# Patient Record
Sex: FEMALE | Race: WHITE | NOT HISPANIC OR LATINO | Employment: OTHER | URBAN - METROPOLITAN AREA
[De-identification: names, ages, dates, MRNs, and addresses within clinical notes are randomized per-mention and may not be internally consistent; named-entity substitution may affect disease eponyms.]

---

## 2017-01-24 DIAGNOSIS — R10.9 RIGHT FLANK PAIN: ICD-10-CM

## 2017-01-24 RX ORDER — TAMSULOSIN HYDROCHLORIDE 0.4 MG/1
0.4 CAPSULE ORAL DAILY
Qty: 10 CAPSULE | Refills: 1 | Status: SHIPPED | OUTPATIENT
Start: 2017-01-24 | End: 2017-03-03 | Stop reason: SDUPTHER

## 2017-02-27 DIAGNOSIS — R10.9 RIGHT FLANK PAIN: ICD-10-CM

## 2017-02-27 RX ORDER — TAMSULOSIN HYDROCHLORIDE 0.4 MG/1
0.4 CAPSULE ORAL DAILY
Qty: 10 CAPSULE | Refills: 0 | OUTPATIENT
Start: 2017-02-27

## 2017-03-03 DIAGNOSIS — R10.9 RIGHT FLANK PAIN: ICD-10-CM

## 2017-03-03 RX ORDER — TAMSULOSIN HYDROCHLORIDE 0.4 MG/1
0.4 CAPSULE ORAL DAILY
Qty: 10 CAPSULE | Refills: 1 | Status: SHIPPED | OUTPATIENT
Start: 2017-03-03 | End: 2017-05-18 | Stop reason: SDUPTHER

## 2017-03-14 DIAGNOSIS — J20.9 BRONCHITIS, ACUTE, WITH BRONCHOSPASM: ICD-10-CM

## 2017-03-14 RX ORDER — IPRATROPIUM BROMIDE AND ALBUTEROL 20; 100 UG/1; UG/1
SPRAY, METERED RESPIRATORY (INHALATION)
Qty: 1 PACKAGE | Refills: 2 | Status: SHIPPED | OUTPATIENT
Start: 2017-03-14 | End: 2017-06-07 | Stop reason: SDUPTHER

## 2017-04-05 DIAGNOSIS — R10.9 RIGHT FLANK PAIN: ICD-10-CM

## 2017-04-05 RX ORDER — TAMSULOSIN HYDROCHLORIDE 0.4 MG/1
0.4 CAPSULE ORAL DAILY
Qty: 10 CAPSULE | Refills: 1 | OUTPATIENT
Start: 2017-04-05

## 2017-05-18 DIAGNOSIS — R10.9 RIGHT FLANK PAIN: ICD-10-CM

## 2017-05-18 RX ORDER — TAMSULOSIN HYDROCHLORIDE 0.4 MG/1
0.4 CAPSULE ORAL DAILY
Qty: 10 CAPSULE | Refills: 1 | Status: SHIPPED | OUTPATIENT
Start: 2017-05-18 | End: 2017-07-31 | Stop reason: SDUPTHER

## 2017-06-07 DIAGNOSIS — J20.9 BRONCHITIS, ACUTE, WITH BRONCHOSPASM: ICD-10-CM

## 2017-06-07 RX ORDER — IPRATROPIUM BROMIDE AND ALBUTEROL 20; 100 UG/1; UG/1
SPRAY, METERED RESPIRATORY (INHALATION)
Qty: 4 G | Refills: 2 | Status: SHIPPED | OUTPATIENT
Start: 2017-06-07 | End: 2018-03-19 | Stop reason: SDUPTHER

## 2017-07-31 DIAGNOSIS — R10.9 RIGHT FLANK PAIN: ICD-10-CM

## 2017-07-31 RX ORDER — TAMSULOSIN HYDROCHLORIDE 0.4 MG/1
CAPSULE ORAL
Qty: 10 CAPSULE | Refills: 1 | Status: SHIPPED | OUTPATIENT
Start: 2017-07-31 | End: 2018-02-21

## 2017-08-23 DIAGNOSIS — J20.9 BRONCHITIS, ACUTE, WITH BRONCHOSPASM: ICD-10-CM

## 2017-08-23 RX ORDER — IPRATROPIUM BROMIDE AND ALBUTEROL 20; 100 UG/1; UG/1
SPRAY, METERED RESPIRATORY (INHALATION)
Refills: 2 | OUTPATIENT
Start: 2017-08-23

## 2017-08-29 ENCOUNTER — OFFICE VISIT (OUTPATIENT)
Dept: OPTOMETRY | Facility: CLINIC | Age: 62
End: 2017-08-29
Payer: MEDICARE

## 2017-08-29 DIAGNOSIS — H52.7 REFRACTIVE ERROR: ICD-10-CM

## 2017-08-29 DIAGNOSIS — H25.13 NUCLEAR SCLEROSIS, BILATERAL: Primary | ICD-10-CM

## 2017-08-29 DIAGNOSIS — H26.9 CORTICAL CATARACT: ICD-10-CM

## 2017-08-29 DIAGNOSIS — H04.123 DRY EYE SYNDROME, BILATERAL: ICD-10-CM

## 2017-08-29 PROCEDURE — 99999 PR PBB SHADOW E&M-EST. PATIENT-LVL I: CPT | Mod: PBBFAC,,, | Performed by: OPTOMETRIST

## 2017-08-29 PROCEDURE — 92015 DETERMINE REFRACTIVE STATE: CPT | Mod: ,,, | Performed by: OPTOMETRIST

## 2017-08-29 PROCEDURE — 99211 OFF/OP EST MAY X REQ PHY/QHP: CPT | Mod: PBBFAC,PO | Performed by: OPTOMETRIST

## 2017-08-29 PROCEDURE — 92014 COMPRE OPH EXAM EST PT 1/>: CPT | Mod: S$PBB,,, | Performed by: OPTOMETRIST

## 2017-08-29 RX ORDER — ONDANSETRON HYDROCHLORIDE 8 MG/1
TABLET, FILM COATED ORAL
Refills: 0 | COMMUNITY
Start: 2017-08-27 | End: 2021-08-09 | Stop reason: SDUPTHER

## 2017-08-29 RX ORDER — PANTOPRAZOLE SODIUM 40 MG/1
TABLET, DELAYED RELEASE ORAL
Refills: 0 | Status: ON HOLD | COMMUNITY
Start: 2017-08-27 | End: 2019-11-21 | Stop reason: SDUPTHER

## 2017-08-29 NOTE — PROGRESS NOTES
HPI     Presenting Complaint: Pt heretoday for yearly ocular health exam    (+) Pt states near and distance vision has been decline over the last   year.   (-) Pt stopped taking Plaquenil in 2015    Pain:None  Ophthalmic medication / drops:None    (-) headaches  (-) diplopia   (-) flashes / (+) hx of floaters      Last edited by Alfie Erwin, OD on 8/29/2017  1:40 PM. (History)            Assessment /Plan     For exam results, see Encounter Report.    Nuclear sclerosis, bilateral    Cortical cataract    Refractive error    Dry eye syndrome, bilateral      Mild cataracts OU. Discussed possible ocular affects of cataracts. Acceptable BCVA OU. Discussed treatment options. Surgery not recommended at this time. Monitor yearly.     Dispensed updated spectacle Rx. Discussed various spectacle lens options. Discussed adaptation period to new specs.     Mild MELISA OU. Discussed ocular affects of dry eyes. Recommend OTC Systane or Refresh gel artificial tears twice daily OU, caution transient blurring of vision with gel use. Discussed chronicity of MELISA. RTC if symptoms not alleviated by continued use of artificial tears.       RTC in 1 year for comprehensive eye exam, or sooner prn.

## 2017-09-28 DIAGNOSIS — Z12.11 COLON CANCER SCREENING: ICD-10-CM

## 2017-10-06 DIAGNOSIS — J20.9 BRONCHITIS, ACUTE, WITH BRONCHOSPASM: ICD-10-CM

## 2017-10-06 RX ORDER — IPRATROPIUM BROMIDE AND ALBUTEROL 20; 100 UG/1; UG/1
SPRAY, METERED RESPIRATORY (INHALATION)
Refills: 2 | OUTPATIENT
Start: 2017-10-06

## 2017-12-29 ENCOUNTER — TELEPHONE (OUTPATIENT)
Dept: FAMILY MEDICINE | Facility: CLINIC | Age: 62
End: 2017-12-29

## 2017-12-29 NOTE — TELEPHONE ENCOUNTER
Spoke with patient, to let her know that the clinic is closed.  Asked patient if she would like an appointment tomorrow with Saturday clinic.  Patient stated that she will be going to the ED.

## 2017-12-29 NOTE — TELEPHONE ENCOUNTER
----- Message from RT Javon sent at 12/29/2017 11:55 AM CST -----  Contact: pt    pt , requesting an appt to be worked in today for possible kidney infection, and she is in pain,  thanks.

## 2018-01-03 DIAGNOSIS — J20.9 BRONCHITIS, ACUTE, WITH BRONCHOSPASM: ICD-10-CM

## 2018-01-05 RX ORDER — IPRATROPIUM BROMIDE AND ALBUTEROL 20; 100 UG/1; UG/1
SPRAY, METERED RESPIRATORY (INHALATION)
Qty: 4 G | Refills: 2 | OUTPATIENT
Start: 2018-01-05

## 2018-02-20 ENCOUNTER — NURSE TRIAGE (OUTPATIENT)
Dept: ADMINISTRATIVE | Facility: CLINIC | Age: 63
End: 2018-02-20

## 2018-02-20 ENCOUNTER — TELEPHONE (OUTPATIENT)
Dept: FAMILY MEDICINE | Facility: CLINIC | Age: 63
End: 2018-02-20

## 2018-02-20 NOTE — TELEPHONE ENCOUNTER
Spoke with patient.  Has been sick for several days with cough.  Today experiencing shortness of breath.  Appointment with Dr Davis for 1pm today.

## 2018-02-20 NOTE — TELEPHONE ENCOUNTER
Was transferred from the scheduling desk. Patient stated that she has had some difficulty breathing which started a few days ago. Reported weak, shallow breathing and SOB even at rest. Thought it was her allergies. Advised to call EMS per protocol however seems hesitant. Informed that I would send a message to the provider's office. Instructed to call back with any additional problems and/or concerns    Reason for Disposition   Slow, shallow and weak breathing    Protocols used: ST BREATHING DIFFICULTY-A-OH

## 2018-02-20 NOTE — TELEPHONE ENCOUNTER
----- Message from Mireya Garcia sent at 2/20/2018 10:33 AM CST -----  Contact: patient  Type: Needs Medical Advice    Who Called:  patient  Symptoms (please be specific):  Shortness of breath  How long has patient had these symptoms:  A week, but it's getting worse  Pharmacy name and phone #: N/A  Best Call Back Number:   Additional Information: Patient calling to schedule an appt today for shortness of breath. Call to pod, no answer. Called and warm transferred to On Call Nurse.

## 2018-02-21 ENCOUNTER — OFFICE VISIT (OUTPATIENT)
Dept: FAMILY MEDICINE | Facility: CLINIC | Age: 63
End: 2018-02-21
Payer: MEDICARE

## 2018-02-21 VITALS
WEIGHT: 224.19 LBS | BODY MASS INDEX: 36.03 KG/M2 | HEART RATE: 103 BPM | SYSTOLIC BLOOD PRESSURE: 114 MMHG | TEMPERATURE: 99 F | DIASTOLIC BLOOD PRESSURE: 73 MMHG | OXYGEN SATURATION: 98 % | RESPIRATION RATE: 20 BRPM | HEIGHT: 66 IN

## 2018-02-21 DIAGNOSIS — J18.9 COMMUNITY ACQUIRED PNEUMONIA, UNSPECIFIED LATERALITY: Primary | ICD-10-CM

## 2018-02-21 PROCEDURE — 99999 PR PBB SHADOW E&M-EST. PATIENT-LVL III: CPT | Mod: PBBFAC,,, | Performed by: FAMILY MEDICINE

## 2018-02-21 PROCEDURE — 96372 THER/PROPH/DIAG INJ SC/IM: CPT | Mod: PBBFAC,PO

## 2018-02-21 PROCEDURE — 99214 OFFICE O/P EST MOD 30 MIN: CPT | Mod: S$PBB,,, | Performed by: FAMILY MEDICINE

## 2018-02-21 PROCEDURE — 99213 OFFICE O/P EST LOW 20 MIN: CPT | Mod: PBBFAC,PO,25 | Performed by: FAMILY MEDICINE

## 2018-02-21 RX ORDER — BENZONATATE 100 MG/1
100 CAPSULE ORAL 3 TIMES DAILY PRN
Qty: 45 CAPSULE | Refills: 1 | Status: SHIPPED | OUTPATIENT
Start: 2018-02-21 | End: 2018-03-22 | Stop reason: SDUPTHER

## 2018-02-21 RX ORDER — METHYLPREDNISOLONE 4 MG/1
TABLET ORAL
Qty: 1 PACKAGE | Refills: 0 | Status: SHIPPED | OUTPATIENT
Start: 2018-02-21 | End: 2018-03-19 | Stop reason: ALTCHOICE

## 2018-02-21 RX ORDER — DOXYCYCLINE 100 MG/1
100 CAPSULE ORAL 2 TIMES DAILY
Qty: 20 CAPSULE | Refills: 0 | Status: SHIPPED | OUTPATIENT
Start: 2018-02-21 | End: 2018-03-19 | Stop reason: ALTCHOICE

## 2018-02-21 RX ORDER — CEFTRIAXONE 1 G/1
1 INJECTION, POWDER, FOR SOLUTION INTRAMUSCULAR; INTRAVENOUS
Status: COMPLETED | OUTPATIENT
Start: 2018-02-21 | End: 2018-02-21

## 2018-02-21 RX ADMIN — CEFTRIAXONE SODIUM 1 G: 1 INJECTION, POWDER, FOR SOLUTION INTRAMUSCULAR; INTRAVENOUS at 01:02

## 2018-02-21 NOTE — PROGRESS NOTES
Patient identified by name and  with verbal feedback. Rocephin 1 gm administered IM to right upper outer quadrant gluteus using aseptic technique. Patient tolerated well, no adverse reactions noted/reported.

## 2018-02-21 NOTE — PROGRESS NOTES
Subjective:       Patient ID: Edith Tran is a 63 y.o. female.    Chief Complaint: No chief complaint on file.    Pt reports past Hx of Pulm Fibrosis and Pulm HTN      Cough   This is a new problem. The current episode started in the past 7 days. The problem has been gradually worsening. The problem occurs every few minutes. The cough is productive of purulent sputum. Associated symptoms include chest pain, ear congestion, shortness of breath and wheezing. Pertinent negatives include no fever, hemoptysis or rash.     Review of Systems   Constitutional: Negative for fever.   Respiratory: Positive for cough, shortness of breath and wheezing. Negative for hemoptysis.    Cardiovascular: Positive for chest pain.   Gastrointestinal: Negative for abdominal pain and nausea.   Skin: Negative for rash.   All other systems reviewed and are negative.      Objective:      Physical Exam   Constitutional: She appears well-developed. No distress.   HENT:   Right Ear: Tympanic membrane normal. Tympanic membrane is not erythematous.   Left Ear: Tympanic membrane normal. Tympanic membrane is not erythematous.   Nose: Mucosal edema present. Right sinus exhibits no maxillary sinus tenderness. Left sinus exhibits no maxillary sinus tenderness.   Mouth/Throat: Posterior oropharyngeal erythema present.   Neck: Neck supple.   Cardiovascular: Normal rate and regular rhythm.    No murmur heard.  Pulmonary/Chest: Effort normal. She has rales in the right middle field, the left upper field and the left lower field.   Lymphadenopathy:     She has no cervical adenopathy.   Skin: Skin is warm and dry.       Assessment:       1. Community acquired pneumonia, unspecified laterality        Plan:         Diagnoses and all orders for this visit:    Community acquired pneumonia, unspecified laterality  -     cefTRIAXone injection 1 g; Inject 1 g into the muscle one time.  -     doxycycline (MONODOX) 100 MG capsule; Take 1 capsule (100 mg total) by  mouth 2 (two) times daily.  -     benzonatate (TESSALON) 100 MG capsule; Take 1 capsule (100 mg total) by mouth 3 (three) times daily as needed for Cough.  -     methylPREDNISolone (MEDROL DOSEPACK) 4 mg tablet; use as directed

## 2018-03-07 DIAGNOSIS — R10.9 RIGHT FLANK PAIN: ICD-10-CM

## 2018-03-07 DIAGNOSIS — J20.9 BRONCHITIS, ACUTE, WITH BRONCHOSPASM: ICD-10-CM

## 2018-03-07 RX ORDER — TAMSULOSIN HYDROCHLORIDE 0.4 MG/1
CAPSULE ORAL
Qty: 10 CAPSULE | Refills: 1 | OUTPATIENT
Start: 2018-03-07

## 2018-03-07 RX ORDER — IPRATROPIUM BROMIDE AND ALBUTEROL 20; 100 UG/1; UG/1
SPRAY, METERED RESPIRATORY (INHALATION)
Qty: 4 G | Refills: 2 | OUTPATIENT
Start: 2018-03-07

## 2018-03-19 ENCOUNTER — OFFICE VISIT (OUTPATIENT)
Dept: FAMILY MEDICINE | Facility: CLINIC | Age: 63
End: 2018-03-19
Payer: MEDICARE

## 2018-03-19 ENCOUNTER — HOSPITAL ENCOUNTER (OUTPATIENT)
Dept: RADIOLOGY | Facility: CLINIC | Age: 63
Discharge: HOME | End: 2018-03-19
Attending: INTERNAL MEDICINE
Payer: MEDICARE

## 2018-03-19 ENCOUNTER — DOCUMENTATION ONLY (OUTPATIENT)
Dept: FAMILY MEDICINE | Facility: CLINIC | Age: 63
End: 2018-03-19

## 2018-03-19 VITALS
RESPIRATION RATE: 16 BRPM | HEIGHT: 66 IN | DIASTOLIC BLOOD PRESSURE: 84 MMHG | HEART RATE: 92 BPM | BODY MASS INDEX: 36.21 KG/M2 | OXYGEN SATURATION: 97 % | WEIGHT: 225.31 LBS | SYSTOLIC BLOOD PRESSURE: 126 MMHG | TEMPERATURE: 98 F

## 2018-03-19 DIAGNOSIS — I27.20 PULMONARY HYPERTENSION: Primary | ICD-10-CM

## 2018-03-19 DIAGNOSIS — J20.9 SUBACUTE BRONCHITIS: ICD-10-CM

## 2018-03-19 DIAGNOSIS — I27.20 PULMONARY HYPERTENSION: ICD-10-CM

## 2018-03-19 DIAGNOSIS — J20.9 BRONCHITIS, ACUTE, WITH BRONCHOSPASM: ICD-10-CM

## 2018-03-19 DIAGNOSIS — J30.1 CHRONIC ALLERGIC RHINITIS DUE TO POLLEN, UNSPECIFIED SEASONALITY: ICD-10-CM

## 2018-03-19 DIAGNOSIS — J01.81 OTHER ACUTE RECURRENT SINUSITIS: ICD-10-CM

## 2018-03-19 DIAGNOSIS — I10 ESSENTIAL HYPERTENSION: ICD-10-CM

## 2018-03-19 PROCEDURE — 71046 X-RAY EXAM CHEST 2 VIEWS: CPT | Mod: 26,,, | Performed by: RADIOLOGY

## 2018-03-19 PROCEDURE — 99214 OFFICE O/P EST MOD 30 MIN: CPT | Mod: S$GLB,,, | Performed by: INTERNAL MEDICINE

## 2018-03-19 PROCEDURE — 71046 X-RAY EXAM CHEST 2 VIEWS: CPT | Mod: TC,FY,PO

## 2018-03-19 RX ORDER — AMLODIPINE BESYLATE 5 MG/1
5 TABLET ORAL DAILY
Qty: 90 TABLET | Refills: 1 | Status: SHIPPED | OUTPATIENT
Start: 2018-03-19 | End: 2018-10-12 | Stop reason: SDUPTHER

## 2018-03-19 RX ORDER — HYDROXYZINE HYDROCHLORIDE 25 MG/1
25 TABLET, FILM COATED ORAL 3 TIMES DAILY PRN
Qty: 20 TABLET | Refills: 5 | Status: SHIPPED | OUTPATIENT
Start: 2018-03-19 | End: 2019-03-20 | Stop reason: SDUPTHER

## 2018-03-19 RX ORDER — MYCOPHENOLATE MOFETIL 500 MG/1
500 TABLET ORAL 2 TIMES DAILY
Qty: 180 TABLET | Refills: 1 | Status: SHIPPED | OUTPATIENT
Start: 2018-03-19 | End: 2019-07-15 | Stop reason: SDUPTHER

## 2018-03-19 RX ORDER — FLUTICASONE PROPIONATE 50 MCG
1 SPRAY, SUSPENSION (ML) NASAL DAILY
Qty: 3 BOTTLE | Refills: 3 | Status: SHIPPED | OUTPATIENT
Start: 2018-03-19 | End: 2019-04-05 | Stop reason: SDUPTHER

## 2018-03-19 RX ORDER — PREDNISONE 20 MG/1
40 TABLET ORAL DAILY
Qty: 4 TABLET | Refills: 0 | Status: SHIPPED | OUTPATIENT
Start: 2018-03-19 | End: 2018-03-21

## 2018-03-19 RX ORDER — BENZONATATE 200 MG/1
200 CAPSULE ORAL 3 TIMES DAILY PRN
Qty: 30 CAPSULE | Refills: 1 | Status: SHIPPED | OUTPATIENT
Start: 2018-03-19 | End: 2018-03-29

## 2018-03-19 RX ORDER — TADALAFIL 20 MG/1
20 TABLET ORAL DAILY
Qty: 90 TABLET | Refills: 1 | Status: SHIPPED | OUTPATIENT
Start: 2018-03-19 | End: 2019-01-11 | Stop reason: SDUPTHER

## 2018-03-19 NOTE — PROGRESS NOTES
"Subjective:       Patient ID: Edith Tran is a 63 y.o. female.    Chief Complaint: Follow-up; Pneumonia; Cough; and Sore Throat    HPI       CHIEF COMPLAINT: Cough(+).  HPI:  Has pulmonary htn, out of meds.     ONSET/TIMIN wks  ago    DURATION:               Paroxysmal: no .    QUALITY/COURSE:. better    INTENSITY/SEVERITY: Severity is #  8 (10 point scale)      The following symptoms/statements are positive if BOLD, negative otherwise.      CONTEXT/WHEN:  Tobacco_use. Smokers_in_home. Seasonal_pattern. Allergies/Hayfever. Sinusitis. Irritant_Exposure(smoke/dust/fumes). Exposure_to_others_with_similar_symptoms.        Similar_problems_in_past.   PAST TREATMENT OR EVALUATION:   previous PPD. Recent_previous_chest_x-ray. Recent_antibiotics.  Associated Symptoms:     sputum production: scant. copious. Hemoptysis.  Medical History: Past_pulmonary_infections.  Cardiovascular_disease.chronic_lung_disease.  tuberculosis. Asthma. AIDS. Gastroesophageal_reflux_disease .      Review of Systems   Constitutional: Negative for chills, diaphoresis, fatigue, fever and unexpected weight change.   HENT: Positive for sore throat. Negative for rhinorrhea and sinus pressure.    Respiratory: Positive for cough and shortness of breath. Negative for chest tightness and wheezing.    Cardiovascular: Negative for chest pain and palpitations.   Gastrointestinal: Negative for nausea and vomiting.   Musculoskeletal: Negative for myalgias.   Neurological: Negative for dizziness and weakness.   Psychiatric/Behavioral: The patient is not nervous/anxious.        Objective:      Vitals:    18 1400   BP: 126/84   Pulse: 92   Resp: 16   Temp: 98 °F (36.7 °C)   TempSrc: Oral   SpO2: 97%   Weight: 102.2 kg (225 lb 5 oz)   Height: 5' 6" (1.676 m)   PainSc: 0-No pain     Physical Exam   Constitutional: She appears well-developed and well-nourished.   HENT:   Right Ear: External ear normal.   Left Ear: External ear normal.   Mouth/Throat: " Oropharynx is clear and moist. No oropharyngeal exudate.   Cardiovascular: Normal rate, regular rhythm and normal heart sounds.  Exam reveals no friction rub.    No murmur heard.  Pulmonary/Chest: Effort normal. No respiratory distress. She has no wheezes. She has rales (both bases). She exhibits no tenderness.   Abdominal: Soft. Bowel sounds are normal. There is no tenderness.   Musculoskeletal: She exhibits no edema.   Lymphadenopathy:     She has no cervical adenopathy.   Nursing note and vitals reviewed.        Assessment:       1. Pulmonary hypertension    2. Subacute bronchitis    3. Bronchitis, acute, with bronchospasm    4. Other acute recurrent sinusitis    5. Chronic allergic rhinitis due to pollen, unspecified seasonality    6. Essential hypertension     I suspect that her shortness of breath and cough are due to the uncontrolled pulmonary hypertension since she's been off her medicine.  She is however immunosuppressed.  We'll get the x-ray as soon as possible      Plan:     Pulmonary hypertension  -     X-Ray Chest PA And Lateral; Future; Expected date: 03/19/2018  -     tadalafil, PULMONARY HYPERTENSION, (ADCIRCA) 20 mg Tab; Take 1 tablet (20 mg total) by mouth once daily.  Dispense: 90 tablet; Refill: 1  -     mycophenolate (CELLCEPT) 500 mg Tab; Take 1 tablet (500 mg total) by mouth 2 (two) times daily.  Dispense: 180 tablet; Refill: 1  -     Ambulatory consult to Pulmonology    Subacute bronchitis  -     Brain natriuretic peptide; Future; Expected date: 03/19/2018  -     D dimer, quantitative; Future; Expected date: 03/19/2018  -     CBC auto differential; Future; Expected date: 03/19/2018  -     Comprehensive metabolic panel; Future; Expected date: 03/19/2018  -     X-Ray Chest PA And Lateral; Future; Expected date: 03/19/2018  -     hydrOXYzine HCl (ATARAX) 25 MG tablet; Take 1 tablet (25 mg total) by mouth 3 (three) times daily as needed for Anxiety.  Dispense: 20 tablet; Refill: 5  -      benzonatate (TESSALON) 200 MG capsule; Take 1 capsule (200 mg total) by mouth 3 (three) times daily as needed for Cough.  Dispense: 30 capsule; Refill: 1  -     predniSONE (DELTASONE) 20 MG tablet; Take 2 tablets (40 mg total) by mouth once daily.  Dispense: 4 tablet; Refill: 0    Bronchitis, acute, with bronchospasm  -     ipratropium-albuterol (COMBIVENT RESPIMAT)  mcg/actuation inhaler; Inhale 1 puff into the lungs 4 (four) times daily. Rescue  Dispense: 4 g; Refill: 2    Other acute recurrent sinusitis  -     fluticasone (FLONASE) 50 mcg/actuation nasal spray; 1 spray (50 mcg total) by Each Nare route once daily.  Dispense: 3 Bottle; Refill: 3    Chronic allergic rhinitis due to pollen, unspecified seasonality  -     fluticasone (FLONASE) 50 mcg/actuation nasal spray; 1 spray (50 mcg total) by Each Nare route once daily.  Dispense: 3 Bottle; Refill: 3    Essential hypertension  -     amLODIPine (NORVASC) 5 MG tablet; Take 1 tablet (5 mg total) by mouth once daily.  Dispense: 90 tablet; Refill: 1      Follow-up in about 6 weeks (around 4/30/2018) for if you are not better return in one week.

## 2018-03-20 DIAGNOSIS — R06.00 DYSPNEA, UNSPECIFIED TYPE: ICD-10-CM

## 2018-03-21 ENCOUNTER — HOSPITAL ENCOUNTER (OUTPATIENT)
Dept: RADIOLOGY | Facility: HOSPITAL | Age: 63
Discharge: HOME | End: 2018-03-21
Attending: INTERNAL MEDICINE
Payer: MEDICARE

## 2018-03-21 DIAGNOSIS — R06.00 DYSPNEA, UNSPECIFIED TYPE: ICD-10-CM

## 2018-03-21 DIAGNOSIS — R93.5 ABNORMAL CT OF THE ABDOMEN: Primary | ICD-10-CM

## 2018-03-21 DIAGNOSIS — N28.89 KIDNEY MASS: Primary | ICD-10-CM

## 2018-03-21 PROCEDURE — 71275 CT ANGIOGRAPHY CHEST: CPT | Mod: 26,,, | Performed by: RADIOLOGY

## 2018-03-21 PROCEDURE — 25500020 PHARM REV CODE 255

## 2018-03-21 PROCEDURE — 71275 CT ANGIOGRAPHY CHEST: CPT | Mod: TC

## 2018-03-21 RX ORDER — SODIUM CHLORIDE 9 MG/ML
INJECTION, SOLUTION INTRAVENOUS
Status: DISCONTINUED
Start: 2018-03-21 | End: 2018-03-22 | Stop reason: HOSPADM

## 2018-03-21 RX ADMIN — IOHEXOL 100 ML: 350 INJECTION, SOLUTION INTRAVENOUS at 04:03

## 2018-03-22 ENCOUNTER — HOSPITAL ENCOUNTER (OUTPATIENT)
Dept: RADIOLOGY | Facility: HOSPITAL | Age: 63
Discharge: HOME | End: 2018-03-22
Attending: INTERNAL MEDICINE
Payer: MEDICARE

## 2018-03-22 DIAGNOSIS — J18.9 COMMUNITY ACQUIRED PNEUMONIA, UNSPECIFIED LATERALITY: ICD-10-CM

## 2018-03-22 DIAGNOSIS — N28.89 RENAL MASS: Primary | ICD-10-CM

## 2018-03-22 DIAGNOSIS — R93.5 ABNORMAL CT OF THE ABDOMEN: ICD-10-CM

## 2018-03-22 PROCEDURE — 76770 US EXAM ABDO BACK WALL COMP: CPT | Mod: 26,,, | Performed by: RADIOLOGY

## 2018-03-22 PROCEDURE — 76770 US EXAM ABDO BACK WALL COMP: CPT | Mod: TC

## 2018-03-23 RX ORDER — BENZONATATE 100 MG/1
CAPSULE ORAL
Qty: 45 CAPSULE | Refills: 1 | Status: SHIPPED | OUTPATIENT
Start: 2018-03-23 | End: 2018-05-01 | Stop reason: ALTCHOICE

## 2018-03-26 ENCOUNTER — TELEPHONE (OUTPATIENT)
Dept: FAMILY MEDICINE | Facility: CLINIC | Age: 63
End: 2018-03-26

## 2018-03-26 ENCOUNTER — HOSPITAL ENCOUNTER (OUTPATIENT)
Dept: RADIOLOGY | Facility: HOSPITAL | Age: 63
Discharge: HOME | End: 2018-03-26
Attending: INTERNAL MEDICINE
Payer: MEDICARE

## 2018-03-26 DIAGNOSIS — N28.89 RENAL MASS: ICD-10-CM

## 2018-03-26 DIAGNOSIS — N28.89 RENAL MASS: Primary | ICD-10-CM

## 2018-03-26 PROCEDURE — A9585 GADOBUTROL INJECTION: HCPCS | Performed by: INTERNAL MEDICINE

## 2018-03-26 PROCEDURE — 25500020 PHARM REV CODE 255: Performed by: INTERNAL MEDICINE

## 2018-03-26 PROCEDURE — 74183 MRI ABD W/O CNTR FLWD CNTR: CPT | Mod: TC

## 2018-03-26 PROCEDURE — 74183 MRI ABD W/O CNTR FLWD CNTR: CPT | Mod: 26,,, | Performed by: RADIOLOGY

## 2018-03-26 RX ORDER — GADOBUTROL 604.72 MG/ML
10 INJECTION INTRAVENOUS
Status: COMPLETED | OUTPATIENT
Start: 2018-03-26 | End: 2018-03-26

## 2018-03-26 RX ORDER — SODIUM CHLORIDE 9 MG/ML
INJECTION, SOLUTION INTRAVENOUS
Status: DISPENSED
Start: 2018-03-26 | End: 2018-03-26

## 2018-03-26 RX ORDER — GADOBUTROL 604.72 MG/ML
INJECTION INTRAVENOUS
Status: DISPENSED
Start: 2018-03-26 | End: 2018-03-26

## 2018-03-26 RX ADMIN — GADOBUTROL 10 ML: 604.72 INJECTION INTRAVENOUS at 06:03

## 2018-03-26 NOTE — TELEPHONE ENCOUNTER
----- Message from Laina Evans sent at 3/26/2018 11:30 AM CDT -----   Please call me to discuss and issue with this patients MRI this morning epic orders , Thanking you in advance.    Laina     [phone]

## 2018-04-03 ENCOUNTER — TELEPHONE (OUTPATIENT)
Dept: FAMILY MEDICINE | Facility: CLINIC | Age: 63
End: 2018-04-03

## 2018-04-03 DIAGNOSIS — R82.81 PYURIA: Primary | ICD-10-CM

## 2018-04-03 NOTE — TELEPHONE ENCOUNTER
Spoke with patient.  She had routine lab work yesterday at [facility] and they recommended a urine culture for bacteria in urine.  She is requesting orders.  Also, she will sign release of information when she come in to give sample for us to get info.  Fwd to provider.

## 2018-04-03 NOTE — TELEPHONE ENCOUNTER
----- Message from Shruthi Simpson sent at 4/3/2018  2:44 PM CDT -----  Contact: self   Patient requesting orders for urine culture, any questions please call back at [phone] ([phone])

## 2018-04-04 ENCOUNTER — PATIENT MESSAGE (OUTPATIENT)
Dept: FAMILY MEDICINE | Facility: CLINIC | Age: 63
End: 2018-04-04

## 2018-04-04 ENCOUNTER — CLINICAL SUPPORT (OUTPATIENT)
Dept: FAMILY MEDICINE | Facility: CLINIC | Age: 63
End: 2018-04-04
Payer: MEDICARE

## 2018-04-04 DIAGNOSIS — R82.81 PYURIA: ICD-10-CM

## 2018-04-04 PROCEDURE — 87086 URINE CULTURE/COLONY COUNT: CPT

## 2018-04-06 ENCOUNTER — PATIENT MESSAGE (OUTPATIENT)
Dept: FAMILY MEDICINE | Facility: CLINIC | Age: 63
End: 2018-04-06

## 2018-04-06 LAB — BACTERIA UR CULT: NO GROWTH

## 2018-05-01 ENCOUNTER — OFFICE VISIT (OUTPATIENT)
Dept: FAMILY MEDICINE | Facility: CLINIC | Age: 63
End: 2018-05-01
Payer: MEDICARE

## 2018-05-01 VITALS
HEIGHT: 66 IN | OXYGEN SATURATION: 98 % | SYSTOLIC BLOOD PRESSURE: 112 MMHG | TEMPERATURE: 98 F | DIASTOLIC BLOOD PRESSURE: 86 MMHG | RESPIRATION RATE: 16 BRPM | BODY MASS INDEX: 35.96 KG/M2 | WEIGHT: 223.75 LBS | HEART RATE: 91 BPM

## 2018-05-01 DIAGNOSIS — E78.5 HYPERLIPIDEMIA, UNSPECIFIED HYPERLIPIDEMIA TYPE: ICD-10-CM

## 2018-05-01 DIAGNOSIS — Z12.11 COLON CANCER SCREENING: ICD-10-CM

## 2018-05-01 DIAGNOSIS — M34.9 SCLERODERMA: Primary | ICD-10-CM

## 2018-05-01 DIAGNOSIS — Z12.4 CERVICAL CANCER SCREENING: ICD-10-CM

## 2018-05-01 DIAGNOSIS — J42 CHRONIC BRONCHITIS, UNSPECIFIED CHRONIC BRONCHITIS TYPE: ICD-10-CM

## 2018-05-01 PROCEDURE — 99213 OFFICE O/P EST LOW 20 MIN: CPT | Mod: S$GLB,,, | Performed by: INTERNAL MEDICINE

## 2018-05-01 NOTE — PROGRESS NOTES
"Subjective:       Patient ID: Edith Tran is a 63 y.o. female.    Chief Complaint: Follow-up (pneumonia)    HPI         CHIEF COMPLAINT: Cough(+).  HPI:     ONSET/TIMIN.5 mo   ago    DURATION:               Paroxysmal: no .    QUALITY/COURSE:. better    INTENSITY/SEVERITY: Severity is #  0 (10 point scale)      The following symptoms/statements are positive if BOLD, negative otherwise.      CONTEXT/WHEN:  Tobacco_use. Smokers_in_home. Seasonal_pattern. Allergies/Hayfever. Sinusitis. Irritant_Exposure(smoke/dust/fumes). Exposure_to_others_with_similar_symptoms.        Similar_problems_in_past.   PAST TREATMENT OR EVALUATION:   previous PPD. Recent_previous_chest_x-ray. Recent_antibiotics.  Associated Symptoms:     sputum production: scant. copious. Hemoptysis.  Medical History: Past_pulmonary_infections.  Cardiovascular_disease.chronic_lung_disease.  tuberculosis. Asthma. AIDS. Gastroesophageal_reflux_disease .      Review of Systems   Constitutional: Negative for chills, diaphoresis, fever and unexpected weight change.   HENT: Negative for rhinorrhea, sinus pressure and sore throat.    Respiratory: Negative for cough, shortness of breath and wheezing.    Cardiovascular: Negative for chest pain.   Musculoskeletal: Negative for myalgias.   Allergic/Immunologic: Positive for environmental allergies.       Objective:      Vitals:    18 0834   BP: 112/86   Pulse: 91   Resp: 16   Temp: 97.9 °F (36.6 °C)   TempSrc: Oral   SpO2: 98%   Weight: 101.5 kg (223 lb 12.3 oz)   Height: 5' 6" (1.676 m)   PainSc: 0-No pain     Physical Exam   Constitutional: She appears well-developed and well-nourished.   HENT:   Right Ear: External ear normal.   Left Ear: External ear normal.   Mouth/Throat: Oropharynx is clear and moist. No oropharyngeal exudate.   Cardiovascular: Normal rate, regular rhythm and normal heart sounds.  Exam reveals no friction rub.    No murmur heard.  Pulmonary/Chest: Effort normal and breath sounds " normal. No respiratory distress. She has no wheezes. She has no rales. She exhibits no tenderness.   Abdominal: Soft. Bowel sounds are normal. There is no tenderness.   Musculoskeletal: She exhibits no edema.   Lymphadenopathy:     She has no cervical adenopathy.   Neurological: She is alert.   Psychiatric: She has a normal mood and affect. Her behavior is normal. Thought content normal.   Nursing note and vitals reviewed.      The ASCVD Risk score (Manterwilber MCKEON Jr., et al., 2013) failed to calculate for the following reasons:    Cannot find a previous HDL lab    Cannot find a previous total cholesterol lab    Assessment:       1. Scleroderma    2. Chronic bronchitis, unspecified chronic bronchitis type    3. Colon cancer screening    4. Cervical cancer screening    5. Hyperlipidemia, unspecified hyperlipidemia type          Plan:   In the setting of scleroderma her severe bronchitis which lasted 2 months may be related to reflux.  Eventually it may have caused lung problems.  Scleroderma  -     Complete PFT with bronchodilator; Future    Chronic bronchitis, unspecified chronic bronchitis type  -     Complete PFT with bronchodilator; Future    Colon cancer screening  -     Ambulatory consult to Gastroenterology    Cervical cancer screening    Hyperlipidemia, unspecified hyperlipidemia type  -     Comprehensive metabolic panel; Future; Expected date: 05/01/2018  -     Lipid panel; Future; Expected date: 05/01/2018      Follow-up in about 3 months (around 8/1/2018).

## 2018-05-08 ENCOUNTER — HOSPITAL ENCOUNTER (OUTPATIENT)
Dept: RESPIRATORY THERAPY | Facility: HOSPITAL | Age: 63
Discharge: HOME | End: 2018-05-08
Attending: INTERNAL MEDICINE
Payer: MEDICARE

## 2018-05-08 DIAGNOSIS — M34.9 SCLERODERMA: ICD-10-CM

## 2018-05-08 DIAGNOSIS — J42 CHRONIC BRONCHITIS, UNSPECIFIED CHRONIC BRONCHITIS TYPE: ICD-10-CM

## 2018-05-08 PROCEDURE — 94729 DIFFUSING CAPACITY: CPT

## 2018-05-08 PROCEDURE — 94060 EVALUATION OF WHEEZING: CPT | Mod: 26,,, | Performed by: INTERNAL MEDICINE

## 2018-05-08 PROCEDURE — 94729 DIFFUSING CAPACITY: CPT | Mod: 26,,, | Performed by: INTERNAL MEDICINE

## 2018-05-08 PROCEDURE — 94727 GAS DIL/WSHOT DETER LNG VOL: CPT

## 2018-05-08 PROCEDURE — 94727 GAS DIL/WSHOT DETER LNG VOL: CPT | Mod: 26,,, | Performed by: INTERNAL MEDICINE

## 2018-05-08 PROCEDURE — 94060 EVALUATION OF WHEEZING: CPT

## 2018-05-09 NOTE — PROCEDURES
Pulmonary Functions, including spirometry and bronchodilator response and lung volumes and diffusion, study was done May 8, 2018.  Spirometry shows mild obstruction, loss vital capacity and obstruction and no bronchodilator response.   FEV1 is 65% or 1.68 liters.  Lung volumes show  loss of TLC with restriction 66%.  Diffusion shows reduced but falls within normal range when corrected for lung volumes.    Pulmonary functions show  Mild obstruction and also restriction. Clinical correlation recommended.     Mikal Serrano M.D.

## 2018-05-15 ENCOUNTER — PATIENT MESSAGE (OUTPATIENT)
Dept: FAMILY MEDICINE | Facility: CLINIC | Age: 63
End: 2018-05-15

## 2018-06-04 ENCOUNTER — OFFICE VISIT (OUTPATIENT)
Dept: FAMILY MEDICINE | Facility: CLINIC | Age: 63
End: 2018-06-04
Payer: MEDICARE

## 2018-06-04 ENCOUNTER — HOSPITAL ENCOUNTER (OUTPATIENT)
Dept: RADIOLOGY | Facility: CLINIC | Age: 63
Discharge: HOME | End: 2018-06-04
Attending: NURSE PRACTITIONER
Payer: MEDICARE

## 2018-06-04 VITALS
WEIGHT: 224.63 LBS | SYSTOLIC BLOOD PRESSURE: 114 MMHG | DIASTOLIC BLOOD PRESSURE: 76 MMHG | HEIGHT: 66 IN | BODY MASS INDEX: 36.1 KG/M2 | HEART RATE: 105 BPM | TEMPERATURE: 98 F | OXYGEN SATURATION: 96 % | RESPIRATION RATE: 16 BRPM

## 2018-06-04 DIAGNOSIS — R05.9 COUGH: ICD-10-CM

## 2018-06-04 DIAGNOSIS — J01.10 ACUTE FRONTAL SINUSITIS, RECURRENCE NOT SPECIFIED: Primary | ICD-10-CM

## 2018-06-04 PROCEDURE — 71046 X-RAY EXAM CHEST 2 VIEWS: CPT | Mod: TC,FY,PO

## 2018-06-04 PROCEDURE — 99213 OFFICE O/P EST LOW 20 MIN: CPT | Mod: S$GLB,,, | Performed by: NURSE PRACTITIONER

## 2018-06-04 PROCEDURE — 71046 X-RAY EXAM CHEST 2 VIEWS: CPT | Mod: 26,,, | Performed by: RADIOLOGY

## 2018-06-04 RX ORDER — AMOXICILLIN AND CLAVULANATE POTASSIUM 875; 125 MG/1; MG/1
1 TABLET, FILM COATED ORAL 2 TIMES DAILY
Qty: 20 TABLET | Refills: 0 | Status: SHIPPED | OUTPATIENT
Start: 2018-06-04 | End: 2018-06-14

## 2018-06-04 RX ORDER — PREDNISONE 5 MG/1
TABLET ORAL
Qty: 21 TABLET | Refills: 0 | Status: SHIPPED | OUTPATIENT
Start: 2018-06-04 | End: 2018-07-11 | Stop reason: SDUPTHER

## 2018-06-04 RX ORDER — PROMETHAZINE HYDROCHLORIDE AND DEXTROMETHORPHAN HYDROBROMIDE 6.25; 15 MG/5ML; MG/5ML
5 SYRUP ORAL NIGHTLY
Qty: 120 ML | Refills: 0 | Status: SHIPPED | OUTPATIENT
Start: 2018-06-04 | End: 2018-07-11 | Stop reason: SDUPTHER

## 2018-06-04 RX ORDER — BENZONATATE 200 MG/1
200 CAPSULE ORAL 3 TIMES DAILY PRN
Qty: 30 CAPSULE | Refills: 0 | Status: SHIPPED | OUTPATIENT
Start: 2018-06-04 | End: 2019-02-05

## 2018-06-04 NOTE — PROGRESS NOTES
Subjective:       Patient ID: Edith Tran is a 63 y.o. female.    Chief Complaint: cough, congestion, shortness of breath    Cough   This is a new problem. The current episode started in the past 7 days. The problem has been gradually worsening. The problem occurs every few minutes. The cough is productive of sputum (scant productive cough). Associated symptoms include chest pain, ear congestion, nasal congestion, a sore throat, shortness of breath and wheezing. Pertinent negatives include no chills, ear pain, headaches or myalgias. Risk factors for lung disease include travel. She has tried OTC cough suppressant and prescription cough suppressant for the symptoms. The treatment provided mild relief. has scleroderma, is currently on cellcept     Review of Systems   Constitutional: Negative for chills.   HENT: Positive for sore throat. Negative for ear pain.    Respiratory: Positive for cough, shortness of breath and wheezing.    Cardiovascular: Positive for chest pain.   Musculoskeletal: Negative for myalgias.   Neurological: Negative for headaches.       Objective:      Physical Exam   Constitutional: She is oriented to person, place, and time. She appears well-developed and well-nourished. No distress.   HENT:   Head: Normocephalic and atraumatic.   Right Ear: External ear normal.   Left Ear: External ear normal.   Mouth/Throat: Oropharynx is clear and moist. No oropharyngeal exudate.   Eyes: Conjunctivae are normal. Right eye exhibits no discharge. Left eye exhibits no discharge. No scleral icterus.   Neck: Normal range of motion. Neck supple.   Cardiovascular: Normal rate, regular rhythm and normal heart sounds.  Exam reveals no gallop and no friction rub.    No murmur heard.  Pulmonary/Chest: Effort normal. No respiratory distress. She has no wheezes. She has rales.   Lymphadenopathy:     She has no cervical adenopathy.   Neurological: She is alert and oriented to person, place, and time.   Skin: Skin is warm  and dry. She is not diaphoretic.   Psychiatric: She has a normal mood and affect. Her behavior is normal.   Nursing note and vitals reviewed.      Assessment:       1. Acute frontal sinusitis, recurrence not specified    2. Cough        Plan:       Acute frontal sinusitis, recurrence not specified  -     amoxicillin-clavulanate 875-125mg (AUGMENTIN) 875-125 mg per tablet; Take 1 tablet by mouth 2 (two) times daily. 1 tab po with food bid  Dispense: 20 tablet; Refill: 0    Cough  -     X-Ray Chest PA And Lateral; Future; Expected date: 06/04/2018  -     benzonatate (TESSALON) 200 MG capsule; Take 1 capsule (200 mg total) by mouth 3 (three) times daily as needed for Cough.  Dispense: 30 capsule; Refill: 0  -     promethazine-dextromethorphan (PROMETHAZINE-DM) 6.25-15 mg/5 mL Syrp; Take 5 mLs by mouth every evening.  Dispense: 120 mL; Refill: 0  -     predniSONE (DELTASONE) 5 MG tablet; 6 tabs the first day, then 5 the next, then 4, 3, 2, 1  Dispense: 21 tablet; Refill: 0      1 week if not better    Start the prednisone first thing in the morning, take all the tablets (6 first day, 5 second day, 4 third day, 3 forth day, 2 fifth day, 1 last day) with breakfast or 1/2 with breakfast and 1/2 with lunch). Do not use after 2:00 pm or it will keep you awake all night.

## 2018-06-28 ENCOUNTER — PATIENT MESSAGE (OUTPATIENT)
Dept: GASTROENTEROLOGY | Facility: CLINIC | Age: 63
End: 2018-06-28

## 2018-06-29 ENCOUNTER — TELEPHONE (OUTPATIENT)
Dept: GASTROENTEROLOGY | Facility: CLINIC | Age: 63
End: 2018-06-29

## 2018-06-29 NOTE — TELEPHONE ENCOUNTER
----- Message from Anna Ly sent at 6/29/2018  1:16 PM CDT -----  Call pt at [phone] asking to set up a colonoscopy

## 2018-07-11 ENCOUNTER — OFFICE VISIT (OUTPATIENT)
Dept: PULMONOLOGY | Facility: CLINIC | Age: 63
End: 2018-07-11
Payer: MEDICARE

## 2018-07-11 VITALS
BODY MASS INDEX: 35.19 KG/M2 | HEART RATE: 88 BPM | OXYGEN SATURATION: 100 % | WEIGHT: 218.94 LBS | DIASTOLIC BLOOD PRESSURE: 79 MMHG | HEIGHT: 66 IN | SYSTOLIC BLOOD PRESSURE: 131 MMHG

## 2018-07-11 DIAGNOSIS — K22.2 ESOPHAGEAL STRICTURE: ICD-10-CM

## 2018-07-11 DIAGNOSIS — M34.9 SCLERODERMA: ICD-10-CM

## 2018-07-11 DIAGNOSIS — J47.9 BRONCHIECTASIS WITHOUT COMPLICATION: ICD-10-CM

## 2018-07-11 DIAGNOSIS — J84.10 PULMONARY FIBROSIS: ICD-10-CM

## 2018-07-11 DIAGNOSIS — R05.9 COUGH: ICD-10-CM

## 2018-07-11 DIAGNOSIS — I27.20 PULMONARY HYPERTENSION: Primary | ICD-10-CM

## 2018-07-11 DIAGNOSIS — R09.89 CHRONIC SINUS COMPLAINTS: ICD-10-CM

## 2018-07-11 DIAGNOSIS — J44.9 CHRONIC OBSTRUCTIVE PULMONARY DISEASE, UNSPECIFIED COPD TYPE: ICD-10-CM

## 2018-07-11 PROCEDURE — 99999 PR PBB SHADOW E&M-EST. PATIENT-LVL IV: CPT | Mod: PBBFAC,,, | Performed by: INTERNAL MEDICINE

## 2018-07-11 PROCEDURE — 99214 OFFICE O/P EST MOD 30 MIN: CPT | Mod: PBBFAC,PO | Performed by: INTERNAL MEDICINE

## 2018-07-11 PROCEDURE — 99205 OFFICE O/P NEW HI 60 MIN: CPT | Mod: S$PBB,,, | Performed by: INTERNAL MEDICINE

## 2018-07-11 RX ORDER — ALBUTEROL SULFATE 90 UG/1
AEROSOL, METERED RESPIRATORY (INHALATION)
Qty: 1 INHALER | Refills: 11 | Status: SHIPPED | OUTPATIENT
Start: 2018-07-11 | End: 2019-01-14 | Stop reason: SDUPTHER

## 2018-07-11 RX ORDER — MONTELUKAST SODIUM 10 MG/1
10 TABLET ORAL NIGHTLY
Qty: 30 TABLET | Refills: 11 | Status: SHIPPED | OUTPATIENT
Start: 2018-07-11 | End: 2019-04-29 | Stop reason: SDUPTHER

## 2018-07-11 RX ORDER — PROMETHAZINE HYDROCHLORIDE AND DEXTROMETHORPHAN HYDROBROMIDE 6.25; 15 MG/5ML; MG/5ML
5 SYRUP ORAL NIGHTLY
Qty: 473 ML | Refills: 2 | Status: SHIPPED | OUTPATIENT
Start: 2018-07-11 | End: 2019-02-05

## 2018-07-11 RX ORDER — PREDNISONE 5 MG/1
TABLET ORAL
Qty: 21 TABLET | Refills: 1 | Status: SHIPPED | OUTPATIENT
Start: 2018-07-11 | End: 2018-08-01 | Stop reason: ALTCHOICE

## 2018-07-11 RX ORDER — AMOXICILLIN AND CLAVULANATE POTASSIUM 875; 125 MG/1; MG/1
1 TABLET, FILM COATED ORAL EVERY 12 HOURS
Qty: 20 TABLET | Refills: 3 | Status: SHIPPED | OUTPATIENT
Start: 2018-07-11 | End: 2018-08-01 | Stop reason: ALTCHOICE

## 2018-07-11 NOTE — PROGRESS NOTES
"7/11/2018    Edith Tran  New Patient Consult    Chief Complaint   Patient presents with    Pulmonary Hypertension    Pulmonary Fibrosis    Sputum Production     white and clear       HPI: has had raynauld's for yrs, pt had severe mobility problems issues leading to dx slceroderma 2007 - "rock bottom".  Has had swallow sticking with  2-3 dilations with last over 3 yrs ago.  Pt has had pulm htn on opsumit and talafadil,  Pt also on ofev in a study, and cellcept.  Also low dose prednisone.    Pt dx osas and uses cpap nightly 8 hrs - had used 02 but off 02 for 3 yrs.     Pt had had 6 min walks but none recently- none last yr at least.    Pt gets bronchitis with cough and yellow mucous.  Has occ wheezes.  Had exacerbations in feb and June - typically 2-3 yrly.  Tessalon helps.    Uses combivent occ ppt headaches with some breathing benefit.       Uses flonase regular.  Was on asthma rx for yrs.              The chief compliant  problem is new to me",   PFSH:  Past Medical History:   Diagnosis Date    Allergy     GERD (gastroesophageal reflux disease)     Scleroderma involving lung since she was 49 y/o         History reviewed. No pertinent surgical history.  Social History   Substance Use Topics    Smoking status: Former Smoker     Quit date: 9/2/1995    Smokeless tobacco: Not on file    Alcohol use No     Family History   Problem Relation Age of Onset    Kidney disease Mother     Cancer Father     Heart disease Brother     Mental illness Son     Glaucoma Neg Hx     Macular degeneration Neg Hx     Retinal detachment Neg Hx      Review of patient's allergies indicates:   Allergen Reactions    Plaquenil [hydroxychloroquine]      Having eye reaction, needs to stop plaquenil and stay off of it.        Performance Status:The patient's activity level is housebound activities.      Review of Systems:  a review of eleven systems covering constitutional, Eye, HEENT, Psych, Respiratory, Cardiac, GI, , " "Musculoskeletal, Endocrine, Dermatologic was negative except for pertinent findings as listed ABOVE and below:  pertinent positive as above, rest is good       Exam:Comprehensive exam done. /79 (BP Location: Right arm, Patient Position: Sitting)   Pulse 88   Ht 5' 6" (1.676 m)   Wt 99.3 kg (218 lb 14.7 oz)   SpO2 100%   BMI 35.33 kg/m²   Exam included Vitals as listed, and patient's appearance and affect and alertness and mood, oral exam for yeast and hygiene and pharynx lesions and Mallapatti (M) score, neck with inspection for jvd and masses and thyroid abnormalities and lymph nodes (supraclavicular and infraclavicular nodes and axillary also examined and noted if abn), chest exam included symmetry and effort and fremitus and percussion and auscultation, cardiac exam included rhythm and gallops and murmur and rubs and jvd and edema, abdominal exam for mass and hepatosplenomegaly and tenderness and hernias and bowel sounds, Musculoskeletal exam with muscle tone and posture and mobility/gait and  strength, and skin for rashes and cyanosis and pallor and turgor, extremity for clubbing.  Findings were normal except for pertinent findings listed below:M2, bibasiliar rales, min clubbing, rest good.       Radiographs (ct chest and cxr) reviewed: view by direct vision  March 2018 ct not too bad with cysts    Labs  noted    PFT results reviewed   Pulmonary Functions, including spirometry and bronchodilator response and lung volumes and diffusion, study was done May 8, 2018.  Spirometry shows mild obstruction, loss vital capacity and obstruction and no bronchodilator response.   FEV1 is 65% or 1.68 liters.  Lung volumes show  loss of TLC with restriction 66%.  Diffusion shows reduced but falls within normal range when corrected for lung volumes.     Pulmonary functions show  Mild obstruction and also restriction. Clinical correlation recommended.     Mikal Serrano M.D.    Plan:  Clinical impression is " apparently straight forward and impression with management as below.     Edith was seen today for pulmonary hypertension, pulmonary fibrosis and sputum production.    Diagnoses and all orders for this visit:    Pulmonary hypertension  -     Six Minute Walk Test to qualify for Home Oxygen; Future  -     PULSE OXIMETRY OVERNIGHT    Pulmonary fibrosis  -     Six Minute Walk Test to qualify for Home Oxygen; Future  -     PULSE OXIMETRY OVERNIGHT  -     AFB Culture & Smear; Future    Chronic sinus complaints  -     montelukast (SINGULAIR) 10 mg tablet; Take 1 tablet (10 mg total) by mouth every evening.    Bronchiectasis without complication  -     Culture, Respiratory with Gram Stain; Standing  -     fluticasone-umeclidin-vilanter (TRELEGY ELLIPTA) 100-62.5-25 mcg DsDv; Inhale 1 puff into the lungs once daily.  -     amoxicillin-clavulanate 875-125mg (AUGMENTIN) 875-125 mg per tablet; Take 1 tablet by mouth every 12 (twelve) hours.  -     albuterol 90 mcg/actuation inhaler; 2 puffs every 4 hours as needed for cough, wheeze, or shortness of breath  -     AFB Culture & Smear; Future    Esophageal stricture    Scleroderma    Chronic obstructive pulmonary disease, unspecified COPD type  -     Six Minute Walk Test to qualify for Home Oxygen; Future  -     PULSE OXIMETRY OVERNIGHT  -     fluticasone-umeclidin-vilanter (TRELEGY ELLIPTA) 100-62.5-25 mcg DsDv; Inhale 1 puff into the lungs once daily.  -     amoxicillin-clavulanate 875-125mg (AUGMENTIN) 875-125 mg per tablet; Take 1 tablet by mouth every 12 (twelve) hours.  -     albuterol 90 mcg/actuation inhaler; 2 puffs every 4 hours as needed for cough, wheeze, or shortness of breath  -     montelukast (SINGULAIR) 10 mg tablet; Take 1 tablet (10 mg total) by mouth every evening.  -     AFB Culture & Smear; Future    Cough  -     Cancel: AFB Culture & Smear  -     predniSONE (DELTASONE) 5 MG tablet; 6 tabs the first day, then 5 the next, then 4, 3, 2, 1  -      promethazine-dextromethorphan (PROMETHAZINE-DM) 6.25-15 mg/5 mL Syrp; Take 5 mLs by mouth every evening.        Follow-up in about 6 weeks (around 8/22/2018).    Discussed with patient above for education the following:      Patient Instructions   Chronic sinus   Use flonase,    Use singulair - may skip in a month if not clearly helps     augmentin for bad infection    Fibrosis and bronchiectasis and copd (bronchiectasis form of copd)   Risk for germs hard to treat- check culture   Continue scleroderma medications     Trial trelegy- continue if helps   Use albuterol as needed for cough - stop combivent when out- may set up nebulizer     If bronchitis flares- prednisone taper as you did in June 2018- augmentin (if not clearing yellow/green would need culture)    Check 6 min walk and 02 sleeping-- would arrange oxygen if needed.

## 2018-07-11 NOTE — LETTER
July 11, 2018      Tj Haywood MD  [address]  [place] [address]           [facility]  [address]  [place] [address]  Phone: [phone]  Fax: [phone]          Patient: Edith Tran   MR Number: [id number]   YOB: 1955   Date of Visit: 7/11/2018       Dear Dr. Tj Haywood:    Thank you for referring Edith Tran to me for evaluation. Attached you will find relevant portions of my assessment and plan of care.    If you have questions, please do not hesitate to call me. I look forward to following Edith Tran along with you.    Sincerely,    Mikal Serrano MD    Enclosure  CC:  No Recipients    If you would like to receive this communication electronically, please contact [email or web address] or [phone] to request more information on [employer] Link access.    For providers and/or their staff who would like to refer a patient to [facility], please contact us through our one-stop-shop provider referral line, [facility], at [phone].    If you feel you have received this communication in error or would no longer like to receive these types of communications, please e-mail [email or web address]

## 2018-07-11 NOTE — PATIENT INSTRUCTIONS
Chronic sinus   Use flonase,    Use singulair - may skip in a month if not clearly helps     augmentin for bad infection    Fibrosis and bronchiectasis and copd (bronchiectasis form of copd)   Risk for germs hard to treat- check culture   Continue scleroderma medications     Trial trelegy- continue if helps   Use albuterol as needed for cough - stop combivent when out- may set up nebulizer     If bronchitis flares- prednisone taper as you did in June 2018- augmentin (if not clearing yellow/green would need culture)    Check 6 min walk and 02 sleeping-- would arrange oxygen if needed.

## 2018-07-17 ENCOUNTER — HOSPITAL ENCOUNTER (OUTPATIENT)
Dept: RESPIRATORY THERAPY | Facility: HOSPITAL | Age: 63
Discharge: HOME | End: 2018-07-17
Attending: INTERNAL MEDICINE
Payer: MEDICARE

## 2018-07-17 DIAGNOSIS — I27.20 PULMONARY HYPERTENSION: ICD-10-CM

## 2018-07-17 DIAGNOSIS — J44.9 CHRONIC OBSTRUCTIVE PULMONARY DISEASE, UNSPECIFIED COPD TYPE: ICD-10-CM

## 2018-07-17 DIAGNOSIS — J84.10 PULMONARY FIBROSIS: ICD-10-CM

## 2018-07-17 LAB — BR6MWT: NORMAL

## 2018-07-17 PROCEDURE — 94618 PULMONARY STRESS TESTING: CPT

## 2018-07-23 ENCOUNTER — TELEPHONE (OUTPATIENT)
Dept: PULMONOLOGY | Facility: CLINIC | Age: 63
End: 2018-07-23

## 2018-07-23 DIAGNOSIS — I27.20 PULMONARY HYPERTENSION: Primary | ICD-10-CM

## 2018-07-23 NOTE — TELEPHONE ENCOUNTER
Oxygen ordered through [employer].     ----- Message from Mikal Serrano MD sent at 7/17/2018  4:39 PM CDT -----  Should wear 02 for activity - important for pulmonary hypertension.  Arrange 2lpm portalbe concentrator

## 2018-07-25 ENCOUNTER — CLINICAL SUPPORT (OUTPATIENT)
Dept: FAMILY MEDICINE | Facility: CLINIC | Age: 63
End: 2018-07-25
Payer: MEDICARE

## 2018-07-25 ENCOUNTER — OFFICE VISIT (OUTPATIENT)
Dept: GASTROENTEROLOGY | Facility: CLINIC | Age: 63
End: 2018-07-25
Payer: MEDICARE

## 2018-07-25 VITALS
DIASTOLIC BLOOD PRESSURE: 77 MMHG | SYSTOLIC BLOOD PRESSURE: 125 MMHG | HEART RATE: 98 BPM | WEIGHT: 217.81 LBS | BODY MASS INDEX: 35.16 KG/M2

## 2018-07-25 DIAGNOSIS — K50.00 TERMINAL ILEITIS WITHOUT COMPLICATION: ICD-10-CM

## 2018-07-25 DIAGNOSIS — R12 HEARTBURN: ICD-10-CM

## 2018-07-25 DIAGNOSIS — Z87.19 HISTORY OF ESOPHAGEAL ULCER: Primary | ICD-10-CM

## 2018-07-25 DIAGNOSIS — E78.5 HYPERLIPIDEMIA, UNSPECIFIED HYPERLIPIDEMIA TYPE: ICD-10-CM

## 2018-07-25 LAB
ALBUMIN SERPL BCP-MCNC: 3.9 G/DL
ALP SERPL-CCNC: 65 U/L
ALT SERPL W/O P-5'-P-CCNC: 34 U/L
ANION GAP SERPL CALC-SCNC: 11 MMOL/L
AST SERPL-CCNC: 33 U/L
BILIRUB SERPL-MCNC: 0.8 MG/DL
BUN SERPL-MCNC: 7 MG/DL
CALCIUM SERPL-MCNC: 9.6 MG/DL
CHLORIDE SERPL-SCNC: 105 MMOL/L
CHOLEST SERPL-MCNC: 190 MG/DL
CHOLEST/HDLC SERPL: 4.1 {RATIO}
CO2 SERPL-SCNC: 24 MMOL/L
CREAT SERPL-MCNC: 0.7 MG/DL
EST. GFR  (AFRICAN AMERICAN): >60 ML/MIN/1.73 M^2
EST. GFR  (NON AFRICAN AMERICAN): >60 ML/MIN/1.73 M^2
GLUCOSE SERPL-MCNC: 89 MG/DL
HDLC SERPL-MCNC: 46 MG/DL
HDLC SERPL: 24.2 %
LDLC SERPL CALC-MCNC: 111.2 MG/DL
NONHDLC SERPL-MCNC: 144 MG/DL
POTASSIUM SERPL-SCNC: 4 MMOL/L
PROT SERPL-MCNC: 7.1 G/DL
SODIUM SERPL-SCNC: 140 MMOL/L
TRIGL SERPL-MCNC: 164 MG/DL

## 2018-07-25 PROCEDURE — 99999 PR PBB SHADOW E&M-EST. PATIENT-LVL II: CPT | Mod: PBBFAC,,, | Performed by: INTERNAL MEDICINE

## 2018-07-25 PROCEDURE — 99212 OFFICE O/P EST SF 10 MIN: CPT | Mod: PBBFAC,PO | Performed by: INTERNAL MEDICINE

## 2018-07-25 PROCEDURE — 80053 COMPREHEN METABOLIC PANEL: CPT

## 2018-07-25 PROCEDURE — 80061 LIPID PANEL: CPT

## 2018-07-25 PROCEDURE — 99205 OFFICE O/P NEW HI 60 MIN: CPT | Mod: S$PBB,,, | Performed by: INTERNAL MEDICINE

## 2018-07-25 NOTE — PROGRESS NOTES
"[facility]     CC: Esophageal ulcer, ? Crohn's disease    HPI 63 y.o. female with history of scleroderma associated with pulmonary fibrosis and pulmonary hypertension, presents to follow up history of benign, chronic, esophageal ulcer that was biopsied at [facility] in 2012. Old records which were faxed to my office reviewed, esophageal ulcer pathology showed "squamous mucosa with ulcer, acute and chronic inflammation, granulation tissue, no evidence of dysplasia or malignancy". She does admit to frequent heartburn and early satiety, which are controlled on Nexium 40 mg daily.     Although her colonoscopy report is not available, the pathology from her colonoscopy in 2014 is, which shoed hyperplastic polyps in descending colon and rectum, as well as "ulceration, marked acute and chronic inflammation which extends into the submucosa" was found in the terminal ileum (viral stains negative), as well as "chronic and mild acute inflammation and focal surface hyperplastic changes" in the cecum. Patient states they were concerned she had Crohn's disease however never provided treatment. She has not had a follow up colonoscopy since. She has 2-3 formed BMs/day without blood and denies abdominal pain.       Past Medical History:   Diagnosis Date    Allergy     GERD (gastroesophageal reflux disease)     Scleroderma involving lung since she was 47 y/o       No past surgical history on file.    Social History   Substance Use Topics    Smoking status: Former Smoker     Quit date: 9/2/1995    Smokeless tobacco: Not on file    Alcohol use No   -No illicits    Family History   Problem Relation Age of Onset    Kidney disease Mother     Cancer Father     Heart disease Brother     Mental illness Son     Glaucoma Neg Hx     Macular degeneration Neg Hx     Retinal detachment Neg Hx        Review of Systems  General ROS: negative for - chills, fever or weight loss  Psychological ROS: negative for - hallucination, " depression or suicidal ideation  Ophthalmic ROS: negative for - blurry vision, photophobia or eye pain  ENT ROS: negative for - epistaxis, sore throat or rhinorrhea  Respiratory ROS: + chronic cough, shortness of breath, or wheezing  Cardiovascular ROS: no chest pain or dyspnea on exertion  Gastrointestinal ROS: + heartburn, + early satiety, no diarrhea  Genito-Urinary ROS: no dysuria, trouble voiding, or hematuria  Musculoskeletal ROS: negative for - arthralgia, myalgia, weakness  Neurological ROS: no syncope or seizures; no ataxia  Dermatological ROS: negative for pruritis, rash and jaundice    Physical Examination  /77   Pulse 98   Wt 98.8 kg (217 lb 13 oz)   BMI 35.16 kg/m²   General appearance: alert, cooperative, no distress  HENT: Normocephalic, atraumatic, neck symmetrical, no nasal discharge   Eyes: conjunctivae/corneas clear, PERRL, EOM's intact, sclera anicteric  Lungs: clear to auscultation bilaterally, no dullness to percussion bilaterally, symmetric expansion, breathing unlabored  Heart: regular rate and rhythm without rub; no displacement of the PMI   Abdomen: obese, soft, nontender, nondistended, BS normoactive ,no organomegaly appreciated   Extremities: extremities symmetric; no clubbing, cyanosis, or edema  Integument: Skin color, texture, turgor normal; no rashes; hair distrubution normal, no jaundice  Neurologic: Alert and oriented X 3, no focal sensory or motor neurologic deficits  Psychiatric: no pressured speech; normal affect; no evidence of impaired cognition, no anxiety/depression     Labs:  Lab Results   Component Value Date    WBC 6.71 03/19/2018    HGB 13.7 03/19/2018    HCT 44.0 03/19/2018    MCV 93 03/19/2018     03/19/2018     -LFTs- normal  -HCV Ab- NR    Imaging:  MRI abdomen March 2018 was independently visualized and reviewed by me and showed fatty liver, complex renal cyst    Assessment:   63 y.o. female with scleroderma, pulmonary fibrosis and pulmonary  hypertension, presents for follow up of history of esophageal ulcer as well as terminal ileitis in 2014 suspicious for Crohn's disease that has not been treated   Plan:  -Schedule EGD and colonoscopy with TI intubation as well as TI and segmental colon biopsies  -Continue daily PPI  -Check CBC, CRP      Ngozi Prince MD  [facility]  [address], [address]  [place] [address]  Office: [phone]  Fax: [phone]

## 2018-07-30 ENCOUNTER — LAB VISIT (OUTPATIENT)
Dept: LAB | Facility: HOSPITAL | Age: 63
End: 2018-07-30
Attending: INTERNAL MEDICINE
Payer: MEDICARE

## 2018-07-30 DIAGNOSIS — K50.00 TERMINAL ILEITIS WITHOUT COMPLICATION: ICD-10-CM

## 2018-07-30 LAB
BASOPHILS # BLD AUTO: 0.05 K/UL
BASOPHILS NFR BLD: 0.7 %
CRP SERPL-MCNC: 3.6 MG/L
DIFFERENTIAL METHOD: ABNORMAL
EOSINOPHIL # BLD AUTO: 0.2 K/UL
EOSINOPHIL NFR BLD: 2.2 %
ERYTHROCYTE [DISTWIDTH] IN BLOOD BY AUTOMATED COUNT: 12.9 %
HCT VFR BLD AUTO: 44.6 %
HGB BLD-MCNC: 14.3 G/DL
IMM GRANULOCYTES # BLD AUTO: 0.02 K/UL
IMM GRANULOCYTES NFR BLD AUTO: 0.3 %
LYMPHOCYTES # BLD AUTO: 1 K/UL
LYMPHOCYTES NFR BLD: 15.2 %
MCH RBC QN AUTO: 29.1 PG
MCHC RBC AUTO-ENTMCNC: 32.1 G/DL
MCV RBC AUTO: 91 FL
MONOCYTES # BLD AUTO: 0.4 K/UL
MONOCYTES NFR BLD: 5.3 %
NEUTROPHILS # BLD AUTO: 5.2 K/UL
NEUTROPHILS NFR BLD: 76.3 %
NRBC BLD-RTO: 0 /100 WBC
PLATELET # BLD AUTO: 203 K/UL
PMV BLD AUTO: 12.3 FL
RBC # BLD AUTO: 4.91 M/UL
WBC # BLD AUTO: 6.76 K/UL

## 2018-07-30 PROCEDURE — 85025 COMPLETE CBC W/AUTO DIFF WBC: CPT

## 2018-07-30 PROCEDURE — 36415 COLL VENOUS BLD VENIPUNCTURE: CPT | Mod: PO

## 2018-07-30 PROCEDURE — 86140 C-REACTIVE PROTEIN: CPT

## 2018-07-31 ENCOUNTER — PATIENT MESSAGE (OUTPATIENT)
Dept: GASTROENTEROLOGY | Facility: CLINIC | Age: 63
End: 2018-07-31

## 2018-08-01 ENCOUNTER — DOCUMENTATION ONLY (OUTPATIENT)
Dept: FAMILY MEDICINE | Facility: CLINIC | Age: 63
End: 2018-08-01

## 2018-08-01 ENCOUNTER — OFFICE VISIT (OUTPATIENT)
Dept: FAMILY MEDICINE | Facility: CLINIC | Age: 63
End: 2018-08-01
Payer: MEDICARE

## 2018-08-01 VITALS
TEMPERATURE: 98 F | OXYGEN SATURATION: 98 % | WEIGHT: 217.81 LBS | BODY MASS INDEX: 35.01 KG/M2 | DIASTOLIC BLOOD PRESSURE: 78 MMHG | SYSTOLIC BLOOD PRESSURE: 134 MMHG | RESPIRATION RATE: 16 BRPM | HEIGHT: 66 IN | HEART RATE: 88 BPM

## 2018-08-01 DIAGNOSIS — R09.02 HYPOXIA: ICD-10-CM

## 2018-08-01 DIAGNOSIS — M34.1 SCLERODERMA OF ESOPHAGUS: ICD-10-CM

## 2018-08-01 DIAGNOSIS — I10 HYPERTENSION, ESSENTIAL: Primary | ICD-10-CM

## 2018-08-01 DIAGNOSIS — I27.20 PULMONARY HYPERTENSION: ICD-10-CM

## 2018-08-01 PROCEDURE — 99214 OFFICE O/P EST MOD 30 MIN: CPT | Mod: S$GLB,,, | Performed by: INTERNAL MEDICINE

## 2018-08-01 NOTE — PROGRESS NOTES
Health Maintenance Due   Topic Date Due    TETANUS VACCINE  02/09/1973    Pap Smear with HPV Cotest  02/09/1976    Colonoscopy  02/09/2005    Influenza Vaccine  08/01/2018

## 2018-08-01 NOTE — PROGRESS NOTES
Subjective:       Patient ID: Edith Tran is a 63 y.o. female.    Chief Complaint: Hypertension (labs)    HPI     Has scleroderma and resultant pulmonary hypertension.  Also has pulmonary fibrosis.      CHIEF COMPLAINT: Hypertension  HPI:     ONSET:      QUALITY/COURSE:   Unchanged.     INTENSITY/SEVERITY:  Average blood pressure is 125/70 .     MODIFIERS/TREATMENTS:  Taking medications: yes. .High sodium intake: no. alcohol: no      The following symptoms are positive only if BOLDED, otherwise are negative.      SYMPTOMS/RELATED: Possible medication side effects include:   Depression..  . Cough. . Constipation.    REVIEW OF SYMPTOMS: . Weight_loss . Weight_gain . Leg_cramps ..    TARGET ORGAN DAMAGE:: angina/ prior myocardial infarction, chronic kidney disease, heart failure, left ventricular hypertrophy, peripheral artery disease, prior coronary revascularization, retinopathy, stroke. transient ischemic attack.      When she is tired can't cope with making any decisions.  This happens for example when she is shopping.    Review of Systems   Constitutional: Negative for activity change and unexpected weight change.   HENT: Positive for trouble swallowing (trouble with solids). Negative for rhinorrhea.    Eyes: Positive for visual disturbance. Negative for discharge.   Respiratory: Positive for shortness of breath. Negative for chest tightness and wheezing.    Cardiovascular: Negative for chest pain and palpitations.   Gastrointestinal: Negative for blood in stool, constipation, diarrhea and vomiting.   Endocrine: Negative for polydipsia and polyuria.   Genitourinary: Negative for difficulty urinating, dysuria, hematuria and menstrual problem.   Musculoskeletal: Positive for arthralgias and joint swelling. Negative for neck pain.   Neurological: Positive for weakness. Negative for headaches.   Psychiatric/Behavioral: Positive for confusion. Negative for dysphoric mood.         Objective:      Vitals:    08/01/18  "0835   BP: 134/78   Pulse: 88   Resp: 16   Temp: 97.9 °F (36.6 °C)   TempSrc: Oral   SpO2: 98%   Weight: 98.8 kg (217 lb 13 oz)   Height: 5' 6" (1.676 m)   PainSc: 0-No pain     Physical Exam   Constitutional: She appears well-developed and well-nourished.   Cardiovascular: Normal rate, regular rhythm and normal heart sounds.    Pulmonary/Chest: Effort normal and breath sounds normal.   Abdominal: Soft. There is no tenderness.   Neurological: She is alert.   Psychiatric: She has a normal mood and affect. Her behavior is normal. Thought content normal.   Nursing note and vitals reviewed.      The 10-year ASCVD risk score ([place] Academy[place] MCKEON Jr., et al., 2013) is: 7%    Values used to calculate the score:      Age: 63 years      Sex: Female      Is Non- : No      Diabetic: No      Tobacco smoker: No      Systolic Blood Pressure: 134 mmHg      Is BP treated: Yes      HDL Cholesterol: 46 mg/dL      Total Cholesterol: 190 mg/dL  6 min walk resulted in a decrease of her pulse ox from 90% to 87%  Assessment:       1. Hypertension, essential    2. Scleroderma of esophagus    3. Pulmonary hypertension    4. Hypoxia          Plan:   Patient is seen GI next week    Hypertension, essential    Scleroderma of esophagus    Pulmonary hypertension  -     OXYGEN FOR HOME USE    Hypoxia  -     OXYGEN FOR HOME USE      No Follow-up on file.      "

## 2018-08-01 NOTE — PATIENT INSTRUCTIONS
Lose 5 pounds.  Suggest [place] diet++    You do not have oxygen you need to rest it you're feeling confused.  For example, if you walk to the car and are having feelings of confusion do not drive because your oxygen would be too low.

## 2018-08-02 ENCOUNTER — TELEPHONE (OUTPATIENT)
Dept: FAMILY MEDICINE | Facility: CLINIC | Age: 63
End: 2018-08-02

## 2018-08-02 NOTE — TELEPHONE ENCOUNTER
----- Message from Keyla Huddleston sent at 8/2/2018  1:22 PM CDT -----  Type: Needs Medical Advice    Who Called:Millie Brunner [facility]  Symptoms (please be specific):  Na  How long has patient had these symptoms:  tiesha  Pharmacy name and phone #:  [facility]  Best Call Back Number: [phone]   Additional Information: For oxygen, stating received 2 part testing, need a 3 part, will also be faxing over information regarding the need for 3 part testing

## 2018-08-06 ENCOUNTER — PATIENT MESSAGE (OUTPATIENT)
Dept: PULMONOLOGY | Facility: CLINIC | Age: 63
End: 2018-08-06

## 2018-08-07 ENCOUNTER — HOSPITAL ENCOUNTER (OUTPATIENT)
Facility: HOSPITAL | Age: 63
Discharge: HOME | End: 2018-08-07
Attending: INTERNAL MEDICINE | Admitting: INTERNAL MEDICINE
Payer: MEDICARE

## 2018-08-07 ENCOUNTER — SURGERY (OUTPATIENT)
Age: 63
End: 2018-08-07

## 2018-08-07 ENCOUNTER — ANESTHESIA EVENT (OUTPATIENT)
Dept: ENDOSCOPY | Facility: HOSPITAL | Age: 63
End: 2018-08-07
Payer: MEDICARE

## 2018-08-07 ENCOUNTER — ANESTHESIA (OUTPATIENT)
Dept: ENDOSCOPY | Facility: HOSPITAL | Age: 63
End: 2018-08-07
Payer: MEDICARE

## 2018-08-07 VITALS
DIASTOLIC BLOOD PRESSURE: 88 MMHG | HEIGHT: 66 IN | TEMPERATURE: 98 F | SYSTOLIC BLOOD PRESSURE: 139 MMHG | RESPIRATION RATE: 16 BRPM | OXYGEN SATURATION: 97 % | HEART RATE: 93 BPM

## 2018-08-07 DIAGNOSIS — Z12.11 SCREENING FOR COLON CANCER: ICD-10-CM

## 2018-08-07 DIAGNOSIS — K63.3 IDIOPATHIC SMALL INTESTINAL ULCERS: Primary | ICD-10-CM

## 2018-08-07 PROBLEM — K22.9 ESOPHAGEAL ABNORMALITY: Status: ACTIVE | Noted: 2018-08-07

## 2018-08-07 PROBLEM — K57.90 DIVERTICULOSIS: Status: ACTIVE | Noted: 2018-08-07

## 2018-08-07 PROCEDURE — 43239 EGD BIOPSY SINGLE/MULTIPLE: CPT | Performed by: INTERNAL MEDICINE

## 2018-08-07 PROCEDURE — 27201012 HC FORCEPS, HOT/COLD, DISP: Performed by: INTERNAL MEDICINE

## 2018-08-07 PROCEDURE — 37000009 HC ANESTHESIA EA ADD 15 MINS: Performed by: INTERNAL MEDICINE

## 2018-08-07 PROCEDURE — 37000008 HC ANESTHESIA 1ST 15 MINUTES: Performed by: INTERNAL MEDICINE

## 2018-08-07 PROCEDURE — 63600175 PHARM REV CODE 636 W HCPCS: Performed by: NURSE ANESTHETIST, CERTIFIED REGISTERED

## 2018-08-07 PROCEDURE — D9220A PRA ANESTHESIA: Mod: ANES,,, | Performed by: ANESTHESIOLOGY

## 2018-08-07 PROCEDURE — 45380 COLONOSCOPY AND BIOPSY: CPT | Performed by: INTERNAL MEDICINE

## 2018-08-07 PROCEDURE — 25000003 PHARM REV CODE 250: Performed by: INTERNAL MEDICINE

## 2018-08-07 PROCEDURE — 88305 TISSUE EXAM BY PATHOLOGIST: CPT | Performed by: PATHOLOGY

## 2018-08-07 PROCEDURE — 43239 EGD BIOPSY SINGLE/MULTIPLE: CPT | Mod: 51,,, | Performed by: INTERNAL MEDICINE

## 2018-08-07 PROCEDURE — D9220A PRA ANESTHESIA: Mod: CRNA,,, | Performed by: NURSE ANESTHETIST, CERTIFIED REGISTERED

## 2018-08-07 PROCEDURE — 45380 COLONOSCOPY AND BIOPSY: CPT | Mod: ,,, | Performed by: INTERNAL MEDICINE

## 2018-08-07 RX ORDER — SODIUM CHLORIDE 9 MG/ML
INJECTION, SOLUTION INTRAVENOUS CONTINUOUS
Status: DISCONTINUED | OUTPATIENT
Start: 2018-08-07 | End: 2018-08-07 | Stop reason: HOSPADM

## 2018-08-07 RX ORDER — LIDOCAINE HCL/PF 100 MG/5ML
SYRINGE (ML) INTRAVENOUS
Status: DISCONTINUED | OUTPATIENT
Start: 2018-08-07 | End: 2018-08-07

## 2018-08-07 RX ORDER — PROPOFOL 10 MG/ML
INJECTION, EMULSION INTRAVENOUS
Status: COMPLETED
Start: 2018-08-07 | End: 2018-08-07

## 2018-08-07 RX ORDER — LIDOCAINE HYDROCHLORIDE 20 MG/ML
INJECTION, SOLUTION EPIDURAL; INFILTRATION; INTRACAUDAL; PERINEURAL
Status: DISCONTINUED
Start: 2018-08-07 | End: 2018-08-07 | Stop reason: HOSPADM

## 2018-08-07 RX ORDER — PROPOFOL 10 MG/ML
VIAL (ML) INTRAVENOUS
Status: DISCONTINUED | OUTPATIENT
Start: 2018-08-07 | End: 2018-08-07

## 2018-08-07 RX ADMIN — PROPOFOL 30 MG: 10 INJECTION, EMULSION INTRAVENOUS at 09:08

## 2018-08-07 RX ADMIN — SODIUM CHLORIDE: 0.9 INJECTION, SOLUTION INTRAVENOUS at 08:08

## 2018-08-07 RX ADMIN — PROPOFOL 100 MG: 10 INJECTION, EMULSION INTRAVENOUS at 09:08

## 2018-08-07 RX ADMIN — LIDOCAINE HYDROCHLORIDE 75 MG: 20 INJECTION, SOLUTION INTRAVENOUS at 09:08

## 2018-08-07 RX ADMIN — PROPOFOL 40 MG: 10 INJECTION, EMULSION INTRAVENOUS at 09:08

## 2018-08-07 NOTE — TRANSFER OF CARE
"Anesthesia Transfer of Care Note    Patient: Edith Tran    Procedure(s) Performed: Procedure(s) (LRB):  EGD (ESOPHAGOGASTRODUODENOSCOPY) (N/A)  COLONOSCOPY (N/A)    Patient location: PACU    Anesthesia Type: general    Transport from OR: Transported from OR on room air with adequate spontaneous ventilation    Post pain: adequate analgesia    Post assessment: no apparent anesthetic complications    Post vital signs: stable    Level of consciousness: awake    Nausea/Vomiting: no nausea/vomiting    Complications: none    Transfer of care protocol was followed      Last vitals:   Visit Vitals  /76   Pulse 88   Temp 36.3 °C (97.3 °F) (Tympanic)   Resp 11   Ht 5' 6" (1.676 m)   SpO2 95%   Breastfeeding? No     "

## 2018-08-07 NOTE — DISCHARGE INSTRUCTIONS
Gastritis (Adult)    Gastritis is inflammation and irritation of the stomach lining. It can be present for a short time (acute) or be long lasting (chronic). Gastritis is often caused by infection with bacteria called H pylori. More than a third of people in the [place] have this bacteria in their bodies. In many cases, H pylori causes no problems or symptoms. In some people, though, the infection irritates the stomach lining and causes gastritis. Other causes of stomach irritation include drinking alcohol or taking pain-relieving medicines called NSAIDs (such as aspirin or ibuprofen).   Symptoms of gastritis can include:  · Abdominal pain or bloating  · Loss of appetite  · Nausea or vomiting  · Vomiting blood or having black stools  · Feeling more tired than usual  An inflamed and irritated stomach lining is more likely to develop a sore called an ulcer. To help prevent this, gastritis should be treated.  Home care  If needed, medicines may be prescribed. If you have H pylori infection, treating it will likely relieve your symptoms. Other changes can help reduce stomach irritation and help it heal.  · If you have been prescribed medicines for H pylori infection, take them as directed. Take all of the medicine until it is finished or your healthcare provider tells you to stop, even if you feel better.  · Your healthcare provider may recommend avoiding NSAIDs. If you take daily aspirin for your heart or other medical reasons, do not stop without talking to your healthcare provider first.  · Avoid drinking alcohol.  · Stop smoking. Smoking can irritate the stomach and delay healing. As much as possible, stay away from second hand smoke.  Follow-up care  Follow up with your healthcare provider, or as advised by our staff. Testing may be needed to check for inflammation or an ulcer.  When to seek medical advice  Call your healthcare provider for any of the following:  · Stomach pain that gets worse or moves to the lower  right abdomen (appendix area)  · Chest pain that appears or gets worse, or spreads to the back, neck, shoulder, or arm  · Frequent vomiting (cant keep down liquids)  · Blood in the stool or vomit (red or black in color)  · Feeling weak or dizzy  · Fever of 100.4ºF (38ºC) or higher, or as directed by your healthcare provider  Date Last Reviewed: 6/22/2015 © 2000-2017 [employer]. [address] [place] [address]. All rights reserved. This information is not intended as a substitute for professional medical care. Always follow your healthcare professional's instructions.        What Is a Hiatal Hernia?    Hiatal hernia is when the area where the stomach and esophagus meet bulges up through the diaphragm into the chest cavity. In some cases, part of the stomach may bulge above the diaphragm. Stomach acid may move up into the esophagus and cause symptoms. The symptoms are often blamed on gastroesophageal reflux disease (GERD). You may only know about the hernia when it shows up on an X-ray taken for other reasons.   What you may feel  The hiatus is a normal hole in the diaphragm. The esophagus passes through this hole and leads to the stomach. In some cases, part of the stomach may bulge above the diaphragm. This bulge is called a hernia. Stomach acid may move up into the esophagus and cause symptoms.  When you eat, the muscle at the hiatus relaxes to allow food to pass into the stomach. It tightens again to keep food and digestive acids in the stomach.  Many people with hiatal hernias have mild symptoms. You may notice the following GERD symptoms:  · Heartburn or other chest discomfort  · A feeling of chest fullness after a meal  · Frequent burping  · Acid taste in the mouth  · Trouble swallowing  Treating symptoms  If you have been diagnosed with hiatal hernia, these suggestions may help improve symptoms:  · Lose excess weight. Extra weight puts pressure on the stomach and esophagus.  · Dont  lie down after eating. Sit up for at least an hour after eating. Lying down after eating can increase symptoms.  · Avoid certain foods and drinks. These include fatty foods, chocolate, coffee, mint, and other foods that cause symptoms for you.  · Dont smoke or drink alcohol. These can worsen symptoms.  · Look at your medicines. Discuss your medicines with your healthcare provider. Many medicines can cause symptoms.  · Consider an antacid medicine. Ask your healthcare provider about over-the-counter and prescription medicines that may help.  · Ask about surgery, if needed. Surgery is a treatment choice for some people. Your healthcare provider can determine if surgery is an option for you.    Date Last Reviewed: 10/1/2016  © 0168-0819 [employer]. [address] [place] [address], [place] [address]. All rights reserved. This information is not intended as a substitute for professional medical care. Always follow your healthcare professional's instructions.        Upper GI Endoscopy     During endoscopy, a long, flexible tube is used to view the inside of your upper GI tract.      Upper GI endoscopy allows your healthcare provider to look directly into the beginning of your gastrointestinal (GI) tract. The esophagus, stomach, and duodenum (the first part of the small intestine) make up the upper GI tract.   Before the exam  Follow these and any other instructions you are given before your endoscopy. If you dont follow the healthcare providers instructions carefully, the test may need to be canceled or done over:  · Don't eat or drink anything after midnight the night before your exam. If your exam is in the afternoon, drink only clear liquids in the morning. Don't eat or drink anything for 8 hours before the exam. In some cases, you may be able to take medicines with sips of water until 2 hours before the procedure. Speak with your healthcare provider about this.   · Bring your X-rays and any other test results  you have.  · Because you will be sedated, arrange for an adult to drive you home after the exam.  · Tell your healthcare provider before the exam if you are taking any medicines or have any medical problems.  The procedure  Here is what to expect:  · You will lie on the endoscopy table. Usually patients lie on the left side.  · You will be monitored and given oxygen.  · Your throat may be numbed with a spray or gargle. You are given medicine through an intravenous (IV) line that will help you relax and remain comfortable. You may be awake or asleep during the procedure.  · The healthcare provider will put the endoscope in your mouth and down your esophagus. It is thinner than most pieces of food that you swallow. It will not affect your breathing. The medicine helps keep you from gagging.  · Air is put into your GI tract to expand it. It can make you burp.  · During the procedure, the healthcare provider can take biopsies (tissue samples), remove abnormalities, such as polyps, or treat abnormalities through a variety of devices placed through the endoscope. You will not feel this.   · The endoscope carries images of your upper GI tract to a video screen. If you are awake, you may be able to look at the images.  · After the procedure is done, you will rest for a time. An adult must drive you home.  When to call your healthcare provider  Contact your healthcare provider if you have:  · Black or tarry stools, or blood in your stool  · Fever  · Pain in your belly that does not go away  · Nausea and vomiting, or vomiting blood   Date Last Reviewed: 7/1/2016 © 2000-2017 [employer]. [address] [place] [address], [place] [address]. All rights reserved. This information is not intended as a substitute for professional medical care. Always follow your healthcare professional's instructions.        Diverticulosis    Diverticulosis means that small pouches have formed in the wall of your large intestine (colon). Most  often, this problem causes no symptoms and is common as people age. But the pouches in the colon are at risk of becoming infected. When this happens, the condition is called diverticulitis. Although most people with diverticulosis never develop diverticulitis, it is still not uncommon. Rectal bleeding can also occur and in less common situations, a type of colon inflammation called colitis.  While most people do not have symptoms, some people with diverticulosis may have:  · Abdominal cramps and pain  · Bloating  · Constipation  · Change in bowel habits  Causes  The exact cause of diverticulosis (and diverticulitis) has not been proved, but a few things are associated with the condition:  · Low-fiber diet  · Constipation  · Lack of exercise  Your healthcare provider will talk with you about how to manage your condition. Diet changes may be all that are needed to help control diverticulosis and prevent progression to diverticulitis. If you develop diverticulitis, you will likely need other treatments.  Home care  You may be told to take fiber supplements daily. Fiber adds bulk to the stool so that it passes through the colon more easily. Stool softeners may be recommended. You may also be given medications for pain relief. Be sure to take all medications as directed.  In the past, people were told to avoid corn, nuts, and seeds. This is no longer necessary.  Follow these guidelines when caring for yourself at home:  · Eat unprocessed foods that are high in fiber. Whole grains, fruits, and vegetables are good choices.  · Drink 6 to 8 glasses of water every day unless your healthcare provider has you limit how much fluid you should have.  · Watch for changes in your bowel movements. Tell your provider if you notice any changes.  · Begin an exercise program. Ask your provider how to get started. Generally, walking is the best.  · Get plenty of rest and sleep.  Follow-up care  Follow up with your healthcare provider,  or as advised. Regular visits may be needed to check on your health. Sometimes special procedures such as colonoscopy, are needed after an episode of diverticulitis or blooding. Be sure to keep all your appointments.  If a stool sample was taken, or cultures were done, you should be told if they are positive, or if your treatment needs to be changed. You can call as directed for the results.  If X-rays were done, a radiologist will look at them. You will be told if there is a change in your treatment.  If antibiotics were prescribed, be sure to finish them all.  When to seek medical advice  Call your healthcare provider right away if any of these occur:  · Fever of 100.4°F (38°C) or higher, or as directed by your healthcare provider  · Severe cramps in the lower left side of the abdomen or pain that is getting worse  · Tenderness in the lower left side of the abdomen or worsening pain throughout the abdomen  · Diarrhea or constipation that doesn't get better within 24 hours  · Nausea and vomiting  · Bleeding from the rectum  Call 911  Call emergency services if any of the following occur:  · Trouble breathing  · Confusion  · Very drowsy or trouble awakening  · Fainting or loss of consciousness  · Rapid heart rate  · Chest pain  Date Last Reviewed: 12/30/2015  © 3102-9491 [employer]. [address] [place] [address], [place] [address]. All rights reserved. This information is not intended as a substitute for professional medical care. Always follow your healthcare professional's instructions.        Understanding Colon and Rectal Polyps    The colon (also called the large intestine) is a muscular tube that forms the last part of the digestive tract. It absorbs water and stores food waste. The colon is about 4 to 6 feet long. The rectum is the last 6 inches of the colon. The colon and rectum have a smooth lining composed of millions of cells. Changes in these cells can lead to growths in the colon that can become  cancerous and should be removed. Multiple tests are available to screen for colon cancer, but the colonoscopy is the most recommended test. During colonoscopy, these polyps can be removed. How often you need this test depends on many things including your condition, your family history, symptoms, and what the findings were at the previous colonoscopy.   When the colon lining changes  Changes that happen in the cells that line the colon or rectum can lead to growths called polyps. Over a period of years, polyps can turn cancerous. Removing polyps early may prevent cancer from ever forming.  Polyps  Polyps are fleshy clumps of tissue that form on the lining of the colon or rectum. Small polyps are usually benign (not cancerous). However, over time, cells in a polyp can change and become cancerous. Certain types of polyps known as adenomatous polyps are premalignant. The risk for invasive cancer increases with the size of the polyp and certain cell and gene features. This means that they can become cancerous if they're not removed. Hyperplastic polyps are benign. They can grow quite large and not turn cancerous.   Cancer  Almost all colorectal cancers start when polyp cells begin growing abnormally. As a cancerous tumor grows, it may involve more and more of the colon or rectum. In time, cancer can also grow beyond the colon or rectum and spread to nearby organs or to glands called lymph nodes. The cells can also travel to other parts of the body. This is known as metastasis. The earlier a cancerous tumor is removed, the better the chance of preventing its spread.    Date Last Reviewed: 8/1/2016  © 9514-3358 [employer]. [address] [place] [address], [place] [address]. All rights reserved. This information is not intended as a substitute for professional medical care. Always follow your healthcare professional's instructions.        Colonoscopy     A camera attached to a flexible tube with a viewing lens is  used to take video pictures.     Colonoscopy is a test to view the inside of your lower digestive tract (colon and rectum). Sometimes it can show the last part of the small intestine (ileum). During the test, small pieces of tissue may be removed for testing. This is called a biopsy. Small growths, such as polyps, may also be removed.   Why is colonoscopy done?  The test is done to help look for colon cancer. And it can help find the source of abdominal pain, bleeding, and changes in bowel habits. It may be needed once a year, depending on factors such as your:  · Age  · Health history  · Family health history  · Symptoms  · Results from any prior colonoscopy  Risks and possible complications  These include:  · Bleeding               · A puncture or tear in the colon   · Risks of anesthesia  · A cancer lesion not being seen  Getting ready   To prepare for the test:  · Talk with your healthcare provider about the risks of the test (see below). Also ask your healthcare provider about alternatives to the test.  · Tell your healthcare provider about any medicines you take. Also tell him or her about any health conditions you may have.  · Make sure your rectum and colon are empty for the test. Follow the diet and bowel prep instructions exactly. If you dont, the test may need to be rescheduled.  · Plan for a friend or family member to drive you home after the test.     Colonoscopy provides an inside view of the entire colon.     You may discuss the results with your doctor right away or at a future visit.  During the test   The test is usually done in the hospital on an outpatient basis. This means you go home the same day. The procedure takes about 30 minutes. During that time:  · You are given relaxing (sedating) medicine through an IV line. You may be drowsy, or fully asleep.  · The healthcare provider will first give you a physical exam to check for anal and rectal problems.  · Then the anus is lubricated and the  scope inserted.  · If you are awake, you may have a feeling similar to needing to have a bowel movement. You may also feel pressure as air is pumped into the colon. Its OK to pass gas during the procedure.  · Biopsy, polyp removal, or other treatments may be done during the test.  After the test   You may have gas right after the test. It can help to try to pass it to help prevent later bloating. Your healthcare provider may discuss the results with you right away. Or you may need to schedule a follow-up visit to talk about the results. After the test, you can go back to your normal eating and other activities. You may be tired from the sedation and need to rest for a few hours.  Date Last Reviewed: 11/1/2016  © 2995-4665 [employer]. [address] [place] [address], [place] [address]. All rights reserved. This information is not intended as a substitute for professional medical care. Always follow your healthcare professional's instructions.

## 2018-08-07 NOTE — ANESTHESIA PREPROCEDURE EVALUATION
08/07/2018  Edith Tran is a 63 y.o., female.    Anesthesia Evaluation    I have reviewed the Patient Summary Reports.    I have reviewed the Nursing Notes.   I have reviewed the Medications.     Review of Systems  Anesthesia Hx:  No problems with previous Anesthesia Denies Hx of Anesthetic complications    Social:  Non-Smoker    Cardiovascular:   Hypertension Denies MI.  Denies CAD.    Denies CABG/stent.   Denies Angina.    Pulmonary:   COPD Denies Asthma.  Denies Recent URI.    Renal/:   Denies Chronic Renal Disease.     Hepatic/GI:   GERD, poorly controlled Denies Liver Disease.    Neurological:   Denies TIA. Denies CVA. Denies Seizures.    Endocrine:   Denies Diabetes. Denies Hypothyroidism.    Psych:   Denies Psychiatric History.          Physical Exam  General:  Obesity    Airway/Jaw/Neck:  Airway Findings: Mouth Opening: Normal Tongue: Normal  General Airway Assessment: Adult, Good  Mallampati: II  Improves to II with phonation.  TM Distance: 4-6 cm      Dental:  Dental Findings: In tact   Chest/Lungs:  Chest/Lungs Findings: Clear to auscultation, Normal Respiratory Rate     Heart/Vascular:  Heart Findings: Rate: Normal  Rhythm: Regular Rhythm  Sounds: Normal  Heart murmur: negative       Mental Status:  Mental Status Findings:  Cooperative, Alert and Oriented         Anesthesia Plan  Type of Anesthesia, risks & benefits discussed:  Anesthesia Type:  general  Patient's Preference:   Intra-op Monitoring Plan: standard ASA monitors  Intra-op Monitoring Plan Comments:   Post Op Pain Control Plan:   Post Op Pain Control Plan Comments:   Induction:    Beta Blocker:  Patient is not currently on a Beta-Blocker (No further documentation required).       Informed Consent: Patient understands risks and agrees with Anesthesia plan.  Questions answered. Anesthesia consent signed with patient.  ASA Score: 3      Day of Surgery Review of History & Physical: I have interviewed and examined the patient. I have reviewed the patient's H&P dated:  There are no significant changes.          Ready For Surgery From Anesthesia Perspective.

## 2018-08-07 NOTE — [employer] PATIENT INSTRUCTIONS
Discharge Summary/Instructions after an Endoscopic Procedure  Patient Name: Edith Tran  Patient MRN: [id number]  Patient YOB: 1955 Tuesday, August 07, 2018  Ngozi Prince MD  RESTRICTIONS:  During your procedure today, you received medications for sedation.  These   medications may affect your judgment, balance and coordination.  Therefore,   for 24 hours, you have the following restrictions:   - DO NOT drive a car, operate machinery, make legal/financial decisions,   sign important papers or drink alcohol.    ACTIVITY:  Today: no heavy lifting, straining or running due to procedural   sedation/anesthesia.  The following day: return to full activity including work.  DIET:  Eat and drink normally unless instructed otherwise.     TREATMENT FOR COMMON SIDE EFFECTS:  - Mild abdominal pain, nausea, belching, bloating or excessive gas:  rest,   eat lightly and use a heating pad.  - Sore Throat: treat with throat lozenges and/or gargle with warm salt   water.  - Because air was used during the procedure, expelling large amounts of air   from your rectum or belching is normal.  - If a bowel prep was taken, you may not have a bowel movement for 1-3 days.    This is normal.  SYMPTOMS TO WATCH FOR AND REPORT TO YOUR PHYSICIAN:  1. Abdominal pain or bloating, other than gas cramps.  2. Chest pain.  3. Back pain.  4. Signs of infection such as: chills or fever occurring within 24 hours   after the procedure.  5. Rectal bleeding, which would show as bright red, maroon, or black stools.   (A tablespoon of blood from the rectum is not serious, especially if   hemorrhoids are present.)  6. Vomiting.  7. Weakness or dizziness.  GO DIRECTLY TO THE NEAREST EMERGENCY ROOM IF YOU HAVE ANY OF THE FOLLOWING:      Difficulty breathing              Chills and/or fever over 101 F   Persistent vomiting and/or vomiting blood   Severe abdominal pain   Severe chest pain   Black, tarry stools   Bleeding- more than one  tablespoon   Any other symptom or condition that you feel may need urgent attention  Your doctor recommends these additional instructions:  If any biopsies were taken, your doctors clinic will contact you in 1 to 2   weeks with any results.  - Discharge patient to home (with escort).   - Patient has a contact number available for emergencies.  The signs and   symptoms of potential delayed complications were discussed with the   patient.  Return to normal activities tomorrow.  Written discharge   instructions were provided to the patient.   - Resume previous diet.   - Continue present medications.   - Await pathology results.   - Repeat colonoscopy date to be determined after pending pathology results   are reviewed for surveillance based on pathology results.  -CTE to further evaluate small bowel   - Return to my office in 3 months.  For questions, problems or results please call your physician - Ngozi Prince MD at Work:  [phone].  [facility], EMERGENCY ROOM PHONE NUMBER: [phone]  IF A COMPLICATION OR EMERGENCY SITUATION ARISES AND YOU ARE UNABLE TO REACH   YOUR PHYSICIAN - GO DIRECTLY TO THE EMERGENCY ROOM.  Ngozi Prince MD  8/7/2018 10:04:12 AM  This report has been verified and signed electronically.  [employer]

## 2018-08-07 NOTE — [employer]ATION PATIENT INSTRUCTIONS
Discharge Summary/Instructions after an Endoscopic Procedure  Patient Name: Edith Tran  Patient MRN: [id number]  Patient YOB: 1955 Tuesday, August 07, 2018  Ngozi Prince MD  RESTRICTIONS:  During your procedure today, you received medications for sedation.  These   medications may affect your judgment, balance and coordination.  Therefore,   for 24 hours, you have the following restrictions:   - DO NOT drive a car, operate machinery, make legal/financial decisions,   sign important papers or drink alcohol.    ACTIVITY:  Today: no heavy lifting, straining or running due to procedural   sedation/anesthesia.  The following day: return to full activity including work.  DIET:  Eat and drink normally unless instructed otherwise.     TREATMENT FOR COMMON SIDE EFFECTS:  - Mild abdominal pain, nausea, belching, bloating or excessive gas:  rest,   eat lightly and use a heating pad.  - Sore Throat: treat with throat lozenges and/or gargle with warm salt   water.  - Because air was used during the procedure, expelling large amounts of air   from your rectum or belching is normal.  - If a bowel prep was taken, you may not have a bowel movement for 1-3 days.    This is normal.  SYMPTOMS TO WATCH FOR AND REPORT TO YOUR PHYSICIAN:  1. Abdominal pain or bloating, other than gas cramps.  2. Chest pain.  3. Back pain.  4. Signs of infection such as: chills or fever occurring within 24 hours   after the procedure.  5. Rectal bleeding, which would show as bright red, maroon, or black stools.   (A tablespoon of blood from the rectum is not serious, especially if   hemorrhoids are present.)  6. Vomiting.  7. Weakness or dizziness.  GO DIRECTLY TO THE NEAREST EMERGENCY ROOM IF YOU HAVE ANY OF THE FOLLOWING:      Difficulty breathing              Chills and/or fever over 101 F   Persistent vomiting and/or vomiting blood   Severe abdominal pain   Severe chest pain   Black, tarry stools   Bleeding- more than one  tablespoon   Any other symptom or condition that you feel may need urgent attention  Your doctor recommends these additional instructions:  If any biopsies were taken, your doctors clinic will contact you in 1 to 2   weeks with any results.  - Discharge patient to home (with escort).   - Patient has a contact number available for emergencies.  The signs and   symptoms of potential delayed complications were discussed with the   patient.  Return to normal activities tomorrow.  Written discharge   instructions were provided to the patient.   - Resume previous diet.   - Continue present medications including PPI.    - Await pathology results.   - Repeat upper endoscopy after studies are complete for surveillance based   on pathology results.   - Return to GI office in 3 months.  For questions, problems or results please call your physician - Ngozi Prince MD at Work:  [phone].  [facility], EMERGENCY ROOM PHONE NUMBER: [phone]  IF A COMPLICATION OR EMERGENCY SITUATION ARISES AND YOU ARE UNABLE TO REACH   YOUR PHYSICIAN - GO DIRECTLY TO THE EMERGENCY ROOM.  Ngozi Prince MD  8/7/2018 10:14:10 AM  This report has been verified and signed electronically.  [employer]

## 2018-08-07 NOTE — ANESTHESIA POSTPROCEDURE EVALUATION
"Anesthesia Post Evaluation    Patient: Edith Tran    Procedure(s) Performed: Procedure(s) (LRB):  EGD (ESOPHAGOGASTRODUODENOSCOPY) (N/A)  COLONOSCOPY (N/A)    Final Anesthesia Type: general  Patient location during evaluation: PACU  Patient participation: Yes- Able to Participate  Level of consciousness: awake and alert and oriented  Post-procedure vital signs: reviewed and stable  Pain management: adequate  Airway patency: patent  PONV status at discharge: No PONV  Anesthetic complications: no      Cardiovascular status: blood pressure returned to baseline  Respiratory status: unassisted, spontaneous ventilation and room air  Hydration status: euvolemic  Follow-up not needed.        Visit Vitals  /88   Pulse 93   Temp 36.4 °C (97.6 °F)   Resp 16   Ht 5' 6" (1.676 m)   SpO2 97%   Breastfeeding? No       Pain/Kranthi Score: Pain Assessment Performed: Yes (8/7/2018 10:16 AM)  Presence of Pain: denies (8/7/2018 10:16 AM)  Pain Rating Prior to Med Admin: 1 (8/7/2018 10:16 AM)  Pain Rating Post Med Admin: 1 (8/7/2018 10:16 AM)  Kranthi Score: 10 (8/7/2018 10:15 AM)      "

## 2018-08-07 NOTE — H&P (VIEW-ONLY)
"[facility]     CC: Esophageal ulcer, ? Crohn's disease    HPI 63 y.o. female with history of scleroderma associated with pulmonary fibrosis and pulmonary hypertension, presents to follow up history of benign, chronic, esophageal ulcer that was biopsied at [facility] in 2012. Old records which were faxed to my office reviewed, esophageal ulcer pathology showed "squamous mucosa with ulcer, acute and chronic inflammation, granulation tissue, no evidence of dysplasia or malignancy". She does admit to frequent heartburn and early satiety, which are controlled on Nexium 40 mg daily.     Although her colonoscopy report is not available, the pathology from her colonoscopy in 2014 is, which shoed hyperplastic polyps in descending colon and rectum, as well as "ulceration, marked acute and chronic inflammation which extends into the submucosa" was found in the terminal ileum (viral stains negative), as well as "chronic and mild acute inflammation and focal surface hyperplastic changes" in the cecum. Patient states they were concerned she had Crohn's disease however never provided treatment. She has not had a follow up colonoscopy since. She has 2-3 formed BMs/day without blood and denies abdominal pain.       Past Medical History:   Diagnosis Date    Allergy     GERD (gastroesophageal reflux disease)     Scleroderma involving lung since she was 47 y/o       No past surgical history on file.    Social History   Substance Use Topics    Smoking status: Former Smoker     Quit date: 9/2/1995    Smokeless tobacco: Not on file    Alcohol use No   -No illicits    Family History   Problem Relation Age of Onset    Kidney disease Mother     Cancer Father     Heart disease Brother     Mental illness Son     Glaucoma Neg Hx     Macular degeneration Neg Hx     Retinal detachment Neg Hx        Review of Systems  General ROS: negative for - chills, fever or weight loss  Psychological ROS: negative for - hallucination, " depression or suicidal ideation  Ophthalmic ROS: negative for - blurry vision, photophobia or eye pain  ENT ROS: negative for - epistaxis, sore throat or rhinorrhea  Respiratory ROS: + chronic cough, shortness of breath, or wheezing  Cardiovascular ROS: no chest pain or dyspnea on exertion  Gastrointestinal ROS: + heartburn, + early satiety, no diarrhea  Genito-Urinary ROS: no dysuria, trouble voiding, or hematuria  Musculoskeletal ROS: negative for - arthralgia, myalgia, weakness  Neurological ROS: no syncope or seizures; no ataxia  Dermatological ROS: negative for pruritis, rash and jaundice    Physical Examination  /77   Pulse 98   Wt 98.8 kg (217 lb 13 oz)   BMI 35.16 kg/m²   General appearance: alert, cooperative, no distress  HENT: Normocephalic, atraumatic, neck symmetrical, no nasal discharge   Eyes: conjunctivae/corneas clear, PERRL, EOM's intact, sclera anicteric  Lungs: clear to auscultation bilaterally, no dullness to percussion bilaterally, symmetric expansion, breathing unlabored  Heart: regular rate and rhythm without rub; no displacement of the PMI   Abdomen: obese, soft, nontender, nondistended, BS normoactive ,no organomegaly appreciated   Extremities: extremities symmetric; no clubbing, cyanosis, or edema  Integument: Skin color, texture, turgor normal; no rashes; hair distrubution normal, no jaundice  Neurologic: Alert and oriented X 3, no focal sensory or motor neurologic deficits  Psychiatric: no pressured speech; normal affect; no evidence of impaired cognition, no anxiety/depression     Labs:  Lab Results   Component Value Date    WBC 6.71 03/19/2018    HGB 13.7 03/19/2018    HCT 44.0 03/19/2018    MCV 93 03/19/2018     03/19/2018     -LFTs- normal  -HCV Ab- NR    Imaging:  MRI abdomen March 2018 was independently visualized and reviewed by me and showed fatty liver, complex renal cyst    Assessment:   63 y.o. female with scleroderma, pulmonary fibrosis and pulmonary  hypertension, presents for follow up of history of esophageal ulcer as well as terminal ileitis in 2014 suspicious for Crohn's disease that has not been treated   Plan:  -Schedule EGD and colonoscopy with TI intubation as well as TI and segmental colon biopsies  -Continue daily PPI  -Check CBC, CRP      Ngozi Prince MD  [facility]  [address], [address]  [place] [address]  Office: [phone]  Fax: [phone]

## 2018-08-07 NOTE — PLAN OF CARE
Have you had a colonoscopy LESS THAN 3 years ago?   * If YES, answer these questions*: No      1. Did patient have a prior colonic polyp in a previous surveillance/diagnostic colonoscopy and is 18 years or older on date of encounter?  2. Documentation of < 3 year interval since the patients last colonoscopy due to medical reasons (eg., last colonoscopy incomplete, last colonoscopy had inadequate prep, piecemeal removal of adenomas, or last colonoscopy found > 10 adenomas) ?

## 2018-08-14 ENCOUNTER — HOSPITAL ENCOUNTER (OUTPATIENT)
Dept: RADIOLOGY | Facility: HOSPITAL | Age: 63
Discharge: HOME | End: 2018-08-14
Attending: INTERNAL MEDICINE
Payer: MEDICARE

## 2018-08-14 DIAGNOSIS — K63.3 IDIOPATHIC SMALL INTESTINAL ULCERS: ICD-10-CM

## 2018-08-14 PROCEDURE — 74177 CT ABD & PELVIS W/CONTRAST: CPT | Mod: TC

## 2018-08-14 PROCEDURE — 74177 CT ABD & PELVIS W/CONTRAST: CPT | Mod: 26,,, | Performed by: RADIOLOGY

## 2018-08-14 PROCEDURE — 25500020 PHARM REV CODE 255

## 2018-08-14 RX ORDER — SODIUM CHLORIDE 9 MG/ML
INJECTION, SOLUTION INTRAVENOUS
Status: DISPENSED
Start: 2018-08-14 | End: 2018-08-14

## 2018-08-14 RX ADMIN — IOHEXOL 100 ML: 350 INJECTION, SOLUTION INTRAVENOUS at 08:08

## 2018-08-20 ENCOUNTER — PATIENT MESSAGE (OUTPATIENT)
Dept: GASTROENTEROLOGY | Facility: CLINIC | Age: 63
End: 2018-08-20

## 2018-08-21 ENCOUNTER — OFFICE VISIT (OUTPATIENT)
Dept: PULMONOLOGY | Facility: CLINIC | Age: 63
End: 2018-08-21
Payer: MEDICARE

## 2018-08-21 ENCOUNTER — HOSPITAL ENCOUNTER (OUTPATIENT)
Dept: RESPIRATORY THERAPY | Facility: HOSPITAL | Age: 63
Discharge: HOME | End: 2018-08-21
Attending: INTERNAL MEDICINE
Payer: MEDICARE

## 2018-08-21 VITALS
DIASTOLIC BLOOD PRESSURE: 84 MMHG | OXYGEN SATURATION: 98 % | BODY MASS INDEX: 34.61 KG/M2 | HEIGHT: 66 IN | HEART RATE: 107 BPM | WEIGHT: 215.38 LBS | SYSTOLIC BLOOD PRESSURE: 115 MMHG

## 2018-08-21 DIAGNOSIS — I27.20 PULMONARY HYPERTENSION: ICD-10-CM

## 2018-08-21 DIAGNOSIS — J84.9 INTERSTITIAL LUNG DISEASE: ICD-10-CM

## 2018-08-21 DIAGNOSIS — M34.9 SCLERODERMA: ICD-10-CM

## 2018-08-21 DIAGNOSIS — J96.11 CHRONIC HYPOXEMIC RESPIRATORY FAILURE: ICD-10-CM

## 2018-08-21 DIAGNOSIS — R09.89 CHRONIC SINUS COMPLAINTS: ICD-10-CM

## 2018-08-21 DIAGNOSIS — I27.20 PULMONARY HYPERTENSION: Primary | ICD-10-CM

## 2018-08-21 LAB — BR6MWT: NORMAL

## 2018-08-21 PROCEDURE — 94618 PULMONARY STRESS TESTING: CPT

## 2018-08-21 PROCEDURE — 99999 PR PBB SHADOW E&M-EST. PATIENT-LVL IV: CPT | Mod: PBBFAC,,, | Performed by: INTERNAL MEDICINE

## 2018-08-21 PROCEDURE — 99214 OFFICE O/P EST MOD 30 MIN: CPT | Mod: S$PBB,,, | Performed by: INTERNAL MEDICINE

## 2018-08-21 PROCEDURE — 99214 OFFICE O/P EST MOD 30 MIN: CPT | Mod: PBBFAC,PO | Performed by: INTERNAL MEDICINE

## 2018-08-21 NOTE — PROGRESS NOTES
"2018    Edith Tran  New Patient Consult    Chief Complaint   Patient presents with    Follow-up       HPI:   Aug 21, uses trelegy, no c/o.  No 0x arranges- sat 90 falls to 87 at 2 mins- walked 307 meters or 71%.  Dx Crohn's.  No abx nor prednisone.  cpap good and sinus good.  Instructions:Would monitor 6 min walk every 3 months.    307 meters was last distance.  Six min walk.  Monitor ct chest yearly in March - sooner if worsens.  Needs 02 for activity.  Use medications for bronchitis.   Stay active.    has had raynauld's for yrs, pt had severe mobility problems issues leading to dx slceroderma  - "rock bottom".  Has had swallow sticking with  2-3 dilations with last over 3 yrs ago.  Pt has had pulm htn on opsumit and talafadil,  Pt also on ofev in a study, and cellcept.  Also low dose prednisone.  Pt dx osas and uses cpap nightly 8 hrs - had used 02 but off 02 for 3 yrs.   Pt had had 6 min walks but none recently- none last yr at least.  Pt gets bronchitis with cough and yellow mucous.  Has occ wheezes.  Had exacerbations in feb and  - typically 2-3 yrly.  Tessalon helps.  Uses combivent occ ppt headaches with some breathing benefit.     Uses flonase regular.  Was on asthma rx for yrs.              The chief compliant  problem is new to me",   PFSH:  Past Medical History:   Diagnosis Date    Allergy     COPD (chronic obstructive pulmonary disease)     GERD (gastroesophageal reflux disease)     Hypertension     Scleroderma involving lung since she was 47 y/o         History reviewed. No pertinent surgical history.  Social History     Tobacco Use    Smoking status: Former Smoker     Last attempt to quit: 1995     Years since quittin.9   Substance Use Topics    Alcohol use: No    Drug use: No     Family History   Problem Relation Age of Onset    Kidney disease Mother     Cancer Father     Heart disease Brother     Mental illness Son     Glaucoma Neg Hx     Macular " "degeneration Neg Hx     Retinal detachment Neg Hx      Review of patient's allergies indicates:   Allergen Reactions    Plaquenil [hydroxychloroquine]      Having eye reaction, needs to stop plaquenil and stay off of it.        Performance Status:The patient's activity level is housebound activities.      Review of Systems:  a review of eleven systems covering constitutional, Eye, HEENT, Psych, Respiratory, Cardiac, GI, , Musculoskeletal, Endocrine, Dermatologic was negative except for pertinent findings as listed ABOVE and below:  pertinent positive as above, rest is good       Exam:Comprehensive exam done. /84 (BP Location: Left arm, Patient Position: Sitting)   Pulse 107   Ht 5' 6" (1.676 m)   Wt 97.7 kg (215 lb 6.2 oz)   SpO2 98% Comment: on room air  BMI 34.76 kg/m²   Exam included Vitals as listed, and patient's appearance and affect and alertness and mood, oral exam for yeast and hygiene and pharynx lesions and Mallapatti (M) score, neck with inspection for jvd and masses and thyroid abnormalities and lymph nodes (supraclavicular and infraclavicular nodes and axillary also examined and noted if abn), chest exam included symmetry and effort and fremitus and percussion and auscultation, cardiac exam included rhythm and gallops and murmur and rubs and jvd and edema, abdominal exam for mass and hepatosplenomegaly and tenderness and hernias and bowel sounds, Musculoskeletal exam with muscle tone and posture and mobility/gait and  strength, and skin for rashes and cyanosis and pallor and turgor, extremity for clubbing.  Findings were normal except for pertinent findings listed below:M2, bibasiliar rales, min clubbing, rest good.       Radiographs (ct chest and cxr) reviewed: view by direct vision  March 2018 ct not too bad with cysts- viewed 8/21/18    Labs  noted    PFT results reviewed   Pulmonary Functions, including spirometry and bronchodilator response and lung volumes and diffusion, study " was done May 8, 2018.  Spirometry shows mild obstruction, loss vital capacity and obstruction and no bronchodilator response.   FEV1 is 65% or 1.68 liters.  Lung volumes show  loss of TLC with restriction 66%.  Diffusion shows reduced but falls within normal range when corrected for lung volumes.   Pulmonary functions show  Mild obstruction and also restriction. Clinical correlation recommended.   Mikal Serrano M.D.    Plan:  Clinical impression is apparently straight forward and impression with management as below.     Edith was seen today for follow-up.    Diagnoses and all orders for this visit:    Pulmonary hypertension  -     Stress test, pulmonary; Standing    Interstitial lung disease  -     CT Chest Without Contrast; Future    Scleroderma  -     CT Chest Without Contrast; Future    Chronic hypoxemic respiratory failure    Chronic sinus complaints        Follow-up in about 3 months (around 11/21/2018).    Discussed with patient above for education the following:      Patient Instructions   Would monitor 6 min walk every 3 months.    307 meters was last distance.  Six min walk.  Monitor ct chest yearly in March - sooner if worsens.  Needs 02 for activity.  Use medications for bronchitis.   Stay active.

## 2018-08-21 NOTE — PATIENT INSTRUCTIONS
Would monitor 6 min walk every 3 months.    307 meters was last distance.  Six min walk.  Monitor ct chest yearly in March - sooner if worsens.  Needs 02 for activity.  Use medications for bronchitis.   Stay active.

## 2018-09-12 ENCOUNTER — OFFICE VISIT (OUTPATIENT)
Dept: GASTROENTEROLOGY | Facility: CLINIC | Age: 63
End: 2018-09-12
Payer: MEDICARE

## 2018-09-12 VITALS
HEART RATE: 96 BPM | SYSTOLIC BLOOD PRESSURE: 130 MMHG | BODY MASS INDEX: 34.16 KG/M2 | WEIGHT: 211.63 LBS | DIASTOLIC BLOOD PRESSURE: 84 MMHG

## 2018-09-12 DIAGNOSIS — K50.80 CROHN'S DISEASE OF BOTH SMALL AND LARGE INTESTINE WITHOUT COMPLICATION: Primary | ICD-10-CM

## 2018-09-12 PROCEDURE — 99213 OFFICE O/P EST LOW 20 MIN: CPT | Mod: PBBFAC,PO | Performed by: INTERNAL MEDICINE

## 2018-09-12 PROCEDURE — 99214 OFFICE O/P EST MOD 30 MIN: CPT | Mod: S$PBB,,, | Performed by: INTERNAL MEDICINE

## 2018-09-12 PROCEDURE — 99999 PR PBB SHADOW E&M-EST. PATIENT-LVL III: CPT | Mod: PBBFAC,,, | Performed by: INTERNAL MEDICINE

## 2018-09-12 NOTE — PROGRESS NOTES
"[facility]    CC: Crohn's disease    HPI 63 y.o. female with history of scleroderma associated with pulmonary fibrosis and pulmonary HTN on Cellcept, Psumit, Nintedanib, and Tadalafil, presents for recently diagnosed, non-stricturing, non-penetrating ileocolonic Crohn's disease. In August she underwent both EGD (esophageal hyperkeratosis, gastric inflammation negative for H.pylori, hyperplastic gastric polyps) and colonoscopy (mild inflammation in distal TI  biopsies "focal active ileitis with villous atrophy" with endoscopically normal colonic mucosa, ascending, transverse colon biopsies "mild chronic colitis" ). She also had a CTE which shows chronic inflammatory changes in the TI. She currently is asymptomatic, denies diarrhea, blood in stool or abdominal pain. She has no prior treatment for Crohn's disease. As referenced in my last note, she underwent colonoscopy in 2014 at [facility]. Although her colonoscopy report is not available, the pathology from her colonoscopy in 2014 is, which showed hyperplastic polyps in descending colon and rectum, as well as "ulceration, marked acute and chronic inflammation which extends into the submucosa" was found in the terminal ileum (viral stains negative), as well as "chronic and mild acute inflammation and focal surface hyperplastic changes" in the cecum.    Past Medical History:   Diagnosis Date    Allergy     COPD (chronic obstructive pulmonary disease)     GERD (gastroesophageal reflux disease)     Hypertension     Scleroderma involving lung since she was 47 y/o         Review of Systems  General ROS: negative for - chills, fever or weight loss  Cardiovascular ROS: no chest pain or dyspnea on exertion  Gastrointestinal ROS: no diarrhea, no blood in stool, no abdominal pain    Physical Examination  /84   Pulse 96   Wt 96 kg (211 lb 10.3 oz)   BMI 34.16 kg/m²   General appearance: alert, cooperative, no distress  HENT: Normocephalic, atraumatic, " neck symmetrical, no nasal discharge, sclera anicteric   Lungs: clear to auscultation bilaterally, symmetric chest wall expansion bilaterally  Heart: regular rate and rhythm without rub; no displacement of the PMI   Abdomen: soft, nontender,nondistended, BS normoactive, no organomegaly  Extremities: extremities symmetric; no clubbing, cyanosis, or edema    Labs:  Lab Results   Component Value Date    WBC 6.76 07/30/2018    HGB 14.3 07/30/2018    HCT 44.6 07/30/2018    MCV 91 07/30/2018     07/30/2018     -LFTs- normal  -CRP- 3.6 (normal)    Imaging:  CTE was independently visualized and reviewed by me and showed chronic inflammatory changes of TI with prominent fat in the wall, renal mass (complex cyst on MRI), fatty liver, diverticulosis coli, uterine fibroids    Assessment:   63 y.o. female with scleroderma, pulmonary fibrosis and pulmonary HTN on multiple medications including Cellcept, presents for follow up of recently diagnosed, non-stricturing, non-penetrating ileocolonic Crohn's disease. She is asymptomatic from a GI standpoint.     Plan:  -Due to patient's age, lack of symptoms and use of immunosuppressants/ advanced agents for pulmonary HTN/fibrosis/scleroderma, unsure if biologic therapy, or even immunomodulator, is in the patient's best interest. I will discuss case with IBD expert and then with the patient after I have received recommendations. She agrees with this plan and feels well. Prior to initiating any treatment for Crohn's disease I will speak to patient's Rheumatologist (Dr. Barnes at [facility], cell number provided by patient)  -Will check labs, including vitamin levels  -DEXA scan  -Discussed importance of flu and pneumovax  -Based off colon polyp pathology (tiny serrated polyp) would need repeat colonoscopy in 5 years, however in setting of new recently diagnosed ileocolonic Crohn's disease will likely perform sooner for surveillance  -Daily PPI         Ngozi Prince,  MD [facility]  [address], [address]  [place] [address]  Office: [phone]  Fax: [phone]

## 2018-09-14 ENCOUNTER — HOSPITAL ENCOUNTER (OUTPATIENT)
Dept: RADIOLOGY | Facility: HOSPITAL | Age: 63
Discharge: HOME | End: 2018-09-14
Attending: INTERNAL MEDICINE
Payer: MEDICARE

## 2018-09-14 DIAGNOSIS — K50.80 CROHN'S DISEASE OF BOTH SMALL AND LARGE INTESTINE WITHOUT COMPLICATION: ICD-10-CM

## 2018-09-14 PROCEDURE — 77080 DXA BONE DENSITY AXIAL: CPT | Mod: TC

## 2018-09-14 PROCEDURE — 77080 DXA BONE DENSITY AXIAL: CPT | Mod: 26,,, | Performed by: RADIOLOGY

## 2018-09-20 NOTE — PROGRESS NOTES
Discussed case with IBD expert Dr. Guzman. As patient is asymptomatic with mild ileal ulcers and on Cellcept I will elect not to treat her Crohn's disease at this time. We will plan for repeat colonoscopy in 1 year and if remains stable and patient remains symptom free likely colonoscopy every 2 years after that. Patient is in agreement with plan. We also discussed her normal DEXA scan.    Ngozi Prince MD

## 2018-09-28 ENCOUNTER — LAB VISIT (OUTPATIENT)
Dept: LAB | Facility: HOSPITAL | Age: 63
End: 2018-09-28
Attending: INTERNAL MEDICINE
Payer: MEDICARE

## 2018-09-28 DIAGNOSIS — K50.80 CROHN'S DISEASE OF BOTH SMALL AND LARGE INTESTINE WITHOUT COMPLICATION: ICD-10-CM

## 2018-09-28 LAB
25(OH)D3+25(OH)D2 SERPL-MCNC: 21 NG/ML
VIT B12 SERPL-MCNC: 340 PG/ML

## 2018-09-28 PROCEDURE — 82607 VITAMIN B-12: CPT

## 2018-09-28 PROCEDURE — 82306 VITAMIN D 25 HYDROXY: CPT

## 2018-09-28 PROCEDURE — 36415 COLL VENOUS BLD VENIPUNCTURE: CPT

## 2018-10-04 ENCOUNTER — PATIENT MESSAGE (OUTPATIENT)
Dept: GASTROENTEROLOGY | Facility: CLINIC | Age: 63
End: 2018-10-04

## 2018-10-04 RX ORDER — ERGOCALCIFEROL 1.25 MG/1
50000 CAPSULE ORAL DAILY
Qty: 16 CAPSULE | Refills: 1 | Status: SHIPPED | OUTPATIENT
Start: 2018-10-04 | End: 2018-10-27 | Stop reason: SDUPTHER

## 2018-10-11 ENCOUNTER — HOSPITAL ENCOUNTER (OUTPATIENT)
Dept: RADIOLOGY | Facility: CLINIC | Age: 63
Discharge: HOME | End: 2018-10-11
Attending: OBSTETRICS & GYNECOLOGY
Payer: MEDICARE

## 2018-10-11 ENCOUNTER — OFFICE VISIT (OUTPATIENT)
Dept: OBSTETRICS AND GYNECOLOGY | Facility: CLINIC | Age: 63
End: 2018-10-11
Payer: MEDICARE

## 2018-10-11 VITALS — WEIGHT: 214.31 LBS | BODY MASS INDEX: 34.59 KG/M2

## 2018-10-11 DIAGNOSIS — Z12.39 SCREENING FOR MALIGNANT NEOPLASM OF BREAST: ICD-10-CM

## 2018-10-11 DIAGNOSIS — Z01.419 WELL WOMAN EXAM WITH ROUTINE GYNECOLOGICAL EXAM: Primary | ICD-10-CM

## 2018-10-11 DIAGNOSIS — Z01.419 ENCOUNTER FOR WELL WOMAN EXAM WITH ROUTINE GYNECOLOGICAL EXAM: ICD-10-CM

## 2018-10-11 PROCEDURE — 77063 BREAST TOMOSYNTHESIS BI: CPT | Mod: TC,PO

## 2018-10-11 PROCEDURE — 77067 SCR MAMMO BI INCL CAD: CPT | Mod: 26,,, | Performed by: RADIOLOGY

## 2018-10-11 PROCEDURE — 88175 CYTOPATH C/V AUTO FLUID REDO: CPT

## 2018-10-11 PROCEDURE — 99999 PR PBB SHADOW E&M-EST. PATIENT-LVL IV: CPT | Mod: PBBFAC,,, | Performed by: OBSTETRICS & GYNECOLOGY

## 2018-10-11 PROCEDURE — 77067 SCR MAMMO BI INCL CAD: CPT | Mod: TC,PO

## 2018-10-11 PROCEDURE — 99214 OFFICE O/P EST MOD 30 MIN: CPT | Mod: PBBFAC,PO | Performed by: OBSTETRICS & GYNECOLOGY

## 2018-10-11 PROCEDURE — 87624 HPV HI-RISK TYP POOLED RSLT: CPT

## 2018-10-11 PROCEDURE — 77063 BREAST TOMOSYNTHESIS BI: CPT | Mod: 26,,, | Performed by: RADIOLOGY

## 2018-10-11 PROCEDURE — G0438 PPPS, INITIAL VISIT: HCPCS | Mod: ,,, | Performed by: OBSTETRICS & GYNECOLOGY

## 2018-10-11 NOTE — PROGRESS NOTES
Subjective:       Patient ID: Edith Tran is a 63 y.o. female.    Chief Complaint:  Well Woman      History of Present Illness  HPI  Annual Exam-Postmenopausal  Patient presents for annual exam. The patient has no complaints today. The patient is not currently sexually active. GYN screening history: no prior history of gyn screening tests. The patient is not taking hormone replacement therapy. Patient denies post-menopausal vaginal bleeding.Patient reports menopause at age 50. The patient wears seatbelts: yes. The patient participates in regular exercise: no. Has the patient ever been transfused or tattooed?: no. The patient reports that there is not domestic violence in her life.      GYN & OB History  No LMP recorded. Patient is postmenopausal.   Date of Last Pap: No result found    OB History    Para Term  AB Living   3 1     2     SAB TAB Ectopic Multiple Live Births     2            # Outcome Date GA Lbr Hemant/2nd Weight Sex Delivery Anes PTL Lv   3 Para      Vag-Spont      2 TAB            1 TAB                   Review of Systems  Review of Systems   Constitutional: Negative for activity change, appetite change, chills, diaphoresis, fatigue, fever and unexpected weight change.   HENT: Negative for mouth sores and tinnitus.    Eyes: Negative for discharge and visual disturbance.   Respiratory: Negative for cough, shortness of breath and wheezing.    Cardiovascular: Negative for chest pain, palpitations and leg swelling.   Gastrointestinal: Negative for abdominal pain, bloating, blood in stool, constipation, diarrhea, nausea and vomiting.   Endocrine: Negative for diabetes, hair loss, hot flashes, hyperthyroidism and hypothyroidism.   Genitourinary: Negative for decreased libido, dyspareunia, dysuria, flank pain, frequency, genital sores, hematuria, menorrhagia, menstrual problem, pelvic pain, urgency, vaginal bleeding, vaginal discharge, vaginal pain, dysmenorrhea, urinary incontinence,  postcoital bleeding, postmenopausal bleeding and vaginal odor.   Musculoskeletal: Negative for back pain, joint swelling and myalgias.   Skin:  Negative for rash, no acne and hair changes.   Neurological: Negative for seizures, syncope, numbness and headaches.   Hematological: Negative for adenopathy. Does not bruise/bleed easily.   Psychiatric/Behavioral: Negative for depression and sleep disturbance. The patient is not nervous/anxious.    Breast: Negative for breast mass, breast pain, nipple discharge and skin changes          Objective:    Physical Exam:   Constitutional: She is oriented to person, place, and time. She appears well-developed and well-nourished. No distress.    HENT:   Head: Normocephalic.    Eyes: Pupils are equal, round, and reactive to light.    Neck: Normal range of motion.    Cardiovascular: Normal rate.     Pulmonary/Chest: Effort normal.   Breasts: no palpable masses or nipple discharge        Abdominal: Soft. She exhibits no distension. There is no tenderness.     Genitourinary: Vagina normal and uterus normal. No vaginal discharge found.   Genitourinary Comments: Pap smear of cervix           Musculoskeletal: Normal range of motion and moves all extremeties.       Neurological: She is alert and oriented to person, place, and time.    Skin: Skin is warm and dry.    Psychiatric: She has a normal mood and affect. Her behavior is normal. Judgment and thought content normal.          Assessment:        1. Well woman exam with routine gynecological exam    2. Screening for malignant neoplasm of breast    3. Encounter for well woman exam with routine gynecological exam                Plan:      Mammogram

## 2018-10-12 DIAGNOSIS — I10 ESSENTIAL HYPERTENSION: ICD-10-CM

## 2018-10-14 RX ORDER — AMLODIPINE BESYLATE 5 MG/1
5 TABLET ORAL DAILY
Qty: 90 TABLET | Refills: 0 | Status: SHIPPED | OUTPATIENT
Start: 2018-10-14 | End: 2019-07-05 | Stop reason: SDUPTHER

## 2018-10-17 ENCOUNTER — PATIENT OUTREACH (OUTPATIENT)
Dept: ADMINISTRATIVE | Facility: HOSPITAL | Age: 63
End: 2018-10-17

## 2018-10-18 LAB
HPV HR 12 DNA CVX QL NAA+PROBE: NEGATIVE
HPV16 AG SPEC QL: NEGATIVE
HPV18 DNA SPEC QL NAA+PROBE: NEGATIVE

## 2018-10-25 ENCOUNTER — LAB VISIT (OUTPATIENT)
Dept: LAB | Facility: HOSPITAL | Age: 63
End: 2018-10-25
Attending: INTERNAL MEDICINE
Payer: MEDICARE

## 2018-10-25 DIAGNOSIS — I10 HYPERTENSION, ESSENTIAL: ICD-10-CM

## 2018-10-25 DIAGNOSIS — I27.20 PULMONARY HYPERTENSION: ICD-10-CM

## 2018-10-25 LAB
ALBUMIN SERPL BCP-MCNC: 3.6 G/DL
ALP SERPL-CCNC: 64 U/L
ALT SERPL W/O P-5'-P-CCNC: 46 U/L
ANION GAP SERPL CALC-SCNC: 10 MMOL/L
AST SERPL-CCNC: 40 U/L
BASOPHILS # BLD AUTO: 0.05 K/UL
BASOPHILS NFR BLD: 0.8 %
BILIRUB SERPL-MCNC: 0.7 MG/DL
BUN SERPL-MCNC: 10 MG/DL
CALCIUM SERPL-MCNC: 9.1 MG/DL
CHLORIDE SERPL-SCNC: 106 MMOL/L
CO2 SERPL-SCNC: 23 MMOL/L
CREAT SERPL-MCNC: 0.8 MG/DL
DIFFERENTIAL METHOD: ABNORMAL
EOSINOPHIL # BLD AUTO: 0.2 K/UL
EOSINOPHIL NFR BLD: 3.8 %
ERYTHROCYTE [DISTWIDTH] IN BLOOD BY AUTOMATED COUNT: 13.5 %
EST. GFR  (AFRICAN AMERICAN): >60 ML/MIN/1.73 M^2
EST. GFR  (NON AFRICAN AMERICAN): >60 ML/MIN/1.73 M^2
GLUCOSE SERPL-MCNC: 95 MG/DL
HCT VFR BLD AUTO: 40.8 %
HGB BLD-MCNC: 13 G/DL
IMM GRANULOCYTES # BLD AUTO: 0.03 K/UL
IMM GRANULOCYTES NFR BLD AUTO: 0.5 %
LYMPHOCYTES # BLD AUTO: 1.2 K/UL
LYMPHOCYTES NFR BLD: 20.4 %
MCH RBC QN AUTO: 29.7 PG
MCHC RBC AUTO-ENTMCNC: 31.9 G/DL
MCV RBC AUTO: 93 FL
MONOCYTES # BLD AUTO: 0.5 K/UL
MONOCYTES NFR BLD: 8.9 %
NEUTROPHILS # BLD AUTO: 4 K/UL
NEUTROPHILS NFR BLD: 65.6 %
NRBC BLD-RTO: 0 /100 WBC
PLATELET # BLD AUTO: 181 K/UL
PMV BLD AUTO: 12 FL
POTASSIUM SERPL-SCNC: 3.6 MMOL/L
PROT SERPL-MCNC: 6.7 G/DL
RBC # BLD AUTO: 4.37 M/UL
SODIUM SERPL-SCNC: 139 MMOL/L
WBC # BLD AUTO: 6.07 K/UL

## 2018-10-25 PROCEDURE — 80053 COMPREHEN METABOLIC PANEL: CPT

## 2018-10-25 PROCEDURE — 36415 COLL VENOUS BLD VENIPUNCTURE: CPT | Mod: PO

## 2018-10-25 PROCEDURE — 85025 COMPLETE CBC W/AUTO DIFF WBC: CPT

## 2018-10-31 ENCOUNTER — OFFICE VISIT (OUTPATIENT)
Dept: OPTOMETRY | Facility: CLINIC | Age: 63
End: 2018-10-31
Payer: MEDICARE

## 2018-10-31 DIAGNOSIS — H25.13 NUCLEAR SCLEROSIS, BILATERAL: Primary | ICD-10-CM

## 2018-10-31 DIAGNOSIS — H52.7 REFRACTIVE ERROR: ICD-10-CM

## 2018-10-31 DIAGNOSIS — H26.9 CORTICAL CATARACT: ICD-10-CM

## 2018-10-31 DIAGNOSIS — H04.123 DRY EYE SYNDROME, BILATERAL: ICD-10-CM

## 2018-10-31 PROCEDURE — 99999 PR PBB SHADOW E&M-EST. PATIENT-LVL II: CPT | Mod: PBBFAC,,, | Performed by: OPTOMETRIST

## 2018-10-31 PROCEDURE — 92014 COMPRE OPH EXAM EST PT 1/>: CPT | Mod: S$PBB,,, | Performed by: OPTOMETRIST

## 2018-10-31 PROCEDURE — 92015 DETERMINE REFRACTIVE STATE: CPT | Mod: ,,, | Performed by: OPTOMETRIST

## 2018-10-31 PROCEDURE — 99212 OFFICE O/P EST SF 10 MIN: CPT | Mod: PBBFAC,PO | Performed by: OPTOMETRIST

## 2018-10-31 NOTE — PROGRESS NOTES
HPI     Presenting Complaint: Pt here today for yearly ocular health exam. Pt   states when watching TV with current glasses vision feels blurry.     Ophthalmic medication / drops:OTC art tear gel at bedtime as needed for   dry eyes    (-) headaches  (-) diplopia   (-) flashes / (-) floaters      Last edited by Eloy Manrique on 10/31/2018  8:39 AM. (History)            Assessment /Plan     For exam results, see Encounter Report.    Nuclear sclerosis, bilateral    Cortical cataract    Refractive error    Dry eye syndrome, bilateral      Mild cataracts of both eyes, not visually significant. Discussed possible ocular affects of cataracts. Acceptable BCVA OU. Discussed treatment options. Surgery not recommended at this time. Monitor yearly.     Dispensed updated spectacle Rx. Discussed various spectacle lens options. Discussed adaptation period to new specs.     Mild MELISA of both eyes. Discussed symptoms and treatment options. Recommend otc artificial tears at least in the morning and at bedtime, more while watching tv or around bright lights. Discussed chronicity, return if worsening or no alleviation.       RTC in 1 year for comprehensive eye exam, or sooner prn.

## 2018-11-01 ENCOUNTER — OFFICE VISIT (OUTPATIENT)
Dept: FAMILY MEDICINE | Facility: CLINIC | Age: 63
End: 2018-11-01
Payer: MEDICARE

## 2018-11-01 VITALS
WEIGHT: 213.19 LBS | BODY MASS INDEX: 34.26 KG/M2 | TEMPERATURE: 98 F | OXYGEN SATURATION: 94 % | SYSTOLIC BLOOD PRESSURE: 112 MMHG | HEART RATE: 94 BPM | DIASTOLIC BLOOD PRESSURE: 78 MMHG | HEIGHT: 66 IN

## 2018-11-01 DIAGNOSIS — R45.4 IRRITABILITY: ICD-10-CM

## 2018-11-01 DIAGNOSIS — Z23 NEEDS FLU SHOT: ICD-10-CM

## 2018-11-01 DIAGNOSIS — R06.00 DYSPNEA, UNSPECIFIED TYPE: Primary | ICD-10-CM

## 2018-11-01 DIAGNOSIS — M34.9 SCLERODERMA: ICD-10-CM

## 2018-11-01 DIAGNOSIS — I27.20 PULMONARY HYPERTENSION: ICD-10-CM

## 2018-11-01 PROCEDURE — G0008 ADMIN INFLUENZA VIRUS VAC: HCPCS | Mod: S$GLB,,, | Performed by: INTERNAL MEDICINE

## 2018-11-01 PROCEDURE — 99214 OFFICE O/P EST MOD 30 MIN: CPT | Mod: 25,S$GLB,, | Performed by: INTERNAL MEDICINE

## 2018-11-01 PROCEDURE — 90686 IIV4 VACC NO PRSV 0.5 ML IM: CPT | Mod: S$GLB,,, | Performed by: INTERNAL MEDICINE

## 2018-11-01 RX ORDER — BUSPIRONE HYDROCHLORIDE 15 MG/1
15 TABLET ORAL 2 TIMES DAILY
Qty: 60 TABLET | Refills: 11 | Status: SHIPPED | OUTPATIENT
Start: 2018-11-01 | End: 2019-02-05 | Stop reason: HOSPADM

## 2018-11-01 NOTE — PATIENT INSTRUCTIONS
Get echo and stress test results from [facility].    Continue aspiration precautions including elevating the head of the bed and not eating before bedtime

## 2018-11-01 NOTE — PROGRESS NOTES
Subjective:       Patient ID: Edith Tran is a 63 y.o. female.    Chief Complaint: Scleroderma    HPI         CHIEF COMPLAINT: Dyspnea    .   HPI: scleroderma on oxygen. Pulmonary hypertension    ONSET/TIMING:          10 y       ago.    DURATION: . intermittant    QUALITY/COURSE:   unchanged      INTENSITY/SEVERITY:  7 (0 to 10)               MODIFIERS/TREATMENTS:Precipitating factors: exercise, exertion and recumbency,  Weights:   Wt Readings from Last 1 Encounters:   11/01/18 0825 96.7 kg (213 lb 3 oz)     BNP    @LABRCNTIP(BNP,BNPTRIAGEBLO)@  D-dimer    Lab Results   Component Value Date    DDIMER 0.78 (H) 03/19/2018         SYMPTOMS/RELATED: . --Possible medication side effects include:    The following symptoms/statements are positive if in BOLD, negative if not.          CONTEXT/WHEN:  Similar_problems . Tobacco_use. Seasonal_pattern.  Copd. CHF.   thomboembolic disease.  Allergies/Hayfever.. Sinusitis. . Asthma.  Exposure_to_others_with_similar_symptoms.      TREATMENTS:  OTC_Cough/Decongestants..  Rx_Cough/Decongestants. Bronchodilators.. Inhaled_Corticosteroids. Oral_Corticosteroids... .Antibiotics. Diuretics. Ace inhibitor or ARB. Beta-blocker.     REVIEW OF SYMPTOMS:    . Dyspnea on exertion. Paroxysmal_Nocturnal_Dyspnea.. Orthopnea...  Purulent_Sputum. . Hemoptysis.  Rhinorrhea/Purulent.   Stressed. Weight_loss. Weight_gain. Leg_Pain.     Echo and echo stress test at [facility]     Family says she is depressed, but she is tired all the time.         Review of Systems   Constitutional: Positive for activity change. Negative for unexpected weight change.   HENT: Positive for rhinorrhea. Negative for hearing loss and trouble swallowing.    Eyes: Positive for visual disturbance. Negative for discharge.   Respiratory: Positive for cough and wheezing (at baseline). Negative for chest tightness.    Cardiovascular: Negative for chest pain and palpitations.   Gastrointestinal: Negative for blood in stool,  "constipation, diarrhea and vomiting.   Endocrine: Negative for polydipsia and polyuria.   Genitourinary: Negative for difficulty urinating, dysuria, hematuria and menstrual problem.   Musculoskeletal: Positive for arthralgias and joint swelling. Negative for neck pain.   Neurological: Positive for weakness. Negative for headaches.   Psychiatric/Behavioral: Positive for dysphoric mood. Negative for confusion.         Objective:      Vitals:    11/01/18 0825   BP: 112/78   Pulse: 94   Temp: 98 °F (36.7 °C)   TempSrc: Oral   SpO2: (!) 94%   Weight: 96.7 kg (213 lb 3 oz)   Height: 5' 6" (1.676 m)   PainSc: 0-No pain     Physical Exam      Assessment:       1. Dyspnea, unspecified type    2. Scleroderma    3. Pulmonary hypertension    4. Irritability    5. Needs flu shot          Plan:   Has a rheumatolgist and sees Dr. Serrano    Dyspnea, unspecified type  -     Comprehensive metabolic panel; Future; Expected date: 11/01/2018  -     CBC auto differential; Future; Expected date: 11/01/2018    Scleroderma  -     Comprehensive metabolic panel; Future; Expected date: 11/01/2018  -     CBC auto differential; Future; Expected date: 11/01/2018    Pulmonary hypertension  -     CBC auto differential; Future; Expected date: 11/01/2018    Irritability  -     busPIRone (BUSPAR) 15 MG tablet; Take 1 tablet (15 mg total) by mouth 2 (two) times daily.  Dispense: 60 tablet; Refill: 11    Needs flu shot  -     Influenza - Quadrivalent (3 years & older) (PF)      Follow-up in about 3 months (around 2/1/2019).      "

## 2018-11-09 RX ORDER — ERGOCALCIFEROL 1.25 MG/1
CAPSULE ORAL
Qty: 16 CAPSULE | Refills: 0 | Status: SHIPPED | OUTPATIENT
Start: 2018-11-09 | End: 2019-01-02 | Stop reason: SDUPTHER

## 2018-11-15 ENCOUNTER — PATIENT MESSAGE (OUTPATIENT)
Dept: PULMONOLOGY | Facility: CLINIC | Age: 63
End: 2018-11-15

## 2018-11-21 ENCOUNTER — HOSPITAL ENCOUNTER (OUTPATIENT)
Dept: RESPIRATORY THERAPY | Facility: HOSPITAL | Age: 63
Discharge: HOME | End: 2018-11-21
Attending: INTERNAL MEDICINE
Payer: MEDICARE

## 2018-11-21 DIAGNOSIS — I27.20 PULMONARY HYPERTENSION: ICD-10-CM

## 2018-11-21 LAB — BR6MWT: NORMAL

## 2018-11-21 PROCEDURE — 94618 PULMONARY STRESS TESTING: CPT

## 2018-11-27 ENCOUNTER — OFFICE VISIT (OUTPATIENT)
Dept: PULMONOLOGY | Facility: CLINIC | Age: 63
End: 2018-11-27
Payer: MEDICARE

## 2018-11-27 VITALS
HEIGHT: 66 IN | HEART RATE: 94 BPM | DIASTOLIC BLOOD PRESSURE: 73 MMHG | OXYGEN SATURATION: 97 % | SYSTOLIC BLOOD PRESSURE: 121 MMHG | BODY MASS INDEX: 34.29 KG/M2 | WEIGHT: 213.38 LBS

## 2018-11-27 DIAGNOSIS — J84.9 INTERSTITIAL LUNG DISEASE: ICD-10-CM

## 2018-11-27 DIAGNOSIS — J44.9 COPD MIXED TYPE: ICD-10-CM

## 2018-11-27 DIAGNOSIS — I27.20 PULMONARY HYPERTENSION: Primary | ICD-10-CM

## 2018-11-27 PROCEDURE — 99214 OFFICE O/P EST MOD 30 MIN: CPT | Mod: S$PBB,,, | Performed by: INTERNAL MEDICINE

## 2018-11-27 PROCEDURE — 99999 PR PBB SHADOW E&M-EST. PATIENT-LVL IV: CPT | Mod: PBBFAC,,, | Performed by: INTERNAL MEDICINE

## 2018-11-27 PROCEDURE — 99214 OFFICE O/P EST MOD 30 MIN: CPT | Mod: PBBFAC,PO | Performed by: INTERNAL MEDICINE

## 2018-11-27 RX ORDER — BUDESONIDE AND FORMOTEROL FUMARATE DIHYDRATE 160; 4.5 UG/1; UG/1
2 AEROSOL RESPIRATORY (INHALATION) EVERY 12 HOURS
Qty: 1 INHALER | Refills: 11 | Status: SHIPPED | OUTPATIENT
Start: 2018-11-27 | End: 2019-12-02 | Stop reason: SDUPTHER

## 2018-11-27 RX ORDER — AMOXICILLIN AND CLAVULANATE POTASSIUM 875; 125 MG/1; MG/1
TABLET, FILM COATED ORAL
Refills: 3 | COMMUNITY
Start: 2018-09-28 | End: 2019-02-05

## 2018-11-27 RX ORDER — PREDNISONE 5 MG/1
TABLET ORAL
Refills: 1 | COMMUNITY
Start: 2018-09-28 | End: 2019-10-08 | Stop reason: ALTCHOICE

## 2018-11-27 NOTE — PATIENT INSTRUCTIONS
Suggest monitor 6 min walks - distance and  02 levels need monitored.  Distance is more pulm hypertension- 02 sat is  More lung tissue.    You are on ofev and cellcept for lung tissue- if lung tissue worsens (ct would be way to see) - other treatment reasonable.    You are on opsumit and tadafanil for pulmonary hypertension- walk distance is main parameter to follow.    Your sleep apnea is not bad with AHI - 6.3, much worse on back and in dream sleep.  cpap 8 was adequate.  Big concern should be low 02 levels sleeping- not  Big issue.    Do chest chest in  March and follow      Use symbicort if covered 2 twice daily  Should suppress cough.

## 2018-11-27 NOTE — PROGRESS NOTES
"11/27/2018    Edith Tran      Chief Complaint   Patient presents with    Follow-up     3 month, 6 minute wwalk    Shortness of Breath    Sputum Production     clear       HPI:   Nov 27, trelegy not covered- not using, had flu shot 8 am with sorenes later day and huge swollen arm with  Pain thhat night.   No cough. On opsumit/tadafanil, and on ofev/cellcept.  Stbale. No diarrhea.  Sees pulm htn and  Rheum [facility].  Last 6 min walk 02  Angelo 91 slow pace.        Aug 21, uses trelegy, no c/o.  No 0x arranges- sat 90 falls to 87 at 2 mins- walked 307 meters or 71%.  Dx Crohn's.  No abx nor prednisone.  cpap good and sinus good.  Instructions:Would monitor 6 min walk every 3 months.    307 meters was last distance.  Six min walk.  Monitor ct chest yearly in March - sooner if worsens.  Needs 02 for activity.  Use medications for bronchitis.   Stay active.    July11,2018has had raynauld's for yrs, pt had severe mobility problems issues leading to dx slceroderma 2007 - "rock bottom".  Has had swallow sticking with  2-3 dilations with last over 3 yrs ago.  Pt has had pulm htn on opsumit and talafadil,  Pt also on ofev in a study, and cellcept.  Also low dose prednisone.  Pt dx osas and uses cpap nightly 8 hrs - had used 02 but off 02 for 3 yrs.   Pt had had 6 min walks but none recently- none last yr at least.  Pt gets bronchitis with cough and yellow mucous.  Has occ wheezes.  Had exacerbations in feb and June - typically 2-3 yrly.  Tessalon helps.  Uses combivent occ ppt headaches with some breathing benefit.     Uses flonase regular.  Was on asthma rx for yrs.              The chief compliant  problem is new to me",   PFSH:  Past Medical History:   Diagnosis Date    Allergy     COPD (chronic obstructive pulmonary disease)     GERD (gastroesophageal reflux disease)     Hypertension     Scleroderma involving lung since she was 47 y/o         Past Surgical History:   Procedure Laterality Date    COLONOSCOPY N/A " "2018    Procedure: COLONOSCOPY;  Surgeon: Ngozi Prince MD;  Location: [facility];  Service: Endoscopy;  Laterality: N/A;    COLONOSCOPY N/A 2018    Performed by Ngozi Prince MD at [facility] ENDO    EGD (ESOPHAGOGASTRODUODENOSCOPY) N/A 2018    Performed by Ngozi Prince MD at [facility] ENDO    ESOPHAGOGASTRODUODENOSCOPY N/A 2018    Procedure: EGD (ESOPHAGOGASTRODUODENOSCOPY);  Surgeon: Ngozi Prince MD;  Location: [facility];  Service: Endoscopy;  Laterality: N/A;     Social History     Tobacco Use    Smoking status: Former Smoker     Last attempt to quit: 1995     Years since quittin.2   Substance Use Topics    Alcohol use: No    Drug use: No     Family History   Problem Relation Age of Onset    Kidney disease Mother     Cancer Father     Heart disease Brother     Mental illness Son     Glaucoma Neg Hx     Macular degeneration Neg Hx     Retinal detachment Neg Hx      Review of patient's allergies indicates:   Allergen Reactions    Plaquenil [hydroxychloroquine]      Having eye reaction, needs to stop plaquenil and stay off of it.        Performance Status:The patient's activity level is housebound activities.      Review of Systems:  a review of eleven systems covering constitutional, Eye, HEENT, Psych, Respiratory, Cardiac, GI, , Musculoskeletal, Endocrine, Dermatologic was negative except for pertinent findings as listed ABOVE and below:  pertinent positive as above, rest is good       Exam:Comprehensive exam done. /73 (BP Location: Right arm, Patient Position: Sitting)   Pulse 94   Ht 5' 6" (1.676 m)   Wt 96.8 kg (213 lb 6.5 oz)   SpO2 97% Comment: on room air  BMI 34.44 kg/m²   Exam included Vitals as listed, and patient's appearance and affect and alertness and mood, oral exam for yeast and hygiene and pharynx lesions and Mallapatti (M) score, neck with inspection for jvd and masses and thyroid abnormalities and lymph nodes (supraclavicular and " infraclavicular nodes and axillary also examined and noted if abn), chest exam included symmetry and effort and fremitus and percussion and auscultation, cardiac exam included rhythm and gallops and murmur and rubs and jvd and edema, abdominal exam for mass and hepatosplenomegaly and tenderness and hernias and bowel sounds, Musculoskeletal exam with muscle tone and posture and mobility/gait and  strength, and skin for rashes and cyanosis and pallor and turgor, extremity for clubbing.  Findings were normal except for pertinent findings listed below:M2, bibasiliar rales, min clubbing, rest good.       Radiographs (ct chest and cxr) reviewed: view by direct vision  March 2018 ct not too bad with cysts- viewed 8/21/18    Labs  noted    PFT results reviewed   Pulmonary Functions, including spirometry and bronchodilator response and lung volumes and diffusion, study was done May 8, 2018.  Spirometry shows mild obstruction, loss vital capacity and obstruction and no bronchodilator response.   FEV1 is 65% or 1.68 liters.  Lung volumes show  loss of TLC with restriction 66%.  Diffusion shows reduced but falls within normal range when corrected for lung volumes.   Pulmonary functions show  Mild obstruction and also restriction. Clinical correlation recommended.   Mikal Serrano M.D.    Plan:  Clinical impression is apparently straight forward and impression with management as below.     Edith was seen today for follow-up, shortness of breath and sputum production.    Diagnoses and all orders for this visit:    Pulmonary hypertension    Interstitial lung disease  -     budesonide-formoterol 160-4.5 mcg (SYMBICORT) 160-4.5 mcg/actuation HFAA; Inhale 2 puffs into the lungs every 12 (twelve) hours. Controller  -     CT Chest Without Contrast; Future    COPD mixed type  -     budesonide-formoterol 160-4.5 mcg (SYMBICORT) 160-4.5 mcg/actuation HFAA; Inhale 2 puffs into the lungs every 12 (twelve) hours. Controller  -     CT  Chest Without Contrast; Future        Follow-up in about 4 months (around 4/2/2019).    Discussed with patient above for education the following:      Patient Instructions   Suggest monitor 6 min walks - distance and  02 levels need monitored.  Distance is more pulm hypertension- 02 sat is  More lung tissue.    You are on ofev and cellcept for lung tissue- if lung tissue worsens (ct would be way to see) - other treatment reasonable.    You are on opsumit and tadafanil for pulmonary hypertension- walk distance is main parameter to follow.    Your sleep apnea is not bad with AHI - 6.3, much worse on back and in dream sleep.  cpap 8 was adequate.  Big concern should be low 02 levels sleeping- not  Big issue.    Do chest chest in  March and follow      Use symbicort if covered 2 twice daily  Should suppress cough.

## 2019-01-02 RX ORDER — ERGOCALCIFEROL 1.25 MG/1
50000 CAPSULE ORAL
Qty: 12 CAPSULE | Refills: 3 | Status: SHIPPED | OUTPATIENT
Start: 2019-01-02 | End: 2019-06-14 | Stop reason: SDUPTHER

## 2019-01-11 DIAGNOSIS — I27.20 PULMONARY HYPERTENSION: ICD-10-CM

## 2019-01-11 RX ORDER — TADALAFIL 20 MG/1
20 TABLET ORAL DAILY
Qty: 90 TABLET | Refills: 1 | Status: SHIPPED | OUTPATIENT
Start: 2019-01-11 | End: 2019-02-04 | Stop reason: SDUPTHER

## 2019-01-14 DIAGNOSIS — J44.9 CHRONIC OBSTRUCTIVE PULMONARY DISEASE, UNSPECIFIED COPD TYPE: ICD-10-CM

## 2019-01-14 DIAGNOSIS — J47.9 BRONCHIECTASIS WITHOUT COMPLICATION: ICD-10-CM

## 2019-01-14 RX ORDER — ALBUTEROL SULFATE 90 UG/1
AEROSOL, METERED RESPIRATORY (INHALATION)
Qty: 1 INHALER | Refills: 11 | Status: SHIPPED | OUTPATIENT
Start: 2019-01-14 | End: 2020-01-27 | Stop reason: SDUPTHER

## 2019-01-25 ENCOUNTER — LAB VISIT (OUTPATIENT)
Dept: LAB | Facility: HOSPITAL | Age: 64
End: 2019-01-25
Attending: INTERNAL MEDICINE
Payer: MEDICARE

## 2019-01-25 DIAGNOSIS — R06.00 DYSPNEA, UNSPECIFIED TYPE: ICD-10-CM

## 2019-01-25 DIAGNOSIS — I27.20 PULMONARY HYPERTENSION: ICD-10-CM

## 2019-01-25 DIAGNOSIS — M34.9 SCLERODERMA: ICD-10-CM

## 2019-01-25 LAB
ALBUMIN SERPL BCP-MCNC: 3.8 G/DL
ALP SERPL-CCNC: 73 U/L
ALT SERPL W/O P-5'-P-CCNC: 34 U/L
ANION GAP SERPL CALC-SCNC: 10 MMOL/L
AST SERPL-CCNC: 28 U/L
BASOPHILS # BLD AUTO: 0.04 K/UL
BASOPHILS NFR BLD: 0.7 %
BILIRUB SERPL-MCNC: 0.5 MG/DL
BUN SERPL-MCNC: 7 MG/DL
CALCIUM SERPL-MCNC: 9.7 MG/DL
CHLORIDE SERPL-SCNC: 106 MMOL/L
CO2 SERPL-SCNC: 25 MMOL/L
CREAT SERPL-MCNC: 0.7 MG/DL
DIFFERENTIAL METHOD: ABNORMAL
EOSINOPHIL # BLD AUTO: 0.2 K/UL
EOSINOPHIL NFR BLD: 3.9 %
ERYTHROCYTE [DISTWIDTH] IN BLOOD BY AUTOMATED COUNT: 13.1 %
EST. GFR  (AFRICAN AMERICAN): >60 ML/MIN/1.73 M^2
EST. GFR  (NON AFRICAN AMERICAN): >60 ML/MIN/1.73 M^2
GLUCOSE SERPL-MCNC: 92 MG/DL
HCT VFR BLD AUTO: 42.3 %
HGB BLD-MCNC: 13.4 G/DL
IMM GRANULOCYTES # BLD AUTO: 0.02 K/UL
IMM GRANULOCYTES NFR BLD AUTO: 0.4 %
LYMPHOCYTES # BLD AUTO: 1.1 K/UL
LYMPHOCYTES NFR BLD: 20.3 %
MCH RBC QN AUTO: 29.7 PG
MCHC RBC AUTO-ENTMCNC: 31.7 G/DL
MCV RBC AUTO: 94 FL
MONOCYTES # BLD AUTO: 0.4 K/UL
MONOCYTES NFR BLD: 7.4 %
NEUTROPHILS # BLD AUTO: 3.8 K/UL
NEUTROPHILS NFR BLD: 67.3 %
NRBC BLD-RTO: 0 /100 WBC
PLATELET # BLD AUTO: 192 K/UL
PMV BLD AUTO: 11.8 FL
POTASSIUM SERPL-SCNC: 3.5 MMOL/L
PROT SERPL-MCNC: 7.1 G/DL
RBC # BLD AUTO: 4.51 M/UL
SODIUM SERPL-SCNC: 141 MMOL/L
WBC # BLD AUTO: 5.57 K/UL

## 2019-01-25 PROCEDURE — 36415 COLL VENOUS BLD VENIPUNCTURE: CPT | Mod: PO

## 2019-01-25 PROCEDURE — 80053 COMPREHEN METABOLIC PANEL: CPT

## 2019-01-25 PROCEDURE — 85025 COMPLETE CBC W/AUTO DIFF WBC: CPT

## 2019-01-30 ENCOUNTER — PATIENT MESSAGE (OUTPATIENT)
Dept: FAMILY MEDICINE | Facility: CLINIC | Age: 64
End: 2019-01-30

## 2019-02-04 DIAGNOSIS — I27.20 PULMONARY HYPERTENSION: ICD-10-CM

## 2019-02-04 RX ORDER — TADALAFIL 20 MG/1
20 TABLET ORAL DAILY
Qty: 90 TABLET | Refills: 1 | Status: SHIPPED | OUTPATIENT
Start: 2019-02-04 | End: 2019-09-18

## 2019-02-05 ENCOUNTER — OFFICE VISIT (OUTPATIENT)
Dept: FAMILY MEDICINE | Facility: CLINIC | Age: 64
End: 2019-02-05
Payer: MEDICARE

## 2019-02-05 ENCOUNTER — DOCUMENTATION ONLY (OUTPATIENT)
Dept: FAMILY MEDICINE | Facility: CLINIC | Age: 64
End: 2019-02-05

## 2019-02-05 VITALS
HEART RATE: 95 BPM | HEIGHT: 66 IN | DIASTOLIC BLOOD PRESSURE: 78 MMHG | WEIGHT: 209.88 LBS | SYSTOLIC BLOOD PRESSURE: 116 MMHG | BODY MASS INDEX: 33.73 KG/M2 | RESPIRATION RATE: 16 BRPM | OXYGEN SATURATION: 98 %

## 2019-02-05 DIAGNOSIS — R31.9 HEMATURIA, UNSPECIFIED TYPE: ICD-10-CM

## 2019-02-05 DIAGNOSIS — F32.A ANXIETY AND DEPRESSION: Primary | ICD-10-CM

## 2019-02-05 DIAGNOSIS — J84.9 INTERSTITIAL LUNG DISEASE: ICD-10-CM

## 2019-02-05 DIAGNOSIS — I10 HYPERTENSION, UNSPECIFIED TYPE: ICD-10-CM

## 2019-02-05 DIAGNOSIS — F41.9 ANXIETY AND DEPRESSION: Primary | ICD-10-CM

## 2019-02-05 DIAGNOSIS — M34.9 SCLERODERMA: ICD-10-CM

## 2019-02-05 LAB
BILIRUB SERPL-MCNC: NORMAL MG/DL
BLOOD URINE, POC: NORMAL
COLOR, POC UA: YELLOW
GLUCOSE UR QL STRIP: NORMAL
KETONES UR QL STRIP: NORMAL
LEUKOCYTE ESTERASE URINE, POC: NORMAL
NITRITE, POC UA: NORMAL
PH, POC UA: 7
PROTEIN, POC: NORMAL
SPECIFIC GRAVITY, POC UA: 1
UROBILINOGEN, POC UA: NORMAL

## 2019-02-05 PROCEDURE — 81002 URINALYSIS NONAUTO W/O SCOPE: CPT | Mod: S$GLB,,, | Performed by: INTERNAL MEDICINE

## 2019-02-05 PROCEDURE — 81002 POCT URINE DIPSTICK WITHOUT MICROSCOPE: ICD-10-PCS | Mod: S$GLB,,, | Performed by: INTERNAL MEDICINE

## 2019-02-05 PROCEDURE — 99214 OFFICE O/P EST MOD 30 MIN: CPT | Mod: 25,S$GLB,, | Performed by: INTERNAL MEDICINE

## 2019-02-05 PROCEDURE — 99214 PR OFFICE/OUTPT VISIT, EST, LEVL IV, 30-39 MIN: ICD-10-PCS | Mod: 25,S$GLB,, | Performed by: INTERNAL MEDICINE

## 2019-02-05 RX ORDER — VENLAFAXINE HYDROCHLORIDE 75 MG/1
75 CAPSULE, EXTENDED RELEASE ORAL DAILY
Qty: 30 CAPSULE | Refills: 11 | Status: SHIPPED | OUTPATIENT
Start: 2019-02-05 | End: 2019-12-18

## 2019-02-05 NOTE — PATIENT INSTRUCTIONS
Call us if you have any suicidal thoughts    Exercise will help.  Medication will take 3 or 4 weeks to kick in

## 2019-02-05 NOTE — PROGRESS NOTES
Subjective:       Patient ID: Edith Tran is a 63 y.o. female.    Chief Complaint: Hypertension (labs)    HPI         CHIEF COMPLAINT: Dyspnea    .   HPI: scleroderma , can walk 100 ft, sometimes get confused if no O2.     ONSET/TIMING:                 ago.      DURATION: . intermittant    QUALITY/COURSE:   unchanged      INTENSITY/SEVERITY:  5 (0 to 10)               MODIFIERS/TREATMENTS:Precipitating factors: exercise,  Weights:   Wt Readings from Last 1 Encounters:   02/05/19 0905 95.2 kg (209 lb 14.1 oz)     BNP    @LABRCNTIP(BNP,BNPTRIAGEBLO)@  D-dimer    Lab Results   Component Value Date    DDIMER 0.78 (H) 03/19/2018         SYMPTOMS/RELATED: . --Possible medication side effects include:    The following symptoms/statements are positive if in BOLD, negative if not.          CONTEXT/WHEN:  Similar_problems . Tobacco_use. Seasonal_pattern.  Copd. CHF.   thomboembolic disease.  Allergies/Hayfever.. Sinusitis. . Asthma.  Exposure_to_others_with_similar_symptoms.      TREATMENTS:  OTC_Cough/Decongestants..  Rx_Cough/Decongestants. Bronchodilators.. Inhaled_Corticosteroids. Oral_Corticosteroids... .Antibiotics. Diuretics. Ace inhibitor or ARB. Beta-blocker.     REVIEW OF SYMPTOMS:    . Dyspnea on exertion. Paroxysmal_Nocturnal_Dyspnea.. Orthopnea...  Purulent_Sputum. . Hemoptysis.  Rhinorrhea/Purulent.   Stressed. Weight_loss. Weight_gain. Leg_Pain.     \        CHIEF COMPLAINT: Depression: yes.  . Anxiety: yes.   HPI: couldn't fit buspar in due to all the meds.     ONSET/TIMING:  .  ago.  Similar problems in past: .yes.   . # of episodes  of panic per week      0. .    DURATION:  constant    QUALITY/COURSE: unchanged    INTENSITY/SEVERITY: .Severity is # 5 (10 point scale)    CONTEXT/WHEN:  Postpartum: no..  Personal loss: no ..  Stress: yes..    MODIFIERS/TREATMENTS: . Taking medications: yes.  Compliance with taking prescribed medications: yes.  alcohol abuse: no ...  Drug abuse: no .  Chronic disease:  no.      The following symptoms are positive if BOLD, negative otherwise.        SYMPTOMS/RELATED: Possible medication side effects include:  dry mouth, decreased libido, impotence, GI disturbance, headache, insomnia, weight gain and weight loss.    REVIEW OF SYSTEMS: Sadness . Weakness . Fatigue . Lack_of _enjoyment_in_life . Sleep_disturbances . Anorexia .  Increased_appetite .  Irritability . Crying_spells .  Guilt .  Lost_concentration . Suicidal_ideation .   During panic attacks:  Chest_pain  . Shortness_of_breath  . dizziness . tingling . palpitations .            CHIEF COMPLAINT: Hypertension  HPI:     ONSET:       QUALITY/COURSE:   Unchanged.     INTENSITY/SEVERITY:  Average blood pressure is 120/80 .     MODIFIERS/TREATMENTS:  Taking medications: yes. .High sodium intake: no. alcohol: no      The following symptoms are positive only if BOLDED, otherwise are negative.      SYMPTOMS/RELATED: Possible medication side effects include:   Depression..  . Cough. . Constipation.    REVIEW OF SYMPTOMS: . Weight_loss . Weight_gain . Leg_cramps .Potency_problems .    TARGET ORGAN DAMAGE:: angina/ prior myocardial infarction, chronic kidney disease, heart failure, left ventricular hypertrophy, peripheral artery disease, prior coronary revascularization, retinopathy, stroke. transient ischemic attack.      Review of Systems   Constitutional: Negative for chills, diaphoresis, fatigue and fever.   HENT: Negative for sore throat.    Eyes: Negative for visual disturbance.   Respiratory: Positive for shortness of breath. Negative for cough, chest tightness and wheezing.    Cardiovascular: Negative for chest pain and palpitations.   Gastrointestinal: Negative for nausea and vomiting.   Neurological: Negative for dizziness, syncope, weakness and headaches.   Psychiatric/Behavioral: Negative for dysphoric mood. The patient is not nervous/anxious.          Objective:      Vitals:    02/05/19 0905   BP: 116/78   Pulse: 95   Resp:  "16   SpO2: 98%   Weight: 95.2 kg (209 lb 14.1 oz)   Height: 5' 6" (1.676 m)   PainSc: 0-No pain     Physical Exam   Constitutional: She appears well-developed and well-nourished.   Cardiovascular: Normal rate, regular rhythm and normal heart sounds.   Pulmonary/Chest: Effort normal and breath sounds normal.   Abdominal: Soft. There is no tenderness.   Neurological: She is alert.   Psychiatric: She has a normal mood and affect. Her behavior is normal. Thought content normal.   Nursing note and vitals reviewed.   Urinalysis without blood      Assessment:       1. Anxiety and depression    2. Interstitial lung disease    3. Scleroderma    4. Hypertension, unspecified type    5. Hematuria, unspecified type          Plan:       Anxiety and depression  -     venlafaxine (EFFEXOR-XR) 75 MG 24 hr capsule; Take 1 capsule (75 mg total) by mouth once daily.  Dispense: 30 capsule; Refill: 11    Interstitial lung disease    Scleroderma  -     CBC auto differential; Future; Expected date: 02/05/2019  -     Comprehensive metabolic panel; Future; Expected date: 02/05/2019    Hypertension, unspecified type  -     CBC auto differential; Future; Expected date: 02/05/2019  -     Comprehensive metabolic panel; Future; Expected date: 02/05/2019    Hematuria, unspecified type  -     POCT urine dipstick without microscope  -     Comprehensive metabolic panel; Future; Expected date: 02/05/2019      Follow-up in about 6 weeks (around 3/19/2019).      "

## 2019-03-13 ENCOUNTER — LAB VISIT (OUTPATIENT)
Dept: LAB | Facility: HOSPITAL | Age: 64
End: 2019-03-13
Attending: INTERNAL MEDICINE
Payer: MEDICARE

## 2019-03-13 DIAGNOSIS — M34.9 SCLERODERMA: ICD-10-CM

## 2019-03-13 DIAGNOSIS — R31.9 HEMATURIA, UNSPECIFIED TYPE: ICD-10-CM

## 2019-03-13 DIAGNOSIS — I10 HYPERTENSION, UNSPECIFIED TYPE: ICD-10-CM

## 2019-03-13 LAB
ALBUMIN SERPL BCP-MCNC: 3.9 G/DL
ALP SERPL-CCNC: 65 U/L
ALT SERPL W/O P-5'-P-CCNC: 33 U/L
ANION GAP SERPL CALC-SCNC: 10 MMOL/L
AST SERPL-CCNC: 32 U/L
BASOPHILS # BLD AUTO: 0.05 K/UL
BASOPHILS NFR BLD: 0.9 %
BILIRUB SERPL-MCNC: 0.8 MG/DL
BUN SERPL-MCNC: 8 MG/DL
CALCIUM SERPL-MCNC: 9.7 MG/DL
CHLORIDE SERPL-SCNC: 106 MMOL/L
CO2 SERPL-SCNC: 24 MMOL/L
CREAT SERPL-MCNC: 0.7 MG/DL
DIFFERENTIAL METHOD: ABNORMAL
EOSINOPHIL # BLD AUTO: 0.3 K/UL
EOSINOPHIL NFR BLD: 4.4 %
ERYTHROCYTE [DISTWIDTH] IN BLOOD BY AUTOMATED COUNT: 13.7 %
EST. GFR  (AFRICAN AMERICAN): >60 ML/MIN/1.73 M^2
EST. GFR  (NON AFRICAN AMERICAN): >60 ML/MIN/1.73 M^2
GLUCOSE SERPL-MCNC: 79 MG/DL
HCT VFR BLD AUTO: 44 %
HGB BLD-MCNC: 13.6 G/DL
IMM GRANULOCYTES # BLD AUTO: 0.01 K/UL
IMM GRANULOCYTES NFR BLD AUTO: 0.2 %
LYMPHOCYTES # BLD AUTO: 0.9 K/UL
LYMPHOCYTES NFR BLD: 16.5 %
MCH RBC QN AUTO: 29.1 PG
MCHC RBC AUTO-ENTMCNC: 30.9 G/DL
MCV RBC AUTO: 94 FL
MONOCYTES # BLD AUTO: 0.5 K/UL
MONOCYTES NFR BLD: 8.6 %
NEUTROPHILS # BLD AUTO: 4 K/UL
NEUTROPHILS NFR BLD: 69.4 %
NRBC BLD-RTO: 0 /100 WBC
PLATELET # BLD AUTO: 197 K/UL
PMV BLD AUTO: 11.4 FL
POTASSIUM SERPL-SCNC: 4.2 MMOL/L
PROT SERPL-MCNC: 7 G/DL
RBC # BLD AUTO: 4.67 M/UL
SODIUM SERPL-SCNC: 140 MMOL/L
WBC # BLD AUTO: 5.71 K/UL

## 2019-03-13 PROCEDURE — 36415 COLL VENOUS BLD VENIPUNCTURE: CPT | Mod: PO

## 2019-03-13 PROCEDURE — 80053 COMPREHEN METABOLIC PANEL: CPT

## 2019-03-13 PROCEDURE — 85025 COMPLETE CBC W/AUTO DIFF WBC: CPT

## 2019-03-20 ENCOUNTER — OFFICE VISIT (OUTPATIENT)
Dept: FAMILY MEDICINE | Facility: CLINIC | Age: 64
End: 2019-03-20
Payer: MEDICARE

## 2019-03-20 ENCOUNTER — DOCUMENTATION ONLY (OUTPATIENT)
Dept: FAMILY MEDICINE | Facility: CLINIC | Age: 64
End: 2019-03-20

## 2019-03-20 VITALS
HEART RATE: 90 BPM | RESPIRATION RATE: 16 BRPM | SYSTOLIC BLOOD PRESSURE: 118 MMHG | DIASTOLIC BLOOD PRESSURE: 82 MMHG | WEIGHT: 203.25 LBS | HEIGHT: 66 IN | OXYGEN SATURATION: 98 % | BODY MASS INDEX: 32.66 KG/M2

## 2019-03-20 DIAGNOSIS — I10 ESSENTIAL HYPERTENSION: ICD-10-CM

## 2019-03-20 DIAGNOSIS — M34.9 SCLERODERMA: Primary | ICD-10-CM

## 2019-03-20 DIAGNOSIS — I27.20 PULMONARY HYPERTENSION: ICD-10-CM

## 2019-03-20 DIAGNOSIS — F41.9 ANXIETY AND DEPRESSION: ICD-10-CM

## 2019-03-20 DIAGNOSIS — F32.A ANXIETY AND DEPRESSION: ICD-10-CM

## 2019-03-20 DIAGNOSIS — J44.9 COPD MIXED TYPE: ICD-10-CM

## 2019-03-20 PROCEDURE — 99213 PR OFFICE/OUTPT VISIT, EST, LEVL III, 20-29 MIN: ICD-10-PCS | Mod: S$GLB,,, | Performed by: INTERNAL MEDICINE

## 2019-03-20 PROCEDURE — 99213 OFFICE O/P EST LOW 20 MIN: CPT | Mod: S$GLB,,, | Performed by: INTERNAL MEDICINE

## 2019-03-20 RX ORDER — HYDROXYZINE HYDROCHLORIDE 25 MG/1
25 TABLET, FILM COATED ORAL 3 TIMES DAILY PRN
Qty: 30 TABLET | Refills: 5 | Status: SHIPPED | OUTPATIENT
Start: 2019-03-20 | End: 2019-11-01

## 2019-03-20 NOTE — PATIENT INSTRUCTIONS
Itching instruction: moist compress for several minutes, then baby oil    Use hydroxyzine with caution

## 2019-03-20 NOTE — PROGRESS NOTES
Subjective:       Patient ID: Edith Tran is a 64 y.o. female.    Chief Complaint: Hypertension (labs)    HPI     Patient has scleroderma with pulmonary fibrosis and resultant pulmonary hypertension.      CHIEF COMPLAINT: Hypertension  HPI:     ONSET:      QUALITY/COURSE:   Unchanged.     INTENSITY/SEVERITY:  Average blood pressure is 120/70 .     MODIFIERS/TREATMENTS:  Taking medications: yes. .High sodium intake: no. alcohol: no      The following symptoms are positive only if BOLDED, otherwise are negative.      SYMPTOMS/RELATED: Possible medication side effects include:   Depression..  . Cough. . Constipation.    REVIEW OF SYMPTOMS: . Weight_loss . Weight_gain . Leg_cramps ..    TARGET ORGAN DAMAGE:: angina/ prior myocardial infarction, chronic kidney disease, heart failure, left ventricular hypertrophy, peripheral artery disease, prior coronary revascularization, retinopathy, stroke. transient ischemic attack.          CHIEF COMPLAINT: Depression: yes.  . Anxiety: yes.   HPI:     ONSET/TIMING:  .yrs  ago.  Similar problems in past: .yes.   . # of episodes  of panic per week      0. .    DURATION:  constant    QUALITY/COURSE: better    INTENSITY/SEVERITY: .Severity is # 2 (10 point scale)    CONTEXT/WHEN:  Postpartum: no..  Personal loss: no ..  Stress: yes..    MODIFIERS/TREATMENTS: . Taking medications: yes.  Compliance with taking prescribed medications: yes.  alcohol abuse: no ...  Drug abuse: no .  Chronic disease: no.      The following symptoms are positive if BOLD, negative otherwise.        SYMPTOMS/RELATED: Possible medication side effects include:  dry mouth, decreased libido, impotence, GI disturbance, headache, insomnia, weight gain and weight loss.    REVIEW OF SYSTEMS: Sadness . Weakness . Fatigue . Lack_of _enjoyment_in_life . Sleep_disturbances . Anorexia .  Increased_appetite .  Irritability . Crying_spells .  Guilt .  Lost_concentration . Suicidal_ideation .   During panic attacks:   "Chest_pain  . Shortness_of_breath  . dizziness . tingling . palpitations .        Review of Systems   Constitutional: Negative for diaphoresis.   Eyes: Negative for visual disturbance.   Respiratory: Negative for chest tightness and shortness of breath.    Cardiovascular: Negative for chest pain.   Gastrointestinal: Positive for diarrhea (takes imodium 2x/wk with success. ) and nausea. Negative for abdominal distention, abdominal pain and vomiting.   Neurological: Negative for dizziness, syncope, weakness and headaches.   Psychiatric/Behavioral: Negative for dysphoric mood. The patient is not nervous/anxious.          Objective:      Vitals:    03/20/19 0849   BP: 118/82   Pulse: 90   Resp: 16   SpO2: 98%   Weight: 92.2 kg (203 lb 4.2 oz)   Height: 5' 6" (1.676 m)   PainSc: 0-No pain     Physical Exam   Constitutional: She appears well-developed and well-nourished.   Cardiovascular: Normal rate, regular rhythm and normal heart sounds.   Pulmonary/Chest: Effort normal and breath sounds normal.   Crackles at bases   Abdominal: Soft. There is no tenderness.   Neurological: She is alert.   Psychiatric: She has a normal mood and affect. Her behavior is normal. Thought content normal.   Nursing note and vitals reviewed.        Assessment:       1. Scleroderma    2. Essential hypertension    3. Anxiety and depression    4. Pulmonary hypertension    5. COPD mixed type          Plan:       Scleroderma  -     hydrOXYzine HCl (ATARAX) 25 MG tablet; Take 1 tablet (25 mg total) by mouth 3 (three) times daily as needed for Itching.  Dispense: 30 tablet; Refill: 5    Essential hypertension  -     CBC auto differential; Future; Expected date: 03/20/2019  -     Comprehensive metabolic panel; Future; Expected date: 03/20/2019  -     Lipid panel; Future; Expected date: 03/20/2019    Anxiety and depression    Pulmonary hypertension    COPD mixed type      Follow-up in about 3 months (around 6/20/2019).      "

## 2019-03-27 ENCOUNTER — TELEPHONE (OUTPATIENT)
Dept: PULMONOLOGY | Facility: CLINIC | Age: 64
End: 2019-03-27

## 2019-03-27 ENCOUNTER — HOSPITAL ENCOUNTER (OUTPATIENT)
Dept: RADIOLOGY | Facility: HOSPITAL | Age: 64
Discharge: HOME | End: 2019-03-27
Attending: INTERNAL MEDICINE
Payer: MEDICARE

## 2019-03-27 DIAGNOSIS — J44.9 COPD MIXED TYPE: ICD-10-CM

## 2019-03-27 DIAGNOSIS — J84.9 INTERSTITIAL LUNG DISEASE: ICD-10-CM

## 2019-03-27 PROCEDURE — 71250 CT CHEST WITHOUT CONTRAST: ICD-10-PCS | Mod: 26,,, | Performed by: RADIOLOGY

## 2019-03-27 PROCEDURE — 71250 CT THORAX DX C-: CPT | Mod: TC

## 2019-03-27 PROCEDURE — 71250 CT THORAX DX C-: CPT | Mod: 26,,, | Performed by: RADIOLOGY

## 2019-03-27 NOTE — TELEPHONE ENCOUNTER
Pt notified. Confirmed appt with NP 4/3/19    ----- Message from Mikal Serrano MD sent at 3/27/2019 12:09 PM CDT -----  Notify CT scan the chest looks to be similar to last year.  No change in plans.  Discuss at follow-up.

## 2019-04-03 ENCOUNTER — OFFICE VISIT (OUTPATIENT)
Dept: PULMONOLOGY | Facility: CLINIC | Age: 64
End: 2019-04-03
Payer: MEDICARE

## 2019-04-03 VITALS
HEIGHT: 66 IN | DIASTOLIC BLOOD PRESSURE: 72 MMHG | SYSTOLIC BLOOD PRESSURE: 104 MMHG | BODY MASS INDEX: 32.45 KG/M2 | OXYGEN SATURATION: 88 % | HEART RATE: 103 BPM | WEIGHT: 201.94 LBS

## 2019-04-03 DIAGNOSIS — J84.9 INTERSTITIAL LUNG DISEASE: ICD-10-CM

## 2019-04-03 DIAGNOSIS — I27.20 PULMONARY HYPERTENSION: Primary | ICD-10-CM

## 2019-04-03 DIAGNOSIS — G47.33 OBSTRUCTIVE SLEEP APNEA: ICD-10-CM

## 2019-04-03 DIAGNOSIS — J01.00 ACUTE NON-RECURRENT MAXILLARY SINUSITIS: ICD-10-CM

## 2019-04-03 DIAGNOSIS — J44.9 COPD MIXED TYPE: ICD-10-CM

## 2019-04-03 PROCEDURE — 99214 PR OFFICE/OUTPT VISIT, EST, LEVL IV, 30-39 MIN: ICD-10-PCS | Mod: S$PBB,,, | Performed by: NURSE PRACTITIONER

## 2019-04-03 PROCEDURE — 99999 PR PBB SHADOW E&M-EST. PATIENT-LVL IV: ICD-10-PCS | Mod: PBBFAC,,, | Performed by: NURSE PRACTITIONER

## 2019-04-03 PROCEDURE — 99214 OFFICE O/P EST MOD 30 MIN: CPT | Mod: PBBFAC,PO | Performed by: NURSE PRACTITIONER

## 2019-04-03 PROCEDURE — 99999 PR PBB SHADOW E&M-EST. PATIENT-LVL IV: CPT | Mod: PBBFAC,,, | Performed by: NURSE PRACTITIONER

## 2019-04-03 PROCEDURE — 99214 OFFICE O/P EST MOD 30 MIN: CPT | Mod: S$PBB,,, | Performed by: NURSE PRACTITIONER

## 2019-04-03 RX ORDER — PROMETHAZINE HYDROCHLORIDE AND DEXTROMETHORPHAN HYDROBROMIDE 6.25; 15 MG/5ML; MG/5ML
5 SYRUP ORAL NIGHTLY
COMMUNITY
End: 2019-04-03 | Stop reason: SDUPTHER

## 2019-04-03 RX ORDER — BENZONATATE 200 MG/1
200 CAPSULE ORAL 3 TIMES DAILY PRN
Qty: 90 CAPSULE | Refills: 1 | Status: SHIPPED | OUTPATIENT
Start: 2019-04-03 | End: 2019-05-03

## 2019-04-03 RX ORDER — AMOXICILLIN AND CLAVULANATE POTASSIUM 875; 125 MG/1; MG/1
1 TABLET, FILM COATED ORAL
COMMUNITY
End: 2020-09-09

## 2019-04-03 RX ORDER — PROMETHAZINE HYDROCHLORIDE AND DEXTROMETHORPHAN HYDROBROMIDE 6.25; 15 MG/5ML; MG/5ML
5 SYRUP ORAL NIGHTLY
Qty: 150 ML | Refills: 3 | Status: SHIPPED | OUTPATIENT
Start: 2019-04-03 | End: 2020-06-18

## 2019-04-03 NOTE — PROGRESS NOTES
"4/3/2019    Edith Tran      Chief Complaint   Patient presents with    Shortness of Breath    Cough    Sputum Production     yellow-white       HPI:   April 3, 2019- Onset 1 week: Cough productive white in color, sore throat, post nasal drip, sinus drainage yellow, flushed cheeks, complaints improving tx with antibiotics no prednisone use. Cough worse when lying down.    Current therapy for scleroderma, pulmonary htn, and pulmonary fibrosis Opsumit/tadafanil and Ofev/cellcept. No diarrhea no complaints.  Currently using Symbicort 1 puff daily. Using supplemental oxygen at 2L [place] for Shortness of breath, states greatly beneficial, pt states able to walk further and keep up with company while on oxygen.      Nov 27, trelegy not covered- not using, had flu shot 8 am with sorenes later day and huge swollen arm with  Pain thhat night.   No cough. On opsumit/tadafanil, and on ofev/cellcept.  Stable. No diarrhea.  Sees pulm htn and  [facility].  Last 6 min walk 02  Angelo 91 slow pace.      Aug 21, uses trelegy, no c/o.  No 0x arranges- sat 90 falls to 87 at 2 mins- walked 307 meters or 71%.  Dx Crohn's.  No abx nor prednisone.  cpap good and sinus good.  Instructions:Would monitor 6 min walk every 3 months.    307 meters was last distance.  Six min walk.  Monitor ct chest yearly in March - sooner if worsens.  Needs 02 for activity.  Use medications for bronchitis.   Stay active.    July11,2018has had raynauld's for yrs, pt had severe mobility problems issues leading to dx slceroderma 2007 - "rock bottom".  Has had swallow sticking with  2-3 dilations with last over 3 yrs ago.  Pt has had pulm htn on opsumit and talafadil,  Pt also on ofev in a study, and cellcept.  Also low dose prednisone.  Pt dx osas and uses cpap nightly 8 hrs - had used 02 but off 02 for 3 yrs.   Pt had had 6 min walks but none recently- none last yr at least.  Pt gets bronchitis with cough and yellow mucous.  Has occ wheezes.  Had exacerbations in " "feb and  - typically 2-3 yrly.  Tessalon helps.  Uses combivent occ ppt headaches with some breathing benefit.     Uses flonase regular.  Was on asthma rx for yrs.              The chief compliant  problem is new to me  PFSH:  Past Medical History:   Diagnosis Date    Allergy     COPD (chronic obstructive pulmonary disease)     GERD (gastroesophageal reflux disease)     Hypertension     Scleroderma involving lung since she was 49 y/o         Past Surgical History:   Procedure Laterality Date    COLONOSCOPY N/A 2018    Performed by [facility]gozi Prince [facility] at [facility]    EGD (ESOPHAGOGASTRODUODENOSCOPY) N/A 2018    Performed by [facility]gozi Prince MD at [facility]     Social History     Tobacco Use    Smoking status: Former Smoker     Last attempt to quit: 1995     Years since quittin.6   Substance Use Topics    Alcohol use: No    Drug use: No     Family History   Problem Relation Age of Onset    Kidney disease Mother     Cancer Father     Heart disease Brother     Mental illness Son     Glaucoma Neg Hx     Macular degeneration Neg Hx     Retinal detachment Neg Hx      Review of patient's allergies indicates:   Allergen Reactions    Plaquenil [hydroxychloroquine]      Having eye reaction, needs to stop plaquenil and stay off of it.        Performance Status:The patient's activity level is housebound activities.      Review of Systems:  a review of eleven systems covering constitutional, Eye, HEENT, Psych, Respiratory, Cardiac, GI, , Musculoskeletal, Endocrine, Dermatologic was negative except for pertinent findings as listed ABOVE and below:  pertinent positive as above, cough, sinus pressure, sore throat, rest is good       Exam:Comprehensive exam done. /72 (BP Location: Right arm, Patient Position: Sitting)   Pulse 103   Ht 5' 6" (1.676 m)   Wt 91.6 kg (201 lb 15.1 oz)   SpO2 (!) 88% Comment: room air at rest  BMI 32.59 kg/m²   Exam included Vitals as listed, and " patient's appearance and affect and alertness and mood, oral exam for yeast and hygiene and pharynx lesions and Mallapatti (M) score, neck with inspection for jvd and masses and thyroid abnormalities and lymph nodes (supraclavicular and infraclavicular nodes and axillary also examined and noted if abn), chest exam included symmetry and effort and fremitus and percussion and auscultation, cardiac exam included rhythm and gallops and murmur and rubs and jvd and edema, abdominal exam for mass and hepatosplenomegaly and tenderness and hernias and bowel sounds, Musculoskeletal exam with muscle tone and posture and mobility/gait and  strength, and skin for rashes and cyanosis and pallor and turgor, extremity for clubbing.  Findings were normal except for pertinent findings listed below:M2, bibasiliar rales, min clubbing, Sinus cavity edema boggy, throat erythema,        Radiographs (ct chest and cxr) reviewed: view by direct vision  March 2018 ct not too bad with cysts- viewed 8/21/18  CT CHEST WITHOUT CONTRAST 03/27/2019   Severe interstitial fibrosis with peripheral honeycombing and multiple bilateral lower lobe bulla consistent with the history of scleroderma.  This is essentially unchanged from March 21, 2018.  Continued mild mediastinal adenopathy also unchanged.  No acute findings.  Small hiatal hernia.  Stable 2 cm complicated cyst of the left kidney.       Labs  noted  Lab Results   Component Value Date    WBC 5.71 03/13/2019    HGB 13.6 03/13/2019    HCT 44.0 03/13/2019    MCV 94 03/13/2019     03/13/2019        PFT results reviewed   Pulmonary Functions, including spirometry and bronchodilator response and lung volumes and diffusion, study was done May 8, 2018.  Spirometry shows mild obstruction, loss vital capacity and obstruction and no bronchodilator response.   FEV1 is 65% or 1.68 liters.  Lung volumes show  loss of TLC with restriction 66%.  Diffusion shows reduced but falls within normal range  when corrected for lung volumes.   Pulmonary functions show  Mild obstruction and also restriction. Clinical correlation recommended.   Mikal Serrano M.D.    Echo 10/12/2018 Normal left and right ventricular systolic functions with LVEF >55%.    Plan:  Clinical impression is apparently straight forward and impression with management as below.     Edith was seen today for shortness of breath, cough and sputum production.    Diagnoses and all orders for this visit:    Pulmonary hypertension    COPD mixed type  -     promethazine-dextromethorphan (PROMETHAZINE-DM) 6.25-15 mg/5 mL Syrp; Take 5 mLs by mouth every evening.  -     benzonatate (TESSALON) 200 MG capsule; Take 1 capsule (200 mg total) by mouth 3 (three) times daily as needed for Cough.    Interstitial lung disease    Obstructive sleep apnea    Acute non-recurrent maxillary sinusitis  -     promethazine-dextromethorphan (PROMETHAZINE-DM) 6.25-15 mg/5 mL Syrp; Take 5 mLs by mouth every evening.  -     benzonatate (TESSALON) 200 MG capsule; Take 1 capsule (200 mg total) by mouth 3 (three) times daily as needed for Cough.        Follow up in about 6 months (around 10/3/2019).    Discussed with patient above for education the following:      Patient Instructions   You are doing an excellent job taking care of yourself    CT was reviewed. The lung damage present is stable from a year ago.    Will continue to monitor Pulmonary hypertension and Pulmonary fibrosis thru six minute walk test and CTs   Continue current treatment plan for Pulmonary Fibrosis and Pulmonary Hypertension    Continue CPAP for Obstructive sleep apnea, current settings are sufficient.    Continue supplemental oxygen for shortness of breath    Use Symbicort 2 puffs twice a day when SOB can decrease to 1 puff daily when stable    Fela perles pills 1 pill three times a day for cough

## 2019-04-03 NOTE — PATIENT INSTRUCTIONS
You are doing an excellent job taking care of yourself    CT was reviewed. The lung damage present is stable from a year ago.    Will continue to monitor Pulmonary hypertension and Pulmonary fibrosis thru six minute walk test and CTs   Continue current treatment plan for Pulmonary Fibrosis and Pulmonary Hypertension    Continue CPAP for Obstructive sleep apnea, current settings are sufficient.    Continue supplemental oxygen for shortness of breath    Use Symbicort 2 puffs twice a day when SOB can decrease to 1 puff daily when stable    Fela perles pills 1 pill three times a day for cough

## 2019-04-05 DIAGNOSIS — J01.81 OTHER ACUTE RECURRENT SINUSITIS: ICD-10-CM

## 2019-04-05 DIAGNOSIS — J30.2 CHRONIC SEASONAL ALLERGIC RHINITIS: ICD-10-CM

## 2019-04-05 RX ORDER — FLUTICASONE PROPIONATE 50 MCG
1 SPRAY, SUSPENSION (ML) NASAL DAILY
Qty: 3 BOTTLE | Refills: 3 | Status: SHIPPED | OUTPATIENT
Start: 2019-04-05 | End: 2020-03-31 | Stop reason: SDUPTHER

## 2019-04-29 DIAGNOSIS — J44.9 CHRONIC OBSTRUCTIVE PULMONARY DISEASE, UNSPECIFIED COPD TYPE: ICD-10-CM

## 2019-04-29 DIAGNOSIS — R09.89 CHRONIC SINUS COMPLAINTS: ICD-10-CM

## 2019-04-29 RX ORDER — MONTELUKAST SODIUM 10 MG/1
10 TABLET ORAL NIGHTLY
Qty: 30 TABLET | Refills: 11 | Status: SHIPPED | OUTPATIENT
Start: 2019-04-29 | End: 2020-04-13 | Stop reason: SDUPTHER

## 2019-06-11 ENCOUNTER — LAB VISIT (OUTPATIENT)
Dept: LAB | Facility: HOSPITAL | Age: 64
End: 2019-06-11
Attending: INTERNAL MEDICINE
Payer: MEDICARE

## 2019-06-11 DIAGNOSIS — I10 ESSENTIAL HYPERTENSION: ICD-10-CM

## 2019-06-11 LAB
ALBUMIN SERPL BCP-MCNC: 3.9 G/DL (ref 3.5–5.2)
ALP SERPL-CCNC: 75 U/L (ref 55–135)
ALT SERPL W/O P-5'-P-CCNC: 17 U/L (ref 10–44)
ANION GAP SERPL CALC-SCNC: 11 MMOL/L (ref 8–16)
AST SERPL-CCNC: 18 U/L (ref 10–40)
BASOPHILS # BLD AUTO: 0.05 K/UL (ref 0–0.2)
BASOPHILS NFR BLD: 1 % (ref 0–1.9)
BILIRUB SERPL-MCNC: 0.3 MG/DL (ref 0.1–1)
BUN SERPL-MCNC: 7 MG/DL (ref 8–23)
CALCIUM SERPL-MCNC: 10 MG/DL (ref 8.7–10.5)
CHLORIDE SERPL-SCNC: 108 MMOL/L (ref 95–110)
CHOLEST SERPL-MCNC: 206 MG/DL (ref 120–199)
CHOLEST/HDLC SERPL: 3.3 {RATIO} (ref 2–5)
CO2 SERPL-SCNC: 27 MMOL/L (ref 23–29)
CREAT SERPL-MCNC: 0.7 MG/DL (ref 0.5–1.4)
DIFFERENTIAL METHOD: ABNORMAL
EOSINOPHIL # BLD AUTO: 0.2 K/UL (ref 0–0.5)
EOSINOPHIL NFR BLD: 4.3 % (ref 0–8)
ERYTHROCYTE [DISTWIDTH] IN BLOOD BY AUTOMATED COUNT: 13.6 % (ref 11.5–14.5)
EST. GFR  (AFRICAN AMERICAN): >60 ML/MIN/1.73 M^2
EST. GFR  (NON AFRICAN AMERICAN): >60 ML/MIN/1.73 M^2
GLUCOSE SERPL-MCNC: 101 MG/DL (ref 70–110)
HCT VFR BLD AUTO: 43.1 % (ref 37–48.5)
HDLC SERPL-MCNC: 62 MG/DL (ref 40–75)
HDLC SERPL: 30.1 % (ref 20–50)
HGB BLD-MCNC: 13.4 G/DL (ref 12–16)
IMM GRANULOCYTES # BLD AUTO: 0.02 K/UL (ref 0–0.04)
IMM GRANULOCYTES NFR BLD AUTO: 0.4 % (ref 0–0.5)
LDLC SERPL CALC-MCNC: 118.4 MG/DL (ref 63–159)
LYMPHOCYTES # BLD AUTO: 1 K/UL (ref 1–4.8)
LYMPHOCYTES NFR BLD: 20 % (ref 18–48)
MCH RBC QN AUTO: 29.3 PG (ref 27–31)
MCHC RBC AUTO-ENTMCNC: 31.1 G/DL (ref 32–36)
MCV RBC AUTO: 94 FL (ref 82–98)
MONOCYTES # BLD AUTO: 0.4 K/UL (ref 0.3–1)
MONOCYTES NFR BLD: 7.9 % (ref 4–15)
NEUTROPHILS # BLD AUTO: 3.4 K/UL (ref 1.8–7.7)
NEUTROPHILS NFR BLD: 66.4 % (ref 38–73)
NONHDLC SERPL-MCNC: 144 MG/DL
NRBC BLD-RTO: 0 /100 WBC
PLATELET # BLD AUTO: 183 K/UL (ref 150–350)
PMV BLD AUTO: 12 FL (ref 9.2–12.9)
POTASSIUM SERPL-SCNC: 4.4 MMOL/L (ref 3.5–5.1)
PROT SERPL-MCNC: 7.3 G/DL (ref 6–8.4)
RBC # BLD AUTO: 4.58 M/UL (ref 4–5.4)
SODIUM SERPL-SCNC: 146 MMOL/L (ref 136–145)
TRIGL SERPL-MCNC: 128 MG/DL (ref 30–150)
WBC # BLD AUTO: 5.16 K/UL (ref 3.9–12.7)

## 2019-06-11 PROCEDURE — 80061 LIPID PANEL: CPT

## 2019-06-11 PROCEDURE — 36415 COLL VENOUS BLD VENIPUNCTURE: CPT | Mod: PO

## 2019-06-11 PROCEDURE — 85025 COMPLETE CBC W/AUTO DIFF WBC: CPT

## 2019-06-11 PROCEDURE — 80053 COMPREHEN METABOLIC PANEL: CPT

## 2019-06-12 ENCOUNTER — PATIENT MESSAGE (OUTPATIENT)
Dept: FAMILY MEDICINE | Facility: CLINIC | Age: 64
End: 2019-06-12

## 2019-06-18 ENCOUNTER — OFFICE VISIT (OUTPATIENT)
Dept: FAMILY MEDICINE | Facility: CLINIC | Age: 64
End: 2019-06-18
Payer: MEDICARE

## 2019-06-18 ENCOUNTER — DOCUMENTATION ONLY (OUTPATIENT)
Dept: FAMILY MEDICINE | Facility: CLINIC | Age: 64
End: 2019-06-18

## 2019-06-18 VITALS
DIASTOLIC BLOOD PRESSURE: 72 MMHG | RESPIRATION RATE: 16 BRPM | SYSTOLIC BLOOD PRESSURE: 126 MMHG | BODY MASS INDEX: 30.86 KG/M2 | OXYGEN SATURATION: 99 % | TEMPERATURE: 98 F | WEIGHT: 192 LBS | HEIGHT: 66 IN | HEART RATE: 97 BPM

## 2019-06-18 DIAGNOSIS — M34.9 SCLERODERMA: Primary | ICD-10-CM

## 2019-06-18 DIAGNOSIS — G47.00 INSOMNIA, UNSPECIFIED TYPE: ICD-10-CM

## 2019-06-18 DIAGNOSIS — I15.0 RENOVASCULAR HYPERTENSION: ICD-10-CM

## 2019-06-18 PROBLEM — R06.09 DOE (DYSPNEA ON EXERTION): Status: ACTIVE | Noted: 2019-06-18

## 2019-06-18 PROCEDURE — 99214 OFFICE O/P EST MOD 30 MIN: CPT | Mod: S$GLB,,, | Performed by: INTERNAL MEDICINE

## 2019-06-18 PROCEDURE — 99214 PR OFFICE/OUTPT VISIT, EST, LEVL IV, 30-39 MIN: ICD-10-PCS | Mod: S$GLB,,, | Performed by: INTERNAL MEDICINE

## 2019-06-18 RX ORDER — LOSARTAN POTASSIUM 25 MG/1
25 TABLET ORAL DAILY
Qty: 90 TABLET | Refills: 3 | Status: SHIPPED | OUTPATIENT
Start: 2019-06-18 | End: 2020-06-14 | Stop reason: SDUPTHER

## 2019-06-18 RX ORDER — ERGOCALCIFEROL 1.25 MG/1
CAPSULE ORAL
Qty: 12 CAPSULE | Refills: 0 | Status: SHIPPED | OUTPATIENT
Start: 2019-06-18 | End: 2019-07-12 | Stop reason: SDUPTHER

## 2019-06-18 RX ORDER — TRAZODONE HYDROCHLORIDE 50 MG/1
50 TABLET ORAL NIGHTLY PRN
Qty: 30 TABLET | Refills: 3 | Status: SHIPPED | OUTPATIENT
Start: 2019-06-18 | End: 2019-09-18 | Stop reason: SDUPTHER

## 2019-06-18 NOTE — PATIENT INSTRUCTIONS
[email or web address] has a website that has a course that teaches you how to sleep.  I highly recommend it.

## 2019-06-18 NOTE — PROGRESS NOTES
Subjective:       Patient ID: Edith Tran is a 64 y.o. female.    Chief Complaint: Hypertension    HPI           CHIEF COMPLAINT: Hypertension  HPI: hx of scleroderma.     ONSET:      QUALITY/COURSE:   Unchanged.     INTENSITY/SEVERITY:  Average blood pressure is unknown.      MODIFIERS/TREATMENTS:  Taking medications: yes. .High sodium intake: no. alcohol: no      The following symptoms are positive only if BOLDED, otherwise are negative.      SYMPTOMS/RELATED: Possible medication side effects include:   Depression..  . Cough. . Constipation.    REVIEW OF SYMPTOMS: . Weight_loss . Weight_gain . Leg_cramps .Potency_problems .    TARGET ORGAN DAMAGE:: angina/ prior myocardial infarction, chronic kidney disease, heart failure, left ventricular hypertrophy, peripheral artery disease, prior coronary revascularization, retinopathy, stroke. transient ischemic attack.    Patient has interstitial lung disease is on oxygen 24/7.    CHIEF COMPLAINT: insomnia .  HPI:     ONSET/TIMING: Onset    yrs ago. Sudden: no .  Similar problems in past: no. ..    DURATION:  constant    QUALITY/COURSE: .unchanged.     Can't fall asleep: yes. . Wakes up early: yes.      INTENSITY/SEVERITY:  Severity 8   (on a 1-10 scale).      MODIFIERS/TREATMENTS: . Taking medications: yes.  . Effective: yes.  SYMPTOMS/RELATED: . Possible medication side effects include:     .     The following symptoms/statements  are positive if BOLD, negative otherwise.      CONTEXT/WHEN:  .Nocturnal. . Napping.  shift work . Time_zone_changes.     REVIEW OF SYSTEMS :  .   nocturia . Restless_legs . pain . Obstructive_sleep_apnea.  Interstitial lung disease.  . depression . anxiety . Substance_abuse . Copd . Grief .         Review of Systems   Constitutional: Negative for activity change and unexpected weight change.   HENT: Positive for rhinorrhea. Negative for hearing loss and trouble swallowing.    Eyes: Positive for visual disturbance. Negative for discharge.  "  Respiratory: Positive for shortness of breath. Negative for chest tightness and wheezing.    Cardiovascular: Negative for chest pain and palpitations.   Gastrointestinal: Negative for blood in stool, constipation, diarrhea and vomiting.   Endocrine: Negative for polydipsia and polyuria.   Genitourinary: Negative for difficulty urinating, dysuria, hematuria and menstrual problem.   Musculoskeletal: Positive for arthralgias and joint swelling. Negative for neck pain.   Neurological: Positive for weakness. Negative for headaches.   Psychiatric/Behavioral: Positive for dysphoric mood. Negative for confusion.         Objective:      Vitals:    06/18/19 0835   BP: 126/72   Pulse: 97   Resp: 16   Temp: 98 °F (36.7 °C)   TempSrc: Oral   SpO2: 99%   Weight: 87.1 kg (192 lb 0.3 oz)   Height: 5' 6" (1.676 m)   PainSc:   2   PainLoc: Hand     Physical Exam   Constitutional: She appears well-developed and well-nourished.   Cardiovascular: Normal rate, regular rhythm and normal heart sounds.   Pulmonary/Chest: Effort normal. She has rales (At bases).   Abdominal: Soft. There is no tenderness.   Neurological: She is alert.   Psychiatric: She has a normal mood and affect. Her behavior is normal. Thought content normal.   Nursing note and vitals reviewed.        Assessment:       1. Scleroderma    2. Renovascular hypertension    3. Insomnia, unspecified type          Plan:       Scleroderma  -     losartan (COZAAR) 25 MG tablet; Take 1 tablet (25 mg total) by mouth once daily.  Dispense: 90 tablet; Refill: 3  -     CBC auto differential; Future; Expected date: 06/18/2019  -     Comprehensive metabolic panel; Future; Expected date: 06/18/2019    Renovascular hypertension  -     losartan (COZAAR) 25 MG tablet; Take 1 tablet (25 mg total) by mouth once daily.  Dispense: 90 tablet; Refill: 3  -     CBC auto differential; Future; Expected date: 06/18/2019  -     Comprehensive metabolic panel; Future; Expected date: " 06/18/2019    Insomnia, unspecified type  -     traZODone (DESYREL) 50 MG tablet; Take 1 tablet (50 mg total) by mouth nightly as needed for Insomnia.  Dispense: 30 tablet; Refill: 3      Follow up in about 3 months (around 9/18/2019).

## 2019-07-05 DIAGNOSIS — I10 ESSENTIAL HYPERTENSION: ICD-10-CM

## 2019-07-05 RX ORDER — AMLODIPINE BESYLATE 5 MG/1
5 TABLET ORAL DAILY
Qty: 90 TABLET | Refills: 0 | Status: SHIPPED | OUTPATIENT
Start: 2019-07-05 | End: 2019-10-23 | Stop reason: SDUPTHER

## 2019-07-12 RX ORDER — ERGOCALCIFEROL 1.25 MG/1
50000 CAPSULE ORAL
Qty: 12 CAPSULE | Refills: 0 | Status: SHIPPED | OUTPATIENT
Start: 2019-07-12 | End: 2019-10-03 | Stop reason: SDUPTHER

## 2019-07-15 DIAGNOSIS — I27.20 PULMONARY HYPERTENSION: ICD-10-CM

## 2019-07-15 RX ORDER — MYCOPHENOLATE MOFETIL 500 MG/1
500 TABLET ORAL 2 TIMES DAILY
Qty: 180 TABLET | Refills: 1 | Status: SHIPPED | OUTPATIENT
Start: 2019-07-15 | End: 2019-10-18 | Stop reason: SDUPTHER

## 2019-08-19 RX ORDER — BENZONATATE 200 MG/1
CAPSULE ORAL
Qty: 30 CAPSULE | Refills: 0 | Status: SHIPPED | OUTPATIENT
Start: 2019-08-19 | End: 2019-10-08 | Stop reason: SDUPTHER

## 2019-09-11 ENCOUNTER — LAB VISIT (OUTPATIENT)
Dept: LAB | Facility: HOSPITAL | Age: 64
End: 2019-09-11
Attending: INTERNAL MEDICINE
Payer: MEDICARE

## 2019-09-11 DIAGNOSIS — I15.0 RENOVASCULAR HYPERTENSION: ICD-10-CM

## 2019-09-11 DIAGNOSIS — M34.9 SCLERODERMA: ICD-10-CM

## 2019-09-11 LAB
ALBUMIN SERPL BCP-MCNC: 4 G/DL (ref 3.5–5.2)
ALP SERPL-CCNC: 63 U/L (ref 55–135)
ALT SERPL W/O P-5'-P-CCNC: 13 U/L (ref 10–44)
ANION GAP SERPL CALC-SCNC: 10 MMOL/L (ref 8–16)
AST SERPL-CCNC: 16 U/L (ref 10–40)
BASOPHILS # BLD AUTO: 0.03 K/UL (ref 0–0.2)
BASOPHILS NFR BLD: 0.7 % (ref 0–1.9)
BILIRUB SERPL-MCNC: 0.8 MG/DL (ref 0.1–1)
BUN SERPL-MCNC: 8 MG/DL (ref 8–23)
CALCIUM SERPL-MCNC: 9.3 MG/DL (ref 8.7–10.5)
CHLORIDE SERPL-SCNC: 104 MMOL/L (ref 95–110)
CO2 SERPL-SCNC: 25 MMOL/L (ref 23–29)
CREAT SERPL-MCNC: 0.8 MG/DL (ref 0.5–1.4)
DIFFERENTIAL METHOD: ABNORMAL
EOSINOPHIL # BLD AUTO: 0.1 K/UL (ref 0–0.5)
EOSINOPHIL NFR BLD: 2.6 % (ref 0–8)
ERYTHROCYTE [DISTWIDTH] IN BLOOD BY AUTOMATED COUNT: 13.2 % (ref 11.5–14.5)
EST. GFR  (AFRICAN AMERICAN): >60 ML/MIN/1.73 M^2
EST. GFR  (NON AFRICAN AMERICAN): >60 ML/MIN/1.73 M^2
GLUCOSE SERPL-MCNC: 88 MG/DL (ref 70–110)
HCT VFR BLD AUTO: 42.3 % (ref 37–48.5)
HGB BLD-MCNC: 12.9 G/DL (ref 12–16)
IMM GRANULOCYTES # BLD AUTO: 0.01 K/UL (ref 0–0.04)
IMM GRANULOCYTES NFR BLD AUTO: 0.2 % (ref 0–0.5)
LYMPHOCYTES # BLD AUTO: 0.8 K/UL (ref 1–4.8)
LYMPHOCYTES NFR BLD: 16.5 % (ref 18–48)
MCH RBC QN AUTO: 29.4 PG (ref 27–31)
MCHC RBC AUTO-ENTMCNC: 30.5 G/DL (ref 32–36)
MCV RBC AUTO: 96 FL (ref 82–98)
MONOCYTES # BLD AUTO: 0.4 K/UL (ref 0.3–1)
MONOCYTES NFR BLD: 8.8 % (ref 4–15)
NEUTROPHILS # BLD AUTO: 3.2 K/UL (ref 1.8–7.7)
NEUTROPHILS NFR BLD: 71.2 % (ref 38–73)
NRBC BLD-RTO: 0 /100 WBC
PLATELET # BLD AUTO: 177 K/UL (ref 150–350)
PMV BLD AUTO: 11.8 FL (ref 9.2–12.9)
POTASSIUM SERPL-SCNC: 3.9 MMOL/L (ref 3.5–5.1)
PROT SERPL-MCNC: 7.1 G/DL (ref 6–8.4)
RBC # BLD AUTO: 4.39 M/UL (ref 4–5.4)
SODIUM SERPL-SCNC: 139 MMOL/L (ref 136–145)
WBC # BLD AUTO: 4.55 K/UL (ref 3.9–12.7)

## 2019-09-11 PROCEDURE — 85025 COMPLETE CBC W/AUTO DIFF WBC: CPT

## 2019-09-11 PROCEDURE — 36415 COLL VENOUS BLD VENIPUNCTURE: CPT | Mod: PO

## 2019-09-11 PROCEDURE — 80053 COMPREHEN METABOLIC PANEL: CPT

## 2019-09-18 ENCOUNTER — OFFICE VISIT (OUTPATIENT)
Dept: FAMILY MEDICINE | Facility: CLINIC | Age: 64
End: 2019-09-18
Payer: MEDICARE

## 2019-09-18 ENCOUNTER — DOCUMENTATION ONLY (OUTPATIENT)
Dept: FAMILY MEDICINE | Facility: CLINIC | Age: 64
End: 2019-09-18

## 2019-09-18 VITALS
DIASTOLIC BLOOD PRESSURE: 72 MMHG | HEIGHT: 66 IN | TEMPERATURE: 98 F | BODY MASS INDEX: 30.82 KG/M2 | OXYGEN SATURATION: 99 % | WEIGHT: 191.81 LBS | SYSTOLIC BLOOD PRESSURE: 106 MMHG | RESPIRATION RATE: 16 BRPM | HEART RATE: 88 BPM

## 2019-09-18 DIAGNOSIS — G47.00 INSOMNIA, UNSPECIFIED TYPE: ICD-10-CM

## 2019-09-18 DIAGNOSIS — M34.9 SCLERODERMA: ICD-10-CM

## 2019-09-18 DIAGNOSIS — I15.0 RENOVASCULAR HYPERTENSION: Primary | ICD-10-CM

## 2019-09-18 DIAGNOSIS — J84.9 INTERSTITIAL LUNG DISEASE: ICD-10-CM

## 2019-09-18 DIAGNOSIS — M67.40 GANGLION CYST: ICD-10-CM

## 2019-09-18 PROCEDURE — 99214 OFFICE O/P EST MOD 30 MIN: CPT | Mod: 25,S$GLB,, | Performed by: INTERNAL MEDICINE

## 2019-09-18 PROCEDURE — G0008 ADMIN INFLUENZA VIRUS VAC: HCPCS | Mod: S$GLB,,, | Performed by: INTERNAL MEDICINE

## 2019-09-18 PROCEDURE — 99214 PR OFFICE/OUTPT VISIT, EST, LEVL IV, 30-39 MIN: ICD-10-PCS | Mod: 25,S$GLB,, | Performed by: INTERNAL MEDICINE

## 2019-09-18 PROCEDURE — G0008 FLU VACCINE (QUAD) GREATER THAN OR EQUAL TO 3YO PRESERVATIVE FREE IM: ICD-10-PCS | Mod: S$GLB,,, | Performed by: INTERNAL MEDICINE

## 2019-09-18 PROCEDURE — 90686 IIV4 VACC NO PRSV 0.5 ML IM: CPT | Mod: S$GLB,,, | Performed by: INTERNAL MEDICINE

## 2019-09-18 PROCEDURE — 90686 FLU VACCINE (QUAD) GREATER THAN OR EQUAL TO 3YO PRESERVATIVE FREE IM: ICD-10-PCS | Mod: S$GLB,,, | Performed by: INTERNAL MEDICINE

## 2019-09-18 RX ORDER — TADALAFIL 20 MG/1
40 TABLET ORAL DAILY
COMMUNITY
Start: 2019-08-06 | End: 2019-10-09 | Stop reason: SDUPTHER

## 2019-09-18 RX ORDER — TRAZODONE HYDROCHLORIDE 50 MG/1
100 TABLET ORAL NIGHTLY PRN
Qty: 60 TABLET | Refills: 3 | Status: SHIPPED | OUTPATIENT
Start: 2019-09-18 | End: 2020-01-21 | Stop reason: SDUPTHER

## 2019-09-18 NOTE — PROGRESS NOTES
Subjective:      8:57 AM     Patient ID: Edith Tran is a 64 y.o. female.    Chief Complaint: Hypertension (labs,no refills needed) and lump on wrist    HPI     Has scleroderma.  She is on oxygen for interstitial lung disease related to that.      CHIEF COMPLAINT: Hypertension  HPI:     ONSET:      QUALITY/COURSE:   Unchanged.     INTENSITY/SEVERITY:  Average blood pressure is 110/70.      MODIFIERS/TREATMENTS:  Taking medications: yes. .High sodium intake: no. alcohol: no      The following symptoms are positive only if BOLDED, otherwise are negative.      SYMPTOMS/RELATED: Possible medication side effects include:   Depression..  . Cough. . Constipation.    REVIEW OF SYMPTOMS: . Weight_loss . Weight_gain . Leg_cramps ..    TARGET ORGAN DAMAGE:: angina/ prior myocardial infarction, chronic kidney disease, heart failure, left ventricular hypertrophy, peripheral artery disease, prior coronary revascularization, retinopathy, stroke. transient ischemic attack.          CHIEF COMPLAINT: Upper_Extremity_Problems. Pain: yes.. Weakness: no . Injury: no .  HPI:  When the cyst got bigger she used a wrist splint and it did go down after for 5 days    ONSET/TIMING: . Onset  1-2 years ago.  Gradual. Work related: no.  Similar_problems_in past: no.     DURATION: .          Paroxysmal: no .    QUALITY/COURSE:  Improving after being worse last week.    LOCATION:    .  . Radiation: no.    INTENSITY/SEVERITY:. Severity is # 5 now 0   (10 point scale).    CONTEXT/WHEN: . Date_Expected_Work_Return is . Activity: yes. . Abduction greater than 90 degrees: no.. Inactivity: no      MODIFIERS/TREATMENTS:  Current_Limitations_are.        Taking medications: no.  Physical_Therapy: no .  Chiropractor: no . . Litigation_pending: no. . X-rays: no.. CT: no.. MRI: no.    SYMPTOMS/RELATED: . --Possible medication side effects include:.     The following symptoms are positive if BOLD, negative otherwise.       REVIEW OF SYMPTOMS:   Numbness.  "Weakness. Muscle_Problems. Fever. Joint_Problems. Swelling. Erythema. Weight_loss.    Review of Systems      Objective:      Vitals:    09/18/19 0849   BP: 106/72   Pulse: 88   Resp: 16   Temp: 98.2 °F (36.8 °C)   TempSrc: Oral   SpO2: 99%   Weight: 87 kg (191 lb 12.8 oz)   Height: 5' 6" (1.676 m)   PainSc: 0-No pain     Physical Exam   Constitutional: She appears well-developed and well-nourished.   Cardiovascular: Normal rate, regular rhythm and normal heart sounds.   Pulmonary/Chest: Effort normal and breath sounds normal.   Abdominal: Soft. There is no tenderness.   Musculoskeletal:   1 cm ganglion cyst not tender on the dorsum of the right wrist   Neurological: She is alert.   Psychiatric: She has a normal mood and affect. Her behavior is normal. Thought content normal.   Nursing note and vitals reviewed.        Assessment:       1. Renovascular hypertension    2. Interstitial lung disease    3. Insomnia, unspecified type    4. Scleroderma    5. Ganglion cyst          Plan:       Renovascular hypertension  -     CBC auto differential; Future; Expected date: 09/18/2019  -     Comprehensive metabolic panel; Future; Expected date: 09/18/2019    Interstitial lung disease    Insomnia, unspecified type  -     traZODone (DESYREL) 50 MG tablet; Take 2 tablets (100 mg total) by mouth nightly as needed for Insomnia.  Dispense: 60 tablet; Refill: 3    Scleroderma  -     CBC auto differential; Future; Expected date: 09/18/2019  -     Comprehensive metabolic panel; Future; Expected date: 09/18/2019    Ganglion cyst    Other orders  -     Influenza - Quadrivalent (3 years & older)      Follow up in about 3 months (around 12/18/2019).      "

## 2019-09-18 NOTE — PROGRESS NOTES
Health Maintenance Due   Topic Date Due    TETANUS VACCINE  02/09/1973    Colonoscopy  08/07/2019

## 2019-09-18 NOTE — PATIENT INSTRUCTIONS
Lose 5 pounds.  Suggest [place] diet    Get the shingles shot    [email or web address] has a website that has a course that teaches you how to sleep.  I highly recommend it.     Get the PFTs that are ordered    If the ganglion cyst gets worse you can come in and will aspirate it so that it does not hurt.        Thank you for choosing [facility].     Please fill out the patient experience survey.

## 2019-09-30 ENCOUNTER — EXTERNAL CHRONIC CARE MANAGEMENT (OUTPATIENT)
Dept: PRIMARY CARE CLINIC | Facility: CLINIC | Age: 64
End: 2019-09-30
Payer: MEDICARE

## 2019-09-30 PROCEDURE — 99490 CHRNC CARE MGMT STAFF 1ST 20: CPT | Mod: PBBFAC | Performed by: INTERNAL MEDICINE

## 2019-09-30 PROCEDURE — 99490 PR CHRONIC CARE MGMT, 1ST 20 MIN: ICD-10-PCS | Mod: S$PBB,,, | Performed by: INTERNAL MEDICINE

## 2019-09-30 PROCEDURE — 99490 CHRNC CARE MGMT STAFF 1ST 20: CPT | Mod: S$PBB,,, | Performed by: INTERNAL MEDICINE

## 2019-10-03 RX ORDER — ERGOCALCIFEROL 1.25 MG/1
CAPSULE ORAL
Qty: 12 CAPSULE | Refills: 0 | Status: SHIPPED | OUTPATIENT
Start: 2019-10-03 | End: 2019-12-27

## 2019-10-08 ENCOUNTER — OFFICE VISIT (OUTPATIENT)
Dept: PULMONOLOGY | Facility: CLINIC | Age: 64
End: 2019-10-08
Payer: MEDICARE

## 2019-10-08 VITALS
HEART RATE: 101 BPM | SYSTOLIC BLOOD PRESSURE: 102 MMHG | BODY MASS INDEX: 31.47 KG/M2 | WEIGHT: 195 LBS | DIASTOLIC BLOOD PRESSURE: 64 MMHG | OXYGEN SATURATION: 97 %

## 2019-10-08 DIAGNOSIS — R05.9 COUGH: ICD-10-CM

## 2019-10-08 DIAGNOSIS — J44.9 COPD MIXED TYPE: Primary | ICD-10-CM

## 2019-10-08 DIAGNOSIS — J84.9 INTERSTITIAL LUNG DISEASE: ICD-10-CM

## 2019-10-08 DIAGNOSIS — G47.33 OBSTRUCTIVE SLEEP APNEA: ICD-10-CM

## 2019-10-08 PROCEDURE — 99214 OFFICE O/P EST MOD 30 MIN: CPT | Mod: PBBFAC,PO | Performed by: NURSE PRACTITIONER

## 2019-10-08 PROCEDURE — 99213 OFFICE O/P EST LOW 20 MIN: CPT | Mod: S$PBB,,, | Performed by: NURSE PRACTITIONER

## 2019-10-08 PROCEDURE — 99999 PR PBB SHADOW E&M-EST. PATIENT-LVL IV: ICD-10-PCS | Mod: PBBFAC,,, | Performed by: NURSE PRACTITIONER

## 2019-10-08 PROCEDURE — 99213 PR OFFICE/OUTPT VISIT, EST, LEVL III, 20-29 MIN: ICD-10-PCS | Mod: S$PBB,,, | Performed by: NURSE PRACTITIONER

## 2019-10-08 PROCEDURE — 99999 PR PBB SHADOW E&M-EST. PATIENT-LVL IV: CPT | Mod: PBBFAC,,, | Performed by: NURSE PRACTITIONER

## 2019-10-08 RX ORDER — BENZONATATE 200 MG/1
CAPSULE ORAL
Qty: 30 CAPSULE | Refills: 0 | Status: SHIPPED | OUTPATIENT
Start: 2019-10-08 | End: 2020-09-09

## 2019-10-08 RX ORDER — PREDNISONE 5 MG/1
TABLET ORAL
Qty: 21 TABLET | Refills: 0 | Status: SHIPPED | OUTPATIENT
Start: 2019-10-08 | End: 2020-09-09

## 2019-10-08 NOTE — PATIENT INSTRUCTIONS
Order sent for different CPAP mask  Continue to use nightly for Obstructive sleep apnea    Continue supplemental oxygen    Will review the results of PFT and 6 min walk from [place] at next appointment.    Continue Symbicort daily.     Use Fela perles and prednisone as needed for cough.

## 2019-10-08 NOTE — PROGRESS NOTES
10/8/2019    Edith Tran  Office Note    Chief Complaint   Patient presents with    Follow-up     6 month       HPI:   October 8, 2019- Has PFT and 6 min walk scheduled Nov 2019 by Dr. Barnes Rheumatologist at [place]. Wearing supplemental oxygen at home day/night set at 2L NC, currently on Symbicort daily, states benefit in breathing. No recent prednisone use. SOB controlled. No cough, no chest tightness, no wheeze.   Wear cpap nightly, states benefiting greatly but want different mask, tries to read before bed, mask over nasal bridge does not allow glasses to rest correctly.      April 3, 2019- Onset 1 week: Cough productive white in color, sore throat, post nasal drip, sinus drainage yellow, flushed cheeks, complaints improving tx with antibiotics no prednisone use. Cough worse when lying down.    Current therapy for scleroderma, pulmonary htn, and pulmonary fibrosis Opsumit/tadafanil and Ofev/cellcept. No diarrhea no complaints.  Currently using Symbicort 1 puff daily. Using supplemental oxygen at 2L NC for Shortness of breath, states greatly beneficial, pt states able to walk further and keep up with company while on oxygen.      Nov 27, trelegy not covered- not using, had flu shot 8 am with sorenes later day and huge swollen arm with  Pain thhat night.   No cough. On opsumit/tadafanil, and on ofev/cellcept.  Stable. No diarrhea.  Sees pulm htn and  [facility].  Last 6 min walk 02  Angelo 91 slow pace.      Aug 21, uses trelegy, no c/o.  No 0x arranges- sat 90 falls to 87 at 2 mins- walked 307 meters or 71%.  Dx Crohn's.  No abx nor prednisone.  cpap good and sinus good.  Instructions:Would monitor 6 min walk every 3 months.    307 meters was last distance.  Six min walk.  Monitor ct chest yearly in March - sooner if worsens.  Needs 02 for activity.  Use medications for bronchitis.   Stay active.    July11,2018has had raynauld's for yrs, pt had severe mobility problems issues leading to dx slceroderma  " - "rock bottom".  Has had swallow sticking with  2-3 dilations with last over 3 yrs ago.  Pt has had pulm htn on opsumit and talafadil,  Pt also on ofev in a study, and cellcept.  Also low dose prednisone.  Pt dx osas and uses cpap nightly 8 hrs - had used 02 but off 02 for 3 yrs.   Pt had had 6 min walks but none recently- none last yr at least.  Pt gets bronchitis with cough and yellow mucous.  Has occ wheezes.  Had exacerbations in feb and  - typically 2-3 yrly.  Tessalon helps.  Uses combivent occ ppt headaches with some breathing benefit.     Uses flonase regular.  Was on asthma rx for yrs.        The chief compliant  problem is stable  PFSH:  Past Medical History:   Diagnosis Date    Allergy     COPD (chronic obstructive pulmonary disease)     GERD (gastroesophageal reflux disease)     Hypertension     Scleroderma involving lung since she was 49 y/o         Past Surgical History:   Procedure Laterality Date    COLONOSCOPY N/A 2018    Procedure: COLONOSCOPY;  Surgeon: Ngozi Prince MD;  Location: [facility] ENDO;  Service: Endoscopy;  Laterality: N/A;    ESOPHAGOGASTRODUODENOSCOPY N/A 2018    Procedure: EGD (ESOPHAGOGASTRODUODENOSCOPY);  Surgeon: Ngozi Prince MD;  Location: [facility] ENDO;  Service: Endoscopy;  Laterality: N/A;     Social History     Tobacco Use    Smoking status: Former Smoker     Last attempt to quit: 1995     Years since quittin.1   Substance Use Topics    Alcohol use: No    Drug use: No     Family History   Problem Relation Age of Onset    Kidney disease Mother     Cancer Father     Heart disease Brother     Mental illness Son     Glaucoma Neg Hx     Macular degeneration Neg Hx     Retinal detachment Neg Hx      Review of patient's allergies indicates:   Allergen Reactions    Plaquenil [hydroxychloroquine]      Having eye reaction, needs to stop plaquenil and stay off of it.        Performance Status:The patient's activity level is housebound " activities.      Review of Systems:  a review of eleven systems covering constitutional, Eye, HEENT, Psych, Respiratory, Cardiac, GI, , Musculoskeletal, Endocrine, Dermatologic was negative except for pertinent findings as listed ABOVE and below:  pertinent positive as above, rest is good       Exam:Comprehensive exam done. /64   Pulse 101   Wt 88.5 kg (195 lb)   SpO2 97%   BMI 31.47 kg/m²   Exam included Vitals as listed, and patient's appearance and affect and alertness and mood, oral exam for yeast and hygiene and pharynx lesions and Mallapatti (M) score, neck with inspection for jvd and masses and thyroid abnormalities and lymph nodes (supraclavicular and infraclavicular nodes and axillary also examined and noted if abn), chest exam included symmetry and effort and fremitus and percussion and auscultation, cardiac exam included rhythm and gallops and murmur and rubs and jvd and edema, abdominal exam for mass and hepatosplenomegaly and tenderness and hernias and bowel sounds, Musculoskeletal exam with muscle tone and posture and mobility/gait and  strength, and skin for rashes and cyanosis and pallor and turgor, extremity for clubbing.  Findings were normal except for pertinent findings listed below:M2, BS clear,        Radiographs (ct chest and cxr) reviewed: view by direct vision  March 2018 ct not too bad with cysts- viewed 8/21/18  CT CHEST WITHOUT CONTRAST 03/27/2019   Severe interstitial fibrosis with peripheral honeycombing and multiple bilateral lower lobe bulla consistent with the history of scleroderma.  This is essentially unchanged from March 21, 2018.  Continued mild mediastinal adenopathy also unchanged.  No acute findings.  Small hiatal hernia.  Stable 2 cm complicated cyst of the left kidney.       Labs  noted  Lab Results   Component Value Date    WBC 4.55 09/11/2019    HGB 12.9 09/11/2019    HCT 42.3 09/11/2019    MCV 96 09/11/2019     09/11/2019        PFT results  reviewed   Pulmonary Functions, including spirometry and bronchodilator response and lung volumes and diffusion, study was done May 8, 2018.  Spirometry shows mild obstruction, loss vital capacity and obstruction and no bronchodilator response.   FEV1 is 65% or 1.68 liters.  Lung volumes show  loss of TLC with restriction 66%.  Diffusion shows reduced but falls within normal range when corrected for lung volumes.   Pulmonary functions show  Mild obstruction and also restriction. Clinical correlation recommended.   Mikal Serrano M.D.    Echo 10/12/2018 Normal left and right ventricular systolic functions with LVEF >55%.    Plan:  Clinical impression is apparently straight forward and impression with management as below.     Edith was seen today for follow-up.    Diagnoses and all orders for this visit:    COPD mixed type  -     predniSONE (DELTASONE) 5 MG tablet; 6 tabs the first day, then 5 the next, then 4, 3, 2, 1    Obstructive sleep apnea  -     CPAP/BIPAP SUPPLIES    Interstitial lung disease  -     predniSONE (DELTASONE) 5 MG tablet; 6 tabs the first day, then 5 the next, then 4, 3, 2, 1    Cough  -     predniSONE (DELTASONE) 5 MG tablet; 6 tabs the first day, then 5 the next, then 4, 3, 2, 1    Other orders  -     benzonatate (TESSALON) 200 MG capsule; TAKE 1 CAPSULE BY MOUTH THREE TIMES A DAY IF NEEDED FOR COUGH        Follow up in about 6 months (around 4/8/2020), or if symptoms worsen or fail to improve.    Discussed with patient above for education the following:      Patient Instructions   Order sent for different CPAP mask  Continue to use nightly for Obstructive sleep apnea    Continue supplemental oxygen    Will review the results of PFT and [facility] at [facility] appointment.    Continue Symbicort daily.     Use Fela perles and prednisone as needed for cough.

## 2019-10-09 RX ORDER — TADALAFIL 20 MG/1
20 TABLET ORAL DAILY
Qty: 90 TABLET | Refills: 0 | Status: SHIPPED | OUTPATIENT
Start: 2019-10-09 | End: 2020-09-09

## 2019-10-18 ENCOUNTER — TELEPHONE (OUTPATIENT)
Dept: FAMILY MEDICINE | Facility: CLINIC | Age: 64
End: 2019-10-18

## 2019-10-18 DIAGNOSIS — I27.20 PULMONARY HYPERTENSION: ICD-10-CM

## 2019-10-18 RX ORDER — MYCOPHENOLATE MOFETIL 500 MG/1
1500 TABLET ORAL 2 TIMES DAILY
Qty: 520 TABLET | Refills: 1 | Status: SHIPPED | OUTPATIENT
Start: 2019-10-18 | End: 2020-04-16 | Stop reason: SDUPTHER

## 2019-10-23 ENCOUNTER — OFFICE VISIT (OUTPATIENT)
Dept: GASTROENTEROLOGY | Facility: CLINIC | Age: 64
End: 2019-10-23
Payer: MEDICARE

## 2019-10-23 VITALS
HEART RATE: 97 BPM | SYSTOLIC BLOOD PRESSURE: 116 MMHG | DIASTOLIC BLOOD PRESSURE: 69 MMHG | WEIGHT: 195.75 LBS | BODY MASS INDEX: 31.6 KG/M2

## 2019-10-23 DIAGNOSIS — K50.80 CROHN'S DISEASE OF BOTH SMALL AND LARGE INTESTINE WITHOUT COMPLICATION: ICD-10-CM

## 2019-10-23 DIAGNOSIS — I10 ESSENTIAL HYPERTENSION: ICD-10-CM

## 2019-10-23 DIAGNOSIS — R13.10 DYSPHAGIA, UNSPECIFIED TYPE: ICD-10-CM

## 2019-10-23 DIAGNOSIS — R11.0 NAUSEA: ICD-10-CM

## 2019-10-23 DIAGNOSIS — K21.9 REFLUX INVOLVING INTESTINAL TRACT: Primary | ICD-10-CM

## 2019-10-23 PROCEDURE — 99999 PR PBB SHADOW E&M-EST. PATIENT-LVL III: ICD-10-PCS | Mod: PBBFAC,,, | Performed by: INTERNAL MEDICINE

## 2019-10-23 PROCEDURE — 99213 PR OFFICE/OUTPT VISIT, EST, LEVL III, 20-29 MIN: ICD-10-PCS | Mod: S$PBB,,, | Performed by: INTERNAL MEDICINE

## 2019-10-23 PROCEDURE — 99213 OFFICE O/P EST LOW 20 MIN: CPT | Mod: PBBFAC,PO | Performed by: INTERNAL MEDICINE

## 2019-10-23 PROCEDURE — 99213 OFFICE O/P EST LOW 20 MIN: CPT | Mod: S$PBB,,, | Performed by: INTERNAL MEDICINE

## 2019-10-23 PROCEDURE — 99999 PR PBB SHADOW E&M-EST. PATIENT-LVL III: CPT | Mod: PBBFAC,,, | Performed by: INTERNAL MEDICINE

## 2019-10-23 RX ORDER — AMLODIPINE BESYLATE 5 MG/1
5 TABLET ORAL DAILY
Qty: 90 TABLET | Refills: 0 | Status: SHIPPED | OUTPATIENT
Start: 2019-10-23 | End: 2020-01-27 | Stop reason: SDUPTHER

## 2019-10-23 NOTE — PROGRESS NOTES
[facility]    CC: Crohn's disease    HPI 64 y.o. female with history of scleroderma associated with pulmonary fibrosis and pulmonary HTN on Cellcept, Psumit, Nintedanib, and Tadalafil, presents for follow up of chronic, mild ileocolonic Crohn's disease not on medications due to other chronic immunosuppressants. Currently she is having 1 well formed, non-bloody stool a day. However, she has complaints of intermittent dysphagia as well as reflux and nausea.     Past Medical History:   Diagnosis Date    Allergy     COPD (chronic obstructive pulmonary disease)     GERD (gastroesophageal reflux disease)     Hypertension     Scleroderma involving lung since she was 47 y/o         Review of Systems  General ROS: negative for - chills, fever or weight loss  Cardiovascular ROS: no chest pain or dyspnea on exertion  Gastrointestinal ROS: + nausea, + dysphagia, no blood in stool     Physical Examination  /69   Pulse 97   Wt 88.8 kg (195 lb 12.3 oz)   BMI 31.60 kg/m²   General appearance: alert, cooperative, no distress  HENT: Normocephalic, atraumatic, neck symmetrical, no nasal discharge, sclera anicteric   Lungs: clear to auscultation bilaterally, symmetric chest wall expansion bilaterally  Heart: regular rate and rhythm without rub; no displacement of the PMI   Abdomen: soft, nontender,nondistended, BS active ,no masses appreciated   Extremities: extremities symmetric; no clubbing, cyanosis, or edema    Labs:  Lab Results   Component Value Date    WBC 4.55 09/11/2019    HGB 12.9 09/11/2019    HCT 42.3 09/11/2019    MCV 96 09/11/2019     09/11/2019       CMP  Sodium   Date Value Ref Range Status   09/11/2019 139 136 - 145 mmol/L Final     Potassium   Date Value Ref Range Status   09/11/2019 3.9 3.5 - 5.1 mmol/L Final     Chloride   Date Value Ref Range Status   09/11/2019 104 95 - 110 mmol/L Final     CO2   Date Value Ref Range Status   09/11/2019 25 23 - 29 mmol/L Final     Glucose    Date Value Ref Range Status   09/11/2019 88 70 - 110 mg/dL Final     BUN, Bld   Date Value Ref Range Status   09/11/2019 8 8 - 23 mg/dL Final     Creatinine   Date Value Ref Range Status   09/11/2019 0.8 0.5 - 1.4 mg/dL Final     Calcium   Date Value Ref Range Status   09/11/2019 9.3 8.7 - 10.5 mg/dL Final     Total Protein   Date Value Ref Range Status   09/11/2019 7.1 6.0 - 8.4 g/dL Final     Albumin   Date Value Ref Range Status   09/11/2019 4.0 3.5 - 5.2 g/dL Final     Total Bilirubin   Date Value Ref Range Status   09/11/2019 0.8 0.1 - 1.0 mg/dL Final     Comment:     For infants and newborns, interpretation of results should be based  on gestational age, weight and in agreement with clinical  observations.  Premature Infant recommended reference ranges:  Up to 24 hours.............<8.0 mg/dL  Up to 48 hours............<12.0 mg/dL  3-5 days..................<15.0 mg/dL  6-29 days.................<15.0 mg/dL       Alkaline Phosphatase   Date Value Ref Range Status   09/11/2019 63 55 - 135 U/L Final     AST   Date Value Ref Range Status   09/11/2019 16 10 - 40 U/L Final     ALT   Date Value Ref Range Status   09/11/2019 13 10 - 44 U/L Final     Anion Gap   Date Value Ref Range Status   09/11/2019 10 8 - 16 mmol/L Final     eGFR if    Date Value Ref Range Status   09/11/2019 >60.0 >60 mL/min/1.73 m^2 Final     eGFR if non    Date Value Ref Range Status   09/11/2019 >60.0 >60 mL/min/1.73 m^2 Final     Comment:     Calculation used to obtain the estimated glomerular filtration  rate (eGFR) is the CKD-EPI equation.            Assessment:   64 y.o. female with scleroderma, pulmonary fibrosis and pulmonary HTN on multiple medications including Cellcept, presents for follow up of mild ileocolonic Crohn's disease. She is not currently on treatment however does not have lower GI symptoms. She does have dysphagia, reflux and nausea, concern for underlying esophageal motility disorder in  setting of known scleroderma/multiple autoimmune disease.    Plan:  -Schedule EGD and colonoscopy  -Barium esophagram  -Zofran PRN  -Andree candies and natural dramamine (andree root)  -Continue daily PPI  -Avoid trigger foods    Ngozi Prince MD  [facility]  [address], [address]  [place] [address]  Office: [phone]  Fax: [phone]

## 2019-10-23 NOTE — H&P (VIEW-ONLY)
[facility]    CC: Crohn's disease    HPI 64 y.o. female with history of scleroderma associated with pulmonary fibrosis and pulmonary HTN on Cellcept, Psumit, Nintedanib, and Tadalafil, presents for follow up of chronic, mild ileocolonic Crohn's disease not on medications due to other chronic immunosuppressants. Currently she is having 1 well formed, non-bloody stool a day. However, she has complaints of intermittent dysphagia as well as reflux and nausea.     Past Medical History:   Diagnosis Date    Allergy     COPD (chronic obstructive pulmonary disease)     GERD (gastroesophageal reflux disease)     Hypertension     Scleroderma involving lung since she was 49 y/o         Review of Systems  General ROS: negative for - chills, fever or weight loss  Cardiovascular ROS: no chest pain or dyspnea on exertion  Gastrointestinal ROS: + nausea, + dysphagia, no blood in stool     Physical Examination  /69   Pulse 97   Wt 88.8 kg (195 lb 12.3 oz)   BMI 31.60 kg/m²   General appearance: alert, cooperative, no distress  HENT: Normocephalic, atraumatic, neck symmetrical, no nasal discharge, sclera anicteric   Lungs: clear to auscultation bilaterally, symmetric chest wall expansion bilaterally  Heart: regular rate and rhythm without rub; no displacement of the PMI   Abdomen: soft, nontender,nondistended, BS active ,no masses appreciated   Extremities: extremities symmetric; no clubbing, cyanosis, or edema    Labs:  Lab Results   Component Value Date    WBC 4.55 09/11/2019    HGB 12.9 09/11/2019    HCT 42.3 09/11/2019    MCV 96 09/11/2019     09/11/2019       CMP  Sodium   Date Value Ref Range Status   09/11/2019 139 136 - 145 mmol/L Final     Potassium   Date Value Ref Range Status   09/11/2019 3.9 3.5 - 5.1 mmol/L Final     Chloride   Date Value Ref Range Status   09/11/2019 104 95 - 110 mmol/L Final     CO2   Date Value Ref Range Status   09/11/2019 25 23 - 29 mmol/L Final     Glucose    Date Value Ref Range Status   09/11/2019 88 70 - 110 mg/dL Final     BUN, Bld   Date Value Ref Range Status   09/11/2019 8 8 - 23 mg/dL Final     Creatinine   Date Value Ref Range Status   09/11/2019 0.8 0.5 - 1.4 mg/dL Final     Calcium   Date Value Ref Range Status   09/11/2019 9.3 8.7 - 10.5 mg/dL Final     Total Protein   Date Value Ref Range Status   09/11/2019 7.1 6.0 - 8.4 g/dL Final     Albumin   Date Value Ref Range Status   09/11/2019 4.0 3.5 - 5.2 g/dL Final     Total Bilirubin   Date Value Ref Range Status   09/11/2019 0.8 0.1 - 1.0 mg/dL Final     Comment:     For infants and newborns, interpretation of results should be based  on gestational age, weight and in agreement with clinical  observations.  Premature Infant recommended reference ranges:  Up to 24 hours.............<8.0 mg/dL  Up to 48 hours............<12.0 mg/dL  3-5 days..................<15.0 mg/dL  6-29 days.................<15.0 mg/dL       Alkaline Phosphatase   Date Value Ref Range Status   09/11/2019 63 55 - 135 U/L Final     AST   Date Value Ref Range Status   09/11/2019 16 10 - 40 U/L Final     ALT   Date Value Ref Range Status   09/11/2019 13 10 - 44 U/L Final     Anion Gap   Date Value Ref Range Status   09/11/2019 10 8 - 16 mmol/L Final     eGFR if    Date Value Ref Range Status   09/11/2019 >60.0 >60 mL/min/1.73 m^2 Final     eGFR if non    Date Value Ref Range Status   09/11/2019 >60.0 >60 mL/min/1.73 m^2 Final     Comment:     Calculation used to obtain the estimated glomerular filtration  rate (eGFR) is the CKD-EPI equation.            Assessment:   64 y.o. female with scleroderma, pulmonary fibrosis and pulmonary HTN on multiple medications including Cellcept, presents for follow up of mild ileocolonic Crohn's disease. She is not currently on treatment however does not have lower GI symptoms. She does have dysphagia, reflux and nausea, concern for underlying esophageal motility disorder in  setting of known scleroderma/multiple autoimmune disease.    Plan:  -Schedule EGD and colonoscopy  -Barium esophagram  -Zofran PRN  -Andree candies and natural dramamine (andree root)  -Continue daily PPI  -Avoid trigger foods    Ngozi Prince MD  [facility]  [address], [address]  [place] [address]  Office: [phone]  Fax: [phone]

## 2019-10-31 ENCOUNTER — EXTERNAL CHRONIC CARE MANAGEMENT (OUTPATIENT)
Dept: PRIMARY CARE CLINIC | Facility: CLINIC | Age: 64
End: 2019-10-31
Payer: MEDICARE

## 2019-10-31 PROCEDURE — 99490 CHRNC CARE MGMT STAFF 1ST 20: CPT | Mod: PBBFAC | Performed by: INTERNAL MEDICINE

## 2019-10-31 PROCEDURE — 99490 CHRNC CARE MGMT STAFF 1ST 20: CPT | Mod: S$PBB,,, | Performed by: INTERNAL MEDICINE

## 2019-10-31 PROCEDURE — 99490 PR CHRONIC CARE MGMT, 1ST 20 MIN: ICD-10-PCS | Mod: S$PBB,,, | Performed by: INTERNAL MEDICINE

## 2019-11-01 ENCOUNTER — OFFICE VISIT (OUTPATIENT)
Dept: OPTOMETRY | Facility: CLINIC | Age: 64
End: 2019-11-01
Payer: MEDICARE

## 2019-11-01 DIAGNOSIS — H52.7 REFRACTIVE ERROR: ICD-10-CM

## 2019-11-01 DIAGNOSIS — H25.13 NUCLEAR SCLEROSIS, BILATERAL: Primary | ICD-10-CM

## 2019-11-01 PROCEDURE — 92015 PR REFRACTION: ICD-10-PCS | Mod: ,,, | Performed by: OPTOMETRIST

## 2019-11-01 PROCEDURE — 92015 DETERMINE REFRACTIVE STATE: CPT | Mod: ,,, | Performed by: OPTOMETRIST

## 2019-11-01 PROCEDURE — 92014 PR EYE EXAM, EST PATIENT,COMPREHESV: ICD-10-PCS | Mod: S$PBB,,, | Performed by: OPTOMETRIST

## 2019-11-01 PROCEDURE — 99999 PR PBB SHADOW E&M-EST. PATIENT-LVL II: ICD-10-PCS | Mod: PBBFAC,,, | Performed by: OPTOMETRIST

## 2019-11-01 PROCEDURE — 99212 OFFICE O/P EST SF 10 MIN: CPT | Mod: PBBFAC,PO | Performed by: OPTOMETRIST

## 2019-11-01 PROCEDURE — 99999 PR PBB SHADOW E&M-EST. PATIENT-LVL II: CPT | Mod: PBBFAC,,, | Performed by: OPTOMETRIST

## 2019-11-01 PROCEDURE — 92014 COMPRE OPH EXAM EST PT 1/>: CPT | Mod: S$PBB,,, | Performed by: OPTOMETRIST

## 2019-11-01 NOTE — PROGRESS NOTES
HPI     Annual Exam      Additional comments: DLE 10-18 ([facility])    ocular health exam               Blurred Vision      Additional comments: at distance only --- near [facility] good              Dry Eye      Additional comments: +Systane gtts & Refresh Liquigel               Spots and/or Floaters      Additional comments: OU -- no light flashes          Last edited by Vashti Littlejohn on 11/1/2019  9:07 AM. (History)            Assessment /Plan     For exam results, see Encounter Report.    Nuclear sclerosis, bilateral    Refractive error      Mild NS OU, not yet significant. Discussed possible ocular affects of cataracts. Acceptable BCVA OU. Discussed treatment options. Surgery not recommended at this time. Monitor yearly.     Dispensed updated spectacle Rx. Discussed various spectacle lens options. Moderate change OD, discussed adaptation period to new specs.  Demonstrated new spec Rx vs current specs in phoropter with patient satisfaction.    Discussed ocular affects of dry eyes. Pt reports well controlled with use of drops. Continue OTC  artificial tears two to four times a day in both eyes, if more, use preservative free drops. Discussed chronicity of MELISA. RTC if symptoms not alleviated by continued use of artificial tears.       RTC in 1 year for comprehensive eye exam, or sooner prn.

## 2019-11-07 ENCOUNTER — HOSPITAL ENCOUNTER (OUTPATIENT)
Dept: RADIOLOGY | Facility: HOSPITAL | Age: 64
Discharge: HOME | End: 2019-11-07
Attending: INTERNAL MEDICINE
Payer: MEDICARE

## 2019-11-07 DIAGNOSIS — K21.9 REFLUX INVOLVING INTESTINAL TRACT: ICD-10-CM

## 2019-11-07 PROCEDURE — 74241 FL UPPER GI W KUB TO INCLUDE ESOPHAGRAM: CPT | Mod: 26,,, | Performed by: RADIOLOGY

## 2019-11-07 PROCEDURE — 74241 FL UPPER GI W KUB TO INCLUDE ESOPHAGRAM: CPT | Mod: TC,FY

## 2019-11-07 PROCEDURE — 74241 FL UPPER GI W KUB TO INCLUDE ESOPHAGRAM: ICD-10-PCS | Mod: 26,,, | Performed by: RADIOLOGY

## 2019-11-08 ENCOUNTER — PATIENT MESSAGE (OUTPATIENT)
Dept: FAMILY MEDICINE | Facility: CLINIC | Age: 64
End: 2019-11-08

## 2019-11-11 ENCOUNTER — TELEPHONE (OUTPATIENT)
Dept: FAMILY MEDICINE | Facility: CLINIC | Age: 64
End: 2019-11-11

## 2019-11-11 DIAGNOSIS — M34.9 SCLERODERMA: Primary | ICD-10-CM

## 2019-11-21 ENCOUNTER — ANESTHESIA EVENT (OUTPATIENT)
Dept: ENDOSCOPY | Facility: HOSPITAL | Age: 64
End: 2019-11-21
Payer: MEDICARE

## 2019-11-21 ENCOUNTER — ANESTHESIA (OUTPATIENT)
Dept: ENDOSCOPY | Facility: HOSPITAL | Age: 64
End: 2019-11-21
Payer: MEDICARE

## 2019-11-21 ENCOUNTER — HOSPITAL ENCOUNTER (OUTPATIENT)
Facility: HOSPITAL | Age: 64
Discharge: HOME | End: 2019-11-21
Attending: INTERNAL MEDICINE | Admitting: INTERNAL MEDICINE
Payer: MEDICARE

## 2019-11-21 DIAGNOSIS — K52.9 COLITIS: ICD-10-CM

## 2019-11-21 PROBLEM — K20.90 ESOPHAGITIS DETERMINED BY ENDOSCOPY: Status: ACTIVE | Noted: 2018-08-07

## 2019-11-21 PROBLEM — K22.2 ESOPHAGEAL STRICTURE: Status: ACTIVE | Noted: 2019-11-21

## 2019-11-21 PROCEDURE — 25000003 PHARM REV CODE 250: Performed by: NURSE ANESTHETIST, CERTIFIED REGISTERED

## 2019-11-21 PROCEDURE — 43248 EGD GUIDE WIRE INSERTION: CPT | Performed by: INTERNAL MEDICINE

## 2019-11-21 PROCEDURE — 63600175 PHARM REV CODE 636 W HCPCS: Performed by: INTERNAL MEDICINE

## 2019-11-21 PROCEDURE — 45380 COLONOSCOPY AND BIOPSY: CPT | Performed by: INTERNAL MEDICINE

## 2019-11-21 PROCEDURE — D9220A PRA ANESTHESIA: ICD-10-PCS | Mod: ANES,,, | Performed by: ANESTHESIOLOGY

## 2019-11-21 PROCEDURE — 43248 PR EGD, FLEX, W/DILATION OVER GUIDEWIRE: ICD-10-PCS | Mod: 51,,, | Performed by: INTERNAL MEDICINE

## 2019-11-21 PROCEDURE — 27201012 HC FORCEPS, HOT/COLD, DISP: Performed by: INTERNAL MEDICINE

## 2019-11-21 PROCEDURE — 37000008 HC ANESTHESIA 1ST 15 MINUTES: Performed by: INTERNAL MEDICINE

## 2019-11-21 PROCEDURE — 43248 EGD GUIDE WIRE INSERTION: CPT | Mod: 51,,, | Performed by: INTERNAL MEDICINE

## 2019-11-21 PROCEDURE — 45380 PR COLONOSCOPY,BIOPSY: ICD-10-PCS | Mod: ,,, | Performed by: INTERNAL MEDICINE

## 2019-11-21 PROCEDURE — 63600175 PHARM REV CODE 636 W HCPCS: Performed by: NURSE ANESTHETIST, CERTIFIED REGISTERED

## 2019-11-21 PROCEDURE — D9220A PRA ANESTHESIA: ICD-10-PCS | Mod: CRNA,,, | Performed by: NURSE ANESTHETIST, CERTIFIED REGISTERED

## 2019-11-21 PROCEDURE — 45380 COLONOSCOPY AND BIOPSY: CPT | Mod: ,,, | Performed by: INTERNAL MEDICINE

## 2019-11-21 PROCEDURE — 43239 EGD BIOPSY SINGLE/MULTIPLE: CPT | Mod: 59,,, | Performed by: INTERNAL MEDICINE

## 2019-11-21 PROCEDURE — D9220A PRA ANESTHESIA: Mod: ANES,,, | Performed by: ANESTHESIOLOGY

## 2019-11-21 PROCEDURE — 43239 PR EGD, FLEX, W/BIOPSY, SGL/MULTI: ICD-10-PCS | Mod: 59,,, | Performed by: INTERNAL MEDICINE

## 2019-11-21 PROCEDURE — 88305 TISSUE EXAM BY PATHOLOGIST: CPT | Mod: 59 | Performed by: PATHOLOGY

## 2019-11-21 PROCEDURE — D9220A PRA ANESTHESIA: Mod: CRNA,,, | Performed by: NURSE ANESTHETIST, CERTIFIED REGISTERED

## 2019-11-21 PROCEDURE — 37000009 HC ANESTHESIA EA ADD 15 MINS: Performed by: INTERNAL MEDICINE

## 2019-11-21 PROCEDURE — 43239 EGD BIOPSY SINGLE/MULTIPLE: CPT | Performed by: INTERNAL MEDICINE

## 2019-11-21 PROCEDURE — 88305 TISSUE EXAM BY PATHOLOGIST: CPT | Mod: 26,,, | Performed by: PATHOLOGY

## 2019-11-21 PROCEDURE — 88305 TISSUE EXAM BY PATHOLOGIST: ICD-10-PCS | Mod: 26,,, | Performed by: PATHOLOGY

## 2019-11-21 RX ORDER — SODIUM CHLORIDE 9 MG/ML
INJECTION, SOLUTION INTRAVENOUS CONTINUOUS
Status: DISCONTINUED | OUTPATIENT
Start: 2019-11-21 | End: 2019-11-21 | Stop reason: HOSPADM

## 2019-11-21 RX ORDER — GLYCOPYRROLATE 0.2 MG/ML
INJECTION INTRAMUSCULAR; INTRAVENOUS
Status: DISCONTINUED | OUTPATIENT
Start: 2019-11-21 | End: 2019-11-21

## 2019-11-21 RX ORDER — PROPOFOL 10 MG/ML
VIAL (ML) INTRAVENOUS
Status: DISCONTINUED | OUTPATIENT
Start: 2019-11-21 | End: 2019-11-21

## 2019-11-21 RX ORDER — PANTOPRAZOLE SODIUM 40 MG/1
40 TABLET, DELAYED RELEASE ORAL 2 TIMES DAILY
Qty: 60 TABLET | Refills: 3 | Status: SHIPPED | OUTPATIENT
Start: 2019-11-21 | End: 2020-06-16

## 2019-11-21 RX ORDER — LIDOCAINE HCL/PF 100 MG/5ML
SYRINGE (ML) INTRAVENOUS
Status: DISCONTINUED | OUTPATIENT
Start: 2019-11-21 | End: 2019-11-21

## 2019-11-21 RX ADMIN — PROPOFOL 50 MG: 10 INJECTION, EMULSION INTRAVENOUS at 09:11

## 2019-11-21 RX ADMIN — PROPOFOL 30 MG: 10 INJECTION, EMULSION INTRAVENOUS at 09:11

## 2019-11-21 RX ADMIN — GLYCOPYRROLATE 0.2 MG: 0.2 INJECTION, SOLUTION INTRAMUSCULAR; INTRAVENOUS at 09:11

## 2019-11-21 RX ADMIN — PROPOFOL 100 MG: 10 INJECTION, EMULSION INTRAVENOUS at 09:11

## 2019-11-21 RX ADMIN — SODIUM CHLORIDE: 0.9 INJECTION, SOLUTION INTRAVENOUS at 08:11

## 2019-11-21 RX ADMIN — LIDOCAINE HYDROCHLORIDE 100 MG: 20 INJECTION, SOLUTION INTRAVENOUS at 09:11

## 2019-11-21 NOTE — ANESTHESIA PREPROCEDURE EVALUATION
11/21/2019  Edith Tran is a 64 y.o., female.    Anesthesia Evaluation    I have reviewed the Patient Summary Reports.    I have reviewed the Nursing Notes.   I have reviewed the Medications.     Review of Systems  Anesthesia Hx:  No problems with previous Anesthesia    Social:  Non-Smoker    Cardiovascular:   Hypertension, well controlled CASH    Pulmonary:   COPD, moderate Shortness of breath Sleep Apnea, CPAP Scleroderma  Pulmonary Fibrosis  Pulmonary Hypertension  Home O2 at night occasionally   Education provided regarding risk of obstructive sleep apnea     Renal/:  Renal/ Normal     Hepatic/GI:   PUD, GERD, well controlled    Neurological:  Neurology Normal    Endocrine:  Endocrine Normal        Physical Exam  General:  Well nourished, Obesity    Airway/Jaw/Neck:  Airway Findings: Mouth Opening: Normal Tongue: Normal  General Airway Assessment: Adult  Oropharynx Findings:  Mallampati: II  Jaw/Neck Findings:  Neck ROM: Normal ROM     Eyes/Ears/Nose:  Eyes/Ears/Nose Findings:    Dental:  Dental Findings:   Chest/Lungs:  Chest/Lungs Findings: Normal Respiratory Rate     Heart/Vascular:  Heart Findings: Rate: Normal  Rhythm: Regular Rhythm        Mental Status:  Mental Status Findings:  Cooperative, Alert and Oriented         Anesthesia Plan  Type of Anesthesia, risks & benefits discussed:  Anesthesia Type:  general  Patient's Preference:   Intra-op Monitoring Plan: standard ASA monitors  Intra-op Monitoring Plan Comments:   Post Op Pain Control Plan: multimodal analgesia  Post Op Pain Control Plan Comments:   Induction:   IV  Beta Blocker:  Patient is not currently on a Beta-Blocker (No further documentation required).       Informed Consent: Patient understands risks and agrees with Anesthesia plan.  Questions answered. Anesthesia consent signed with patient.  ASA Score: 3     Day of Surgery Review  of History & Physical:  There are no significant changes.   H&P completed by Anesthesiologist.       Ready For Surgery From Anesthesia Perspective.

## 2019-11-21 NOTE — [employer] PATIENT INSTRUCTIONS
Discharge Summary/Instructions after an Endoscopic Procedure  Patient Name: Edith Tran  Patient MRN: [id number]  Patient YOB: 1955 Thursday, November 21, 2019  Ngozi Prince MD  RESTRICTIONS:  During your procedure today, you received medications for sedation.  These   medications may affect your judgment, balance and coordination.  Therefore,   for 24 hours, you have the following restrictions:   - DO NOT drive a car, operate machinery, make legal/financial decisions,   sign important papers or drink alcohol.    ACTIVITY:  Today: no heavy lifting, straining or running due to procedural   sedation/anesthesia.  The following day: return to full activity including work.  DIET:  Eat and drink normally unless instructed otherwise.     TREATMENT FOR COMMON SIDE EFFECTS:  - Mild abdominal pain, nausea, belching, bloating or excessive gas:  rest,   eat lightly and use a heating pad.  - Sore Throat: treat with throat lozenges and/or gargle with warm salt   water.  - Because air was used during the procedure, expelling large amounts of air   from your rectum or belching is normal.  - If a bowel prep was taken, you may not have a bowel movement for 1-3 days.    This is normal.  SYMPTOMS TO WATCH FOR AND REPORT TO YOUR PHYSICIAN:  1. Abdominal pain or bloating, other than gas cramps.  2. Chest pain.  3. Back pain.  4. Signs of infection such as: chills or fever occurring within 24 hours   after the procedure.  5. Rectal bleeding, which would show as bright red, maroon, or black stools.   (A tablespoon of blood from the rectum is not serious, especially if   hemorrhoids are present.)  6. Vomiting.  7. Weakness or dizziness.  GO DIRECTLY TO THE NEAREST EMERGENCY ROOM IF YOU HAVE ANY OF THE FOLLOWING:      Difficulty breathing              Chills and/or fever over 101 F   Persistent vomiting and/or vomiting blood   Severe abdominal pain   Severe chest pain   Black, tarry stools   Bleeding- more than  one tablespoon   Any other symptom or condition that you feel may need urgent attention  Your doctor recommends these additional instructions:  If any biopsies were taken, your doctors clinic will contact you in 1 to 2   weeks with any results.  - Await pathology results.   - Discharge patient to home (with escort).   - Patient has a contact number available for emergencies.  The signs and   symptoms of potential delayed complications were discussed with the   patient.  Return to normal activities tomorrow.  Written discharge   instructions were provided to the patient.   - Resume previous diet.   - Increase PPI to BID  - Repeat upper endoscopy in 8 weeks to evaluate the response to therapy and   repeat dilation with TTS balloon   - Return to my office after studies are complete.  For questions, problems or results please call your physician - Ngozi Prince MD at Work:  [phone].  [facility], EMERGENCY ROOM PHONE NUMBER: [phone]  IF A COMPLICATION OR EMERGENCY SITUATION ARISES AND YOU ARE UNABLE TO REACH   YOUR PHYSICIAN - GO DIRECTLY TO THE EMERGENCY ROOM.  Ngozi Prince MD  11/21/2019 9:54:55 AM  This report has been verified and signed electronically.  [employer]

## 2019-11-21 NOTE — ANESTHESIA POSTPROCEDURE EVALUATION
Anesthesia Post Evaluation    Patient: Edith Tran    Procedure(s) Performed: Procedure(s) (LRB):  EGD (ESOPHAGOGASTRODUODENOSCOPY) (N/A)  COLONOSCOPY (N/A)    Final Anesthesia Type: general    Patient location during evaluation: PACU  Patient participation: Yes- Able to Participate  Level of consciousness: awake and alert and oriented  Post-procedure vital signs: reviewed and stable  Pain management: adequate  Airway patency: patent    PONV status at discharge: No PONV  Anesthetic complications: no      Cardiovascular status: blood pressure returned to baseline and stable  Respiratory status: unassisted and spontaneous ventilation  Hydration status: euvolemic  Follow-up not needed.          Vitals Value Taken Time   /63 11/21/2019 10:10 AM   Temp 36.7 °C (98.1 °F) 11/21/2019 10:10 AM   Pulse 85 11/21/2019 10:10 AM   Resp 20 11/21/2019 10:10 AM   SpO2 100 % 11/21/2019 10:10 AM         Event Time     Out of Recovery 10:20:00          Pain/Kranthi Score: Kranthi Score: 10 (11/21/2019 10:10 AM)

## 2019-11-21 NOTE — OR NURSING
Have you had a colonoscopy LESS THAN 3 years ago?   * If YES, answer these questions*: yes, 08/07/2018    1. Did patient have a prior colonic polyp in a previous surveillance/diagnostic colonoscopy and is 18 years or older on date of encounter?yes  2. Documentation of < 3 year interval since the patients last colonoscopy due to medical reasons (eg., last colonoscopy incomplete, last colonoscopy had inadequate prep, piecemeal removal of adenomas, or last colonoscopy found > 10 adenomas) ? colitis

## 2019-11-21 NOTE — OR NURSING
Have you had a colonoscopy LESS THAN 3 years ago? YES  HX CROHNS  * If YES, answer these questions*:    1. Did patient have a prior colonic polyp in a previous surveillance/diagnostic colonoscopy and is 18 years or older on date of encounter?    2. Documentation of < 3 year interval since the patients last colonoscopy due to medical reasons (eg., last colonoscopy incomplete, last colonoscopy had inadequate prep, piecemeal removal of adenomas, or last colonoscopy found > 10 adenomas) ?

## 2019-11-21 NOTE — TRANSFER OF CARE
"Anesthesia Transfer of Care Note    Patient: Edith Trna    Procedure(s) Performed: Procedure(s) (LRB):  EGD (ESOPHAGOGASTRODUODENOSCOPY) (N/A)  COLONOSCOPY (N/A)    Patient location: GI    Anesthesia Type: general    Transport from OR: Transported from OR on room air with adequate spontaneous ventilation    Post pain: adequate analgesia    Post assessment: no apparent anesthetic complications and tolerated procedure well    Post vital signs: stable    Level of consciousness: awake, alert and oriented    Nausea/Vomiting: no nausea/vomiting    Complications: none    Transfer of care protocol was followed      Last vitals:   Visit Vitals  /65   Pulse 84   Temp 36.7 °C (98.1 °F) (Skin)   Resp 18   Ht 5' 6" (1.676 m)   Wt 87.1 kg (192 lb)   SpO2 100%   Breastfeeding? No   BMI 30.99 kg/m²     "

## 2019-11-21 NOTE — [employer] PATIENT INSTRUCTIONS
Discharge Summary/Instructions after an Endoscopic Procedure  Patient Name: Edith Tran  Patient MRN: [id number]  Patient YOB: 1955 Thursday, November 21, 2019  Ngozi Prince MD  RESTRICTIONS:  During your procedure today, you received medications for sedation.  These   medications may affect your judgment, balance and coordination.  Therefore,   for 24 hours, you have the following restrictions:   - DO NOT drive a car, operate machinery, make legal/financial decisions,   sign important papers or drink alcohol.    ACTIVITY:  Today: no heavy lifting, straining or running due to procedural   sedation/anesthesia.  The following day: return to full activity including work.  DIET:  Eat and drink normally unless instructed otherwise.     TREATMENT FOR COMMON SIDE EFFECTS:  - Mild abdominal pain, nausea, belching, bloating or excessive gas:  rest,   eat lightly and use a heating pad.  - Sore Throat: treat with throat lozenges and/or gargle with warm salt   water.  - Because air was used during the procedure, expelling large amounts of air   from your rectum or belching is normal.  - If a bowel prep was taken, you may not have a bowel movement for 1-3 days.    This is normal.  SYMPTOMS TO WATCH FOR AND REPORT TO YOUR PHYSICIAN:  1. Abdominal pain or bloating, other than gas cramps.  2. Chest pain.  3. Back pain.  4. Signs of infection such as: chills or fever occurring within 24 hours   after the procedure.  5. Rectal bleeding, which would show as bright red, maroon, or black stools.   (A tablespoon of blood from the rectum is not serious, especially if   hemorrhoids are present.)  6. Vomiting.  7. Weakness or dizziness.  GO DIRECTLY TO THE NEAREST EMERGENCY ROOM IF YOU HAVE ANY OF THE FOLLOWING:      Difficulty breathing              Chills and/or fever over 101 F   Persistent vomiting and/or vomiting blood   Severe abdominal pain   Severe chest pain   Black, tarry stools   Bleeding- more than  one tablespoon   Any other symptom or condition that you feel may need urgent attention  Your doctor recommends these additional instructions:  If any biopsies were taken, your doctors clinic will contact you in 1 to 2   weeks with any results.  - Discharge patient to home (with escort).   - Patient has a contact number available for emergencies.  The signs and   symptoms of potential delayed complications were discussed with the   patient.  Return to normal activities tomorrow.  Written discharge   instructions were provided to the patient.   - Resume previous diet.   - Continue present medications.   - Await pathology results.   - Repeat colonoscopy date to be determined after pending pathology results   are reviewed for surveillance.   - Return to my office after studies are complete.  For questions, problems or results please call your physician - Ngozi Prince MD at Work:  [phone].  [facility], EMERGENCY ROOM PHONE NUMBER: [phone]  IF A COMPLICATION OR EMERGENCY SITUATION ARISES AND YOU ARE UNABLE TO REACH   YOUR PHYSICIAN - GO DIRECTLY TO THE EMERGENCY ROOM.  Ngozi Prince MD  11/21/2019 9:46:21 AM  This report has been verified and signed electronically.  [employer]

## 2019-11-21 NOTE — DISCHARGE INSTRUCTIONS
Esophagitis     With esophagitis, the lining of the esophagus is inflamed.   Do you often have burning pain in your chest? You may have esophagitis. This is when the lining of the esophagus becomes red and swollen (inflamed). The esophagus is the tube that connects your throat to your stomach. This sheet tells you more about esophagitis. It also explains your treatment options.  Main types of esophagitis  Reflux esophagitis. This is the more common type. It is caused by GERD (gastroesophageal reflux disease). Stomach contents with stomach acid flow back up into the esophagus. This happens over and over. It leads to inflammation. Risk factors can include:  · Being overweight  · Asthma  · Smoking  · Pregnancy  · Frequent vomiting  · Certain medicines (such as aspirin and other anti-inflammatories)  · Hiatal hernia  Infectious esophagitis. This is caused by an infection. You are more at risk for this if you have a weakened immune system and poor nutrition. Antibiotic use can also be a factor. The infection is often due to the following:  · A type of fungus (typically candida)  · A virus, such as herpes simplex virus 1 (HSV-1) or cytomegalovirus (CMV)  Eosinophilic esophagitis. Foods or other things around you can give you an allergic reaction. This triggers an immune response and leads to esophagitis.  Pill-induced esophagitis. Certain types of medicines can cause inflammation and ulcers in the esophagus. These include doxycycline, aspirin, NSAIDs, alendronate, potassium, quinidine, iron.  Symptoms of esophagitis  The following symptoms can occur with esophagitis:  · Pain when swallowing, or trouble swallowing  · Pain behind your breastbone (heartburn)  · Acid regurgitation  · Chronic sore throat  · Gum Inflammation  · Cavities  · Bad breath  · Nausea  · Pain in your upper belly (abdomen)  · Bleeding (indicated by bright red vomit or black, tarry stool)  These symptoms occur more often with reflux  esophagitis:  · Coughing, wheezing, or asthma  · Hoarseness  Diagnosis of esophagitis  Your healthcare provider will ask about your health history and symptoms. Youll also be examined. Sometimes certain tests are needed. These may include:  · Upper endoscopy. A thin, flexible tube with a tiny light and camera is used. It is inserted through the mouth down into the esophagus. This lets the provider look for damage. A small sample of tissue (biopsy) may also be removed. The sample is sent to a lab for testing.  · Upper GI X-ray with barium. An X-ray is done after you drink a substance called barium. Barium may make problems in the esophagus easier to see on an x-ray.  · Esophageal pH. A soft, thin tube is passed into the esophagus through the nose or mouth for 24 hours. It measures the acid level in the esophagus.  · Esophageal manometry. A soft, thin tube is passed into the esophagus through the nose or mouth. It measures muscle contractions in the esophagus.  Treatment of esophagitis  Medicines. Different medicines can help treat esophagitis. The medicine used will depend on the type of esophagitis you have. Talk with your healthcare provider.  Lifestyle changes. Making the following changes can help reduce irritation and ease your symptoms:  · Avoid spicy foods (pepper, chili powder, guajardo). Also avoid hard foods (nuts, crackers, raw vegetables) and acidic foods and drinks (tomatoes, citrus fruits and juices). Other problem foods include chocolate, peppermint, nutmeg, and foods high in fat.  · Until you can swallow without pain, follow a combined liquid and soft diet. Try foods such as cooked cereals, mashed potatoes, and soups.  · Take small bites and chew your food thoroughly.  · Avoid large meals and heavy evening meals. Don't lie down within 2 to 3 hours of eating.  · Get to or stay at a healthy weight.  · Avoid alcohol, caffeine, and smoking or tobacco products.  · Brush and floss your teeth  · Raise your  upper body by 4 to 6 inches when lying in bed. This can be done using a foam wedge. Or put blocks under the legs at the head of your bed.  Surgery. This may be needed for severe reflux esophagitis. Other noninvasive procedures to treat GERD and esophagitis are being studied. Your provider can tell you more.  Why treatment Is important  Without treatment, esophagitis can get worse. This is especially true with severe reflux esophagitis. For instance, continued symptoms can cause scarring of the esophagus. Over time, this can cause a narrowing the esophagus (stricture). This can make it hard to pass food down to the stomach. As symptoms go on they can also cause changes in the lining of the esophagus. These changes can put you at a slightly higher risk of cancer of the esophagus.   Date Last Reviewed: 7/1/2016 © 2000-2017 [employer]. [address] [place] [address]. All rights reserved. This information is not intended as a substitute for professional medical care. Always follow your healthcare professional's instructions.        Discharge Instructions for Crohns Disease  You have been diagnosed with Crohns disease. Your digestive tract is swollen and inflamed. All layers of your digestive tract may be affected. Although there is no cure for Crohns disease, you can receive treatment for the symptoms. Help manage your symptoms by following your healthcare providers advice and watching what you eat.  Home care  Recommendations for taking care of yourself at home include the following:  · Work closely with your healthcare provider to determine the types of treatment that are best for you.  · Take your medicines exactly as directed.  ¨ Let your healthcare provider know if you are having uncomfortable side effects.  ¨ Dont stop taking your medicines without talking with your healthcare provider first.  · It may be helpful to avoid certain foods for a little while. Depending on your condition,  "these may include caffeine (coffee, tea, and cola), spicy foods, milk products, and raw fruits and vegetables. For certain people, these can be hard to digest and can worsen symptoms in a flare-up. Your healthcare provider may have you work with a nutritionist to come up with the best food choices for you.  · Try eating several small meals a day instead of 3 large ones.  · Don't smoke. Tobacco smoking makes the disease worse.   · Keep appointments for regular checkups even if you are not having symptoms.  · Talk to your healthcare provider about surgery for Crohns disease. Surgery wont cure Crohns disease, but it may help control the symptoms. Only you and your healthcare provider can decide if this choice is right for you.  · Learn more about your condition. Contact the Crohns [employer] toll-free at 353-321-4679 or www.ccfa.org  Managing nutrition  You may be able to eat most foods until you have a flare-up. But like anyone else, you need to make healthy eating choices. Some of the healthiest foods can make symptoms worse, though. Keeping track of your "problem foods" may be helpful. Ask your healthcare provider any questions you have about healthy eating.     When to call your healthcare provider  Call your healthcare provider right away if you have any of the following:  · Severe pain or bloating in your belly after meals  · Sores in your mouth  · Sores in your anal area (around your rectum)  · Fever of 100.4°F (38°C) or higher, or as directed by your healthcare provider  · Poor appetite or weight loss  · Bloody diarrhea  · Nausea or vomiting  · Skin rashes or skin that weeps (or drains)  · Changes in your vision   Date Last Reviewed: 8/1/2016  © 1376-4998 [employer]. [address] [place] [address], [place] [address]. All rights reserved. This information is not intended as a substitute for professional medical care. Always follow your healthcare professional's instructions.        What Is " a Hiatal Hernia?    Hiatal hernia is when the area where the stomach and esophagus meet bulges up through the diaphragm into the chest cavity. In some cases, part of the stomach may bulge above the diaphragm. Stomach acid may move up into the esophagus and cause symptoms. The symptoms are often blamed on gastroesophageal reflux disease (GERD). You may only know about the hernia when it shows up on an X-ray taken for other reasons.   What you may feel  The hiatus is a normal hole in the diaphragm. The esophagus passes through this hole and leads to the stomach. In some cases, part of the stomach may bulge above the diaphragm. This bulge is called a hernia. Stomach acid may move up into the esophagus and cause symptoms.  When you eat, the muscle at the hiatus relaxes to allow food to pass into the stomach. It tightens again to keep food and digestive acids in the stomach.  Many people with hiatal hernias have mild symptoms. You may notice the following GERD symptoms:  · Heartburn or other chest discomfort  · A feeling of chest fullness after a meal  · Frequent burping  · Acid taste in the mouth  · Trouble swallowing  Treating symptoms  If you have been diagnosed with hiatal hernia, these suggestions may help improve symptoms:  · Lose excess weight. Extra weight puts pressure on the stomach and esophagus.  · Dont lie down after eating. Sit up for at least an hour after eating. Lying down after eating can increase symptoms.  · Avoid certain foods and drinks. These include fatty foods, chocolate, coffee, mint, and other foods that cause symptoms for you.  · Dont smoke or drink alcohol. These can worsen symptoms.  · Look at your medicines. Discuss your medicines with your healthcare provider. Many medicines can cause symptoms.  · Consider an antacid medicine. Ask your healthcare provider about over-the-counter and prescription medicines that may help.  · Ask about surgery, if needed. Surgery is a treatment choice for  "some people. Your healthcare provider can determine if surgery is an option for you.    Date Last Reviewed: 10/1/2016  © 7972-7479 [employer]. [address] [place] [address], [place] [address]. All rights reserved. This information is not intended as a substitute for professional medical care. Always follow your healthcare professional's instructions.      Discharge Instructions: After Your Surgery/Procedure  Youve just had surgery. During surgery you were given medicine called anesthesia to keep you relaxed and free of pain. After surgery you may have some pain or nausea. This is common. Here are some tips for feeling better and getting well after surgery.     Stay on schedule with your medication.   Going home  Your doctor or nurse will show you how to take care of yourself when you go home. He or she will also answer your questions. Have an adult family member or friend drive you home.      For your safety we recommend these precaution for the first 24 hours after your procedure:  · Do not drive or use heavy equipment.  · Do not make important decisions or sign legal papers.  · Do not drink alcohol.  · Have someone stay with you, if needed. He or she can watch for problems and help keep you safe.  · Your concentration, balance, coordination, and judgement may be impaired for many hours after anesthesia.  Use caution when ambulating or standing up.     · You may feel weak and "washed out" after anesthesia and surgery.      Subtle residual effects of general anesthesia or sedation with regional / local anesthesia can last more than 24 hours.  Rest for the remainder of the day or longer if your Doctor/Surgeon has advised you to do so.  Although you may feel normal within the first 24 hours, your reflexes and mental ability may be impaired without you realizing it.  You may feel dizzy, lightheaded or sleepy for 24 hours or longer.      Be sure to go to all follow-up visits with your doctor. And rest after " your surgery for as long as your doctor tells you to.  Coping with pain  If you have pain after surgery, pain medicine will help you feel better. Take it as told, before pain becomes severe. Also, ask your doctor or pharmacist about other ways to control pain. This might be with heat, ice, or relaxation. And follow any other instructions your surgeon or nurse gives you.  Tips for taking pain medicine  To get the best relief possible, remember these points:  · Pain medicines can upset your stomach. Taking them with a little food may help.  · Most pain relievers taken by mouth need at least 20 to 30 minutes to start to work.  · Taking medicine on a schedule can help you remember to take it. Try to time your medicine so that you can take it before starting an activity. This might be before you get dressed, go for a walk, or sit down for dinner.  · Constipation is a common side effect of pain medicines. Call your doctor before taking any medicines such as laxatives or stool softeners to help ease constipation. Also ask if you should skip any foods. Drinking lots of fluids and eating foods such as fruits and vegetables that are high in fiber can also help. Remember, do not take laxatives unless your surgeon has prescribed them.  · Drinking alcohol and taking pain medicine can cause dizziness and slow your breathing. It can even be deadly. Do not drink alcohol while taking pain medicine.  · Pain medicine can make you react more slowly to things. Do not drive or run machinery while taking pain medicine.  Your health care provider may tell you to take acetaminophen to help ease your pain. Ask him or her how much you are supposed to take each day. Acetaminophen or other pain relievers may interact with your prescription medicines or other over-the-counter (OTC) drugs. Some prescription medicines have acetaminophen and other ingredients. Using both prescription and OTC acetaminophen for pain can cause you to overdose.  Read the labels on your OTC medicines with care. This will help you to clearly know the list of ingredients, how much to take, and any warnings. It may also help you not take too much acetaminophen. If you have questions or do not understand the information, ask your pharmacist or health care provider to explain it to you before you take the OTC medicine.  Managing nausea  Some people have an upset stomach after surgery. This is often because of anesthesia, pain, or pain medicine, or the stress of surgery. These tips will help you handle nausea and eat healthy foods as you get better. If you were on a special food plan before surgery, ask your doctor if you should follow it while you get better. These tips may help:  · Do not push yourself to eat. Your body will tell you when to eat and how much.  · Start off with clear liquids and soup. They are easier to digest.  · Next try semi-solid foods, such as mashed potatoes, applesauce, and gelatin, as you feel ready.  · Slowly move to solid foods. Dont eat fatty, rich, or spicy foods at first.  · Do not force yourself to have 3 large meals a day. Instead eat smaller amounts more often.  · Take pain medicines with a small amount of solid food, such as crackers or toast, to avoid nausea.     Call your surgeon if  · You still have pain an hour after taking medicine. The medicine may not be strong enough.  · You feel too sleepy, dizzy, or groggy. The medicine may be too strong.  · You have side effects like nausea, vomiting, or skin changes, such as rash, itching, or hives.       If you have obstructive sleep apnea  You were given anesthesia medicine during surgery to keep you comfortable and free of pain. After surgery, you may have more apnea spells because of this medicine and other medicines you were given. The spells may last longer than usual.   At home:  · Keep using the continuous positive airway pressure (CPAP) device when you sleep. Unless your health care  provider tells you not to, use it when you sleep, day or night. CPAP is a common device used to treat obstructive sleep apnea.  · Talk with your provider before taking any pain medicine, muscle relaxants, or sedatives. Your provider will tell you about the possible dangers of taking these medicines.  © 6402-0897 [employer]. [address] [place] [address], [place] [address]. All rights reserved. This information is not intended as a substitute for professional medical care. Always follow your healthcare professional's instructions.

## 2019-11-22 VITALS
DIASTOLIC BLOOD PRESSURE: 63 MMHG | RESPIRATION RATE: 20 BRPM | HEIGHT: 66 IN | SYSTOLIC BLOOD PRESSURE: 117 MMHG | HEART RATE: 85 BPM | TEMPERATURE: 98 F | BODY MASS INDEX: 30.86 KG/M2 | WEIGHT: 192 LBS | OXYGEN SATURATION: 100 %

## 2019-11-30 ENCOUNTER — EXTERNAL CHRONIC CARE MANAGEMENT (OUTPATIENT)
Dept: PRIMARY CARE CLINIC | Facility: CLINIC | Age: 64
End: 2019-11-30
Payer: MEDICARE

## 2019-11-30 PROCEDURE — 99490 PR CHRONIC CARE MGMT, 1ST 20 MIN: ICD-10-PCS | Mod: S$PBB,,, | Performed by: INTERNAL MEDICINE

## 2019-11-30 PROCEDURE — 99490 CHRNC CARE MGMT STAFF 1ST 20: CPT | Mod: PBBFAC | Performed by: INTERNAL MEDICINE

## 2019-11-30 PROCEDURE — 99490 CHRNC CARE MGMT STAFF 1ST 20: CPT | Mod: S$PBB,,, | Performed by: INTERNAL MEDICINE

## 2019-12-02 DIAGNOSIS — J44.9 COPD MIXED TYPE: ICD-10-CM

## 2019-12-02 DIAGNOSIS — J84.9 INTERSTITIAL LUNG DISEASE: ICD-10-CM

## 2019-12-02 RX ORDER — BUDESONIDE AND FORMOTEROL FUMARATE DIHYDRATE 160; 4.5 UG/1; UG/1
2 AEROSOL RESPIRATORY (INHALATION) EVERY 12 HOURS
Qty: 1 INHALER | Refills: 11 | Status: SHIPPED | OUTPATIENT
Start: 2019-12-02 | End: 2020-09-30 | Stop reason: SDUPTHER

## 2019-12-12 ENCOUNTER — LAB VISIT (OUTPATIENT)
Dept: LAB | Facility: HOSPITAL | Age: 64
End: 2019-12-12
Attending: INTERNAL MEDICINE
Payer: MEDICARE

## 2019-12-12 DIAGNOSIS — I15.0 RENOVASCULAR HYPERTENSION: ICD-10-CM

## 2019-12-12 DIAGNOSIS — M34.9 SCLERODERMA: ICD-10-CM

## 2019-12-12 LAB
ALBUMIN SERPL BCP-MCNC: 3.7 G/DL (ref 3.5–5.2)
ALP SERPL-CCNC: 63 U/L (ref 55–135)
ALT SERPL W/O P-5'-P-CCNC: 9 U/L (ref 10–44)
ANION GAP SERPL CALC-SCNC: 7 MMOL/L (ref 8–16)
AST SERPL-CCNC: 14 U/L (ref 10–40)
BASOPHILS # BLD AUTO: 0.03 K/UL (ref 0–0.2)
BASOPHILS NFR BLD: 0.9 % (ref 0–1.9)
BILIRUB SERPL-MCNC: 0.5 MG/DL (ref 0.1–1)
BUN SERPL-MCNC: 9 MG/DL (ref 8–23)
CALCIUM SERPL-MCNC: 8.9 MG/DL (ref 8.7–10.5)
CHLORIDE SERPL-SCNC: 108 MMOL/L (ref 95–110)
CO2 SERPL-SCNC: 27 MMOL/L (ref 23–29)
CREAT SERPL-MCNC: 0.8 MG/DL (ref 0.5–1.4)
DIFFERENTIAL METHOD: ABNORMAL
EOSINOPHIL # BLD AUTO: 0.1 K/UL (ref 0–0.5)
EOSINOPHIL NFR BLD: 3.7 % (ref 0–8)
ERYTHROCYTE [DISTWIDTH] IN BLOOD BY AUTOMATED COUNT: 12.9 % (ref 11.5–14.5)
EST. GFR  (AFRICAN AMERICAN): >60 ML/MIN/1.73 M^2
EST. GFR  (NON AFRICAN AMERICAN): >60 ML/MIN/1.73 M^2
GLUCOSE SERPL-MCNC: 84 MG/DL (ref 70–110)
HCT VFR BLD AUTO: 39.6 % (ref 37–48.5)
HGB BLD-MCNC: 11.9 G/DL (ref 12–16)
IMM GRANULOCYTES # BLD AUTO: 0.01 K/UL (ref 0–0.04)
IMM GRANULOCYTES NFR BLD AUTO: 0.3 % (ref 0–0.5)
LYMPHOCYTES # BLD AUTO: 0.6 K/UL (ref 1–4.8)
LYMPHOCYTES NFR BLD: 15.9 % (ref 18–48)
MCH RBC QN AUTO: 29 PG (ref 27–31)
MCHC RBC AUTO-ENTMCNC: 30.1 G/DL (ref 32–36)
MCV RBC AUTO: 96 FL (ref 82–98)
MONOCYTES # BLD AUTO: 0.3 K/UL (ref 0.3–1)
MONOCYTES NFR BLD: 7.7 % (ref 4–15)
NEUTROPHILS # BLD AUTO: 2.5 K/UL (ref 1.8–7.7)
NEUTROPHILS NFR BLD: 71.5 % (ref 38–73)
NRBC BLD-RTO: 0 /100 WBC
PLATELET # BLD AUTO: 134 K/UL (ref 150–350)
PMV BLD AUTO: 12 FL (ref 9.2–12.9)
POTASSIUM SERPL-SCNC: 3.6 MMOL/L (ref 3.5–5.1)
PROT SERPL-MCNC: 6.5 G/DL (ref 6–8.4)
RBC # BLD AUTO: 4.11 M/UL (ref 4–5.4)
SODIUM SERPL-SCNC: 142 MMOL/L (ref 136–145)
WBC # BLD AUTO: 3.52 K/UL (ref 3.9–12.7)

## 2019-12-12 PROCEDURE — 36415 COLL VENOUS BLD VENIPUNCTURE: CPT | Mod: PO

## 2019-12-12 PROCEDURE — 80053 COMPREHEN METABOLIC PANEL: CPT

## 2019-12-12 PROCEDURE — 85025 COMPLETE CBC W/AUTO DIFF WBC: CPT

## 2019-12-13 ENCOUNTER — TELEPHONE (OUTPATIENT)
Dept: FAMILY MEDICINE | Facility: CLINIC | Age: 64
End: 2019-12-13

## 2019-12-13 DIAGNOSIS — D64.9 ANEMIA, UNSPECIFIED TYPE: Primary | ICD-10-CM

## 2019-12-13 NOTE — TELEPHONE ENCOUNTER
----- Message from Eboni Macias sent at 12/13/2019  1:08 PM CST -----  Contact: patient  Type:  Patient Returning Call    Who Called:  patient  Who Left Message for Patient:    Does the patient know what this is regarding?:  Results  Best Call Back Number:  [phone]  Additional Information:

## 2019-12-14 ENCOUNTER — LAB VISIT (OUTPATIENT)
Dept: LAB | Facility: HOSPITAL | Age: 64
End: 2019-12-14
Attending: INTERNAL MEDICINE
Payer: MEDICARE

## 2019-12-14 DIAGNOSIS — D64.9 ANEMIA, UNSPECIFIED TYPE: ICD-10-CM

## 2019-12-14 LAB
FERRITIN SERPL-MCNC: 73 NG/ML (ref 20–300)
IRON SERPL-MCNC: 64 UG/DL (ref 30–160)
SATURATED IRON: 19 % (ref 20–50)
TOTAL IRON BINDING CAPACITY: 332 UG/DL (ref 250–450)
TRANSFERRIN SERPL-MCNC: 224 MG/DL (ref 200–375)

## 2019-12-14 PROCEDURE — 83540 ASSAY OF IRON: CPT

## 2019-12-14 PROCEDURE — 82728 ASSAY OF FERRITIN: CPT

## 2019-12-14 PROCEDURE — 36415 COLL VENOUS BLD VENIPUNCTURE: CPT | Mod: PO

## 2019-12-16 DIAGNOSIS — D50.9 IRON DEFICIENCY ANEMIA, UNSPECIFIED IRON DEFICIENCY ANEMIA TYPE: Primary | ICD-10-CM

## 2019-12-18 ENCOUNTER — OFFICE VISIT (OUTPATIENT)
Dept: FAMILY MEDICINE | Facility: CLINIC | Age: 64
End: 2019-12-18
Payer: MEDICARE

## 2019-12-18 VITALS
OXYGEN SATURATION: 96 % | TEMPERATURE: 98 F | SYSTOLIC BLOOD PRESSURE: 126 MMHG | WEIGHT: 195 LBS | HEART RATE: 73 BPM | RESPIRATION RATE: 18 BRPM | BODY MASS INDEX: 31.34 KG/M2 | DIASTOLIC BLOOD PRESSURE: 82 MMHG | HEIGHT: 66 IN

## 2019-12-18 DIAGNOSIS — D50.9 IRON DEFICIENCY ANEMIA, UNSPECIFIED IRON DEFICIENCY ANEMIA TYPE: ICD-10-CM

## 2019-12-18 DIAGNOSIS — F32.A ANXIETY AND DEPRESSION: ICD-10-CM

## 2019-12-18 DIAGNOSIS — J84.9 INTERSTITIAL LUNG DISEASE: ICD-10-CM

## 2019-12-18 DIAGNOSIS — M34.9 SCLERODERMA: Primary | ICD-10-CM

## 2019-12-18 DIAGNOSIS — F41.9 ANXIETY AND DEPRESSION: ICD-10-CM

## 2019-12-18 PROCEDURE — 99214 PR OFFICE/OUTPT VISIT, EST, LEVL IV, 30-39 MIN: ICD-10-PCS | Mod: S$GLB,,, | Performed by: INTERNAL MEDICINE

## 2019-12-18 PROCEDURE — 99214 OFFICE O/P EST MOD 30 MIN: CPT | Mod: S$GLB,,, | Performed by: INTERNAL MEDICINE

## 2019-12-18 RX ORDER — FERROUS SULFATE 325(65) MG
325 TABLET ORAL
Qty: 90 TABLET | Refills: 1 | Status: SHIPPED | OUTPATIENT
Start: 2019-12-18 | End: 2020-06-18

## 2019-12-18 RX ORDER — VENLAFAXINE HYDROCHLORIDE 150 MG/1
150 CAPSULE, EXTENDED RELEASE ORAL DAILY
Qty: 30 CAPSULE | Refills: 11 | Status: SHIPPED | OUTPATIENT
Start: 2019-12-18 | End: 2020-12-08 | Stop reason: SDUPTHER

## 2019-12-18 NOTE — PROGRESS NOTES
Subjective:      9:00 AM     Patient ID: Edith Tran is a 64 y.o. female.    Chief Complaint: Arthritis    HPI   Patient has scleroderma.  She is having trouble swallowing saw Gastroenterology did a scope, found multiple ulcers.  She has iron deficiency anemia.  She interstitial lung states at baseline.  It is not bothering her.  She paces herself.    CHIEF COMPLAINT: Arthritis  HPI:     ONSET/TIMING:      Years  ago.     DURATION:  intermittant    QUALITY/COURSE:  .  Worse.  Similar past problems  : yes .      INTENSITY/SEVERITY:  .Severity is #     9 times   (10 point scale).    CONTEXT/WHEN: after inactivity .yes    MODIFIERS/TREATMENTS:  Taking medications:   compliant with taking prescribed medications : yes. . Medication less effective now : no .     SYMPTOMS/RELATED:  . --. Possible medication side effects include : none .    ADL LIMITATIONS: difficulty with ADLs - opening jars etc.    The following symptoms are positive if BOLD, negative otherwise.      LOCATION:  .--Bilateral . Right . Left . Neck . Back . Shoulders . Elbows . Hands . Hips . Knees . Ankles . Feet . Chest . Arms . Legs . Radiation .      REVIEW OF SYMPTOMS:  Joint_Swelling . Joint_Stiffness . Sharp pain . Dull pain . Joint_stiffness_lasting_more_than_1_hour  .  Joint_Erythema . Weakness . Fever . Eye-pain. Nodules.   Rash  .      Her long-time rheumatologist has and she does not have 1.      Review of Systems   Constitutional: Negative for activity change.   HENT: Positive for trouble swallowing.    Eyes: Negative for discharge.   Respiratory: Negative for wheezing.    Cardiovascular: Negative for chest pain and palpitations.   Gastrointestinal: Negative for constipation, diarrhea and vomiting.   Genitourinary: Negative for difficulty urinating and hematuria.   Musculoskeletal: Positive for arthralgias.   Neurological: Negative for headaches.   Psychiatric/Behavioral: Positive for dysphoric mood.         Objective:      Vitals:    12/18/19  "0823   BP: 126/82   Pulse: 73   Resp: 18   Temp: 98.2 °F (36.8 °C)   TempSrc: Oral   SpO2: 96%   Weight: 88.5 kg (195 lb)   Height: 5' 6" (1.676 m)   PainSc: 0-No pain     Physical Exam   Constitutional: She appears well-developed and well-nourished.   Cardiovascular: Normal rate, regular rhythm and normal heart sounds.   Pulmonary/Chest: Effort normal and breath sounds normal.   Abdominal: Soft. There is no tenderness.   Neurological: She is alert.   Psychiatric: She has a normal mood and affect. Her behavior is normal. Thought content normal.   Nursing note and vitals reviewed.        Assessment:       1. Scleroderma    2. Iron deficiency anemia, unspecified iron deficiency anemia type    3. Interstitial lung disease    4. Anxiety and depression          Plan:       Scleroderma  -     Ambulatory Referral to Rheumatology  -     Ambulatory consult to Occupational Therapy  -     Comprehensive metabolic panel; Future; Expected date: 12/18/2019    Iron deficiency anemia, unspecified iron deficiency anemia type  -     ferrous sulfate (FEOSOL) 325 mg (65 mg iron) Tab tablet; Take 1 tablet (325 mg total) by mouth daily with breakfast.  Dispense: 90 tablet; Refill: 1  -     CBC auto differential; Future; Expected date: 12/18/2019  -     Iron and TIBC; Future; Expected date: 12/18/2019    Interstitial lung disease    Anxiety and depression  -     venlafaxine (EFFEXOR-XR) 150 MG Cp24; Take 1 capsule (150 mg total) by mouth once daily.  Dispense: 30 capsule; Refill: 11      Follow up in about 3 months (around 3/18/2020).      "

## 2019-12-18 NOTE — PATIENT INSTRUCTIONS
If can find rheumatologist next month me know and will see we can get Dr. Wright's group     Thank you for choosing [facility].     Please fill out the patient experience survey.

## 2019-12-20 ENCOUNTER — CLINICAL SUPPORT (OUTPATIENT)
Dept: REHABILITATION | Facility: HOSPITAL | Age: 64
End: 2019-12-20
Attending: INTERNAL MEDICINE
Payer: MEDICARE

## 2019-12-20 DIAGNOSIS — Z74.09 IMPAIRED MOBILITY AND ADLS: Primary | ICD-10-CM

## 2019-12-20 DIAGNOSIS — Z78.9 IMPAIRED MOBILITY AND ADLS: Primary | ICD-10-CM

## 2019-12-20 PROCEDURE — 97165 OT EVAL LOW COMPLEX 30 MIN: CPT | Mod: PN

## 2019-12-23 NOTE — PLAN OF CARE
"  [facility] Occupational Therapy  Initial Evaluation     Date: 12/20/2019  Name: Edith Tran  Clinic Number: [id number]    Therapy Diagnosis:   Encounter Diagnosis   Name Primary?    Impaired mobility and ADLs Yes     Physician: Tj Haywood MD    Physician Orders: evaluate and treat   Medical Diagnosis: scleroderma  Surgical Procedure and Date: NA  Evaluation Date: 12/20/2019  Time In:1303  Time Out: 1440  Total Billable Time: 37 minutes    Precautions:  Standard    Subjective     Involved Side: bilateral  Dominant Side: Right  Date of Onset: over 15 years  History of Current Condition/Mechanism of Injury: patient wants to "keep her hands"  Imaging: none   Previous Therapy: years ago    Past Medical History/Physical Systems Review:   Edith Tran  has a past medical history of Allergy, COPD (chronic obstructive pulmonary disease), GERD (gastroesophageal reflux disease), and Scleroderma involving lung.    Edith Tran  has a past surgical history that includes Esophagogastroduodenoscopy (N/A, 8/7/2018); Colonoscopy (N/A, 8/7/2018); Esophagogastroduodenoscopy (N/A, 11/21/2019); and Colonoscopy (N/A, 11/21/2019).    Edith has a current medication list which includes the following prescription(s): albuterol, amlodipine, amoxicillin-clavulanate 875-125mg, benzonatate, budesonide-formoterol 160-4.5 mcg, ergocalciferol, ferrous sulfate, fluticasone propionate, losartan, macitentan, montelukast, mycophenolate, nintedanib, ondansetron, pantoprazole, prednisone, promethazine-dextromethorphan, tadalafil, trazodone, and venlafaxine.    Review of patient's allergies indicates:   Allergen Reactions    Hydroxychloroquine Other (See Comments)     Having eye reaction, needs to stop plaquenil and stay off of it.         Patient's Goals for Therapy: to improve hand function    Pain:  Functional Pain Scale Rating 0-10:   n/a/10 on average  n/a/10 at best  3/10 at worst  Location: hands  Description: burning, " numbness, aching  Aggravating Factors: cold  Easing Factors: heating pad    Occupation:    Working presently: employed      Functional Limitations/Social History:    Previous functional status includes: mod I with all ADLs. Goes to senior center 3-5x a week.    Current FunctionalStatus   Home/Living environment : lives alone      Limitation of Functional Status as follows:   ADLs/IADLs:     - Feeding: mod I    - Bathing: mod I    - Dressing/Grooming: mod I    - Driving: independent            Objective     Edith arrived to appointment on time. Reports that she has not noticed recent decline in function of her hands but she does not want to lose what she has. Pt has Reynaud's and hands present with impaired sensation. Fine motor skills impaired as well. Pt exercises at home. Pt demonstrated the exercises she does at home. All appropriate. Pt unable to hold on to objects that she picks up 2/2 decreased sensation.  equal on both sides at 55#.          Home Exercise Program/Education:  Issued HEP (see patient instructions in EMR) and educated on modality use for pain management . Exercises were reviewed and Edith was able to demonstrate them prior to the end of the session.   Pt received a written copy of exercises to perform at home. Edith demonstrated good  understanding of the education provided.  Pt was advised to perform these exercises free of pain, and to stop performing them if pain occurs.    Patient/Family Education: role of OT, goals for OT, scheduling/cancellations - pt verbalized understanding. Discussed insurance limitations with patient.    Additional Education provided: reviewed plan of care    Assessment     Edith Tran is a 64 y.o. female referred to outpatient occupational therapy and presents with a medical diagnosis of scleroderma, resulting in Decreased functional hand use, Diminished/Impaired Sensation and Diminished/Impaired Coordination and demonstrates limitations as described in the chart  below. Following medical record review it is determined that pt will benefit from occupational therapy services in order to maximize pain free and/or functional use of bilateral hands. The following goals were discussed with the patient and patient is in agreement with them as to be addressed in the treatment plan. The patient's rehab potential is Good.     Anticipated barriers to occupational therapy: chronic condition, long standing  Pt has no cultural, educational or language barriers to learning provided.    Profile and History Assessment of Occupational Performance Level of Clinical Decision Making Complexity Score   Occupational Profile:   Edith Tran is a 64 y.o. female who lives alone and is currently employed Edith Tran has difficulty with  ADLs and IADLs as listed previously, which  affecting his/her daily functional abilities.      Comorbidities:    has a past medical history of Allergy, COPD (chronic obstructive pulmonary disease), GERD (gastroesophageal reflux disease), and Scleroderma involving lung.    Medical and Therapy History Review:   Expanded               Performance Deficits    Physical:  Control of Voluntary Movement  Pinch Strength  Gross Motor Coordination  Fine Motor Coordination  Tactile Functions  Pain    Cognitive:  No Deficits    Psychosocial:    No Deficits     Clinical Decision Making:  low    Assessment Process:  Problem-Focused Assessments    Modification/Need for Assistance:  Minimal-Moderate Modifications/Assistance    Intervention Selection:  Limited Treatment Options       low  Based on PMHX, co morbidities , data from assessments and functional level of assistance required with task and clinical presentation directly impacting function.       The following goals were discussed with the patient and patient is in agreement with them as to be addressed in the treatment plan.     Goals:   1. Independent with HEP  2. 2-4 visits to advance HEP  3. Use of modalities to increase  sensation.  4. AE as indicated.  5. Pt will be able to carry cup or dish to and from table.      Plan   Certification Period/Plan of care expiration: 12/20/2019 to 2-.    Outpatient Occupational Therapy 1 times weekly for 4 weeks to include the following interventions: Manual therapy/joint mobilizations, Modalities for pain management, Therapeutic exercises/activities., Strengthening and Electrical Modalities.      ALPESH Mullen

## 2019-12-27 ENCOUNTER — CLINICAL SUPPORT (OUTPATIENT)
Dept: REHABILITATION | Facility: HOSPITAL | Age: 64
End: 2019-12-27
Attending: INTERNAL MEDICINE
Payer: MEDICARE

## 2019-12-27 DIAGNOSIS — Z74.09 IMPAIRED MOBILITY AND ADLS: Primary | ICD-10-CM

## 2019-12-27 DIAGNOSIS — Z78.9 IMPAIRED MOBILITY AND ADLS: Primary | ICD-10-CM

## 2019-12-27 LAB
FINAL PATHOLOGIC DIAGNOSIS: NORMAL
GROSS: NORMAL

## 2019-12-27 PROCEDURE — 97530 THERAPEUTIC ACTIVITIES: CPT | Mod: PN

## 2019-12-27 PROCEDURE — 97150 GROUP THERAPEUTIC PROCEDURES: CPT | Mod: PN

## 2019-12-27 RX ORDER — ERGOCALCIFEROL 1.25 MG/1
CAPSULE ORAL
Qty: 12 CAPSULE | Refills: 0 | Status: SHIPPED | OUTPATIENT
Start: 2019-12-27 | End: 2020-03-20

## 2019-12-30 NOTE — PROGRESS NOTES
Occupational Therapy Daily Treatment Note     Date: 12/27/2019  Name: Edith Tran  Clinic Number: [id number]    Therapy Diagnosis:   Encounter Diagnosis   Name Primary?    Impaired mobility and ADLs Yes     Physician: Tj Haywood MD    Physician Orders: evaluate and treat B hand impairments  Medical Diagnosis: scleroderma  Surgical Procedure and Date: NA    Time In:1303  Time Out: 1357  Total Billable Time: 54 minutes    Precautions:  Standard      Subjective     Pt reports: Edith has been completing her own HEP daily.  she was compliant with home exercise program given last session.     Functional change: first visit    Pain: 2/10  Location: bilateral hands      Objective           Edith received the following direct contact modalities : e-stim to B thenar eminence 10 minutes:       Edith participated in dynamic functional therapeutic activities to improve functional performance for 15  minutes, including: [place], [place] and wrist maze      Edith participated in the measurement for an open finger compression glove.      Home Exercises and Education Provided     Education provided:   - benefits of compression: pt has a glove but with very little compression.  - Progress towards goals     Written Home Exercises Provided: yes. Added fine motor exercises to her own HEP.  Exercises were reviewed and Edith was able to demonstrate them prior to the end of the session.  Edith demonstrated good  understanding of the HEP provided.   .        Assessment     Pt may  benefit from skilled OT. Will determine at next visit. Edith is diligent about HEP, has obtained AE needed to remain independent.       There are no updates to goals at this time, as this is her first treatment. Pt prognosis is Good.     Pt will continue to benefit from skilled outpatient occupational therapy to address the deficits listed in the problem list on initial evaluation provide pt/family education and to maximize pt's level of independence in the home  and community environment.     Anticipated barriers to occupational therapy: chronic disease    Pt's spiritual, cultural and educational needs considered and pt agreeable to plan of care and goals.    Goals:  See initial eval    Plan     Updates/Grading for next session:continue with plan of care      ALPESH Mullen

## 2019-12-31 ENCOUNTER — EXTERNAL CHRONIC CARE MANAGEMENT (OUTPATIENT)
Dept: PRIMARY CARE CLINIC | Facility: CLINIC | Age: 64
End: 2019-12-31
Payer: MEDICARE

## 2019-12-31 PROCEDURE — 99490 PR CHRONIC CARE MGMT, 1ST 20 MIN: ICD-10-PCS | Mod: S$PBB,,, | Performed by: INTERNAL MEDICINE

## 2019-12-31 PROCEDURE — 99490 CHRNC CARE MGMT STAFF 1ST 20: CPT | Mod: S$PBB,,, | Performed by: INTERNAL MEDICINE

## 2019-12-31 PROCEDURE — 99490 CHRNC CARE MGMT STAFF 1ST 20: CPT | Mod: PBBFAC | Performed by: INTERNAL MEDICINE

## 2020-01-02 ENCOUNTER — OFFICE VISIT (OUTPATIENT)
Dept: GASTROENTEROLOGY | Facility: CLINIC | Age: 65
End: 2020-01-02
Payer: MEDICARE

## 2020-01-02 VITALS
RESPIRATION RATE: 16 BRPM | DIASTOLIC BLOOD PRESSURE: 66 MMHG | HEIGHT: 66 IN | SYSTOLIC BLOOD PRESSURE: 111 MMHG | HEART RATE: 90 BPM | BODY MASS INDEX: 30.76 KG/M2 | WEIGHT: 191.38 LBS

## 2020-01-02 DIAGNOSIS — K21.00 GERD WITH ESOPHAGITIS: ICD-10-CM

## 2020-01-02 DIAGNOSIS — M34.9 SCLERODERMA: ICD-10-CM

## 2020-01-02 DIAGNOSIS — D69.6 THROMBOCYTOPENIA: ICD-10-CM

## 2020-01-02 DIAGNOSIS — K50.00 CROHN'S DISEASE OF ILEUM WITHOUT COMPLICATION: ICD-10-CM

## 2020-01-02 DIAGNOSIS — K22.89 ESOPHAGEAL LEUKOPLAKIA: ICD-10-CM

## 2020-01-02 DIAGNOSIS — R13.19 ESOPHAGEAL DYSPHAGIA: Primary | ICD-10-CM

## 2020-01-02 PROCEDURE — 99999 PR PBB SHADOW E&M-EST. PATIENT-LVL V: ICD-10-PCS | Mod: PBBFAC,,, | Performed by: INTERNAL MEDICINE

## 2020-01-02 PROCEDURE — 99214 OFFICE O/P EST MOD 30 MIN: CPT | Mod: S$PBB,,, | Performed by: INTERNAL MEDICINE

## 2020-01-02 PROCEDURE — 99999 PR PBB SHADOW E&M-EST. PATIENT-LVL V: CPT | Mod: PBBFAC,,, | Performed by: INTERNAL MEDICINE

## 2020-01-02 PROCEDURE — 99215 OFFICE O/P EST HI 40 MIN: CPT | Mod: PBBFAC,PO | Performed by: INTERNAL MEDICINE

## 2020-01-02 PROCEDURE — 99214 PR OFFICE/OUTPT VISIT, EST, LEVL IV, 30-39 MIN: ICD-10-PCS | Mod: S$PBB,,, | Performed by: INTERNAL MEDICINE

## 2020-01-02 NOTE — LETTER
January 3, 2020      Tj Haywood MD  [address]  [facility] [address]           [facility]  [address]  [facility] [address]  Phone: [phone]          Patient: Edith Tran   MR Number: [id number]   YOB: 1955   Date of Visit: 1/2/2020       Dear Dr. Tj Haywood:    Thank you for referring Edith Tran to me for evaluation. Attached you will find relevant portions of my assessment and plan of care.    If you have questions, please do not hesitate to call me. I look forward to following Edith Tran along with you.    Sincerely,    Ngozi Prince MD    Enclosure  CC:  No Recipients    If you would like to receive this communication electronically, please contact [email or web address] or [phone] to request more information on [employer] Link access.    For providers and/or their staff who would like to refer a patient to [facility], please contact us through our one-stop-shop provider referral line, [facility], at [phone].    If you feel you have received this communication in error or would no longer like to receive these types of communications, please e-mail [email or web address]

## 2020-01-02 NOTE — PROGRESS NOTES
"[facility]    CC: Dysphagia, Crohn's disease    HPI 64 y.o. female  with history of scleroderma associated with pulmonary fibrosis and pulmonary HTN on Cellcept, Psumit, Nintedanib, and Tadalafil, presents for follow up of chronic, moderate, dysphagia primarily to solids as well as asymptomatic mild TI Crohn's disease. Since last visit she underwent EGD which was notable for LA Grade C esophagitis as well as esophageal leukoplakia, H.pylori negative gastritis and esophageal stenosis that was dilated. Colonoscopy performed at that time (November 2019) was notable for few small TI ulcers (normal pathology) as well as normal colonic mucosa (pathology without chronic or active inflammation). Regarding her dysphagia she notes improvement in symptoms if she takes her time to eat. She denies diarrhea or abdominal pain. She was told by her PCP that she has NADIR. She also notes that her Rheumatologist Dr. Barnes left [facility] and has not had follow up in ~3 months.     Past Medical History:   Diagnosis Date    Allergy     COPD (chronic obstructive pulmonary disease)     GERD (gastroesophageal reflux disease)     Scleroderma involving lung since she was 49 y/o         Review of Systems  General ROS: negative for - chills, fever or weight loss  Cardiovascular ROS: no chest pain or dyspnea on exertion  Gastrointestinal ROS: + dysphagia, no blood in stool, no diarrhea    Physical Examination  /66 (BP Location: Right arm, Patient Position: Sitting)   Pulse 90   Resp 16   Ht 5' 6" (1.676 m)   Wt 86.8 kg (191 lb 5.8 oz)   BMI 30.89 kg/m²   General appearance: alert, cooperative, no distress  HENT: Normocephalic, atraumatic, neck symmetrical, no nasal discharge, sclera anicteric   Lungs: clear to auscultation bilaterally, symmetric chest wall expansion bilaterally  Heart: regular rate and rhythm without rub; no displacement of the PMI   Abdomen: soft, nontender,nondistended, BS active  Extremities: " extremities symmetric; no clubbing, cyanosis, or edema    Labs:  Lab Results   Component Value Date    WBC 3.52 (L) 12/12/2019    HGB 11.9 (L) 12/12/2019    HCT 39.6 12/12/2019    MCV 96 12/12/2019     (L) 12/12/2019     -Ferritin- 73  -Iron- 64, TIBC_ 332, Fe sat- 19%      Imaging:  Esophagram with KUB was independently visualized and reviewed by me and showed mild dilation of esophagus with decreased esophageal emptying and little peristalsis suggesting changes of scleroderma, small duodenal diverticulum     Assessment:   64 y.o. female with history of scleroderma associated with pulmonary fibrosis and pulmonary HTN on Cellcept, Psumit, Nintedanib, and Tadalafil, presents for follow up of mild asymptomatic TI Crohn's disease (not on therapy due to multiple immunosuppressants) as well as dysphagia likely due to scleroderma.   Plan:  1.Dysphagia  -Schedule manometry  -Refer to motility expert Dr. Goddard     2.Esopahgeal leukoplakia, Esophagitis  -Discussed pathology results as well as possible increased risk for malignancy  -Repeat EGD in 4 weeks to ensure healing of esophagitis and re-biopsy leukoplakia at that time, consider referral to advanced endoscopist for EMR of lesion    3.Thromobocytopenia, mild trend toward pancytopenia  -Recheck CBC  -We discussed this may be medication induced and that she should follow up with Rheumatology (referrral has been placed by PCP)    4.Mild asymptomatic TI Crohn's disease  -No current treatment indicated as patient on multiple immunosuppressant agents  -Repeat colonoscopy 2024      Ngozi Prince MD  [facility]  [address], [address]  [place] [address]  Office: [phone]  Fax: [phone]

## 2020-01-03 ENCOUNTER — CLINICAL SUPPORT (OUTPATIENT)
Dept: REHABILITATION | Facility: HOSPITAL | Age: 65
End: 2020-01-03
Attending: INTERNAL MEDICINE
Payer: MEDICARE

## 2020-01-03 DIAGNOSIS — R29.898 WEAKNESS OF BOTH HANDS: ICD-10-CM

## 2020-01-03 DIAGNOSIS — Z74.09 IMPAIRED MOBILITY AND ADLS: Primary | ICD-10-CM

## 2020-01-03 DIAGNOSIS — Z78.9 IMPAIRED MOBILITY AND ADLS: Primary | ICD-10-CM

## 2020-01-03 PROCEDURE — 97530 THERAPEUTIC ACTIVITIES: CPT | Mod: PN

## 2020-01-04 ENCOUNTER — LAB VISIT (OUTPATIENT)
Dept: LAB | Facility: HOSPITAL | Age: 65
End: 2020-01-04
Attending: INTERNAL MEDICINE
Payer: MEDICARE

## 2020-01-04 DIAGNOSIS — D69.6 THROMBOCYTOPENIA: ICD-10-CM

## 2020-01-04 LAB
BASOPHILS # BLD AUTO: 0.04 K/UL (ref 0–0.2)
BASOPHILS NFR BLD: 1 % (ref 0–1.9)
DIFFERENTIAL METHOD: ABNORMAL
EOSINOPHIL # BLD AUTO: 0.2 K/UL (ref 0–0.5)
EOSINOPHIL NFR BLD: 4.3 % (ref 0–8)
ERYTHROCYTE [DISTWIDTH] IN BLOOD BY AUTOMATED COUNT: 13.1 % (ref 11.5–14.5)
HCT VFR BLD AUTO: 40.8 % (ref 37–48.5)
HGB BLD-MCNC: 12.1 G/DL (ref 12–16)
IMM GRANULOCYTES # BLD AUTO: 0.01 K/UL (ref 0–0.04)
IMM GRANULOCYTES NFR BLD AUTO: 0.3 % (ref 0–0.5)
LYMPHOCYTES # BLD AUTO: 0.7 K/UL (ref 1–4.8)
LYMPHOCYTES NFR BLD: 17.9 % (ref 18–48)
MCH RBC QN AUTO: 28.9 PG (ref 27–31)
MCHC RBC AUTO-ENTMCNC: 29.7 G/DL (ref 32–36)
MCV RBC AUTO: 97 FL (ref 82–98)
MONOCYTES # BLD AUTO: 0.3 K/UL (ref 0.3–1)
MONOCYTES NFR BLD: 6.8 % (ref 4–15)
NEUTROPHILS # BLD AUTO: 2.8 K/UL (ref 1.8–7.7)
NEUTROPHILS NFR BLD: 69.7 % (ref 38–73)
NRBC BLD-RTO: 0 /100 WBC
PLATELET # BLD AUTO: 187 K/UL (ref 150–350)
PMV BLD AUTO: 11.7 FL (ref 9.2–12.9)
RBC # BLD AUTO: 4.19 M/UL (ref 4–5.4)
WBC # BLD AUTO: 3.96 K/UL (ref 3.9–12.7)

## 2020-01-04 PROCEDURE — 85025 COMPLETE CBC W/AUTO DIFF WBC: CPT

## 2020-01-04 PROCEDURE — 36415 COLL VENOUS BLD VENIPUNCTURE: CPT | Mod: PO

## 2020-01-06 ENCOUNTER — TELEPHONE (OUTPATIENT)
Dept: ENDOSCOPY | Facility: HOSPITAL | Age: 65
End: 2020-01-06

## 2020-01-09 ENCOUNTER — PATIENT MESSAGE (OUTPATIENT)
Dept: ENDOSCOPY | Facility: HOSPITAL | Age: 65
End: 2020-01-09

## 2020-01-20 NOTE — PROGRESS NOTES
Outpatient Therapy Discharge Summary and Final Treatment     Name: Edith Tran  Clinic Number: [id number]    Therapy Diagnosis:   Encounter Diagnoses   Name Primary?    Impaired mobility and ADLs Yes    Weakness of both hands      Physician: Tj Haywood MD    Physician Orders: evaluate and treat weakness B hands.  Medical Diagnosis: scleroderma  Evaluation Date:       Date of Last visit: 01-  Total Visits Received: 4  Cancelled Visits: 0  No Show Visits: 0    Treatment Time:    1306   1400  54 minutes therapeutic activities    Reviewed HEP. Patient has been compliant and was able to demonstrate them correctly. Discussed possibility of benefiting from compression glove, pt in agreement and is ordering one this day.    Assessment        Goals:   1. Independent with HEP  2. 2-4 visits to advance HEP  3. Use of modalities to increase sensation.  4. AE as indicated.  5. Pt will be able to carry cup or dish to and from table.    Discharge reason: Patient has met all of his/her goals    Plan   This patient is discharged from Occupational Therapy

## 2020-01-21 DIAGNOSIS — G47.00 INSOMNIA, UNSPECIFIED TYPE: ICD-10-CM

## 2020-01-21 RX ORDER — TRAZODONE HYDROCHLORIDE 50 MG/1
100 TABLET ORAL NIGHTLY PRN
Qty: 60 TABLET | Refills: 3 | Status: SHIPPED | OUTPATIENT
Start: 2020-01-21 | End: 2020-05-19

## 2020-01-27 DIAGNOSIS — J47.9 BRONCHIECTASIS WITHOUT COMPLICATION: ICD-10-CM

## 2020-01-27 DIAGNOSIS — I10 ESSENTIAL HYPERTENSION: ICD-10-CM

## 2020-01-27 DIAGNOSIS — J44.9 CHRONIC OBSTRUCTIVE PULMONARY DISEASE, UNSPECIFIED COPD TYPE: ICD-10-CM

## 2020-01-27 RX ORDER — AMLODIPINE BESYLATE 5 MG/1
5 TABLET ORAL DAILY
Qty: 90 TABLET | Refills: 1 | Status: SHIPPED | OUTPATIENT
Start: 2020-01-27 | End: 2020-12-18 | Stop reason: SDUPTHER

## 2020-01-27 RX ORDER — ALBUTEROL SULFATE 90 UG/1
AEROSOL, METERED RESPIRATORY (INHALATION)
Qty: 8 G | Refills: 18 | Status: SHIPPED | OUTPATIENT
Start: 2020-01-27 | End: 2020-09-30 | Stop reason: SDUPTHER

## 2020-01-29 ENCOUNTER — ANESTHESIA EVENT (OUTPATIENT)
Dept: ENDOSCOPY | Facility: HOSPITAL | Age: 65
End: 2020-01-29
Payer: MEDICARE

## 2020-01-29 ENCOUNTER — ANESTHESIA (OUTPATIENT)
Dept: ENDOSCOPY | Facility: HOSPITAL | Age: 65
End: 2020-01-29
Payer: MEDICARE

## 2020-01-29 ENCOUNTER — HOSPITAL ENCOUNTER (OUTPATIENT)
Facility: HOSPITAL | Age: 65
Discharge: HOME | End: 2020-01-29
Attending: INTERNAL MEDICINE | Admitting: INTERNAL MEDICINE
Payer: MEDICARE

## 2020-01-29 DIAGNOSIS — K21.9 GERD (GASTROESOPHAGEAL REFLUX DISEASE): ICD-10-CM

## 2020-01-29 DIAGNOSIS — K22.89 ESOPHAGEAL LEUKOPLAKIA: Primary | ICD-10-CM

## 2020-01-29 PROCEDURE — 88305 TISSUE EXAM BY PATHOLOGIST: CPT | Mod: 26,,, | Performed by: PATHOLOGY

## 2020-01-29 PROCEDURE — 43248 EGD GUIDE WIRE INSERTION: CPT | Performed by: INTERNAL MEDICINE

## 2020-01-29 PROCEDURE — D9220A PRA ANESTHESIA: ICD-10-PCS | Mod: ANES,,, | Performed by: ANESTHESIOLOGY

## 2020-01-29 PROCEDURE — 37000008 HC ANESTHESIA 1ST 15 MINUTES: Performed by: INTERNAL MEDICINE

## 2020-01-29 PROCEDURE — 88342 IMHCHEM/IMCYTCHM 1ST ANTB: CPT | Performed by: PATHOLOGY

## 2020-01-29 PROCEDURE — 88312 SPECIAL STAINS GROUP 1: CPT | Mod: 26,,, | Performed by: PATHOLOGY

## 2020-01-29 PROCEDURE — 27201012 HC FORCEPS, HOT/COLD, DISP: Performed by: INTERNAL MEDICINE

## 2020-01-29 PROCEDURE — 88342 CHG IMMUNOCYTOCHEMISTRY: ICD-10-PCS | Mod: 26,,, | Performed by: PATHOLOGY

## 2020-01-29 PROCEDURE — 88341 IMHCHEM/IMCYTCHM EA ADD ANTB: CPT | Mod: 26,,, | Performed by: PATHOLOGY

## 2020-01-29 PROCEDURE — 88312 PR  SPECIAL STAINS,GROUP I: ICD-10-PCS | Mod: 26,,, | Performed by: PATHOLOGY

## 2020-01-29 PROCEDURE — 88312 SPECIAL STAINS GROUP 1: CPT | Performed by: PATHOLOGY

## 2020-01-29 PROCEDURE — 25000242 PHARM REV CODE 250 ALT 637 W/ HCPCS: Performed by: ANESTHESIOLOGY

## 2020-01-29 PROCEDURE — 43248 EGD GUIDE WIRE INSERTION: CPT | Mod: ,,, | Performed by: INTERNAL MEDICINE

## 2020-01-29 PROCEDURE — D9220A PRA ANESTHESIA: ICD-10-PCS | Mod: CRNA,,, | Performed by: NURSE ANESTHETIST, CERTIFIED REGISTERED

## 2020-01-29 PROCEDURE — D9220A PRA ANESTHESIA: Mod: ANES,,, | Performed by: ANESTHESIOLOGY

## 2020-01-29 PROCEDURE — 88305 TISSUE EXAM BY PATHOLOGIST: ICD-10-PCS | Mod: 26,,, | Performed by: PATHOLOGY

## 2020-01-29 PROCEDURE — 63600175 PHARM REV CODE 636 W HCPCS: Performed by: INTERNAL MEDICINE

## 2020-01-29 PROCEDURE — 43239 EGD BIOPSY SINGLE/MULTIPLE: CPT | Performed by: INTERNAL MEDICINE

## 2020-01-29 PROCEDURE — 43239 EGD BIOPSY SINGLE/MULTIPLE: CPT | Mod: 59,,, | Performed by: INTERNAL MEDICINE

## 2020-01-29 PROCEDURE — 63600175 PHARM REV CODE 636 W HCPCS: Performed by: NURSE ANESTHETIST, CERTIFIED REGISTERED

## 2020-01-29 PROCEDURE — 43248 PR EGD, FLEX, W/DILATION OVER GUIDEWIRE: ICD-10-PCS | Mod: ,,, | Performed by: INTERNAL MEDICINE

## 2020-01-29 PROCEDURE — C1769 GUIDE WIRE: HCPCS | Performed by: INTERNAL MEDICINE

## 2020-01-29 PROCEDURE — 88341 PR IHC OR ICC EACH ADD'L SINGLE ANTIBODY  STAINPR: ICD-10-PCS | Mod: 26,,, | Performed by: PATHOLOGY

## 2020-01-29 PROCEDURE — 94640 AIRWAY INHALATION TREATMENT: CPT

## 2020-01-29 PROCEDURE — 37000009 HC ANESTHESIA EA ADD 15 MINS: Performed by: INTERNAL MEDICINE

## 2020-01-29 PROCEDURE — 88305 TISSUE EXAM BY PATHOLOGIST: CPT | Mod: 59 | Performed by: PATHOLOGY

## 2020-01-29 PROCEDURE — 43239 PR EGD, FLEX, W/BIOPSY, SGL/MULTI: ICD-10-PCS | Mod: 59,,, | Performed by: INTERNAL MEDICINE

## 2020-01-29 PROCEDURE — D9220A PRA ANESTHESIA: Mod: CRNA,,, | Performed by: NURSE ANESTHETIST, CERTIFIED REGISTERED

## 2020-01-29 PROCEDURE — 88342 IMHCHEM/IMCYTCHM 1ST ANTB: CPT | Mod: 26,,, | Performed by: PATHOLOGY

## 2020-01-29 PROCEDURE — 88341 IMHCHEM/IMCYTCHM EA ADD ANTB: CPT | Performed by: PATHOLOGY

## 2020-01-29 RX ORDER — SODIUM CHLORIDE 9 MG/ML
INJECTION, SOLUTION INTRAVENOUS CONTINUOUS
Status: DISCONTINUED | OUTPATIENT
Start: 2020-01-29 | End: 2020-01-29 | Stop reason: HOSPADM

## 2020-01-29 RX ORDER — LEVALBUTEROL 1.25 MG/.5ML
1.25 SOLUTION, CONCENTRATE RESPIRATORY (INHALATION) ONCE
Status: COMPLETED | OUTPATIENT
Start: 2020-01-29 | End: 2020-01-29

## 2020-01-29 RX ORDER — PROPOFOL 10 MG/ML
VIAL (ML) INTRAVENOUS
Status: DISCONTINUED | OUTPATIENT
Start: 2020-01-29 | End: 2020-01-29

## 2020-01-29 RX ORDER — LIDOCAINE HCL/PF 100 MG/5ML
SYRINGE (ML) INTRAVENOUS
Status: DISCONTINUED | OUTPATIENT
Start: 2020-01-29 | End: 2020-01-29

## 2020-01-29 RX ADMIN — Medication 100 MG: at 08:01

## 2020-01-29 RX ADMIN — PROPOFOL 150 MG: 10 INJECTION, EMULSION INTRAVENOUS at 08:01

## 2020-01-29 RX ADMIN — PROPOFOL 30 MG: 10 INJECTION, EMULSION INTRAVENOUS at 08:01

## 2020-01-29 RX ADMIN — SODIUM CHLORIDE 1000 ML: 0.9 INJECTION, SOLUTION INTRAVENOUS at 08:01

## 2020-01-29 RX ADMIN — PROPOFOL 30 MG: 10 INJECTION, EMULSION INTRAVENOUS at 09:01

## 2020-01-29 RX ADMIN — LEVALBUTEROL HYDROCHLORIDE 1.25 MG: 1.25 SOLUTION, CONCENTRATE RESPIRATORY (INHALATION) at 09:01

## 2020-01-29 NOTE — H&P
"[facility]    CC: Dysphagia, esophagitis , esophageal leukoplakia    HPI 64 y.o. female presents to follow up esophagitis and esophageal leukoplakia as well as continued dysphagia    Past Medical History:   Diagnosis Date    Allergy     COPD (chronic obstructive pulmonary disease)     GERD (gastroesophageal reflux disease)     Kidney stones     Scleroderma involving lung since she was 49 y/o         Review of Systems  General ROS: negative for - chills, fever or weight loss  Cardiovascular ROS: no chest pain or dyspnea on exertion  Gastrointestinal ROS: + dysphagia    Physical Examination  /68 (BP Location: Left arm)   Pulse 74   Temp 98.8 °F (37.1 °C) (Skin)   Resp 16   Ht 5' 6" (1.676 m)   Wt 86.2 kg (190 lb)   SpO2 98%   Breastfeeding? No   BMI 30.67 kg/m²   General appearance: alert, cooperative, no distress  HENT: Normocephalic, atraumatic, neck symmetrical, no nasal discharge, sclera anicteric   Lungs: clear to auscultation bilaterally, symmetric chest wall expansion bilaterally  Heart: regular rate and rhythm without rub; no displacement of the PMI   Abdomen: soft  Extremities: extremities symmetric; no clubbing, cyanosis, or edema      Assessment:   64 y.o. female presents to follow up esophagitis and esophageal leukoplakia as well as continued dysphagia    Plan:  -proceed to EGD    Ngozi Prince MD  [facility]  [address], [address]  [place] [address]  Office: [phone]  Fax: [phone]  "

## 2020-01-29 NOTE — ANESTHESIA PREPROCEDURE EVALUATION
01/29/2020  Edith Tran is a 64 y.o., female.    Anesthesia Evaluation    I have reviewed the Patient Summary Reports.    I have reviewed the Nursing Notes.   I have reviewed the Medications.     Review of Systems  Cardiovascular:   Hypertension CASH    Pulmonary:   COPD, mild Shortness of breath Sleep Apnea, CPAP    Education provided regarding risk of obstructive sleep apnea     Renal/:  Renal/ Normal     Hepatic/GI:   PUD, GERD, well controlled    Musculoskeletal:  Musculoskeletal Normal    Neurological:  Neurology Normal    Endocrine:  Endocrine Normal    Psych:  Psychiatric Normal           Physical Exam  General:  Well nourished    Airway/Jaw/Neck:  Airway Findings: Mouth Opening: Normal Tongue: Normal  General Airway Assessment: Adult  Mallampati: III  Improves to II with phonation.  Jaw/Neck Findings:  Neck ROM: Normal ROM      Dental:  Dental Findings: In tact   Chest/Lungs:  Chest/Lungs Findings: Clear to auscultation, Normal Respiratory Rate         Mental Status:  Mental Status Findings:  Cooperative, Alert and Oriented         Anesthesia Plan  Type of Anesthesia, risks & benefits discussed:  Anesthesia Type:  general  Patient's Preference:   Intra-op Monitoring Plan: standard ASA monitors  Intra-op Monitoring Plan Comments:   Post Op Pain Control Plan: multimodal analgesia  Post Op Pain Control Plan Comments:   Induction:   IV  Beta Blocker:  Patient is not currently on a Beta-Blocker (No further documentation required).       Informed Consent: Patient understands risks and agrees with Anesthesia plan.  Questions answered. Anesthesia consent signed with patient.  ASA Score: 2     Day of Surgery Review of History & Physical:            Ready For Surgery From Anesthesia Perspective.

## 2020-01-29 NOTE — ANESTHESIA POSTPROCEDURE EVALUATION
Anesthesia Post Evaluation    Patient: Edith Tran    Procedure(s) Performed: Procedure(s) (LRB):  EGD (ESOPHAGOGASTRODUODENOSCOPY) (N/A)    Final Anesthesia Type: general    Patient location during evaluation: PACU  Patient participation: Yes- Able to Participate  Level of consciousness: sedated and awake  Post-procedure vital signs: reviewed and stable  Pain management: adequate  Airway patency: patent    PONV status at discharge: No PONV  Anesthetic complications: no      Cardiovascular status: blood pressure returned to baseline  Respiratory status: spontaneous ventilation  Hydration status: euvolemic  Follow-up not needed.          Vitals Value Taken Time   /68 1/29/2020  9:15 AM   Temp 37.1 °C (98.8 °F) 1/29/2020  8:00 AM   Pulse 80 1/29/2020  9:15 AM   Resp 20 1/29/2020  9:15 AM   SpO2 99 % 1/29/2020  9:15 AM         Event Time     Out of Recovery 09:14:40          Pain/Kranthi Score: Kranthi Score: 10 (1/29/2020  9:15 AM)

## 2020-01-29 NOTE — [employer]OV[employer] PATIENT INSTRUCTIONS
Discharge Summary/Instructions after an Endoscopic Procedure  Patient Name: Edith Tran  Patient MRN: [id number]  Patient YOB: 1955 Wednesday, January 29, 2020  Ngozi Prince MD  RESTRICTIONS:  During your procedure today, you received medications for sedation.  These   medications may affect your judgment, balance and coordination.  Therefore,   for 24 hours, you have the following restrictions:   - DO NOT drive a car, operate machinery, make legal/financial decisions,   sign important papers or drink alcohol.    ACTIVITY:  Today: no heavy lifting, straining or running due to procedural   sedation/anesthesia.  The following day: return to full activity including work.  DIET:  Eat and drink normally unless instructed otherwise.     TREATMENT FOR COMMON SIDE EFFECTS:  - Mild abdominal pain, nausea, belching, bloating or excessive gas:  rest,   eat lightly and use a heating pad.  - Sore Throat: treat with throat lozenges and/or gargle with warm salt   water.  - Because air was used during the procedure, expelling large amounts of air   from your rectum or belching is normal.  - If a bowel prep was taken, you may not have a bowel movement for 1-3 days.    This is normal.  SYMPTOMS TO WATCH FOR AND REPORT TO YOUR PHYSICIAN:  1. Abdominal pain or bloating, other than gas cramps.  2. Chest pain.  3. Back pain.  4. Signs of infection such as: chills or fever occurring within 24 hours   after the procedure.  5. Rectal bleeding, which would show as bright red, maroon, or black stools.   (A tablespoon of blood from the rectum is not serious, especially if   hemorrhoids are present.)  6. Vomiting.  7. Weakness or dizziness.  GO DIRECTLY TO THE NEAREST EMERGENCY ROOM IF YOU HAVE ANY OF THE FOLLOWING:      Difficulty breathing              Chills and/or fever over 101 F   Persistent vomiting and/or vomiting blood   Severe abdominal pain   Severe chest pain   Black, tarry stools   Bleeding- more than  one tablespoon   Any other symptom or condition that you feel may need urgent attention  Your doctor recommends these additional instructions:  If any biopsies were taken, your doctors clinic will contact you in 1 to 2   weeks with any results.  - Discharge patient to home (with escort).   - Patient has a contact number available for emergencies.  The signs and   symptoms of potential delayed complications were discussed with the   patient.  Return to normal activities tomorrow.  Written discharge   instructions were provided to the patient.   - Resume previous diet.   - Continue present medications including PPI.   - Await pathology results.   - Repeat upper endoscopy at appointment to be scheduled for surveillance   based on pathology results.   -Keep manometry appointment as scheduled   - Return to my office as previously scheduled.  For questions, problems or results please call your physician - Ngozi Prince MD at Work:  [phone].  [facility], EMERGENCY ROOM PHONE NUMBER: [phone]  IF A COMPLICATION OR EMERGENCY SITUATION ARISES AND YOU ARE UNABLE TO REACH   YOUR PHYSICIAN - GO DIRECTLY TO THE EMERGENCY ROOM.  Ngozi Prince MD  1/29/2020 9:11:04 AM  This report has been verified and signed electronically.  [employer]

## 2020-01-29 NOTE — PLAN OF CARE
VSS. Denies complaints. Xopenex aerosol tx given per RT. Tolerated well. Seen by Dr. Norris and d/c instructions given. NAD noted. Discharged via w/c to POV with friend [place].

## 2020-01-29 NOTE — DISCHARGE INSTRUCTIONS
Arteriovenous Malformation (AVM)  You have been told that you have an arteriovenous malformation (AVM). An AVM is an abnormal tangle of blood vessels within the brain. Although some AVMs never rupture, both known factors (such as an increase in blood pressure) and unknown factors can lead to rupture. If you have an AVM, you were probably born with it. But most people don't know they have one until a problem happens. Signs of an AVM include bad headaches, sudden or progressive paralysis or loss of sensation, blurred or double vision, and seizures (jerking movements that are out of your control).     Understanding an AVM  The brain controls the body. You can move and feel because of the brain. And it is the brain that makes you able to think, show emotions, remember, and make judgments. An AVM can damage the brain and put the rest of the body in danger.  Inside the skull  Under the scalp and the skull, a tough membrane (called the dura) surrounds the brain. Beneath the dura, cerebrospinal fluid (CSF) cushions the brain. Blood vessels carry nutrients and oxygen-rich blood throughout the brain.    A problem with blood flow  An AVM is a tangle of blood vessels. It can cause pressure to build up in the blood vessel and prevent normal blood flow. If the pressure becomes too great or the wall of the AVM vessel weakens a blood vessel can burst and blood can leak or spurt into the brain. This can damage parts of the brain that control vital body functions, such as sight, sensation, language, critical thinking, and movements. In some cases, problems caused by an AVM can even lead to death. The high blood flow in an AVM can also shunt the oxygen from the arteries directly to the veins, bypassing the brain capillaries. This shunting can lead to strokes. But an AVM can be treated.  Date Last Reviewed: 10/8/2015  © 3323-6461 [employer]. [address] [place] [address], [place] [address]. All rights reserved. This  information is not intended as a substitute for professional medical care. Always follow your healthcare professional's instructions.        Treating Dysphagia     Talk to your healthcare provider before you take any medicines.    A medical evaluation helps your healthcare provider find the cause of your trouble swallowing, or dysphagia. Your evaluation may include a medical history and some special tests. Your provider will make a treatment plan based on the results of your evaluation. You may need to take medicines. In some cases, your provider may suggest a procedure to stretch or widen your esophagus (esophageal dilation). Or your provider may suggest surgery.  What you can do  To help control dysphagia, follow your treatment plan. Take all medicines as directed. You also can help lessen your dysphagia symptoms by being careful about what and how you eat.  Medicines  You may need medicines, such as those that:  · Reduce or neutralize stomach acids  · Control esophagus muscle spasms  · Treat an allergic disorder of the esophagus that is causing the problem  · Are injected into the esophagus to help symptoms  Esophageal dilation  Dilation is a procedure that your provider can use to widen your esophagus. It is most often done when a narrowing (stricture) of the esophagus is causing the problem. There are many ways your provider can widen your esophagus. He or she can discuss them with you.  Eating tips  · Eat slowly in a relaxed setting.  · Dont talk while you eat.  · Take small bites and chew slowly and thoroughly  · Sit in an upright position during and after meals.  · Ask your provider about any special diets that may help, such as liquid diets.  · If you have trouble swallowing solid foods, you can use a  to mash or purée them.  · Thicken liquids with milk, juice, broth, gravy, or starch to make swallowing easier.  Occupational or speech therapy  Your healthcare provider may recommend that you have an  evaluation or sessions with a speech or occupational therapist. These specialists in dysphagia may give you exercises and instructions to help you eat safely.   Date Last Reviewed: 10/1/2016  © 2047-5585 [employer]. [address] [place] [address], [place] [address]. All rights reserved. This information is not intended as a substitute for professional medical care. Always follow your healthcare professional's instructions.        Esophageal Ulcer  An esophageal ulcer is an open sore in the lining of the esophagus. The esophagus is the tube that carries food and liquid from your mouth to your stomach. This sheet tells you more about esophageal ulcers and how they are treated.    Causes of an esophageal ulcer  Esophageal ulcers can be caused by:  · GERD (gastroesophageal reflux disease). This condition occurs when stomach acid flows back into the esophagus. It is the most common cause of esophageal ulcers.  · Infection of the esophagus. This is caused by certain types of fungus and bacteria. It's also caused by viruses such as herpes simplex virus 1 (HSV-1) or cytomegalovirus (CMV).  · Irritants that damage the esophagus. These include cigarette smoke, alcohol, lye, and certain medications.  · Certain types of treatments done on the esophagus. These include chemotherapy and radiation.  · Excessive vomiting.  Symptoms of an esophageal ulcer  Esophageal ulcer symptoms can include:  · Pain when you swallow or trouble swallowing  · Pain behind your breastbone (heartburn)  · Upset stomach (nausea) and vomiting    · Vomiting blood  · Chest pain  Diagnosing an esophageal ulcer  Your provider will ask about your symptoms and health history. He or she will also give you a full exam. Tests will be done as well. These can include:  · Upper endoscopy. This is done to see inside your esophagus. This lets your provider check for ulcers. During the test, an endoscope (scope) is used. This is a thin, flexible tube with a tiny  camera and light on the end. The scope is placed into your mouth. It is then guided down the esophagus. Small brushes may be passed through the scope to loosen cells from the lining of the esophagus. Other tools may also be passed through the scope to remove tiny tissue samples (biopsy). These samples are then sent to a lab for study.  · Barium swallow. This is done to take X-rays of your esophagus. This helps your provider check for ulcers. For this test, youll drink a chalky liquid that contains a substance called barium. The barium coats your esophagus so that it will show up clearly on X-rays.  · Blood tests. These check for infection, such as HSV-1 and CMV in the esophagus. For a blood test, a small sample of your blood is taken and sent to a lab.  Treating an esophageal ulcer  Treatment focuses on giving the ulcer time to heal, easing symptoms, and preventing further damage. Treatment may include:  · Medicines to reduce the amount of acid your stomach makes  · Medicines to treat infection  · Quitting smoking and not drinking alcohol  · Avoiding irritating medicines (such as aspirin, ibuprofen, potassium, tetracyclines, quinidine, iron, and alendronate)  Recovery and follow-up  With treatment, an esophageal ulcer takes several weeks or longer to heal. A follow-up endoscopy may be done to check the ulcers healing. Let your provider know if your symptoms don't get better or if they come back again. If you have GERD, work with your provider to manage it. You can take steps to help keep your esophagus healthy and prevent future problems.  When to call your healthcare provider  Call your provider right away if you have any of the following:  · Fever of 100.4°F (38.0°C) or higher, or as advised by your healthcare provider  · Continued pain or trouble swallowing  · Coughing up blood  · Frequent nausea or vomiting that looks like bloody coffee grounds  · Dark, tarry, or bloody stools      Date Last Reviewed:  7/1/2016  © 7272-6046 [employer]. [address] [place] [address], [place] [address]. All rights reserved. This information is not intended as a substitute for professional medical care. Always follow your healthcare professional's instructions.        Upper GI Endoscopy     During endoscopy, a long, flexible tube is used to view the inside of your upper GI tract.      Upper GI endoscopy allows your healthcare provider to look directly into the beginning of your gastrointestinal (GI) tract. The esophagus, stomach, and duodenum (the first part of the small intestine) make up the upper GI tract.   Before the exam  Follow these and any other instructions you are given before your endoscopy. If you dont follow the healthcare providers instructions carefully, the test may need to be canceled or done over:  · Don't eat or drink anything after midnight the night before your exam. If your exam is in the afternoon, drink only clear liquids in the morning. Don't eat or drink anything for 8 hours before the exam. In some cases, you may be able to take medicines with sips of water until 2 hours before the procedure. Speak with your healthcare provider about this.   · Bring your X-rays and any other test results you have.  · Because you will be sedated, arrange for an adult to drive you home after the exam.  · Tell your healthcare provider before the exam if you are taking any medicines or have any medical problems.  The procedure  Here is what to expect:  · You will lie on the endoscopy table. Usually patients lie on the left side.  · You will be monitored and given oxygen.  · Your throat may be numbed with a spray or gargle. You are given medicine through an intravenous (IV) line that will help you relax and remain comfortable. You may be awake or asleep during the procedure.  · The healthcare provider will put the endoscope in your mouth and down your esophagus. It is thinner than most pieces of food that you swallow.  It will not affect your breathing. The medicine helps keep you from gagging.  · Air is put into your GI tract to expand it. It can make you burp.  · During the procedure, the healthcare provider can take biopsies (tissue samples), remove abnormalities, such as polyps, or treat abnormalities through a variety of devices placed through the endoscope. You will not feel this.   · The endoscope carries images of your upper GI tract to a video screen. If you are awake, you may be able to look at the images.  · After the procedure is done, you will rest for a time. An adult must drive you home.  When to call your healthcare provider  Contact your healthcare provider if you have:  · Black or tarry stools, or blood in your stool  · Fever  · Pain in your belly that does not go away  · Nausea and vomiting, or vomiting blood   Date Last Reviewed: 7/1/2016  © 4113-5937 [employer]. [address] [place] [address], [place] [address]. All rights reserved. This information is not intended as a substitute for professional medical care. Always follow your healthcare professional's instructions.

## 2020-01-29 NOTE — TRANSFER OF CARE
"Anesthesia Transfer of Care Note    Patient: Edith Tran    Procedure(s) Performed: Procedure(s) (LRB):  EGD (ESOPHAGOGASTRODUODENOSCOPY) (N/A)    Patient location: GI    Anesthesia Type: general    Transport from OR: Transported from OR on room air with adequate spontaneous ventilation    Post pain: adequate analgesia    Post assessment: no apparent anesthetic complications    Post vital signs: stable    Level of consciousness: awake    Nausea/Vomiting: no nausea/vomiting    Complications: none    Transfer of care protocol was followed      Last vitals:   Visit Vitals  BP 99/60   Pulse 90   Temp 37.1 °C (98.8 °F) (Skin)   Resp 16   Ht 5' 6" (1.676 m)   Wt 86.2 kg (190 lb)   SpO2 98%   Breastfeeding? No   BMI 30.67 kg/m²     "

## 2020-01-30 VITALS
BODY MASS INDEX: 30.53 KG/M2 | HEART RATE: 74 BPM | WEIGHT: 190 LBS | RESPIRATION RATE: 16 BRPM | OXYGEN SATURATION: 98 % | HEIGHT: 66 IN | TEMPERATURE: 99 F | DIASTOLIC BLOOD PRESSURE: 68 MMHG | SYSTOLIC BLOOD PRESSURE: 104 MMHG

## 2020-01-31 ENCOUNTER — EXTERNAL CHRONIC CARE MANAGEMENT (OUTPATIENT)
Dept: PRIMARY CARE CLINIC | Facility: CLINIC | Age: 65
End: 2020-01-31
Payer: MEDICARE

## 2020-01-31 PROCEDURE — 99490 CHRNC CARE MGMT STAFF 1ST 20: CPT | Mod: PBBFAC | Performed by: INTERNAL MEDICINE

## 2020-01-31 PROCEDURE — 99490 PR CHRONIC CARE MGMT, 1ST 20 MIN: ICD-10-PCS | Mod: S$PBB,,, | Performed by: INTERNAL MEDICINE

## 2020-01-31 PROCEDURE — 99490 CHRNC CARE MGMT STAFF 1ST 20: CPT | Mod: S$PBB,,, | Performed by: INTERNAL MEDICINE

## 2020-02-07 LAB
FINAL PATHOLOGIC DIAGNOSIS: NORMAL
GROSS: NORMAL
Lab: NORMAL
MICROSCOPIC EXAM: NORMAL

## 2020-02-29 ENCOUNTER — EXTERNAL CHRONIC CARE MANAGEMENT (OUTPATIENT)
Dept: PRIMARY CARE CLINIC | Facility: CLINIC | Age: 65
End: 2020-02-29
Payer: MEDICARE

## 2020-02-29 PROCEDURE — 99490 PR CHRONIC CARE MGMT, 1ST 20 MIN: ICD-10-PCS | Mod: S$PBB,,, | Performed by: INTERNAL MEDICINE

## 2020-02-29 PROCEDURE — 99490 CHRNC CARE MGMT STAFF 1ST 20: CPT | Mod: S$PBB,,, | Performed by: INTERNAL MEDICINE

## 2020-03-09 ENCOUNTER — TELEPHONE (OUTPATIENT)
Dept: GASTROENTEROLOGY | Facility: CLINIC | Age: 65
End: 2020-03-09

## 2020-03-09 NOTE — TELEPHONE ENCOUNTER
----- Message from Ngozi Prince MD sent at 3/6/2020  3:25 PM CST -----  Please let patient know her biopsies are benign. She should continue PPI and we should repeat EGD in 3-6 months

## 2020-03-11 ENCOUNTER — LAB VISIT (OUTPATIENT)
Dept: LAB | Facility: HOSPITAL | Age: 65
End: 2020-03-11
Attending: INTERNAL MEDICINE
Payer: MEDICARE

## 2020-03-11 DIAGNOSIS — M34.9 SCLERODERMA: ICD-10-CM

## 2020-03-11 DIAGNOSIS — D50.9 IRON DEFICIENCY ANEMIA, UNSPECIFIED IRON DEFICIENCY ANEMIA TYPE: ICD-10-CM

## 2020-03-11 LAB
ALBUMIN SERPL BCP-MCNC: 3.8 G/DL (ref 3.5–5.2)
ALP SERPL-CCNC: 67 U/L (ref 55–135)
ALT SERPL W/O P-5'-P-CCNC: 9 U/L (ref 10–44)
ANION GAP SERPL CALC-SCNC: 10 MMOL/L (ref 8–16)
AST SERPL-CCNC: 13 U/L (ref 10–40)
BASOPHILS # BLD AUTO: 0.03 K/UL (ref 0–0.2)
BASOPHILS NFR BLD: 0.7 % (ref 0–1.9)
BILIRUB SERPL-MCNC: 0.4 MG/DL (ref 0.1–1)
BUN SERPL-MCNC: 11 MG/DL (ref 8–23)
CALCIUM SERPL-MCNC: 8.9 MG/DL (ref 8.7–10.5)
CHLORIDE SERPL-SCNC: 106 MMOL/L (ref 95–110)
CO2 SERPL-SCNC: 26 MMOL/L (ref 23–29)
CREAT SERPL-MCNC: 0.7 MG/DL (ref 0.5–1.4)
DIFFERENTIAL METHOD: ABNORMAL
EOSINOPHIL # BLD AUTO: 0.2 K/UL (ref 0–0.5)
EOSINOPHIL NFR BLD: 3.7 % (ref 0–8)
ERYTHROCYTE [DISTWIDTH] IN BLOOD BY AUTOMATED COUNT: 13.1 % (ref 11.5–14.5)
EST. GFR  (AFRICAN AMERICAN): >60 ML/MIN/1.73 M^2
EST. GFR  (NON AFRICAN AMERICAN): >60 ML/MIN/1.73 M^2
GLUCOSE SERPL-MCNC: 80 MG/DL (ref 70–110)
HCT VFR BLD AUTO: 40.7 % (ref 37–48.5)
HGB BLD-MCNC: 12 G/DL (ref 12–16)
IMM GRANULOCYTES # BLD AUTO: 0.01 K/UL (ref 0–0.04)
IMM GRANULOCYTES NFR BLD AUTO: 0.2 % (ref 0–0.5)
IRON SERPL-MCNC: 63 UG/DL (ref 30–160)
LYMPHOCYTES # BLD AUTO: 0.8 K/UL (ref 1–4.8)
LYMPHOCYTES NFR BLD: 17.4 % (ref 18–48)
MCH RBC QN AUTO: 29.1 PG (ref 27–31)
MCHC RBC AUTO-ENTMCNC: 29.5 G/DL (ref 32–36)
MCV RBC AUTO: 99 FL (ref 82–98)
MONOCYTES # BLD AUTO: 0.3 K/UL (ref 0.3–1)
MONOCYTES NFR BLD: 7.3 % (ref 4–15)
NEUTROPHILS # BLD AUTO: 3.1 K/UL (ref 1.8–7.7)
NEUTROPHILS NFR BLD: 70.7 % (ref 38–73)
NRBC BLD-RTO: 0 /100 WBC
PLATELET # BLD AUTO: 170 K/UL (ref 150–350)
PMV BLD AUTO: 11.5 FL (ref 9.2–12.9)
POTASSIUM SERPL-SCNC: 4.1 MMOL/L (ref 3.5–5.1)
PROT SERPL-MCNC: 6.7 G/DL (ref 6–8.4)
RBC # BLD AUTO: 4.12 M/UL (ref 4–5.4)
SATURATED IRON: 19 % (ref 20–50)
SODIUM SERPL-SCNC: 142 MMOL/L (ref 136–145)
TOTAL IRON BINDING CAPACITY: 329 UG/DL (ref 250–450)
TRANSFERRIN SERPL-MCNC: 222 MG/DL (ref 200–375)
WBC # BLD AUTO: 4.36 K/UL (ref 3.9–12.7)

## 2020-03-11 PROCEDURE — 83540 ASSAY OF IRON: CPT

## 2020-03-11 PROCEDURE — 80053 COMPREHEN METABOLIC PANEL: CPT

## 2020-03-11 PROCEDURE — 36415 COLL VENOUS BLD VENIPUNCTURE: CPT | Mod: PO

## 2020-03-11 PROCEDURE — 85025 COMPLETE CBC W/AUTO DIFF WBC: CPT

## 2020-03-18 ENCOUNTER — OFFICE VISIT (OUTPATIENT)
Dept: FAMILY MEDICINE | Facility: CLINIC | Age: 65
End: 2020-03-18
Payer: MEDICARE

## 2020-03-18 ENCOUNTER — DOCUMENTATION ONLY (OUTPATIENT)
Dept: FAMILY MEDICINE | Facility: CLINIC | Age: 65
End: 2020-03-18

## 2020-03-18 ENCOUNTER — PATIENT MESSAGE (OUTPATIENT)
Dept: FAMILY MEDICINE | Facility: CLINIC | Age: 65
End: 2020-03-18

## 2020-03-18 VITALS
HEART RATE: 86 BPM | DIASTOLIC BLOOD PRESSURE: 78 MMHG | WEIGHT: 200.81 LBS | HEIGHT: 66 IN | SYSTOLIC BLOOD PRESSURE: 110 MMHG | BODY MASS INDEX: 32.27 KG/M2 | TEMPERATURE: 98 F | OXYGEN SATURATION: 98 % | RESPIRATION RATE: 16 BRPM

## 2020-03-18 DIAGNOSIS — R79.9 ABNORMAL FINDING OF BLOOD CHEMISTRY, UNSPECIFIED: ICD-10-CM

## 2020-03-18 DIAGNOSIS — I27.20 PULMONARY HTN: Primary | ICD-10-CM

## 2020-03-18 DIAGNOSIS — R63.5 WEIGHT GAIN: ICD-10-CM

## 2020-03-18 DIAGNOSIS — J84.9 INTERSTITIAL LUNG DISEASE: ICD-10-CM

## 2020-03-18 DIAGNOSIS — M34.9 SCLERODERMA: ICD-10-CM

## 2020-03-18 DIAGNOSIS — D50.9 IRON DEFICIENCY ANEMIA, UNSPECIFIED IRON DEFICIENCY ANEMIA TYPE: ICD-10-CM

## 2020-03-18 PROCEDURE — 99214 PR OFFICE/OUTPT VISIT, EST, LEVL IV, 30-39 MIN: ICD-10-PCS | Mod: S$GLB,,, | Performed by: INTERNAL MEDICINE

## 2020-03-18 PROCEDURE — 99214 OFFICE O/P EST MOD 30 MIN: CPT | Mod: S$GLB,,, | Performed by: INTERNAL MEDICINE

## 2020-03-18 RX ORDER — SILDENAFIL CITRATE 20 MG/1
20 TABLET ORAL 3 TIMES DAILY
COMMUNITY
End: 2020-06-18

## 2020-03-18 RX ORDER — TADALAFIL 20 MG/1
20 TABLET ORAL 2 TIMES DAILY
Qty: 60 TABLET | Refills: 11 | Status: SHIPPED | OUTPATIENT
Start: 2020-03-18 | End: 2020-12-18 | Stop reason: SDUPTHER

## 2020-03-18 NOTE — PATIENT INSTRUCTIONS
Lose 5 pounds.  Suggest [place] diet        Avoid  alcohol  Low-Salt Diet  This diet removes foods that are high in salt. It also limits the amount of salt you use when cooking. It is most often used for people with high blood pressure, edema (fluid retention), and kidney, liver, or heart disease.  Table salt contains the mineral sodium. Your body needs sodium to work normally. But too much sodium can make your health problems worse. Your healthcare provider is recommending a low-salt (also called low-sodium) diet for you. Your total daily allowance of salt is 1,500 to 2,300 milligrams (mg). It is less than 1 teaspoon of table salt. This means you can have only about 500 to 700 mg of sodium at each meal. People with certain health problems should limit salt intake to the lower end of the recommended range.    When you cook, don´t add much salt. If you can cook without using salt, even better. Don´t add salt to your food at the table.  When shopping, read food labels. Salt is often called sodium on the label. Choose foods that are salt-free, low salt, or very low salt. Note that foods with reduced salt may not lower your salt intake enough.    Beans, potatoes, and pasta  Ok: Dry beans, split peas, lentils, potatoes, rice, macaroni, pasta, spaghetti without added salt  Avoid: Potato chips, tortilla chips, and similar products  Breads and cereals  Ok: Low-sodium breads, rolls, cereals, and cakes; low-salt crackers, matzo crackers  Avoid: Salted crackers, pretzels, popcorn, Chinese toast, pancakes, muffins  Dairy  Ok: Milk, chocolate milk, hot chocolate mix, low-salt cheeses, and yogurt  Avoid: Processed cheese and cheese spreads; Roquefort, Camembert, and cottage cheese; buttermilk, instant breakfast drink  Desserts  Ok: Ice cream, frozen yogurt, juice bars, gelatin, cookies and pies, sugar, honey, jelly, hard candy  Avoid: Most pies, cakes and cookies prepared or processed with salt; instant pudding  Drinks  Ok:  Tea, coffee, fizzy (carbonated) drinks, juices  Avoid: Flavored coffees, electrolyte replacement drinks, sports drinks  Meats  Ok: All fresh meat, fish, poultry, low-salt tuna, eggs, egg substitute  Avoid: Smoked, pickled, brine-cured, or salted meats and fish. This includes velazco, chipped beef, corned beef, hot dogs, deli meats, ham, kosher meats, salt pork, sausage, canned tuna, salted codfish, smoked salmon, herring, sardines, or anchovies.  Seasonings and spices  Ok: Most seasonings are okay. Good substitutes for salt include: fresh herb blends, hot sauce, lemon, garlic, guajardo, vinegar, dry mustard, parsley, cilantro, horseradish, tomato paste, regular margarine, mayonnaise, unsalted butter, cream cheese, vegetable oil, cream, low-salt salad dressing and gravy.  Avoid: Regular ketchup, relishes, pickles, soy sauce, teriyaki sauce, Worcestershire sauce, BBQ sauce, tartar sauce, meat tenderizer, chili sauce, regular gravy, regular salad dressing, salted butter  Soups  Ok: Low-salt soups and broths made with allowed foods  Avoid: Bouillon cubes, soups with smoked or salted meats, regular soup and broth  Vegetables  Ok: Most vegetables are okay; also low-salt tomato and vegetable juices  Avoid: Sauerkraut and other brine-soaked vegetables; pickles and other pickled vegetables; tomato juice, olives  © 4974-1553 [employer]. [address] [place] [address]. All rights reserved. This information is not intended as a substitute for professional medical care. Always follow your healthcare professional's instructions.        Thank you for choosing [facility].     Please fill out the patient experience survey.

## 2020-03-18 NOTE — PROGRESS NOTES
Subjective:      8:18 AM     Patient ID: Edith Tran is a 65 y.o. female.    Chief Complaint: Hypertension (labs,no refills needed)    HPI   Patient has scleroderma.    CHIEF COMPLAINT: Arthritis  HPI:  Having symptomatic Raynaud's    ONSET/TIMING:      Years  ago.     DURATION:  intermittant    QUALITY/COURSE:  .  Worse.  Similar past problems  : yes .       INTENSITY/SEVERITY:  .Severity is #    4 at times   9  (10 point scale).    CONTEXT/WHEN: after inactivity .yes    MODIFIERS/TREATMENTS:  Taking medications:   compliant with taking prescribed medications : yes. . Medication less effective now : no .     SYMPTOMS/RELATED:  . --. Possible medication side effects include : none .    ADL LIMITATIONS: difficulty with ADLs - opening jars etc.     The following symptoms are positive if BOLD, negative otherwise.       LOCATION:  .--Bilateral . Right . Left . Neck . Back . Shoulders . Elbows . Hands . Hips . Knees . Ankles . Feet . Chest . Arms . Legs . Radiation .      REVIEW OF SYMPTOMS:  Joint_Swelling . Joint_Stiffness . Sharp pain . Dull pain . Joint_stiffness_lasting_more_than_1_hour  .  Joint_Erythema . Weakness . Fever . Eye-pain. Nodules.   Rash  .     Patient has interstitial lung disease and her breathing is at baseline.  She is upset that they switched her from Cialis to sildenafil    CHIEF COMPLAINT: Hypertension  HPI:     ONSET:      QUALITY/COURSE:   Unchanged.     INTENSITY/SEVERITY:  Average blood pressure is unknown.      MODIFIERS/TREATMENTS:  Taking medications: yes. .High sodium intake: no. alcohol: no      The following symptoms are positive only if BOLDED, otherwise are negative.      SYMPTOMS/RELATED: Possible medication side effects include:   Depression..  . Cough. . Constipation.    REVIEW OF SYMPTOMS: . Weight_loss . Weight_gain 10 lb . Leg_cramps ..    TARGET ORGAN DAMAGE:: angina/ prior myocardial infarction, chronic kidney disease, heart failure, left ventricular hypertrophy, peripheral  "artery disease, prior coronary revascularization, retinopathy, stroke. transient ischemic attack.      Dysphagia is better, she did have ulcers.    Review of Systems   Constitutional: Negative for activity change and unexpected weight change.   HENT: Positive for rhinorrhea and trouble swallowing. Negative for hearing loss.    Eyes: Negative for discharge and visual disturbance.   Respiratory: Negative for chest tightness and wheezing.    Cardiovascular: Negative for chest pain and palpitations.   Gastrointestinal: Negative for blood in stool, constipation, diarrhea and vomiting.   Endocrine: Negative for polydipsia and polyuria.   Genitourinary: Negative for difficulty urinating, hematuria and menstrual problem.   Musculoskeletal: Positive for arthralgias and joint swelling. Negative for neck pain.   Neurological: Negative for weakness and headaches.   Psychiatric/Behavioral: Negative for confusion and dysphoric mood.         Objective:      Vitals:    03/18/20 0817   BP: 110/78   Pulse: 86   Resp: 16   Temp: 97.7 °F (36.5 °C)   TempSrc: Oral   SpO2: 98%   Weight: 91.1 kg (200 lb 13.4 oz)   Height: 5' 6" (1.676 m)   PainSc: 0-No pain     Physical Exam   Constitutional: She appears well-developed and well-nourished.   Cardiovascular: Normal rate, regular rhythm and normal heart sounds.   Pulmonary/Chest: Effort normal. She has rales.   Abdominal: Soft. There is no tenderness.   Musculoskeletal:   Sclerodactyly with tender joints in the hands bilaterally at the MP PIP and DI Ps   Neurological: She is alert.   Psychiatric: She has a normal mood and affect. Her behavior is normal. Thought content normal.   Nursing note and vitals reviewed.        Assessment:       1. Pulmonary HTN    2. Scleroderma    3. Interstitial lung disease    4. Iron deficiency anemia, unspecified iron deficiency anemia type    5. Weight gain    6. Abnormal finding of blood chemistry, unspecified           Plan:       Pulmonary HTN  -     " tadalafiL (CIALIS) 20 MG Tab; Take 1 tablet (20 mg total) by mouth 2 (two) times daily.  Dispense: 60 tablet; Refill: 11  -     Comprehensive metabolic panel; Future; Expected date: 03/18/2020    Scleroderma  -     Ambulatory referral/consult to Rheumatology; Future; Expected date: 03/25/2020  -     Comprehensive metabolic panel; Future; Expected date: 03/18/2020    Interstitial lung disease    Iron deficiency anemia, unspecified iron deficiency anemia type  -     CBC auto differential; Future; Expected date: 03/18/2020    Weight gain  -     Lipid panel; Future; Expected date: 03/18/2020    Abnormal finding of blood chemistry, unspecified   -     Lipid panel; Future; Expected date: 03/18/2020      Follow up in about 3 months (around 6/18/2020).

## 2020-03-20 ENCOUNTER — PATIENT MESSAGE (OUTPATIENT)
Dept: ADMINISTRATIVE | Facility: OTHER | Age: 65
End: 2020-03-20

## 2020-03-20 RX ORDER — ERGOCALCIFEROL 1.25 MG/1
CAPSULE ORAL
Qty: 12 CAPSULE | Refills: 0 | Status: SHIPPED | OUTPATIENT
Start: 2020-03-20 | End: 2020-06-14 | Stop reason: SDUPTHER

## 2020-03-31 ENCOUNTER — EXTERNAL CHRONIC CARE MANAGEMENT (OUTPATIENT)
Dept: PRIMARY CARE CLINIC | Facility: CLINIC | Age: 65
End: 2020-03-31
Payer: MEDICARE

## 2020-03-31 DIAGNOSIS — J30.2 CHRONIC SEASONAL ALLERGIC RHINITIS: ICD-10-CM

## 2020-03-31 DIAGNOSIS — J01.81 OTHER ACUTE RECURRENT SINUSITIS: ICD-10-CM

## 2020-03-31 PROCEDURE — 99490 CHRNC CARE MGMT STAFF 1ST 20: CPT | Mod: S$PBB,,, | Performed by: INTERNAL MEDICINE

## 2020-03-31 PROCEDURE — 99490 PR CHRONIC CARE MGMT, 1ST 20 MIN: ICD-10-PCS | Mod: S$PBB,,, | Performed by: INTERNAL MEDICINE

## 2020-03-31 RX ORDER — FLUTICASONE PROPIONATE 50 MCG
1 SPRAY, SUSPENSION (ML) NASAL DAILY
Qty: 16 ML | Refills: 11 | Status: SHIPPED | OUTPATIENT
Start: 2020-03-31 | End: 2021-08-09 | Stop reason: SDUPTHER

## 2020-04-01 ENCOUNTER — PATIENT MESSAGE (OUTPATIENT)
Dept: PULMONOLOGY | Facility: CLINIC | Age: 65
End: 2020-04-01

## 2020-04-07 ENCOUNTER — PATIENT OUTREACH (OUTPATIENT)
Dept: ADMINISTRATIVE | Facility: OTHER | Age: 65
End: 2020-04-07

## 2020-04-13 DIAGNOSIS — R09.89 CHRONIC SINUS COMPLAINTS: ICD-10-CM

## 2020-04-13 DIAGNOSIS — J44.9 CHRONIC OBSTRUCTIVE PULMONARY DISEASE, UNSPECIFIED COPD TYPE: ICD-10-CM

## 2020-04-13 RX ORDER — MONTELUKAST SODIUM 10 MG/1
10 TABLET ORAL NIGHTLY
Qty: 30 TABLET | Refills: 11 | Status: SHIPPED | OUTPATIENT
Start: 2020-04-13 | End: 2020-09-30 | Stop reason: SDUPTHER

## 2020-04-15 ENCOUNTER — TELEPHONE (OUTPATIENT)
Dept: FAMILY MEDICINE | Facility: CLINIC | Age: 65
End: 2020-04-15

## 2020-04-16 DIAGNOSIS — I27.20 PULMONARY HYPERTENSION: ICD-10-CM

## 2020-04-17 RX ORDER — MYCOPHENOLATE MOFETIL 500 MG/1
1500 TABLET ORAL 2 TIMES DAILY
Qty: 520 TABLET | Refills: 1 | Status: SHIPPED | OUTPATIENT
Start: 2020-04-17 | End: 2020-12-09 | Stop reason: SDUPTHER

## 2020-04-30 ENCOUNTER — EXTERNAL CHRONIC CARE MANAGEMENT (OUTPATIENT)
Dept: PRIMARY CARE CLINIC | Facility: CLINIC | Age: 65
End: 2020-04-30
Payer: MEDICARE

## 2020-04-30 PROCEDURE — 99490 PR CHRONIC CARE MGMT, 1ST 20 MIN: ICD-10-PCS | Mod: S$GLB,,, | Performed by: INTERNAL MEDICINE

## 2020-04-30 PROCEDURE — 99490 CHRNC CARE MGMT STAFF 1ST 20: CPT | Mod: S$GLB,,, | Performed by: INTERNAL MEDICINE

## 2020-05-05 ENCOUNTER — PATIENT MESSAGE (OUTPATIENT)
Dept: ADMINISTRATIVE | Facility: HOSPITAL | Age: 65
End: 2020-05-05

## 2020-05-07 ENCOUNTER — PATIENT OUTREACH (OUTPATIENT)
Dept: OTHER | Facility: OTHER | Age: 65
End: 2020-05-07

## 2020-05-07 NOTE — LETTER
May 7, 2020     Edith Tran  [address]  [place] [address]       Dear Edith,    Welcome to [facility]! Our goal is to make care effective, proactive and convenient by using data you send us from home to better treat your chronic conditions.              My name is Prabhjot Acharya, and I am your dedicated Digital Medicine clinician. As an expert in medication management, I will help ensure that the medications you are taking continue to provide the intended benefits and help you reach your goals. You can reach me directly at [phone] or by sending me a message directly through your [facility] account.      I am Sebastian Patterson and I will be your health . My job is to help you identify lifestyle changes to improve your disease control. We will talk about nutrition, exercise, and other ways you may be able to adjust your current habits to better your health. Additionally, we will help ensure you are completing the tests and screenings that are necessary to help manage your conditions. You can reach me directly at [phone] or by sending me a message directly through your [facility] account.    Most importantly, YOU are at the center of this team. Together, we will work to improve your overall health and encourage you to meet your goals for a healthier lifestyle.     What we expect from YOU:  · Please take frequent home blood pressure measurements. We ask that you take at least 1 blood pressure reading per week, but more information will better help us get you know you. Be sure you rest for a few minutes before taking the reading in a quiet, comfortable place.     Be available to receive phone calls or [email or web address] messages, when appropriate, from your care team. Please let us know if there are any specific days or times that work best for us to reach you via phone.     Complete routine tests and screenings. Dont worry, we will help keep you on track!           What you should  expect from your Digital Medicine Care Team:   We will work with you to create a personalized plan of care and provide you with encouragement and education, including regarding lifestyle changes, that could help you manage your disease states.     We will adjust your current medications, if needed, and continue to monitor your long-term progress.     We will provide you and your physician with monthly progress reports after you have been in the program for more than 30 days.     We will send you reminders through [employer] and text messages to help ensure you do not miss any testing deadlines to help manage your disease states.    You will be able to reach us by phone or through your [facility] account by clicking our names under Care Team on the right side of the home screen.    I look forward to working with you to achieve your blood pressure goals!    We look forward to working with you to help manage your health,    Sincerely,    Your Digital Medicine Team    Please visit our websites to learn more:   · Hypertension: [email or web address]      Remember, we are not available for emergencies. If you have an emergency, please contact your doctors office directly or call [facility] on-call ([phone] or [phone]) or [phone].

## 2020-05-07 NOTE — PROGRESS NOTES
Digital Medicine: Health  Introduction    Introduced Edith Tran to Digital Medicine. Discussed health  role and recommended lifestyle modifications.    The history is provided by the patient. No  was used.     HYPERTENSION  Our goal is to get BP to consistently below 130/80mmHg and make the process convenient so patient can avoid extra trips to the office. Getting your blood pressure below 130/80mmHg (definition of control) will reduce your risk for heart attack, kidney failure, stroke and death (as well as kidney failure, eye disease, & dementia)      Reviewed that the Digital Medicine care team - consisting of a clinician and a health  - will follow the most current evidence-based national guidelines for treating your condition.  The health  will focus on lifestyle modifications and motivation while the clinician will focus on medication therapy.  The care team will review all data on a regular basis and reach out as needed.      Explained that one of the key parts of the program is communication with the care team.  Asked patient to respond to outreach attempts and complete questionnaires.  Stressed importance of medication adherence.    Explained that we expect patient to obtain several blood pressures per week at random times of day.  Instructed patient not to allow anyone else to use phone and monitoring device.  Confirmed appropriate BP monitoring technique.      Explained to patient that the digital medicine team is not available for emergencies.  Patient will call [facility] on-call ([phone] or [phone]) or [phone] if needed.      Patient's BP goal is 130/80.Patient's BP average is 102/67 mmHg, which is above goal, per 2017 ACC/AHA Hypertension Guidelines.          Last 5 Patient Entered Readings                                      Current 30 Day Average: 102/67     Recent Readings 5/7/2020 5/6/2020 5/5/2020    SBP (mmHg) 109 95 101    DBP (mmHg) 69 60 72     Pulse 82 79 77            Intervention/Plan    There are no preventive care reminders to display for this patient.    Reviewed the importance of self-monitoring, medication adherence, and that the health  can be used as a resource for lifestyle modifications to help reduce or maintain a healthy lifestyle.    Sent link to [facility] webpages and my contact information via [employer] for future questions. Follow up scheduled.         Screenings    SDOH

## 2020-05-19 DIAGNOSIS — G47.00 INSOMNIA, UNSPECIFIED TYPE: ICD-10-CM

## 2020-05-19 RX ORDER — TRAZODONE HYDROCHLORIDE 50 MG/1
TABLET ORAL
Qty: 60 TABLET | Refills: 3 | Status: SHIPPED | OUTPATIENT
Start: 2020-05-19 | End: 2020-09-23

## 2020-05-26 ENCOUNTER — PATIENT OUTREACH (OUTPATIENT)
Dept: OTHER | Facility: OTHER | Age: 65
End: 2020-05-26

## 2020-05-31 ENCOUNTER — EXTERNAL CHRONIC CARE MANAGEMENT (OUTPATIENT)
Dept: PRIMARY CARE CLINIC | Facility: CLINIC | Age: 65
End: 2020-05-31
Payer: MEDICARE

## 2020-05-31 PROCEDURE — 99490 CHRNC CARE MGMT STAFF 1ST 20: CPT | Mod: S$GLB,,, | Performed by: INTERNAL MEDICINE

## 2020-05-31 PROCEDURE — 99490 PR CHRONIC CARE MGMT, 1ST 20 MIN: ICD-10-PCS | Mod: S$GLB,,, | Performed by: INTERNAL MEDICINE

## 2020-06-09 NOTE — PROGRESS NOTES
Digital Medicine: Health  Follow-Up    The history is provided by the patient. No  was used.   Follow Up  Called patient for routine follow up.    Patient reports that she has been staying super safe and healthy with the quarantine. She reports that she is pleased with her blood pressure readings and denies any further questions or concerns at the current time.    Will follow up with the patient in a few weeks.      Intervention/Plan    There are no preventive care reminders to display for this patient.    Last 5 Patient Entered Readings                                      Current 30 Day Average: 104/71     Recent Readings 6/5/2020 6/5/2020 6/4/2020 5/25/2020 5/15/2020    SBP (mmHg) 102 89 107 113 102    DBP (mmHg) 73 64 76 86 71    Pulse 84 88 84 90 85                  Screenings    SDOH

## 2020-06-11 ENCOUNTER — PATIENT OUTREACH (OUTPATIENT)
Dept: OTHER | Facility: OTHER | Age: 65
End: 2020-06-11

## 2020-06-11 DIAGNOSIS — J84.9 INTERSTITIAL LUNG DISEASE: ICD-10-CM

## 2020-06-11 DIAGNOSIS — I10 ESSENTIAL HYPERTENSION: Primary | ICD-10-CM

## 2020-06-11 PROCEDURE — 99457 RPM TX MGMT 1ST 20 MIN: CPT | Mod: S$GLB,,, | Performed by: INTERNAL MEDICINE

## 2020-06-11 PROCEDURE — 99457 PR MONITORING, PHYSIOL PARAM, REMOTE, 1ST 20 MINS, PER MONTH: ICD-10-PCS | Mod: S$GLB,,, | Performed by: INTERNAL MEDICINE

## 2020-06-11 NOTE — PROGRESS NOTES
Digital Medicine: Clinician Introduction    Edith Tran is a 65 y.o. female who is newly enrolled in the Digital Medicine Clinic.    The following information was reviewed and updated:  Preferred pharmacy   [facility] - [place], [place] - [address] [place]  [address] [place]  [facility] [address]  Phone: [phone] Fax: [phone]    [facility] - [place], [place] - [address] [place]  [address] [place]  [facility] [address]  Phone: [phone] Fax: [phone]    [id number] - [place] - [address] & [facility]  [address]   [facility] [address]  Phone: [phone] Fax: [phone]    [id number] - [place], [place] - [address]  [address]  [facility] [address]  Phone: [phone] Fax: [phone]      Patient prefers a 90 days supply.     Review of patient's allergies indicates:   Allergen Reactions    Hydroxychloroquine Other (See Comments)     Having eye reaction, needs to stop plaquenil and stay off of it.        I spoke with the patient today for her initial enrollment call.  She is taking losartan for her blood pressure at night, and taking amlodipine for her Raynaud ARDS at night.  She says that she has no questions or concerns regarding her blood pressure medication or readings.    The history is provided by the patient. No  was used.     HYPERTENSION  Our goal is to get BP to consistently below 130/80mmHg and make the process convenient so patient can avoid extra trips to the office. Getting your blood pressure below 130/80mmHg (definition of control) will reduce your risk for heart attack, kidney failure, stroke and death (as well as kidney failure, eye disease, & dementia)      Reviewed non-pharmacologic therapies and impact on BP      Explained that we expect patient to obtain several blood pressures per week at random times of day.  Instructed patient not to allow anyone  else to use phone and monitoring device.  Confirmed appropriate BP monitoring technique.      Explained to patient that the digital medicine team is not available for emergencies.  Patient will call [facility] on-call ([phone] or [phone]) or [phone] if needed.    Patient's BP goal is 130/80. Patients BP average is 104/71 mmHg, which is at or below goal, per 2017 ACC/AHA Hypertension Guidelines.      Med Review complete.    Allergies reviewed.      Last 5 Patient Entered Readings                                      Current 30 Day Average: 104/71     Recent Readings 6/5/2020 6/5/2020 6/4/2020 5/25/2020 5/15/2020    SBP (mmHg) 102 89 107 113 102    DBP (mmHg) 73 64 76 86 71    Pulse 84 88 84 90 85            INTERVENTION(S)  reviewed appropriate dose schedule, recommended diet modifications, recommended physical activity and encouragement/support    PLAN  patient verbalizes understanding and additional monitoring needed    Average blood pressure 104/71    No med changes.  Follow-up in 5 months      There are no preventive care reminders to display for this patient.    Current Medication Regimen:  Hypertension Medications             amLODIPine (NORVASC) 5 MG tablet Take 1 tablet (5 mg total) by mouth once daily.    losartan (COZAAR) 25 MG tablet Take 1 tablet (25 mg total) by mouth once daily.            Reviewed the importance of self-monitoring, medication adherence, and that the health  can be used as a resource for lifestyle modifications to help reduce or maintain a healthy lifestyle.    Sent link to [facility] webpages and my contact information via [employer] for future questions. Follow up scheduled.             Sleep Apnea Screening  Patient previously diagnosed with ELAINE     She reports she is currently using CPAP     Medication Affordability Screening  Patient is currently not having problems affording medications    Medication Adherence Screening   She did not miss a dose this  month.

## 2020-06-14 DIAGNOSIS — I15.0 RENOVASCULAR HYPERTENSION: ICD-10-CM

## 2020-06-14 DIAGNOSIS — M34.9 SCLERODERMA: ICD-10-CM

## 2020-06-16 RX ORDER — ERGOCALCIFEROL 1.25 MG/1
50000 CAPSULE ORAL
Qty: 12 CAPSULE | Refills: 0 | Status: SHIPPED | OUTPATIENT
Start: 2020-06-16 | End: 2020-06-24

## 2020-06-16 RX ORDER — LOSARTAN POTASSIUM 25 MG/1
25 TABLET ORAL DAILY
Qty: 90 TABLET | Refills: 3 | Status: CANCELLED | OUTPATIENT
Start: 2020-06-16 | End: 2021-06-16

## 2020-06-16 RX ORDER — LOSARTAN POTASSIUM 25 MG/1
25 TABLET ORAL DAILY
Qty: 90 TABLET | Refills: 3 | Status: SHIPPED | OUTPATIENT
Start: 2020-06-16 | End: 2020-12-18 | Stop reason: SDUPTHER

## 2020-06-18 ENCOUNTER — OFFICE VISIT (OUTPATIENT)
Dept: FAMILY MEDICINE | Facility: CLINIC | Age: 65
End: 2020-06-18
Payer: MEDICARE

## 2020-06-18 ENCOUNTER — LAB VISIT (OUTPATIENT)
Dept: LAB | Facility: HOSPITAL | Age: 65
End: 2020-06-18
Attending: INTERNAL MEDICINE
Payer: MEDICARE

## 2020-06-18 VITALS
SYSTOLIC BLOOD PRESSURE: 120 MMHG | DIASTOLIC BLOOD PRESSURE: 80 MMHG | RESPIRATION RATE: 16 BRPM | HEART RATE: 91 BPM | WEIGHT: 197.06 LBS | OXYGEN SATURATION: 98 % | BODY MASS INDEX: 31.67 KG/M2 | TEMPERATURE: 98 F | HEIGHT: 66 IN

## 2020-06-18 DIAGNOSIS — J84.9 INTERSTITIAL LUNG DISEASE: ICD-10-CM

## 2020-06-18 DIAGNOSIS — Z11.4 ENCOUNTER FOR SCREENING FOR HIV: ICD-10-CM

## 2020-06-18 DIAGNOSIS — D50.9 IRON DEFICIENCY ANEMIA, UNSPECIFIED IRON DEFICIENCY ANEMIA TYPE: ICD-10-CM

## 2020-06-18 DIAGNOSIS — R13.10 DYSPHAGIA, UNSPECIFIED TYPE: ICD-10-CM

## 2020-06-18 DIAGNOSIS — M34.9 SCLERODERMA: Primary | ICD-10-CM

## 2020-06-18 DIAGNOSIS — M34.9 SCLERODERMA: ICD-10-CM

## 2020-06-18 LAB
ALBUMIN SERPL BCP-MCNC: 3.9 G/DL (ref 3.5–5.2)
ALP SERPL-CCNC: 68 U/L (ref 55–135)
ALT SERPL W/O P-5'-P-CCNC: 12 U/L (ref 10–44)
ANION GAP SERPL CALC-SCNC: 11 MMOL/L (ref 8–16)
AST SERPL-CCNC: 15 U/L (ref 10–40)
BASOPHILS # BLD AUTO: 0.04 K/UL (ref 0–0.2)
BASOPHILS NFR BLD: 0.6 % (ref 0–1.9)
BILIRUB SERPL-MCNC: 0.5 MG/DL (ref 0.1–1)
BUN SERPL-MCNC: 10 MG/DL (ref 8–23)
CALCIUM SERPL-MCNC: 10.4 MG/DL (ref 8.7–10.5)
CHLORIDE SERPL-SCNC: 103 MMOL/L (ref 95–110)
CO2 SERPL-SCNC: 26 MMOL/L (ref 23–29)
CREAT SERPL-MCNC: 0.9 MG/DL (ref 0.5–1.4)
DIFFERENTIAL METHOD: ABNORMAL
EOSINOPHIL # BLD AUTO: 0.1 K/UL (ref 0–0.5)
EOSINOPHIL NFR BLD: 1.7 % (ref 0–8)
ERYTHROCYTE [DISTWIDTH] IN BLOOD BY AUTOMATED COUNT: 13 % (ref 11.5–14.5)
EST. GFR  (AFRICAN AMERICAN): >60 ML/MIN/1.73 M^2
EST. GFR  (NON AFRICAN AMERICAN): >60 ML/MIN/1.73 M^2
GLUCOSE SERPL-MCNC: 95 MG/DL (ref 70–110)
HCT VFR BLD AUTO: 42 % (ref 37–48.5)
HGB BLD-MCNC: 13.3 G/DL (ref 12–16)
HIV1+2 IGG SERPL QL IA.RAPID: NORMAL
IMM GRANULOCYTES # BLD AUTO: 0.03 K/UL (ref 0–0.04)
IMM GRANULOCYTES NFR BLD AUTO: 0.4 % (ref 0–0.5)
IRON SERPL-MCNC: 69 UG/DL (ref 30–160)
LYMPHOCYTES # BLD AUTO: 1.2 K/UL (ref 1–4.8)
LYMPHOCYTES NFR BLD: 16.1 % (ref 18–48)
MCH RBC QN AUTO: 30.4 PG (ref 27–31)
MCHC RBC AUTO-ENTMCNC: 31.7 G/DL (ref 32–36)
MCV RBC AUTO: 96 FL (ref 82–98)
MONOCYTES # BLD AUTO: 0.6 K/UL (ref 0.3–1)
MONOCYTES NFR BLD: 8 % (ref 4–15)
NEUTROPHILS # BLD AUTO: 5.2 K/UL (ref 1.8–7.7)
NEUTROPHILS NFR BLD: 73.2 % (ref 38–73)
NRBC BLD-RTO: 0 /100 WBC
PLATELET # BLD AUTO: 180 K/UL (ref 150–350)
PMV BLD AUTO: 11.6 FL (ref 9.2–12.9)
POTASSIUM SERPL-SCNC: 3.9 MMOL/L (ref 3.5–5.1)
PROT SERPL-MCNC: 7.2 G/DL (ref 6–8.4)
RBC # BLD AUTO: 4.38 M/UL (ref 4–5.4)
SATURATED IRON: 19 % (ref 20–50)
SODIUM SERPL-SCNC: 140 MMOL/L (ref 136–145)
TOTAL IRON BINDING CAPACITY: 363 UG/DL (ref 250–450)
TRANSFERRIN SERPL-MCNC: 245 MG/DL (ref 200–375)
WBC # BLD AUTO: 7.13 K/UL (ref 3.9–12.7)

## 2020-06-18 PROCEDURE — 36415 COLL VENOUS BLD VENIPUNCTURE: CPT

## 2020-06-18 PROCEDURE — 99214 PR OFFICE/OUTPT VISIT, EST, LEVL IV, 30-39 MIN: ICD-10-PCS | Mod: S$GLB,,, | Performed by: INTERNAL MEDICINE

## 2020-06-18 PROCEDURE — 85025 COMPLETE CBC W/AUTO DIFF WBC: CPT

## 2020-06-18 PROCEDURE — 99214 OFFICE O/P EST MOD 30 MIN: CPT | Mod: S$GLB,,, | Performed by: INTERNAL MEDICINE

## 2020-06-18 PROCEDURE — 83540 ASSAY OF IRON: CPT

## 2020-06-18 PROCEDURE — 86703 HIV-1/HIV-2 1 RESULT ANTBDY: CPT

## 2020-06-18 PROCEDURE — 80053 COMPREHEN METABOLIC PANEL: CPT

## 2020-06-18 NOTE — PROGRESS NOTES
Subjective:      8:48 AM     Patient ID: Edith Tran is a 65 y.o. female.    Chief Complaint: Hypertension (no refills needed)    HPI     Patient has scleroderma    CHIEF COMPLAINT: Dyspnea    .   HPI:     ONSET/TIMING:          Years       ago.      DURATION: . intermittant    QUALITY/COURSE:   unchanged      INTENSITY/SEVERITY:  5 (0 to 10)             BNP    @LABRCNTIP(BNP,BNPTRIAGEBLO)@  D-dimer    Lab Results   Component Value Date    DDIMER 0.78 (H) 03/19/2018         SYMPTOMS/RELATED: . --Possible medication side effects include:    The following symptoms/statements are positive if in BOLD, negative if not.          CONTEXT/WHEN:  Similar_problems . Tobacco_use. Seasonal_pattern.  Copd. CHF.  Interstitial lung disease  thomboembolic disease.  Allergies/Hayfever.. Sinusitis. . Asthma.  Exposure_to_others_with_similar_symptoms.      TREATMENTS:  OTC_Cough/Decongestants..  Rx_Cough/Decongestants. Bronchodilators.. Inhaled_Corticosteroids. Oral_Corticosteroids... .Antibiotics. Diuretics. Ace inhibitor or ARB. Beta-blocker.     REVIEW OF SYMPTOMS:    . Dyspnea on exertion. Paroxysmal_Nocturnal_Dyspnea.. Orthopnea...  Purulent_Sputum. . Hemoptysis.  Rhinorrhea/Purulent.   Stressed. Weight_loss down 4 lb. Weight_gain. Leg_Pain.     Patient has for over 10 severity pain in her hand joints.  Has to work her hands to keep him from being stiff.  She is getting Raynaud's occasionally but it is better now that the summer heat is here.    She is due to have my mom of dri for her chronic dysphagia.  It has not changed.  Does due to the scleroderma.    Review of Systems   Constitutional: Negative for activity change and unexpected weight change.   HENT: Positive for trouble swallowing. Negative for hearing loss and rhinorrhea.    Eyes: Negative for discharge and visual disturbance.   Respiratory: Negative for chest tightness and wheezing.    Cardiovascular: Negative for chest pain and palpitations.   Gastrointestinal:  "Negative for blood in stool, constipation, diarrhea and vomiting.   Endocrine: Negative for polydipsia and polyuria.   Genitourinary: Negative for difficulty urinating, dysuria, hematuria and menstrual problem.   Musculoskeletal: Positive for arthralgias and joint swelling. Negative for neck pain.   Neurological: Negative for weakness and headaches.   Psychiatric/Behavioral: Negative for confusion and dysphoric mood.         Objective:      Vitals:    06/18/20 0815   BP: 120/80   Pulse: 91   Resp: 16   Temp: 98.2 °F (36.8 °C)   TempSrc: Oral   SpO2: 98%   Weight: 89.4 kg (197 lb 1.5 oz)   Height: 5' 6" (1.676 m)   PainSc: 0-No pain     Physical Exam  Vitals signs and nursing note reviewed.   Constitutional:       Appearance: She is well-developed.   Cardiovascular:      Rate and Rhythm: Normal rate and regular rhythm.      Heart sounds: Normal heart sounds.   Pulmonary:      Effort: Pulmonary effort is normal.      Comments: Crinkly sound with exhalation  Abdominal:      Palpations: Abdomen is soft.      Tenderness: There is no abdominal tenderness.   Skin:     Comments: Sausage fingers   Neurological:      Mental Status: She is alert.   Psychiatric:         Behavior: Behavior normal.         Thought Content: Thought content normal.       No results found for this or any previous visit (from the past 1008 hour(s)).       Assessment:       No diagnosis found.      Plan:       There are no diagnoses linked to this encounter.  No follow-ups on file.      "

## 2020-06-30 ENCOUNTER — EXTERNAL CHRONIC CARE MANAGEMENT (OUTPATIENT)
Dept: PRIMARY CARE CLINIC | Facility: CLINIC | Age: 65
End: 2020-06-30
Payer: MEDICARE

## 2020-06-30 PROCEDURE — 99490 PR CHRONIC CARE MGMT, 1ST 20 MIN: ICD-10-PCS | Mod: S$GLB,,, | Performed by: INTERNAL MEDICINE

## 2020-06-30 PROCEDURE — 99490 CHRNC CARE MGMT STAFF 1ST 20: CPT | Mod: S$GLB,,, | Performed by: INTERNAL MEDICINE

## 2020-07-02 NOTE — TELEPHONE ENCOUNTER
----- Message from Shruthi Simpson sent at 4/15/2020 10:38 AM CDT -----  Contact: Rosa Giang want to know if you can fax over patient last bloodwork test results please fax to [phone], any questions please call back at [phone]    Case number    Janelle, Heidy Kaba

## 2020-07-24 ENCOUNTER — LAB VISIT (OUTPATIENT)
Dept: PRIMARY CARE CLINIC | Facility: CLINIC | Age: 65
End: 2020-07-24
Payer: MEDICARE

## 2020-07-24 PROCEDURE — U0003 INFECTIOUS AGENT DETECTION BY NUCLEIC ACID (DNA OR RNA); SEVERE ACUTE RESPIRATORY SYNDROME CORONAVIRUS 2 (SARS-COV-2) (CORONAVIRUS DISEASE [COVID-19]), AMPLIFIED PROBE TECHNIQUE, MAKING USE OF HIGH THROUGHPUT TECHNOLOGIES AS DESCRIBED BY CMS-2020-01-R: HCPCS

## 2020-07-25 LAB — SARS-COV-2 RNA RESP QL NAA+PROBE: NOT DETECTED

## 2020-07-27 ENCOUNTER — HOSPITAL ENCOUNTER (OUTPATIENT)
Facility: HOSPITAL | Age: 65
Discharge: HOME | End: 2020-07-27
Attending: INTERNAL MEDICINE | Admitting: INTERNAL MEDICINE
Payer: MEDICARE

## 2020-07-27 VITALS
TEMPERATURE: 98 F | WEIGHT: 195 LBS | SYSTOLIC BLOOD PRESSURE: 135 MMHG | HEART RATE: 89 BPM | HEIGHT: 66 IN | DIASTOLIC BLOOD PRESSURE: 79 MMHG | BODY MASS INDEX: 31.34 KG/M2 | RESPIRATION RATE: 18 BRPM | OXYGEN SATURATION: 98 %

## 2020-07-27 DIAGNOSIS — R13.10 DYSPHAGIA: ICD-10-CM

## 2020-07-27 PROCEDURE — 91010 PR ESOPHAGEAL MOTILITY STUDY, MA2METRY: ICD-10-PCS | Mod: 26,GC,, | Performed by: INTERNAL MEDICINE

## 2020-07-27 PROCEDURE — 91010 ESOPHAGUS MOTILITY STUDY: CPT | Mod: 26,GC,, | Performed by: INTERNAL MEDICINE

## 2020-07-27 PROCEDURE — 25000003 PHARM REV CODE 250: Performed by: INTERNAL MEDICINE

## 2020-07-27 PROCEDURE — 91010 ESOPHAGUS MOTILITY STUDY: CPT | Performed by: INTERNAL MEDICINE

## 2020-07-27 PROCEDURE — 91037 ESOPH IMPED FUNCTION TEST: CPT | Performed by: INTERNAL MEDICINE

## 2020-07-27 RX ORDER — LIDOCAINE HYDROCHLORIDE 20 MG/ML
JELLY TOPICAL ONCE
Status: COMPLETED | OUTPATIENT
Start: 2020-07-27 | End: 2020-07-27

## 2020-07-27 RX ADMIN — LIDOCAINE HYDROCHLORIDE 10 ML: 20 JELLY TOPICAL at 08:07

## 2020-07-27 NOTE — DISCHARGE INSTRUCTIONS
Esophageal Manometry     A catheter measures pressure along the esophagus.     Esophageal manometry is a test to measure the strength and function of the esophagus (the food pipe). Results can help identify causes of heartburn, swallowing problems, or chest pain. The test can also help plan surgery and determine the success of previous surgery.  Preparing for the test  Be sure to talk to your healthcare provider about any medicines you take. Some medicines can affect the test results. Also ask any questions you have about the risks of the test. These include irritation to the nose and throat. Be sure not to smoke, eat, or drink for up to 12 hours before the test.  During the test  Manometry takes about an hour. Usually you lie down during the test. Your nose and throat are numbed. Then a soft, thin tube is placed through the nose and down the esophagus. At first you may notice a gagging feeling. You will be asked to swallow several times. Holes along the tube measure the pressure while you swallow. Measurements are printed out as tracings, much like a heart test tracing. After the test, another catheter may be left in the esophagus for up to 24 hours to measure acid (pH) levels.  After esophageal manometry  Youll probably discuss the results of the test with your healthcare provider at another appointment. This is because time is needed to review the tracings. You may have a mild sore throat for a short time. As soon as the numbness in your throat is gone, you can return to eating and your normal activities.  Date Last Reviewed: 6/1/2016  © 6632-0511 [employer]. [address] [place] [address], [place] [address]. All rights reserved. This information is not intended as a substitute for professional medical care. Always follow your healthcare professional's instructions.

## 2020-07-31 ENCOUNTER — EXTERNAL CHRONIC CARE MANAGEMENT (OUTPATIENT)
Dept: PRIMARY CARE CLINIC | Facility: CLINIC | Age: 65
End: 2020-07-31
Payer: MEDICARE

## 2020-07-31 PROCEDURE — 99490 PR CHRONIC CARE MGMT, 1ST 20 MIN: ICD-10-PCS | Mod: S$GLB,,, | Performed by: INTERNAL MEDICINE

## 2020-07-31 PROCEDURE — 99490 CHRNC CARE MGMT STAFF 1ST 20: CPT | Mod: S$GLB,,, | Performed by: INTERNAL MEDICINE

## 2020-07-31 NOTE — [employer]ATION PATIENT INSTRUCTIONS
Discharge Summary/Instructions after an Endoscopic Procedure  Patient Name: Edith Tran  Patient MRN: [id number]  Patient YOB: 1955 Monday, July 27, 2020  Bhupendra Temple MD  RESTRICTIONS:  During your procedure today, you received medications for sedation.  These   medications may affect your judgment, balance and coordination.  Therefore,   for 24 hours, you have the following restrictions:   - DO NOT drive a car, operate machinery, make legal/financial decisions,   sign important papers or drink alcohol.    ACTIVITY:  Today: no heavy lifting, straining or running due to procedural   sedation/anesthesia.  The following day: return to full activity including work.  DIET:  Eat and drink normally unless instructed otherwise.     TREATMENT FOR COMMON SIDE EFFECTS:  - Mild abdominal pain, nausea, belching, bloating or excessive gas:  rest,   eat lightly and use a heating pad.  - Sore Throat: treat with throat lozenges and/or gargle with warm salt   water.  - Because air was used during the procedure, expelling large amounts of air   from your rectum or belching is normal.  - If a bowel prep was taken, you may not have a bowel movement for 1-3 days.    This is normal.  SYMPTOMS TO WATCH FOR AND REPORT TO YOUR PHYSICIAN:  1. Abdominal pain or bloating, other than gas cramps.  2. Chest pain.  3. Back pain.  4. Signs of infection such as: chills or fever occurring within 24 hours   after the procedure.  5. Rectal bleeding, which would show as bright red, maroon, or black stools.   (A tablespoon of blood from the rectum is not serious, especially if   hemorrhoids are present.)  6. Vomiting.  7. Weakness or dizziness.  GO DIRECTLY TO THE NEAREST EMERGENCY ROOM IF YOU HAVE ANY OF THE FOLLOWING:      Difficulty breathing              Chills and/or fever over 101 F   Persistent vomiting and/or vomiting blood   Severe abdominal pain   Severe chest pain   Black, tarry stools   Bleeding- more than one tablespoon   Any other  symptom or condition that you feel may need urgent attention  Your doctor recommends these additional instructions:  If any biopsies were taken, your doctors clinic will contact you in 1 to 2   weeks with any results.  - Use Protonix (pantoprazole) 40 mg PO daily.   - Follow an antireflux regimen.   - Resume previous diet.   - Return to GI clinic as previously scheduled.  For questions, problems or results please call your physician - Bhupendra Temple MD at Work:  [phone].  [facility], EMERGENCY ROOM PHONE NUMBER: [phone]  IF A COMPLICATION OR EMERGENCY SITUATION ARISES AND YOU ARE UNABLE TO REACH   YOUR PHYSICIAN - GO DIRECTLY TO THE EMERGENCY ROOM.  Bhupendra Temple MD  7/31/2020 4:19:09 PM  This report has been verified and signed electronically.  [employer]

## 2020-08-02 ENCOUNTER — PATIENT OUTREACH (OUTPATIENT)
Dept: ADMINISTRATIVE | Facility: OTHER | Age: 65
End: 2020-08-02

## 2020-08-03 NOTE — PROGRESS NOTES
Chart was reviewed for overdue Proactive [facility] Encounters (JESUS MANUEL)  topics  Updates were requested from care everywhere  Health Maintenance has been updated  Immunizations were reconciled

## 2020-08-06 ENCOUNTER — OFFICE VISIT (OUTPATIENT)
Dept: GASTROENTEROLOGY | Facility: CLINIC | Age: 65
End: 2020-08-06
Payer: MEDICARE

## 2020-08-06 VITALS
DIASTOLIC BLOOD PRESSURE: 53 MMHG | HEART RATE: 96 BPM | SYSTOLIC BLOOD PRESSURE: 117 MMHG | WEIGHT: 201.75 LBS | HEIGHT: 66 IN | RESPIRATION RATE: 16 BRPM | BODY MASS INDEX: 32.42 KG/M2

## 2020-08-06 DIAGNOSIS — K50.00 CROHN'S DISEASE OF SMALL INTESTINE WITHOUT COMPLICATION: ICD-10-CM

## 2020-08-06 DIAGNOSIS — K22.89 ESOPHAGEAL LEUKOPLAKIA: ICD-10-CM

## 2020-08-06 DIAGNOSIS — M34.1 SCLERODERMA OF ESOPHAGUS: ICD-10-CM

## 2020-08-06 DIAGNOSIS — Z87.19 HISTORY OF ESOPHAGITIS: Primary | ICD-10-CM

## 2020-08-06 DIAGNOSIS — R13.10 DYSPHAGIA, UNSPECIFIED TYPE: ICD-10-CM

## 2020-08-06 DIAGNOSIS — Z01.818 PREOP TESTING: ICD-10-CM

## 2020-08-06 PROCEDURE — 99214 OFFICE O/P EST MOD 30 MIN: CPT | Mod: S$PBB,,, | Performed by: INTERNAL MEDICINE

## 2020-08-06 PROCEDURE — 99215 OFFICE O/P EST HI 40 MIN: CPT | Mod: PBBFAC,PO | Performed by: INTERNAL MEDICINE

## 2020-08-06 PROCEDURE — 99214 PR OFFICE/OUTPT VISIT, EST, LEVL IV, 30-39 MIN: ICD-10-PCS | Mod: S$PBB,,, | Performed by: INTERNAL MEDICINE

## 2020-08-06 PROCEDURE — 99999 PR PBB SHADOW E&M-EST. PATIENT-LVL V: ICD-10-PCS | Mod: PBBFAC,,, | Performed by: INTERNAL MEDICINE

## 2020-08-06 PROCEDURE — 99999 PR PBB SHADOW E&M-EST. PATIENT-LVL V: CPT | Mod: PBBFAC,,, | Performed by: INTERNAL MEDICINE

## 2020-08-06 RX ORDER — METOCLOPRAMIDE 5 MG/1
5 TABLET ORAL
Qty: 60 TABLET | Refills: 6 | Status: SHIPPED | OUTPATIENT
Start: 2020-08-06 | End: 2020-09-05

## 2020-08-06 NOTE — H&P (VIEW-ONLY)
"[facility]    CC: Dysphagia, crohn's disease    HPI 65 y.o. female with history of scleroderma associated with pulmonary fibrosis and pulmonary HTN on Cellcept, Psumit, Nintedanib, and Tadalafil, presents for follow up of chronic, moderate, dysphagia primarily to solids as well as asymptomatic mild TI Crohn's disease as esophageal leukoplakia. She recently underwent manometry which was consistent with scleroderma esophagus (absent contractility, LES pressures low). She restarted her PPI BID recently and is not having significant heartburn. She has regular bowel habits and denies diarrhea or blood in stool.     Past Medical History:   Diagnosis Date    Allergy     COPD (chronic obstructive pulmonary disease)     GERD (gastroesophageal reflux disease)     Kidney stones     Scleroderma involving lung since she was 49 y/o       Allergies and Medications reviewed     Review of Systems  General ROS: negative for - chills, fever or weight loss  Cardiovascular ROS: no chest pain or dyspnea on exertion  Gastrointestinal ROS: + dysphagia, controlled GERD, no diarrhea    Physical Examination  BP (!) 117/53 (BP Location: Right arm, Patient Position: Sitting)   Pulse 96   Resp 16   Ht 5' 6" (1.676 m)   Wt 91.5 kg (201 lb 11.5 oz)   BMI 32.56 kg/m²   General appearance: alert, cooperative, no distress  HENT: Normocephalic, atraumatic, neck symmetrical, no nasal discharge, sclera anicteric   Lungs: clear to auscultation bilaterally, symmetric chest wall expansion bilaterally  Heart: regular rate and rhythm without rub; no displacement of the PMI   Abdomen: soft, nontender,nondistended, BS Active ,no masses   Extremities: extremities symmetric; no clubbing, cyanosis, or edema    Labs:  Lab Results   Component Value Date    WBC 7.13 06/18/2020    HGB 13.3 06/18/2020    HCT 42.0 06/18/2020    MCV 96 06/18/2020     06/18/2020           Imaging:  Esophagram/UGI series November 2019 report was " independently visualized and reviewed by me and showed    Mild dilatation of the esophagus with decreased esophageal emptying and little peristalsis suggesting changes of scleroderma..  Small hiatal hernia. Small duodenal diverticulum.    Assessment:   65 y.o. female with history of scleroderma associated with pulmonary fibrosis and pulmonary HTN on Cellcept, Psumit, Nintedanib, and Tadalafil, presents for follow up of mild asymptomatic TI Crohn's disease (not on therapy due to multiple immunosuppressants) as well as dysphagia l due to scleroderma.   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

## 2020-08-06 NOTE — PROGRESS NOTES
"[facility]    CC: Dysphagia, crohn's disease    HPI 65 y.o. female with history of scleroderma associated with pulmonary fibrosis and pulmonary HTN on Cellcept, Psumit, Nintedanib, and Tadalafil, presents for follow up of chronic, moderate, dysphagia primarily to solids as well as asymptomatic mild TI Crohn's disease as esophageal leukoplakia. She recently underwent manometry which was consistent with scleroderma esophagus (absent contractility, LES pressures low). She restarted her PPI BID recently and is not having significant heartburn. She has regular bowel habits and denies diarrhea or blood in stool.     Past Medical History:   Diagnosis Date    Allergy     COPD (chronic obstructive pulmonary disease)     GERD (gastroesophageal reflux disease)     Kidney stones     Scleroderma involving lung since she was 49 y/o       Allergies and Medications reviewed     Review of Systems  General ROS: negative for - chills, fever or weight loss  Cardiovascular ROS: no chest pain or dyspnea on exertion  Gastrointestinal ROS: + dysphagia, controlled GERD, no diarrhea    Physical Examination  BP (!) 117/53 (BP Location: Right arm, Patient Position: Sitting)   Pulse 96   Resp 16   Ht 5' 6" (1.676 m)   Wt 91.5 kg (201 lb 11.5 oz)   BMI 32.56 kg/m²   General appearance: alert, cooperative, no distress  HENT: Normocephalic, atraumatic, neck symmetrical, no nasal discharge, sclera anicteric   Lungs: clear to auscultation bilaterally, symmetric chest wall expansion bilaterally  Heart: regular rate and rhythm without rub; no displacement of the PMI   Abdomen: soft, nontender,nondistended, BS Active ,no masses   Extremities: extremities symmetric; no clubbing, cyanosis, or edema    Labs:  Lab Results   Component Value Date    WBC 7.13 06/18/2020    HGB 13.3 06/18/2020    HCT 42.0 06/18/2020    MCV 96 06/18/2020     06/18/2020           Imaging:  Esophagram/UGI series November 2019 report was " independently visualized and reviewed by me and showed    Mild dilatation of the esophagus with decreased esophageal emptying and little peristalsis suggesting changes of scleroderma..  Small hiatal hernia. Small duodenal diverticulum.    Assessment:   65 y.o. female with history of scleroderma associated with pulmonary fibrosis and pulmonary HTN on Cellcept, Psumit, Nintedanib, and Tadalafil, presents for follow up of mild asymptomatic TI Crohn's disease (not on therapy due to multiple immunosuppressants) as well as dysphagia l due to scleroderma.   Plan:  1.Dysphagia due to scleroderma esophagus  -Continue PPI  -Trial of Reglan  -Continue to eat slowly, take small bites, chew food thoroughly, encouraged to stand while taking pills    2.Esophageal leukoplakia  -Repeat EGD with biopsies    3.Mild asymptomatic TI Crohn's disease  -No current treatment indicated as patient on multiple immunosuppressant agents  -Repeat colonoscopy 2024      Ngozi Prince MD  [facility]  [address], [address]  [place] [address]  Office: [phone]  Fax: [phone]

## 2020-08-17 ENCOUNTER — LAB VISIT (OUTPATIENT)
Dept: PRIMARY CARE CLINIC | Facility: CLINIC | Age: 65
End: 2020-08-17
Payer: MEDICARE

## 2020-08-17 DIAGNOSIS — Z01.818 PREOP TESTING: ICD-10-CM

## 2020-08-17 PROCEDURE — U0003 INFECTIOUS AGENT DETECTION BY NUCLEIC ACID (DNA OR RNA); SEVERE ACUTE RESPIRATORY SYNDROME CORONAVIRUS 2 (SARS-COV-2) (CORONAVIRUS DISEASE [COVID-19]), AMPLIFIED PROBE TECHNIQUE, MAKING USE OF HIGH THROUGHPUT TECHNOLOGIES AS DESCRIBED BY CMS-2020-01-R: HCPCS

## 2020-08-18 LAB — SARS-COV-2 RNA RESP QL NAA+PROBE: NOT DETECTED

## 2020-08-20 ENCOUNTER — ANESTHESIA (OUTPATIENT)
Dept: ENDOSCOPY | Facility: HOSPITAL | Age: 65
End: 2020-08-20
Payer: MEDICARE

## 2020-08-20 ENCOUNTER — HOSPITAL ENCOUNTER (OUTPATIENT)
Facility: HOSPITAL | Age: 65
Discharge: HOME | End: 2020-08-20
Attending: INTERNAL MEDICINE | Admitting: INTERNAL MEDICINE
Payer: MEDICARE

## 2020-08-20 ENCOUNTER — ANESTHESIA EVENT (OUTPATIENT)
Dept: ENDOSCOPY | Facility: HOSPITAL | Age: 65
End: 2020-08-20
Payer: MEDICARE

## 2020-08-20 DIAGNOSIS — R13.10 DYSPHAGIA: ICD-10-CM

## 2020-08-20 PROCEDURE — 43239 EGD BIOPSY SINGLE/MULTIPLE: CPT | Mod: 59,,, | Performed by: INTERNAL MEDICINE

## 2020-08-20 PROCEDURE — 88305 TISSUE EXAM BY PATHOLOGIST: ICD-10-PCS | Mod: 26,,, | Performed by: PATHOLOGY

## 2020-08-20 PROCEDURE — 88341 IMHCHEM/IMCYTCHM EA ADD ANTB: CPT | Mod: 26,,, | Performed by: PATHOLOGY

## 2020-08-20 PROCEDURE — 88312 SPECIAL STAINS GROUP 1: CPT | Mod: 26,,, | Performed by: PATHOLOGY

## 2020-08-20 PROCEDURE — 43248 EGD GUIDE WIRE INSERTION: CPT | Performed by: INTERNAL MEDICINE

## 2020-08-20 PROCEDURE — 43248 EGD GUIDE WIRE INSERTION: CPT | Mod: ,,, | Performed by: INTERNAL MEDICINE

## 2020-08-20 PROCEDURE — D9220A PRA ANESTHESIA: Mod: CRNA,,, | Performed by: NURSE ANESTHETIST, CERTIFIED REGISTERED

## 2020-08-20 PROCEDURE — 43239 EGD BIOPSY SINGLE/MULTIPLE: CPT | Performed by: INTERNAL MEDICINE

## 2020-08-20 PROCEDURE — C1769 GUIDE WIRE: HCPCS | Performed by: INTERNAL MEDICINE

## 2020-08-20 PROCEDURE — 88342 IMHCHEM/IMCYTCHM 1ST ANTB: CPT | Performed by: PATHOLOGY

## 2020-08-20 PROCEDURE — 88305 TISSUE EXAM BY PATHOLOGIST: CPT | Performed by: PATHOLOGY

## 2020-08-20 PROCEDURE — 88342 CHG IMMUNOCYTOCHEMISTRY: ICD-10-PCS | Mod: 26,,, | Performed by: PATHOLOGY

## 2020-08-20 PROCEDURE — 88305 TISSUE EXAM BY PATHOLOGIST: CPT | Mod: 26,,, | Performed by: PATHOLOGY

## 2020-08-20 PROCEDURE — 27201012 HC FORCEPS, HOT/COLD, DISP: Performed by: INTERNAL MEDICINE

## 2020-08-20 PROCEDURE — 88341 PR IHC OR ICC EACH ADD'L SINGLE ANTIBODY  STAINPR: ICD-10-PCS | Mod: 26,,, | Performed by: PATHOLOGY

## 2020-08-20 PROCEDURE — D9220A PRA ANESTHESIA: Mod: ANES,,, | Performed by: ANESTHESIOLOGY

## 2020-08-20 PROCEDURE — D9220A PRA ANESTHESIA: ICD-10-PCS | Mod: CRNA,,, | Performed by: NURSE ANESTHETIST, CERTIFIED REGISTERED

## 2020-08-20 PROCEDURE — D9220A PRA ANESTHESIA: ICD-10-PCS | Mod: ANES,,, | Performed by: ANESTHESIOLOGY

## 2020-08-20 PROCEDURE — 63600175 PHARM REV CODE 636 W HCPCS: Performed by: NURSE ANESTHETIST, CERTIFIED REGISTERED

## 2020-08-20 PROCEDURE — 25000003 PHARM REV CODE 250: Performed by: NURSE ANESTHETIST, CERTIFIED REGISTERED

## 2020-08-20 PROCEDURE — 37000008 HC ANESTHESIA 1ST 15 MINUTES: Performed by: INTERNAL MEDICINE

## 2020-08-20 PROCEDURE — 88342 IMHCHEM/IMCYTCHM 1ST ANTB: CPT | Mod: 26,,, | Performed by: PATHOLOGY

## 2020-08-20 PROCEDURE — 43239 PR EGD, FLEX, W/BIOPSY, SGL/MULTI: ICD-10-PCS | Mod: 59,,, | Performed by: INTERNAL MEDICINE

## 2020-08-20 PROCEDURE — 88312 PR  SPECIAL STAINS,GROUP I: ICD-10-PCS | Mod: 26,,, | Performed by: PATHOLOGY

## 2020-08-20 PROCEDURE — 88312 SPECIAL STAINS GROUP 1: CPT | Mod: 59 | Performed by: PATHOLOGY

## 2020-08-20 PROCEDURE — 43248 PR EGD, FLEX, W/DILATION OVER GUIDEWIRE: ICD-10-PCS | Mod: ,,, | Performed by: INTERNAL MEDICINE

## 2020-08-20 PROCEDURE — 25000003 PHARM REV CODE 250: Performed by: INTERNAL MEDICINE

## 2020-08-20 PROCEDURE — 88341 IMHCHEM/IMCYTCHM EA ADD ANTB: CPT | Performed by: PATHOLOGY

## 2020-08-20 RX ORDER — SODIUM CHLORIDE 9 MG/ML
INJECTION, SOLUTION INTRAVENOUS CONTINUOUS
Status: DISCONTINUED | OUTPATIENT
Start: 2020-08-20 | End: 2020-08-20 | Stop reason: HOSPADM

## 2020-08-20 RX ORDER — PANTOPRAZOLE SODIUM 40 MG/1
40 TABLET, DELAYED RELEASE ORAL 2 TIMES DAILY
Qty: 60 TABLET | Refills: 6 | Status: SHIPPED | OUTPATIENT
Start: 2020-08-20 | End: 2020-12-23

## 2020-08-20 RX ORDER — LIDOCAINE HCL/PF 100 MG/5ML
SYRINGE (ML) INTRAVENOUS
Status: DISCONTINUED | OUTPATIENT
Start: 2020-08-20 | End: 2020-08-20

## 2020-08-20 RX ORDER — PROPOFOL 10 MG/ML
VIAL (ML) INTRAVENOUS
Status: DISCONTINUED | OUTPATIENT
Start: 2020-08-20 | End: 2020-08-20

## 2020-08-20 RX ADMIN — PROPOFOL 50 MG: 10 INJECTION, EMULSION INTRAVENOUS at 10:08

## 2020-08-20 RX ADMIN — PROPOFOL 100 MG: 10 INJECTION, EMULSION INTRAVENOUS at 10:08

## 2020-08-20 RX ADMIN — SODIUM CHLORIDE: 0.9 INJECTION, SOLUTION INTRAVENOUS at 09:08

## 2020-08-20 RX ADMIN — LIDOCAINE HYDROCHLORIDE 100 MG: 20 INJECTION INTRAVENOUS at 10:08

## 2020-08-20 NOTE — ANESTHESIA PREPROCEDURE EVALUATION
08/20/2020  Edith Tran is a 65 y.o., female.    Pre-op Assessment    I have reviewed the Patient Summary Reports.     I have reviewed the Nursing Notes. I have reviewed the NPO Status.   I have reviewed the Medications.     Review of Systems  Anesthesia Hx:  Denies Family Hx of Anesthesia complications.   Denies Personal Hx of Anesthesia complications.   Cardiovascular:   Hypertension CASH    Pulmonary:   COPD, mild Shortness of breath Sleep Apnea, CPAP    Education provided regarding risk of obstructive sleep apnea     Renal/:   renal calculi    Hepatic/GI:   PUD, GERD, well controlled        Physical Exam  General:  Obesity    Airway/Jaw/Neck:  Airway Findings: Mouth Opening: Normal Tongue: Normal  General Airway Assessment: Adult  Mallampati: III  Improves to II with phonation.  Jaw/Neck Findings:  Neck ROM: Normal ROM      Dental:  Dental Findings: In tact   Chest/Lungs:  Chest/Lungs Findings: Clear to auscultation, Normal Respiratory Rate         Mental Status:  Mental Status Findings:  Cooperative, Alert and Oriented         Anesthesia Plan  Type of Anesthesia, risks & benefits discussed:  Anesthesia Type:  general  Patient's Preference:   Intra-op Monitoring Plan: standard ASA monitors  Intra-op Monitoring Plan Comments:   Post Op Pain Control Plan:   Post Op Pain Control Plan Comments:   Induction:   IV  Beta Blocker:  Patient is not currently on a Beta-Blocker (No further documentation required).       Informed Consent: Patient understands risks and agrees with Anesthesia plan.  Questions answered. Anesthesia consent signed with patient.  ASA Score: 3     Day of Surgery Review of History & Physical:    H&P update referred to the provider.         Ready For Surgery From Anesthesia Perspective.

## 2020-08-20 NOTE — TRANSFER OF CARE
"Anesthesia Transfer of Care Note    Patient: Edith Tran    Procedure(s) Performed: Procedure(s) (LRB):  EGD (ESOPHAGOGASTRODUODENOSCOPY) (N/A)    Patient location: GI    Anesthesia Type: general    Transport from OR: Transported from OR on room air with adequate spontaneous ventilation    Post pain: adequate analgesia    Post assessment: no apparent anesthetic complications and tolerated procedure well    Post vital signs: stable    Level of consciousness: awake, alert and oriented    Nausea/Vomiting: no nausea/vomiting    Complications: none    Transfer of care protocol was followed      Last vitals:   Visit Vitals  /66 (BP Location: Left arm, Patient Position: Lying)   Pulse 73   Temp 36.9 °C (98.4 °F) (Skin)   Resp 16   Ht 5' 6" (1.676 m)   Wt 90.3 kg (199 lb)   SpO2 99%   Breastfeeding No   BMI 32.12 kg/m²     "

## 2020-08-20 NOTE — [employer] PATIENT INSTRUCTIONS
Discharge Summary/Instructions after an Endoscopic Procedure  Patient Name: Edith Tran  Patient MRN: [id number]  Patient YOB: 1955 Thursday, August 20, 2020  Ngozi Prince MD  RESTRICTIONS:  During your procedure today, you received medications for sedation.  These   medications may affect your judgment, balance and coordination.  Therefore,   for 24 hours, you have the following restrictions:   - DO NOT drive a car, operate machinery, make legal/financial decisions,   sign important papers or drink alcohol.    ACTIVITY:  Today: no heavy lifting, straining or running due to procedural   sedation/anesthesia.  The following day: return to full activity including work.  DIET:  Eat and drink normally unless instructed otherwise.     TREATMENT FOR COMMON SIDE EFFECTS:  - Mild abdominal pain, nausea, belching, bloating or excessive gas:  rest,   eat lightly and use a heating pad.  - Sore Throat: treat with throat lozenges and/or gargle with warm salt   water.  - Because air was used during the procedure, expelling large amounts of air   from your rectum or belching is normal.  - If a bowel prep was taken, you may not have a bowel movement for 1-3 days.    This is normal.  SYMPTOMS TO WATCH FOR AND REPORT TO YOUR PHYSICIAN:  1. Abdominal pain or bloating, other than gas cramps.  2. Chest pain.  3. Back pain.  4. Signs of infection such as: chills or fever occurring within 24 hours   after the procedure.  5. Rectal bleeding, which would show as bright red, maroon, or black stools.   (A tablespoon of blood from the rectum is not serious, especially if   hemorrhoids are present.)  6. Vomiting.  7. Weakness or dizziness.  GO DIRECTLY TO THE NEAREST EMERGENCY ROOM IF YOU HAVE ANY OF THE FOLLOWING:      Difficulty breathing              Chills and/or fever over 101 F   Persistent vomiting and/or vomiting blood   Severe abdominal pain   Severe chest pain   Black, tarry stools   Bleeding- more than one  tablespoon   Any other symptom or condition that you feel may need urgent attention  Your doctor recommends these additional instructions:  If any biopsies were taken, your doctors clinic will contact you in 1 to 2   weeks with any results.  - Discharge patient to home (with escort).   - Patient has a contact number available for emergencies.  The signs and   symptoms of potential delayed complications were discussed with the   patient.  Return to normal activities tomorrow.  Written discharge   instructions were provided to the patient.   - Resume previous diet.   - Continue present medications including PPI BID  - Await pathology results.  For questions, problems or results please call your physician - Ngozi Prince MD at Work:  [phone].  [facility], EMERGENCY ROOM PHONE NUMBER: [phone]  IF A COMPLICATION OR EMERGENCY SITUATION ARISES AND YOU ARE UNABLE TO REACH   YOUR PHYSICIAN - GO DIRECTLY TO THE EMERGENCY ROOM.  Ngozi Prince MD  8/20/2020 10:58:45 AM  This report has been verified and signed electronically.  [employer]

## 2020-08-20 NOTE — ANESTHESIA POSTPROCEDURE EVALUATION
Anesthesia Post Evaluation    Patient: Edith Tran    Procedure(s) Performed: Procedure(s) (LRB):  EGD (ESOPHAGOGASTRODUODENOSCOPY) (N/A)    Final Anesthesia Type: general    Patient location during evaluation: PACU  Patient participation: Yes- Able to Participate  Level of consciousness: awake and alert  Post-procedure vital signs: reviewed and stable  Pain management: adequate  Airway patency: patent    PONV status at discharge: No PONV  Anesthetic complications: no      Cardiovascular status: blood pressure returned to baseline  Respiratory status: unassisted  Hydration status: euvolemic  Follow-up not needed.          Vitals Value Taken Time   /65 08/20/20 1053   Temp 36.5 °C (97.7 °F) 08/20/20 1053   Pulse 75 08/20/20 1053   Resp 16 08/20/20 1053   SpO2 100 % 08/20/20 1053         No case tracking events are documented in the log.      Pain/Kranthi Score: No data recorded

## 2020-08-20 NOTE — PLAN OF CARE
Vss, neri po fluids, denies pain, ambulates easily. IV removed, catheter intact. Discharge instructions provided and states understanding. States ready to go home.  Discharged from facility with family per wheelchair.

## 2020-08-21 VITALS
BODY MASS INDEX: 31.98 KG/M2 | RESPIRATION RATE: 16 BRPM | TEMPERATURE: 98 F | HEIGHT: 66 IN | DIASTOLIC BLOOD PRESSURE: 78 MMHG | HEART RATE: 69 BPM | SYSTOLIC BLOOD PRESSURE: 142 MMHG | WEIGHT: 199 LBS | OXYGEN SATURATION: 96 %

## 2020-08-28 LAB
COMMENT: NORMAL
FINAL PATHOLOGIC DIAGNOSIS: NORMAL
GROSS: NORMAL
Lab: NORMAL

## 2020-08-31 ENCOUNTER — PATIENT MESSAGE (OUTPATIENT)
Dept: GASTROENTEROLOGY | Facility: CLINIC | Age: 65
End: 2020-08-31

## 2020-08-31 ENCOUNTER — EXTERNAL CHRONIC CARE MANAGEMENT (OUTPATIENT)
Dept: PRIMARY CARE CLINIC | Facility: CLINIC | Age: 65
End: 2020-08-31
Payer: MEDICARE

## 2020-08-31 DIAGNOSIS — Z87.19 HISTORY OF ESOPHAGITIS: Primary | ICD-10-CM

## 2020-08-31 DIAGNOSIS — Z01.818 PREOP TESTING: ICD-10-CM

## 2020-08-31 PROCEDURE — 99490 CHRNC CARE MGMT STAFF 1ST 20: CPT | Mod: S$GLB,,, | Performed by: INTERNAL MEDICINE

## 2020-08-31 PROCEDURE — 99490 PR CHRONIC CARE MGMT, 1ST 20 MIN: ICD-10-PCS | Mod: S$GLB,,, | Performed by: INTERNAL MEDICINE

## 2020-09-03 ENCOUNTER — PATIENT OUTREACH (OUTPATIENT)
Dept: OTHER | Facility: OTHER | Age: 65
End: 2020-09-03

## 2020-09-03 NOTE — PROGRESS NOTES
Digital Medicine: Health  Follow-Up    The history is provided by the patient.             Reason for review: Blood pressure at goal            Diet-no change to diet    No change to diet.        Physical Activity-no change to routine  No change to exercise routine.     Medication Adherence-Medication adherence was assessed.        Substance, Sleep, Stress-No change  stress-not assessed  Details:  Intervention(s):    Sleep-not assessed  Details:  Intervention(s):    Alcohol -not assessed  Details:  Intervention(s):    Tobacco-Not Assessed  Details:  Intervention(s):          Continue current diet/physical activity routine.       Addressed any questions or concerns and patient has my contact information if needed prior to next outreach.   Explained the importance of self-monitoring and medication adherence. Encouraged the patient to communicate with their health  for lifestyle modifications to help improve or maintain a healthy lifestyle.            There are no preventive care reminders to display for this patient.    Last 5 Patient Entered Readings                                      Current 30 Day Average: 122/83     Recent Readings 8/31/2020 8/26/2020 8/19/2020 8/11/2020 8/5/2020    SBP (mmHg) 110 108 138 126 129    DBP (mmHg) 86 73 87 89 82    Pulse 95 90 82 98 88

## 2020-09-04 ENCOUNTER — TELEPHONE (OUTPATIENT)
Dept: RHEUMATOLOGY | Facility: CLINIC | Age: 65
End: 2020-09-04

## 2020-09-04 NOTE — TELEPHONE ENCOUNTER
----- Message from Kimberly Tristan sent at 9/4/2020  1:34 PM CDT -----  Contact: self/[phone]  Patient called in regards needing to scheduled an appointment with PA. Please call and advise. Thank you.

## 2020-09-04 NOTE — TELEPHONE ENCOUNTER
Pt advised next NP appt with Dr. Isaacs is April 2021. Pt given name of MD in [place] to call for appt.

## 2020-09-08 ENCOUNTER — PATIENT MESSAGE (OUTPATIENT)
Dept: RHEUMATOLOGY | Facility: CLINIC | Age: 65
End: 2020-09-08

## 2020-09-08 ENCOUNTER — TELEPHONE (OUTPATIENT)
Dept: RHEUMATOLOGY | Facility: CLINIC | Age: 65
End: 2020-09-08

## 2020-09-08 NOTE — TELEPHONE ENCOUNTER
----- Message from Fanny Montanez sent at 9/8/2020  9:09 AM CDT -----  Regarding: appt  Contact: Pt  Pt called to schedule an appt to est care and asked for a call back

## 2020-09-09 ENCOUNTER — LAB VISIT (OUTPATIENT)
Dept: LAB | Facility: HOSPITAL | Age: 65
End: 2020-09-09
Attending: STUDENT IN AN ORGANIZED HEALTH CARE EDUCATION/TRAINING PROGRAM
Payer: MEDICARE

## 2020-09-09 ENCOUNTER — OFFICE VISIT (OUTPATIENT)
Dept: RHEUMATOLOGY | Facility: CLINIC | Age: 65
End: 2020-09-09
Payer: MEDICARE

## 2020-09-09 ENCOUNTER — PATIENT OUTREACH (OUTPATIENT)
Dept: ADMINISTRATIVE | Facility: OTHER | Age: 65
End: 2020-09-09

## 2020-09-09 VITALS
HEIGHT: 66 IN | SYSTOLIC BLOOD PRESSURE: 133 MMHG | WEIGHT: 203.25 LBS | BODY MASS INDEX: 32.66 KG/M2 | DIASTOLIC BLOOD PRESSURE: 87 MMHG | HEART RATE: 97 BPM

## 2020-09-09 DIAGNOSIS — K21.9 GASTROESOPHAGEAL REFLUX DISEASE, ESOPHAGITIS PRESENCE NOT SPECIFIED: ICD-10-CM

## 2020-09-09 DIAGNOSIS — M34.9 SCLERODERMA: ICD-10-CM

## 2020-09-09 DIAGNOSIS — M34.9 SYSTEMIC SCLEROSIS WITH LIMITED CUTANEOUS INVOLVEMENT: ICD-10-CM

## 2020-09-09 DIAGNOSIS — J84.9 INTERSTITIAL LUNG DISEASE: Primary | ICD-10-CM

## 2020-09-09 DIAGNOSIS — I27.20 PULMONARY HYPERTENSION: ICD-10-CM

## 2020-09-09 DIAGNOSIS — Z51.81 MEDICATION MONITORING ENCOUNTER: ICD-10-CM

## 2020-09-09 DIAGNOSIS — J84.9 INTERSTITIAL LUNG DISEASE: ICD-10-CM

## 2020-09-09 LAB
ALBUMIN SERPL BCP-MCNC: 4.3 G/DL (ref 3.5–5.2)
ALP SERPL-CCNC: 59 U/L (ref 55–135)
ALT SERPL W/O P-5'-P-CCNC: 15 U/L (ref 10–44)
ANION GAP SERPL CALC-SCNC: 10 MMOL/L (ref 8–16)
AST SERPL-CCNC: 19 U/L (ref 10–40)
BASOPHILS # BLD AUTO: 0.05 K/UL (ref 0–0.2)
BASOPHILS NFR BLD: 0.9 % (ref 0–1.9)
BILIRUB SERPL-MCNC: 0.6 MG/DL (ref 0.1–1)
BUN SERPL-MCNC: 9 MG/DL (ref 8–23)
CALCIUM SERPL-MCNC: 10.3 MG/DL (ref 8.7–10.5)
CHLORIDE SERPL-SCNC: 104 MMOL/L (ref 95–110)
CO2 SERPL-SCNC: 28 MMOL/L (ref 23–29)
CREAT SERPL-MCNC: 0.8 MG/DL (ref 0.5–1.4)
CRP SERPL-MCNC: 2.8 MG/L (ref 0–8.2)
DIFFERENTIAL METHOD: ABNORMAL
EOSINOPHIL # BLD AUTO: 0.1 K/UL (ref 0–0.5)
EOSINOPHIL NFR BLD: 1.9 % (ref 0–8)
ERYTHROCYTE [DISTWIDTH] IN BLOOD BY AUTOMATED COUNT: 12.8 % (ref 11.5–14.5)
ERYTHROCYTE [SEDIMENTATION RATE] IN BLOOD BY WESTERGREN METHOD: 13 MM/HR (ref 0–20)
EST. GFR  (AFRICAN AMERICAN): >60 ML/MIN/1.73 M^2
EST. GFR  (NON AFRICAN AMERICAN): >60 ML/MIN/1.73 M^2
GLUCOSE SERPL-MCNC: 88 MG/DL (ref 70–110)
HCT VFR BLD AUTO: 42.3 % (ref 37–48.5)
HGB BLD-MCNC: 13.5 G/DL (ref 12–16)
IMM GRANULOCYTES # BLD AUTO: 0.01 K/UL (ref 0–0.04)
IMM GRANULOCYTES NFR BLD AUTO: 0.2 % (ref 0–0.5)
LYMPHOCYTES # BLD AUTO: 1.1 K/UL (ref 1–4.8)
LYMPHOCYTES NFR BLD: 19.3 % (ref 18–48)
MCH RBC QN AUTO: 30.5 PG (ref 27–31)
MCHC RBC AUTO-ENTMCNC: 31.9 G/DL (ref 32–36)
MCV RBC AUTO: 96 FL (ref 82–98)
MONOCYTES # BLD AUTO: 0.5 K/UL (ref 0.3–1)
MONOCYTES NFR BLD: 8.3 % (ref 4–15)
NEUTROPHILS # BLD AUTO: 4.1 K/UL (ref 1.8–7.7)
NEUTROPHILS NFR BLD: 69.4 % (ref 38–73)
NRBC BLD-RTO: 0 /100 WBC
PLATELET # BLD AUTO: 186 K/UL (ref 150–350)
PMV BLD AUTO: 11.4 FL (ref 9.2–12.9)
POTASSIUM SERPL-SCNC: 4.2 MMOL/L (ref 3.5–5.1)
PROT SERPL-MCNC: 7.5 G/DL (ref 6–8.4)
RBC # BLD AUTO: 4.43 M/UL (ref 4–5.4)
SODIUM SERPL-SCNC: 142 MMOL/L (ref 136–145)
WBC # BLD AUTO: 5.87 K/UL (ref 3.9–12.7)

## 2020-09-09 PROCEDURE — 36415 COLL VENOUS BLD VENIPUNCTURE: CPT

## 2020-09-09 PROCEDURE — 85025 COMPLETE CBC W/AUTO DIFF WBC: CPT

## 2020-09-09 PROCEDURE — 80053 COMPREHEN METABOLIC PANEL: CPT

## 2020-09-09 PROCEDURE — 86140 C-REACTIVE PROTEIN: CPT

## 2020-09-09 PROCEDURE — 99999 PR PBB SHADOW E&M-EST. PATIENT-LVL IV: CPT | Mod: PBBFAC,,, | Performed by: STUDENT IN AN ORGANIZED HEALTH CARE EDUCATION/TRAINING PROGRAM

## 2020-09-09 PROCEDURE — 99999 PR PBB SHADOW E&M-EST. PATIENT-LVL IV: ICD-10-PCS | Mod: PBBFAC,,, | Performed by: STUDENT IN AN ORGANIZED HEALTH CARE EDUCATION/TRAINING PROGRAM

## 2020-09-09 PROCEDURE — 99205 PR OFFICE/OUTPT VISIT, NEW, LEVL V, 60-74 MIN: ICD-10-PCS | Mod: S$PBB,,, | Performed by: STUDENT IN AN ORGANIZED HEALTH CARE EDUCATION/TRAINING PROGRAM

## 2020-09-09 PROCEDURE — 99205 OFFICE O/P NEW HI 60 MIN: CPT | Mod: S$PBB,,, | Performed by: STUDENT IN AN ORGANIZED HEALTH CARE EDUCATION/TRAINING PROGRAM

## 2020-09-09 PROCEDURE — 85651 RBC SED RATE NONAUTOMATED: CPT

## 2020-09-09 PROCEDURE — 99214 OFFICE O/P EST MOD 30 MIN: CPT | Mod: PBBFAC,PO | Performed by: STUDENT IN AN ORGANIZED HEALTH CARE EDUCATION/TRAINING PROGRAM

## 2020-09-09 RX ORDER — SILDENAFIL CITRATE 20 MG/1
TABLET ORAL
COMMUNITY
Start: 2020-09-04 | End: 2021-03-31

## 2020-09-09 ASSESSMENT — ROUTINE ASSESSMENT OF PATIENT INDEX DATA (RAPID3)
WHEN YOU AWAKENED IN THE MORNING OVER THE LAST WEEK, PLEASE INDICATE THE AMOUNT OF TIME IT TAKES UNTIL YOU ARE AS LIMBER AS YOU WILL BE FOR THE DAY: 30 MINUTES
PATIENT GLOBAL ASSESSMENT SCORE: 6
AM STIFFNESS SCORE: 1, YES
MDHAQ FUNCTION SCORE: 1.1
TOTAL RAPID3 SCORE: 5.22
PAIN SCORE: 6
FATIGUE SCORE: 7
PSYCHOLOGICAL DISTRESS SCORE: 2.2

## 2020-09-09 NOTE — PROGRESS NOTES
RHEUMATOLOGY CLINIC INITIAL VISIT    Reason for referral:-  Referred for management of SSc     Chief complaints:- Management of Scleroderma       HPI:-  Edith Mak a 65 y.o. pleasant female with PMH of SSc, PAH, ILD, GERD, and esophageal dysmotility comes in for an initial visit with above chief complaints.     Rheumatological history    Patient was diagnosed with LSSc in 2009.   At that time, patient was having swelling of hands, dysphagia,, GERD, and SOB.  SSc (Raynaud's, abnormal esophageal motility, ILD, skin changes, and GERD).  Was seeing Dr. Barnes ([place]) - but she left and had no follow up with rheumatology for over a year.    Previously on HCQ 200mg BID - had to discontinue to eye symptoms.  Was on MTX in the past - uncertain     Was on Tadalafil.  Currently on Cellcept, Opsumit (macitentan), Nintedanib, and Tadalafil.  Had recent manometry which was consistent with scleroderma esophagus (absence contractility, LES pressure low).  Patient had a recent follow-up with GI (August 6, 2020) - for chronic moderate dysphagia primarily to solids, mild Crohn's disease and esophageal leukoplakia.    Due for repeat colonoscopy in 2024.  Concurrently on PPI b.i.d. and a trial of Reglan.    Had recent PFTs done at [facility] (Aug 2020) - no results available on CareEverywhere yet.  Following Dr. Goddard (pulmonary at [facility]).    Fhx: No rheumatological disease    Tobacco (1pack/week x 20 years, quit 20 years ago), EtOH (denies), recreational/illicit drugs - eatables 2x/week (gummies)      Occupation: Retired - previously caregiver    ROS: denies any mouth ulcers, hair loss, or rash  +Raynaud's (chronic - not worsening)     Review of Systems   Constitutional: Positive for malaise/fatigue. Negative for chills, diaphoresis, fever and weight loss.   HENT: Negative for congestion, ear discharge, ear pain, hearing loss, nosebleeds, sinus pain and tinnitus.    Eyes: Negative for photophobia, pain, discharge and redness.    Respiratory: Positive for cough and shortness of breath. Negative for hemoptysis, sputum production, wheezing and stridor.    Cardiovascular: Negative for chest pain, palpitations, orthopnea, claudication, leg swelling and PND.   Gastrointestinal: Positive for heartburn. Negative for abdominal pain, constipation, diarrhea, nausea and vomiting.   Genitourinary: Negative for dysuria, frequency, hematuria and urgency.   Musculoskeletal: Positive for joint pain. Negative for back pain, myalgias and neck pain.   Skin: Negative for rash.   Neurological: Negative for dizziness, tingling, tremors, weakness and headaches.   Endo/Heme/Allergies: Does not bruise/bleed easily.   Psychiatric/Behavioral: Negative for depression, hallucinations and suicidal ideas. The patient is not nervous/anxious and does not have insomnia.        Past Medical History:   Diagnosis Date    Allergy     COPD (chronic obstructive pulmonary disease)     GERD (gastroesophageal reflux disease)     Kidney stones     Scleroderma involving lung since she was 47 y/o       Past Surgical History:   Procedure Laterality Date    COLONOSCOPY N/A 8/7/2018    Procedure: COLONOSCOPY;  Surgeon: Ngozi Prince MD;  Location: [facility];  Service: Endoscopy;  Laterality: N/A;    COLONOSCOPY N/A 11/21/2019    Procedure: COLONOSCOPY;  Surgeon: Ngozi Prince MD;  Location: [facility];  Service: Endoscopy;  Laterality: N/A;    ESOPHAGEAL MANOMETRY WITH MEASUREMENT OF IMPEDANCE N/A 7/27/2020    Procedure: MANOMETRY, ESOPHAGUS, WITH IMPEDANCE MEASUREMENT;  Surgeon: Bhupendra Temple MD;  Location: [address]);  Service: Endoscopy;  Laterality: N/A;  3/10 - pt confirmed apptCovid test ordered for 7/24 in [place].[facility]    ESOPHAGOGASTRODUODENOSCOPY N/A 8/7/2018    Procedure: EGD (ESOPHAGOGASTRODUODENOSCOPY);  Surgeon: Ngozi Prince MD;  Location: [facility];  Service: Endoscopy;  Laterality: N/A;    ESOPHAGOGASTRODUODENOSCOPY N/A 11/21/2019    Procedure:  "EGD (ESOPHAGOGASTRODUODENOSCOPY);  Surgeon: Ngozi Prince MD;  Location: [facility];  Service: Endoscopy;  Laterality: N/A;    ESOPHAGOGASTRODUODENOSCOPY N/A 2020    Procedure: EGD (ESOPHAGOGASTRODUODENOSCOPY);  Surgeon: Ngozi Prince MD;  Location: [facility];  Service: Endoscopy;  Laterality: N/A;    ESOPHAGOGASTRODUODENOSCOPY N/A 2020    Procedure: EGD (ESOPHAGOGASTRODUODENOSCOPY);  Surgeon: Ngozi Prince MD;  Location: [facility];  Service: Endoscopy;  Laterality: N/A;        Social History     Tobacco Use    Smoking status: Former Smoker     Quit date: 1995     Years since quittin.0    Smokeless tobacco: Never Used   Substance Use Topics    Alcohol use: No     Frequency: Monthly or less     Drinks per session: 1 or 2     Binge frequency: Never    Drug use: No       Family History   Problem Relation Age of Onset    Kidney disease Mother     Cancer Father     Heart disease Brother     Mental illness Son     Glaucoma Neg Hx     Macular degeneration Neg Hx     Retinal detachment Neg Hx        Review of patient's allergies indicates:   Allergen Reactions    Hydroxychloroquine Other (See Comments)     Having eye reaction, needs to stop plaquenil and stay off of it.        Vitals:    20 1007   BP: 133/87   Pulse: 97   Weight: 92.2 kg (203 lb 4.2 oz)   Height: 5' 6" (1.676 m)   PainSc: 0-No pain       Physical Exam   HENT:   No mouth ulcers      Pulmonary/Chest:   Bilateral lung base - +crackles   Abdominal:   Umbilical hernia   Musculoskeletal: Normal range of motion.         General: No tenderness or deformity.      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 Range 2020 10:50 2020 09:11 2020 08:48 2020 00:00 2020 10:53   WBC Latest Ref " Range: 3.90 - 12.70 K/uL 7.13       RBC Latest Ref Range: 4.00 - 5.40 M/uL 4.38       Hemoglobin Latest Ref Range: 12.0 - 16.0 g/dL 13.3       Hematocrit Latest Ref Range: 37.0 - 48.5 % 42.0       MCV Latest Ref Range: 82 - 98 fL 96       MCH Latest Ref Range: 27.0 - 31.0 pg 30.4       MCHC Latest Ref Range: 32.0 - 36.0 g/dL 31.7 (L)       RDW Latest Ref Range: 11.5 - 14.5 % 13.0       Platelets Latest Ref Range: 150 - 350 K/uL 180       MPV Latest Ref Range: 9.2 - 12.9 fL 11.6       Gran% Latest Ref Range: 38.0 - 73.0 % 73.2 (H)       Gran # (ANC) Latest Ref Range: 1.8 - 7.7 K/uL 5.2       Lymph% Latest Ref Range: 18.0 - 48.0 % 16.1 (L)       Lymph # Latest Ref Range: 1.0 - 4.8 K/uL 1.2       Mono% Latest Ref Range: 4.0 - 15.0 % 8.0       Mono # Latest Ref Range: 0.3 - 1.0 K/uL 0.6       Eosinophil% Latest Ref Range: 0.0 - 8.0 % 1.7       Eos # Latest Ref Range: 0.0 - 0.5 K/uL 0.1       Basophil% Latest Ref Range: 0.0 - 1.9 % 0.6       Baso # Latest Ref Range: 0.00 - 0.20 K/uL 0.04       nRBC Latest Ref Range: 0 /100 WBC 0       Differential Method Unknown Automated       Immature Grans (Abs) Latest Ref Range: 0.00 - 0.04 K/uL 0.03       Immature Granulocytes Latest Ref Range: 0.0 - 0.5 % 0.4       Iron Latest Ref Range: 30 - 160 ug/dL 69       TIBC Latest Ref Range: 250 - 450 ug/dL 363       Saturated Iron Latest Ref Range: 20 - 50 % 19 (L)       Transferrin Latest Ref Range: 200 - 375 mg/dL 245       Sodium Latest Ref Range: 136 - 145 mmol/L 140       Potassium Latest Ref Range: 3.5 - 5.1 mmol/L 3.9       Chloride Latest Ref Range: 95 - 110 mmol/L 103       CO2 Latest Ref Range: 23 - 29 mmol/L 26       Anion Gap Latest Ref Range: 8 - 16 mmol/L 11       BUN, Bld Latest Ref Range: 8 - 23 mg/dL 10       Creatinine Latest Ref Range: 0.5 - 1.4 mg/dL 0.9       eGFR if non African American Latest Ref Range: >60 mL/min/1.73 m^2 >60       eGFR if African American Latest Ref Range: >60 mL/min/1.73 m^2 >60       Glucose  Latest Ref Range: 70 - 110 mg/dL 95       Calcium Latest Ref Range: 8.7 - 10.5 mg/dL 10.4       Alkaline Phosphatase Latest Ref Range: 55 - 135 U/L 68       PROTEIN TOTAL Latest Ref Range: 6.0 - 8.4 g/dL 7.2       Albumin Latest Ref Range: 3.5 - 5.2 g/dL 3.9       BILIRUBIN TOTAL Latest Ref Range: 0.1 - 1.0 mg/dL 0.5       AST Latest Ref Range: 10 - 40 U/L 15       ALT Latest Ref Range: 10 - 44 U/L 12       SARS-CoV2 (COVID-19) Qualitative PCR Latest Ref Range: Not Detected   Not Detected Not Detected     HIV Rapid Testing Latest Ref Range: Negative  Non-Reactive       SPECIMEN TO PATHOLOGY, SURGERY Unknown     Rpt   Final Pathologic Diagnosis Unknown     1.  Abnormal muco...   Gross Unknown     Number of contain...   Comment Unknown     For part 1:  Ther...   Disclaimer Unknown     This test was dev...   CARDIAC MONITORING STRIPS Unknown    Attch      Radiographs:-  Independent visualization of images done.  March 2019 - CT chest - Severe interstitial fibrosis with peripheral honeycombing and multiple bilateral lower lobe bulla.  Unchanged from March 2018.  Mild mediastinal adenopathy.  hiatal hernia on L complicated cyst  Sept 2018 - DEXA scan - lumbar spine T-score of -0.8.  In left femoral neck T-score of 0.1.  FRAX 6.1% risk of major osteoporotic fracture and 0.2% risk of hip fracture in the next 10 years.  Normal bone density.    Old and Outside medical records :-  Reviewed old and all outside medical records available in Care Everywhere.  Aug 2013 - [facility] Rheumatology Encounter - LSSc with ILD and PH.  On Adcirca and Cellcept 3g/day. On HCQ and omeprazole for GERD.         Medication List with Changes/Refills   Current Medications    ALBUTEROL (PROVENTIL/VENTOLIN HFA) 90 MCG/ACTUATION INHALER    2 puffs every 4 hours as needed for cough, wheeze, or shortness of breath    AMLODIPINE (NORVASC) 5 MG TABLET    Take 1 tablet (5 mg total) by mouth once daily.    BUDESONIDE-FORMOTEROL 160-4.5 MCG (SYMBICORT) 160-4.5  MCG/ACTUATION HFAA    Inhale 2 puffs into the lungs every 12 (twelve) hours. Controller    ERGOCALCIFEROL (ERGOCALCIFEROL) 50,000 UNIT CAP    TAKE 1 CAPSULE BY MOUTH EVERY 7 DAYS    FLUTICASONE PROPIONATE (FLONASE) 50 MCG/ACTUATION NASAL SPRAY    1 spray (50 mcg total) by Each Nostril route once daily.    LOSARTAN (COZAAR) 25 MG TABLET    Take 1 tablet (25 mg total) by mouth once daily.    MACITENTAN (OPSUMIT) 10 MG TAB    Take 10 mg by mouth once daily.    MONTELUKAST (SINGULAIR) 10 MG TABLET    Take 1 tablet (10 mg total) by mouth every evening.    MYCOPHENOLATE (CELLCEPT) 500 MG TAB    Take 3 tablets (1,500 mg total) by mouth 2 (two) times daily.    NINTEDANIB 150 MG CAP    Take 150 mg by mouth every 12 (twelve) hours.    ONDANSETRON (ZOFRAN) 8 MG TABLET        PANTOPRAZOLE (PROTONIX) 40 MG TABLET    Take 1 tablet (40 mg total) by mouth 2 (two) times a day.    SILDENAFIL (REVATIO) 20 MG TAB        TADALAFIL (CIALIS) 20 MG TAB    Take 1 tablet (20 mg total) by mouth 2 (two) times daily.    TRAZODONE (DESYREL) 50 MG TABLET    TAKE 2 TABLETS BY MOUTH NIGHTLY AS NEEDED FOR INSOMNIA    VENLAFAXINE (EFFEXOR-XR) 150 MG CP24    Take 1 capsule (150 mg total) by mouth once daily.   Discontinued Medications    AMOXICILLIN-CLAVULANATE 875-125MG (AUGMENTIN) 875-125 MG PER TABLET    Take 1 tablet by mouth every 12 (twelve) hours.    BENZONATATE (TESSALON) 200 MG CAPSULE    TAKE 1 CAPSULE BY MOUTH THREE TIMES A DAY IF NEEDED FOR COUGH    PREDNISONE (DELTASONE) 5 MG TABLET    6 tabs the first day, then 5 the next, then 4, 3, 2, 1    TADALAFIL (CIALIS) 20 MG TAB    Take 1 tablet (20 mg total) by mouth once daily.       Assessment/Plans:-   65 y.o. pleasant female with PMH of SSc, PAH, ILD, GERD, and esophageal dysmotility present to rheumatology clinic to establish care.     Patient had been diagnosed with SSc for over a decade and was previously under the care of multidisplinary team in [facility].  Since Dr. Barnes's departure -  patient had not follow with rheumatology for over a year.     SSc with ILD/PAH, esophageal dysmotility and GERD complications.   Currently on MMF 1500mg BID  Opsumit (Macitentan) 10mg daily  Nintedanib 150mg BID   Tadalafil 20mg BID     Recent follow up with Pulmonary (Dr. Pérez) - had PFTs done in Aug 2020 (pending results in [facility]).  Patient wants to move her care at this time to the [facility]    Recent follow up with GI (Dr. Prince) - recent EGD (Sept 2020)  Chronic moderate dysphagia primarily to solids, mild Crohn's disease and esophageal leukoplakia.    Due for repeat colonoscopy in 2024.  Concurrently on PPI b.i.d. and a trial of Reglan.    Uptodate on all vaccination   - Shringrix - completed 2020  - Pneumovax 2019 - due again in 2024  - Prevenar completed Aug 2019    Plan  - Medication monitoring - CBC, CMP  - ESR/CRP  - [facility]   - Continue Cellcept 1500mg BID   - Continue management of ILD as per pulmonary   - Patient currently on clinical trial (for a few years already) with Nintedanib - continue follow and management as per clinical trial  - Follow up with GI as previously scheduled  - Will need monitoring of PFTs and ECHO - ILD and PAH   - Education provided to obtain flu vaccination when available  - Education provided to notify us immediately if + infectious process - Cellcept with need to be held   - monitoring labs to be completed prior to next visit    Follow up in about 3 months (around 12/9/2020).    Thank you for allowing me to participate in the care ofEdith TALLEY [place].    Disclaimer: This note was prepared using voice recognition system and is likely to have sound alike errors and is not proof read.  Please call me with any questions.

## 2020-09-09 NOTE — PROGRESS NOTES
Chart was reviewed for overdue [facility]active [facility]coun[facility] (JESUS MANUEL)  topics  Updates were requested from care everywhere  Health Maintenance has been updated  LINKS immunization registry triggered

## 2020-09-18 ENCOUNTER — OFFICE VISIT (OUTPATIENT)
Dept: FAMILY MEDICINE | Facility: CLINIC | Age: 65
End: 2020-09-18
Payer: MEDICARE

## 2020-09-18 VITALS
SYSTOLIC BLOOD PRESSURE: 126 MMHG | RESPIRATION RATE: 16 BRPM | OXYGEN SATURATION: 98 % | DIASTOLIC BLOOD PRESSURE: 82 MMHG | TEMPERATURE: 98 F | HEIGHT: 66 IN | BODY MASS INDEX: 32.27 KG/M2 | WEIGHT: 200.81 LBS | HEART RATE: 88 BPM

## 2020-09-18 DIAGNOSIS — J84.9 INTERSTITIAL LUNG DISEASE: ICD-10-CM

## 2020-09-18 DIAGNOSIS — I10 ESSENTIAL HYPERTENSION: ICD-10-CM

## 2020-09-18 DIAGNOSIS — M34.9 SCLERODERMA: Primary | ICD-10-CM

## 2020-09-18 DIAGNOSIS — M34.1 SCLERODERMA OF ESOPHAGUS: ICD-10-CM

## 2020-09-18 PROCEDURE — G0008 PR ADMIN INFLUENZA VIRUS VAC: ICD-10-PCS | Mod: S$GLB,,, | Performed by: INTERNAL MEDICINE

## 2020-09-18 PROCEDURE — 99214 OFFICE O/P EST MOD 30 MIN: CPT | Mod: 25,S$GLB,, | Performed by: INTERNAL MEDICINE

## 2020-09-18 PROCEDURE — 99214 PR OFFICE/OUTPT VISIT, EST, LEVL IV, 30-39 MIN: ICD-10-PCS | Mod: 25,S$GLB,, | Performed by: INTERNAL MEDICINE

## 2020-09-18 PROCEDURE — 90694 FLU VACCINE - QUADRIVALENT - ADJUVANTED: ICD-10-PCS | Mod: S$GLB,,, | Performed by: INTERNAL MEDICINE

## 2020-09-18 PROCEDURE — 90694 VACC AIIV4 NO PRSRV 0.5ML IM: CPT | Mod: S$GLB,,, | Performed by: INTERNAL MEDICINE

## 2020-09-18 PROCEDURE — G0008 ADMIN INFLUENZA VIRUS VAC: HCPCS | Mod: S$GLB,,, | Performed by: INTERNAL MEDICINE

## 2020-09-18 RX ORDER — METOCLOPRAMIDE 5 MG/1
5 TABLET ORAL 2 TIMES DAILY PRN
COMMUNITY
End: 2021-08-09 | Stop reason: SDUPTHER

## 2020-09-18 NOTE — PROGRESS NOTES
"Subjective:      9:26 AM     Patient ID: Edith Tran is a 65 y.o. female.    Chief Complaint: Hypertension (labs,no refills needed)    HPI   The patient has scleroderma.  It is affected her esophagus  for which she is being treated with Reglan.  She has interstitial lung disease is from it and is on oxygen.  She has shortness of breath with exertion.  She has constant pain in her fingers which are swollen all the time.        CHIEF COMPLAINT: Hypertension  HPI:     ONSET:      QUALITY/COURSE:   Unchanged.     INTENSITY/SEVERITY:  Average blood pressure is 120/80.      MODIFIERS/TREATMENTS:  Taking medications: yes. .High sodium intake: no. alcohol: no      The following symptoms are positive only if BOLDED, otherwise are negative.      SYMPTOMS/RELATED: Possible medication side effects include:   Depression..  . Cough. . Constipation.    REVIEW OF SYMPTOMS: . Weight_loss . Weight_gain . Leg_cramps ..    TARGET ORGAN DAMAGE:: angina/ prior myocardial infarction, chronic kidney disease, heart failure, left ventricular hypertrophy, peripheral artery disease, prior coronary revascularization, retinopathy, stroke. transient ischemic attack.      Review of Systems   Constitutional: Negative for diaphoresis.   Eyes: Negative for visual disturbance.   Respiratory: Positive for shortness of breath. Negative for chest tightness.    Cardiovascular: Negative for chest pain.   Musculoskeletal: Positive for arthralgias.   Neurological: Positive for headaches (Mild tension headaches). Negative for dizziness, syncope and weakness.   Psychiatric/Behavioral: Negative for dysphoric mood. The patient is not nervous/anxious.          Objective:      Vitals:    09/18/20 0906   BP: 126/82   Pulse: 88   Resp: 16   Temp: 98.3 °F (36.8 °C)   TempSrc: Oral   SpO2: 98%   Weight: 91.1 kg (200 lb 13.4 oz)   Height: 5' 6" (1.676 m)   PainSc: 0-No pain     Physical Exam  Vitals signs and nursing note reviewed.   Constitutional:       Appearance: " "She is well-developed.   Cardiovascular:      Rate and Rhythm: Normal rate and regular rhythm.      Heart sounds: Normal heart sounds.   Pulmonary:      Effort: Pulmonary effort is normal.      Breath sounds: Normal breath sounds.   Abdominal:      Palpations: Abdomen is soft.      Tenderness: There is no abdominal tenderness.   Neurological:      Mental Status: She is alert.   Psychiatric:         Behavior: Behavior normal.         Thought Content: Thought content normal.       Recent Results (from the past 1008 hour(s))   COVID-19 Routine Screening    Collection Time: 08/17/20  8:48 AM   Result Value Ref Range    SARS-CoV2 (COVID-19) Qualitative PCR Not Detected Not Detected   Specimen to Pathology, Surgery Gastrointestinal tract    Collection Time: 08/20/20 10:53 AM   Result Value Ref Range    Final Pathologic Diagnosis       1.  Abnormal mucosa, esophagus (biopsy):  - Esophageal leukoplakia (see comment)  - No evidence of dysplasia or malignancy, multiple levels examined  - No fungal organism; GMS stain with appropriate control is negative  2.  Ulceration, esophagus (biopsy):  - Focal acute esophagitis with ulceration  - No dysplasia or malignancy; multiple levels examined  - GMS stain with appropriate control is negative for fungal organism  - No viral inclusion, CMV, HSV 1 and HSV 2 immunostains with appropriate  controls are negative      Gross       Number of containers:  2  Part 1  Container Label: Clinic Number/AP Number:  [id number], and "abnormal esophageal "  Received in formalin are 2 soft tan-white tissue fragments measuring 2 and 3  mm in greatest dimension.  Specimen is stained with Hematoxylin and entirely  submitted in NAR--1-A.  Part 2  Container Label: Clinic Number/AP Number:  [id number], and "esophageal  ulceration "  Received in formalin is a 2 mm soft tan-white tissue fragment.  Specimen is  stained with Hematoxylin and entirely submitted in EDP--2-A.  Dunia Tucker      Comment       " For part 1:  There is hyperkeratosis and hypergranulosis on H&E stained  sections.  In combination with abnormal findings in endoscopic, this is  esophageal leukoplakia, which is associated with dysmotility (esophageal  stenosis) or irritation.      Disclaimer       This test was developed and its performance characteristics determined by  [facility], Department of Pathology and Laboratory Medicine. It  has not been cleared or approved by the US Food and Drug Administration. The  FDA has determined that such clearance or approval is not necessary. This  test is used for clinical purposes. It should not be regarded as  investigational or for research. This laboratory is certified under the  Clinical Laboratory Improvement Admendments (CLIA) as qualified to perform  such high complexity clinical laboratory testing     CBC auto differential    Collection Time: 09/09/20 11:16 AM   Result Value Ref Range    WBC 5.87 3.90 - 12.70 K/uL    RBC 4.43 4.00 - 5.40 M/uL    Hemoglobin 13.5 12.0 - 16.0 g/dL    Hematocrit 42.3 37.0 - 48.5 %    Mean Corpuscular Volume 96 82 - 98 fL    Mean Corpuscular Hemoglobin 30.5 27.0 - 31.0 pg    Mean Corpuscular Hemoglobin Conc 31.9 (L) 32.0 - 36.0 g/dL    RDW 12.8 11.5 - 14.5 %    Platelets 186 150 - 350 K/uL    MPV 11.4 9.2 - 12.9 fL    Immature Granulocytes 0.2 0.0 - 0.5 %    Gran # (ANC) 4.1 1.8 - 7.7 K/uL    Immature Grans (Abs) 0.01 0.00 - 0.04 K/uL    Lymph # 1.1 1.0 - 4.8 K/uL    Mono # 0.5 0.3 - 1.0 K/uL    Eos # 0.1 0.0 - 0.5 K/uL    Baso # 0.05 0.00 - 0.20 K/uL    nRBC 0 0 /100 WBC    Gran% 69.4 38.0 - 73.0 %    Lymph% 19.3 18.0 - 48.0 %    Mono% 8.3 4.0 - 15.0 %    Eosinophil% 1.9 0.0 - 8.0 %    Basophil% 0.9 0.0 - 1.9 %    Differential Method Automated    Comprehensive metabolic panel    Collection Time: 09/09/20 11:16 AM   Result Value Ref Range    Sodium 142 136 - 145 mmol/L    Potassium 4.2 3.5 - 5.1 mmol/L    Chloride 104 95 - 110 mmol/L    CO2 28 23 - 29 mmol/L     Glucose 88 70 - 110 mg/dL    BUN, Bld 9 8 - 23 mg/dL    Creatinine 0.8 0.5 - 1.4 mg/dL    Calcium 10.3 8.7 - 10.5 mg/dL    Total Protein 7.5 6.0 - 8.4 g/dL    Albumin 4.3 3.5 - 5.2 g/dL    Total Bilirubin 0.6 0.1 - 1.0 mg/dL    Alkaline Phosphatase 59 55 - 135 U/L    AST 19 10 - 40 U/L    ALT 15 10 - 44 U/L    Anion Gap 10 8 - 16 mmol/L    eGFR if African American >60 >60 mL/min/1.73 m^2    eGFR if non African American >60 >60 mL/min/1.73 m^2   C-Reactive Protein    Collection Time: 09/09/20 11:16 AM   Result Value Ref Range    CRP 2.8 0.0 - 8.2 mg/L   Sedimentation rate    Collection Time: 09/09/20 11:16 AM   Result Value Ref Range    Sed Rate 13 0 - 20 mm/Hr    The 10-year ASCVD risk score (Rogerswilber MCKEON Jr., et al., 2013) is: 7%    Values used to calculate the score:      Age: 65 years      Sex: Female      Is Non- : No      Diabetic: No      Tobacco smoker: No      Systolic Blood Pressure: 126 mmHg      Is BP treated: Yes      HDL Cholesterol: 62 mg/dL      Total Cholesterol: 206 mg/dL        Assessment:       1. Scleroderma    2. Interstitial lung disease    3. Essential hypertension    4. Scleroderma of esophagus          Plan:       Scleroderma  -     CBC auto differential; Future; Expected date: 09/18/2020    Interstitial lung disease    Essential hypertension  -     CBC auto differential; Future; Expected date: 09/18/2020  -     Comprehensive metabolic panel; Future; Expected date: 09/18/2020  -     Lipid Panel; Future; Expected date: 09/18/2020    Scleroderma of esophagus      Follow up in about 3 months (around 12/18/2020).

## 2020-09-18 NOTE — PATIENT INSTRUCTIONS
If blood pressures above 160 systolic take an extra losartan and contact us        Avoid  alcohol  Low-Salt Diet  This diet removes foods that are high in salt. It also limits the amount of salt you use when cooking. It is most often used for people with high blood pressure, edema (fluid retention), and kidney, liver, or heart disease.  Table salt contains the mineral sodium. Your body needs sodium to work normally. But too much sodium can make your health problems worse. Your healthcare provider is recommending a low-salt (also called low-sodium) diet for you. Your total daily allowance of salt is 1,500 to 2,300 milligrams (mg). It is less than 1 teaspoon of table salt. This means you can have only about 500 to 700 mg of sodium at each meal. People with certain health problems should limit salt intake to the lower end of the recommended range.    When you cook, don´t add much salt. If you can cook without using salt, even better. Don´t add salt to your food at the table.  When shopping, read food labels. Salt is often called sodium on the label. Choose foods that are salt-free, low salt, or very low salt. Note that foods with reduced salt may not lower your salt intake enough.    Beans, potatoes, and pasta  Ok: Dry beans, split peas, lentils, potatoes, rice, macaroni, pasta, spaghetti without added salt  Avoid: Potato chips, tortilla chips, and similar products  Breads and cereals  Ok: Low-sodium breads, rolls, cereals, and cakes; low-salt crackers, matzo crackers  Avoid: Salted crackers, pretzels, popcorn, Setswana toast, pancakes, muffins  Dairy  Ok: Milk, chocolate milk, hot chocolate mix, low-salt cheeses, and yogurt  Avoid: Processed cheese and cheese spreads; Roquefort, Camembert, and cottage cheese; buttermilk, instant breakfast drink  Desserts  Ok: Ice cream, frozen yogurt, juice bars, gelatin, cookies and pies, sugar, honey, jelly, hard candy  Avoid: Most pies, cakes and cookies prepared or processed with  salt; instant pudding  Drinks  Ok: Tea, coffee, fizzy (carbonated) drinks, juices  Avoid: Flavored coffees, electrolyte replacement drinks, sports drinks  Meats  Ok: All fresh meat, fish, poultry, low-salt tuna, eggs, egg substitute  Avoid: Smoked, pickled, brine-cured, or salted meats and fish. This includes velazco, chipped beef, corned beef, hot dogs, deli meats, ham, kosher meats, salt pork, sausage, canned tuna, salted codfish, smoked salmon, herring, sardines, or anchovies.  Seasonings and spices  Ok: Most seasonings are okay. Good substitutes for salt include: fresh herb blends, hot sauce, lemon, garlic, guajardo, vinegar, dry mustard, parsley, cilantro, horseradish, tomato paste, regular margarine, mayonnaise, unsalted butter, cream cheese, vegetable oil, cream, low-salt salad dressing and gravy.  Avoid: Regular ketchup, relishes, pickles, soy sauce, teriyaki sauce, Worcestershire sauce, BBQ sauce, tartar sauce, meat tenderizer, chili sauce, regular gravy, regular salad dressing, salted butter  Soups  Ok: Low-salt soups and broths made with allowed foods  Avoid: Bouillon cubes, soups with smoked or salted meats, regular soup and broth  Vegetables  Ok: Most vegetables are okay; also low-salt tomato and vegetable juices  Avoid: Sauerkraut and other brine-soaked vegetables; pickles and other pickled vegetables; tomato juice, olives  © 3656-6277 [employer]. [address] [place] [address], [place] [address]. All rights reserved. This information is not intended as a substitute for professional medical care. Always follow your healthcare professional's instructions.    Thank you for choosing [facility].     Please fill out the patient experience survey.

## 2020-09-30 ENCOUNTER — OFFICE VISIT (OUTPATIENT)
Dept: PULMONOLOGY | Facility: CLINIC | Age: 65
End: 2020-09-30
Payer: MEDICARE

## 2020-09-30 ENCOUNTER — EXTERNAL CHRONIC CARE MANAGEMENT (OUTPATIENT)
Dept: PRIMARY CARE CLINIC | Facility: CLINIC | Age: 65
End: 2020-09-30
Payer: MEDICARE

## 2020-09-30 VITALS
WEIGHT: 205.5 LBS | OXYGEN SATURATION: 97 % | BODY MASS INDEX: 33.03 KG/M2 | SYSTOLIC BLOOD PRESSURE: 109 MMHG | HEART RATE: 111 BPM | RESPIRATION RATE: 18 BRPM | HEIGHT: 66 IN | DIASTOLIC BLOOD PRESSURE: 65 MMHG

## 2020-09-30 DIAGNOSIS — G47.33 OBSTRUCTIVE SLEEP APNEA: Primary | ICD-10-CM

## 2020-09-30 DIAGNOSIS — J44.9 CHRONIC OBSTRUCTIVE PULMONARY DISEASE, UNSPECIFIED COPD TYPE: ICD-10-CM

## 2020-09-30 DIAGNOSIS — J44.9 COPD MIXED TYPE: ICD-10-CM

## 2020-09-30 DIAGNOSIS — J84.10 PULMONARY FIBROSIS: ICD-10-CM

## 2020-09-30 DIAGNOSIS — I27.20 PULMONARY HYPERTENSION: ICD-10-CM

## 2020-09-30 DIAGNOSIS — R09.89 CHRONIC SINUS COMPLAINTS: ICD-10-CM

## 2020-09-30 DIAGNOSIS — I27.20 PULMONARY HTN: ICD-10-CM

## 2020-09-30 DIAGNOSIS — J47.9 BRONCHIECTASIS WITHOUT COMPLICATION: ICD-10-CM

## 2020-09-30 DIAGNOSIS — R05.9 COUGH: ICD-10-CM

## 2020-09-30 DIAGNOSIS — J84.9 INTERSTITIAL LUNG DISEASE: ICD-10-CM

## 2020-09-30 DIAGNOSIS — Z99.81 ON HOME OXYGEN THERAPY: ICD-10-CM

## 2020-09-30 PROCEDURE — 99214 OFFICE O/P EST MOD 30 MIN: CPT | Mod: PBBFAC,PO | Performed by: INTERNAL MEDICINE

## 2020-09-30 PROCEDURE — 99214 PR OFFICE/OUTPT VISIT, EST, LEVL IV, 30-39 MIN: ICD-10-PCS | Mod: S$PBB,,, | Performed by: INTERNAL MEDICINE

## 2020-09-30 PROCEDURE — 99490 PR CHRONIC CARE MGMT, 1ST 20 MIN: ICD-10-PCS | Mod: S$GLB,,, | Performed by: INTERNAL MEDICINE

## 2020-09-30 PROCEDURE — 99999 PR PBB SHADOW E&M-EST. PATIENT-LVL IV: ICD-10-PCS | Mod: PBBFAC,,, | Performed by: INTERNAL MEDICINE

## 2020-09-30 PROCEDURE — 99490 CHRNC CARE MGMT STAFF 1ST 20: CPT | Mod: S$GLB,,, | Performed by: INTERNAL MEDICINE

## 2020-09-30 PROCEDURE — 99214 OFFICE O/P EST MOD 30 MIN: CPT | Mod: S$PBB,,, | Performed by: INTERNAL MEDICINE

## 2020-09-30 PROCEDURE — 99999 PR PBB SHADOW E&M-EST. PATIENT-LVL IV: CPT | Mod: PBBFAC,,, | Performed by: INTERNAL MEDICINE

## 2020-09-30 RX ORDER — MONTELUKAST SODIUM 10 MG/1
10 TABLET ORAL NIGHTLY
Qty: 30 TABLET | Refills: 11 | Status: SHIPPED | OUTPATIENT
Start: 2020-09-30 | End: 2022-03-17 | Stop reason: SDUPTHER

## 2020-09-30 RX ORDER — BUDESONIDE AND FORMOTEROL FUMARATE DIHYDRATE 160; 4.5 UG/1; UG/1
2 AEROSOL RESPIRATORY (INHALATION) EVERY 12 HOURS
Qty: 1 INHALER | Refills: 11 | Status: SHIPPED | OUTPATIENT
Start: 2020-09-30 | End: 2021-08-09 | Stop reason: SDUPTHER

## 2020-09-30 RX ORDER — ALBUTEROL SULFATE 90 UG/1
AEROSOL, METERED RESPIRATORY (INHALATION)
Qty: 8 G | Refills: 18 | Status: SHIPPED | OUTPATIENT
Start: 2020-09-30 | End: 2021-01-06 | Stop reason: SDUPTHER

## 2020-09-30 RX ORDER — PREDNISONE 5 MG/1
TABLET ORAL
Qty: 42 TABLET | Refills: 1 | Status: SHIPPED | OUTPATIENT
Start: 2020-09-30 | End: 2021-09-16

## 2020-09-30 RX ORDER — AZITHROMYCIN 500 MG/1
TABLET, FILM COATED ORAL
Qty: 3 TABLET | Refills: 3 | Status: SHIPPED | OUTPATIENT
Start: 2020-09-30 | End: 2021-09-16

## 2020-09-30 NOTE — PROGRESS NOTES
9/30/2020    Edith Tran  Office Note    Chief Complaint   Patient presents with    Follow-up     no increased SOB, no complaints       HPI:  9/30/2020- uses 02 out of house, had scratchy throat with am cough lately. Uses cpap nightly all night usually 4-7 hrs/night.      Sees Dr Arboleda- Dr Barnes left.    Had been seeing Dr Goddard, had pft and 6 min walk, saw Dr Goddard on 8/3/2020 - told all stable,  Cannot locate pft /walk test in care everywhere.  Usually studied yearly.  Pt feels stable otherwise.  3/27/2019 ct chest viewed with ild/honeycombing.  Follow[facility].    Pt continues on ofev and cellcept and cialis and opsumit     October 8, 2019- Has PFT and 6 min walk scheduled Nov 2019 by Dr. Barnes Rheumatologist at [facility]. Wearing supplemental oxygen at home day/night set at 2L NC, currently on Symbicort daily, states benefit in breathing. No recent prednisone use. SOB controlled. No cough, no chest tightness, no wheeze.   Wear cpap nightly, states benefiting greatly but want different mask, tries to read before bed, mask over nasal bridge does not allow glasses to rest correctly.  Patient Instructions   Order sent for different CPAP mask  Continue to use nightly for Obstructive sleep apnea    Continue supplemental oxygen    Will review the results of PFT and 6 min walk from [place] at next appointment.    Continue Symbicort daily.     Use Fela perles and prednisone as needed for cough.     April 3, 2019- Onset 1 week: Cough productive white in color, sore throat, post nasal drip, sinus drainage yellow, flushed cheeks, complaints improving tx with antibiotics no prednisone use. Cough worse when lying down.    Current therapy for scleroderma, pulmonary htn, and pulmonary fibrosis Opsumit/tadafanil and Ofev/cellcept. No diarrhea no complaints.  Currently using Symbicort 1 puff daily. Using supplemental oxygen at 2L NC for Shortness of breath, states greatly beneficial, pt states able to  "walk further and keep up with company while on oxygen.      Nov 27, trelegy not covered- not using, had flu shot 8 am with sorenes later day and huge swollen arm with  Pain thhat night.   No cough. On opsumit/tadafanil, and on ofev/cellcept.  Stable. No diarrhea.  Sees pulm htn and  [facility].  Last 6 min walk 02  Angelo 91 slow pace.      Aug 21, uses trelegy, no c/o.  No 0x arranges- sat 90 falls to 87 at 2 mins- walked 307 meters or 71%.  Dx Crohn's.  No abx nor prednisone.  cpap good and sinus good.  Instructions:Would monitor 6 min walk every 3 months.    307 meters was last distance.  Six min walk.  Monitor ct chest yearly in March - sooner if worsens.  Needs 02 for activity.  Use medications for bronchitis.   Stay active.    July11,2018has had raynauld's for yrs, pt had severe mobility problems issues leading to dx slceroderma 2007 - "rock bottom".  Has had swallow sticking with  2-3 dilations with last over 3 yrs ago.  Pt has had pulm htn on opsumit and talafadil,  Pt also on ofev in a study, and cellcept.  Also low dose prednisone.  Pt dx osas and uses cpap nightly 8 hrs - had used 02 but off 02 for 3 yrs.   Pt had had 6 min walks but none recently- none last yr at least.  Pt gets bronchitis with cough and yellow mucous.  Has occ wheezes.  Had exacerbations in feb and June - typically 2-3 yrly.  Tessalon helps.  Uses combivent occ ppt headaches with some breathing benefit.     Uses flonase regular.  Was on asthma rx for yrs.        The chief compliant  problem is stable  PFSH:  Past Medical History:   Diagnosis Date    Allergy     COPD (chronic obstructive pulmonary disease)     GERD (gastroesophageal reflux disease)     Kidney stones     Scleroderma involving lung since she was 47 y/o         Past Surgical History:   Procedure Laterality Date    COLONOSCOPY N/A 8/7/2018    Procedure: COLONOSCOPY;  Surgeon: Ngozi Prince MD;  [facility];  Service: Endoscopy;  Laterality: N/A;    " COLONOSCOPY N/A 2019    Procedure: COLONOSCOPY;  Surgeon: Ngozi Prince MD;  Location: [facility] ENDO;  Service: Endoscopy;  Laterality: N/A;    ESOPHAGEAL MANOMETRY WITH MEASUREMENT OF IMPEDANCE N/A 2020    Procedure: MANOMETRY, ESOPHAGUS, WITH IMPEDANCE MEASUREMENT;  Surgeon: Bhupendra Temple MD;  Location: [facility] ENDO (4TH FLR);  Service: Endoscopy;  Laterality: N/A;  3/10 - pt confirmed apptCovid test ordered for  in [place]    ESOPHAGOGASTRODUODENOSCOPY N/A 2018    Procedure: EGD (ESOPHAGOGASTRODUODENOSCOPY);  Surgeon: Ngozi Prince MD;  Location: [facility] ENDO;  Service: Endoscopy;  Laterality: N/A;    ESOPHAGOGASTRODUODENOSCOPY N/A 2019    Procedure: EGD (ESOPHAGOGASTRODUODENOSCOPY);  Surgeon: Ngozi Prince MD;  Location: [facility] END[facility];  Service: Endoscopy;  Laterality: N/A;    ESOPHAGOGASTRODUODENOSCOPY N/A 2020    Procedure: EGD (ESOPHAGOGASTRODUODENOSCOPY);  Surgeon: Ngozi Prince MD;  Location: [facility] END[facility];  Service: Endoscopy;  Laterality: N/A;    ESOPHAGOGASTRODUODENOSCOPY N/A 2020    Procedure: EGD (ESOPHAGOGASTRODUODENOSCOPY);  Surgeon: Ngozi Prince MD;  Location: [facility] END[facility];  Service: Endoscopy;  Laterality: N/A;     Social History     Tobacco Use    Smoking status: Former Smoker     Quit date: 1995     Years since quittin.0    Smokeless tobacco: Never Used   Substance Use Topics    Alcohol use: No     Frequency: Monthly or less     Drinks per session: 1 or 2     Binge frequency: Never    Drug use: No     Family History   Problem Relation Age of Onset    Kidney disease Mother     Cancer Father     Heart disease Brother     Mental illness Son     Glaucoma Neg Hx     Macular degeneration Neg Hx     Retinal detachment Neg Hx      Review of patient's allergies indicates:   Allergen Reactions    Plaquenil [hydroxychloroquine]      Having eye reaction, needs to stop plaquenil and stay off of it.        Performance Status:The patient's  "activity level is housebound activities.      Review of Systems:  a review of eleven systems covering constitutional, Eye, HEENT, Psych, Respiratory, Cardiac, GI, , Musculoskeletal, Endocrine, Dermatologic was negative except for pertinent findings as listed ABOVE and below:  pertinent positive as above, rest is good       Exam:Comprehensive exam done. /65   Pulse (!) 111   Resp 18   Ht 5' 6" (1.676 m)   Wt 93.2 kg (205 lb 7.5 oz)   SpO2 97% Comment: room air at rest  BMI 33.16 kg/m²   Exam included Vitals as listed, and patient's appearance and affect and alertness and mood, oral exam for yeast and hygiene and pharynx lesions and Mallapatti (M) score, neck with inspection for jvd and masses and thyroid abnormalities and lymph nodes (supraclavicular and infraclavicular nodes and axillary also examined and noted if abn), chest exam included symmetry and effort and fremitus and percussion and auscultation, cardiac exam included rhythm and gallops and murmur and rubs and jvd and edema, abdominal exam for mass and hepatosplenomegaly and tenderness and hernias and bowel sounds, Musculoskeletal exam with muscle tone and posture and mobility/gait and  strength, and skin for rashes and cyanosis and pallor and turgor, extremity for clubbing.  Findings were normal except for pertinent findings listed below:M2, bilat rales. No  Edema nor clubbing.       Radiographs (ct chest and cxr) reviewed: view by direct vision  March 2018 ct not too bad with cysts- viewed 8/21/18  CT CHEST WITHOUT CONTRAST 03/27/2019   Severe interstitial fibrosis with peripheral honeycombing and multiple bilateral lower lobe bulla consistent with the history of scleroderma.  This is essentially unchanged from March 21, 2018.  Continued mild mediastinal adenopathy also unchanged.  No acute findings.  Small hiatal hernia.  Stable 2 cm complicated cyst of the left kidney.       Labs  noted  Lab Results   Component Value Date    WBC 5.87 " 09/09/2020    HGB 13.5 09/09/2020    HCT 42.3 09/09/2020    MCV 96 09/09/2020     09/09/2020        PFT results reviewed   Pulmonary Functions, including spirometry and bronchodilator response and lung volumes and diffusion, study was done May 8, 2018.  Spirometry shows mild obstruction, loss vital capacity and obstruction and no bronchodilator response.   FEV1 is 65% or 1.68 liters.  Lung volumes show  loss of TLC with restriction 66%.  Diffusion shows reduced but falls within normal range when corrected for lung volumes.   Pulmonary functions show  Mild obstruction and also restriction. Clinical correlation recommended.   Mikal Serrano M.D.    Echo 10/12/2018 Normal left and right ventricular systolic functions with LVEF >55%.    Plan:  Clinical impression is apparently straight forward and impression with management as below.     Edith was seen today for follow-up.    Diagnoses and all orders for this visit:    Obstructive sleep apnea    Interstitial lung disease  -     budesonide-formoterol 160-4.5 mcg (SYMBICORT) 160-4.5 mcg/actuation HFAA; Inhale 2 puffs into the lungs every 12 (twelve) hours. Controller  -     predniSONE (DELTASONE) 5 MG tablet; 6 tabs the first day, then 5 the next, then 4, 3, 2, 1- may repeat for bronchitis.  -     azithromycin (ZITHROMAX) 500 MG tablet; One daily for yellow mucous, repeat if needed    COPD mixed type  -     budesonide-formoterol 160-4.5 mcg (SYMBICORT) 160-4.5 mcg/actuation HFAA; Inhale 2 puffs into the lungs every 12 (twelve) hours. Controller  -     predniSONE (DELTASONE) 5 MG tablet; 6 tabs the first day, then 5 the next, then 4, 3, 2, 1- may repeat for bronchitis.  -     azithromycin (ZITHROMAX) 500 MG tablet; One daily for yellow mucous, repeat if needed    Bronchiectasis without complication  -     albuterol (PROVENTIL/VENTOLIN HFA) 90 mcg/actuation inhaler; 2 puffs every 4 hours as needed for cough, wheeze, or shortness of breath    Chronic obstructive  pulmonary disease, unspecified COPD type  -     albuterol (PROVENTIL/VENTOLIN HFA) 90 mcg/actuation inhaler; 2 puffs every 4 hours as needed for cough, wheeze, or shortness of breath  -     montelukast (SINGULAIR) 10 mg tablet; Take 1 tablet (10 mg total) by mouth every evening.    Chronic sinus complaints  -     montelukast (SINGULAIR) 10 mg tablet; Take 1 tablet (10 mg total) by mouth every evening.    Cough  -     predniSONE (DELTASONE) 5 MG tablet; 6 tabs the first day, then 5 the next, then 4, 3, 2, 1- may repeat for bronchitis.    Pulmonary fibrosis  -     Stress test, pulmonary; Standing  -     Complete PFT without bronchodilator; Future    Pulmonary hypertension  -     Stress test, pulmonary; Standing  -     Complete PFT without bronchodilator; Future    Pulmonary HTN        Follow up in about 6 months (around 3/30/2021), or if symptoms worsen or fail to improve.    Discussed with patient above for education the following:      Patient Instructions   If antibiotic needed - azithromycin daily for 3 days, last 10, simple antibiotic.     Prednisone 5 mg - may use if cough bad.    Should have result of right heart cath, and august pulm function and six min walk- will ask to obtain.  Will need to make sure can continue opsumit/cialias    Can follow six min walk every 3 months - placed standing.    You need to stay on ofev for pulmonary fibrosis, uses tadalafil and optusmit for pulmonary hypertension.

## 2020-09-30 NOTE — PATIENT INSTRUCTIONS
If antibiotic needed - azithromycin daily for 3 days, last 10, simple antibiotic.     Prednisone 5 mg - may use if cough bad.    Should have result of right heart cath, and august pulm function and six min walk- will ask to obtain.  Will need to make sure can continue opsumit/maritza    Can follow six min walk every 3 months - placed standing.    You need to stay on ofev for pulmonary fibrosis, uses tadalafil and optusmit for pulmonary hypertension.

## 2020-10-07 ENCOUNTER — OFFICE VISIT (OUTPATIENT)
Dept: OPTOMETRY | Facility: CLINIC | Age: 65
End: 2020-10-07
Payer: MEDICARE

## 2020-10-07 DIAGNOSIS — H25.13 NUCLEAR SCLEROSIS, BILATERAL: Primary | ICD-10-CM

## 2020-10-07 DIAGNOSIS — H31.002 CHORIORETINAL SCAR OF LEFT EYE: ICD-10-CM

## 2020-10-07 DIAGNOSIS — H52.7 REFRACTIVE ERROR: ICD-10-CM

## 2020-10-07 PROCEDURE — 92015 DETERMINE REFRACTIVE STATE: CPT | Mod: ,,, | Performed by: OPTOMETRIST

## 2020-10-07 PROCEDURE — 92014 COMPRE OPH EXAM EST PT 1/>: CPT | Mod: S$PBB,,, | Performed by: OPTOMETRIST

## 2020-10-07 PROCEDURE — 99213 OFFICE O/P EST LOW 20 MIN: CPT | Mod: PBBFAC,PO | Performed by: OPTOMETRIST

## 2020-10-07 PROCEDURE — 92014 PR EYE EXAM, EST PATIENT,COMPREHESV: ICD-10-PCS | Mod: S$PBB,,, | Performed by: OPTOMETRIST

## 2020-10-07 PROCEDURE — 99999 PR PBB SHADOW E&M-EST. PATIENT-LVL III: ICD-10-PCS | Mod: PBBFAC,,, | Performed by: OPTOMETRIST

## 2020-10-07 PROCEDURE — 92015 PR REFRACTION: ICD-10-PCS | Mod: ,,, | Performed by: OPTOMETRIST

## 2020-10-07 PROCEDURE — 99999 PR PBB SHADOW E&M-EST. PATIENT-LVL III: CPT | Mod: PBBFAC,,, | Performed by: OPTOMETRIST

## 2020-10-07 NOTE — PROGRESS NOTES
HPI     C/o decreased vision right eye gradually noticing trouble with with   distance vision when watching tv and driving. Denies eye pain today or eye   injury.  Using OTC gel at bedtime.     Last edited by Danielle Padilla on 10/7/2020  9:48 AM. (History)            Assessment /Plan     For exam results, see Encounter Report.    Nuclear sclerosis, bilateral    Refractive error    Chorioretinal scar of left eye      Mild cataracts OU. Discussed possible ocular affects of cataracts. Acceptable BCVA OU. Discussed treatment options. Surgery not recommended at this time. Monitor yearly.     Refractive error OU. Dispensed spectacle Rx. Discussed various spectacle lens options. Discussed adaptation period to new specs. RTC if any problems with specs.     Chorioretinal scar temporal OS, no holes, tears, breaks, RD. Monitor yearly.       RTC in 1 year for comprehensive eye exam, or sooner prn.

## 2020-10-22 ENCOUNTER — HOSPITAL ENCOUNTER (OUTPATIENT)
Dept: PULMONOLOGY | Facility: HOSPITAL | Age: 65
Discharge: HOME | End: 2020-10-22
Attending: INTERNAL MEDICINE
Payer: MEDICARE

## 2020-10-22 DIAGNOSIS — I27.20 PULMONARY HYPERTENSION: ICD-10-CM

## 2020-10-22 DIAGNOSIS — J84.10 PULMONARY FIBROSIS: ICD-10-CM

## 2020-10-22 PROCEDURE — 94010 BREATHING CAPACITY TEST: CPT

## 2020-10-22 PROCEDURE — 94010 BREATHING CAPACITY TEST: CPT | Mod: 26,59,, | Performed by: INTERNAL MEDICINE

## 2020-10-22 PROCEDURE — 94727 PR PULM FUNCTION TEST BY GAS: ICD-10-PCS | Mod: 26,,, | Performed by: INTERNAL MEDICINE

## 2020-10-22 PROCEDURE — 94618 PULMONARY STRESS TESTING: CPT | Mod: 26,,, | Performed by: INTERNAL MEDICINE

## 2020-10-22 PROCEDURE — 94729 DIFFUSING CAPACITY: CPT

## 2020-10-22 PROCEDURE — 94729 DIFFUSING CAPACITY: CPT | Mod: 26,,, | Performed by: INTERNAL MEDICINE

## 2020-10-22 PROCEDURE — 94010 BREATHING CAPACITY TEST: ICD-10-PCS | Mod: 26,59,, | Performed by: INTERNAL MEDICINE

## 2020-10-22 PROCEDURE — 94727 GAS DIL/WSHOT DETER LNG VOL: CPT

## 2020-10-22 PROCEDURE — 94727 GAS DIL/WSHOT DETER LNG VOL: CPT | Mod: 26,,, | Performed by: INTERNAL MEDICINE

## 2020-10-22 PROCEDURE — 94729 PR C02/MEMBANE DIFFUSE CAPACITY: ICD-10-PCS | Mod: 26,,, | Performed by: INTERNAL MEDICINE

## 2020-10-22 PROCEDURE — 94618 PULMONARY STRESS TESTING: ICD-10-PCS | Mod: 26,,, | Performed by: INTERNAL MEDICINE

## 2020-10-22 PROCEDURE — 94618 PULMONARY STRESS TESTING: CPT

## 2020-10-27 DIAGNOSIS — J44.9 COPD MIXED TYPE: Primary | ICD-10-CM

## 2020-10-27 LAB
BR6MWT: NORMAL
BRPFT: NORMAL
DLCO ADJ PRE: 9.49 ML/(MIN*MMHG)
DLCO SINGLE BREATH LLN: 17.61
DLCO SINGLE BREATH PRE REF: 40.7 %
DLCO SINGLE BREATH REF: 23.34
DLCOC SBVA LLN: 3.05
DLCOC SBVA PRE REF: 61.4 %
DLCOC SBVA REF: 4.41
DLCOC SINGLE BREATH LLN: 17.61
DLCOC SINGLE BREATH PRE REF: 40.7 %
DLCOC SINGLE BREATH REF: 23.34
DLCOVA LLN: 3.05
DLCOVA PRE REF: 61.4 %
DLCOVA PRE: 2.7 ML/(MIN*MMHG*L)
DLCOVA REF: 4.41
DLVAADJ PRE: 2.7 ML/(MIN*MMHG*L)
ERVN2 LLN: -16449.25
ERVN2 PRE REF: 100.1 %
ERVN2 PRE: 0.75 L
ERVN2 REF: 0.75
FEF 25 75 LLN: 1.03
FEF 25 75 PRE REF: 68.1 %
FEF 25 75 REF: 2.13
FEV1 FVC LLN: 66
FEV1 FVC PRE REF: 93.2 %
FEV1 FVC REF: 78
FEV1 LLN: 1.85
FEV1 PRE REF: 80.6 %
FEV1 REF: 2.5
FRCN2 LLN: 2.01
FRCN2 PRE REF: 62.6 %
FRCN2 REF: 2.83
FVC LLN: 2.39
FVC PRE REF: 85.8 %
FVC REF: 3.22
IVC PRE: 2.49 L
IVC SINGLE BREATH LLN: 2.39
IVC SINGLE BREATH PRE REF: 77.5 %
IVC SINGLE BREATH REF: 3.22
MVV LLN: 82
MVV PRE REF: 79.8 %
MVV REF: 96
PEF LLN: 4.5
PEF PRE REF: 97.6 %
PEF REF: 6.33
PRE DLCO: 9.49 ML/(MIN*MMHG)
PRE FEF 25 75: 1.45 L/S
PRE FET 100: 11.14 SEC
PRE FEV1 FVC: 73.04 %
PRE FEV1: 2.02 L
PRE FRC N2: 1.77 L
PRE FVC: 2.76 L
PRE MVV: 77 L/MIN
PRE PEF: 6.18 L/S
RVN2 LLN: 1.51
RVN2 PRE REF: 49.5 %
RVN2 PRE: 1.03 L
RVN2 REF: 2.08
RVN2TLCN2 LLN: 31.47
RVN2TLCN2 PRE REF: 66 %
RVN2TLCN2 PRE: 27.11 %
RVN2TLCN2 REF: 41.06
TLCN2 LLN: 4.31
TLCN2 PRE REF: 71.6 %
TLCN2 PRE: 3.79 L
TLCN2 REF: 5.3
VA PRE: 3.51 L
VA SINGLE BREATH LLN: 5.15
VA SINGLE BREATH PRE REF: 68.2 %
VA SINGLE BREATH REF: 5.15
VCMAXN2 LLN: 2.39
VCMAXN2 PRE REF: 85.9 %
VCMAXN2 PRE: 2.77 L
VCMAXN2 REF: 3.22

## 2020-10-31 ENCOUNTER — EXTERNAL CHRONIC CARE MANAGEMENT (OUTPATIENT)
Dept: PRIMARY CARE CLINIC | Facility: CLINIC | Age: 65
End: 2020-10-31
Payer: MEDICARE

## 2020-10-31 PROCEDURE — 99490 PR CHRONIC CARE MGMT, 1ST 20 MIN: ICD-10-PCS | Mod: S$GLB,,, | Performed by: INTERNAL MEDICINE

## 2020-10-31 PROCEDURE — 99490 CHRNC CARE MGMT STAFF 1ST 20: CPT | Mod: S$GLB,,, | Performed by: INTERNAL MEDICINE

## 2020-11-23 ENCOUNTER — PATIENT OUTREACH (OUTPATIENT)
Dept: OTHER | Facility: OTHER | Age: 65
End: 2020-11-23

## 2020-11-23 NOTE — PROGRESS NOTES
Digital Medicine: Health  Follow-Up    The history is provided by the patient.                 Patient needs assistance troubleshooting: patient reminder needed.    Additional Follow-up details: Called patient for routine follow up.    Asked the patient about the missing readings and the patient reported that she was out of town helping a friend who was sick. She explained that she did not bring her cuff with her but was using her friends blood pressure machine to take her readings. She stated that they have been looking good.    Patient reported that she is now home and will begin taking her readings again. Will follow up with the patient in a couple weeks as another reminder.            Diet-Not assessed          Physical Activity-Not assessed    Medication Adherence-Medication Adherence not addressed.      Substance, Sleep, Stress-Not assessed      Continue current diet/physical activity routine.       Addressed patient questions and patient has my contact information if needed prior to next outreach.   Explained the importance of self-monitoring and medication adherence. Encouraged the patient to communicate with their health  for lifestyle modifications to help improve or maintain a healthy lifestyle.               There are no preventive care reminders to display for this patient.      Last 5 Patient Entered Readings                                      Current 30 Day Average: 118/67     Recent Readings 10/28/2020 9/29/2020 9/21/2020 9/14/2020 9/4/2020    SBP (mmHg) 118 138 109 120 124    DBP (mmHg) 67 89 82 88 89    Pulse 101 98 100 99 89

## 2020-11-28 ENCOUNTER — PATIENT MESSAGE (OUTPATIENT)
Dept: ADMINISTRATIVE | Facility: OTHER | Age: 65
End: 2020-11-28

## 2020-11-30 ENCOUNTER — EXTERNAL CHRONIC CARE MANAGEMENT (OUTPATIENT)
Dept: PRIMARY CARE CLINIC | Facility: CLINIC | Age: 65
End: 2020-11-30
Payer: MEDICARE

## 2020-11-30 PROCEDURE — 99490 PR CHRONIC CARE MGMT, 1ST 20 MIN: ICD-10-PCS | Mod: S$GLB,,, | Performed by: INTERNAL MEDICINE

## 2020-11-30 PROCEDURE — 99490 CHRNC CARE MGMT STAFF 1ST 20: CPT | Mod: S$GLB,,, | Performed by: INTERNAL MEDICINE

## 2020-12-01 ENCOUNTER — LAB VISIT (OUTPATIENT)
Dept: PRIMARY CARE CLINIC | Facility: CLINIC | Age: 65
End: 2020-12-01
Payer: MEDICARE

## 2020-12-01 DIAGNOSIS — I10 ESSENTIAL HYPERTENSION: ICD-10-CM

## 2020-12-01 DIAGNOSIS — Z01.818 PREOP TESTING: ICD-10-CM

## 2020-12-01 DIAGNOSIS — M34.9 SCLERODERMA: ICD-10-CM

## 2020-12-01 PROCEDURE — U0003 INFECTIOUS AGENT DETECTION BY NUCLEIC ACID (DNA OR RNA); SEVERE ACUTE RESPIRATORY SYNDROME CORONAVIRUS 2 (SARS-COV-2) (CORONAVIRUS DISEASE [COVID-19]), AMPLIFIED PROBE TECHNIQUE, MAKING USE OF HIGH THROUGHPUT TECHNOLOGIES AS DESCRIBED BY CMS-2020-01-R: HCPCS

## 2020-12-02 LAB — SARS-COV-2 RNA RESP QL NAA+PROBE: NOT DETECTED

## 2020-12-04 ENCOUNTER — ANESTHESIA (OUTPATIENT)
Dept: ENDOSCOPY | Facility: HOSPITAL | Age: 65
End: 2020-12-04
Payer: MEDICARE

## 2020-12-04 ENCOUNTER — ANESTHESIA EVENT (OUTPATIENT)
Dept: ENDOSCOPY | Facility: HOSPITAL | Age: 65
End: 2020-12-04
Payer: MEDICARE

## 2020-12-04 ENCOUNTER — PATIENT OUTREACH (OUTPATIENT)
Dept: ADMINISTRATIVE | Facility: HOSPITAL | Age: 65
End: 2020-12-04

## 2020-12-04 ENCOUNTER — HOSPITAL ENCOUNTER (OUTPATIENT)
Facility: HOSPITAL | Age: 65
Discharge: HOME | End: 2020-12-04
Attending: INTERNAL MEDICINE | Admitting: INTERNAL MEDICINE
Payer: MEDICARE

## 2020-12-04 DIAGNOSIS — Z12.31 SCREENING MAMMOGRAM, ENCOUNTER FOR: Primary | ICD-10-CM

## 2020-12-04 DIAGNOSIS — K20.90 ESOPHAGITIS: ICD-10-CM

## 2020-12-04 PROCEDURE — D9220A PRA ANESTHESIA: ICD-10-PCS | Mod: CRNA,,, | Performed by: NURSE ANESTHETIST, CERTIFIED REGISTERED

## 2020-12-04 PROCEDURE — D9220A PRA ANESTHESIA: Mod: ANES,,, | Performed by: ANESTHESIOLOGY

## 2020-12-04 PROCEDURE — 37000009 HC ANESTHESIA EA ADD 15 MINS: Performed by: INTERNAL MEDICINE

## 2020-12-04 PROCEDURE — 43239 PR EGD, FLEX, W/BIOPSY, SGL/MULTI: ICD-10-PCS | Mod: 59,,, | Performed by: INTERNAL MEDICINE

## 2020-12-04 PROCEDURE — 43248 EGD GUIDE WIRE INSERTION: CPT | Mod: ,,, | Performed by: INTERNAL MEDICINE

## 2020-12-04 PROCEDURE — C1769 GUIDE WIRE: HCPCS | Performed by: INTERNAL MEDICINE

## 2020-12-04 PROCEDURE — D9220A PRA ANESTHESIA: ICD-10-PCS | Mod: ANES,,, | Performed by: ANESTHESIOLOGY

## 2020-12-04 PROCEDURE — D9220A PRA ANESTHESIA: Mod: CRNA,,, | Performed by: NURSE ANESTHETIST, CERTIFIED REGISTERED

## 2020-12-04 PROCEDURE — 88305 TISSUE EXAM BY PATHOLOGIST: ICD-10-PCS | Mod: 26,,, | Performed by: PATHOLOGY

## 2020-12-04 PROCEDURE — 43248 EGD GUIDE WIRE INSERTION: CPT | Performed by: INTERNAL MEDICINE

## 2020-12-04 PROCEDURE — 88305 TISSUE EXAM BY PATHOLOGIST: CPT | Mod: 26,,, | Performed by: PATHOLOGY

## 2020-12-04 PROCEDURE — 37000008 HC ANESTHESIA 1ST 15 MINUTES: Performed by: INTERNAL MEDICINE

## 2020-12-04 PROCEDURE — 27201012 HC FORCEPS, HOT/COLD, DISP: Performed by: INTERNAL MEDICINE

## 2020-12-04 PROCEDURE — 43239 EGD BIOPSY SINGLE/MULTIPLE: CPT | Mod: 59,,, | Performed by: INTERNAL MEDICINE

## 2020-12-04 PROCEDURE — 88305 TISSUE EXAM BY PATHOLOGIST: CPT | Performed by: PATHOLOGY

## 2020-12-04 PROCEDURE — 43239 EGD BIOPSY SINGLE/MULTIPLE: CPT | Performed by: INTERNAL MEDICINE

## 2020-12-04 PROCEDURE — 25000003 PHARM REV CODE 250: Performed by: NURSE ANESTHETIST, CERTIFIED REGISTERED

## 2020-12-04 PROCEDURE — 43248 PR EGD, FLEX, W/DILATION OVER GUIDEWIRE: ICD-10-PCS | Mod: ,,, | Performed by: INTERNAL MEDICINE

## 2020-12-04 PROCEDURE — 63600175 PHARM REV CODE 636 W HCPCS: Performed by: NURSE ANESTHETIST, CERTIFIED REGISTERED

## 2020-12-04 PROCEDURE — 25000003 PHARM REV CODE 250: Performed by: INTERNAL MEDICINE

## 2020-12-04 RX ORDER — LIDOCAINE HYDROCHLORIDE 20 MG/ML
INJECTION INTRAVENOUS
Status: DISCONTINUED | OUTPATIENT
Start: 2020-12-04 | End: 2020-12-04

## 2020-12-04 RX ORDER — PROPOFOL 10 MG/ML
VIAL (ML) INTRAVENOUS
Status: DISCONTINUED | OUTPATIENT
Start: 2020-12-04 | End: 2020-12-04

## 2020-12-04 RX ORDER — SODIUM CHLORIDE 9 MG/ML
INJECTION, SOLUTION INTRAVENOUS CONTINUOUS
Status: DISCONTINUED | OUTPATIENT
Start: 2020-12-04 | End: 2020-12-04 | Stop reason: HOSPADM

## 2020-12-04 RX ADMIN — PROPOFOL 100 MG: 10 INJECTION, EMULSION INTRAVENOUS at 08:12

## 2020-12-04 RX ADMIN — LIDOCAINE HYDROCHLORIDE 100 MG: 20 INJECTION, SOLUTION INTRAVENOUS at 08:12

## 2020-12-04 RX ADMIN — PROPOFOL 50 MG: 10 INJECTION, EMULSION INTRAVENOUS at 08:12

## 2020-12-04 RX ADMIN — SODIUM CHLORIDE 10 ML/HR: 0.9 INJECTION, SOLUTION INTRAVENOUS at 08:12

## 2020-12-04 NOTE — PROGRESS NOTES
2020 Care Everywhere updates requested and reviewed.  Immunizations reconciled. Media reports reviewed.  Duplicate HM overrides and  orders removed.  Overdue HM topic chart audit and/or requested.  Overdue lab testing linked to upcoming lab appointments if applies.      DIS reviewed      Mammogram and DEXA  YES    WOG orders placed. MAMMO  Letter mailed.  PORTAL      Health Maintenance Due   Topic Date Due    Mammogram  10/11/2020    Colorectal Cancer Screening  2020

## 2020-12-04 NOTE — [employer] PATIENT INSTRUCTIONS
Discharge Summary/Instructions after an Endoscopic Procedure  Patient Name: Edith Tran  Patient MRN: [id number]  Patient YOB: 1955 Friday, December 4, 2020  Ngozi Prince MD  RESTRICTIONS:  During your procedure today, you received medications for sedation.  These   medications may affect your judgment, balance and coordination.  Therefore,   for 24 hours, you have the following restrictions:   - DO NOT drive a car, operate machinery, make legal/financial decisions,   sign important papers or drink alcohol.    ACTIVITY:  Today: no heavy lifting, straining or running due to procedural   sedation/anesthesia.  The following day: return to full activity including work.  DIET:  Eat and drink normally unless instructed otherwise.     TREATMENT FOR COMMON SIDE EFFECTS:  - Mild abdominal pain, nausea, belching, bloating or excessive gas:  rest,   eat lightly and use a heating pad.  - Sore Throat: treat with throat lozenges and/or gargle with warm salt   water.  - Because air was used during the procedure, expelling large amounts of air   from your rectum or belching is normal.  - If a bowel prep was taken, you may not have a bowel movement for 1-3 days.    This is normal.  SYMPTOMS TO WATCH FOR AND REPORT TO YOUR PHYSICIAN:  1. Abdominal pain or bloating, other than gas cramps.  2. Chest pain.  3. Back pain.  4. Signs of infection such as: chills or fever occurring within 24 hours   after the procedure.  5. Rectal bleeding, which would show as bright red, maroon, or black stools.   (A tablespoon of blood from the rectum is not serious, especially if   hemorrhoids are present.)  6. Vomiting.  7. Weakness or dizziness.  GO DIRECTLY TO THE NEAREST EMERGENCY ROOM IF YOU HAVE ANY OF THE FOLLOWING:      Difficulty breathing              Chills and/or fever over 101 F   Persistent vomiting and/or vomiting blood   Severe abdominal pain   Severe chest pain   Black, tarry stools   Bleeding- more than one  tablespoon   Any other symptom or condition that you feel may need urgent attention  Your doctor recommends these additional instructions:  If any biopsies were taken, your doctors clinic will contact you in 1 to 2   weeks with any results.  - Discharge patient to home (with escort).   - Patient has a contact number available for emergencies.  The signs and   symptoms of potential delayed complications were discussed with the   patient.  Return to normal activities tomorrow.  Written discharge   instructions were provided to the patient.   - Resume previous diet.   - PPI BID x 8 weeks  -Repeat EGD in 6 months  - Await pathology results.  For questions, problems or results please call your physician - Ngozi Prince MD at Work:  [phone].  [facility], EMERGENCY ROOM PHONE NUMBER: [phone]  IF A COMPLICATION OR EMERGENCY SITUATION ARISES AND YOU ARE UNABLE TO REACH   YOUR PHYSICIAN - GO DIRECTLY TO THE EMERGENCY ROOM.  Ngozi Prince MD  12/4/2020 8:48:19 AM  This report has been verified and signed electronically.  [employer]

## 2020-12-04 NOTE — PLAN OF CARE
Pt had EGD with Dr Prince. Tolerated well, bx taken of esophagus and dilation with a #57 Bougie.  Awoken easily after procedure and spoke to Dr Prince re post dx.  Drank 2 cups of coffee -tolerated well.  IV removed. Dressed self.  Reviewed d/c instructions, verbalized understanding  awaited neighbor arrival then d/c to car via w/c [place] transport.

## 2020-12-04 NOTE — DISCHARGE INSTRUCTIONS
Soft Diet  Your healthcare provider has prescribed a soft diet (also called gastrointestinal soft diet). This means eating foods that are soft, low in fiber, and easy to digest. This diet is for people with digestive problems. A soft diet provides foods that are easy to chew and swallow. Foods should be bite-size and very soft or moist. Follow your healthcare providers specific instructions about what foods and drinks you may have. The general guidelines below can help you get started on this diet.       Beverages  OK: Milk, tea, coffee, fruit juices, carbonated beverages, nutrition shakes, and drinks (Note: Thin liquids may be hard to swallow. They may need to be thickened.)  Avoid: None, unless they need to be thickened  Breads and crackers  OK: Refined white, wheat, or seedless rye bread; santa or soda crackers that have been moistened; plain rolls or bagels; very soft tortillas  Avoid: Whole-grain breads, rolls, or bagels with nuts, raisins, or seeds; crackers, croutons, taco shells  Cereals and grains  OK: Cooked cereals, plain dry cereals that have been moistened, plain macaroni, spaghetti, noodles, rice  Avoid: Whole-grain cereals and granola, or cereals containing bran, raisins, seeds or nuts; coconut; brown or wild rice  Desserts and sweets  OK: Moist cake; soft fruit pie with bottom crust only; soft cookies moistened in milk or other liquid; gelatin, custard, pudding, plain ice cream, plain sherbet, sugar, honey, clear jelly  Avoid: Pastries, desserts, and ice cream that have nuts, coconut, seeds, or dried fruit; popcorn; chips of any kind including potato chips and tortilla chips; jam, marmalade  Eggs and cheese  OK: Poached, soft boiled, or scrambled eggs; cottage cheese, ricotta cheese, cream cheese, cheese sauces, or cheese melted in other dishes  Avoid: Crisp fried eggs, cheese slices and cubes  Fruits  OK: Avocado, banana, baked peeled apple, applesauce, peeled ripe peaches or pears, canned fruit  (apricots, cherries, peaches, pears), melons  Avoid: Raw apple, dried fruits, coconut, pineapple, grapes, fruit jesse, fruit snacks  Meat and fish  OK: All fresh meat, poultry, or fish that is cooked until tender  Avoid: Meat, fish, or poultry that is fried; tough or stringy meat including velazco, sausage, bratwurst, jerky, corned beef  Other protein foods  OK: Tofu, baked beans, smooth peanut butter or other nut or seed butters  Avoid: Deep-fried tofu; crunchy peanut or other nut or seed butters; nuts or seeds that are whole or chopped  Soups  OK: All soups, but they may need to be thickened. Thin liquid may be too hard to swallow.  Avoid: Soups made with stringy meat pieces or chunky vegetables  Vegetables  OK: Peeled and well-cooked potatoes or sweet potatoes; fresh, cooked, canned, or frozen vegetables without seeds, skin, or coarse fiber  Avoid: Raw vegetables, deep-fried vegetables (such as tempura), and corn  Date Last Reviewed: 8/1/2016 © 2000-2017 [employer]. [address] [place] [address]. All rights reserved. This information is not intended as a substitute for professional medical care. Always follow your healthcare professional's instructions.        Esophagitis     With esophagitis, the lining of the esophagus is inflamed.   Do you often have burning pain in your chest? You may have esophagitis. This is when the lining of the esophagus becomes red and swollen (inflamed). The esophagus is the tube that connects your throat to your stomach. This sheet tells you more about esophagitis. It also explains your treatment options.  Main types of esophagitis  Reflux esophagitis. This is the more common type. It is caused by GERD (gastroesophageal reflux disease). Stomach contents with stomach acid flow back up into the esophagus. This happens over and over. It leads to inflammation. Risk factors can include:  · Being overweight  · Asthma  · Smoking  · Pregnancy  · Frequent  vomiting  · Certain medicines (such as aspirin and other anti-inflammatories)  · Hiatal hernia  Infectious esophagitis. This is caused by an infection. You are more at risk for this if you have a weakened immune system and poor nutrition. Antibiotic use can also be a factor. The infection is often due to the following:  · A type of fungus (typically candida)  · A virus, such as herpes simplex virus 1 (HSV-1) or cytomegalovirus (CMV)  Eosinophilic esophagitis. Foods or other things around you can give you an allergic reaction. This triggers an immune response and leads to esophagitis.  Pill-induced esophagitis. Certain types of medicines can cause inflammation and ulcers in the esophagus. These include doxycycline, aspirin, NSAIDs, alendronate, potassium, quinidine, iron.  Symptoms of esophagitis  The following symptoms can occur with esophagitis:  · Pain when swallowing, or trouble swallowing  · Pain behind your breastbone (heartburn)  · Acid regurgitation  · Chronic sore throat  · Gum Inflammation  · Cavities  · Bad breath  · Nausea  · Pain in your upper belly (abdomen)  · Bleeding (indicated by bright red vomit or black, tarry stool)  These symptoms occur more often with reflux esophagitis:  · Coughing, wheezing, or asthma  · Hoarseness  Diagnosis of esophagitis  Your healthcare provider will ask about your health history and symptoms. Youll also be examined. Sometimes certain tests are needed. These may include:  · Upper endoscopy. A thin, flexible tube with a tiny light and camera is used. It is inserted through the mouth down into the esophagus. This lets the provider look for damage. A small sample of tissue (biopsy) may also be removed. The sample is sent to a lab for testing.  · Upper GI X-ray with barium. An X-ray is done after you drink a substance called barium. Barium may make problems in the esophagus easier to see on an x-ray.  · Esophageal pH. A soft, thin tube is passed into the esophagus through  the nose or mouth for 24 hours. It measures the acid level in the esophagus.  · Esophageal manometry. A soft, thin tube is passed into the esophagus through the nose or mouth. It measures muscle contractions in the esophagus.  Treatment of esophagitis  Medicines. Different medicines can help treat esophagitis. The medicine used will depend on the type of esophagitis you have. Talk with your healthcare provider.  Lifestyle changes. Making the following changes can help reduce irritation and ease your symptoms:  · Avoid spicy foods (pepper, chili powder, guajardo). Also avoid hard foods (nuts, crackers, raw vegetables) and acidic foods and drinks (tomatoes, citrus fruits and juices). Other problem foods include chocolate, peppermint, nutmeg, and foods high in fat.  · Until you can swallow without pain, follow a combined liquid and soft diet. Try foods such as cooked cereals, mashed potatoes, and soups.  · Take small bites and chew your food thoroughly.  · Avoid large meals and heavy evening meals. Don't lie down within 2 to 3 hours of eating.  · Get to or stay at a healthy weight.  · Avoid alcohol, caffeine, and smoking or tobacco products.  · Brush and floss your teeth  · Raise your upper body by 4 to 6 inches when lying in bed. This can be done using a foam wedge. Or put blocks under the legs at the head of your bed.  Surgery. This may be needed for severe reflux esophagitis. Other noninvasive procedures to treat GERD and esophagitis are being studied. Your provider can tell you more.  Why treatment Is important  Without treatment, esophagitis can get worse. This is especially true with severe reflux esophagitis. For instance, continued symptoms can cause scarring of the esophagus. Over time, this can cause a narrowing the esophagus (stricture). This can make it hard to pass food down to the stomach. As symptoms go on they can also cause changes in the lining of the esophagus. These changes can put you at a slightly  higher risk of cancer of the esophagus.   Date Last Reviewed: 7/1/2016  © 5558-9997 [employer]. [address] [place] [address], [place] [address]. All rights reserved. This information is not intended as a substitute for professional medical care. Always follow your healthcare professional's instructions.        Esophageal Dilation     A balloon dilator may be used to widen a stricture in the esophagus.   An esophageal dilation is a procedure used to widen a narrowed section of your esophagus. This is the tube that leads from your throat to your stomach. Narrowing (stricture) of the esophagus can cause problems. These include trouble swallowing. This sheet explains what to expect with esophageal dilation.  Why esophageal dilation is needed  Several problems can be treated with esophageal dilation. They include:  · Peptic stricture. This is caused by reflux esophagitis. With this problem, the esophagus is irritated by acid reflux (heartburn). This occurs when acid from your stomach flows back up into the esophagus. Stomach acid damages the lining of the esophagus. This leads to a buildup of scar tissue. As a result, the esophagus is narrowed.  · Schatzkis ring. This is an abnormal ring of tissue. It forms where the esophagus meets the stomach. It can cause trouble swallowing. It can also cause food to get stuck in the esophagus. The cause of this condition is not known.  · Achalasia. This condition stops food and liquids from moving into your stomach from the esophagus. It affects the lower esophageal sphincter (LES). The LES is a muscular ring that opens (relaxes) when you swallow. With achalasia, the LES does not relax. This condition can also cause problems with peristalsis. This is the normal muscular action of the esophagus that moves food into the stomach.  · Eosinophilic esophagitis. This is a redness and swelling (inflammation) in the esophagus. It is caused by an environmental trigger such as a food  allergy. It can lead to pain, trouble swallowing, and strictures.  · Less common causes of stricture. Other causes of stricture include radiation treatment and cancer.  Before you have esophageal dilation  · Tell your provider about any medicines you take. This includes prescription medicines, over-the-counter medicines, herbs, vitamins, and other supplements. Be sure to mention aspirin or any blood thinners youre taking.  · Let your provider know if you need to take antibiotics before dental procedures. You may need to take them before esophageal dilation as well.  · Tell your provider about any health conditions you have, such as heart or lung disease. Also mention any allergies to medicines.  · Youll need to have an empty stomach for the procedure. Follow your providers instructions for not eating and drinking before the procedure.  · Arrange to have a family member or friend drive you home after the procedure.  During the procedure  · You may be given local anesthesia to numb your throat. Youll also likely be given medicine to relax you. The procedure takes about 15 minutes. It does not cause trouble breathing.  · A tube called an endoscope (scope) is used. This is a narrow tube with a tiny light and camera at the end. The scope is inserted through your mouth and into your esophagus. It lets your provider see inside your esophagus. To help guide your provider, an imaging method called fluoroscopy may also be used. This creates a moving X-ray image on a computer screen.   · Next, special tiny tools are carefully guided through your mouth and down into the esophagus. They widen the stricture and are then removed. Different types of instruments are used. The type used depends on the size and cause of the stricture. Types include:  ¨ Balloon dilator. A tiny empty balloon is put into the stricture using an endoscope. The balloon is slowly filled with air. The air is removed from the balloon when the stricture is  widened to the right size. Balloon dilators are used to treat many types of strictures.  ¨ Guided wire dilator. A thin wire is eased down the esophagus. A small tube thats wider on one end is guided down the wire. It is put into the stricture to stretch it. These dilators are used to treat all kinds of strictures.  ¨ Bougies. These are weighted, cone-shaped tubes. Starting with smaller cones, your provider uses increasingly larger cones until the stricture is stretched the right amount. Bougies are often used to treat strictures that are simple (short, straight, and not very narrow).  After the procedure  · Youll be watched closely until your provider says youre ready to go home. Youll need to have a friend or family member drive you home.  · You may have a sore throat for the rest of the day.  · You may have pain behind your breastbone for a short time afterwards.  · You can start drinking fluids again after the numbness in your throat goes away. You can resume eating the same day or the next day.  Risks and possible complications  Risks and possible complications for esophageal dilation include:  · Infection  · A tear or hole in the esophagus lining, causing bleeding and possibly needing surgery to fix  · Risks of anesthesia  Follow-up  You may need to have the procedure repeated one or more times. This depends on the cause and extent of the narrowing. Repeat procedures can allow the dilation to take place more slowly. This reduces the risks of the procedure.  If your stricture was caused by reflux esophagitis, youll likely need to take medicine to treat that condition. Your provider will tell you more.  When to call your provider  Call your healthcare provider right away if you have any of the following after the procedure:  · Fever of 100.4°F (38.0°C)  · Chest pain  · Trouble swallowing  · Vomiting blood or material that looks like coffee grounds  · Bleeding  · Black, tarry, or bloody stools   Date Last  "Reviewed: 7/1/2016  © 4251-6952 [employer]. [address] [place] [address], [place] [address]. All rights reserved. This information is not intended as a substitute for professional medical care. Always follow your healthcare professional's instructions.      Discharge Instructions: After Your Surgery/Procedure  Youve just had surgery. During surgery you were given medicine called anesthesia to keep you relaxed and free of pain. After surgery you may have some pain or nausea. This is common. Here are some tips for feeling better and getting well after surgery.     Stay on schedule with your medication.   Going home  Your doctor or nurse will show you how to take care of yourself when you go home. He or she will also answer your questions. Have an adult family member or friend drive you home.      For your safety we recommend these precaution for the first 24 hours after your procedure:  · Do not drive or use heavy equipment.  · Do not make important decisions or sign legal papers.  · Do not drink alcohol.  · Have someone stay with you, if needed. He or she can watch for problems and help keep you safe.  · Your concentration, balance, coordination, and judgement may be impaired for many hours after anesthesia.  Use caution when ambulating or standing up.     · You may feel weak and "washed out" after anesthesia and surgery.      Subtle residual effects of general anesthesia or sedation with regional / local anesthesia can last more than 24 hours.  Rest for the remainder of the day or longer if your Doctor/Surgeon has advised you to do so.  Although you may feel normal within the first 24 hours, your reflexes and mental ability may be impaired without you realizing it.  You may feel dizzy, lightheaded or sleepy for 24 hours or longer.      Be sure to go to all follow-up visits with your doctor. And rest after your surgery for as long as your doctor tells you to.  Coping with pain  If you have pain after " surgery, pain medicine will help you feel better. Take it as told, before pain becomes severe. Also, ask your doctor or pharmacist about other ways to control pain. This might be with heat, ice, or relaxation. And follow any other instructions your surgeon or nurse gives you.  Tips for taking pain medicine  To get the best relief possible, remember these points:  · Pain medicines can upset your stomach. Taking them with a little food may help.  · Most pain relievers taken by mouth need at least 20 to 30 minutes to start to work.  · Taking medicine on a schedule can help you remember to take it. Try to time your medicine so that you can take it before starting an activity. This might be before you get dressed, go for a walk, or sit down for dinner.  · Constipation is a common side effect of pain medicines. Call your doctor before taking any medicines such as laxatives or stool softeners to help ease constipation. Also ask if you should skip any foods. Drinking lots of fluids and eating foods such as fruits and vegetables that are high in fiber can also help. Remember, do not take laxatives unless your surgeon has prescribed them.  · Drinking alcohol and taking pain medicine can cause dizziness and slow your breathing. It can even be deadly. Do not drink alcohol while taking pain medicine.  · Pain medicine can make you react more slowly to things. Do not drive or run machinery while taking pain medicine.  Your health care provider may tell you to take acetaminophen to help ease your pain. Ask him or her how much you are supposed to take each day. Acetaminophen or other pain relievers may interact with your prescription medicines or other over-the-counter (OTC) drugs. Some prescription medicines have acetaminophen and other ingredients. Using both prescription and OTC acetaminophen for pain can cause you to overdose. Read the labels on your OTC medicines with care. This will help you to clearly know the list of  ingredients, how much to take, and any warnings. It may also help you not take too much acetaminophen. If you have questions or do not understand the information, ask your pharmacist or health care provider to explain it to you before you take the OTC medicine.  Managing nausea  Some people have an upset stomach after surgery. This is often because of anesthesia, pain, or pain medicine, or the stress of surgery. These tips will help you handle nausea and eat healthy foods as you get better. If you were on a special food plan before surgery, ask your doctor if you should follow it while you get better. These tips may help:  · Do not push yourself to eat. Your body will tell you when to eat and how much.  · Start off with clear liquids and soup. They are easier to digest.  · Next try semi-solid foods, such as mashed potatoes, applesauce, and gelatin, as you feel ready.  · Slowly move to solid foods. Dont eat fatty, rich, or spicy foods at first.  · Do not force yourself to have 3 large meals a day. Instead eat smaller amounts more often.  · Take pain medicines with a small amount of solid food, such as crackers or toast, to avoid nausea.     Call your surgeon if  · You still have pain an hour after taking medicine. The medicine may not be strong enough.  · You feel too sleepy, dizzy, or groggy. The medicine may be too strong.  · You have side effects like nausea, vomiting, or skin changes, such as rash, itching, or hives.       If you have obstructive sleep apnea  You were given anesthesia medicine during surgery to keep you comfortable and free of pain. After surgery, you may have more apnea spells because of this medicine and other medicines you were given. The spells may last longer than usual.   At home:  · Keep using the continuous positive airway pressure (CPAP) device when you sleep. Unless your health care provider tells you not to, use it when you sleep, day or night. CPAP is a common device used to treat  obstructive sleep apnea.  · Talk with your provider before taking any pain medicine, muscle relaxants, or sedatives. Your provider will tell you about the possible dangers of taking these medicines.  © 3169-0642 [employer]. [address] [place] [address], [place] [address]. All rights reserved. This information is not intended as a substitute for professional medical care. Always follow your healthcare professional's instructions.

## 2020-12-04 NOTE — ANESTHESIA PREPROCEDURE EVALUATION
12/04/2020  Edith Tran is a 65 y.o., female.    Anesthesia Evaluation    I have reviewed the Patient Summary Reports.    I have reviewed the Nursing Notes. I have reviewed the NPO Status.   I have reviewed the Medications.     Review of Systems  Cardiovascular:   Hypertension CASH    Pulmonary:   COPD, mild Shortness of breath Sleep Apnea Scleroderma involving lung   Renal/:   Chronic Renal Disease    Hepatic/GI:   PUD, GERD, poorly controlled        Physical Exam  General:  Well nourished    Airway/Jaw/Neck:  Airway Findings: Mouth Opening: Normal Tongue: Normal  General Airway Assessment: Adult, Good  Mallampati: II  Improves to II with phonation.  TM Distance: 4-6 cm      Dental:  Dental Findings: In tact   Chest/Lungs:  Chest/Lungs Findings: Clear to auscultation, Normal Respiratory Rate     Heart/Vascular:  Heart Findings: Rate: Normal  Rhythm: Regular Rhythm  Sounds: Normal  Heart murmur: negative       Mental Status:  Mental Status Findings:  Cooperative, Alert and Oriented         Anesthesia Plan  Type of Anesthesia, risks & benefits discussed:  Anesthesia Type:  general  Patient's Preference:   Intra-op Monitoring Plan: standard ASA monitors  Intra-op Monitoring Plan Comments:   Post Op Pain Control Plan:   Post Op Pain Control Plan Comments:   Induction:   IV  Beta Blocker:  Patient is not currently on a Beta-Blocker (No further documentation required).       Informed Consent: Patient understands risks and agrees with Anesthesia plan.  Questions answered. Anesthesia consent signed with patient.  ASA Score: 3     Day of Surgery Review of History & Physical: I have interviewed and examined the patient. I have reviewed the patient's H&P dated:  There are no significant changes.  H&P update referred to the surgeon.  H&P completed by Anesthesiologist.       Ready For Surgery From Anesthesia  Perspective.

## 2020-12-04 NOTE — H&P
"[facility] Note    CC: Dysphagia    HPI 65 y.o. female with scleroderma presents to follow up chronic dysphagia and esophageal leukoplakia    Past Medical History:   Diagnosis Date    Allergy     Arthritis     Colon polyps     COPD (chronic obstructive pulmonary disease)     GERD (gastroesophageal reflux disease)     Kidney stones     Scleroderma involving lung since she was 47 y/o    Sleep apnea        Allergies and Medications reviewed     Review of Systems  General ROS: negative for - chills, fever or weight loss  Cardiovascular ROS: no chest pain or dyspnea on exertion  Gastrointestinal ROS: + dysphagia    Physical Examination  /60 (BP Location: Left arm, Patient Position: Lying)   Pulse 82   Temp 98.2 °F (36.8 °C) (Skin)   Resp 12   Ht 5' 6" (1.676 m)   Wt 89.4 kg (197 lb)   SpO2 98%   Breastfeeding No   BMI 31.80 kg/m²   General appearance: alert, cooperative, no distress  HENT: Normocephalic, atraumatic, neck symmetrical, no nasal discharge, sclera anicteric   Lungs: clear to auscultation bilaterally, symmetric chest wall expansion bilaterally  Heart: regular rate and rhythm without rub; no displacement of the PMI   Abdomen: soft  Extremities: extremities symmetric; no clubbing, cyanosis, or edema        Labs:  Lab Results   Component Value Date    WBC 5.87 09/09/2020    HGB 13.5 09/09/2020    HCT 42.3 09/09/2020    MCV 96 09/09/2020     09/09/2020           Assessment:   65 y.o. female presents for follow up of chronic dysphagia and esophageal leukoplakia    Plan:  -Proceed to EGD    MD [facility]  [address], [address]  [place] [address]  Office: [phone]  Fax: [phone]  "

## 2020-12-04 NOTE — TRANSFER OF CARE
"Anesthesia Transfer of Care Note    Patient: Edith Tran    Procedure(s) Performed: Procedure(s) (LRB):  EGD (ESOPHAGOGASTRODUODENOSCOPY) (N/A)    Patient location: GI    Anesthesia Type: general    Transport from OR: Transported from OR on room air with adequate spontaneous ventilation    Post pain: adequate analgesia    Post assessment: no apparent anesthetic complications    Post vital signs: stable    Level of consciousness: responds to stimulation and sedated    Nausea/Vomiting: no nausea/vomiting    Complications: none    Transfer of care protocol was followed      Last vitals:   Visit Vitals  /60 (BP Location: Left arm, Patient Position: Lying)   Pulse 82   Temp 36.8 °C (98.2 °F) (Skin)   Resp 12   Ht 5' 6" (1.676 m)   Wt 89.4 kg (197 lb)   SpO2 98%   Breastfeeding No   BMI 31.80 kg/m²     "

## 2020-12-04 NOTE — ANESTHESIA POSTPROCEDURE EVALUATION
Anesthesia Post Evaluation    Patient: Edith Tran    Procedure(s) Performed: Procedure(s) (LRB):  EGD (ESOPHAGOGASTRODUODENOSCOPY) (N/A)    Final Anesthesia Type: general    Patient location during evaluation: PACU  Patient participation: Yes- Able to Participate  Level of consciousness: awake and alert and oriented  Post-procedure vital signs: reviewed and stable  Pain management: adequate  Airway patency: patent    PONV status at discharge: No PONV  Anesthetic complications: no      Cardiovascular status: blood pressure returned to baseline  Respiratory status: unassisted, spontaneous ventilation and room air  Hydration status: euvolemic  Follow-up not needed.          Vitals Value Taken Time   /66 12/04/20 0905   Temp 36.6 °C (97.9 °F) 12/04/20 0845   Pulse 79 12/04/20 0905   Resp 16 12/04/20 0905   SpO2 99 % 12/04/20 0905         No case tracking events are documented in the log.      Pain/Kranthi Score: Kranthi Score: 10 (12/4/2020  9:05 AM)

## 2020-12-07 ENCOUNTER — PATIENT OUTREACH (OUTPATIENT)
Dept: OTHER | Facility: OTHER | Age: 65
End: 2020-12-07

## 2020-12-07 ENCOUNTER — PATIENT OUTREACH (OUTPATIENT)
Dept: ADMINISTRATIVE | Facility: OTHER | Age: 65
End: 2020-12-07

## 2020-12-07 VITALS
OXYGEN SATURATION: 99 % | HEART RATE: 78 BPM | TEMPERATURE: 98 F | WEIGHT: 197 LBS | HEIGHT: 66 IN | RESPIRATION RATE: 16 BRPM | SYSTOLIC BLOOD PRESSURE: 110 MMHG | DIASTOLIC BLOOD PRESSURE: 68 MMHG | BODY MASS INDEX: 31.66 KG/M2

## 2020-12-07 NOTE — PROGRESS NOTES
Digital Medicine: Health  Follow-Up    The history is provided by the patient.             Reason for review: Blood pressure at goal    Patient needs assistance troubleshooting: patient reminder needed.    Additional Follow-up details: Patient reports that she has been in and out of town over the last few weeks. She reports that when she is home, she does not think about taking her blood pressure readings.    Asked the patient if she could start taking her readings again for us and she reported that she will do her best.    Will follow up in a couple weeks as another reminder for patient if she does not submit readings.            Diet-Not assessed          Physical Activity-Not assessed    Medication Adherence-Medication Adherence not addressed.      Substance, Sleep, Stress-Not assessed      Additional monitoring needed.       Addressed patient questions and patient has my contact information if needed prior to next outreach.   Explained the importance of self-monitoring and medication adherence. Encouraged the patient to communicate with their health  for lifestyle modifications to help improve or maintain a healthy lifestyle.               There are no preventive care reminders to display for this patient.      Last 5 Patient Entered Readings                                      Current 30 Day Average:      Recent Readings 10/28/2020 9/29/2020 9/21/2020 9/14/2020 9/4/2020    SBP (mmHg) 118 138 109 120 124    DBP (mmHg) 67 89 82 88 89    Pulse 101 98 100 99 89

## 2020-12-08 DIAGNOSIS — F41.9 ANXIETY AND DEPRESSION: ICD-10-CM

## 2020-12-08 DIAGNOSIS — F32.A ANXIETY AND DEPRESSION: ICD-10-CM

## 2020-12-08 RX ORDER — VENLAFAXINE HYDROCHLORIDE 150 MG/1
150 CAPSULE, EXTENDED RELEASE ORAL DAILY
Qty: 30 CAPSULE | Refills: 11 | Status: SHIPPED | OUTPATIENT
Start: 2020-12-08 | End: 2021-03-31

## 2020-12-08 NOTE — PROGRESS NOTES
RHEUMATOLOGY CLINIC FOLLOW UP VISIT      Chief complaints:- Management of Scleroderma       HPI:-  Edith Mak a 65 y.o. pleasant female with PMH of SSc, PAH, ILD, GERD, and esophageal dysmotility comes in for an initial visit with above chief complaints.     Rheumatological history    Patient was diagnosed with LSSc in 2009.   At that time, patient was having swelling of hands, dysphagia,, GERD, and SOB.  SSc (Raynaud's, abnormal esophageal motility, ILD, skin changes, and GERD).  Was seeing Dr. Barnes ([facility]) - but she left and had no follow up with rheumatology for over a year.    Previously on HCQ 200mg BID - had to discontinue to eye symptoms.  Was on MTX in the past - uncertain     Was on Tadalafil.  Currently on Cellcept, Opsumit (macitentan), Nintedanib, and Tadalafil.  Had recent manometry which was consistent with scleroderma esophagus (absence contractility, LES pressure low).  Patient had a recent follow-up with GI (August 6, 2020) - for chronic moderate dysphagia primarily to solids, mild Crohn's disease and esophageal leukoplakia.    Due for repeat colonoscopy in 2024.  Concurrently on PPI b.i.d. and a trial of Reglan.    Had recent PFTs done at [facility] [place] (Aug 2020) - no results available on CareEverywhere yet.  Following Dr. Goddard (pulmonary at [facility] [place]).    Fhx: No rheumatological disease    Tobacco (1pack/week x 20 years, quit 20 years ago), EtOH (denies), recreational/illicit drugs - eatables 2x/week (gummies)      Occupation: Retired - previously caregiver    ROS: denies any mouth ulcers, hair loss, or rash  +Raynaud's (chronic - not worsening) - recent RHC?     Interval History     Followed up with   - Ophth - chrioretinal scar and mild cataracts   - Pulmonary (Dr. Serrano)   - GI - EGD (Dec 2020) - Grade A esophagitis and mild Schatzki ring - dilation was performed.  Small hiatial hernia     Pending ECHO - scheduled for next month     After dilation, patient is doing much better - less  nausea.    Patient continues to use NC 2L PRN - during excretion  activities (walking around grocery store) and at night during sleep.      Compliant with all home medications.  Feeling well, no new complaints.     Review of Systems   Constitutional: Negative for chills, diaphoresis, fever, malaise/fatigue and weight loss.   HENT: Negative for congestion, ear discharge, ear pain, hearing loss, nosebleeds, sinus pain and tinnitus.    Eyes: Negative for photophobia, pain, discharge and redness.   Respiratory: Positive for shortness of breath. Negative for cough, hemoptysis, sputum production, wheezing and stridor.    Cardiovascular: Negative for chest pain, palpitations, orthopnea, claudication, leg swelling and PND.   Gastrointestinal: Positive for heartburn. Negative for abdominal pain, constipation, diarrhea, nausea and vomiting.   Genitourinary: Negative for dysuria, frequency, hematuria and urgency.   Musculoskeletal: Positive for joint pain. Negative for back pain, myalgias and neck pain.   Skin: Negative for rash.   Neurological: Negative for dizziness, tingling, tremors, weakness and headaches.   Endo/Heme/Allergies: Does not bruise/bleed easily.   Psychiatric/Behavioral: Negative for depression, hallucinations and suicidal ideas. The patient is not nervous/anxious and does not have insomnia.        Past Medical History:   Diagnosis Date    Allergy     Arthritis     Colon polyps     COPD (chronic obstructive pulmonary disease)     GERD (gastroesophageal reflux disease)     Kidney stones     Scleroderma involving lung since she was 49 y/o    Sleep apnea        Past Surgical History:   Procedure Laterality Date    COLONOSCOPY N/A 8/7/2018    Procedure: COLONOSCOPY;  Surgeon: Ngozi Prince MD;  Location: [facility];  Service: Endoscopy;  Laterality: N/A;    COLONOSCOPY N/A 11/21/2019    Procedure: COLONOSCOPY;  Surgeon: Ngozi Prince MD;  Location: [facility];  Service: Endoscopy;  Laterality:  N/A;    ESOPHAGEAL MANOMETRY WITH MEASUREMENT OF IMPEDANCE N/A 2020    Procedure: MANOMETRY, ESOPHAGUS, WITH IMPEDANCE MEASUREMENT;  Surgeon: Bhupendra Temple MD;  [facility]  Service: Endoscopy;  Laterality: N/A;  3/10 - pt confirmed apptCovid test ordered for  in [email or web address]    ESOPHAGOGASTRODUODENOSCOPY N/A 2018    Procedure: EGD (ESOPHAGOGASTRODUODENOSCOPY);  Surgeon: Ngozi Prince MD;  Location: [facility];  Service: Endoscopy;  Laterality: N/A;    ESOPHAGOGASTRODUODENOSCOPY N/A 2019    Procedure: EGD (ESOPHAGOGASTRODUODENOSCOPY);  Surgeon: Ngozi Prince MD;  Location: [facility];  Service: Endoscopy;  Laterality: N/A;    ESOPHAGOGASTRODUODENOSCOPY N/A 2020    Procedure: EGD (ESOPHAGOGASTRODUODENOSCOPY);  Surgeon: Ngozi Prince MD;  Location: [facility];  Service: Endoscopy;  Laterality: N/A;    ESOPHAGOGASTRODUODENOSCOPY N/A 2020    Procedure: EGD (ESOPHAGOGASTRODUODENOSCOPY);  Surgeon: Ngozi Prince MD;  Location: [facility];  Service: Endoscopy;  Laterality: N/A;    ESOPHAGOGASTRODUODENOSCOPY N/A 2020    Procedure: EGD (ESOPHAGOGASTRODUODENOSCOPY);  Surgeon: Ngozi Prince MD;  Location: [facility];  Service: Endoscopy;  Laterality: N/A;        Social History     Tobacco Use    Smoking status: Former Smoker     Quit date: 1995     Years since quittin.2    Smokeless tobacco: Never Used   Substance Use Topics    Alcohol use: No     Frequency: Monthly or less     Drinks per session: 1 or 2     Binge frequency: Never    Drug use: No       Family History   Problem Relation Age of Onset    Kidney disease Mother     Cancer Father     Heart disease Brother     Mental illness Son     Glaucoma Neg Hx     Macular degeneration Neg Hx     Retinal detachment Neg Hx        Review of patient's allergies indicates:   Allergen Reactions    Hydroxychloroquine Other (See Comments)     Having eye reaction, needs to stop plaquenil and  "stay off of it.        Vitals:    12/09/20 0759   BP: 107/72   Pulse: 106   Weight: 91.2 kg (201 lb 1 oz)   Height: 5' 6" (1.676 m)   PainSc: 0-No pain       Physical Exam   HENT:   No mouth ulcers      Pulmonary/Chest:   Bilateral lung base - +crackles   Abdominal:   Umbilical hernia   Musculoskeletal: Normal range of motion.         General: No tenderness or deformity.      Comments: No signs of synovitis    Skin:   Raynaud's - no digital ulcers  Skin thickening of bilateral distal digits (sclerodactylyl) - mild   No other skin thickening area           Labs:-  Results for MARYAN MURILLO (MRN [id number]) as of 12/8/2020 15:44   Ref. Range 8/20/2020 10:53 9/9/2020 11:16 10/27/2020 15:48 12/1/2020 08:54 12/4/2020 08:43   WBC Latest Ref Range: 3.90 - 12.70 K/uL  5.87      RBC Latest Ref Range: 4.00 - 5.40 M/uL  4.43      Hemoglobin Latest Ref Range: 12.0 - 16.0 g/dL  13.5      Hematocrit Latest Ref Range: 37.0 - 48.5 %  42.3      MCV Latest Ref Range: 82 - 98 fL  96      MCH Latest Ref Range: 27.0 - 31.0 pg  30.5      MCHC Latest Ref Range: 32.0 - 36.0 g/dL  31.9 (L)      RDW Latest Ref Range: 11.5 - 14.5 %  12.8      Platelets Latest Ref Range: 150 - 350 K/uL  186      MPV Latest Ref Range: 9.2 - 12.9 fL  11.4      Gran % Latest Ref Range: 38.0 - 73.0 %  69.4      Lymph % Latest Ref Range: 18.0 - 48.0 %  19.3      Mono % Latest Ref Range: 4.0 - 15.0 %  8.3      Eosinophil % Latest Ref Range: 0.0 - 8.0 %  1.9      Basophil % Latest Ref Range: 0.0 - 1.9 %  0.9      Immature Granulocytes Latest Ref Range: 0.0 - 0.5 %  0.2      Gran # (ANC) Latest Ref Range: 1.8 - 7.7 K/uL  4.1      Lymph # Latest Ref Range: 1.0 - 4.8 K/uL  1.1      Mono # Latest Ref Range: 0.3 - 1.0 K/uL  0.5      Eos # Latest Ref Range: 0.0 - 0.5 K/uL  0.1      Baso # Latest Ref Range: 0.00 - 0.20 K/uL  0.05      Immature Grans (Abs) Latest Ref Range: 0.00 - 0.04 K/uL  0.01      nRBC Latest Ref Range: 0 /100 WBC  0      Differential Method Unknown  " Automated      Sed Rate Latest Ref Range: 0 - 20 mm/Hr  13      Sodium Latest Ref Range: 136 - 145 mmol/L  142      Potassium Latest Ref Range: 3.5 - 5.1 mmol/L  4.2      Chloride Latest Ref Range: 95 - 110 mmol/L  104      CO2 Latest Ref Range: 23 - 29 mmol/L  28      Anion Gap Latest Ref Range: 8 - 16 mmol/L  10      BUN Latest Ref Range: 8 - 23 mg/dL  9      Creatinine Latest Ref Range: 0.5 - 1.4 mg/dL  0.8      eGFR if non African American Latest Ref Range: >60 mL/min/1.73 m^2  >60      eGFR if African American Latest Ref Range: >60 mL/min/1.73 m^2  >60      Glucose Latest Ref Range: 70 - 110 mg/dL  88      Calcium Latest Ref Range: 8.7 - 10.5 mg/dL  10.3      Alkaline Phosphatase Latest Ref Range: 55 - 135 U/L  59      PROTEIN TOTAL Latest Ref Range: 6.0 - 8.4 g/dL  7.5      Albumin Latest Ref Range: 3.5 - 5.2 g/dL  4.3      BILIRUBIN TOTAL Latest Ref Range: 0.1 - 1.0 mg/dL  0.6      AST Latest Ref Range: 10 - 40 U/L  19      ALT Latest Ref Range: 10 - 44 U/L  15      CRP Latest Ref Range: 0.0 - 8.2 mg/L  2.8      SARS-CoV2 (COVID-19) Qualitative PCR Latest Ref Range: Not Detected     Not Detected    COMPLETE PFT WITHOUT BRONCHODILATOR Unknown   Rpt     Pre FVC Latest Units: L   2.76     Pre FEV1 Latest Units: L   2.02     Pre FEV1 FVC Latest Units: %   73.04     TLCN2 PRE Latest Units: L   3.79     VCMAXN2 PRE Latest Units: L   2.77     Pre DLCO Latest Units: ml/(min*mmHg)   9.49     DLCO ADJ PRE Latest Units: ml/(min*mmHg)   9.49     SPECIMEN TO PATHOLOGY, SURGERY Unknown Rpt    Rpt   Final Pathologic Diagnosis Unknown 1.  Abnormal muco...       Gross Unknown Number of contain...       Comment Unknown For part 1:  Ther...       Disclaimer Unknown This test was dev...         Radiographs:-  Independent visualization of images done.  March 2019 - CT chest - Severe interstitial fibrosis with peripheral honeycombing and multiple bilateral lower lobe bulla.  Unchanged from March 2018.  Mild mediastinal adenopathy.   hiatal hernia on L complicated cyst  Sept 2018 - DEXA scan - lumbar spine T-score of -0.8.  In left femoral neck T-score of 0.1.  FRAX 6.1% risk of major osteoporotic fracture and 0.2% risk of hip fracture in the next 10 years.  Normal bone density.    Old and Outside medical records :-  Reviewed old and all outside medical records available in Care Everywhere.  Aug 2013 - [facility] Rheumatology Encounter - LSSc with ILD and PH.  On Adcirca and Cellcept 3g/day. On HCQ and omeprazole for GERD.         Medication List with Changes/Refills   Current Medications    ALBUTEROL (PROVENTIL/VENTOLIN HFA) 90 MCG/ACTUATION INHALER    2 puffs every 4 hours as needed for cough, wheeze, or shortness of breath    AMLODIPINE (NORVASC) 5 MG TABLET    Take 1 tablet (5 mg total) by mouth once daily.    AZITHROMYCIN (ZITHROMAX) 500 MG TABLET    One daily for yellow mucous, repeat if needed    BUDESONIDE-FORMOTEROL 160-4.5 MCG (SYMBICORT) 160-4.5 MCG/ACTUATION HFAA    Inhale 2 puffs into the lungs every 12 (twelve) hours. Controller    ERGOCALCIFEROL (ERGOCALCIFEROL) 50,000 UNIT CAP    TAKE 1 CAPSULE BY MOUTH EVERY 7 DAYS    FLUTICASONE PROPIONATE (FLONASE) 50 MCG/ACTUATION NASAL SPRAY    1 spray (50 mcg total) by Each Nostril route once daily.    LOSARTAN (COZAAR) 25 MG TABLET    Take 1 tablet (25 mg total) by mouth once daily.    MACITENTAN (OPSUMIT) 10 MG TAB    Take 10 mg by mouth once daily.    METOCLOPRAMIDE HCL (REGLAN) 5 MG TABLET    Take 5 mg by mouth 2 (two) times daily as needed.    MONTELUKAST (SINGULAIR) 10 MG TABLET    Take 1 tablet (10 mg total) by mouth every evening.    NINTEDANIB 150 MG CAP    Take 150 mg by mouth every 12 (twelve) hours.    ONDANSETRON (ZOFRAN) 8 MG TABLET        PANTOPRAZOLE (PROTONIX) 40 MG TABLET    Take 1 tablet (40 mg total) by mouth 2 (two) times a day.    PREDNISONE (DELTASONE) 5 MG TABLET    6 tabs the first day, then 5 the next, then 4, 3, 2, 1- may repeat for bronchitis.    SILDENAFIL (REVATIO)  20 MG TAB        TADALAFIL (CIALIS) 20 MG TAB    Take 1 tablet (20 mg total) by mouth 2 (two) times daily.    TRAZODONE (DESYREL) 50 MG TABLET    TAKE 2 TABLETS BY MOUTH NIGHTLY AS NEEDED FOR INSOMNIA    VENLAFAXINE (EFFEXOR-XR) 150 MG CP24    Take 1 capsule (150 mg total) by mouth once daily.   Changed and/or Refilled Medications    Modified Medication Previous Medication    MYCOPHENOLATE (CELLCEPT) 500 MG TAB mycophenolate (CELLCEPT) 500 mg Tab       Take 3 tablets (1,500 mg total) by mouth 2 (two) times daily.    Take 3 tablets (1,500 mg total) by mouth 2 (two) times daily.       Assessment/Plans:-   65 y.o. pleasant female with PMH of SSc, PAH, ILD, GERD, and esophageal dysmotility present to rheumatology clinic present for management of SSc     Patient had been diagnosed with SSc for over a decade and was previously under the care of multidisplinary team in [facility] [place].  Since Dr. Barnes's departure - patient had not follow with rheumatology for over a year.     SSc with ILD/PAH, esophageal dysmotility and GERD complications.   Currently on MMF 1500mg BID  Opsumit (Macitentan) 10mg daily  Nintedanib 150mg BID   Tadalafil 20mg BID     Pulmonary - currently following Dr. Serrano.  Pending PFTs and 6MW    Recent follow up with GI (Dr. Prince) - recent EGD with dilatation (Dec 2020).  Doing well.   Due for repeat colonoscopy in 2024.  Concurrently on PPI    Uptodate on all vaccination   - Shringrix - completed 2020  - Pneumovax 2019 - due again in 2024  - Prevenar completed Aug 2019  - Annual flu vaccination - completed 2020    Plan  - Medication monitoring - CBC, CMP  - ESR/CRP  - Continue Cellcept 1500mg BID   - Continue management of ILD as per pulmonary   - Patient currently on clinical trial (for a few years already) with Nintedanib - continue follow and management as per clinical trial  - Follow up with GI as previously scheduled  - Will need monitoring of PFTs and ECHO - ILD and PAH - scheduled   - Education  provided to notify us immediately if + infectious process - Cellcept with need to be held   - monitoring labs to be completed prior to next visit  - DEXA scan due - last one was Sept 2018 (Normal BMD) - will order at the next visit     Follow up in about 3 months (around 3/9/2021).    Disclaimer: This note was prepared using voice recognition system and is likely to have sound alike errors and is not proof read.  Please call me with any questions.

## 2020-12-09 ENCOUNTER — OFFICE VISIT (OUTPATIENT)
Dept: RHEUMATOLOGY | Facility: CLINIC | Age: 65
End: 2020-12-09
Payer: MEDICARE

## 2020-12-09 VITALS
SYSTOLIC BLOOD PRESSURE: 107 MMHG | BODY MASS INDEX: 32.31 KG/M2 | WEIGHT: 201.06 LBS | DIASTOLIC BLOOD PRESSURE: 72 MMHG | HEART RATE: 106 BPM | HEIGHT: 66 IN

## 2020-12-09 DIAGNOSIS — Z51.81 MEDICATION MONITORING ENCOUNTER: ICD-10-CM

## 2020-12-09 DIAGNOSIS — J84.9 INTERSTITIAL LUNG DISEASE: Primary | ICD-10-CM

## 2020-12-09 DIAGNOSIS — M34.9 SCLERODERMA: ICD-10-CM

## 2020-12-09 DIAGNOSIS — M34.9 SYSTEMIC SCLEROSIS WITH LIMITED CUTANEOUS INVOLVEMENT: ICD-10-CM

## 2020-12-09 DIAGNOSIS — I27.20 PULMONARY HYPERTENSION: ICD-10-CM

## 2020-12-09 PROCEDURE — 99999 PR PBB SHADOW E&M-EST. PATIENT-LVL IV: CPT | Mod: PBBFAC,,, | Performed by: STUDENT IN AN ORGANIZED HEALTH CARE EDUCATION/TRAINING PROGRAM

## 2020-12-09 PROCEDURE — 99214 OFFICE O/P EST MOD 30 MIN: CPT | Mod: S$PBB,,, | Performed by: STUDENT IN AN ORGANIZED HEALTH CARE EDUCATION/TRAINING PROGRAM

## 2020-12-09 PROCEDURE — 99999 PR PBB SHADOW E&M-EST. PATIENT-LVL IV: ICD-10-PCS | Mod: PBBFAC,,, | Performed by: STUDENT IN AN ORGANIZED HEALTH CARE EDUCATION/TRAINING PROGRAM

## 2020-12-09 PROCEDURE — 99214 PR OFFICE/OUTPT VISIT, EST, LEVL IV, 30-39 MIN: ICD-10-PCS | Mod: S$PBB,,, | Performed by: STUDENT IN AN ORGANIZED HEALTH CARE EDUCATION/TRAINING PROGRAM

## 2020-12-09 PROCEDURE — 99214 OFFICE O/P EST MOD 30 MIN: CPT | Mod: PBBFAC,PO | Performed by: STUDENT IN AN ORGANIZED HEALTH CARE EDUCATION/TRAINING PROGRAM

## 2020-12-09 RX ORDER — MYCOPHENOLATE MOFETIL 500 MG/1
1500 TABLET ORAL 2 TIMES DAILY
Qty: 520 TABLET | Refills: 1 | Status: SHIPPED | OUTPATIENT
Start: 2020-12-09 | End: 2021-03-10 | Stop reason: SDUPTHER

## 2020-12-11 LAB
FINAL PATHOLOGIC DIAGNOSIS: NORMAL
GROSS: NORMAL
Lab: NORMAL

## 2020-12-16 ENCOUNTER — LAB VISIT (OUTPATIENT)
Dept: LAB | Facility: HOSPITAL | Age: 65
End: 2020-12-16
Attending: INTERNAL MEDICINE
Payer: MEDICARE

## 2020-12-16 DIAGNOSIS — R63.5 WEIGHT GAIN: ICD-10-CM

## 2020-12-16 DIAGNOSIS — R79.9 ABNORMAL FINDING OF BLOOD CHEMISTRY, UNSPECIFIED: ICD-10-CM

## 2020-12-16 LAB
CHOLEST SERPL-MCNC: 194 MG/DL (ref 120–199)
CHOLEST/HDLC SERPL: 3.2 {RATIO} (ref 2–5)
HDLC SERPL-MCNC: 60 MG/DL (ref 40–75)
HDLC SERPL: 30.9 % (ref 20–50)
LDLC SERPL CALC-MCNC: 112 MG/DL (ref 63–159)
NONHDLC SERPL-MCNC: 134 MG/DL
TRIGL SERPL-MCNC: 110 MG/DL (ref 30–150)

## 2020-12-16 PROCEDURE — 80061 LIPID PANEL: CPT

## 2020-12-18 ENCOUNTER — TELEPHONE (OUTPATIENT)
Dept: GASTROENTEROLOGY | Facility: CLINIC | Age: 65
End: 2020-12-18

## 2020-12-18 ENCOUNTER — OFFICE VISIT (OUTPATIENT)
Dept: FAMILY MEDICINE | Facility: CLINIC | Age: 65
End: 2020-12-18
Payer: MEDICARE

## 2020-12-18 VITALS
DIASTOLIC BLOOD PRESSURE: 82 MMHG | HEART RATE: 81 BPM | OXYGEN SATURATION: 99 % | TEMPERATURE: 97 F | WEIGHT: 200.19 LBS | BODY MASS INDEX: 32.17 KG/M2 | RESPIRATION RATE: 16 BRPM | HEIGHT: 66 IN | SYSTOLIC BLOOD PRESSURE: 128 MMHG

## 2020-12-18 DIAGNOSIS — D89.9 DISORDER INVOLVING THE IMMUNE MECHANISM, UNSPECIFIED: ICD-10-CM

## 2020-12-18 DIAGNOSIS — I10 ESSENTIAL HYPERTENSION: ICD-10-CM

## 2020-12-18 DIAGNOSIS — J96.11 CHRONIC HYPOXEMIC RESPIRATORY FAILURE: ICD-10-CM

## 2020-12-18 DIAGNOSIS — I27.20 PULMONARY HTN: ICD-10-CM

## 2020-12-18 DIAGNOSIS — I15.0 RENOVASCULAR HYPERTENSION: ICD-10-CM

## 2020-12-18 DIAGNOSIS — M34.9 SCLERODERMA: ICD-10-CM

## 2020-12-18 PROCEDURE — 99214 PR OFFICE/OUTPT VISIT, EST, LEVL IV, 30-39 MIN: ICD-10-PCS | Mod: S$GLB,,, | Performed by: INTERNAL MEDICINE

## 2020-12-18 PROCEDURE — 99214 OFFICE O/P EST MOD 30 MIN: CPT | Mod: S$GLB,,, | Performed by: INTERNAL MEDICINE

## 2020-12-18 RX ORDER — TADALAFIL 20 MG/1
20 TABLET ORAL 2 TIMES DAILY
Qty: 60 TABLET | Refills: 11 | Status: SHIPPED | OUTPATIENT
Start: 2020-12-18 | End: 2021-04-06

## 2020-12-18 RX ORDER — AMLODIPINE BESYLATE 5 MG/1
5 TABLET ORAL DAILY
Qty: 90 TABLET | Refills: 1 | Status: SHIPPED | OUTPATIENT
Start: 2020-12-18 | End: 2021-03-31

## 2020-12-18 RX ORDER — LOSARTAN POTASSIUM 25 MG/1
25 TABLET ORAL DAILY
Qty: 90 TABLET | Refills: 3 | Status: SHIPPED | OUTPATIENT
Start: 2020-12-18 | End: 2021-05-31 | Stop reason: SDUPTHER

## 2020-12-18 NOTE — PROGRESS NOTES
Subjective:      9:24 AM     Patient ID: Edith Tran is a 65 y.o. female.    Chief Complaint: Hyperlipidemia (labs,refills)    HPI         CHIEF COMPLAINT: Hyperlipidemia. cholesterol screening: no.   HPI:     ONSET:    MODIFIERS/TREATMENTS: . Taking medications: yes. . Non-compliance with following diet: no. .     SYMPTOMS/RELATED:Possible medication side effects include:   Myalgia: no.  .     REVIEW OF SYMPTOMS: past weights:   Wt Readings from Last 1 Encounters:   12/18/20 0902 90.8 kg (200 lb 2.8 oz)                                                     Last lipids: total   Lab Results   Component Value Date    CHOL 194 12/16/2020    CHOL 206 (H) 06/11/2019    CHOL 190 07/25/2018                                                                     HDL   Lab Results   Component Value Date    HDL 60 12/16/2020    HDL 62 06/11/2019    HDL 46 07/25/2018                                                                     LDL   Lab Results   Component Value Date    LDLCALC 112.0 12/16/2020    LDLCALC 118.4 06/11/2019    LDLCALC 111.2 07/25/2018                                                                     TRIG   Lab Results   Component Value Date    TRIG 110 12/16/2020    TRIG 128 06/11/2019    TRIG 164 (H) 07/25/2018                                                                           CHIEF COMPLAINT: Hypertension  HPI:     ONSET:      QUALITY/COURSE:   Unchanged.     INTENSITY/SEVERITY:  Average blood pressure is unknown.      MODIFIERS/TREATMENTS:  Taking medications: yes. .High sodium intake: no. alcohol: no      The following symptoms are positive only if BOLDED, otherwise are negative.      SYMPTOMS/RELATED: Possible medication side effects include:   Depression..  . Cough. . Constipation.    REVIEW OF SYMPTOMS: . Weight_loss . Weight_gain . Leg_cramps .Potency_problems .    TARGET ORGAN DAMAGE:: angina/ prior myocardial infarction, chronic kidney disease, heart failure, left ventricular hypertrophy,  "peripheral artery disease, prior coronary revascularization, retinopathy, stroke. transient ischemic attack.    The patient has a history of scleroderma.  She has as a result pulmonary hypertension and interstitial lung disease.  She also has esophageal disease which is much better since she started taking Protonix early in the day.  Her breathing is stable.  She also has Raynaud's and came in without wearing gloves.  When she gets attacks the painful.  The      The 10-year ASCVD risk score (Treadwellwilber MCKEON Jr., et al., 2013) is: 7.1%    Values used to calculate the score:      Age: 65 years      Sex: Female      Is Non- : No      Diabetic: No      Tobacco smoker: No      Systolic Blood Pressure: 128 mmHg      Is BP treated: Yes      HDL Cholesterol: 60 mg/dL      Total Cholesterol: 194 mg/dL    Review of Systems   Constitutional: Negative for diaphoresis.   Eyes: Negative for visual disturbance.   Respiratory: Negative for chest tightness and shortness of breath.    Cardiovascular: Negative for chest pain.   Neurological: Negative for dizziness, syncope, weakness and headaches.   Psychiatric/Behavioral: Negative for dysphoric mood. The patient is not nervous/anxious.          Objective:      Vitals:    12/18/20 0902   BP: 128/82   Pulse: 81   Resp: 16   Temp: 96.8 °F (36 °C)   TempSrc: Oral   SpO2: 99%   Weight: 90.8 kg (200 lb 2.8 oz)   Height: 5' 6" (1.676 m)   PainSc: 0-No pain     Physical Exam  Vitals signs and nursing note reviewed.   Constitutional:       Appearance: She is well-developed.   Cardiovascular:      Rate and Rhythm: Normal rate and regular rhythm.      Heart sounds: Normal heart sounds.   Pulmonary:      Effort: Pulmonary effort is normal.      Breath sounds: Normal breath sounds.      Comments: inspiratory crackles  Abdominal:      Palpations: Abdomen is soft.      Tenderness: There is no abdominal tenderness.   Neurological:      Mental Status: She is alert.   Psychiatric:    " "     Behavior: Behavior normal.         Thought Content: Thought content normal.       Recent Results (from the past 1008 hour(s))   COVID-19 Routine Screening    Collection Time: 12/01/20  8:54 AM   Result Value Ref Range    SARS-CoV2 (COVID-19) Qualitative PCR Not Detected Not Detected   Specimen to Pathology, Surgery Gastrointestinal tract    Collection Time: 12/04/20  8:43 AM   Result Value Ref Range    Final Pathologic Diagnosis       1. Esophagus, biopsy:  Esophageal leukoplakia.  Rare scattered intraepithelial eosinophils, consistent with reflux  esophagitis.      Gross       Container Label: Clinic Number/AP Number:  [id number], and "esophagus Bx "  Received in formalin is a 7 x 5 x 1 mm aggregate of soft tan-white tissue  fragments.  Specimen is stained with Hematoxylin and entirely submitted in  [id number]--[id number] Tucker      Disclaimer       Unless the case is a 'gross only' or additional testing only, the final  diagnosis for each specimen is based on a microscopic examination of  appropriate tissue sections.     Lipid panel    Collection Time: 12/16/20  8:56 AM   Result Value Ref Range    Cholesterol 194 120 - 199 mg/dL    Triglycerides 110 30 - 150 mg/dL    HDL 60 40 - 75 mg/dL    LDL Cholesterol 112.0 63.0 - 159.0 mg/dL    HDL/Cholesterol Ratio 30.9 20.0 - 50.0 %    Total Cholesterol/HDL Ratio 3.2 2.0 - 5.0    Non-HDL Cholesterol 134 mg/dL   CBC auto differential    Collection Time: 12/16/20  8:56 AM   Result Value Ref Range    WBC 4.16 3.90 - 12.70 K/uL    RBC 4.24 4.00 - 5.40 M/uL    Hemoglobin 12.7 12.0 - 16.0 g/dL    Hematocrit 40.9 37.0 - 48.5 %    MCV 97 82 - 98 fL    MCH 30.0 27.0 - 31.0 pg    MCHC 31.1 (L) 32.0 - 36.0 g/dL    RDW 13.2 11.5 - 14.5 %    Platelets 169 150 - 350 K/uL    MPV 11.4 9.2 - 12.9 fL    Immature Granulocytes 0.2 0.0 - 0.5 %    Gran # (ANC) 2.8 1.8 - 7.7 K/uL    Immature Grans (Abs) 0.01 0.00 - 0.04 K/uL    Lymph # 0.8 (L) 1.0 - 4.8 K/uL    Mono # 0.3 0.3 - 1.0 K/uL    Eos " # 0.2 0.0 - 0.5 K/uL    Baso # 0.03 0.00 - 0.20 K/uL    nRBC 0 0 /100 WBC    Gran % 66.6 38.0 - 73.0 %    Lymph % 20.2 18.0 - 48.0 %    Mono % 8.2 4.0 - 15.0 %    Eosinophil % 4.1 0.0 - 8.0 %    Basophil % 0.7 0.0 - 1.9 %    Differential Method Automated    Comprehensive metabolic panel    Collection Time: 12/16/20  8:56 AM   Result Value Ref Range    Sodium 142 136 - 145 mmol/L    Potassium 4.2 3.5 - 5.1 mmol/L    Chloride 110 95 - 110 mmol/L    CO2 25 23 - 29 mmol/L    Glucose 92 70 - 110 mg/dL    BUN 8 8 - 23 mg/dL    Creatinine 0.8 0.5 - 1.4 mg/dL    Calcium 9.1 8.7 - 10.5 mg/dL    Total Protein 6.9 6.0 - 8.4 g/dL    Albumin 3.9 3.5 - 5.2 g/dL    Total Bilirubin 0.5 0.1 - 1.0 mg/dL    Alkaline Phosphatase 59 55 - 135 U/L    AST 13 10 - 40 U/L    ALT 9 (L) 10 - 44 U/L    Anion Gap 7 (L) 8 - 16 mmol/L    eGFR if African American >60 >60 mL/min/1.73 m^2    eGFR if non African American >60 >60 mL/min/1.73 m^2   C-Reactive Protein    Collection Time: 12/16/20  8:56 AM   Result Value Ref Range    CRP 4.8 0.0 - 8.2 mg/L   Sedimentation rate    Collection Time: 12/16/20  8:56 AM   Result Value Ref Range    Sed Rate 11 0 - 20 mm/Hr          Assessment:       1. Disorder involving the immune mechanism, unspecified    2. Chronic hypoxemic respiratory failure    3. Renovascular hypertension    4. Scleroderma    5. Essential hypertension    6. Pulmonary HTN          Plan:       Disorder involving the immune mechanism, unspecified    Chronic hypoxemic respiratory failure    Renovascular hypertension  -     losartan (COZAAR) 25 MG tablet; Take 1 tablet (25 mg total) by mouth once daily.  Dispense: 90 tablet; Refill: 3    Scleroderma  -     losartan (COZAAR) 25 MG tablet; Take 1 tablet (25 mg total) by mouth once daily.  Dispense: 90 tablet; Refill: 3    Essential hypertension  -     amLODIPine (NORVASC) 5 MG tablet; Take 1 tablet (5 mg total) by mouth once daily.  Dispense: 90 tablet; Refill: 1  -     CBC Auto Differential;  Future; Expected date: 12/18/2020  -     Comprehensive Metabolic Panel; Future; Expected date: 12/18/2020    Pulmonary HTN  -     tadalafiL (CIALIS) 20 MG Tab; Take 1 tablet (20 mg total) by mouth 2 (two) times daily.  Dispense: 60 tablet; Refill: 11      No follow-ups on file.

## 2020-12-18 NOTE — TELEPHONE ENCOUNTER
----- Message from Ngozi Prince MD sent at 12/18/2020  3:25 PM CST -----  Please let patient know her esophageal biopsies are benign and continue to show esophageal leukoplakia. We should repeat her EGD in 6 months

## 2020-12-18 NOTE — PATIENT INSTRUCTIONS
Get K N95 masks.  Wear gloves in the cold.  All was.        If you have labs scheduled at [facility] you need to make an appointment. This is a measure to protect you from covid 19.      Thank you for choosing [facility].     Please fill out the patient experience survey.

## 2020-12-21 ENCOUNTER — PATIENT OUTREACH (OUTPATIENT)
Dept: OTHER | Facility: OTHER | Age: 65
End: 2020-12-21

## 2020-12-21 NOTE — PROGRESS NOTES
Digital Medicine: Health  Follow-Up    The history is provided by the patient.                 Patient needs assistance troubleshooting: patient reminder needed.    Additional Follow-up details: Patient reports that she has been in and out of town and has not been taking her blood pressure readings. She stated that she is going to start taking her blood pressure readings again shortly.            Diet-Not assessed          Physical Activity-Not assessed    Medication Adherence-Medication Adherence not addressed.      Substance, Sleep, Stress-Not assessed      Continue current diet/physical activity routine.       Addressed patient questions and patient has my contact information if needed prior to next outreach.   Explained the importance of self-monitoring and medication adherence. Encouraged the patient to communicate with their health  for lifestyle modifications to help improve or maintain a healthy lifestyle.               There are no preventive care reminders to display for this patient.      Last 5 Patient Entered Readings                                      Current 30 Day Average:      Recent Readings 10/28/2020 9/29/2020 9/21/2020 9/14/2020 9/4/2020    SBP (mmHg) 118 138 109 120 124    DBP (mmHg) 67 89 82 88 89    Pulse 101 98 100 99 89

## 2020-12-23 RX ORDER — PANTOPRAZOLE SODIUM 40 MG/1
TABLET, DELAYED RELEASE ORAL
Qty: 60 TABLET | Refills: 6 | Status: CANCELLED | OUTPATIENT
Start: 2020-12-23

## 2020-12-23 RX ORDER — PANTOPRAZOLE SODIUM 40 MG/1
TABLET, DELAYED RELEASE ORAL
Qty: 60 TABLET | Refills: 6 | Status: SHIPPED | OUTPATIENT
Start: 2020-12-23 | End: 2021-08-09 | Stop reason: SDUPTHER

## 2020-12-31 ENCOUNTER — EXTERNAL CHRONIC CARE MANAGEMENT (OUTPATIENT)
Dept: PRIMARY CARE CLINIC | Facility: CLINIC | Age: 65
End: 2020-12-31
Payer: MEDICARE

## 2020-12-31 PROCEDURE — 99490 CHRNC CARE MGMT STAFF 1ST 20: CPT | Mod: S$GLB,,, | Performed by: INTERNAL MEDICINE

## 2020-12-31 PROCEDURE — 99490 PR CHRONIC CARE MGMT, 1ST 20 MIN: ICD-10-PCS | Mod: S$GLB,,, | Performed by: INTERNAL MEDICINE

## 2021-01-06 DIAGNOSIS — J47.9 BRONCHIECTASIS WITHOUT COMPLICATION: ICD-10-CM

## 2021-01-06 DIAGNOSIS — J44.9 CHRONIC OBSTRUCTIVE PULMONARY DISEASE, UNSPECIFIED COPD TYPE: ICD-10-CM

## 2021-01-06 RX ORDER — ALBUTEROL SULFATE 90 UG/1
AEROSOL, METERED RESPIRATORY (INHALATION)
Qty: 8 G | Refills: 18 | Status: SHIPPED | OUTPATIENT
Start: 2021-01-06 | End: 2021-12-22 | Stop reason: SDUPTHER

## 2021-01-27 ENCOUNTER — HOSPITAL ENCOUNTER (OUTPATIENT)
Dept: RADIOLOGY | Facility: HOSPITAL | Age: 66
Discharge: HOME | End: 2021-01-27
Attending: INTERNAL MEDICINE
Payer: MEDICARE

## 2021-01-27 DIAGNOSIS — Z12.31 SCREENING MAMMOGRAM, ENCOUNTER FOR: ICD-10-CM

## 2021-01-27 PROCEDURE — 77067 MAMMO DIGITAL SCREENING BILAT WITH TOMO: ICD-10-PCS | Mod: 26,,, | Performed by: RADIOLOGY

## 2021-01-27 PROCEDURE — 77067 SCR MAMMO BI INCL CAD: CPT | Mod: TC

## 2021-01-27 PROCEDURE — 77063 MAMMO DIGITAL SCREENING BILAT WITH TOMO: ICD-10-PCS | Mod: 26,,, | Performed by: RADIOLOGY

## 2021-01-27 PROCEDURE — 77063 BREAST TOMOSYNTHESIS BI: CPT | Mod: 26,,, | Performed by: RADIOLOGY

## 2021-01-27 PROCEDURE — 77067 SCR MAMMO BI INCL CAD: CPT | Mod: 26,,, | Performed by: RADIOLOGY

## 2021-03-05 ENCOUNTER — LAB VISIT (OUTPATIENT)
Dept: LAB | Facility: HOSPITAL | Age: 66
End: 2021-03-05
Attending: INTERNAL MEDICINE
Payer: MEDICARE

## 2021-03-05 DIAGNOSIS — I10 ESSENTIAL HYPERTENSION: ICD-10-CM

## 2021-03-05 LAB
ALBUMIN SERPL BCP-MCNC: 3.8 G/DL (ref 3.5–5.2)
ALP SERPL-CCNC: 70 U/L (ref 55–135)
ALT SERPL W/O P-5'-P-CCNC: 11 U/L (ref 10–44)
ANION GAP SERPL CALC-SCNC: 11 MMOL/L (ref 8–16)
AST SERPL-CCNC: 16 U/L (ref 10–40)
BASOPHILS # BLD AUTO: 0.04 K/UL (ref 0–0.2)
BASOPHILS NFR BLD: 0.7 % (ref 0–1.9)
BILIRUB SERPL-MCNC: 0.7 MG/DL (ref 0.1–1)
BUN SERPL-MCNC: 8 MG/DL (ref 8–23)
CALCIUM SERPL-MCNC: 8.8 MG/DL (ref 8.7–10.5)
CHLORIDE SERPL-SCNC: 107 MMOL/L (ref 95–110)
CO2 SERPL-SCNC: 26 MMOL/L (ref 23–29)
CREAT SERPL-MCNC: 0.8 MG/DL (ref 0.5–1.4)
DIFFERENTIAL METHOD: ABNORMAL
EOSINOPHIL # BLD AUTO: 0.2 K/UL (ref 0–0.5)
EOSINOPHIL NFR BLD: 3.6 % (ref 0–8)
ERYTHROCYTE [DISTWIDTH] IN BLOOD BY AUTOMATED COUNT: 13.1 % (ref 11.5–14.5)
EST. GFR  (AFRICAN AMERICAN): >60 ML/MIN/1.73 M^2
EST. GFR  (NON AFRICAN AMERICAN): >60 ML/MIN/1.73 M^2
GLUCOSE SERPL-MCNC: 90 MG/DL (ref 70–110)
HCT VFR BLD AUTO: 39.9 % (ref 37–48.5)
HGB BLD-MCNC: 12.9 G/DL (ref 12–16)
IMM GRANULOCYTES # BLD AUTO: 0.02 K/UL (ref 0–0.04)
IMM GRANULOCYTES NFR BLD AUTO: 0.3 % (ref 0–0.5)
LYMPHOCYTES # BLD AUTO: 0.9 K/UL (ref 1–4.8)
LYMPHOCYTES NFR BLD: 15.8 % (ref 18–48)
MCH RBC QN AUTO: 30.4 PG (ref 27–31)
MCHC RBC AUTO-ENTMCNC: 32.3 G/DL (ref 32–36)
MCV RBC AUTO: 94 FL (ref 82–98)
MONOCYTES # BLD AUTO: 0.5 K/UL (ref 0.3–1)
MONOCYTES NFR BLD: 7.7 % (ref 4–15)
NEUTROPHILS # BLD AUTO: 4.2 K/UL (ref 1.8–7.7)
NEUTROPHILS NFR BLD: 71.9 % (ref 38–73)
NRBC BLD-RTO: 0 /100 WBC
PLATELET # BLD AUTO: 187 K/UL (ref 150–350)
PMV BLD AUTO: 11.6 FL (ref 9.2–12.9)
POTASSIUM SERPL-SCNC: 3.4 MMOL/L (ref 3.5–5.1)
PROT SERPL-MCNC: 7 G/DL (ref 6–8.4)
RBC # BLD AUTO: 4.25 M/UL (ref 4–5.4)
SODIUM SERPL-SCNC: 144 MMOL/L (ref 136–145)
WBC # BLD AUTO: 5.81 K/UL (ref 3.9–12.7)

## 2021-03-05 PROCEDURE — 80053 COMPREHEN METABOLIC PANEL: CPT | Mod: 91 | Performed by: INTERNAL MEDICINE

## 2021-03-05 PROCEDURE — 85025 COMPLETE CBC W/AUTO DIFF WBC: CPT | Mod: 91 | Performed by: INTERNAL MEDICINE

## 2021-03-05 PROCEDURE — 36415 COLL VENOUS BLD VENIPUNCTURE: CPT | Performed by: INTERNAL MEDICINE

## 2021-03-06 ENCOUNTER — PATIENT MESSAGE (OUTPATIENT)
Dept: RHEUMATOLOGY | Facility: CLINIC | Age: 66
End: 2021-03-06

## 2021-03-08 ENCOUNTER — LAB VISIT (OUTPATIENT)
Dept: LAB | Facility: HOSPITAL | Age: 66
End: 2021-03-08
Attending: STUDENT IN AN ORGANIZED HEALTH CARE EDUCATION/TRAINING PROGRAM
Payer: MEDICARE

## 2021-03-08 ENCOUNTER — TELEPHONE (OUTPATIENT)
Dept: RHEUMATOLOGY | Facility: CLINIC | Age: 66
End: 2021-03-08

## 2021-03-08 ENCOUNTER — PATIENT OUTREACH (OUTPATIENT)
Dept: ADMINISTRATIVE | Facility: OTHER | Age: 66
End: 2021-03-08

## 2021-03-08 ENCOUNTER — PATIENT MESSAGE (OUTPATIENT)
Dept: FAMILY MEDICINE | Facility: CLINIC | Age: 66
End: 2021-03-08

## 2021-03-08 DIAGNOSIS — R39.9 URINARY TRACT INFECTION SYMPTOMS: ICD-10-CM

## 2021-03-08 DIAGNOSIS — R39.9 URINARY TRACT INFECTION SYMPTOMS: Primary | ICD-10-CM

## 2021-03-08 LAB
BACTERIA #/AREA URNS HPF: ABNORMAL /HPF
BILIRUB UR QL STRIP: NEGATIVE
CLARITY UR: CLEAR
COLOR UR: YELLOW
GLUCOSE UR QL STRIP: NEGATIVE
HGB UR QL STRIP: NEGATIVE
KETONES UR QL STRIP: NEGATIVE
LEUKOCYTE ESTERASE UR QL STRIP: ABNORMAL
MICROSCOPIC COMMENT: ABNORMAL
NITRITE UR QL STRIP: NEGATIVE
PH UR STRIP: 6 [PH] (ref 5–8)
PROT UR QL STRIP: NEGATIVE
SP GR UR STRIP: <=1.005 (ref 1–1.03)
SQUAMOUS #/AREA URNS HPF: 6 /HPF
URN SPEC COLLECT METH UR: ABNORMAL
UROBILINOGEN UR STRIP-ACNC: NEGATIVE EU/DL
WBC #/AREA URNS HPF: 5 /HPF (ref 0–5)

## 2021-03-08 PROCEDURE — 81000 URINALYSIS NONAUTO W/SCOPE: CPT | Performed by: STUDENT IN AN ORGANIZED HEALTH CARE EDUCATION/TRAINING PROGRAM

## 2021-03-09 ENCOUNTER — PATIENT MESSAGE (OUTPATIENT)
Dept: FAMILY MEDICINE | Facility: CLINIC | Age: 66
End: 2021-03-09

## 2021-03-10 ENCOUNTER — OFFICE VISIT (OUTPATIENT)
Dept: RHEUMATOLOGY | Facility: CLINIC | Age: 66
End: 2021-03-10
Payer: MEDICARE

## 2021-03-10 VITALS
HEART RATE: 96 BPM | DIASTOLIC BLOOD PRESSURE: 81 MMHG | HEIGHT: 66 IN | WEIGHT: 205 LBS | SYSTOLIC BLOOD PRESSURE: 126 MMHG | BODY MASS INDEX: 32.95 KG/M2

## 2021-03-10 DIAGNOSIS — M34.9 SYSTEMIC SCLEROSIS WITH LIMITED CUTANEOUS INVOLVEMENT: ICD-10-CM

## 2021-03-10 DIAGNOSIS — Z79.52 CURRENT CHRONIC USE OF SYSTEMIC STEROIDS: ICD-10-CM

## 2021-03-10 DIAGNOSIS — M34.9 SCLERODERMA: ICD-10-CM

## 2021-03-10 DIAGNOSIS — I73.00 RAYNAUD'S DISEASE WITHOUT GANGRENE: ICD-10-CM

## 2021-03-10 DIAGNOSIS — R39.9 URINARY TRACT INFECTION SYMPTOMS: Primary | ICD-10-CM

## 2021-03-10 DIAGNOSIS — I27.20 PULMONARY HYPERTENSION: ICD-10-CM

## 2021-03-10 PROCEDURE — 99214 PR OFFICE/OUTPT VISIT, EST, LEVL IV, 30-39 MIN: ICD-10-PCS | Mod: S$PBB,,, | Performed by: STUDENT IN AN ORGANIZED HEALTH CARE EDUCATION/TRAINING PROGRAM

## 2021-03-10 PROCEDURE — 99999 PR PBB SHADOW E&M-EST. PATIENT-LVL IV: ICD-10-PCS | Mod: PBBFAC,,, | Performed by: STUDENT IN AN ORGANIZED HEALTH CARE EDUCATION/TRAINING PROGRAM

## 2021-03-10 PROCEDURE — 99214 OFFICE O/P EST MOD 30 MIN: CPT | Mod: PBBFAC,PO | Performed by: STUDENT IN AN ORGANIZED HEALTH CARE EDUCATION/TRAINING PROGRAM

## 2021-03-10 PROCEDURE — 99214 OFFICE O/P EST MOD 30 MIN: CPT | Mod: S$PBB,,, | Performed by: STUDENT IN AN ORGANIZED HEALTH CARE EDUCATION/TRAINING PROGRAM

## 2021-03-10 PROCEDURE — 99999 PR PBB SHADOW E&M-EST. PATIENT-LVL IV: CPT | Mod: PBBFAC,,, | Performed by: STUDENT IN AN ORGANIZED HEALTH CARE EDUCATION/TRAINING PROGRAM

## 2021-03-10 RX ORDER — SULFAMETHOXAZOLE AND TRIMETHOPRIM 800; 160 MG/1; MG/1
1 TABLET ORAL 2 TIMES DAILY
Qty: 10 TABLET | Refills: 0 | Status: SHIPPED | OUTPATIENT
Start: 2021-03-10 | End: 2021-03-15

## 2021-03-10 RX ORDER — SULFAMETHOXAZOLE AND TRIMETHOPRIM 800; 160 MG/1; MG/1
1 TABLET ORAL 2 TIMES DAILY
Qty: 10 TABLET | Refills: 0 | Status: SHIPPED | OUTPATIENT
Start: 2021-03-10 | End: 2021-03-10 | Stop reason: SDUPTHER

## 2021-03-10 RX ORDER — MYCOPHENOLATE MOFETIL 500 MG/1
1500 TABLET ORAL 2 TIMES DAILY
Qty: 520 TABLET | Refills: 1 | Status: SHIPPED | OUTPATIENT
Start: 2021-03-10 | End: 2021-06-14 | Stop reason: SDUPTHER

## 2021-03-10 ASSESSMENT — ROUTINE ASSESSMENT OF PATIENT INDEX DATA (RAPID3)
FATIGUE SCORE: 5.5
PATIENT GLOBAL ASSESSMENT SCORE: 5
PSYCHOLOGICAL DISTRESS SCORE: 2.2
MDHAQ FUNCTION SCORE: 0.7
PAIN SCORE: 7
AM STIFFNESS SCORE: 0, NO
TOTAL RAPID3 SCORE: 4.78

## 2021-03-11 ENCOUNTER — HOSPITAL ENCOUNTER (OUTPATIENT)
Dept: RADIOLOGY | Facility: CLINIC | Age: 66
Discharge: HOME | End: 2021-03-11
Attending: STUDENT IN AN ORGANIZED HEALTH CARE EDUCATION/TRAINING PROGRAM
Payer: MEDICARE

## 2021-03-11 DIAGNOSIS — Z79.52 CURRENT CHRONIC USE OF SYSTEMIC STEROIDS: ICD-10-CM

## 2021-03-11 DIAGNOSIS — M34.9 SYSTEMIC SCLEROSIS WITH LIMITED CUTANEOUS INVOLVEMENT: ICD-10-CM

## 2021-03-11 DIAGNOSIS — M34.9 SCLERODERMA: ICD-10-CM

## 2021-03-11 DIAGNOSIS — I27.20 PULMONARY HYPERTENSION: ICD-10-CM

## 2021-03-11 DIAGNOSIS — I73.00 RAYNAUD'S DISEASE WITHOUT GANGRENE: ICD-10-CM

## 2021-03-11 PROCEDURE — 77080 DEXA BONE DENSITY SPINE HIP: ICD-10-PCS | Mod: 26,,, | Performed by: RADIOLOGY

## 2021-03-11 PROCEDURE — 77080 DXA BONE DENSITY AXIAL: CPT | Mod: 26,,, | Performed by: RADIOLOGY

## 2021-03-11 PROCEDURE — 77080 DXA BONE DENSITY AXIAL: CPT | Mod: TC,PO

## 2021-03-15 ENCOUNTER — PATIENT MESSAGE (OUTPATIENT)
Dept: FAMILY MEDICINE | Facility: CLINIC | Age: 66
End: 2021-03-15

## 2021-03-15 ENCOUNTER — PATIENT MESSAGE (OUTPATIENT)
Dept: RHEUMATOLOGY | Facility: CLINIC | Age: 66
End: 2021-03-15

## 2021-03-16 ENCOUNTER — TELEPHONE (OUTPATIENT)
Dept: PULMONOLOGY | Facility: CLINIC | Age: 66
End: 2021-03-16

## 2021-03-16 ENCOUNTER — HOSPITAL ENCOUNTER (OUTPATIENT)
Dept: PULMONOLOGY | Facility: HOSPITAL | Age: 66
Discharge: HOME | End: 2021-03-16
Attending: INTERNAL MEDICINE
Payer: MEDICARE

## 2021-03-16 ENCOUNTER — OFFICE VISIT (OUTPATIENT)
Dept: PULMONOLOGY | Facility: CLINIC | Age: 66
End: 2021-03-16
Payer: MEDICARE

## 2021-03-16 VITALS
OXYGEN SATURATION: 99 % | BODY MASS INDEX: 32.87 KG/M2 | HEART RATE: 90 BPM | WEIGHT: 204.5 LBS | DIASTOLIC BLOOD PRESSURE: 84 MMHG | HEIGHT: 66 IN | SYSTOLIC BLOOD PRESSURE: 136 MMHG

## 2021-03-16 DIAGNOSIS — R39.15 URGENCY OF URINATION: Primary | ICD-10-CM

## 2021-03-16 DIAGNOSIS — M34.9 SCLERODERMA: ICD-10-CM

## 2021-03-16 DIAGNOSIS — J84.9 INTERSTITIAL LUNG DISEASE: ICD-10-CM

## 2021-03-16 DIAGNOSIS — J84.10 PULMONARY FIBROSIS: ICD-10-CM

## 2021-03-16 DIAGNOSIS — G47.33 OBSTRUCTIVE SLEEP APNEA SYNDROME: ICD-10-CM

## 2021-03-16 DIAGNOSIS — I27.20 PULMONARY HYPERTENSION: ICD-10-CM

## 2021-03-16 DIAGNOSIS — J96.11 CHRONIC HYPOXEMIC RESPIRATORY FAILURE: ICD-10-CM

## 2021-03-16 LAB — BR6MWT: NORMAL

## 2021-03-16 PROCEDURE — 99214 OFFICE O/P EST MOD 30 MIN: CPT | Mod: S$PBB,,, | Performed by: INTERNAL MEDICINE

## 2021-03-16 PROCEDURE — 94618 PULMONARY STRESS TESTING: ICD-10-PCS | Mod: 26,,, | Performed by: INTERNAL MEDICINE

## 2021-03-16 PROCEDURE — 99214 PR OFFICE/OUTPT VISIT, EST, LEVL IV, 30-39 MIN: ICD-10-PCS | Mod: S$PBB,,, | Performed by: INTERNAL MEDICINE

## 2021-03-16 PROCEDURE — 94618 PULMONARY STRESS TESTING: CPT

## 2021-03-16 PROCEDURE — 99214 OFFICE O/P EST MOD 30 MIN: CPT | Mod: PBBFAC,25,PO | Performed by: INTERNAL MEDICINE

## 2021-03-16 PROCEDURE — 99999 PR PBB SHADOW E&M-EST. PATIENT-LVL IV: CPT | Mod: PBBFAC,,, | Performed by: INTERNAL MEDICINE

## 2021-03-16 PROCEDURE — 99999 PR PBB SHADOW E&M-EST. PATIENT-LVL IV: ICD-10-PCS | Mod: PBBFAC,,, | Performed by: INTERNAL MEDICINE

## 2021-03-16 PROCEDURE — 94618 PULMONARY STRESS TESTING: CPT | Mod: 26,,, | Performed by: INTERNAL MEDICINE

## 2021-03-17 ENCOUNTER — TELEPHONE (OUTPATIENT)
Dept: PULMONOLOGY | Facility: CLINIC | Age: 66
End: 2021-03-17

## 2021-03-19 ENCOUNTER — OFFICE VISIT (OUTPATIENT)
Dept: FAMILY MEDICINE | Facility: CLINIC | Age: 66
End: 2021-03-19
Payer: MEDICARE

## 2021-03-19 VITALS
HEART RATE: 88 BPM | RESPIRATION RATE: 20 BRPM | HEIGHT: 66 IN | SYSTOLIC BLOOD PRESSURE: 104 MMHG | TEMPERATURE: 97 F | DIASTOLIC BLOOD PRESSURE: 70 MMHG | OXYGEN SATURATION: 96 % | WEIGHT: 203.25 LBS | BODY MASS INDEX: 32.66 KG/M2

## 2021-03-19 DIAGNOSIS — E87.6 HYPOKALEMIA: ICD-10-CM

## 2021-03-19 DIAGNOSIS — R25.2 LEG CRAMPS: ICD-10-CM

## 2021-03-19 DIAGNOSIS — M34.9 SCLERODERMA: Primary | ICD-10-CM

## 2021-03-19 DIAGNOSIS — R35.0 URINARY FREQUENCY: ICD-10-CM

## 2021-03-19 DIAGNOSIS — I10 ESSENTIAL HYPERTENSION: ICD-10-CM

## 2021-03-19 PROCEDURE — 99214 PR OFFICE/OUTPT VISIT, EST, LEVL IV, 30-39 MIN: ICD-10-PCS | Mod: S$GLB,,, | Performed by: INTERNAL MEDICINE

## 2021-03-19 PROCEDURE — 99214 OFFICE O/P EST MOD 30 MIN: CPT | Mod: S$GLB,,, | Performed by: INTERNAL MEDICINE

## 2021-03-19 RX ORDER — SULFAMETHOXAZOLE AND TRIMETHOPRIM 800; 160 MG/1; MG/1
1 TABLET ORAL 2 TIMES DAILY
COMMUNITY
Start: 2021-03-16 | End: 2021-03-31 | Stop reason: ALTCHOICE

## 2021-03-19 RX ORDER — SOLIFENACIN SUCCINATE 5 MG/1
5 TABLET, FILM COATED ORAL DAILY
Qty: 30 TABLET | Refills: 11 | Status: SHIPPED | OUTPATIENT
Start: 2021-03-19 | End: 2021-03-22

## 2021-03-22 ENCOUNTER — OFFICE VISIT (OUTPATIENT)
Dept: UROLOGY | Facility: CLINIC | Age: 66
End: 2021-03-22
Payer: MEDICARE

## 2021-03-22 VITALS
HEIGHT: 66 IN | DIASTOLIC BLOOD PRESSURE: 87 MMHG | SYSTOLIC BLOOD PRESSURE: 124 MMHG | BODY MASS INDEX: 33.17 KG/M2 | WEIGHT: 206.38 LBS | HEART RATE: 87 BPM

## 2021-03-22 DIAGNOSIS — R31.29 MICROHEMATURIA: ICD-10-CM

## 2021-03-22 DIAGNOSIS — R39.15 URINARY URGENCY: Primary | ICD-10-CM

## 2021-03-22 LAB
BILIRUB SERPL-MCNC: ABNORMAL MG/DL
BLOOD URINE, POC: ABNORMAL
CLARITY, POC UA: CLEAR
COLOR, POC UA: YELLOW
GLUCOSE UR QL STRIP: ABNORMAL
KETONES UR QL STRIP: ABNORMAL
LEUKOCYTE ESTERASE URINE, POC: ABNORMAL
NITRITE, POC UA: ABNORMAL
PH, POC UA: 7
PROTEIN, POC: ABNORMAL
SPECIFIC GRAVITY, POC UA: 1.01
UROBILINOGEN, POC UA: 0.2

## 2021-03-22 PROCEDURE — 99204 PR OFFICE/OUTPT VISIT, NEW, LEVL IV, 45-59 MIN: ICD-10-PCS | Mod: S$PBB,,, | Performed by: UROLOGY

## 2021-03-22 PROCEDURE — 88112 CYTOPATH CELL ENHANCE TECH: CPT | Performed by: PATHOLOGY

## 2021-03-22 PROCEDURE — 81002 URINALYSIS NONAUTO W/O SCOPE: CPT | Mod: PBBFAC,PN | Performed by: UROLOGY

## 2021-03-22 PROCEDURE — 99999 PR PBB SHADOW E&M-EST. PATIENT-LVL IV: ICD-10-PCS | Mod: PBBFAC,,, | Performed by: UROLOGY

## 2021-03-22 PROCEDURE — 99214 OFFICE O/P EST MOD 30 MIN: CPT | Mod: PBBFAC,PN,25 | Performed by: UROLOGY

## 2021-03-22 PROCEDURE — 99204 OFFICE O/P NEW MOD 45 MIN: CPT | Mod: S$PBB,,, | Performed by: UROLOGY

## 2021-03-22 PROCEDURE — 88112 PR  CYTOPATH, CELL ENHANCE TECH: ICD-10-PCS | Mod: 26,,, | Performed by: PATHOLOGY

## 2021-03-22 PROCEDURE — 88112 CYTOPATH CELL ENHANCE TECH: CPT | Mod: 26,,, | Performed by: PATHOLOGY

## 2021-03-22 PROCEDURE — 87086 URINE CULTURE/COLONY COUNT: CPT | Performed by: UROLOGY

## 2021-03-22 PROCEDURE — 99999 PR PBB SHADOW E&M-EST. PATIENT-LVL IV: CPT | Mod: PBBFAC,,, | Performed by: UROLOGY

## 2021-03-22 RX ORDER — MIRABEGRON 50 MG/1
50 TABLET, FILM COATED, EXTENDED RELEASE ORAL DAILY
Qty: 90 TABLET | Refills: 3 | Status: SHIPPED | OUTPATIENT
Start: 2021-03-22 | End: 2022-05-04 | Stop reason: SDUPTHER

## 2021-03-23 LAB — BACTERIA UR CULT: NO GROWTH

## 2021-03-26 ENCOUNTER — PES CALL (OUTPATIENT)
Dept: ADMINISTRATIVE | Facility: CLINIC | Age: 66
End: 2021-03-26

## 2021-03-26 LAB
FINAL PATHOLOGIC DIAGNOSIS: NORMAL
Lab: NORMAL

## 2021-03-31 ENCOUNTER — OFFICE VISIT (OUTPATIENT)
Dept: FAMILY MEDICINE | Facility: CLINIC | Age: 66
End: 2021-03-31
Payer: MEDICARE

## 2021-03-31 VITALS
RESPIRATION RATE: 18 BRPM | HEART RATE: 94 BPM | DIASTOLIC BLOOD PRESSURE: 85 MMHG | BODY MASS INDEX: 33.03 KG/M2 | WEIGHT: 205.5 LBS | TEMPERATURE: 98 F | OXYGEN SATURATION: 99 % | SYSTOLIC BLOOD PRESSURE: 117 MMHG | HEIGHT: 66 IN

## 2021-03-31 DIAGNOSIS — K50.00 CROHN'S DISEASE OF SMALL INTESTINE WITHOUT COMPLICATION: ICD-10-CM

## 2021-03-31 DIAGNOSIS — J44.9 CHRONIC OBSTRUCTIVE PULMONARY DISEASE, UNSPECIFIED COPD TYPE: ICD-10-CM

## 2021-03-31 DIAGNOSIS — D69.6 THROMBOCYTOPENIA: ICD-10-CM

## 2021-03-31 DIAGNOSIS — D89.9 DISORDER INVOLVING THE IMMUNE MECHANISM, UNSPECIFIED: ICD-10-CM

## 2021-03-31 DIAGNOSIS — M54.50 ACUTE BILATERAL LOW BACK PAIN WITHOUT SCIATICA: ICD-10-CM

## 2021-03-31 DIAGNOSIS — M62.838 MUSCLE SPASM: Primary | ICD-10-CM

## 2021-03-31 PROCEDURE — 99214 PR OFFICE/OUTPT VISIT, EST, LEVL IV, 30-39 MIN: ICD-10-PCS | Mod: S$GLB,,, | Performed by: NURSE PRACTITIONER

## 2021-03-31 PROCEDURE — 99214 OFFICE O/P EST MOD 30 MIN: CPT | Mod: S$GLB,,, | Performed by: NURSE PRACTITIONER

## 2021-03-31 RX ORDER — ACETAMINOPHEN AND CODEINE PHOSPHATE 300; 30 MG/1; MG/1
1 TABLET ORAL 4 TIMES DAILY PRN
Qty: 28 TABLET | Refills: 0 | Status: SHIPPED | OUTPATIENT
Start: 2021-03-31 | End: 2021-04-07

## 2021-03-31 RX ORDER — PREDNISONE 20 MG/1
40 TABLET ORAL DAILY
Qty: 10 TABLET | Refills: 0 | Status: SHIPPED | OUTPATIENT
Start: 2021-03-31 | End: 2021-04-05

## 2021-03-31 RX ORDER — TIZANIDINE 2 MG/1
2 TABLET ORAL EVERY 8 HOURS PRN
Qty: 90 TABLET | Refills: 0 | Status: SHIPPED | OUTPATIENT
Start: 2021-03-31 | End: 2021-04-28

## 2021-04-06 ENCOUNTER — TELEPHONE (OUTPATIENT)
Dept: FAMILY MEDICINE | Facility: CLINIC | Age: 66
End: 2021-04-06

## 2021-04-07 ENCOUNTER — PATIENT MESSAGE (OUTPATIENT)
Dept: RHEUMATOLOGY | Facility: CLINIC | Age: 66
End: 2021-04-07

## 2021-04-08 ENCOUNTER — TELEPHONE (OUTPATIENT)
Dept: RHEUMATOLOGY | Facility: CLINIC | Age: 66
End: 2021-04-08

## 2021-04-12 ENCOUNTER — TELEPHONE (OUTPATIENT)
Dept: RHEUMATOLOGY | Facility: CLINIC | Age: 66
End: 2021-04-12

## 2021-04-12 ENCOUNTER — HOSPITAL ENCOUNTER (OUTPATIENT)
Dept: RADIOLOGY | Facility: HOSPITAL | Age: 66
Discharge: HOME | End: 2021-04-12
Attending: NURSE PRACTITIONER
Payer: MEDICARE

## 2021-04-12 ENCOUNTER — OFFICE VISIT (OUTPATIENT)
Dept: FAMILY MEDICINE | Facility: CLINIC | Age: 66
End: 2021-04-12
Payer: MEDICARE

## 2021-04-12 VITALS
RESPIRATION RATE: 19 BRPM | BODY MASS INDEX: 33.17 KG/M2 | TEMPERATURE: 98 F | HEIGHT: 66 IN | DIASTOLIC BLOOD PRESSURE: 87 MMHG | SYSTOLIC BLOOD PRESSURE: 136 MMHG | OXYGEN SATURATION: 98 % | WEIGHT: 206.38 LBS | HEART RATE: 86 BPM

## 2021-04-12 DIAGNOSIS — M54.10 BACK PAIN WITH LEFT-SIDED RADICULOPATHY: Primary | ICD-10-CM

## 2021-04-12 DIAGNOSIS — M54.10 BACK PAIN WITH LEFT-SIDED RADICULOPATHY: ICD-10-CM

## 2021-04-12 PROCEDURE — 99213 OFFICE O/P EST LOW 20 MIN: CPT | Mod: S$GLB,,, | Performed by: INTERNAL MEDICINE

## 2021-04-12 PROCEDURE — 72100 X-RAY EXAM L-S SPINE 2/3 VWS: CPT | Mod: TC,FY

## 2021-04-12 PROCEDURE — 99213 PR OFFICE/OUTPT VISIT, EST, LEVL III, 20-29 MIN: ICD-10-PCS | Mod: S$GLB,,, | Performed by: INTERNAL MEDICINE

## 2021-04-12 PROCEDURE — 72100 X-RAY EXAM L-S SPINE 2/3 VWS: CPT | Mod: 26,,, | Performed by: RADIOLOGY

## 2021-04-12 PROCEDURE — 72100 XR LUMBAR SPINE AP AND LATERAL: ICD-10-PCS | Mod: 26,,, | Performed by: RADIOLOGY

## 2021-04-12 RX ORDER — GABAPENTIN 100 MG/1
200 CAPSULE ORAL 3 TIMES DAILY
Qty: 180 CAPSULE | Refills: 1 | Status: SHIPPED | OUTPATIENT
Start: 2021-04-12 | End: 2021-06-02 | Stop reason: SDUPTHER

## 2021-04-12 RX ORDER — ACETAMINOPHEN AND CODEINE PHOSPHATE 300; 30 MG/1; MG/1
1 TABLET ORAL EVERY 8 HOURS PRN
Qty: 90 TABLET | Refills: 0 | Status: SHIPPED | OUTPATIENT
Start: 2021-04-12 | End: 2021-05-12

## 2021-04-13 ENCOUNTER — PATIENT MESSAGE (OUTPATIENT)
Dept: RHEUMATOLOGY | Facility: CLINIC | Age: 66
End: 2021-04-13

## 2021-04-15 ENCOUNTER — OFFICE VISIT (OUTPATIENT)
Dept: SPINE | Facility: CLINIC | Age: 66
End: 2021-04-15
Payer: MEDICARE

## 2021-04-15 VITALS — WEIGHT: 206.38 LBS | HEIGHT: 66 IN | BODY MASS INDEX: 33.17 KG/M2

## 2021-04-15 DIAGNOSIS — M54.10 BACK PAIN WITH LEFT-SIDED RADICULOPATHY: ICD-10-CM

## 2021-04-15 PROCEDURE — 99204 PR OFFICE/OUTPT VISIT, NEW, LEVL IV, 45-59 MIN: ICD-10-PCS | Mod: S$GLB,,, | Performed by: PHYSICAL MEDICINE & REHABILITATION

## 2021-04-15 PROCEDURE — 99204 OFFICE O/P NEW MOD 45 MIN: CPT | Mod: S$GLB,,, | Performed by: PHYSICAL MEDICINE & REHABILITATION

## 2021-04-16 ENCOUNTER — CLINICAL SUPPORT (OUTPATIENT)
Dept: REHABILITATION | Facility: HOSPITAL | Age: 66
End: 2021-04-16
Attending: PHYSICAL MEDICINE & REHABILITATION
Payer: MEDICARE

## 2021-04-16 DIAGNOSIS — M54.10 BACK PAIN WITH LEFT-SIDED RADICULOPATHY: ICD-10-CM

## 2021-04-16 DIAGNOSIS — M54.16 LUMBAR BACK PAIN WITH RADICULOPATHY AFFECTING LEFT LOWER EXTREMITY: ICD-10-CM

## 2021-04-16 PROCEDURE — 97161 PT EVAL LOW COMPLEX 20 MIN: CPT | Mod: PN

## 2021-04-16 PROCEDURE — 97110 THERAPEUTIC EXERCISES: CPT | Mod: PN

## 2021-04-21 ENCOUNTER — CLINICAL SUPPORT (OUTPATIENT)
Dept: REHABILITATION | Facility: HOSPITAL | Age: 66
End: 2021-04-21
Attending: PHYSICAL MEDICINE & REHABILITATION
Payer: MEDICARE

## 2021-04-21 DIAGNOSIS — M54.16 LUMBAR BACK PAIN WITH RADICULOPATHY AFFECTING LEFT LOWER EXTREMITY: ICD-10-CM

## 2021-04-21 PROCEDURE — 97110 THERAPEUTIC EXERCISES: CPT | Mod: PN,CQ

## 2021-04-25 ENCOUNTER — PATIENT MESSAGE (OUTPATIENT)
Dept: ADMINISTRATIVE | Facility: OTHER | Age: 66
End: 2021-04-25

## 2021-04-30 ENCOUNTER — EXTERNAL CHRONIC CARE MANAGEMENT (OUTPATIENT)
Dept: PRIMARY CARE CLINIC | Facility: CLINIC | Age: 66
End: 2021-04-30
Payer: MEDICARE

## 2021-04-30 PROCEDURE — 99490 PR CHRONIC CARE MGMT, 1ST 20 MIN: ICD-10-PCS | Mod: S$GLB,,, | Performed by: INTERNAL MEDICINE

## 2021-04-30 PROCEDURE — 99490 CHRNC CARE MGMT STAFF 1ST 20: CPT | Mod: S$GLB,,, | Performed by: INTERNAL MEDICINE

## 2021-05-03 ENCOUNTER — CLINICAL SUPPORT (OUTPATIENT)
Dept: REHABILITATION | Facility: HOSPITAL | Age: 66
End: 2021-05-03
Attending: PHYSICAL MEDICINE & REHABILITATION
Payer: MEDICARE

## 2021-05-03 DIAGNOSIS — M54.16 LUMBAR BACK PAIN WITH RADICULOPATHY AFFECTING LEFT LOWER EXTREMITY: ICD-10-CM

## 2021-05-03 PROCEDURE — 97110 THERAPEUTIC EXERCISES: CPT | Mod: PN,CQ

## 2021-05-06 ENCOUNTER — CLINICAL SUPPORT (OUTPATIENT)
Dept: REHABILITATION | Facility: HOSPITAL | Age: 66
End: 2021-05-06
Attending: PHYSICAL MEDICINE & REHABILITATION
Payer: MEDICARE

## 2021-05-06 ENCOUNTER — DOCUMENTATION ONLY (OUTPATIENT)
Dept: REHABILITATION | Facility: HOSPITAL | Age: 66
End: 2021-05-06

## 2021-05-06 ENCOUNTER — OFFICE VISIT (OUTPATIENT)
Dept: FAMILY MEDICINE | Facility: CLINIC | Age: 66
End: 2021-05-06
Payer: MEDICARE

## 2021-05-06 VITALS
TEMPERATURE: 98 F | HEART RATE: 108 BPM | HEIGHT: 66 IN | DIASTOLIC BLOOD PRESSURE: 75 MMHG | RESPIRATION RATE: 18 BRPM | OXYGEN SATURATION: 97 % | WEIGHT: 203.06 LBS | SYSTOLIC BLOOD PRESSURE: 110 MMHG | BODY MASS INDEX: 32.64 KG/M2

## 2021-05-06 DIAGNOSIS — D89.9 DISORDER INVOLVING THE IMMUNE MECHANISM, UNSPECIFIED: ICD-10-CM

## 2021-05-06 DIAGNOSIS — Z00.00 ENCOUNTER FOR PREVENTIVE HEALTH EXAMINATION: Primary | ICD-10-CM

## 2021-05-06 DIAGNOSIS — J96.11 CHRONIC HYPOXEMIC RESPIRATORY FAILURE: ICD-10-CM

## 2021-05-06 DIAGNOSIS — Z99.81 DEPENDENCE ON SUPPLEMENTAL OXYGEN: ICD-10-CM

## 2021-05-06 DIAGNOSIS — D69.6 THROMBOCYTOPENIA: ICD-10-CM

## 2021-05-06 DIAGNOSIS — R26.9 ABNORMALITY OF GAIT AND MOBILITY: ICD-10-CM

## 2021-05-06 DIAGNOSIS — K50.00 CROHN'S DISEASE OF SMALL INTESTINE WITHOUT COMPLICATION: ICD-10-CM

## 2021-05-06 DIAGNOSIS — J44.9 COPD MIXED TYPE: ICD-10-CM

## 2021-05-06 DIAGNOSIS — M54.16 LUMBAR BACK PAIN WITH RADICULOPATHY AFFECTING LEFT LOWER EXTREMITY: ICD-10-CM

## 2021-05-06 DIAGNOSIS — M34.9 SCLERODERMA: ICD-10-CM

## 2021-05-06 DIAGNOSIS — I27.20 PULMONARY HYPERTENSION: ICD-10-CM

## 2021-05-06 PROCEDURE — G0439 PPPS, SUBSEQ VISIT: HCPCS | Mod: S$GLB,,, | Performed by: NURSE PRACTITIONER

## 2021-05-06 PROCEDURE — 97110 THERAPEUTIC EXERCISES: CPT | Mod: PN,CQ

## 2021-05-06 PROCEDURE — G0439 PR MEDICARE ANNUAL WELLNESS SUBSEQUENT VISIT: ICD-10-PCS | Mod: S$GLB,,, | Performed by: NURSE PRACTITIONER

## 2021-05-07 ENCOUNTER — IMMUNIZATION (OUTPATIENT)
Dept: PRIMARY CARE CLINIC | Facility: CLINIC | Age: 66
End: 2021-05-07
Payer: MEDICARE

## 2021-05-07 DIAGNOSIS — Z23 NEED FOR VACCINATION: Primary | ICD-10-CM

## 2021-05-07 PROCEDURE — 91300 COVID-19, MRNA, LNP-S, PF, 30 MCG/0.3 ML DOSE VACCINE: ICD-10-PCS | Mod: S$GLB,,, | Performed by: FAMILY MEDICINE

## 2021-05-07 PROCEDURE — 0001A COVID-19, MRNA, LNP-S, PF, 30 MCG/0.3 ML DOSE VACCINE: CPT | Mod: CV19,S$GLB,, | Performed by: FAMILY MEDICINE

## 2021-05-07 PROCEDURE — 91300 COVID-19, MRNA, LNP-S, PF, 30 MCG/0.3 ML DOSE VACCINE: CPT | Mod: S$GLB,,, | Performed by: FAMILY MEDICINE

## 2021-05-07 PROCEDURE — 0001A COVID-19, MRNA, LNP-S, PF, 30 MCG/0.3 ML DOSE VACCINE: ICD-10-PCS | Mod: CV19,S$GLB,, | Performed by: FAMILY MEDICINE

## 2021-05-10 ENCOUNTER — CLINICAL SUPPORT (OUTPATIENT)
Dept: REHABILITATION | Facility: HOSPITAL | Age: 66
End: 2021-05-10
Attending: PHYSICAL MEDICINE & REHABILITATION
Payer: MEDICARE

## 2021-05-10 DIAGNOSIS — M54.16 LUMBAR BACK PAIN WITH RADICULOPATHY AFFECTING LEFT LOWER EXTREMITY: ICD-10-CM

## 2021-05-10 PROCEDURE — 97110 THERAPEUTIC EXERCISES: CPT | Mod: PN,CQ

## 2021-05-14 ENCOUNTER — CLINICAL SUPPORT (OUTPATIENT)
Dept: REHABILITATION | Facility: HOSPITAL | Age: 66
End: 2021-05-14
Attending: PHYSICAL MEDICINE & REHABILITATION
Payer: MEDICARE

## 2021-05-14 DIAGNOSIS — M54.16 LUMBAR BACK PAIN WITH RADICULOPATHY AFFECTING LEFT LOWER EXTREMITY: ICD-10-CM

## 2021-05-14 PROCEDURE — 97110 THERAPEUTIC EXERCISES: CPT | Mod: PN

## 2021-05-14 PROCEDURE — 97112 NEUROMUSCULAR REEDUCATION: CPT | Mod: PN

## 2021-05-17 ENCOUNTER — CLINICAL SUPPORT (OUTPATIENT)
Dept: REHABILITATION | Facility: HOSPITAL | Age: 66
End: 2021-05-17
Attending: PHYSICAL MEDICINE & REHABILITATION
Payer: MEDICARE

## 2021-05-17 DIAGNOSIS — M54.16 LUMBAR BACK PAIN WITH RADICULOPATHY AFFECTING LEFT LOWER EXTREMITY: ICD-10-CM

## 2021-05-17 PROCEDURE — 97110 THERAPEUTIC EXERCISES: CPT | Mod: PN,CQ

## 2021-05-17 PROCEDURE — 97112 NEUROMUSCULAR REEDUCATION: CPT | Mod: PN,CQ

## 2021-05-19 ENCOUNTER — CLINICAL SUPPORT (OUTPATIENT)
Dept: REHABILITATION | Facility: HOSPITAL | Age: 66
End: 2021-05-19
Attending: PHYSICAL MEDICINE & REHABILITATION
Payer: MEDICARE

## 2021-05-19 DIAGNOSIS — M54.16 LUMBAR BACK PAIN WITH RADICULOPATHY AFFECTING LEFT LOWER EXTREMITY: ICD-10-CM

## 2021-05-19 PROCEDURE — 97112 NEUROMUSCULAR REEDUCATION: CPT | Mod: PN,CQ

## 2021-05-19 PROCEDURE — 97110 THERAPEUTIC EXERCISES: CPT | Mod: PN,CQ

## 2021-05-20 ENCOUNTER — OFFICE VISIT (OUTPATIENT)
Dept: UROLOGY | Facility: CLINIC | Age: 66
End: 2021-05-20
Payer: MEDICARE

## 2021-05-20 VITALS — WEIGHT: 201.75 LBS | BODY MASS INDEX: 32.42 KG/M2 | HEIGHT: 66 IN

## 2021-05-20 DIAGNOSIS — R39.15 URINARY URGENCY: Primary | ICD-10-CM

## 2021-05-20 PROCEDURE — 99999 PR PBB SHADOW E&M-EST. PATIENT-LVL IV: ICD-10-PCS | Mod: PBBFAC,,, | Performed by: NURSE PRACTITIONER

## 2021-05-20 PROCEDURE — 99213 OFFICE O/P EST LOW 20 MIN: CPT | Mod: S$PBB,,, | Performed by: NURSE PRACTITIONER

## 2021-05-20 PROCEDURE — 99213 PR OFFICE/OUTPT VISIT, EST, LEVL III, 20-29 MIN: ICD-10-PCS | Mod: S$PBB,,, | Performed by: NURSE PRACTITIONER

## 2021-05-20 PROCEDURE — 99999 PR PBB SHADOW E&M-EST. PATIENT-LVL IV: CPT | Mod: PBBFAC,,, | Performed by: NURSE PRACTITIONER

## 2021-05-20 PROCEDURE — 99214 OFFICE O/P EST MOD 30 MIN: CPT | Mod: PBBFAC,PN | Performed by: NURSE PRACTITIONER

## 2021-05-24 ENCOUNTER — CLINICAL SUPPORT (OUTPATIENT)
Dept: REHABILITATION | Facility: HOSPITAL | Age: 66
End: 2021-05-24
Attending: PHYSICAL MEDICINE & REHABILITATION
Payer: MEDICARE

## 2021-05-24 DIAGNOSIS — M54.16 LUMBAR BACK PAIN WITH RADICULOPATHY AFFECTING LEFT LOWER EXTREMITY: ICD-10-CM

## 2021-05-24 PROCEDURE — 97112 NEUROMUSCULAR REEDUCATION: CPT | Mod: PN,CQ

## 2021-05-24 PROCEDURE — 97110 THERAPEUTIC EXERCISES: CPT | Mod: PN,CQ

## 2021-05-26 ENCOUNTER — CLINICAL SUPPORT (OUTPATIENT)
Dept: REHABILITATION | Facility: HOSPITAL | Age: 66
End: 2021-05-26
Attending: PHYSICAL MEDICINE & REHABILITATION
Payer: MEDICARE

## 2021-05-26 DIAGNOSIS — M54.16 LUMBAR BACK PAIN WITH RADICULOPATHY AFFECTING LEFT LOWER EXTREMITY: ICD-10-CM

## 2021-05-26 PROCEDURE — 97110 THERAPEUTIC EXERCISES: CPT | Mod: PN,CQ

## 2021-05-26 PROCEDURE — 97112 NEUROMUSCULAR REEDUCATION: CPT | Mod: PN,CQ

## 2021-05-28 ENCOUNTER — IMMUNIZATION (OUTPATIENT)
Dept: PRIMARY CARE CLINIC | Facility: CLINIC | Age: 66
End: 2021-05-28
Payer: MEDICARE

## 2021-05-28 DIAGNOSIS — Z23 NEED FOR VACCINATION: Primary | ICD-10-CM

## 2021-05-28 PROCEDURE — 91300 COVID-19, MRNA, LNP-S, PF, 30 MCG/0.3 ML DOSE VACCINE: ICD-10-PCS | Mod: S$GLB,,, | Performed by: FAMILY MEDICINE

## 2021-05-28 PROCEDURE — 0002A COVID-19, MRNA, LNP-S, PF, 30 MCG/0.3 ML DOSE VACCINE: CPT | Mod: CV19,S$GLB,, | Performed by: FAMILY MEDICINE

## 2021-05-28 PROCEDURE — 0002A COVID-19, MRNA, LNP-S, PF, 30 MCG/0.3 ML DOSE VACCINE: ICD-10-PCS | Mod: CV19,S$GLB,, | Performed by: FAMILY MEDICINE

## 2021-05-28 PROCEDURE — 91300 COVID-19, MRNA, LNP-S, PF, 30 MCG/0.3 ML DOSE VACCINE: CPT | Mod: S$GLB,,, | Performed by: FAMILY MEDICINE

## 2021-05-31 ENCOUNTER — EXTERNAL CHRONIC CARE MANAGEMENT (OUTPATIENT)
Dept: PRIMARY CARE CLINIC | Facility: CLINIC | Age: 66
End: 2021-05-31
Payer: MEDICARE

## 2021-05-31 ENCOUNTER — CLINICAL SUPPORT (OUTPATIENT)
Dept: REHABILITATION | Facility: HOSPITAL | Age: 66
End: 2021-05-31
Attending: PHYSICAL MEDICINE & REHABILITATION
Payer: MEDICARE

## 2021-05-31 DIAGNOSIS — I15.0 RENOVASCULAR HYPERTENSION: ICD-10-CM

## 2021-05-31 DIAGNOSIS — M62.838 MUSCLE SPASM: ICD-10-CM

## 2021-05-31 DIAGNOSIS — M34.9 SCLERODERMA: ICD-10-CM

## 2021-05-31 DIAGNOSIS — M54.16 LUMBAR BACK PAIN WITH RADICULOPATHY AFFECTING LEFT LOWER EXTREMITY: ICD-10-CM

## 2021-05-31 PROCEDURE — 97110 THERAPEUTIC EXERCISES: CPT | Mod: PN

## 2021-05-31 PROCEDURE — 99490 CHRNC CARE MGMT STAFF 1ST 20: CPT | Mod: S$GLB,,, | Performed by: INTERNAL MEDICINE

## 2021-05-31 PROCEDURE — 97112 NEUROMUSCULAR REEDUCATION: CPT | Mod: PN

## 2021-05-31 PROCEDURE — 99490 PR CHRONIC CARE MGMT, 1ST 20 MIN: ICD-10-PCS | Mod: S$GLB,,, | Performed by: INTERNAL MEDICINE

## 2021-05-31 RX ORDER — LOSARTAN POTASSIUM 25 MG/1
25 TABLET ORAL DAILY
Qty: 90 TABLET | Refills: 3 | Status: SHIPPED | OUTPATIENT
Start: 2021-05-31 | End: 2022-03-21 | Stop reason: SDUPTHER

## 2021-05-31 RX ORDER — TIZANIDINE 2 MG/1
TABLET ORAL
Qty: 90 TABLET | Refills: 0 | Status: SHIPPED | OUTPATIENT
Start: 2021-05-31 | End: 2021-07-13 | Stop reason: SDUPTHER

## 2021-06-02 DIAGNOSIS — M54.10 BACK PAIN WITH LEFT-SIDED RADICULOPATHY: ICD-10-CM

## 2021-06-02 RX ORDER — GABAPENTIN 100 MG/1
200 CAPSULE ORAL 3 TIMES DAILY
Qty: 180 CAPSULE | Refills: 2 | Status: SHIPPED | OUTPATIENT
Start: 2021-06-02 | End: 2022-09-19

## 2021-06-11 ENCOUNTER — LAB VISIT (OUTPATIENT)
Dept: LAB | Facility: HOSPITAL | Age: 66
End: 2021-06-11
Attending: INTERNAL MEDICINE
Payer: MEDICARE

## 2021-06-11 DIAGNOSIS — I10 ESSENTIAL HYPERTENSION: ICD-10-CM

## 2021-06-11 LAB
ALBUMIN SERPL BCP-MCNC: 3.9 G/DL (ref 3.5–5.2)
ALP SERPL-CCNC: 60 U/L (ref 55–135)
ALT SERPL W/O P-5'-P-CCNC: 18 U/L (ref 10–44)
ANION GAP SERPL CALC-SCNC: 10 MMOL/L (ref 8–16)
AST SERPL-CCNC: 19 U/L (ref 10–40)
BILIRUB SERPL-MCNC: 0.5 MG/DL (ref 0.1–1)
BUN SERPL-MCNC: 7 MG/DL (ref 8–23)
CALCIUM SERPL-MCNC: 9.1 MG/DL (ref 8.7–10.5)
CHLORIDE SERPL-SCNC: 110 MMOL/L (ref 95–110)
CO2 SERPL-SCNC: 22 MMOL/L (ref 23–29)
CREAT SERPL-MCNC: 0.7 MG/DL (ref 0.5–1.4)
EST. GFR  (AFRICAN AMERICAN): >60 ML/MIN/1.73 M^2
EST. GFR  (NON AFRICAN AMERICAN): >60 ML/MIN/1.73 M^2
GLUCOSE SERPL-MCNC: 91 MG/DL (ref 70–110)
POTASSIUM SERPL-SCNC: 3.7 MMOL/L (ref 3.5–5.1)
PROT SERPL-MCNC: 6.9 G/DL (ref 6–8.4)
SODIUM SERPL-SCNC: 142 MMOL/L (ref 136–145)

## 2021-06-11 PROCEDURE — 36415 COLL VENOUS BLD VENIPUNCTURE: CPT | Performed by: INTERNAL MEDICINE

## 2021-06-11 PROCEDURE — 80053 COMPREHEN METABOLIC PANEL: CPT | Performed by: INTERNAL MEDICINE

## 2021-06-14 ENCOUNTER — OFFICE VISIT (OUTPATIENT)
Dept: RHEUMATOLOGY | Facility: CLINIC | Age: 66
End: 2021-06-14
Payer: MEDICARE

## 2021-06-14 VITALS
HEART RATE: 112 BPM | BODY MASS INDEX: 32.87 KG/M2 | WEIGHT: 204.5 LBS | SYSTOLIC BLOOD PRESSURE: 135 MMHG | HEIGHT: 66 IN | DIASTOLIC BLOOD PRESSURE: 87 MMHG

## 2021-06-14 DIAGNOSIS — Z51.81 MEDICATION MONITORING ENCOUNTER: ICD-10-CM

## 2021-06-14 DIAGNOSIS — I73.00 RAYNAUD'S DISEASE WITHOUT GANGRENE: ICD-10-CM

## 2021-06-14 DIAGNOSIS — M34.9 SYSTEMIC SCLEROSIS WITH LIMITED CUTANEOUS INVOLVEMENT: ICD-10-CM

## 2021-06-14 DIAGNOSIS — I27.20 PULMONARY HYPERTENSION: ICD-10-CM

## 2021-06-14 DIAGNOSIS — M34.9 SCLERODERMA: Primary | ICD-10-CM

## 2021-06-14 DIAGNOSIS — Z79.52 CURRENT CHRONIC USE OF SYSTEMIC STEROIDS: ICD-10-CM

## 2021-06-14 DIAGNOSIS — J84.9 INTERSTITIAL LUNG DISEASE: ICD-10-CM

## 2021-06-14 PROCEDURE — 99214 OFFICE O/P EST MOD 30 MIN: CPT | Mod: S$PBB,,, | Performed by: STUDENT IN AN ORGANIZED HEALTH CARE EDUCATION/TRAINING PROGRAM

## 2021-06-14 PROCEDURE — 99214 OFFICE O/P EST MOD 30 MIN: CPT | Mod: PBBFAC,PO | Performed by: STUDENT IN AN ORGANIZED HEALTH CARE EDUCATION/TRAINING PROGRAM

## 2021-06-14 PROCEDURE — 99214 PR OFFICE/OUTPT VISIT, EST, LEVL IV, 30-39 MIN: ICD-10-PCS | Mod: S$PBB,,, | Performed by: STUDENT IN AN ORGANIZED HEALTH CARE EDUCATION/TRAINING PROGRAM

## 2021-06-14 PROCEDURE — 99999 PR PBB SHADOW E&M-EST. PATIENT-LVL IV: ICD-10-PCS | Mod: PBBFAC,,, | Performed by: STUDENT IN AN ORGANIZED HEALTH CARE EDUCATION/TRAINING PROGRAM

## 2021-06-14 PROCEDURE — 99999 PR PBB SHADOW E&M-EST. PATIENT-LVL IV: CPT | Mod: PBBFAC,,, | Performed by: STUDENT IN AN ORGANIZED HEALTH CARE EDUCATION/TRAINING PROGRAM

## 2021-06-14 RX ORDER — SILDENAFIL CITRATE 20 MG/1
TABLET ORAL
COMMUNITY
Start: 2021-06-03 | End: 2021-09-16

## 2021-06-14 RX ORDER — MYCOPHENOLATE MOFETIL 500 MG/1
1500 TABLET ORAL 2 TIMES DAILY
Qty: 520 TABLET | Refills: 1 | Status: SHIPPED | OUTPATIENT
Start: 2021-06-14 | End: 2023-07-07 | Stop reason: SDUPTHER

## 2021-06-17 ENCOUNTER — OFFICE VISIT (OUTPATIENT)
Dept: SPINE | Facility: CLINIC | Age: 66
End: 2021-06-17
Payer: MEDICARE

## 2021-06-17 DIAGNOSIS — M54.10 BACK PAIN WITH LEFT-SIDED RADICULOPATHY: Primary | ICD-10-CM

## 2021-06-17 PROCEDURE — 99213 OFFICE O/P EST LOW 20 MIN: CPT | Mod: S$GLB,,, | Performed by: PHYSICAL MEDICINE & REHABILITATION

## 2021-06-17 PROCEDURE — 99213 PR OFFICE/OUTPT VISIT, EST, LEVL III, 20-29 MIN: ICD-10-PCS | Mod: S$GLB,,, | Performed by: PHYSICAL MEDICINE & REHABILITATION

## 2021-06-18 ENCOUNTER — OFFICE VISIT (OUTPATIENT)
Dept: FAMILY MEDICINE | Facility: CLINIC | Age: 66
End: 2021-06-18
Payer: MEDICARE

## 2021-06-18 VITALS
OXYGEN SATURATION: 98 % | BODY MASS INDEX: 32.85 KG/M2 | DIASTOLIC BLOOD PRESSURE: 66 MMHG | HEART RATE: 87 BPM | TEMPERATURE: 98 F | SYSTOLIC BLOOD PRESSURE: 122 MMHG | HEIGHT: 66 IN | WEIGHT: 204.38 LBS | RESPIRATION RATE: 16 BRPM

## 2021-06-18 DIAGNOSIS — M34.9 SCLERODERMA: Primary | ICD-10-CM

## 2021-06-18 DIAGNOSIS — K50.00 CROHN'S DISEASE OF SMALL INTESTINE WITHOUT COMPLICATION: ICD-10-CM

## 2021-06-18 DIAGNOSIS — M54.50 CHRONIC BILATERAL LOW BACK PAIN WITHOUT SCIATICA: ICD-10-CM

## 2021-06-18 DIAGNOSIS — G89.29 CHRONIC BILATERAL LOW BACK PAIN WITHOUT SCIATICA: ICD-10-CM

## 2021-06-18 DIAGNOSIS — E55.9 VITAMIN D DEFICIENCY: ICD-10-CM

## 2021-06-18 PROCEDURE — 99214 OFFICE O/P EST MOD 30 MIN: CPT | Mod: S$GLB,,, | Performed by: INTERNAL MEDICINE

## 2021-06-18 PROCEDURE — 99214 PR OFFICE/OUTPT VISIT, EST, LEVL IV, 30-39 MIN: ICD-10-PCS | Mod: S$GLB,,, | Performed by: INTERNAL MEDICINE

## 2021-06-18 RX ORDER — ERGOCALCIFEROL 1.25 MG/1
CAPSULE ORAL
Qty: 4 CAPSULE | Refills: 11 | Status: SHIPPED | OUTPATIENT
Start: 2021-06-18 | End: 2021-09-15 | Stop reason: SDUPTHER

## 2021-06-22 ENCOUNTER — LAB VISIT (OUTPATIENT)
Dept: LAB | Facility: HOSPITAL | Age: 66
End: 2021-06-22
Attending: STUDENT IN AN ORGANIZED HEALTH CARE EDUCATION/TRAINING PROGRAM
Payer: MEDICARE

## 2021-06-22 DIAGNOSIS — M34.9 SCLERODERMA: ICD-10-CM

## 2021-06-22 DIAGNOSIS — Z51.81 MEDICATION MONITORING ENCOUNTER: ICD-10-CM

## 2021-06-22 DIAGNOSIS — J84.9 INTERSTITIAL LUNG DISEASE: ICD-10-CM

## 2021-06-22 DIAGNOSIS — K21.9 GASTROESOPHAGEAL REFLUX DISEASE: ICD-10-CM

## 2021-06-22 DIAGNOSIS — M34.9 SYSTEMIC SCLEROSIS WITH LIMITED CUTANEOUS INVOLVEMENT: ICD-10-CM

## 2021-06-22 DIAGNOSIS — I27.20 PULMONARY HYPERTENSION: ICD-10-CM

## 2021-06-22 LAB
ALBUMIN SERPL BCP-MCNC: 4.1 G/DL (ref 3.5–5.2)
ALP SERPL-CCNC: 61 U/L (ref 55–135)
ALT SERPL W/O P-5'-P-CCNC: 15 U/L (ref 10–44)
ANION GAP SERPL CALC-SCNC: 11 MMOL/L (ref 8–16)
AST SERPL-CCNC: 17 U/L (ref 10–40)
BASOPHILS # BLD AUTO: 0.05 K/UL (ref 0–0.2)
BASOPHILS NFR BLD: 0.7 % (ref 0–1.9)
BILIRUB SERPL-MCNC: 0.7 MG/DL (ref 0.1–1)
BUN SERPL-MCNC: 12 MG/DL (ref 8–23)
CALCIUM SERPL-MCNC: 9.6 MG/DL (ref 8.7–10.5)
CHLORIDE SERPL-SCNC: 105 MMOL/L (ref 95–110)
CO2 SERPL-SCNC: 23 MMOL/L (ref 23–29)
CREAT SERPL-MCNC: 0.8 MG/DL (ref 0.5–1.4)
CRP SERPL-MCNC: 1.7 MG/L (ref 0–8.2)
DIFFERENTIAL METHOD: ABNORMAL
EOSINOPHIL # BLD AUTO: 0.2 K/UL (ref 0–0.5)
EOSINOPHIL NFR BLD: 2.3 % (ref 0–8)
ERYTHROCYTE [DISTWIDTH] IN BLOOD BY AUTOMATED COUNT: 13.2 % (ref 11.5–14.5)
ERYTHROCYTE [SEDIMENTATION RATE] IN BLOOD BY WESTERGREN METHOD: 13 MM/HR (ref 0–20)
EST. GFR  (AFRICAN AMERICAN): >60 ML/MIN/1.73 M^2
EST. GFR  (NON AFRICAN AMERICAN): >60 ML/MIN/1.73 M^2
GLUCOSE SERPL-MCNC: 85 MG/DL (ref 70–110)
HCT VFR BLD AUTO: 43.4 % (ref 37–48.5)
HGB BLD-MCNC: 13.8 G/DL (ref 12–16)
IMM GRANULOCYTES # BLD AUTO: 0.02 K/UL (ref 0–0.04)
IMM GRANULOCYTES NFR BLD AUTO: 0.3 % (ref 0–0.5)
LYMPHOCYTES # BLD AUTO: 1.1 K/UL (ref 1–4.8)
LYMPHOCYTES NFR BLD: 16.4 % (ref 18–48)
MCH RBC QN AUTO: 30.3 PG (ref 27–31)
MCHC RBC AUTO-ENTMCNC: 31.8 G/DL (ref 32–36)
MCV RBC AUTO: 95 FL (ref 82–98)
MONOCYTES # BLD AUTO: 0.5 K/UL (ref 0.3–1)
MONOCYTES NFR BLD: 7.3 % (ref 4–15)
NEUTROPHILS # BLD AUTO: 5.1 K/UL (ref 1.8–7.7)
NEUTROPHILS NFR BLD: 73 % (ref 38–73)
NRBC BLD-RTO: 0 /100 WBC
PLATELET # BLD AUTO: 189 K/UL (ref 150–450)
PMV BLD AUTO: 11.3 FL (ref 9.2–12.9)
POTASSIUM SERPL-SCNC: 3.8 MMOL/L (ref 3.5–5.1)
PROT SERPL-MCNC: 7.3 G/DL (ref 6–8.4)
RBC # BLD AUTO: 4.55 M/UL (ref 4–5.4)
SODIUM SERPL-SCNC: 139 MMOL/L (ref 136–145)
WBC # BLD AUTO: 6.94 K/UL (ref 3.9–12.7)

## 2021-06-22 PROCEDURE — 85025 COMPLETE CBC W/AUTO DIFF WBC: CPT | Performed by: STUDENT IN AN ORGANIZED HEALTH CARE EDUCATION/TRAINING PROGRAM

## 2021-06-22 PROCEDURE — 85651 RBC SED RATE NONAUTOMATED: CPT | Performed by: STUDENT IN AN ORGANIZED HEALTH CARE EDUCATION/TRAINING PROGRAM

## 2021-06-22 PROCEDURE — 36415 COLL VENOUS BLD VENIPUNCTURE: CPT | Performed by: STUDENT IN AN ORGANIZED HEALTH CARE EDUCATION/TRAINING PROGRAM

## 2021-06-22 PROCEDURE — 80053 COMPREHEN METABOLIC PANEL: CPT | Performed by: STUDENT IN AN ORGANIZED HEALTH CARE EDUCATION/TRAINING PROGRAM

## 2021-06-22 PROCEDURE — 86140 C-REACTIVE PROTEIN: CPT | Performed by: STUDENT IN AN ORGANIZED HEALTH CARE EDUCATION/TRAINING PROGRAM

## 2021-06-30 ENCOUNTER — EXTERNAL CHRONIC CARE MANAGEMENT (OUTPATIENT)
Dept: PRIMARY CARE CLINIC | Facility: CLINIC | Age: 66
End: 2021-06-30
Payer: MEDICARE

## 2021-06-30 PROCEDURE — 99490 CHRNC CARE MGMT STAFF 1ST 20: CPT | Mod: S$GLB,,, | Performed by: INTERNAL MEDICINE

## 2021-06-30 PROCEDURE — 99490 PR CHRONIC CARE MGMT, 1ST 20 MIN: ICD-10-PCS | Mod: S$GLB,,, | Performed by: INTERNAL MEDICINE

## 2021-07-06 ENCOUNTER — PATIENT MESSAGE (OUTPATIENT)
Dept: RHEUMATOLOGY | Facility: CLINIC | Age: 66
End: 2021-07-06

## 2021-07-09 ENCOUNTER — PATIENT MESSAGE (OUTPATIENT)
Dept: PULMONOLOGY | Facility: CLINIC | Age: 66
End: 2021-07-09

## 2021-07-13 DIAGNOSIS — M62.838 MUSCLE SPASM: ICD-10-CM

## 2021-07-13 RX ORDER — TIZANIDINE 2 MG/1
TABLET ORAL
Qty: 90 TABLET | Refills: 2 | Status: SHIPPED | OUTPATIENT
Start: 2021-07-13 | End: 2021-09-27 | Stop reason: SDUPTHER

## 2021-07-21 ENCOUNTER — PATIENT MESSAGE (OUTPATIENT)
Dept: PULMONOLOGY | Facility: CLINIC | Age: 66
End: 2021-07-21

## 2021-07-31 ENCOUNTER — EXTERNAL CHRONIC CARE MANAGEMENT (OUTPATIENT)
Dept: PRIMARY CARE CLINIC | Facility: CLINIC | Age: 66
End: 2021-07-31
Payer: MEDICARE

## 2021-07-31 PROCEDURE — 99490 PR CHRONIC CARE MGMT, 1ST 20 MIN: ICD-10-PCS | Mod: S$GLB,,, | Performed by: INTERNAL MEDICINE

## 2021-07-31 PROCEDURE — 99490 CHRNC CARE MGMT STAFF 1ST 20: CPT | Mod: S$GLB,,, | Performed by: INTERNAL MEDICINE

## 2021-08-09 ENCOUNTER — PATIENT MESSAGE (OUTPATIENT)
Dept: GASTROENTEROLOGY | Facility: CLINIC | Age: 66
End: 2021-08-09

## 2021-08-09 DIAGNOSIS — J84.9 INTERSTITIAL LUNG DISEASE: ICD-10-CM

## 2021-08-09 DIAGNOSIS — K30 DELAYED GASTRIC EMPTYING: Primary | ICD-10-CM

## 2021-08-09 DIAGNOSIS — G47.00 INSOMNIA, UNSPECIFIED TYPE: ICD-10-CM

## 2021-08-09 DIAGNOSIS — J30.2 CHRONIC SEASONAL ALLERGIC RHINITIS: ICD-10-CM

## 2021-08-09 DIAGNOSIS — J44.9 COPD MIXED TYPE: ICD-10-CM

## 2021-08-09 DIAGNOSIS — J01.81 OTHER ACUTE RECURRENT SINUSITIS: ICD-10-CM

## 2021-08-09 RX ORDER — BUDESONIDE AND FORMOTEROL FUMARATE DIHYDRATE 160; 4.5 UG/1; UG/1
2 AEROSOL RESPIRATORY (INHALATION) EVERY 12 HOURS
Qty: 1 INHALER | Refills: 11 | Status: SHIPPED | OUTPATIENT
Start: 2021-08-09 | End: 2022-03-28

## 2021-08-09 RX ORDER — FLUTICASONE PROPIONATE 50 MCG
1 SPRAY, SUSPENSION (ML) NASAL DAILY
Qty: 16 ML | Refills: 11 | Status: SHIPPED | OUTPATIENT
Start: 2021-08-09 | End: 2022-09-26 | Stop reason: SDUPTHER

## 2021-08-09 RX ORDER — TRAZODONE HYDROCHLORIDE 50 MG/1
100 TABLET ORAL NIGHTLY PRN
Qty: 60 TABLET | Refills: 3 | Status: SHIPPED | OUTPATIENT
Start: 2021-08-09 | End: 2022-03-21 | Stop reason: SDUPTHER

## 2021-08-13 RX ORDER — ONDANSETRON HYDROCHLORIDE 8 MG/1
8 TABLET, FILM COATED ORAL EVERY 8 HOURS PRN
Qty: 30 TABLET | Refills: 0 | Status: SHIPPED | OUTPATIENT
Start: 2021-08-13

## 2021-08-13 RX ORDER — METOCLOPRAMIDE 5 MG/1
5 TABLET ORAL 2 TIMES DAILY PRN
Qty: 45 TABLET | Refills: 3 | Status: SHIPPED | OUTPATIENT
Start: 2021-08-13 | End: 2022-09-19

## 2021-08-13 RX ORDER — PANTOPRAZOLE SODIUM 40 MG/1
40 TABLET, DELAYED RELEASE ORAL 2 TIMES DAILY
Qty: 180 TABLET | Refills: 3 | Status: SHIPPED | OUTPATIENT
Start: 2021-08-13 | End: 2022-06-15

## 2021-08-23 ENCOUNTER — IMMUNIZATION (OUTPATIENT)
Dept: PRIMARY CARE CLINIC | Facility: CLINIC | Age: 66
End: 2021-08-23
Payer: MEDICARE

## 2021-08-23 DIAGNOSIS — Z23 NEED FOR VACCINATION: Primary | ICD-10-CM

## 2021-08-23 PROCEDURE — 91300 COVID-19, MRNA, LNP-S, PF, 30 MCG/0.3 ML DOSE VACCINE: CPT | Mod: S$GLB,,, | Performed by: FAMILY MEDICINE

## 2021-08-23 PROCEDURE — 0003A COVID-19, MRNA, LNP-S, PF, 30 MCG/0.3 ML DOSE VACCINE: ICD-10-PCS | Mod: CV19,S$GLB,, | Performed by: FAMILY MEDICINE

## 2021-08-23 PROCEDURE — 0003A COVID-19, MRNA, LNP-S, PF, 30 MCG/0.3 ML DOSE VACCINE: CPT | Mod: CV19,S$GLB,, | Performed by: FAMILY MEDICINE

## 2021-08-23 PROCEDURE — 91300 COVID-19, MRNA, LNP-S, PF, 30 MCG/0.3 ML DOSE VACCINE: ICD-10-PCS | Mod: S$GLB,,, | Performed by: FAMILY MEDICINE

## 2021-08-27 ENCOUNTER — LAB VISIT (OUTPATIENT)
Dept: LAB | Facility: HOSPITAL | Age: 66
End: 2021-08-27
Attending: STUDENT IN AN ORGANIZED HEALTH CARE EDUCATION/TRAINING PROGRAM
Payer: MEDICARE

## 2021-08-27 DIAGNOSIS — M34.9 SCLERODERMA: ICD-10-CM

## 2021-08-27 DIAGNOSIS — I27.20 PULMONARY HYPERTENSION: ICD-10-CM

## 2021-08-27 DIAGNOSIS — J84.9 INTERSTITIAL LUNG DISEASE: ICD-10-CM

## 2021-08-27 DIAGNOSIS — K21.9 GASTROESOPHAGEAL REFLUX DISEASE: ICD-10-CM

## 2021-08-27 DIAGNOSIS — M34.9 SYSTEMIC SCLEROSIS WITH LIMITED CUTANEOUS INVOLVEMENT: ICD-10-CM

## 2021-08-27 DIAGNOSIS — Z51.81 MEDICATION MONITORING ENCOUNTER: ICD-10-CM

## 2021-08-27 LAB
ALBUMIN SERPL BCP-MCNC: 3.9 G/DL (ref 3.5–5.2)
ALP SERPL-CCNC: 54 U/L (ref 55–135)
ALT SERPL W/O P-5'-P-CCNC: 16 U/L (ref 10–44)
ANION GAP SERPL CALC-SCNC: 12 MMOL/L (ref 8–16)
AST SERPL-CCNC: 18 U/L (ref 10–40)
BASOPHILS # BLD AUTO: 0.03 K/UL (ref 0–0.2)
BASOPHILS NFR BLD: 0.6 % (ref 0–1.9)
BILIRUB SERPL-MCNC: 0.6 MG/DL (ref 0.1–1)
BUN SERPL-MCNC: 8 MG/DL (ref 8–23)
CALCIUM SERPL-MCNC: 9.6 MG/DL (ref 8.7–10.5)
CHLORIDE SERPL-SCNC: 107 MMOL/L (ref 95–110)
CO2 SERPL-SCNC: 24 MMOL/L (ref 23–29)
CREAT SERPL-MCNC: 0.7 MG/DL (ref 0.5–1.4)
CRP SERPL-MCNC: 3 MG/L (ref 0–8.2)
DIFFERENTIAL METHOD: ABNORMAL
EOSINOPHIL # BLD AUTO: 0.3 K/UL (ref 0–0.5)
EOSINOPHIL NFR BLD: 4.7 % (ref 0–8)
ERYTHROCYTE [DISTWIDTH] IN BLOOD BY AUTOMATED COUNT: 12.8 % (ref 11.5–14.5)
ERYTHROCYTE [SEDIMENTATION RATE] IN BLOOD BY WESTERGREN METHOD: 44 MM/HR (ref 0–20)
EST. GFR  (AFRICAN AMERICAN): >60 ML/MIN/1.73 M^2
EST. GFR  (NON AFRICAN AMERICAN): >60 ML/MIN/1.73 M^2
GLUCOSE SERPL-MCNC: 89 MG/DL (ref 70–110)
HCT VFR BLD AUTO: 40.4 % (ref 37–48.5)
HGB BLD-MCNC: 12.9 G/DL (ref 12–16)
IMM GRANULOCYTES # BLD AUTO: 0.01 K/UL (ref 0–0.04)
IMM GRANULOCYTES NFR BLD AUTO: 0.2 % (ref 0–0.5)
LYMPHOCYTES # BLD AUTO: 1 K/UL (ref 1–4.8)
LYMPHOCYTES NFR BLD: 18.2 % (ref 18–48)
MCH RBC QN AUTO: 30.1 PG (ref 27–31)
MCHC RBC AUTO-ENTMCNC: 31.9 G/DL (ref 32–36)
MCV RBC AUTO: 94 FL (ref 82–98)
MONOCYTES # BLD AUTO: 0.4 K/UL (ref 0.3–1)
MONOCYTES NFR BLD: 7.8 % (ref 4–15)
NEUTROPHILS # BLD AUTO: 3.7 K/UL (ref 1.8–7.7)
NEUTROPHILS NFR BLD: 68.5 % (ref 38–73)
NRBC BLD-RTO: 0 /100 WBC
PLATELET # BLD AUTO: 186 K/UL (ref 150–450)
PMV BLD AUTO: 11.9 FL (ref 9.2–12.9)
POTASSIUM SERPL-SCNC: 3.5 MMOL/L (ref 3.5–5.1)
PROT SERPL-MCNC: 6.9 G/DL (ref 6–8.4)
RBC # BLD AUTO: 4.28 M/UL (ref 4–5.4)
SODIUM SERPL-SCNC: 143 MMOL/L (ref 136–145)
WBC # BLD AUTO: 5.37 K/UL (ref 3.9–12.7)

## 2021-08-27 PROCEDURE — 86140 C-REACTIVE PROTEIN: CPT | Performed by: STUDENT IN AN ORGANIZED HEALTH CARE EDUCATION/TRAINING PROGRAM

## 2021-08-27 PROCEDURE — 85025 COMPLETE CBC W/AUTO DIFF WBC: CPT | Performed by: STUDENT IN AN ORGANIZED HEALTH CARE EDUCATION/TRAINING PROGRAM

## 2021-08-27 PROCEDURE — 80053 COMPREHEN METABOLIC PANEL: CPT | Performed by: STUDENT IN AN ORGANIZED HEALTH CARE EDUCATION/TRAINING PROGRAM

## 2021-08-27 PROCEDURE — 36415 COLL VENOUS BLD VENIPUNCTURE: CPT | Performed by: STUDENT IN AN ORGANIZED HEALTH CARE EDUCATION/TRAINING PROGRAM

## 2021-08-27 PROCEDURE — 85651 RBC SED RATE NONAUTOMATED: CPT | Performed by: STUDENT IN AN ORGANIZED HEALTH CARE EDUCATION/TRAINING PROGRAM

## 2021-08-31 ENCOUNTER — EXTERNAL CHRONIC CARE MANAGEMENT (OUTPATIENT)
Dept: PRIMARY CARE CLINIC | Facility: CLINIC | Age: 66
End: 2021-08-31
Payer: MEDICARE

## 2021-08-31 PROCEDURE — 99490 CHRNC CARE MGMT STAFF 1ST 20: CPT | Mod: S$GLB,,, | Performed by: INTERNAL MEDICINE

## 2021-08-31 PROCEDURE — 99490 PR CHRONIC CARE MGMT, 1ST 20 MIN: ICD-10-PCS | Mod: S$GLB,,, | Performed by: INTERNAL MEDICINE

## 2021-09-14 ENCOUNTER — LAB VISIT (OUTPATIENT)
Dept: LAB | Facility: HOSPITAL | Age: 66
End: 2021-09-14
Attending: INTERNAL MEDICINE
Payer: MEDICARE

## 2021-09-14 DIAGNOSIS — M34.9 SCLERODERMA: ICD-10-CM

## 2021-09-14 DIAGNOSIS — E55.9 VITAMIN D DEFICIENCY: ICD-10-CM

## 2021-09-14 LAB
25(OH)D3+25(OH)D2 SERPL-MCNC: 25 NG/ML (ref 30–96)
ALBUMIN SERPL BCP-MCNC: 3.9 G/DL (ref 3.5–5.2)
ALP SERPL-CCNC: 57 U/L (ref 55–135)
ALT SERPL W/O P-5'-P-CCNC: 13 U/L (ref 10–44)
ANION GAP SERPL CALC-SCNC: 13 MMOL/L (ref 8–16)
AST SERPL-CCNC: 16 U/L (ref 10–40)
BASOPHILS # BLD AUTO: 0.06 K/UL (ref 0–0.2)
BASOPHILS NFR BLD: 0.8 % (ref 0–1.9)
BILIRUB SERPL-MCNC: 1.1 MG/DL (ref 0.1–1)
BUN SERPL-MCNC: 9 MG/DL (ref 8–23)
CALCIUM SERPL-MCNC: 9.7 MG/DL (ref 8.7–10.5)
CHLORIDE SERPL-SCNC: 104 MMOL/L (ref 95–110)
CO2 SERPL-SCNC: 24 MMOL/L (ref 23–29)
CREAT SERPL-MCNC: 0.7 MG/DL (ref 0.5–1.4)
DIFFERENTIAL METHOD: ABNORMAL
EOSINOPHIL # BLD AUTO: 0.2 K/UL (ref 0–0.5)
EOSINOPHIL NFR BLD: 2.9 % (ref 0–8)
ERYTHROCYTE [DISTWIDTH] IN BLOOD BY AUTOMATED COUNT: 13.1 % (ref 11.5–14.5)
EST. GFR  (AFRICAN AMERICAN): >60 ML/MIN/1.73 M^2
EST. GFR  (NON AFRICAN AMERICAN): >60 ML/MIN/1.73 M^2
GLUCOSE SERPL-MCNC: 92 MG/DL (ref 70–110)
HCT VFR BLD AUTO: 44.4 % (ref 37–48.5)
HGB BLD-MCNC: 14.2 G/DL (ref 12–16)
IMM GRANULOCYTES # BLD AUTO: 0.01 K/UL (ref 0–0.04)
IMM GRANULOCYTES NFR BLD AUTO: 0.1 % (ref 0–0.5)
LYMPHOCYTES # BLD AUTO: 1.2 K/UL (ref 1–4.8)
LYMPHOCYTES NFR BLD: 16.7 % (ref 18–48)
MCH RBC QN AUTO: 29.8 PG (ref 27–31)
MCHC RBC AUTO-ENTMCNC: 32 G/DL (ref 32–36)
MCV RBC AUTO: 93 FL (ref 82–98)
MONOCYTES # BLD AUTO: 0.5 K/UL (ref 0.3–1)
MONOCYTES NFR BLD: 6.1 % (ref 4–15)
NEUTROPHILS # BLD AUTO: 5.4 K/UL (ref 1.8–7.7)
NEUTROPHILS NFR BLD: 73.4 % (ref 38–73)
NRBC BLD-RTO: 0 /100 WBC
PLATELET # BLD AUTO: 212 K/UL (ref 150–450)
PMV BLD AUTO: 12.4 FL (ref 9.2–12.9)
POTASSIUM SERPL-SCNC: 4.5 MMOL/L (ref 3.5–5.1)
PROT SERPL-MCNC: 7.1 G/DL (ref 6–8.4)
RBC # BLD AUTO: 4.77 M/UL (ref 4–5.4)
SODIUM SERPL-SCNC: 141 MMOL/L (ref 136–145)
WBC # BLD AUTO: 7.35 K/UL (ref 3.9–12.7)

## 2021-09-14 PROCEDURE — 82306 VITAMIN D 25 HYDROXY: CPT | Performed by: INTERNAL MEDICINE

## 2021-09-14 PROCEDURE — 36415 COLL VENOUS BLD VENIPUNCTURE: CPT | Mod: PO | Performed by: INTERNAL MEDICINE

## 2021-09-14 PROCEDURE — 80053 COMPREHEN METABOLIC PANEL: CPT | Performed by: INTERNAL MEDICINE

## 2021-09-14 PROCEDURE — 85025 COMPLETE CBC W/AUTO DIFF WBC: CPT | Performed by: INTERNAL MEDICINE

## 2021-09-15 ENCOUNTER — TELEPHONE (OUTPATIENT)
Dept: FAMILY MEDICINE | Facility: CLINIC | Age: 66
End: 2021-09-15

## 2021-09-15 ENCOUNTER — HOSPITAL ENCOUNTER (OUTPATIENT)
Dept: PULMONOLOGY | Facility: HOSPITAL | Age: 66
Discharge: HOME | End: 2021-09-15
Attending: INTERNAL MEDICINE
Payer: MEDICARE

## 2021-09-15 DIAGNOSIS — J84.10 PULMONARY FIBROSIS: ICD-10-CM

## 2021-09-15 DIAGNOSIS — I27.20 PULMONARY HYPERTENSION: ICD-10-CM

## 2021-09-15 DIAGNOSIS — E55.9 VITAMIN D DEFICIENCY: ICD-10-CM

## 2021-09-15 LAB — BR6MWT: NORMAL

## 2021-09-15 RX ORDER — ERGOCALCIFEROL 1.25 MG/1
CAPSULE ORAL
Qty: 8 CAPSULE | Refills: 11 | Status: SHIPPED | OUTPATIENT
Start: 2021-09-15 | End: 2022-12-19 | Stop reason: SDUPTHER

## 2021-09-16 ENCOUNTER — OFFICE VISIT (OUTPATIENT)
Dept: PULMONOLOGY | Facility: CLINIC | Age: 66
End: 2021-09-16
Payer: MEDICARE

## 2021-09-16 ENCOUNTER — PATIENT OUTREACH (OUTPATIENT)
Dept: ADMINISTRATIVE | Facility: OTHER | Age: 66
End: 2021-09-16

## 2021-09-16 VITALS
SYSTOLIC BLOOD PRESSURE: 104 MMHG | DIASTOLIC BLOOD PRESSURE: 75 MMHG | HEART RATE: 95 BPM | OXYGEN SATURATION: 99 % | HEIGHT: 66 IN | WEIGHT: 200.5 LBS | BODY MASS INDEX: 32.22 KG/M2

## 2021-09-16 DIAGNOSIS — I27.20 PULMONARY HYPERTENSION: Primary | ICD-10-CM

## 2021-09-16 DIAGNOSIS — J84.9 INTERSTITIAL LUNG DISEASE: ICD-10-CM

## 2021-09-16 DIAGNOSIS — G47.33 OBSTRUCTIVE SLEEP APNEA: ICD-10-CM

## 2021-09-16 DIAGNOSIS — M34.9 SCLERODERMA: ICD-10-CM

## 2021-09-16 DIAGNOSIS — J44.9 COPD MIXED TYPE: ICD-10-CM

## 2021-09-16 PROCEDURE — 99999 PR PBB SHADOW E&M-EST. PATIENT-LVL IV: ICD-10-PCS | Mod: PBBFAC,,, | Performed by: INTERNAL MEDICINE

## 2021-09-16 PROCEDURE — 99214 PR OFFICE/OUTPT VISIT, EST, LEVL IV, 30-39 MIN: ICD-10-PCS | Mod: S$PBB,,, | Performed by: INTERNAL MEDICINE

## 2021-09-16 PROCEDURE — 99214 OFFICE O/P EST MOD 30 MIN: CPT | Mod: S$PBB,,, | Performed by: INTERNAL MEDICINE

## 2021-09-16 PROCEDURE — 99999 PR PBB SHADOW E&M-EST. PATIENT-LVL IV: CPT | Mod: PBBFAC,,, | Performed by: INTERNAL MEDICINE

## 2021-09-16 PROCEDURE — 99214 OFFICE O/P EST MOD 30 MIN: CPT | Mod: PBBFAC,PO | Performed by: INTERNAL MEDICINE

## 2021-09-22 ENCOUNTER — OFFICE VISIT (OUTPATIENT)
Dept: FAMILY MEDICINE | Facility: CLINIC | Age: 66
End: 2021-09-22
Payer: MEDICARE

## 2021-09-22 VITALS
HEIGHT: 66 IN | BODY MASS INDEX: 31.99 KG/M2 | HEART RATE: 90 BPM | WEIGHT: 199.06 LBS | RESPIRATION RATE: 17 BRPM | SYSTOLIC BLOOD PRESSURE: 106 MMHG | OXYGEN SATURATION: 99 % | DIASTOLIC BLOOD PRESSURE: 68 MMHG | TEMPERATURE: 99 F

## 2021-09-22 DIAGNOSIS — M34.9 SCLERODERMA: Primary | ICD-10-CM

## 2021-09-22 DIAGNOSIS — I27.20 PULMONARY HYPERTENSION: ICD-10-CM

## 2021-09-22 DIAGNOSIS — K90.89 OTHER INTESTINAL MALABSORPTION: ICD-10-CM

## 2021-09-22 DIAGNOSIS — I10 ESSENTIAL HYPERTENSION: ICD-10-CM

## 2021-09-22 DIAGNOSIS — E55.9 VITAMIN D DEFICIENCY: ICD-10-CM

## 2021-09-22 DIAGNOSIS — R25.2 CRAMPING OF HANDS: ICD-10-CM

## 2021-09-22 DIAGNOSIS — K50.00 CROHN'S DISEASE OF SMALL INTESTINE WITHOUT COMPLICATION: ICD-10-CM

## 2021-09-22 DIAGNOSIS — Z99.81 DEPENDENCE ON SUPPLEMENTAL OXYGEN: ICD-10-CM

## 2021-09-22 PROCEDURE — 99214 PR OFFICE/OUTPT VISIT, EST, LEVL IV, 30-39 MIN: ICD-10-PCS | Mod: S$GLB,,, | Performed by: INTERNAL MEDICINE

## 2021-09-22 PROCEDURE — 99214 OFFICE O/P EST MOD 30 MIN: CPT | Mod: S$GLB,,, | Performed by: INTERNAL MEDICINE

## 2021-09-22 RX ORDER — LANOLIN ALCOHOL/MO/W.PET/CERES
400 CREAM (GRAM) TOPICAL DAILY
Qty: 90 TABLET | Refills: 1 | Status: SHIPPED | OUTPATIENT
Start: 2021-09-22 | End: 2022-12-19 | Stop reason: SDUPTHER

## 2021-09-27 DIAGNOSIS — M62.838 MUSCLE SPASM: ICD-10-CM

## 2021-09-27 RX ORDER — TIZANIDINE 2 MG/1
TABLET ORAL
Qty: 90 TABLET | Refills: 2 | Status: SHIPPED | OUTPATIENT
Start: 2021-09-27 | End: 2022-09-19

## 2021-09-30 ENCOUNTER — EXTERNAL CHRONIC CARE MANAGEMENT (OUTPATIENT)
Dept: PRIMARY CARE CLINIC | Facility: CLINIC | Age: 66
End: 2021-09-30
Payer: MEDICARE

## 2021-09-30 PROCEDURE — 99490 PR CHRONIC CARE MGMT, 1ST 20 MIN: ICD-10-PCS | Mod: S$GLB,,, | Performed by: INTERNAL MEDICINE

## 2021-09-30 PROCEDURE — 99490 CHRNC CARE MGMT STAFF 1ST 20: CPT | Mod: S$GLB,,, | Performed by: INTERNAL MEDICINE

## 2021-10-14 ENCOUNTER — PATIENT MESSAGE (OUTPATIENT)
Dept: PULMONOLOGY | Facility: CLINIC | Age: 66
End: 2021-10-14
Payer: MEDICARE

## 2021-10-18 ENCOUNTER — PATIENT MESSAGE (OUTPATIENT)
Dept: FAMILY MEDICINE | Facility: CLINIC | Age: 66
End: 2021-10-18
Payer: MEDICARE

## 2021-10-31 ENCOUNTER — EXTERNAL CHRONIC CARE MANAGEMENT (OUTPATIENT)
Dept: PRIMARY CARE CLINIC | Facility: CLINIC | Age: 66
End: 2021-10-31
Payer: MEDICARE

## 2021-10-31 PROCEDURE — 99490 PR CHRONIC CARE MGMT, 1ST 20 MIN: ICD-10-PCS | Mod: S$GLB,,, | Performed by: INTERNAL MEDICINE

## 2021-10-31 PROCEDURE — 99490 CHRNC CARE MGMT STAFF 1ST 20: CPT | Mod: S$GLB,,, | Performed by: INTERNAL MEDICINE

## 2021-11-02 ENCOUNTER — OFFICE VISIT (OUTPATIENT)
Dept: GASTROENTEROLOGY | Facility: CLINIC | Age: 66
End: 2021-11-02
Payer: MEDICARE

## 2021-11-02 VITALS
WEIGHT: 201.25 LBS | BODY MASS INDEX: 32.34 KG/M2 | HEIGHT: 66 IN | SYSTOLIC BLOOD PRESSURE: 143 MMHG | HEART RATE: 102 BPM | DIASTOLIC BLOOD PRESSURE: 85 MMHG

## 2021-11-02 DIAGNOSIS — M34.1 SCLERODERMA OF ESOPHAGUS: ICD-10-CM

## 2021-11-02 DIAGNOSIS — K50.00 CROHN'S DISEASE OF SMALL INTESTINE WITHOUT COMPLICATION: ICD-10-CM

## 2021-11-02 DIAGNOSIS — R13.10 DYSPHAGIA, UNSPECIFIED TYPE: Primary | ICD-10-CM

## 2021-11-02 DIAGNOSIS — K22.89 ESOPHAGEAL LEUKOPLAKIA: ICD-10-CM

## 2021-11-02 PROCEDURE — 99213 PR OFFICE/OUTPT VISIT, EST, LEVL III, 20-29 MIN: ICD-10-PCS | Mod: S$PBB,,, | Performed by: INTERNAL MEDICINE

## 2021-11-02 PROCEDURE — 99999 PR PBB SHADOW E&M-EST. PATIENT-LVL IV: CPT | Mod: PBBFAC,,, | Performed by: INTERNAL MEDICINE

## 2021-11-02 PROCEDURE — 99213 OFFICE O/P EST LOW 20 MIN: CPT | Mod: S$PBB,,, | Performed by: INTERNAL MEDICINE

## 2021-11-02 PROCEDURE — 99999 PR PBB SHADOW E&M-EST. PATIENT-LVL IV: ICD-10-PCS | Mod: PBBFAC,,, | Performed by: INTERNAL MEDICINE

## 2021-11-02 PROCEDURE — 99214 OFFICE O/P EST MOD 30 MIN: CPT | Mod: PBBFAC,PN | Performed by: INTERNAL MEDICINE

## 2021-11-02 RX ORDER — DICYCLOMINE HYDROCHLORIDE 10 MG/1
10 CAPSULE ORAL EVERY 6 HOURS PRN
Qty: 30 CAPSULE | Refills: 3 | Status: SHIPPED | OUTPATIENT
Start: 2021-11-02 | End: 2021-12-02

## 2021-11-03 ENCOUNTER — LAB VISIT (OUTPATIENT)
Dept: LAB | Facility: HOSPITAL | Age: 66
End: 2021-11-03
Attending: INTERNAL MEDICINE
Payer: MEDICARE

## 2021-11-03 DIAGNOSIS — K50.00 CROHN'S DISEASE OF SMALL INTESTINE WITHOUT COMPLICATION: ICD-10-CM

## 2021-11-03 LAB
ALBUMIN SERPL BCP-MCNC: 3.9 G/DL (ref 3.5–5.2)
ALP SERPL-CCNC: 58 U/L (ref 55–135)
ALT SERPL W/O P-5'-P-CCNC: 15 U/L (ref 10–44)
ANION GAP SERPL CALC-SCNC: 12 MMOL/L (ref 8–16)
AST SERPL-CCNC: 16 U/L (ref 10–40)
BILIRUB SERPL-MCNC: 0.9 MG/DL (ref 0.1–1)
BUN SERPL-MCNC: 10 MG/DL (ref 8–23)
CALCIUM SERPL-MCNC: 9.7 MG/DL (ref 8.7–10.5)
CHLORIDE SERPL-SCNC: 107 MMOL/L (ref 95–110)
CO2 SERPL-SCNC: 23 MMOL/L (ref 23–29)
CREAT SERPL-MCNC: 0.9 MG/DL (ref 0.5–1.4)
CRP SERPL-MCNC: 1.9 MG/L (ref 0–8.2)
ERYTHROCYTE [SEDIMENTATION RATE] IN BLOOD BY WESTERGREN METHOD: 8 MM/HR (ref 0–20)
EST. GFR  (AFRICAN AMERICAN): >60 ML/MIN/1.73 M^2
EST. GFR  (NON AFRICAN AMERICAN): >60 ML/MIN/1.73 M^2
GLUCOSE SERPL-MCNC: 83 MG/DL (ref 70–110)
POTASSIUM SERPL-SCNC: 3.7 MMOL/L (ref 3.5–5.1)
PROT SERPL-MCNC: 7.2 G/DL (ref 6–8.4)
SODIUM SERPL-SCNC: 142 MMOL/L (ref 136–145)

## 2021-11-03 PROCEDURE — 36415 COLL VENOUS BLD VENIPUNCTURE: CPT | Performed by: INTERNAL MEDICINE

## 2021-11-03 PROCEDURE — 86140 C-REACTIVE PROTEIN: CPT | Performed by: INTERNAL MEDICINE

## 2021-11-03 PROCEDURE — 80053 COMPREHEN METABOLIC PANEL: CPT | Performed by: INTERNAL MEDICINE

## 2021-11-03 PROCEDURE — 85651 RBC SED RATE NONAUTOMATED: CPT | Performed by: INTERNAL MEDICINE

## 2021-11-08 ENCOUNTER — LAB VISIT (OUTPATIENT)
Dept: LAB | Facility: HOSPITAL | Age: 66
End: 2021-11-08
Attending: INTERNAL MEDICINE
Payer: MEDICARE

## 2021-11-08 DIAGNOSIS — K50.00 CROHN'S DISEASE OF SMALL INTESTINE WITHOUT COMPLICATION: Primary | ICD-10-CM

## 2021-11-08 PROCEDURE — 83986 ASSAY PH BODY FLUID NOS: CPT | Performed by: INTERNAL MEDICINE

## 2021-11-08 PROCEDURE — 83993 ASSAY FOR CALPROTECTIN FECAL: CPT | Performed by: INTERNAL MEDICINE

## 2021-11-08 PROCEDURE — 82656 EL-1 FECAL QUAL/SEMIQ: CPT | Performed by: INTERNAL MEDICINE

## 2021-11-10 LAB
ELASTASE 1, FECAL: >500 MCG/G
O+P STL MICRO: NORMAL

## 2021-11-11 LAB — PH STL: 7 [PH] (ref 5–8.5)

## 2021-11-15 LAB — CALPROTECTIN STL-MCNT: 61.4 MCG/G

## 2021-11-18 ENCOUNTER — TELEPHONE (OUTPATIENT)
Dept: UROLOGY | Facility: CLINIC | Age: 66
End: 2021-11-18
Payer: MEDICARE

## 2021-11-19 ENCOUNTER — ANESTHESIA EVENT (OUTPATIENT)
Dept: ENDOSCOPY | Facility: HOSPITAL | Age: 66
End: 2021-11-19
Payer: MEDICARE

## 2021-11-19 ENCOUNTER — ANESTHESIA (OUTPATIENT)
Dept: ENDOSCOPY | Facility: HOSPITAL | Age: 66
End: 2021-11-19
Payer: MEDICARE

## 2021-11-19 ENCOUNTER — HOSPITAL ENCOUNTER (OUTPATIENT)
Facility: HOSPITAL | Age: 66
Discharge: HOME | End: 2021-11-19
Attending: INTERNAL MEDICINE | Admitting: INTERNAL MEDICINE
Payer: MEDICARE

## 2021-11-19 DIAGNOSIS — R13.10 DYSPHAGIA: ICD-10-CM

## 2021-11-19 DIAGNOSIS — K22.89 ESOPHAGEAL LEUKOPLAKIA: Primary | ICD-10-CM

## 2021-11-19 PROCEDURE — D9220A PRA ANESTHESIA: ICD-10-PCS | Mod: CRNA,,, | Performed by: NURSE ANESTHETIST, CERTIFIED REGISTERED

## 2021-11-19 PROCEDURE — D9220A PRA ANESTHESIA: Mod: CRNA,,, | Performed by: NURSE ANESTHETIST, CERTIFIED REGISTERED

## 2021-11-19 PROCEDURE — D9220A PRA ANESTHESIA: Mod: ANES,,, | Performed by: ANESTHESIOLOGY

## 2021-11-19 PROCEDURE — 25000003 PHARM REV CODE 250: Performed by: NURSE ANESTHETIST, CERTIFIED REGISTERED

## 2021-11-19 PROCEDURE — 88312 SPECIAL STAINS GROUP 1: CPT | Mod: 26,,, | Performed by: STUDENT IN AN ORGANIZED HEALTH CARE EDUCATION/TRAINING PROGRAM

## 2021-11-19 PROCEDURE — 25000003 PHARM REV CODE 250: Performed by: INTERNAL MEDICINE

## 2021-11-19 PROCEDURE — 43239 PR EGD, FLEX, W/BIOPSY, SGL/MULTI: ICD-10-PCS | Mod: ,,, | Performed by: INTERNAL MEDICINE

## 2021-11-19 PROCEDURE — D9220A PRA ANESTHESIA: ICD-10-PCS | Mod: ANES,,, | Performed by: ANESTHESIOLOGY

## 2021-11-19 PROCEDURE — 88305 TISSUE EXAM BY PATHOLOGIST: CPT | Performed by: STUDENT IN AN ORGANIZED HEALTH CARE EDUCATION/TRAINING PROGRAM

## 2021-11-19 PROCEDURE — 88305 TISSUE EXAM BY PATHOLOGIST: CPT | Mod: 26,,, | Performed by: STUDENT IN AN ORGANIZED HEALTH CARE EDUCATION/TRAINING PROGRAM

## 2021-11-19 PROCEDURE — 88305 TISSUE EXAM BY PATHOLOGIST: ICD-10-PCS | Mod: 26,,, | Performed by: STUDENT IN AN ORGANIZED HEALTH CARE EDUCATION/TRAINING PROGRAM

## 2021-11-19 PROCEDURE — 43239 EGD BIOPSY SINGLE/MULTIPLE: CPT | Mod: ,,, | Performed by: INTERNAL MEDICINE

## 2021-11-19 PROCEDURE — 63600175 PHARM REV CODE 636 W HCPCS: Performed by: NURSE ANESTHETIST, CERTIFIED REGISTERED

## 2021-11-19 PROCEDURE — 88312 PR  SPECIAL STAINS,GROUP I: ICD-10-PCS | Mod: 26,,, | Performed by: STUDENT IN AN ORGANIZED HEALTH CARE EDUCATION/TRAINING PROGRAM

## 2021-11-19 PROCEDURE — 43239 EGD BIOPSY SINGLE/MULTIPLE: CPT | Performed by: INTERNAL MEDICINE

## 2021-11-19 PROCEDURE — 37000008 HC ANESTHESIA 1ST 15 MINUTES: Performed by: INTERNAL MEDICINE

## 2021-11-19 PROCEDURE — 37000009 HC ANESTHESIA EA ADD 15 MINS: Performed by: INTERNAL MEDICINE

## 2021-11-19 PROCEDURE — 27201012 HC FORCEPS, HOT/COLD, DISP: Performed by: INTERNAL MEDICINE

## 2021-11-19 PROCEDURE — 88312 SPECIAL STAINS GROUP 1: CPT | Performed by: STUDENT IN AN ORGANIZED HEALTH CARE EDUCATION/TRAINING PROGRAM

## 2021-11-19 RX ORDER — LIDOCAINE HCL/PF 100 MG/5ML
SYRINGE (ML) INTRAVENOUS
Status: DISCONTINUED | OUTPATIENT
Start: 2021-11-19 | End: 2021-11-19

## 2021-11-19 RX ORDER — SODIUM CHLORIDE 9 MG/ML
INJECTION, SOLUTION INTRAVENOUS CONTINUOUS
Status: DISCONTINUED | OUTPATIENT
Start: 2021-11-19 | End: 2021-11-19 | Stop reason: HOSPADM

## 2021-11-19 RX ORDER — PROPOFOL 10 MG/ML
VIAL (ML) INTRAVENOUS
Status: DISCONTINUED | OUTPATIENT
Start: 2021-11-19 | End: 2021-11-19

## 2021-11-19 RX ADMIN — PROPOFOL 20 MG: 10 INJECTION, EMULSION INTRAVENOUS at 11:11

## 2021-11-19 RX ADMIN — LIDOCAINE HYDROCHLORIDE 100 MG: 20 INJECTION INTRAVENOUS at 11:11

## 2021-11-19 RX ADMIN — SODIUM CHLORIDE: 0.9 INJECTION, SOLUTION INTRAVENOUS at 10:11

## 2021-11-19 RX ADMIN — PROPOFOL 100 MG: 10 INJECTION, EMULSION INTRAVENOUS at 11:11

## 2021-11-22 VITALS
HEART RATE: 78 BPM | WEIGHT: 198 LBS | RESPIRATION RATE: 18 BRPM | SYSTOLIC BLOOD PRESSURE: 139 MMHG | HEIGHT: 66 IN | OXYGEN SATURATION: 100 % | BODY MASS INDEX: 31.82 KG/M2 | DIASTOLIC BLOOD PRESSURE: 84 MMHG | TEMPERATURE: 97 F

## 2021-11-30 ENCOUNTER — EXTERNAL CHRONIC CARE MANAGEMENT (OUTPATIENT)
Dept: PRIMARY CARE CLINIC | Facility: CLINIC | Age: 66
End: 2021-11-30
Payer: MEDICARE

## 2021-11-30 PROCEDURE — 99490 CHRNC CARE MGMT STAFF 1ST 20: CPT | Mod: S$GLB,,, | Performed by: INTERNAL MEDICINE

## 2021-11-30 PROCEDURE — 99490 PR CHRONIC CARE MGMT, 1ST 20 MIN: ICD-10-PCS | Mod: S$GLB,,, | Performed by: INTERNAL MEDICINE

## 2021-12-02 ENCOUNTER — OFFICE VISIT (OUTPATIENT)
Dept: UROLOGY | Facility: CLINIC | Age: 66
End: 2021-12-02
Payer: MEDICARE

## 2021-12-02 VITALS — RESPIRATION RATE: 18 BRPM | HEIGHT: 66 IN | WEIGHT: 199.5 LBS | BODY MASS INDEX: 32.06 KG/M2

## 2021-12-02 DIAGNOSIS — R39.15 URINARY URGENCY: Primary | ICD-10-CM

## 2021-12-02 DIAGNOSIS — R35.0 URINARY FREQUENCY: ICD-10-CM

## 2021-12-02 PROCEDURE — 99999 PR PBB SHADOW E&M-EST. PATIENT-LVL IV: CPT | Mod: PBBFAC,,, | Performed by: NURSE PRACTITIONER

## 2021-12-02 PROCEDURE — 99213 OFFICE O/P EST LOW 20 MIN: CPT | Mod: S$PBB,,, | Performed by: NURSE PRACTITIONER

## 2021-12-02 PROCEDURE — 99214 OFFICE O/P EST MOD 30 MIN: CPT | Mod: PBBFAC,PN | Performed by: NURSE PRACTITIONER

## 2021-12-02 PROCEDURE — 99213 PR OFFICE/OUTPT VISIT, EST, LEVL III, 20-29 MIN: ICD-10-PCS | Mod: S$PBB,,, | Performed by: NURSE PRACTITIONER

## 2021-12-02 PROCEDURE — 99999 PR PBB SHADOW E&M-EST. PATIENT-LVL IV: ICD-10-PCS | Mod: PBBFAC,,, | Performed by: NURSE PRACTITIONER

## 2021-12-05 LAB
FINAL PATHOLOGIC DIAGNOSIS: NORMAL
GROSS: NORMAL
Lab: NORMAL
MICROSCOPIC EXAM: NORMAL

## 2021-12-14 ENCOUNTER — LAB VISIT (OUTPATIENT)
Dept: LAB | Facility: HOSPITAL | Age: 66
End: 2021-12-14
Attending: INTERNAL MEDICINE
Payer: MEDICARE

## 2021-12-14 DIAGNOSIS — E55.9 VITAMIN D DEFICIENCY: ICD-10-CM

## 2021-12-14 DIAGNOSIS — K90.89 OTHER INTESTINAL MALABSORPTION: ICD-10-CM

## 2021-12-14 DIAGNOSIS — I10 ESSENTIAL HYPERTENSION: ICD-10-CM

## 2021-12-14 DIAGNOSIS — M34.9 SCLERODERMA: ICD-10-CM

## 2021-12-14 DIAGNOSIS — K50.00 CROHN'S DISEASE OF SMALL INTESTINE WITHOUT COMPLICATION: ICD-10-CM

## 2021-12-14 LAB
25(OH)D3+25(OH)D2 SERPL-MCNC: 28 NG/ML (ref 30–96)
ALBUMIN SERPL BCP-MCNC: 3.8 G/DL (ref 3.5–5.2)
ALP SERPL-CCNC: 56 U/L (ref 55–135)
ALT SERPL W/O P-5'-P-CCNC: 16 U/L (ref 10–44)
ANION GAP SERPL CALC-SCNC: 11 MMOL/L (ref 8–16)
AST SERPL-CCNC: 16 U/L (ref 10–40)
BASOPHILS # BLD AUTO: 0.05 K/UL (ref 0–0.2)
BASOPHILS NFR BLD: 0.8 % (ref 0–1.9)
BILIRUB SERPL-MCNC: 0.7 MG/DL (ref 0.1–1)
BUN SERPL-MCNC: 9 MG/DL (ref 8–23)
CALCIUM SERPL-MCNC: 9.2 MG/DL (ref 8.7–10.5)
CHLORIDE SERPL-SCNC: 107 MMOL/L (ref 95–110)
CO2 SERPL-SCNC: 25 MMOL/L (ref 23–29)
CREAT SERPL-MCNC: 0.8 MG/DL (ref 0.5–1.4)
DIFFERENTIAL METHOD: NORMAL
EOSINOPHIL # BLD AUTO: 0.2 K/UL (ref 0–0.5)
EOSINOPHIL NFR BLD: 3.9 % (ref 0–8)
ERYTHROCYTE [DISTWIDTH] IN BLOOD BY AUTOMATED COUNT: 13 % (ref 11.5–14.5)
ERYTHROCYTE [SEDIMENTATION RATE] IN BLOOD BY WESTERGREN METHOD: 15 MM/HR (ref 0–20)
EST. GFR  (AFRICAN AMERICAN): >60 ML/MIN/1.73 M^2
EST. GFR  (NON AFRICAN AMERICAN): >60 ML/MIN/1.73 M^2
GLUCOSE SERPL-MCNC: 94 MG/DL (ref 70–110)
HCT VFR BLD AUTO: 39.7 % (ref 37–48.5)
HGB BLD-MCNC: 12.8 G/DL (ref 12–16)
IMM GRANULOCYTES # BLD AUTO: 0.02 K/UL (ref 0–0.04)
IMM GRANULOCYTES NFR BLD AUTO: 0.3 % (ref 0–0.5)
LYMPHOCYTES # BLD AUTO: 1.1 K/UL (ref 1–4.8)
LYMPHOCYTES NFR BLD: 18 % (ref 18–48)
MCH RBC QN AUTO: 30.3 PG (ref 27–31)
MCHC RBC AUTO-ENTMCNC: 32.2 G/DL (ref 32–36)
MCV RBC AUTO: 94 FL (ref 82–98)
MONOCYTES # BLD AUTO: 0.4 K/UL (ref 0.3–1)
MONOCYTES NFR BLD: 7.3 % (ref 4–15)
NEUTROPHILS # BLD AUTO: 4.1 K/UL (ref 1.8–7.7)
NEUTROPHILS NFR BLD: 69.7 % (ref 38–73)
NRBC BLD-RTO: 0 /100 WBC
PLATELET # BLD AUTO: 176 K/UL (ref 150–450)
PMV BLD AUTO: 11.8 FL (ref 9.2–12.9)
POTASSIUM SERPL-SCNC: 3.9 MMOL/L (ref 3.5–5.1)
PROT SERPL-MCNC: 6.8 G/DL (ref 6–8.4)
RBC # BLD AUTO: 4.23 M/UL (ref 4–5.4)
SODIUM SERPL-SCNC: 143 MMOL/L (ref 136–145)
VIT B12 SERPL-MCNC: 302 PG/ML (ref 210–950)
WBC # BLD AUTO: 5.9 K/UL (ref 3.9–12.7)

## 2021-12-14 PROCEDURE — 85651 RBC SED RATE NONAUTOMATED: CPT | Performed by: INTERNAL MEDICINE

## 2021-12-14 PROCEDURE — 80053 COMPREHEN METABOLIC PANEL: CPT | Performed by: INTERNAL MEDICINE

## 2021-12-14 PROCEDURE — 82607 VITAMIN B-12: CPT | Performed by: INTERNAL MEDICINE

## 2021-12-14 PROCEDURE — 36415 COLL VENOUS BLD VENIPUNCTURE: CPT | Performed by: INTERNAL MEDICINE

## 2021-12-14 PROCEDURE — 85025 COMPLETE CBC W/AUTO DIFF WBC: CPT | Performed by: INTERNAL MEDICINE

## 2021-12-14 PROCEDURE — 82306 VITAMIN D 25 HYDROXY: CPT | Performed by: INTERNAL MEDICINE

## 2021-12-22 ENCOUNTER — OFFICE VISIT (OUTPATIENT)
Dept: FAMILY MEDICINE | Facility: CLINIC | Age: 66
End: 2021-12-22
Payer: MEDICARE

## 2021-12-22 VITALS
OXYGEN SATURATION: 96 % | TEMPERATURE: 98 F | BODY MASS INDEX: 32.45 KG/M2 | DIASTOLIC BLOOD PRESSURE: 64 MMHG | SYSTOLIC BLOOD PRESSURE: 110 MMHG | HEART RATE: 104 BPM | WEIGHT: 201.06 LBS

## 2021-12-22 DIAGNOSIS — E87.6 HYPOKALEMIA: ICD-10-CM

## 2021-12-22 DIAGNOSIS — I27.20 PULMONARY HYPERTENSION: ICD-10-CM

## 2021-12-22 DIAGNOSIS — M34.9 SCLERODERMA: Primary | ICD-10-CM

## 2021-12-22 DIAGNOSIS — I10 ESSENTIAL HYPERTENSION: ICD-10-CM

## 2021-12-22 DIAGNOSIS — J44.9 CHRONIC OBSTRUCTIVE PULMONARY DISEASE, UNSPECIFIED COPD TYPE: ICD-10-CM

## 2021-12-22 DIAGNOSIS — D69.6 THROMBOCYTOPENIA: ICD-10-CM

## 2021-12-22 DIAGNOSIS — J47.9 BRONCHIECTASIS WITHOUT COMPLICATION: ICD-10-CM

## 2021-12-22 PROCEDURE — 99214 OFFICE O/P EST MOD 30 MIN: CPT | Mod: S$GLB,,, | Performed by: INTERNAL MEDICINE

## 2021-12-22 PROCEDURE — 99214 PR OFFICE/OUTPT VISIT, EST, LEVL IV, 30-39 MIN: ICD-10-PCS | Mod: S$GLB,,, | Performed by: INTERNAL MEDICINE

## 2021-12-22 RX ORDER — ALBUTEROL SULFATE 90 UG/1
AEROSOL, METERED RESPIRATORY (INHALATION)
Qty: 8 G | Refills: 18 | Status: SHIPPED | OUTPATIENT
Start: 2021-12-22 | End: 2023-01-04 | Stop reason: SDUPTHER

## 2021-12-31 ENCOUNTER — EXTERNAL CHRONIC CARE MANAGEMENT (OUTPATIENT)
Dept: PRIMARY CARE CLINIC | Facility: CLINIC | Age: 66
End: 2021-12-31
Payer: MEDICARE

## 2021-12-31 PROCEDURE — 99490 CHRNC CARE MGMT STAFF 1ST 20: CPT | Mod: S$GLB,,, | Performed by: INTERNAL MEDICINE

## 2021-12-31 PROCEDURE — 99490 PR CHRONIC CARE MGMT, 1ST 20 MIN: ICD-10-PCS | Mod: S$GLB,,, | Performed by: INTERNAL MEDICINE

## 2022-02-10 ENCOUNTER — DOCUMENTATION ONLY (OUTPATIENT)
Dept: PULMONOLOGY | Facility: CLINIC | Age: 67
End: 2022-02-10
Payer: MEDICARE

## 2022-02-10 ENCOUNTER — IMMUNIZATION (OUTPATIENT)
Dept: PRIMARY CARE CLINIC | Facility: CLINIC | Age: 67
End: 2022-02-10
Payer: MEDICARE

## 2022-02-10 DIAGNOSIS — Z23 NEED FOR VACCINATION: Primary | ICD-10-CM

## 2022-02-10 PROCEDURE — 91300 COVID-19, MRNA, LNP-S, PF, 30 MCG/0.3 ML DOSE VACCINE: CPT | Mod: S$GLB,,, | Performed by: FAMILY MEDICINE

## 2022-02-10 PROCEDURE — 91300 COVID-19, MRNA, LNP-S, PF, 30 MCG/0.3 ML DOSE VACCINE: ICD-10-PCS | Mod: S$GLB,,, | Performed by: FAMILY MEDICINE

## 2022-02-10 NOTE — PROGRESS NOTES
February 10, 2022-patient dropped out pulmonary functions from February 7, 2022. Forced vital capacity was 69 percent predicted.  There was no airflow obstruction.  Total lung capacity was 72 percent of predicted.  Diffusion was down to 31 percent predicted.  Patient had a 6 minute walk also.  Patient walked about 359 meters in 6 minutes.  She was 97 percent on room air.  The low O2 sat was 89 percent while walking.    Study was done at [facility]

## 2022-02-15 ENCOUNTER — TELEPHONE (OUTPATIENT)
Dept: OPHTHALMOLOGY | Facility: CLINIC | Age: 67
End: 2022-02-15
Payer: MEDICARE

## 2022-02-15 NOTE — TELEPHONE ENCOUNTER
LVM for pt that eye appt of 3-9-22 is cancelled due to Dr. Erwin out on medical leave.   Pt to call back to reschedule per convenience.

## 2022-02-17 ENCOUNTER — HOSPITAL ENCOUNTER (OUTPATIENT)
Dept: PULMONOLOGY | Facility: HOSPITAL | Age: 67
Discharge: HOME | End: 2022-02-17
Attending: INTERNAL MEDICINE
Payer: MEDICARE

## 2022-02-17 ENCOUNTER — HOSPITAL ENCOUNTER (OUTPATIENT)
Dept: RADIOLOGY | Facility: HOSPITAL | Age: 67
Discharge: HOME | End: 2022-02-17
Attending: INTERNAL MEDICINE
Payer: MEDICARE

## 2022-02-17 ENCOUNTER — PATIENT MESSAGE (OUTPATIENT)
Dept: PULMONOLOGY | Facility: CLINIC | Age: 67
End: 2022-02-17
Payer: MEDICARE

## 2022-02-17 DIAGNOSIS — J84.9 INTERSTITIAL LUNG DISEASE: ICD-10-CM

## 2022-02-17 DIAGNOSIS — M34.9 SCLERODERMA: ICD-10-CM

## 2022-02-17 DIAGNOSIS — I27.20 PULMONARY HYPERTENSION: ICD-10-CM

## 2022-02-17 DIAGNOSIS — J84.10 PULMONARY FIBROSIS: ICD-10-CM

## 2022-02-17 LAB
BR6MWT: NORMAL
BRPFT: NORMAL
DLCO ADJ PRE: 8.85 ML/(MIN*MMHG)
DLCO SINGLE BREATH LLN: 16.58
DLCO SINGLE BREATH PRE REF: 39.6 %
DLCO SINGLE BREATH REF: 22.32
DLCOC SBVA LLN: 2.97
DLCOC SBVA PRE REF: 53 %
DLCOC SBVA REF: 4.38
DLCOC SINGLE BREATH LLN: 16.58
DLCOC SINGLE BREATH PRE REF: 39.6 %
DLCOC SINGLE BREATH REF: 22.32
DLCOVA LLN: 2.97
DLCOVA PRE REF: 53 %
DLCOVA PRE: 2.32 ML/(MIN*MMHG*L)
DLCOVA REF: 4.38
DLVAADJ PRE: 2.32 ML/(MIN*MMHG*L)
ERVN2 LLN: -16449.3
ERVN2 PRE REF: 91.1 %
ERVN2 PRE: 0.64 L
ERVN2 REF: 0.7
FEF 25 75 CHG: 16.8 %
FEF 25 75 LLN: 1.31
FEF 25 75 POST REF: 68.5 %
FEF 25 75 PRE REF: 58.6 %
FEF 25 75 REF: 2.7
FET100 CHG: 9.5 %
FEV1 CHG: 1.1 %
FEV1 FVC CHG: 2 %
FEV1 LLN: 1.62
FEV1 POST REF: 90.6 %
FEV1 PRE REF: 89.6 %
FEV1 REF: 2.24
FRCN2 LLN: 1.94
FRCN2 PRE REF: 55.1 %
FRCN2 REF: 2.76
FVC CHG: -0.8 %
FVC LLN: 1.97
FVC POST REF: 98.7 %
FVC PRE REF: 99.5 %
FVC REF: 2.68
IVC PRE: 2.43 L
IVC SINGLE BREATH LLN: 1.97
IVC SINGLE BREATH PRE REF: 90.9 %
IVC SINGLE BREATH REF: 2.68
MVV LLN: 76
MVV PRE REF: 88.1 %
MVV REF: 90
PEF CHG: -6.9 %
PEF LLN: 4.47
PEF POST REF: 109.1 %
PEF PRE REF: 117.2 %
PEF REF: 5.96
POST FEF 25 75: 1.85 L/S
POST FET 100: 8.58 SEC
POST FEV1 FVC: 76.88 %
POST FEV1: 2.03 L
POST FVC: 2.64 L
POST PEF: 6.5 L/S
PRE DLCO: 8.85 ML/(MIN*MMHG)
PRE FEF 25 75: 1.59 L/S
PRE FET 100: 7.83 SEC
PRE FEV1 FVC: 75.38 %
PRE FEV1: 2.01 L
PRE FRC N2: 1.52 L
PRE FVC: 2.67 L
PRE MVV: 79 L/MIN
PRE PEF: 6.98 L/S
RVN2 LLN: 1.48
RVN2 PRE REF: 41.2 %
RVN2 PRE: 0.85 L
RVN2 REF: 2.06
RVN2TLCN2 LLN: 32.15
RVN2TLCN2 PRE REF: 57.8 %
RVN2TLCN2 PRE: 24.14 %
RVN2TLCN2 REF: 41.74
TLCN2 LLN: 4.11
TLCN2 PRE REF: 68.9 %
TLCN2 PRE: 3.51 L
TLCN2 REF: 5.1
VA PRE: 3.81 L
VA SINGLE BREATH LLN: 4.95
VA SINGLE BREATH PRE REF: 77 %
VA SINGLE BREATH REF: 4.95
VCMAXN2 LLN: 1.97
VCMAXN2 PRE REF: 99.5 %
VCMAXN2 PRE: 2.67 L
VCMAXN2 REF: 2.68

## 2022-02-17 PROCEDURE — 94729 DIFFUSING CAPACITY: CPT

## 2022-02-17 PROCEDURE — 94618 PULMONARY STRESS TESTING: ICD-10-PCS | Mod: 26,,, | Performed by: INTERNAL MEDICINE

## 2022-02-17 PROCEDURE — 94727 GAS DIL/WSHOT DETER LNG VOL: CPT

## 2022-02-17 PROCEDURE — 94729 DIFFUSING CAPACITY: CPT | Mod: 26,,, | Performed by: INTERNAL MEDICINE

## 2022-02-17 PROCEDURE — 94727 PR PULM FUNCTION TEST BY GAS: ICD-10-PCS | Mod: 26,,, | Performed by: INTERNAL MEDICINE

## 2022-02-17 PROCEDURE — 94727 GAS DIL/WSHOT DETER LNG VOL: CPT | Mod: 26,,, | Performed by: INTERNAL MEDICINE

## 2022-02-17 PROCEDURE — 94618 PULMONARY STRESS TESTING: CPT

## 2022-02-17 PROCEDURE — 94060 EVALUATION OF WHEEZING: CPT | Mod: 26,59,, | Performed by: INTERNAL MEDICINE

## 2022-02-17 PROCEDURE — 71250 CT THORAX DX C-: CPT | Mod: 26,,, | Performed by: RADIOLOGY

## 2022-02-17 PROCEDURE — 94729 PR C02/MEMBANE DIFFUSE CAPACITY: ICD-10-PCS | Mod: 26,,, | Performed by: INTERNAL MEDICINE

## 2022-02-17 PROCEDURE — 94618 PULMONARY STRESS TESTING: CPT | Mod: 26,,, | Performed by: INTERNAL MEDICINE

## 2022-02-17 PROCEDURE — 94060 PR EVAL OF BRONCHOSPASM: ICD-10-PCS | Mod: 26,59,, | Performed by: INTERNAL MEDICINE

## 2022-02-17 PROCEDURE — 71250 CT CHEST WITHOUT CONTRAST: ICD-10-PCS | Mod: 26,,, | Performed by: RADIOLOGY

## 2022-02-17 PROCEDURE — 71250 CT THORAX DX C-: CPT | Mod: TC

## 2022-02-17 PROCEDURE — 94060 EVALUATION OF WHEEZING: CPT

## 2022-02-21 ENCOUNTER — PATIENT MESSAGE (OUTPATIENT)
Dept: PULMONOLOGY | Facility: CLINIC | Age: 67
End: 2022-02-21
Payer: MEDICARE

## 2022-02-28 ENCOUNTER — EXTERNAL CHRONIC CARE MANAGEMENT (OUTPATIENT)
Dept: PRIMARY CARE CLINIC | Facility: CLINIC | Age: 67
End: 2022-02-28
Payer: MEDICARE

## 2022-02-28 DIAGNOSIS — D84.9 IMMUNOSUPPRESSED STATUS: ICD-10-CM

## 2022-03-16 ENCOUNTER — LAB VISIT (OUTPATIENT)
Dept: LAB | Facility: HOSPITAL | Age: 67
End: 2022-03-16
Attending: FAMILY MEDICINE
Payer: MEDICARE

## 2022-03-16 DIAGNOSIS — E87.6 HYPOKALEMIA: ICD-10-CM

## 2022-03-16 DIAGNOSIS — I10 ESSENTIAL HYPERTENSION: ICD-10-CM

## 2022-03-16 DIAGNOSIS — M34.9 SCLERODERMA: ICD-10-CM

## 2022-03-16 DIAGNOSIS — D69.6 THROMBOCYTOPENIA: ICD-10-CM

## 2022-03-16 LAB
ALBUMIN SERPL BCP-MCNC: 3.8 G/DL (ref 3.5–5.2)
ALP SERPL-CCNC: 62 U/L (ref 55–135)
ALT SERPL W/O P-5'-P-CCNC: 17 U/L (ref 10–44)
ANION GAP SERPL CALC-SCNC: 12 MMOL/L (ref 8–16)
AST SERPL-CCNC: 18 U/L (ref 10–40)
BASOPHILS # BLD AUTO: 0.02 K/UL (ref 0–0.2)
BASOPHILS NFR BLD: 0.3 % (ref 0–1.9)
BILIRUB SERPL-MCNC: 0.7 MG/DL (ref 0.1–1)
BUN SERPL-MCNC: 12 MG/DL (ref 8–23)
CALCIUM SERPL-MCNC: 9.5 MG/DL (ref 8.7–10.5)
CHLORIDE SERPL-SCNC: 107 MMOL/L (ref 95–110)
CO2 SERPL-SCNC: 24 MMOL/L (ref 23–29)
CREAT SERPL-MCNC: 0.8 MG/DL (ref 0.5–1.4)
DIFFERENTIAL METHOD: ABNORMAL
EOSINOPHIL # BLD AUTO: 0.2 K/UL (ref 0–0.5)
EOSINOPHIL NFR BLD: 3.1 % (ref 0–8)
ERYTHROCYTE [DISTWIDTH] IN BLOOD BY AUTOMATED COUNT: 12.8 % (ref 11.5–14.5)
EST. GFR  (AFRICAN AMERICAN): >60 ML/MIN/1.73 M^2
EST. GFR  (NON AFRICAN AMERICAN): >60 ML/MIN/1.73 M^2
GLUCOSE SERPL-MCNC: 87 MG/DL (ref 70–110)
HCT VFR BLD AUTO: 43.2 % (ref 37–48.5)
HGB BLD-MCNC: 13.5 G/DL (ref 12–16)
IMM GRANULOCYTES # BLD AUTO: 0.02 K/UL (ref 0–0.04)
IMM GRANULOCYTES NFR BLD AUTO: 0.3 % (ref 0–0.5)
LYMPHOCYTES # BLD AUTO: 1 K/UL (ref 1–4.8)
LYMPHOCYTES NFR BLD: 16.4 % (ref 18–48)
MCH RBC QN AUTO: 29.6 PG (ref 27–31)
MCHC RBC AUTO-ENTMCNC: 31.3 G/DL (ref 32–36)
MCV RBC AUTO: 95 FL (ref 82–98)
MONOCYTES # BLD AUTO: 0.5 K/UL (ref 0.3–1)
MONOCYTES NFR BLD: 8 % (ref 4–15)
NEUTROPHILS # BLD AUTO: 4.4 K/UL (ref 1.8–7.7)
NEUTROPHILS NFR BLD: 71.9 % (ref 38–73)
NRBC BLD-RTO: 0 /100 WBC
PLATELET # BLD AUTO: 185 K/UL (ref 150–450)
PMV BLD AUTO: 11.9 FL (ref 9.2–12.9)
POTASSIUM SERPL-SCNC: 4.3 MMOL/L (ref 3.5–5.1)
PROT SERPL-MCNC: 7 G/DL (ref 6–8.4)
RBC # BLD AUTO: 4.56 M/UL (ref 4–5.4)
SODIUM SERPL-SCNC: 143 MMOL/L (ref 136–145)
WBC # BLD AUTO: 6.1 K/UL (ref 3.9–12.7)

## 2022-03-16 PROCEDURE — 80053 COMPREHEN METABOLIC PANEL: CPT | Performed by: INTERNAL MEDICINE

## 2022-03-16 PROCEDURE — 36415 COLL VENOUS BLD VENIPUNCTURE: CPT | Performed by: INTERNAL MEDICINE

## 2022-03-16 PROCEDURE — 85025 COMPLETE CBC W/AUTO DIFF WBC: CPT | Performed by: INTERNAL MEDICINE

## 2022-03-21 DIAGNOSIS — I15.0 RENOVASCULAR HYPERTENSION: ICD-10-CM

## 2022-03-21 DIAGNOSIS — M34.9 SCLERODERMA: ICD-10-CM

## 2022-03-21 DIAGNOSIS — G47.00 INSOMNIA, UNSPECIFIED TYPE: ICD-10-CM

## 2022-03-21 RX ORDER — TRAZODONE HYDROCHLORIDE 50 MG/1
100 TABLET ORAL NIGHTLY PRN
Qty: 60 TABLET | Refills: 3 | Status: SHIPPED | OUTPATIENT
Start: 2022-03-21 | End: 2022-07-18

## 2022-03-21 RX ORDER — LOSARTAN POTASSIUM 25 MG/1
25 TABLET ORAL DAILY
Qty: 90 TABLET | Refills: 3 | Status: SHIPPED | OUTPATIENT
Start: 2022-03-21 | End: 2022-12-19 | Stop reason: SDUPTHER

## 2022-03-24 NOTE — PROGRESS NOTES
3/28/2022   3/28/2022    Edith Tran  Office Note    No chief complaint on file.      HPI:    3/28/2022 not using symbicort, dose use occ albuterol which ppt headaches??    Feels like stb le-- uses ox more as feels more sob.  [facility].      Uses cpap -- had ahi 6.3 in past.    9/16/2021 - six min walk distance same as October 2020 at 255 m, batteries on poc run out doing errands over 2 h rs..    Not monitoring ox walking.  ues 3lpm pulse.    Uses filter on cpap to get new.   Had 3 vaccines .   On rx and bladder doing better.   Pt not needing rescue,use symbicort.    Ct in 2019, and pft 2020.   Patient Instructions   Would do ct chest and pft and six min walk prior to seeing next rheumatologist, or in 6 months.     Continue opsumit, tadalafil, symbicort, ofev,     Call if needed    Re check 6 months.  3/16/2021 concerned re covid vaccine and immujne suppression.  Breathing stable, has bladder urgency, has cpap mask problems - like f30 airfit.      Patient Instructions     Six min walk today walked 255 meters, and oxygen fell at 3 minutes to 88, and needed 3 lpm oxygen- 3/16/2021      6 min walk study was accomplished October 22, 2020. Baseline room air saturation was 98%. With ambulation O2 sat fell to 87% by 5 min. On 2 L of oxygen patient maintain sat in the low 90s subsequently. Patient walked about 133% of the reference distance   at 255 m.      6 min walk on August 21, 2018 was accomplished.  Patient's baseline O2 sat was 96% on room air.  After walking in her sats fell to 88% at 5 min.  On 2 L of oxygen sat was maintained in the mid 90s.  The patient was able to walk 390 m or 91% of the   reference value.     You may have spastic bladder.  Would recheck urine.  If sign symptoms may need to see urology?       You have and use portable oxygen concentrator. You should try to use more and be more active.      Get vaccine,      Walk studies suggest some loss in function from 2018 to 2020 but  stable from October to March now.      Lung capacity was fairly good October 2020-  Spirometry, lung volume by gas dilution, and diffusion capacity measured October 22, 2020. The FEV1 to FVC ratio was 73% indicating no airflow obstruction measured by spirometry technique. The FEV1 was 81% of predicted at 2 L. Total lung capacity on lung    volume by gas dilution was 72% of predicted and a little low. Diffusion, uncorrected for anemia, was 41% of predicted and also low.   Patient has no obstruction. There is restriction measured. Diffusion is decreased. Clinical correlation recommended           Would do a six min walk prior to return in 6 months.    9/30/2020- uses 02 out of house, had scratchy throat with am cough lately. Uses cpap nightly all night usually 4-7 hrs/night.      Sees Dr Arboleda- Dr Barnes left.    Had been seeing Dr Goddard, had pft and 6 min walk, saw Dr Goddard on 8/3/2020 - told all stable,  Cannot locate pft /walk test in care everywhere.  Usually studied yearly.  Pt feels stable otherwise.  3/27/2019 ct chest viewed with ild/honeycombing.  [place] [facility] [place] continues on ofev and cellcept and cialis and opsumit     Patient Instructions   If antibiotic needed - azithromycin daily for 3 days, last 10, simple antibiotic.     Prednisone 5 mg - may use if cough bad.    Should have result of right heart cath, and august pulm function and six min walk- will ask to obtain.  Will need to make sure can continue opsumit/cialias    Can follow six min walk every 3 months - placed standing.    You need to stay on ofev for pulmonary fibrosis, uses tadalafil and optusmit for pulmonary hypertension.  October 8, 2019- Has PFT and 6 min walk scheduled Nov 2019 by Dr. Barnes Rheumatologist at [place]. Wearing supplemental oxygen at home day/night set at 2L NC, currently on Symbicort daily, states benefit in breathing. No recent prednisone use. SOB controlled. No cough, no chest tightness, no  "wheeze.   Wear cpap nightly, states benefiting greatly but want different mask, tries to read before bed, mask over nasal bridge does not allow glasses to rest correctly.  Patient Instructions   Order sent for different CPAP mask  Continue to use nightly for Obstructive sleep apnea    Continue supplemental oxygen    Will review the results of PFT and 6 min walk from [place] at next appointment.    Continue Symbicort daily.     Use Fela perles and prednisone as needed for cough.     April 3, 2019- Onset 1 week: Cough productive white in color, sore throat, post nasal drip, sinus drainage yellow, flushed cheeks, complaints improving tx with antibiotics no prednisone use. Cough worse when lying down.    Current therapy for scleroderma, pulmonary htn, and pulmonary fibrosis Opsumit/tadafanil and Ofev/cellcept. No diarrhea no complaints.  Currently using Symbicort 1 puff daily. Using supplemental oxygen at 2L [place] for Shortness of breath, states greatly beneficial, pt states able to walk further and keep up with company while on oxygen.      Nov 27, trelegy not covered- not using, had flu shot 8 am with sorenes later day and huge swollen arm with  Pain thhat night.   No cough. On opsumit/tadafanil, and on ofev/cellcept.  Stable. No diarrhea.  Sees pulm htn and  [facility].  Last 6 min walk 02  Angelo 91 slow pace.      Aug 21, uses trelegy, no c/o.  No 0x arranges- sat 90 falls to 87 at 2 mins- walked 307 meters or 71%.  Dx Crohn's.  No abx nor prednisone.  cpap good and sinus good.  Instructions:Would monitor 6 min walk every 3 months.    307 meters was last distance.  Six min walk.  Monitor ct chest yearly in March - sooner if worsens.  Needs 02 for activity.  Use medications for bronchitis.   Stay active.    July11,2018has had raynauld's for yrs, pt had severe mobility problems issues leading to dx slceroderma 2007 - "rock bottom".  Has had swallow sticking with  2-3 dilations with last over 3 yrs ago.  Pt has had pulm " htn on opsumit and talafadil,  Pt also on ofev in a study, and cellcept.  Also low dose prednisone.  Pt dx osas and uses cpap nightly 8 hrs - had used 02 but off 02 for 3 yrs.   Pt had had 6 min walks but none recently- none last yr at least.  Pt gets bronchitis with cough and yellow mucous.  Has occ wheezes.  Had exacerbations in feb and June - typically 2-3 yrly.  Tessalon helps.  Uses combivent occ ppt headaches with some breathing benefit.     Uses flonase regular.  Was on asthma rx for yrs.        The chief compliant  problem is stable  PFSH:  Past Medical History:   Diagnosis Date    Allergy     Arthritis     Back pain     Colon polyps     COPD (chronic obstructive pulmonary disease)     Emphysema of lung     GERD (gastroesophageal reflux disease)     Hypertension     Kidney stones     Obesity     Pneumonia     Pneumonia due to other staphylococcus     Scleroderma involving lung since she was 49 y/o    Sleep apnea     Trouble in sleeping          Past Surgical History:   Procedure Laterality Date    COLONOSCOPY N/A 8/7/2018    Procedure: COLONOSCOPY;  Surgeon: Ngozi Prince MD;  Location: [facility];  Service: Endoscopy;  Laterality: N/A;    COLONOSCOPY N/A 11/21/2019    Procedure: COLONOSCOPY;  Surgeon: Ngozi Prince MD;  Location: [facility];  Service: Endoscopy;  Laterality: N/A;    ESOPHAGEAL MANOMETRY WITH MEASUREMENT OF IMPEDANCE N/A 7/27/2020    Procedure: MANOMETRY, ESOPHAGUS, WITH IMPEDANCE MEASUREMENT;  Surgeon: Bhupendra Temple MD;  Location: [facility] ([address] [place] [address]);  Service: Endoscopy;  Laterality: N/A;  3/10 - pt confirmed apptCovid test ordered for 7/24 in [email or web address]    ESOPHAGOGASTRODUODENOSCOPY N/A 8/7/2018    Procedure: EGD (ESOPHAGOGASTRODUODENOSCOPY);  Surgeon: Ngozi Prince MD;  Location: [facility];  Service: Endoscopy;  Laterality: N/A;    ESOPHAGOGASTRODUODENOSCOPY N/A 11/21/2019    Procedure: EGD (ESOPHAGOGASTRODUODENOSCOPY);  Surgeon: Ngozi Prince MD;   Location: [facility];  Service: Endoscopy;  Laterality: N/A;    ESOPHAGOGASTRODUODENOSCOPY N/A 2020    Procedure: EGD (ESOPHAGOGASTRODUODENOSCOPY);  Surgeon: Ngozi Prince MD;  Location: [facility];  Service: Endoscopy;  Laterality: N/A;    ESOPHAGOGASTRODUODENOSCOPY N/A 2020    Procedure: EGD (ESOPHAGOGASTRODUODENOSCOPY);  Surgeon: Ngozi Prince MD;  Location: [facility];  Service: Endoscopy;  Laterality: N/A;    ESOPHAGOGASTRODUODENOSCOPY N/A 2020    Procedure: EGD (ESOPHAGOGASTRODUODENOSCOPY);  Surgeon: Ngozi Prince MD;  Location: [facility];  Service: Endoscopy;  Laterality: N/A;    ESOPHAGOGASTRODUODENOSCOPY N/A 2021    Procedure: EGD (ESOPHAGOGASTRODUODENOSCOPY);  Surgeon: Ngozi Prince MD;  Location: [facility];  Service: Endoscopy;  Laterality: N/A;     Social History     Tobacco Use    Smoking status: Former Smoker     Packs/day: 0.50     Years: 27.00     Pack years: 13.50     Types: Cigarettes     Start date:      Quit date: 1995     Years since quittin.5    Smokeless tobacco: Never Used   Substance Use Topics    Alcohol use: No    Drug use: No     Family History   Problem Relation Age of Onset    Kidney disease Mother     Cancer Father     Heart disease Brother     Mental illness Son     Glaucoma Neg Hx     Macular degeneration Neg Hx     Retinal detachment Neg Hx      Review of patient's allergies indicates:   Allergen Reactions    Plaquenil [hydroxychloroquine]      Having eye reaction, needs to stop plaquenil and stay off of it.        Performance Status:The patient's activity level is housebound activities.      Review of Systems:  a review of eleven systems covering constitutional, Eye, HEENT, Psych, Respiratory, Cardiac, GI, , Musculoskeletal, Endocrine, Dermatologic was negative except for pertinent findings as listed ABOVE and below:  pertinent positive as above, rest is good       Exam:Comprehensive exam done. /78 (BP  "Location: Left arm, Patient Position: Sitting)   Pulse 81   Resp 12   Ht 5' 6" (1.676 m)   Wt 91.8 kg (202 lb 7.9 oz)   SpO2 98% Comment: on room air at rest  BMI 32.68 kg/m²   Exam included Vitals as listed, and patient's appearance and affect and alertness and mood, oral exam for yeast and hygiene and pharynx lesions and Mallapatti (M) score, neck with inspection for jvd and masses and thyroid abnormalities and lymph nodes (supraclavicular and infraclavicular nodes and axillary also examined and noted if abn), chest exam included symmetry and effort and fremitus and percussion and auscultation, cardiac exam included rhythm and gallops and murmur and rubs and jvd and edema, abdominal exam for mass and hepatosplenomegaly and tenderness and hernias and bowel sounds, Musculoskeletal exam with muscle tone and posture and mobility/gait and  strength, and skin for rashes and cyanosis and pallor and turgor, extremity for clubbing.  Findings were normal except for pertinent findings listed below:M2, bilat rales. No  Edema nor clubbing.       Radiographs (ct chest and cxr) reviewed: view by direct vision  March 2018 ct not too bad with cysts- viewed 8/21/18  CT CHEST WITHOUT CONTRAST 03/27/2019   Severe interstitial fibrosis with peripheral honeycombing and multiple bilateral lower lobe bulla consistent with the history of scleroderma.  This is essentially unchanged from March 21, 2018.  Continued mild mediastinal adenopathy also unchanged.  No acute findings.  Small hiatal hernia.  Stable 2 cm complicated cyst of the left kidney.       Labs  noted  Lab Results   Component Value Date    WBC 6.10 03/16/2022    HGB 13.5 03/16/2022    HCT 43.2 03/16/2022    MCV 95 03/16/2022     03/16/2022        PFT results reviewed   Pulmonary Functions, including spirometry and bronchodilator response and lung volumes and diffusion, study was done May 8, 2018.  Spirometry shows mild obstruction, loss vital capacity and " obstruction and no bronchodilator response.   FEV1 is 65% or 1.68 liters.  Lung volumes show  loss of TLC with restriction 66%.  Diffusion shows reduced but falls within normal range when corrected for lung volumes.   Pulmonary functions show  Mild obstruction and also restriction. Clinical correlation recommended.   Mikal Serrano M.D.    Echo 10/12/2018 Normal left and right ventricular systolic functions with LVEF >55%.    Plan:  Clinical impression is apparently straight forward and impression with management as below.     Diagnoses and all orders for this visit:    Pulmonary hypertension    Interstitial lung disease    Chronic hypoxemic respiratory failure    Scleroderma    Obstructive sleep apnea        Follow up in about 6 months (around 9/28/2022), or if symptoms worsen or fail to improve.    Discussed with patient above for education the following:      Patient Instructions   Need compliance report from cpap?  Ahi was 6.3 yrs ago.  You are compliant.    Ct chest and 6 min walk and pulm functions are stable to sl better.              Edith Tran  Office Note    No chief complaint on file.      HPI:    9/16/2021 - six min walk distance same as October 2020 at 255 m, batteries on poc run out doing errands over 2 h rs..    Not monitoring ox walking.  ues 3lpm pulse.    Uses filter on cpap to get new.   Had 3 vaccines .   On rx and bladder doing better.   Pt not needing rescue,use symbicort.        Ct in 2019, and pft 2020.     Patient Instructions   Would do ct chest and pft and six min walk prior to seeing next rheumatologist, or in 6 months.     Continue opsumit, tadalafil, symbicort, ofev,     Call if needed    Re check 6 months.    3/16/2021 concerned re covid vaccine and immujne suppression.  Breathing stable, has bladder urgency, has cpap mask problems - like f30 airfit.      Patient Instructions     Six min walk today walked 255 meters, and oxygen fell at 3 minutes to 88, and needed 3 lpm oxygen-  3/16/2021      6 min walk study was accomplished October 22, 2020. Baseline room air saturation was 98%. With ambulation O2 sat fell to 87% by 5 min. On 2 L of oxygen patient maintain sat in the low 90s subsequently. Patient walked about 133% of the reference distance   at 255 m.      6 min walk on August 21, 2018 was accomplished.  Patient's baseline O2 sat was 96% on room air.  After walking in her sats fell to 88% at 5 min.  On 2 L of oxygen sat was maintained in the mid 90s.  The patient was able to walk 390 m or 91% of the   reference value.     You may have spastic bladder.  Would recheck urine.  If sign symptoms may need to see urology?       You have and use portable oxygen concentrator. You should try to use more and be more active.      Get vaccine,      Walk studies suggest some loss in function from 2018 to 2020 but stable from October to March now.      Lung capacity was fairly good October 2020-  Spirometry, lung volume by gas dilution, and diffusion capacity measured October 22, 2020. The FEV1 to FVC ratio was 73% indicating no airflow obstruction measured by spirometry technique. The FEV1 was 81% of predicted at 2 L. Total lung capacity on lung    volume by gas dilution was 72% of predicted and a little low. Diffusion, uncorrected for anemia, was 41% of predicted and also low.   Patient has no obstruction. There is restriction measured. Diffusion is decreased. Clinical correlation recommended           Would do a six min walk prior to return in 6 months.    9/30/2020- uses 02 out of house, had scratchy throat with am cough lately. Uses cpap nightly all night usually 4-7 hrs/night.      Sees Dr Arboleda- Dr Barnes left.    Had been seeing Dr Goddard, had pft and 6 min walk, saw Dr Goddard on 8/3/2020 - told all stable,  Cannot locate pft /walk test in care everywhere.  Usually studied yearly.  Pt feels stable otherwise.  3/27/2019 ct chest viewed with ild/honeycombing.  Follows for ofev trial at  [facility] [place] [place].    Pt continues on ofev and cellcept and cialis and opsumit     Patient Instructions   If antibiotic needed - azithromycin daily for 3 days, last 10, simple antibiotic.     Prednisone 5 mg - may use if cough bad.    Should have result of right heart cath, and august pulm function and six min walk- will ask to obtain.  Will need to make sure can continue opsumit/cialias    Can follow six min walk every 3 months - placed standing.    You need to stay on ofev for pulmonary fibrosis, uses tadalafil and optusmit for pulmonary hypertension.  October 8, 2019- Has PFT and 6 min walk scheduled Nov 2019 by Dr. Barnes Rheumatologist at [place]. Wearing supplemental oxygen at home day/night set at 2L NC, currently on Symbicort daily, states benefit in breathing. No recent prednisone use. SOB controlled. No cough, no chest tightness, no wheeze.   Wear cpap nightly, states benefiting greatly but want different mask, tries to read before bed, mask over nasal bridge does not allow glasses to rest correctly.  Patient Instructions   Order sent for different CPAP mask  Continue to use nightly for Obstructive sleep apnea    Continue supplemental oxygen    Will review the results of PFT and 6 min walk from [place] at next appointment.    Continue Symbicort daily.     Use Fela perles and prednisone as needed for cough.     April 3, 2019- Onset 1 week: Cough productive white in color, sore throat, post nasal drip, sinus drainage yellow, flushed cheeks, complaints improving tx with antibiotics no prednisone use. Cough worse when lying down.    Current therapy for scleroderma, pulmonary htn, and pulmonary fibrosis Opsumit/tadafanil and Ofev/cellcept. No diarrhea no complaints.  Currently using Symbicort 1 puff daily. Using supplemental oxygen at 2L NC for Shortness of breath, states greatly beneficial, pt states able to walk further and keep up with company while on oxygen.      Nov 27, trelegy not covered- not using, had flu  "shot 8 am with sorenes later day and huge swollen arm with  Pain thhat night.   No cough. On opsumit/tadafanil, and on ofev/cellcept.  Stable. No diarrhea.  Sees pulm htn and  [facility].  Last 6 min walk 02  Angelo 91 slow pace.      Aug 21, uses trelegy, no c/o.  No 0x arranges- sat 90 falls to 87 at 2 mins- walked 307 meters or 71%.  Dx Crohn's.  No abx nor prednisone.  cpap good and sinus good.  Instructions:Would monitor 6 min walk every 3 months.    307 meters was last distance.  Six min walk.  Monitor ct chest yearly in March - sooner if worsens.  Needs 02 for activity.  Use medications for bronchitis.   Stay active.    July11,2018has had raynauld's for yrs, pt had severe mobility problems issues leading to dx slceroderma 2007 - "rock bottom".  Has had swallow sticking with  2-3 dilations with last over 3 yrs ago.  Pt has had pulm htn on opsumit and talafadil,  Pt also on ofev in a study, and cellcept.  Also low dose prednisone.  Pt dx osas and uses cpap nightly 8 hrs - had used 02 but off 02 for 3 yrs.   Pt had had 6 min walks but none recently- none last yr at least.  Pt gets bronchitis with cough and yellow mucous.  Has occ wheezes.  Had exacerbations in feb and June - typically 2-3 yrly.  Tessalon helps.  Uses combivent occ ppt headaches with some breathing benefit.     Uses flonase regular.  Was on asthma rx for yrs.        The chief compliant  problem is stable  PFSH:  Past Medical History:   Diagnosis Date    Allergy     Arthritis     Back pain     Colon polyps     COPD (chronic obstructive pulmonary disease)     Emphysema of lung     GERD (gastroesophageal reflux disease)     Hypertension     Kidney stones     Obesity     Pneumonia     Pneumonia due to other staphylococcus     Scleroderma involving lung since she was 49 y/o    Sleep apnea     Trouble in sleeping          Past Surgical History:   Procedure Laterality Date    COLONOSCOPY N/A 8/7/2018    Procedure: COLONOSCOPY;  Surgeon: " Ngozi Prince MD;  Location: [facility];  Service: Endoscopy;  Laterality: N/A;    COLONOSCOPY N/A 2019    Procedure: COLONOSCOPY;  Surgeon: Ngozi Prince MD;  Location: [facility];  Service: Endoscopy;  Laterality: N/A;    ESOPHAGEAL MANOMETRY WITH MEASUREMENT OF IMPEDANCE N/A 2020    Procedure: MANOMETRY, ESOPHAGUS, WITH IMPEDANCE MEASUREMENT;  Surgeon: Bhupendra Temple MD;  [facility]  Service: Endoscopy;  Laterality: N/A;  3/10 - pt confirmed apptCovid test ordered for  in [email or web address]    ESOPHAGOGASTRODUODENOSCOPY N/A 2018    Procedure: EGD (ESOPHAGOGASTRODUODENOSCOPY);  Surgeon: Ngozi Prince MD;  Location: [facility];  Service: Endoscopy;  Laterality: N/A;    ESOPHAGOGASTRODUODENOSCOPY N/A 2019    Procedure: EGD (ESOPHAGOGASTRODUODENOSCOPY);  Surgeon: Ngozi Prince MD;  Location: [facility];  Service: Endoscopy;  Laterality: N/A;    ESOPHAGOGASTRODUODENOSCOPY N/A 2020    Procedure: EGD (ESOPHAGOGASTRODUODENOSCOPY);  Surgeon: Ngozi Prince MD;  Location: [facility];  Service: Endoscopy;  Laterality: N/A;    ESOPHAGOGASTRODUODENOSCOPY N/A 2020    Procedure: EGD (ESOPHAGOGASTRODUODENOSCOPY);  Surgeon: Ngozi Prince MD;  Location: [facility];  Service: Endoscopy;  Laterality: N/A;    ESOPHAGOGASTRODUODENOSCOPY N/A 2020    Procedure: EGD (ESOPHAGOGASTRODUODENOSCOPY);  Surgeon: Ngozi Prince MD;  Location: [facility];  Service: Endoscopy;  Laterality: N/A;    ESOPHAGOGASTRODUODENOSCOPY N/A 2021    Procedure: EGD (ESOPHAGOGASTRODUODENOSCOPY);  Surgeon: Ngozi Prince MD;  Location: [facility];  Service: Endoscopy;  Laterality: N/A;     Social History     Tobacco Use    Smoking status: Former Smoker     Packs/day: 0.50     Years: 27.00     Pack years: 13.50     Types: Cigarettes     Start date:      Quit date: 1995     Years since quittin.5    Smokeless tobacco: Never Used   Substance Use Topics    Alcohol  "use: No    Drug use: No     Family History   Problem Relation Age of Onset    Kidney disease Mother     Cancer Father     Heart disease Brother     Mental illness Son     Glaucoma Neg Hx     Macular degeneration Neg Hx     Retinal detachment Neg Hx      Review of patient's allergies indicates:   Allergen Reactions    Plaquenil [hydroxychloroquine]      Having eye reaction, needs to stop plaquenil and stay off of it.        Performance Status:The patient's activity level is housebound activities.      Review of Systems:  a review of eleven systems covering constitutional, Eye, HEENT, Psych, Respiratory, Cardiac, GI, , Musculoskeletal, Endocrine, Dermatologic was negative except for pertinent findings as listed ABOVE and below:  pertinent positive as above, rest is good       Exam:Comprehensive exam done. /78 (BP Location: Left arm, Patient Position: Sitting)   Pulse 81   Resp 12   Ht 5' 6" (1.676 m)   Wt 91.8 kg (202 lb 7.9 oz)   SpO2 98% Comment: on room air at rest  BMI 32.68 kg/m²   Exam included Vitals as listed, and patient's appearance and affect and alertness and mood, oral exam for yeast and hygiene and pharynx lesions and Mallapatti (M) score, neck with inspection for jvd and masses and thyroid abnormalities and lymph nodes (supraclavicular and infraclavicular nodes and axillary also examined and noted if abn), chest exam included symmetry and effort and fremitus and percussion and auscultation, cardiac exam included rhythm and gallops and murmur and rubs and jvd and edema, abdominal exam for mass and hepatosplenomegaly and tenderness and hernias and bowel sounds, Musculoskeletal exam with muscle tone and posture and mobility/gait and  strength, and skin for rashes and cyanosis and pallor and turgor, extremity for clubbing.  Findings were normal except for pertinent findings listed below:M2, bilat rales. No  Edema nor clubbing.       Radiographs (ct chest and cxr) reviewed: view by " direct vision  March 2018 ct not too bad with cysts- viewed 8/21/18  CT CHEST WITHOUT CONTRAST 03/27/2019   Severe interstitial fibrosis with peripheral honeycombing and multiple bilateral lower lobe bulla consistent with the history of scleroderma.  This is essentially unchanged from March 21, 2018.  Continued mild mediastinal adenopathy also unchanged.  No acute findings.  Small hiatal hernia.  Stable 2 cm complicated cyst of the left kidney.       Labs  noted  Lab Results   Component Value Date    WBC 6.10 03/16/2022    HGB 13.5 03/16/2022    HCT 43.2 03/16/2022    MCV 95 03/16/2022     03/16/2022        PFT results reviewed     February 10, 2022-patient dropped out pulmonary functions from February 7, 2022. Forced vital capacity was 69 percent predicted.  There was no airflow obstruction.  Total lung capacity was 72 percent of predicted.  Diffusion was down to 31 percent predicted.  Patient had a 6 minute walk also.  Patient walked about 359 meters in 6 minutes.  She was 97 percent on room air.  The low O2 sat was 89 percent while walking.  \  \  Oct 27/2020 Spirometry, lung volume by gas dilution, and diffusion capacity measured October 22, 2020. The FEV1 to FVC ratio was 73% indicating no airflow obstruction measured by spirometry technique. The FEV1 was 81% of predicted at 2 L. fvc was 86% . Total lung capacity on lung    volume by gas dilution was 72% of predicted and a little low. Diffusion, uncorrected for anemia, was 41% of predicted and also low.   Patient has no obstruction. There is restriction measured. Diffusion is decreased. Clinical correlation recommended       5/8/2018 Pulmonary Functions, including spirometry and bronchodilator response and lung volumes and diffusion, study was done May 8, 2018.  Spirometry shows mild obstruction, loss vital capacity and obstruction and no bronchodilator response.   FEV1 is 65% or 1.68 liters.  Lung volumes show  loss of TLC with restriction  66%.  Diffusion shows reduced but falls within normal range when corrected for lung volumes.   Pulmonary functions show  Mild obstruction and also restriction. Clinical correlation recommended.     Mikal Serrano M.D.      Study was done at [facility] 10/12/2018 Normal left and right ventricular systolic functions with LVEF >55%.            6 minute walk study was accomplished February 17, 2022.  Patient walked about 359 meters in 6 minutes.  She was 97 percent on room air.  The low O2 sat was 89 percent while walking.    6 minute walk study was accomplished September 15, 2021. Baseline room air saturation was 98%. With ambulation O2 sat fell to 87% by 2 minutes. Patient subsequently 2 L and then 3 L of oxygen to maintain sat in the low 90s. At the end of 6 minutes   walking on oxygen at 3 liters/minute the O2 saturation was 96%. Patient walked about 60% of the reference distance of 255 m.   6 min walk study was accomplished October 22, 2020. Baseline room air saturation was 98%. With ambulation O2 sat fell to 87% by 5 min. On 2 L of oxygen patient maintain sat in the low 90s subsequently. Patient walked about 133% of the reference distance   at 255 m.   6 min walk study was accomplished November 21, 2018.  Baseline room air saturation was 98%.  Walking on room air O2 sat did fall down to 91%.  Patient did walk 317 m or 73% of the reference distance    Plan:  Clinical impression is apparently straight forward and impression with management as below.     Diagnoses and all orders for this visit:    Pulmonary hypertension    Interstitial lung disease    Chronic hypoxemic respiratory failure    Scleroderma    Obstructive sleep apnea        Follow up in about 6 months (around 9/28/2022), or if symptoms worsen or fail to improve.    Discussed with patient above for education the following:      Patient Instructions   Need compliance report from cpap?  Ahi was 6.3 yrs ago.  You are  compliant.    Ct chest and 6 min walk and pulm functions are stable to sl better.

## 2022-03-28 ENCOUNTER — OFFICE VISIT (OUTPATIENT)
Dept: PULMONOLOGY | Facility: CLINIC | Age: 67
End: 2022-03-28
Payer: MEDICARE

## 2022-03-28 VITALS
RESPIRATION RATE: 12 BRPM | BODY MASS INDEX: 32.54 KG/M2 | HEIGHT: 66 IN | DIASTOLIC BLOOD PRESSURE: 78 MMHG | HEART RATE: 81 BPM | OXYGEN SATURATION: 98 % | WEIGHT: 202.5 LBS | SYSTOLIC BLOOD PRESSURE: 120 MMHG

## 2022-03-28 DIAGNOSIS — J84.9 INTERSTITIAL LUNG DISEASE: ICD-10-CM

## 2022-03-28 DIAGNOSIS — G47.33 OBSTRUCTIVE SLEEP APNEA: ICD-10-CM

## 2022-03-28 DIAGNOSIS — J96.11 CHRONIC HYPOXEMIC RESPIRATORY FAILURE: ICD-10-CM

## 2022-03-28 DIAGNOSIS — M34.9 SCLERODERMA: ICD-10-CM

## 2022-03-28 DIAGNOSIS — I27.20 PULMONARY HYPERTENSION: Primary | ICD-10-CM

## 2022-03-28 PROCEDURE — 99999 PR PBB SHADOW E&M-EST. PATIENT-LVL IV: ICD-10-PCS | Mod: PBBFAC,,, | Performed by: INTERNAL MEDICINE

## 2022-03-28 PROCEDURE — 99214 OFFICE O/P EST MOD 30 MIN: CPT | Mod: PBBFAC,PO | Performed by: INTERNAL MEDICINE

## 2022-03-28 PROCEDURE — 99213 OFFICE O/P EST LOW 20 MIN: CPT | Mod: S$PBB,,, | Performed by: INTERNAL MEDICINE

## 2022-03-28 PROCEDURE — 99999 PR PBB SHADOW E&M-EST. PATIENT-LVL IV: CPT | Mod: PBBFAC,,, | Performed by: INTERNAL MEDICINE

## 2022-03-28 PROCEDURE — 99213 PR OFFICE/OUTPT VISIT, EST, LEVL III, 20-29 MIN: ICD-10-PCS | Mod: S$PBB,,, | Performed by: INTERNAL MEDICINE

## 2022-03-28 NOTE — PATIENT INSTRUCTIONS
Need compliance report from cpap?  Ahi was 6.3 yrs ago.  You are compliant.    Ct chest and 6 min walk and pulm functions are stable to sl better.

## 2022-03-31 ENCOUNTER — EXTERNAL CHRONIC CARE MANAGEMENT (OUTPATIENT)
Dept: PRIMARY CARE CLINIC | Facility: CLINIC | Age: 67
End: 2022-03-31
Payer: MEDICARE

## 2022-03-31 ENCOUNTER — OFFICE VISIT (OUTPATIENT)
Dept: FAMILY MEDICINE | Facility: CLINIC | Age: 67
End: 2022-03-31
Payer: MEDICARE

## 2022-03-31 VITALS
HEART RATE: 94 BPM | SYSTOLIC BLOOD PRESSURE: 115 MMHG | TEMPERATURE: 97 F | BODY MASS INDEX: 32.03 KG/M2 | OXYGEN SATURATION: 96 % | HEIGHT: 66 IN | WEIGHT: 199.31 LBS | DIASTOLIC BLOOD PRESSURE: 77 MMHG

## 2022-03-31 DIAGNOSIS — Z12.31 BREAST CANCER SCREENING BY MAMMOGRAM: Primary | ICD-10-CM

## 2022-03-31 DIAGNOSIS — I27.20 PULMONARY HTN: ICD-10-CM

## 2022-03-31 PROCEDURE — 99213 OFFICE O/P EST LOW 20 MIN: CPT | Mod: S$GLB,,, | Performed by: FAMILY MEDICINE

## 2022-03-31 PROCEDURE — 99213 PR OFFICE/OUTPT VISIT, EST, LEVL III, 20-29 MIN: ICD-10-PCS | Mod: S$GLB,,, | Performed by: FAMILY MEDICINE

## 2022-03-31 RX ORDER — TADALAFIL 20 MG/1
TABLET ORAL
Qty: 60 TABLET | Refills: 11 | Status: SHIPPED | OUTPATIENT
Start: 2022-03-31 | End: 2023-08-09

## 2022-03-31 NOTE — PATIENT INSTRUCTIONS
Edi Sharma,     If you are due for any health screening(s) below please notify me so we can arrange them to be ordered and scheduled to maintain your health. Most healthy patients complete it.     Health Maintenance   Topic Date Due    Mammogram  01/27/2022    DEXA Scan  03/11/2024    Lipid Panel  12/16/2025    TETANUS VACCINE  07/21/2030    Hepatitis C Screening  Completed       Breast Cancer Screening    Breast cancer is the second most common cancer in women after skin cancer, and the second leading cause of death from cancer after lung cancer. Mammograms can detect breast cancer early, which significantly increases the chances of curing the cancer.      A screening mammogram is an x-ray image of the breasts used for early breast cancer detection. It can help reduce the number of deaths from breast cancer among women. To get a clear image, the breast is placed between two plastic plates to make it flat. How often a mammogram is needed depends on your age and your breast cancer risk.    Although you are still overdue for this important screening, due to the COVID-19 pandemic, we recommend scheduling it in the near future.              March 31, 2022       Edith Tran  [address]  [facility] [address]           Dear Edith:    Your [facility] care is dedicated to helping you stay healthy with regular scheduled recommended screenings.  Scheduling routine screenings is important to maintaining good health. Our records indicate that you may be overdue for your screening pap smear.  Pap smear screening can help identify patients at risk for developing cervical cancer at an early stage, when it is most likely to be successfully treated.    The current recommendation for Pap smear screening is every 3-5 years.  We encourage you to schedule your appointment with your womens health provider.  Many women see a Gynecologist for this screening but some primary care providers also provide Pap screening  If  you recently had your pap smear screening performed outside of [facility], please let your Health care team know so that they can update your health record.      If you have questions or want to schedule your screening elsewhere, please call [phone] to speak to a [facility] coordinator.    Sincerely,    Your [facility] Care Team

## 2022-03-31 NOTE — PROGRESS NOTES
Subjective:       Patient ID: Edith Tran is a 67 y.o. female.    Chief Complaint: Establish Care, Hypertension (Taking her medication as directed w/o any problems or concerns ), COPD (Stable at present time), and Gastroesophageal Reflux (Controlled with her current medication)    Patient is a 67 year old female with history of copd, pulmonary hypertension, raynaud's disease, gastroesophageal reflux disease, esophageal structure, diverticulosis and colon polyps   presenting for routine health maintenance and to establish care, patient endorses compliance with medication regimen and denies any acute symptoms today.           Current Outpatient Medications:     albuterol (PROVENTIL/VENTOLIN HFA) 90 mcg/actuation inhaler, 2 puffs every 4 hours as needed for cough, wheeze, or shortness of breath, Disp: 8 g, Rfl: 18    ergocalciferol (ERGOCALCIFEROL) 50,000 unit Cap, 1 po twice a wk, Disp: 8 capsule, Rfl: 11    fluticasone propionate (FLONASE) 50 mcg/actuation nasal spray, 1 spray (50 mcg total) by Each Nostril route once daily., Disp: 16 mL, Rfl: 11    gabapentin (NEURONTIN) 100 MG capsule, Take 2 capsules (200 mg total) by mouth 3 (three) times daily. (Patient taking differently: Take 200 mg by mouth once.), Disp: 180 capsule, Rfl: 2    losartan (COZAAR) 25 MG tablet, Take 1 tablet (25 mg total) by mouth once daily., Disp: 90 tablet, Rfl: 3    macitentan 10 mg Tab, Take 10 mg by mouth once daily., Disp: , Rfl:     magnesium oxide (MAG-OX) 400 mg (241.3 mg magnesium) tablet, Take 1 tablet (400 mg total) by mouth once daily., Disp: 90 tablet, Rfl: 1    metoclopramide HCl (REGLAN) 5 MG tablet, Take 1 tablet (5 mg total) by mouth 2 (two) times daily as needed (nausea/vomiting)., Disp: 45 tablet, Rfl: 3    montelukast (SINGULAIR) 10 mg tablet, Take 1 tablet (10 mg total) by mouth every evening., Disp: 30 tablet, Rfl: 11    mycophenolate (CELLCEPT) 500 mg Tab, Take 3 tablets (1,500 mg total) by mouth 2 (two)  times daily., Disp: 520 tablet, Rfl: 1    nintedanib 150 mg Cap, Take 150 mg by mouth every 12 (twelve) hours., Disp: , Rfl:     ondansetron (ZOFRAN) 8 MG tablet, Take 1 tablet (8 mg total) by mouth every 8 (eight) hours as needed for Nausea., Disp: 30 tablet, Rfl: 0    pantoprazole (PROTONIX) 40 MG tablet, Take 1 tablet (40 mg total) by mouth 2 (two) times a day., Disp: 180 tablet, Rfl: 3    potassium bicarbonate disintegrating tablet, Take 1 tablet (10 mEq total) by mouth once daily., Disp: 90 tablet, Rfl: 1    tiZANidine (ZANAFLEX) 2 MG tablet, TAKE 1 TABLET(2 MG) BY MOUTH EVERY 8 HOURS AS NEEDED FOR MUSCLE SPASM, Disp: 90 tablet, Rfl: 2    traZODone (DESYREL) 50 MG tablet, Take 2 tablets (100 mg total) by mouth nightly as needed. AS NEEDED FOR INSOMNIA, Disp: 60 tablet, Rfl: 3    mirabegron (MYRBETRIQ) 50 mg Tb24, Take 1 tablet (50 mg total) by mouth once daily., Disp: 90 tablet, Rfl: 3    tadalafiL (CIALIS) 20 MG Tab, TAKE ONE TABLET BY MOUTH TWICE A DAY, Disp: 60 tablet, Rfl: 11    Review of patient's allergies indicates:   Allergen Reactions    Hydroxychloroquine Other (See Comments)     Having eye reaction, needs to stop plaquenil and stay off of it.        Past Medical History:   Diagnosis Date    Allergy     Arthritis     Back pain     Colon polyps     COPD (chronic obstructive pulmonary disease)     Emphysema of lung     GERD (gastroesophageal reflux disease)     Hypertension     Kidney stones     Obesity     Pneumonia     Pneumonia due to other staphylococcus     Scleroderma involving lung since she was 47 y/o    Sleep apnea     Trouble in sleeping        Past Surgical History:   Procedure Laterality Date    COLONOSCOPY N/A 8/7/2018    Procedure: COLONOSCOPY;  Surgeon: Ngozi Prince MD;  Location: [facility];  Service: Endoscopy;  Laterality: N/A;    COLONOSCOPY N/A 11/21/2019    Procedure: COLONOSCOPY;  Surgeon: Ngozi Prince MD;  Location: [facility];  Service:  Endoscopy;  Laterality: N/A;    ESOPHAGEAL MANOMETRY WITH MEASUREMENT OF IMPEDANCE N/A 2020    Procedure: MANOMETRY, ESOPHAGUS, WITH IMPEDANCE MEASUREMENT;  Surgeon: Bhupendra Temple MD;  Location: [facility] ([address] [place] [address]);  Service: Endoscopy;  Laterality: N/A;  3/10 - pt confirmed apptCovid test ordered for  in [place].[facility]    ESOPHAGOGASTRODUODENOSCOPY N/A 2018    Procedure: EGD (ESOPHAGOGASTRODUODENOSCOPY);  Surgeon: Ngozi Prince MD;  Location: [facility];  Service: Endoscopy;  Laterality: N/A;    ESOPHAGOGASTRODUODENOSCOPY N/A 2019    Procedure: EGD (ESOPHAGOGASTRODUODENOSCOPY);  Surgeon: Ngozi Prince MD;  Location: [facility];  Service: Endoscopy;  Laterality: N/A;    ESOPHAGOGASTRODUODENOSCOPY N/A 2020    Procedure: EGD (ESOPHAGOGASTRODUODENOSCOPY);  Surgeon: Ngozi Prince MD;  Location: [facility];  Service: Endoscopy;  Laterality: N/A;    ESOPHAGOGASTRODUODENOSCOPY N/A 2020    Procedure: EGD (ESOPHAGOGASTRODUODENOSCOPY);  Surgeon: Ngozi Prince MD;  Location: [facility];  Service: Endoscopy;  Laterality: N/A;    ESOPHAGOGASTRODUODENOSCOPY N/A 2020    Procedure: EGD (ESOPHAGOGASTRODUODENOSCOPY);  Surgeon: Ngozi Prince MD;  Location: [facility];  Service: Endoscopy;  Laterality: N/A;    ESOPHAGOGASTRODUODENOSCOPY N/A 2021    Procedure: EGD (ESOPHAGOGASTRODUODENOSCOPY);  Surgeon: Ngozi Prince MD;  Location: [facility];  Service: Endoscopy;  Laterality: N/A;       Family History   Problem Relation Age of Onset    Kidney disease Mother     Cancer Father     Heart disease Brother     Mental illness Son     Glaucoma Neg Hx     Macular degeneration Neg Hx     Retinal detachment Neg Hx        Social History     Tobacco Use    Smoking status: Former Smoker     Packs/day: 0.50     Years: 27.00     Pack years: 13.50     Types: Cigarettes     Start date:      Quit date: 1995     Years since quittin.5    Smokeless tobacco: Never Used  "  Substance Use Topics    Alcohol use: No    Drug use: No       Review of Systems   Constitutional: Negative for activity change, appetite change, chills, diaphoresis, fatigue, fever and unexpected weight change.   HENT: Negative for hearing loss, postnasal drip, rhinorrhea, sinus pressure/congestion, sore throat and trouble swallowing.    Eyes: Negative for visual disturbance.   Respiratory: Negative for apnea, chest tightness, shortness of breath and wheezing.    Cardiovascular: Negative for chest pain.   Gastrointestinal: Negative for abdominal pain, blood in stool, change in bowel habit, constipation, diarrhea, nausea, vomiting and change in bowel habit.   Endocrine: Negative for polydipsia and polyphagia.   Genitourinary: Negative for dysuria, enuresis, flank pain, frequency and urgency.   Integumentary:  Negative for rash and mole/lesion.   Neurological: Negative for dizziness, light-headedness, headaches and memory loss.   Psychiatric/Behavioral: Negative for confusion, decreased concentration, sleep disturbance and suicidal ideas. The patient is not nervous/anxious.            Objective:      Vitals:    03/31/22 0846   BP: 115/77   BP Location: Right arm   Patient Position: Sitting   BP Method: Large (Automatic)   Pulse: 94   Temp: 97.3 °F (36.3 °C)   TempSrc: Temporal   SpO2: 96%   Weight: 90.4 kg (199 lb 4.7 oz)   Height: 5' 6" (1.676 m)     Physical Exam  Constitutional:       General: She is not in acute distress.     Appearance: She is normal weight. She is not ill-appearing, toxic-appearing or diaphoretic.   HENT:      Head: Normocephalic and atraumatic.      Right Ear: External ear normal.      Left Ear: External ear normal.      Nose: Nose normal. No congestion.      Mouth/Throat:      Mouth: Mucous membranes are moist.      Pharynx: Oropharynx is clear. No oropharyngeal exudate or posterior oropharyngeal erythema.   Eyes:      General: No scleral icterus.     Extraocular Movements: Extraocular " movements intact.      Conjunctiva/sclera: Conjunctivae normal.   Cardiovascular:      Rate and Rhythm: Normal rate and regular rhythm.      Pulses: Normal pulses.      Heart sounds: Normal heart sounds. No murmur heard.    No friction rub. No gallop.   Pulmonary:      Effort: Pulmonary effort is normal. No respiratory distress.      Breath sounds: Normal breath sounds. No stridor. No wheezing, rhonchi or rales.   Chest:      Chest wall: No tenderness.   Abdominal:      General: Abdomen is flat. Bowel sounds are normal. There is no distension.      Palpations: Abdomen is soft. There is no mass.      Tenderness: There is no abdominal tenderness. There is no guarding or rebound.      Hernia: No hernia is present.   Musculoskeletal:      Cervical back: Normal range of motion.      Right lower leg: No edema.      Left lower leg: No edema.   Skin:     Coloration: Skin is not jaundiced or pale.      Findings: No bruising, erythema or rash.   Neurological:      General: No focal deficit present.      Mental Status: She is alert and oriented to person, place, and time. Mental status is at baseline.   Psychiatric:         Mood and Affect: Mood normal.         Behavior: Behavior normal.         Thought Content: Thought content normal.         Judgment: Judgment normal.           Assessment:       1. Breast cancer screening by mammogram    2. Pulmonary HTN        Plan:           Breast cancer screening by mammogram  -     Mammo Digital Screening Bilat; Future; Expected date: 03/31/2022    Pulmonary HTN  -     tadalafiL (CIALIS) 20 MG Tab; TAKE ONE TABLET BY MOUTH TWICE A DAY  Dispense: 60 tablet; Refill: 11        Follow up in about 6 months (around 9/30/2022).    Jas Bazzi MD           DISCLAIMER: This note was prepared with [employer] voice recognition transcription software. Garbled syntax, mangled pronouns, and other bizarre constructions may be attributed to that software system.

## 2022-04-12 ENCOUNTER — PATIENT MESSAGE (OUTPATIENT)
Dept: ADMINISTRATIVE | Facility: OTHER | Age: 67
End: 2022-04-12
Payer: MEDICARE

## 2022-04-30 ENCOUNTER — EXTERNAL CHRONIC CARE MANAGEMENT (OUTPATIENT)
Dept: PRIMARY CARE CLINIC | Facility: CLINIC | Age: 67
End: 2022-04-30
Payer: MEDICARE

## 2022-05-05 ENCOUNTER — OFFICE VISIT (OUTPATIENT)
Dept: OPTOMETRY | Facility: CLINIC | Age: 67
End: 2022-05-05
Payer: MEDICARE

## 2022-05-05 DIAGNOSIS — H25.13 NUCLEAR SCLEROSIS, BILATERAL: Primary | ICD-10-CM

## 2022-05-05 DIAGNOSIS — H04.123 DRY EYE SYNDROME, BILATERAL: ICD-10-CM

## 2022-05-05 DIAGNOSIS — H00.15 CHALAZION LEFT LOWER EYELID: ICD-10-CM

## 2022-05-05 DIAGNOSIS — H52.7 REFRACTIVE ERROR: ICD-10-CM

## 2022-05-05 DIAGNOSIS — H31.003 CHORIORETINAL SCAR OF BOTH EYES: ICD-10-CM

## 2022-05-05 PROCEDURE — 92014 PR EYE EXAM, EST PATIENT,COMPREHESV: ICD-10-PCS | Mod: S$PBB,,, | Performed by: OPTOMETRIST

## 2022-05-05 PROCEDURE — 99999 PR PBB SHADOW E&M-EST. PATIENT-LVL III: CPT | Mod: PBBFAC,,, | Performed by: OPTOMETRIST

## 2022-05-05 PROCEDURE — 92015 DETERMINE REFRACTIVE STATE: CPT | Mod: ,,, | Performed by: OPTOMETRIST

## 2022-05-05 PROCEDURE — 99999 PR PBB SHADOW E&M-EST. PATIENT-LVL III: ICD-10-PCS | Mod: PBBFAC,,, | Performed by: OPTOMETRIST

## 2022-05-05 PROCEDURE — 92014 COMPRE OPH EXAM EST PT 1/>: CPT | Mod: S$PBB,,, | Performed by: OPTOMETRIST

## 2022-05-05 PROCEDURE — 92015 PR REFRACTION: ICD-10-PCS | Mod: ,,, | Performed by: OPTOMETRIST

## 2022-05-05 PROCEDURE — 99213 OFFICE O/P EST LOW 20 MIN: CPT | Mod: PBBFAC,PO | Performed by: OPTOMETRIST

## 2022-05-05 NOTE — PROGRESS NOTES
HPI     Annual Exam      Additional comments: LDE: 10/2020              Comments     66 YO female presents today for an annual eye exam. Patient states that   she is noticing more trouble with distance vision even with glasses on.   Finds that it is harder to see road signs when driving. Denies any eye   pain.     Gel drops OU QHS           Last edited by Ashley Lord, PCT on 5/5/2022  8:49 AM. (History)            Assessment /Plan     For exam results, see Encounter Report.    Nuclear sclerosis, bilateral    Refractive error    Chorioretinal scar of both eyes    Chalazion left lower eyelid    Dry eye syndrome, bilateral      1. Nuclear sclerosis, bilateral  Mild to moderate, not yet significant. Discussed possible ocular affects of cataracts. Acceptable BCVA OU. Discussed treatment options. Surgery not recommended at this time. Monitor yearly.     2. Refractive error  Discussed refraction fee. Dispensed updated spectacle Rx. Discussed various spectacle lens options. Discussed adaptation period to new specs.   Demonstrated new spec Rx vs current specs in phoropter with patient satisfaction    3. Chorioretinal scar of both eyes  Stable from previous, no holes, tears, breaks, rd. RTC immediately if any flashes, floaters, decreased vision, veiling of vision, or trauma occurs.    4. Chalazion left lower eyelid  LLL x 1 month. Recommend warm compresses with gentle massage qid x 10 minutes each session. Return if worsening or no resolution.     5. Dry eye syndrome, bilateral  Discussed ocular affects of dry eyes. Recommend OTC artificial tears 2 times a day in both eyes. Continue otc gel drop at bedtime. Discussed chronicity of MELISA. RTC if symptoms not alleviated by continued use of artificial tears.       RTC in 1 year for comprehensive eye exam, or sooner prn.

## 2022-05-09 ENCOUNTER — TELEPHONE (OUTPATIENT)
Dept: ADMINISTRATIVE | Facility: CLINIC | Age: 67
End: 2022-05-09
Payer: MEDICARE

## 2022-05-10 ENCOUNTER — OFFICE VISIT (OUTPATIENT)
Dept: FAMILY MEDICINE | Facility: CLINIC | Age: 67
End: 2022-05-10
Payer: MEDICARE

## 2022-05-10 ENCOUNTER — PATIENT MESSAGE (OUTPATIENT)
Dept: PULMONOLOGY | Facility: CLINIC | Age: 67
End: 2022-05-10
Payer: MEDICARE

## 2022-05-10 VITALS
TEMPERATURE: 98 F | HEIGHT: 66 IN | DIASTOLIC BLOOD PRESSURE: 76 MMHG | SYSTOLIC BLOOD PRESSURE: 122 MMHG | BODY MASS INDEX: 31.85 KG/M2 | OXYGEN SATURATION: 98 % | WEIGHT: 198.19 LBS | HEART RATE: 85 BPM

## 2022-05-10 DIAGNOSIS — I27.20 PULMONARY HYPERTENSION: ICD-10-CM

## 2022-05-10 DIAGNOSIS — J44.9 COPD MIXED TYPE: ICD-10-CM

## 2022-05-10 DIAGNOSIS — Z79.899 IMMUNOSUPPRESSION DUE TO DRUG THERAPY: ICD-10-CM

## 2022-05-10 DIAGNOSIS — J84.9 INTERSTITIAL LUNG DISEASE: Primary | ICD-10-CM

## 2022-05-10 DIAGNOSIS — M34.9 SCLERODERMA: ICD-10-CM

## 2022-05-10 DIAGNOSIS — D84.821 IMMUNOSUPPRESSION DUE TO DRUG THERAPY: ICD-10-CM

## 2022-05-10 DIAGNOSIS — K50.00 CROHN'S DISEASE OF SMALL INTESTINE WITHOUT COMPLICATION: ICD-10-CM

## 2022-05-10 DIAGNOSIS — Z00.00 ENCOUNTER FOR PREVENTIVE HEALTH EXAMINATION: Primary | ICD-10-CM

## 2022-05-10 DIAGNOSIS — Z99.81 DEPENDENCE ON SUPPLEMENTAL OXYGEN: ICD-10-CM

## 2022-05-10 DIAGNOSIS — R09.89 CHRONIC SINUS COMPLAINTS: ICD-10-CM

## 2022-05-10 DIAGNOSIS — R26.9 ABNORMALITY OF GAIT AND MOBILITY: ICD-10-CM

## 2022-05-10 DIAGNOSIS — I71.21 THORACIC ASCENDING AORTIC ANEURYSM: ICD-10-CM

## 2022-05-10 PROBLEM — D69.6 THROMBOCYTOPENIA: Status: RESOLVED | Noted: 2021-03-31 | Resolved: 2022-05-10

## 2022-05-10 PROCEDURE — 99215 OFFICE O/P EST HI 40 MIN: CPT | Mod: PBBFAC,PO | Performed by: NURSE PRACTITIONER

## 2022-05-10 PROCEDURE — G0439 PR MEDICARE ANNUAL WELLNESS SUBSEQUENT VISIT: ICD-10-PCS | Mod: ,,, | Performed by: NURSE PRACTITIONER

## 2022-05-10 PROCEDURE — 99999 PR PBB SHADOW E&M-EST. PATIENT-LVL V: CPT | Mod: PBBFAC,,, | Performed by: NURSE PRACTITIONER

## 2022-05-10 PROCEDURE — 99999 PR PBB SHADOW E&M-EST. PATIENT-LVL V: ICD-10-PCS | Mod: PBBFAC,,, | Performed by: NURSE PRACTITIONER

## 2022-05-10 PROCEDURE — G0439 PPPS, SUBSEQ VISIT: HCPCS | Mod: ,,, | Performed by: NURSE PRACTITIONER

## 2022-05-10 RX ORDER — AZITHROMYCIN 500 MG/1
TABLET, FILM COATED ORAL
Qty: 3 TABLET | Refills: 3 | Status: SHIPPED | OUTPATIENT
Start: 2022-05-10 | End: 2022-09-19

## 2022-05-10 RX ORDER — PREDNISONE 5 MG/1
TABLET ORAL
Qty: 42 TABLET | Refills: 1 | Status: SHIPPED | OUTPATIENT
Start: 2022-05-10 | End: 2022-09-19

## 2022-05-10 NOTE — PATIENT INSTRUCTIONS
Counseling and Referral of Other Preventative  (Italic type indicates deductible and co-insurance are waived)    Patient Name: Edith Tran  Today's Date: 5/10/2022    Health Maintenance       Date Due Completion Date    Mammogram 01/27/2022 1/27/2021    COVID-19 Vaccine (5 - Booster for Pfizer series) 06/10/2022 2/10/2022    Cervical Cancer Screening 10/11/2023 10/11/2018    DEXA Scan 03/11/2024 3/11/2021    Pneumococcal Vaccines (Age 65+) (2 - PPSV23 or PCV20) 11/04/2024 11/4/2019    Colorectal Cancer Screening 11/21/2024 11/21/2019    Lipid Panel 12/16/2025 12/16/2020    TETANUS VACCINE 07/21/2030 7/21/2020        No orders of the defined types were placed in this encounter.    The following information is provided to all patients.  This information is to help you find resources for any of the problems found today that may be affecting your health:                Living healthy guide: www.[facility].[place].gov      Understanding Diabetes: www.diabetes.org      Eating healthy: www.cdc.gov/healthyweight      CDC home safety checklist: www.cdc.gov/steadi/patient.html      Agency on Aging: www.goea.[place].gov      Alcoholics anonymous (AA): www.aa.org      Physical Activity: www.jean claude.nih.gov/rl5jvtm      Tobacco use: www.quitwithusla.org

## 2022-05-10 NOTE — TELEPHONE ENCOUNTER
Patient is asking for medication to be refilled. She staets she is having a flare.  She was last seen on 3/28/22.  Has follow up soon.

## 2022-05-10 NOTE — PROGRESS NOTES
"  Edith Tran presented for a  Medicare AWV and comprehensive Health Risk Assessment today. The following components were reviewed and updated:    · Medical history  · Family History  · Social history  · Allergies and Current Medications  · Health Risk Assessment  · Health Maintenance  · Care Team         ** See Completed Assessments for Annual Wellness Visit within the encounter summary.**         The following assessments were completed:  · Living Situation  · CAGE  · Depression Screening  · Timed Get Up and Go  · Whisper Test  · Cognitive Function Screening  · Nutrition Screening  · ADL Screening  · PAQ Screening            Vitals:    05/10/22 0821   BP: 122/76   Pulse: 85   Temp: 98.2 °F (36.8 °C)   SpO2: 98%   Weight: 89.9 kg (198 lb 3.1 oz)   Height: 5' 6" (1.676 m)     Body mass index is 31.99 kg/m².  Physical Exam  Constitutional:       Appearance: She is well-developed.   HENT:      Head: Normocephalic and atraumatic.      Nose: Nose normal.   Eyes:      General: Lids are normal.      Conjunctiva/sclera: Conjunctivae normal.      Pupils: Pupils are equal, round, and reactive to light.   Cardiovascular:      Rate and Rhythm: Normal rate.   Pulmonary:      Effort: Pulmonary effort is normal.   Musculoskeletal:      Cervical back: Full passive range of motion without pain.   Skin:     General: Skin is warm and dry.   Neurological:      Mental Status: She is alert and oriented to person, place, and time.   Psychiatric:         Speech: Speech normal.         Behavior: Behavior normal.               Diagnoses and health risks identified today and associated recommendations/orders:    1. Encounter for preventive health examination  Discussed health maintenance guidelines appropriate for age.        2. Pulmonary hypertension  Stable, continue to monitor   Followed by cardiology     3. COPD mixed type  Stable, continue current medication regimen  Followed by pulmonology     4. Crohn's disease of small intestine " without complication  Stable, continue current care and management per GI    5. Immunosuppression due to drug therapy  Stable, continue infection prevention measures     6. Dependence on supplemental oxygen  Stable, continue current care     7. Abnormality of gait and mobility  Stable, continue to monitor     8. Scleroderma  Stable, continue current treatment and care per rheumatology   - Ambulatory referral/consult to Rheumatology; Future    9. Thoracic ascending aortic aneurysm  Stable, on most recent imaging CT 2/17/2022- reviewed per Dr. Serrano in pulmonology and stable.        Provided Edith with a 5-10 year written screening schedule and personal prevention plan. Recommendations were developed using the USPSTF age appropriate recommendations. Education, counseling, and referrals were provided as needed. After Visit Summary printed and given to patient which includes a list of additional screenings\tests needed.    Follow up for One year for Annual Wellness Visit.    Rosanne Moreno, NP    I offered to discuss advanced care planning, including how to pick a person who would make decisions for you if you were unable to make them for yourself, called a health care power of , and what kind of decisions you might make such as use of life sustaining treatments such as ventilators and tube feeding when faced with a life limiting illness recorded on a living will that they will need to know. (How you want to be cared for as you near the end of your natural life)     X Patient is interested in learning more about how to make advanced directives.  I provided them paperwork and offered to discuss this with them.

## 2022-05-31 ENCOUNTER — EXTERNAL CHRONIC CARE MANAGEMENT (OUTPATIENT)
Dept: PRIMARY CARE CLINIC | Facility: CLINIC | Age: 67
End: 2022-05-31
Payer: MEDICARE

## 2022-06-03 ENCOUNTER — HOSPITAL ENCOUNTER (OUTPATIENT)
Dept: RADIOLOGY | Facility: CLINIC | Age: 67
Discharge: HOME | End: 2022-06-03
Attending: FAMILY MEDICINE
Payer: MEDICARE

## 2022-06-03 DIAGNOSIS — Z12.31 BREAST CANCER SCREENING BY MAMMOGRAM: ICD-10-CM

## 2022-06-03 PROCEDURE — 77063 BREAST TOMOSYNTHESIS BI: CPT | Mod: TC,PO

## 2022-06-03 PROCEDURE — 77063 BREAST TOMOSYNTHESIS BI: CPT | Mod: 26,,, | Performed by: RADIOLOGY

## 2022-06-03 PROCEDURE — 77063 MAMMO DIGITAL SCREENING BILAT WITH TOMO: ICD-10-PCS | Mod: 26,,, | Performed by: RADIOLOGY

## 2022-06-03 PROCEDURE — 77067 SCR MAMMO BI INCL CAD: CPT | Mod: 26,,, | Performed by: RADIOLOGY

## 2022-06-03 PROCEDURE — 77067 MAMMO DIGITAL SCREENING BILAT WITH TOMO: ICD-10-PCS | Mod: 26,,, | Performed by: RADIOLOGY

## 2022-06-03 PROCEDURE — 77067 SCR MAMMO BI INCL CAD: CPT | Mod: TC,PO

## 2022-06-07 NOTE — PROGRESS NOTES
Your mammogram showed fibroglandular density in breast tissue and this means you will need to have a yearly mammogram.   Please contact me if you have any additional concerns.    Sincerely,    Jas Bazzi MD

## 2022-06-30 ENCOUNTER — EXTERNAL CHRONIC CARE MANAGEMENT (OUTPATIENT)
Dept: PRIMARY CARE CLINIC | Facility: CLINIC | Age: 67
End: 2022-06-30
Payer: MEDICARE

## 2022-06-30 PROCEDURE — 99490 CHRNC CARE MGMT STAFF 1ST 20: CPT | Mod: PBBFAC,PO | Performed by: FAMILY MEDICINE

## 2022-06-30 PROCEDURE — 99490 CHRNC CARE MGMT STAFF 1ST 20: CPT | Mod: S$PBB,,, | Performed by: FAMILY MEDICINE

## 2022-06-30 PROCEDURE — 99490 PR CHRONIC CARE MGMT, 1ST 20 MIN: ICD-10-PCS | Mod: S$PBB,,, | Performed by: FAMILY MEDICINE

## 2022-07-31 ENCOUNTER — EXTERNAL CHRONIC CARE MANAGEMENT (OUTPATIENT)
Dept: PRIMARY CARE CLINIC | Facility: CLINIC | Age: 67
End: 2022-07-31
Payer: MEDICARE

## 2022-07-31 PROCEDURE — 99490 CHRNC CARE MGMT STAFF 1ST 20: CPT | Mod: PBBFAC,PO | Performed by: FAMILY MEDICINE

## 2022-08-31 ENCOUNTER — TELEPHONE (OUTPATIENT)
Dept: PULMONOLOGY | Facility: CLINIC | Age: 67
End: 2022-08-31
Payer: MEDICARE

## 2022-08-31 ENCOUNTER — EXTERNAL CHRONIC CARE MANAGEMENT (OUTPATIENT)
Dept: PRIMARY CARE CLINIC | Facility: CLINIC | Age: 67
End: 2022-08-31
Payer: MEDICARE

## 2022-08-31 PROCEDURE — 99490 CHRNC CARE MGMT STAFF 1ST 20: CPT | Mod: S$PBB,,, | Performed by: NURSE PRACTITIONER

## 2022-08-31 PROCEDURE — 99490 CHRNC CARE MGMT STAFF 1ST 20: CPT | Mod: PBBFAC,PO | Performed by: NURSE PRACTITIONER

## 2022-08-31 PROCEDURE — 99490 PR CHRONIC CARE MGMT, 1ST 20 MIN: ICD-10-PCS | Mod: S$PBB,,, | Performed by: NURSE PRACTITIONER

## 2022-09-07 ENCOUNTER — TELEPHONE (OUTPATIENT)
Dept: PULMONOLOGY | Facility: CLINIC | Age: 67
End: 2022-09-07
Payer: MEDICARE

## 2022-09-16 NOTE — PROGRESS NOTES
Subjective:       Patient ID: Edith Tran is a pleasant 67 y.o. White female patient    Chief Complaint: Establish Care      Patient is a new pt to me and to our practice.  Was pt from Dr. Haywood, [place], who retired. She is referred by Dr. Barnes, Rheumatologist, and I thank her for the referral.    HPI     Pt with PMH as per list of problems below.  Has f-up at [facility] (Dr. Goddard) re: ILD and other issues.  On tadalafil and macitentan, MMF to continue as well as Ofev. Wanted to do RHC.  Referred by Dr. Erickson.  She is here with one of her best friends who lives on the , she herself lives in [place], 1 hour from [facility]. She can stay with this friend when she comes here, and her friend likes to drive her to her medical appts.  She reports feeling rather stable, though reporting that she is aware of the fact that her scleroderma and other rheumatological issues are doing worse.  She is to see Dr. Goddard this week and saw Dr. Barnes last month. I have access to Dr. Goddard's notes in Epic.  No major issue reported today. Due for flu shot and would need the COVID bivalent booster.    Patient Active Problem List   Diagnosis    Scleroderma    Interstitial lung disease    GERD (gastroesophageal reflux disease)    Raynaud's disease    Systemic sclerosis with limited cutaneous involvement    Pulmonary hypertension    Colon polyps    Essential hypertension    Screening for colon cancer    Idiopathic small intestinal ulcers    Esophagitis determined by endoscopy    Diverticulosis    Encounter for well woman exam with routine gynecological exam    COPD mixed type    Obstructive sleep apnea    CASH (dyspnea on exertion)    Colitis    Esophageal stricture    On home oxygen therapy    Esophageal leukoplakia    Dysphagia    Esophagitis    Disorder involving the immune mechanism, unspecified    Chronic hypoxemic respiratory failure    Crohn's disease of small intestine without complication    Hiatal hernia    Thoracic  "ascending aortic aneurysm          ACTIVE MEDICAL ISSUES:  Documented in Problem List     PAST MEDICAL HISTORY  Documented     PAST SURGICAL HISTORY:  Documented     SOCIAL HISTORY:  Documented     FAMILY HISTORY:  Documented     ALLERGIES AND MEDICATIONS: updated and reviewed.  Documented    Review of Systems   Constitutional:  Negative for diaphoresis.   Eyes:  Negative for visual disturbance.   Respiratory:  Positive for cough (rare) and shortness of breath. Negative for chest tightness.    Cardiovascular:  Negative for chest pain.   Neurological:  Negative for dizziness, syncope, weakness and headaches.   Psychiatric/Behavioral:  Negative for dysphoric mood. The patient is not nervous/anxious.      Objective:      Physical Exam  Constitutional:       Appearance: Normal appearance. She is well-developed. She is obese.   HENT:      Head: Normocephalic and atraumatic.      Right Ear: Tympanic membrane normal.      Left Ear: Tympanic membrane normal.      Nose: Nose normal.   Eyes:      General: Lids are normal.      Conjunctiva/sclera: Conjunctivae normal.      Pupils: Pupils are equal, round, and reactive to light.   Cardiovascular:      Rate and Rhythm: Normal rate and regular rhythm.   Pulmonary:      Effort: Pulmonary effort is normal.      Breath sounds: Normal breath sounds.      Comments: Mildly SOB when talking  Musculoskeletal:      Cervical back: Full passive range of motion without pain.   Skin:     General: Skin is warm and dry.   Neurological:      Mental Status: She is alert and oriented to person, place, and time.   Psychiatric:         Speech: Speech normal.         Behavior: Behavior normal.       Vitals:    09/19/22 1011   BP: 110/62   BP Location: Left arm   Patient Position: Sitting   BP Method: Small (Manual)   Pulse: 92   Resp: 18   Temp: 97.9 °F (36.6 °C)   TempSrc: Oral   SpO2: 99%   Weight: 90.6 kg (199 lb 11.8 oz)   Height: 5' 6" (1.676 m)     Body mass index is 32.24 kg/m².    RESULTS: " Reviewed labs from last 12 months    Last Lab Results:     Lab Results   Component Value Date    WBC 6.10 03/16/2022    HGB 13.5 03/16/2022    HCT 43.2 03/16/2022     03/16/2022     03/16/2022    K 4.3 03/16/2022     03/16/2022    CO2 24 03/16/2022    BUN 12 03/16/2022    CREATININE 0.8 03/16/2022    CALCIUM 9.5 03/16/2022    ALBUMIN 3.8 03/16/2022    AST 18 03/16/2022    ALT 17 03/16/2022    CHOL 191 08/19/2022    TRIG 102 08/19/2022    HDL 65 (H) 08/19/2022    LDLCALC 106 08/19/2022         Assessment:       1. Establishing care with new doctor, encounter for    2. Essential hypertension    3. Class 1 obesity due to excess calories with serious comorbidity and body mass index (BMI) of 32.0 to 32.9 in adult    4. Scleroderma    5. Pulmonary hypertension    6. Systemic sclerosis with limited cutaneous involvement    7. COPD mixed type    8. CASH (dyspnea on exertion)    9. On home oxygen therapy    10. Crohn's disease of small intestine without complication    11. Immunosuppression due to drug therapy    12. ELAINE (obstructive sleep apnea)    13. Need for immunization against influenza        Plan:   Edith was seen today for establish care.    Diagnoses and all orders for this visit:    Establishing care with new doctor, encounter for    Discussed the importance of a good pt-doctor trust relationship. Provided the pt with my contact.    Essential hypertension    BP at goal, same treatment.    Class 1 obesity due to excess calories with serious comorbidity and body mass index (BMI) of 32.0 to 32.9 in adult    Difficult for her to work on her lifestyle (cannot exercise due to SOB).  Needs to work on her diet.    Scleroderma    F-up [facility].    Pulmonary hypertension    Had RHC done, will f-up at [facility].    Systemic sclerosis with limited cutaneous involvement    See above.    COPD mixed type    See above.    CASH (dyspnea on exertion)    See above.    On home oxygen therapy    Uses PRN.    Crohn's disease of  small intestine without complication    Not discussed today.    Immunosuppression due to drug therapy    See above.    ELAINE (obstructive sleep apnea)    Uses CPAP.    Need for immunization against influenza  -     Influenza (FLUAD) - Quadrivalent (Adjuvanted) *Preferred* (65+) (PF)    Administered today. Also advised re: COVID bivalent booster.    Follow up in about 3 months (around 12/19/2022) for f-up.    This note was created by combination of typed  and M-Modal dictation.  Transcription errors may be present.  If there are any questions, please contact me.

## 2022-09-19 ENCOUNTER — OFFICE VISIT (OUTPATIENT)
Dept: FAMILY MEDICINE | Facility: CLINIC | Age: 67
End: 2022-09-19
Payer: MEDICARE

## 2022-09-19 VITALS
OXYGEN SATURATION: 99 % | TEMPERATURE: 98 F | RESPIRATION RATE: 18 BRPM | BODY MASS INDEX: 32.1 KG/M2 | HEART RATE: 92 BPM | SYSTOLIC BLOOD PRESSURE: 110 MMHG | HEIGHT: 66 IN | WEIGHT: 199.75 LBS | DIASTOLIC BLOOD PRESSURE: 62 MMHG

## 2022-09-19 DIAGNOSIS — Z79.899 IMMUNOSUPPRESSION DUE TO DRUG THERAPY: ICD-10-CM

## 2022-09-19 DIAGNOSIS — J44.9 COPD MIXED TYPE: ICD-10-CM

## 2022-09-19 DIAGNOSIS — E66.09 CLASS 1 OBESITY DUE TO EXCESS CALORIES WITH SERIOUS COMORBIDITY AND BODY MASS INDEX (BMI) OF 32.0 TO 32.9 IN ADULT: ICD-10-CM

## 2022-09-19 DIAGNOSIS — M34.9 SCLERODERMA: ICD-10-CM

## 2022-09-19 DIAGNOSIS — K50.00 CROHN'S DISEASE OF SMALL INTESTINE WITHOUT COMPLICATION: ICD-10-CM

## 2022-09-19 DIAGNOSIS — I10 ESSENTIAL HYPERTENSION: ICD-10-CM

## 2022-09-19 DIAGNOSIS — G47.33 OSA (OBSTRUCTIVE SLEEP APNEA): ICD-10-CM

## 2022-09-19 DIAGNOSIS — Z99.81 ON HOME OXYGEN THERAPY: ICD-10-CM

## 2022-09-19 DIAGNOSIS — I27.20 PULMONARY HYPERTENSION: ICD-10-CM

## 2022-09-19 DIAGNOSIS — R06.09 DOE (DYSPNEA ON EXERTION): ICD-10-CM

## 2022-09-19 DIAGNOSIS — Z23 NEED FOR IMMUNIZATION AGAINST INFLUENZA: ICD-10-CM

## 2022-09-19 DIAGNOSIS — Z76.89 ESTABLISHING CARE WITH NEW DOCTOR, ENCOUNTER FOR: Primary | ICD-10-CM

## 2022-09-19 DIAGNOSIS — D84.821 IMMUNOSUPPRESSION DUE TO DRUG THERAPY: ICD-10-CM

## 2022-09-19 DIAGNOSIS — M34.9 SYSTEMIC SCLEROSIS WITH LIMITED CUTANEOUS INVOLVEMENT: ICD-10-CM

## 2022-09-19 PROCEDURE — 99999 PR PBB SHADOW E&M-EST. PATIENT-LVL V: ICD-10-PCS | Mod: PBBFAC,,, | Performed by: INTERNAL MEDICINE

## 2022-09-19 PROCEDURE — G0008 ADMIN INFLUENZA VIRUS VAC: HCPCS | Mod: PBBFAC,PO

## 2022-09-19 PROCEDURE — 99215 OFFICE O/P EST HI 40 MIN: CPT | Mod: PBBFAC,PO | Performed by: INTERNAL MEDICINE

## 2022-09-19 PROCEDURE — 99214 OFFICE O/P EST MOD 30 MIN: CPT | Mod: S$PBB,,, | Performed by: INTERNAL MEDICINE

## 2022-09-19 PROCEDURE — 99999 PR PBB SHADOW E&M-EST. PATIENT-LVL V: CPT | Mod: PBBFAC,,, | Performed by: INTERNAL MEDICINE

## 2022-09-19 PROCEDURE — 99214 PR OFFICE/OUTPT VISIT, EST, LEVL IV, 30-39 MIN: ICD-10-PCS | Mod: S$PBB,,, | Performed by: INTERNAL MEDICINE

## 2022-09-19 RX ORDER — ERGOCALCIFEROL 1.25 MG/1
50000 CAPSULE ORAL
COMMUNITY
End: 2022-09-19 | Stop reason: SDUPTHER

## 2022-09-19 NOTE — PROGRESS NOTES
Health Maintenance Due   Topic     COVID-19 Vaccine (5 - Booster for Pfizer series)     Influenza Vaccine (1) Pt agree to get today

## 2022-09-26 ENCOUNTER — OFFICE VISIT (OUTPATIENT)
Dept: PULMONOLOGY | Facility: CLINIC | Age: 67
End: 2022-09-26
Payer: MEDICARE

## 2022-09-26 VITALS
SYSTOLIC BLOOD PRESSURE: 117 MMHG | BODY MASS INDEX: 32.06 KG/M2 | DIASTOLIC BLOOD PRESSURE: 84 MMHG | WEIGHT: 199.5 LBS | HEART RATE: 91 BPM | HEIGHT: 66 IN | OXYGEN SATURATION: 98 %

## 2022-09-26 DIAGNOSIS — J01.81 OTHER ACUTE RECURRENT SINUSITIS: ICD-10-CM

## 2022-09-26 DIAGNOSIS — J30.2 CHRONIC SEASONAL ALLERGIC RHINITIS: ICD-10-CM

## 2022-09-26 DIAGNOSIS — R09.89 CHRONIC SINUS COMPLAINTS: ICD-10-CM

## 2022-09-26 DIAGNOSIS — J44.9 CHRONIC OBSTRUCTIVE PULMONARY DISEASE, UNSPECIFIED COPD TYPE: ICD-10-CM

## 2022-09-26 PROCEDURE — 99999 PR PBB SHADOW E&M-EST. PATIENT-LVL IV: ICD-10-PCS | Mod: PBBFAC,,, | Performed by: INTERNAL MEDICINE

## 2022-09-26 PROCEDURE — 99214 OFFICE O/P EST MOD 30 MIN: CPT | Mod: S$PBB,,, | Performed by: INTERNAL MEDICINE

## 2022-09-26 PROCEDURE — 99214 PR OFFICE/OUTPT VISIT, EST, LEVL IV, 30-39 MIN: ICD-10-PCS | Mod: S$PBB,,, | Performed by: INTERNAL MEDICINE

## 2022-09-26 PROCEDURE — 99999 PR PBB SHADOW E&M-EST. PATIENT-LVL IV: CPT | Mod: PBBFAC,,, | Performed by: INTERNAL MEDICINE

## 2022-09-26 PROCEDURE — 99214 OFFICE O/P EST MOD 30 MIN: CPT | Mod: PBBFAC,PO | Performed by: INTERNAL MEDICINE

## 2022-09-26 RX ORDER — MONTELUKAST SODIUM 10 MG/1
10 TABLET ORAL NIGHTLY
Qty: 30 TABLET | Refills: 11 | Status: SHIPPED | OUTPATIENT
Start: 2022-09-26 | End: 2023-09-27 | Stop reason: SDUPTHER

## 2022-09-26 RX ORDER — FLUTICASONE PROPIONATE 50 MCG
1 SPRAY, SUSPENSION (ML) NASAL DAILY
Qty: 16 ML | Refills: 11 | Status: SHIPPED | OUTPATIENT
Start: 2022-09-26

## 2022-09-26 NOTE — PATIENT INSTRUCTIONS
Heart cath did not look too bad -- baseline NA.    Need to get portable oxygen concentrator repaired.    Would wish to see compliance report from cpap .    If new ct done -- bring on disc.

## 2022-09-26 NOTE — PROGRESS NOTES
9/26/2022 9/26/2022    Edith Tran  Office Note    Chief Complaint   Patient presents with    Follow-up     6 month f/u        HPI:    9/26/2022 no new issues, uses cpap with good results, uses ox for activity exercise. Misses ox concentrator--getting repairs.       Pt was having constant cough prior to august rhc.      Had pft 9/21/2022 -- fvc 71%, fev1 74%, tlc 78%, dlco 27%.  Six min walk 349 meters in 6 min, and needed with 2 lpm needed.  Pft worsened from 2/2022.  Ox needs stable    Cough remitted -- was constant.     Swallows ok.  Eats slowly.      Rhc last month-- [place].    3/28/2022 not using symbicort, dose use occ albuterol which ppt headaches??    Feels like stb le-- uses ox more as feels more sob.  Pt does care at [facility].      Uses cpap -- had ahi 6.3 in past.  Patient Instructions   Need compliance report from cpap?  Ahi was 6.3 yrs ago.  You are compliant.    Ct chest and 6 min walk and pulm functions are stable to sl better.    9/16/2021 - six min walk distance same as October 2020 at 255 m, batteries on poc run out doing errands over 2 h rs..    Not monitoring ox walking.  ues 3lpm pulse.    Uses filter on cpap to get new.   Had 3 vaccines .   On rx and bladder doing better.   Pt not needing rescue,use symbicort.    Ct in 2019, and pft 2020.   Patient Instructions   Would do ct chest and pft and six min walk prior to seeing next rheumatologist, or in 6 months.     Continue opsumit, tadalafil, symbicort, ofev,     Call if needed    Re check 6 months.  3/16/2021 concerned re covid vaccine and immujne suppression.  Breathing stable, has bladder urgency, has cpap mask problems - like f30 airfit.      Patient Instructions     Six min walk today walked 255 meters, and oxygen fell at 3 minutes to 88, and needed 3 lpm oxygen- 3/16/2021      6 min walk study was accomplished October 22, 2020. Baseline room air saturation was 98%. With ambulation O2 sat fell to 87% by 5 min. On 2 L of oxygen  patient maintain sat in the low 90s subsequently. Patient walked about 133% of the reference distance   at 255 m.      6 min walk on August 21, 2018 was accomplished.  Patient's baseline O2 sat was 96% on room air.  After walking in her sats fell to 88% at 5 min.  On 2 L of oxygen sat was maintained in the mid 90s.  The patient was able to walk 390 m or 91% of the   reference value.     You may have spastic bladder.  Would recheck urine.  If sign symptoms may need to see urology?       You have and use portable oxygen concentrator. You should try to use more and be more active.      Get vaccine,      Walk studies suggest some loss in function from 2018 to 2020 but stable from October to March now.      Lung capacity was fairly good October 2020-  Spirometry, lung volume by gas dilution, and diffusion capacity measured October 22, 2020. The FEV1 to FVC ratio was 73% indicating no airflow obstruction measured by spirometry technique. The FEV1 was 81% of predicted at 2 L. Total lung capacity on lung    volume by gas dilution was 72% of predicted and a little low. Diffusion, uncorrected for anemia, was 41% of predicted and also low.   Patient has no obstruction. There is restriction measured. Diffusion is decreased. Clinical correlation recommended           Would do a six min walk prior to return in 6 months.    9/30/2020- uses 02 out of house, had scratchy throat with am cough lately. Uses cpap nightly all night usually 4-7 hrs/night.      Sees Dr Arboleda- Dr Barnes left.    Had been seeing Dr Goddard, had pft and 6 min walk, saw Dr Goddard on 8/3/2020 - told all stable,  Cannot locate pft /walk test in care everywhere.  Usually studied yearly.  Pt feels stable otherwise.  3/27/2019 ct chest viewed with ild/honeycombing.  Follows for ofev trial at [facility].    Pt continues on ofev and cellcept and cialis and opsumit     Patient Instructions   If antibiotic needed - azithromycin daily for 3 days, last 10, simple antibiotic.      Prednisone 5 mg - may use if cough bad.    Should have result of right heart cath, and august pulm function and six min walk- will ask to obtain.  Will need to make sure can continue opsumit/cialias    Can follow six min walk every 3 months - placed standing.    You need to stay on ofev for pulmonary fibrosis, uses tadalafil and optusmit for pulmonary hypertension.  October 8, 2019- Has PFT and 6 min walk scheduled Nov 2019 by Dr. Barnes Rheumatologist at [place]. Wearing supplemental oxygen at home day/night set at 2L NC, currently on Symbicort daily, states benefit in breathing. No recent prednisone use. SOB controlled. No cough, no chest tightness, no wheeze.   Wear cpap nightly, states benefiting greatly but want different mask, tries to read before bed, mask over nasal bridge does not allow glasses to rest correctly.  Patient Instructions   Order sent for different CPAP mask  Continue to use nightly for Obstructive sleep apnea    Continue supplemental oxygen    Will review the results of PFT and 6 min walk from [place] at next appointment.    Continue Symbicort daily.     Use Fela perles and prednisone as needed for cough.     April 3, 2019- Onset 1 week: Cough productive white in color, sore throat, post nasal drip, sinus drainage yellow, flushed cheeks, complaints improving tx with antibiotics no prednisone use. Cough worse when lying down.    Current therapy for scleroderma, pulmonary htn, and pulmonary fibrosis Opsumit/tadafanil and Ofev/cellcept. No diarrhea no complaints.  Currently using Symbicort 1 puff daily. Using supplemental oxygen at 2L NC for Shortness of breath, states greatly beneficial, pt states able to walk further and keep up with company while on oxygen.      Nov 27, trelegy not covered- not using, had flu shot 8 am with sorenes later day and huge swollen arm with  Pain thhat night.   No cough. On opsumit/tadafanil, and on ofev/cellcept.  Stable. No diarrhea.  Sees pulm htn and   [facility].  Last 6 min walk 02  Angelo 91 slow pace.      Aug 21, uses trelegy, no c/o.  No 0x arranges- sat 90 falls to 87 at 2 mins- walked 307 meters or 71%.  Dx Crohn's.  No abx nor prednisone.  cpap good and sinus good.  Instructions:Would monitor 6 min walk every 3 months.    307 meters was last distance.  Six min walk.  Monitor ct chest yearly in March - sooner if worsens.  Needs 02 for activity.  Use medications for bronchitis.   Stay active.    July11,2018has had raynauld's for yrs, pt had severe mobility problems issues leading to dx slceroderma 2007 - "rock bottom".  Has had swallow sticking with  2-3 dilations with last over 3 yrs ago.  Pt has had pulm htn on opsumit and talafadil,  Pt also on ofev in a study, and cellcept.  Also low dose prednisone.  Pt dx osas and uses cpap nightly 8 hrs - had used 02 but off 02 for 3 yrs.   Pt had had 6 min walks but none recently- none last yr at least.  Pt gets bronchitis with cough and yellow mucous.  Has occ wheezes.  Had exacerbations in feb and June - typically 2-3 yrly.  Tessalon helps.  Uses combivent occ ppt headaches with some breathing benefit.     Uses flonase regular.  Was on asthma rx for yrs.        The chief compliant  problem is stable  PFSH:  Past Medical History:   Diagnosis Date    Allergy     Arthritis     Back pain     Colon polyps     COPD (chronic obstructive pulmonary disease)     Emphysema of lung     GERD (gastroesophageal reflux disease)     Hypertension     Kidney stones     Obesity     Pneumonia     Pneumonia due to other staphylococcus     Scleroderma involving lung since she was 47 y/o    Sleep apnea     Trouble in sleeping          Past Surgical History:   Procedure Laterality Date    COLONOSCOPY N/A 8/7/2018    Procedure: COLONOSCOPY;  Surgeon: Ngozi Prnice MD;  Location: [facility];  Service: Endoscopy;  Laterality: N/A;    COLONOSCOPY N/A 11/21/2019    Procedure: COLONOSCOPY;  Surgeon: Ngozi Prince MD;  Location: [facility] " ENDO;  Service: Endoscopy;  Laterality: N/A;    ESOPHAGEAL MANOMETRY WITH MEASUREMENT OF IMPEDANCE N/A 2020    Procedure: MANOMETRY, ESOPHAGUS, WITH IMPEDANCE MEASUREMENT;  Surgeon: Bhupendra Temple MD;  Location: [facility] ENDO ([address] [place] [address]);  Service: Endoscopy;  Laterality: N/A;  3/10 - pt confirmed apptCovid test ordered for  in [place].EC    ESOPHAGOGASTRODUODENOSCOPY N/A 2018    Procedure: EGD (ESOPHAGOGASTRODUODENOSCOPY);  Surgeon: Ngozi Prince MD;  Location: [facility];  Service: Endoscopy;  Laterality: N/A;    ESOPHAGOGASTRODUODENOSCOPY N/A 2019    Procedure: EGD (ESOPHAGOGASTRODUODENOSCOPY);  Surgeon: Ngozi Prince MD;  Location: [facility];  Service: Endoscopy;  Laterality: N/A;    ESOPHAGOGASTRODUODENOSCOPY N/A 2020    Procedure: EGD (ESOPHAGOGASTRODUODENOSCOPY);  Surgeon: Ngozi Prince MD;  Location: [facility];  Service: Endoscopy;  Laterality: N/A;    ESOPHAGOGASTRODUODENOSCOPY N/A 2020    Procedure: EGD (ESOPHAGOGASTRODUODENOSCOPY);  Surgeon: Ngozi Prince MD;  Location: [facility] ENDO;  Service: Endoscopy;  Laterality: N/A;    ESOPHAGOGASTRODUODENOSCOPY N/A 2020    Procedure: EGD (ESOPHAGOGASTRODUODENOSCOPY);  Surgeon: Ngozi Prince MD;  Location: [facility];  Service: Endoscopy;  Laterality: N/A;    ESOPHAGOGASTRODUODENOSCOPY N/A 2021    Procedure: EGD (ESOPHAGOGASTRODUODENOSCOPY);  Surgeon: Ngozi Prince MD;  Location: [facility] ENDO;  Service: Endoscopy;  Laterality: N/A;     Social History     Tobacco Use    Smoking status: Former     Packs/day: 0.50     Years: 27.00     Pack years: 13.50     Types: Cigarettes     Start date:      Quit date: 1995     Years since quittin.0    Smokeless tobacco: Never   Substance Use Topics    Alcohol use: No    Drug use: No     Family History   Problem Relation Age of Onset    Kidney disease Mother     Cancer Father     Heart disease Brother     Mental illness Son     Glaucoma Neg Hx     Macular degeneration  "Neg Hx     Retinal detachment Neg Hx      Review of patient's allergies indicates:   Allergen Reactions    Plaquenil [hydroxychloroquine]      Having eye reaction, needs to stop plaquenil and stay off of it.        Performance Status:The patient's activity level is housebound activities.      Review of Systems:  a review of eleven systems covering constitutional, Eye, HEENT, Psych, Respiratory, Cardiac, GI, , Musculoskeletal, Endocrine, Dermatologic was negative except for pertinent findings as listed ABOVE and below:  pertinent positive as above, rest is good       Exam:Comprehensive exam done. /84 (BP Location: Left arm, Patient Position: Sitting, BP Method: Medium (Automatic))   Pulse 91   Ht 5' 6" (1.676 m)   Wt 90.5 kg (199 lb 8.3 oz)   SpO2 98% Comment: on room air at rest  BMI 32.20 kg/m²   Exam included Vitals as listed, and patient's appearance and affect and alertness and mood, oral exam for yeast and hygiene and pharynx lesions and Mallapatti (M) score, neck with inspection for jvd and masses and thyroid abnormalities and lymph nodes (supraclavicular and infraclavicular nodes and axillary also examined and noted if abn), chest exam included symmetry and effort and fremitus and percussion and auscultation, cardiac exam included rhythm and gallops and murmur and rubs and jvd and edema, abdominal exam for mass and hepatosplenomegaly and tenderness and hernias and bowel sounds, Musculoskeletal exam with muscle tone and posture and mobility/gait and  strength, and skin for rashes and cyanosis and pallor and turgor, extremity for clubbing.  Findings were normal except for pertinent findings listed below:M2, bilat rales. No  Edema nor clubbing.       Radiographs (ct chest and cxr) reviewed: view by direct vision  March 2018 ct not too bad with cysts- viewed 8/21/18  CT CHEST WITHOUT CONTRAST 03/27/2019   Severe interstitial fibrosis with peripheral honeycombing and multiple bilateral lower lobe " bulla consistent with the history of scleroderma.  This is essentially unchanged from March 21, 2018.  Continued mild mediastinal adenopathy also unchanged.  No acute findings.  Small hiatal hernia.  Stable 2 cm complicated cyst of the left kidney.       Labs  noted  Lab Results   Component Value Date    WBC 6.10 03/16/2022    HGB 13.5 03/16/2022    HCT 43.2 03/16/2022    MCV 95 03/16/2022     03/16/2022        PFT results reviewed   Pulmonary Functions, including spirometry and bronchodilator response and lung volumes and diffusion, study was done May 8, 2018.  Spirometry shows mild obstruction, loss vital capacity and obstruction and no bronchodilator response.   FEV1 is 65% or 1.68 liters.  Lung volumes show  loss of TLC with restriction 66%.  Diffusion shows reduced but falls within normal range when corrected for lung volumes.   Pulmonary functions show  Mild obstruction and also restriction. Clinical correlation recommended.   Mikal Serrano M.D.    Echo 10/12/2018 Normal left and right ventricular systolic functions with LVEF >55%.    Plan:  Clinical impression is apparently straight forward and impression with management as below.     Edith was seen today for follow-up.    Diagnoses and all orders for this visit:    Chronic sinus complaints  -     montelukast (SINGULAIR) 10 mg tablet; Take 1 tablet (10 mg total) by mouth every evening.    Chronic obstructive pulmonary disease, unspecified COPD type  -     montelukast (SINGULAIR) 10 mg tablet; Take 1 tablet (10 mg total) by mouth every evening.    Other acute recurrent sinusitis  -     fluticasone propionate (FLONASE) 50 mcg/actuation nasal spray; 1 spray (50 mcg total) by Each Nostril route once daily.    Chronic seasonal allergic rhinitis  -     fluticasone propionate (FLONASE) 50 mcg/actuation nasal spray; 1 spray (50 mcg total) by Each Nostril route once daily.        Follow up in about 6 months (around 3/26/2023), or if symptoms worsen or fail to  improve.    Discussed with patient above for education the following:      Patient Instructions   Heart cath did not look too bad -- baseline NA.    Need to get portable oxygen concentrator repaired.    Would wish to see compliance report from cpap .    If new ct done -- bring on disc.             Edith Tran  Office Note    Chief Complaint   Patient presents with    Follow-up     6 month f/u        HPI:    9/16/2021 - six min walk distance same as October 2020 at 255 m, batteries on poc run out doing errands over 2 h rs..    Not monitoring ox walking.  ues 3lpm pulse.    Uses filter on cpap to get new.   Had 3 vaccines .   On rx and bladder doing better.   Pt not needing rescue,use symbicort.        Ct in 2019, and pft 2020.     Patient Instructions   Would do ct chest and pft and six min walk prior to seeing next rheumatologist, or in 6 months.     Continue opsumit, tadalafil, symbicort, ofev,     Call if needed    Re check 6 months.    3/16/2021 concerned re covid vaccine and immujne suppression.  Breathing stable, has bladder urgency, has cpap mask problems - like f30 airfit.      Patient Instructions     Six min walk today walked 255 meters, and oxygen fell at 3 minutes to 88, and needed 3 lpm oxygen- 3/16/2021      6 min walk study was accomplished October 22, 2020. Baseline room air saturation was 98%. With ambulation O2 sat fell to 87% by 5 min. On 2 L of oxygen patient maintain sat in the low 90s subsequently. Patient walked about 133% of the reference distance   at 255 m.      6 min walk on August 21, 2018 was accomplished.  Patient's baseline O2 sat was 96% on room air.  After walking in her sats fell to 88% at 5 min.  On 2 L of oxygen sat was maintained in the mid 90s.  The patient was able to walk 390 m or 91% of the   reference value.     You may have spastic bladder.  Would recheck urine.  If sign symptoms may need to see urology?       You have and use portable oxygen concentrator. You should try  to use more and be more active.      Get vaccine,      Walk studies suggest some loss in function from 2018 to 2020 but stable from October to March now.      Lung capacity was fairly good October 2020-  Spirometry, lung volume by gas dilution, and diffusion capacity measured October 22, 2020. The FEV1 to FVC ratio was 73% indicating no airflow obstruction measured by spirometry technique. The FEV1 was 81% of predicted at 2 L. Total lung capacity on lung    volume by gas dilution was 72% of predicted and a little low. Diffusion, uncorrected for anemia, was 41% of predicted and also low.   Patient has no obstruction. There is restriction measured. Diffusion is decreased. Clinical correlation recommended           Would do a six min walk prior to return in 6 months.    9/30/2020- uses 02 out of house, had scratchy throat with am cough lately. Uses cpap nightly all night usually 4-7 hrs/night.      Sees Dr Arboleda- Dr Barnes left.    Had been seeing Dr Goddard, had pft and 6 min walk, saw Dr Goddard on 8/3/2020 - told all stable,  Cannot locate pft /walk test in care everywhere.  Usually studied yearly.  Pt feels stable otherwise.  3/27/2019 ct chest viewed with ild/honeycombing.  Follows for ofev trial at [facility].    Pt continues on ofev and cellcept and cialis and opsumit     Patient Instructions   If antibiotic needed - azithromycin daily for 3 days, last 10, simple antibiotic.     Prednisone 5 mg - may use if cough bad.    Should have result of right heart cath, and august pulm function and six min walk- will ask to obtain.  Will need to make sure can continue opsumit/cialias    Can follow six min walk every 3 months - placed standing.    You need to stay on ofev for pulmonary fibrosis, uses tadalafil and optusmit for pulmonary hypertension.  October 8, 2019- Has PFT and 6 min walk scheduled Nov 2019 by Dr. Barnes Rheumatologist at [place]. Wearing supplemental oxygen at home day/night set at 2L NC, currently on  Symbicort daily, states benefit in breathing. No recent prednisone use. SOB controlled. No cough, no chest tightness, no wheeze.   Wear cpap nightly, states benefiting greatly but want different mask, tries to read before bed, mask over nasal bridge does not allow glasses to rest correctly.  Patient Instructions   Order sent for different CPAP mask  Continue to use nightly for Obstructive sleep apnea    Continue supplemental oxygen    Will review the results of PFT and 6 min walk from [place] at next appointment.    Continue Symbicort daily.     Use Fela perles and prednisone as needed for cough.     April 3, 2019- Onset 1 week: Cough productive white in color, sore throat, post nasal drip, sinus drainage yellow, flushed cheeks, complaints improving tx with antibiotics no prednisone use. Cough worse when lying down.    Current therapy for scleroderma, pulmonary htn, and pulmonary fibrosis Opsumit/tadafanil and Ofev/cellcept. No diarrhea no complaints.  Currently using Symbicort 1 puff daily. Using supplemental oxygen at 2L NC for Shortness of breath, states greatly beneficial, pt states able to walk further and keep up with company while on oxygen.      Nov 27, trelegy not covered- not using, had flu shot 8 am with sorenes later day and huge swollen arm with  Pain thhat night.   No cough. On opsumit/tadafanil, and on ofev/cellcept.  Stable. No diarrhea.  Sees pulm htn and  [facility].  Last 6 min walk 02  Angelo 91 slow pace.      Aug 21, uses trelegy, no c/o.  No 0x arranges- sat 90 falls to 87 at 2 mins- walked 307 meters or 71%.  Dx Crohn's.  No abx nor prednisone.  cpap good and sinus good.  Instructions:Would monitor 6 min walk every 3 months.    307 meters was last distance.  Six min walk.  Monitor ct chest yearly in March - sooner if worsens.  Needs 02 for activity.  Use medications for bronchitis.   Stay active.    July11,2018has had raynauld's for yrs, pt had severe mobility problems issues leading to dx  "slceroderma 2007 - "rock bottom".  Has had swallow sticking with  2-3 dilations with last over 3 yrs ago.  Pt has had pulm htn on opsumit and talafadil,  Pt also on ofev in a study, and cellcept.  Also low dose prednisone.  Pt dx osas and uses cpap nightly 8 hrs - had used 02 but off 02 for 3 yrs.   Pt had had 6 min walks but none recently- none last yr at least.  Pt gets bronchitis with cough and yellow mucous.  Has occ wheezes.  Had exacerbations in feb and June - typically 2-3 yrly.  Tessalon helps.  Uses combivent occ ppt headaches with some breathing benefit.     Uses flonase regular.  Was on asthma rx for yrs.        The chief compliant  problem is stable  PFSH:  Past Medical History:   Diagnosis Date    Allergy     Arthritis     Back pain     Colon polyps     COPD (chronic obstructive pulmonary disease)     Emphysema of lung     GERD (gastroesophageal reflux disease)     Hypertension     Kidney stones     Obesity     Pneumonia     Pneumonia due to other staphylococcus     Scleroderma involving lung since she was 47 y/o    Sleep apnea     Trouble in sleeping          Past Surgical History:   Procedure Laterality Date    COLONOSCOPY N/A 8/7/2018    Procedure: COLONOSCOPY;  Surgeon: Ngozi Prince MD;  Location: [facility];  Service: Endoscopy;  Laterality: N/A;    COLONOSCOPY N/A 11/21/2019    Procedure: COLONOSCOPY;  Surgeon: Ngozi Prince MD;  Location: [facility];  Service: Endoscopy;  Laterality: N/A;    ESOPHAGEAL MANOMETRY WITH MEASUREMENT OF IMPEDANCE N/A 7/27/2020    Procedure: MANOMETRY, ESOPHAGUS, WITH IMPEDANCE MEASUREMENT;  Surgeon: Bhupendra Temple MD;  Location: [facility] ([address] [place] [address]);  Service: Endoscopy;  Laterality: N/A;  3/10 - pt confirmed apptCovid test ordered for 7/24 in [place].EC    ESOPHAGOGASTRODUODENOSCOPY N/A 8/7/2018    Procedure: EGD (ESOPHAGOGASTRODUODENOSCOPY);  Surgeon: Ngozi Prince MD;  Location: [facility];  Service: Endoscopy;  Laterality: N/A;    " ESOPHAGOGASTRODUODENOSCOPY N/A 2019    Procedure: EGD (ESOPHAGOGASTRODUODENOSCOPY);  Surgeon: Ngozi Prince MD;  Location: [facility] ENDO;  Service: Endoscopy;  Laterality: N/A;    ESOPHAGOGASTRODUODENOSCOPY N/A 2020    Procedure: EGD (ESOPHAGOGASTRODUODENOSCOPY);  Surgeon: Ngozi Prince MD;  Location: [facility] ENDO;  Service: Endoscopy;  Laterality: N/A;    ESOPHAGOGASTRODUODENOSCOPY N/A 2020    Procedure: EGD (ESOPHAGOGASTRODUODENOSCOPY);  Surgeon: Ngozi Prince MD;  Location: [facility] ENDO;  Service: Endoscopy;  Laterality: N/A;    ESOPHAGOGASTRODUODENOSCOPY N/A 2020    Procedure: EGD (ESOPHAGOGASTRODUODENOSCOPY);  Surgeon: Ngozi Prince MD;  Location: [facility] ENDO;  Service: Endoscopy;  Laterality: N/A;    ESOPHAGOGASTRODUODENOSCOPY N/A 2021    Procedure: EGD (ESOPHAGOGASTRODUODENOSCOPY);  Surgeon: Ngozi Prince MD;  Location: [facility] ENDO;  Service: Endoscopy;  Laterality: N/A;     Social History     Tobacco Use    Smoking status: Former     Packs/day: 0.50     Years: 27.00     Pack years: 13.50     Types: Cigarettes     Start date:      Quit date: 1995     Years since quittin.0    Smokeless tobacco: Never   Substance Use Topics    Alcohol use: No    Drug use: No     Family History   Problem Relation Age of Onset    Kidney disease Mother     Cancer Father     Heart disease Brother     Mental illness Son     Glaucoma Neg Hx     Macular degeneration Neg Hx     Retinal detachment Neg Hx      Review of patient's allergies indicates:   Allergen Reactions    Plaquenil [hydroxychloroquine]      Having eye reaction, needs to stop plaquenil and stay off of it.        Performance Status:The patient's activity level is housebound activities.      Review of Systems:  a review of eleven systems covering constitutional, Eye, HEENT, Psych, Respiratory, Cardiac, GI, , Musculoskeletal, Endocrine, Dermatologic was negative except for pertinent findings as listed ABOVE and below:   "pertinent positive as above, rest is good       Exam:Comprehensive exam done. /84 (BP Location: Left arm, Patient Position: Sitting, BP Method: Medium (Automatic))   Pulse 91   Ht 5' 6" (1.676 m)   Wt 90.5 kg (199 lb 8.3 oz)   SpO2 98% Comment: on room air at rest  BMI 32.20 kg/m²   Exam included Vitals as listed, and patient's appearance and affect and alertness and mood, oral exam for yeast and hygiene and pharynx lesions and Mallapatti (M) score, neck with inspection for jvd and masses and thyroid abnormalities and lymph nodes (supraclavicular and infraclavicular nodes and axillary also examined and noted if abn), chest exam included symmetry and effort and fremitus and percussion and auscultation, cardiac exam included rhythm and gallops and murmur and rubs and jvd and edema, abdominal exam for mass and hepatosplenomegaly and tenderness and hernias and bowel sounds, Musculoskeletal exam with muscle tone and posture and mobility/gait and  strength, and skin for rashes and cyanosis and pallor and turgor, extremity for clubbing.  Findings were normal except for pertinent findings listed below:M2, bilat rales. No  Edema nor clubbing.       Radiographs (ct chest and cxr) reviewed: view by direct vision  March 2018 ct not too bad with cysts- viewed 8/21/18  CT CHEST WITHOUT CONTRAST 03/27/2019   Severe interstitial fibrosis with peripheral honeycombing and multiple bilateral lower lobe bulla consistent with the history of scleroderma.  This is essentially unchanged from March 21, 2018.  Continued mild mediastinal adenopathy also unchanged.  No acute findings.  Small hiatal hernia.  Stable 2 cm complicated cyst of the left kidney.       Labs  noted  Lab Results   Component Value Date    WBC 6.10 03/16/2022    HGB 13.5 03/16/2022    HCT 43.2 03/16/2022    MCV 95 03/16/2022     03/16/2022        PFT results reviewed     February 10, 2022-patient dropped out pulmonary functions from February 7, 2022. " Forced vital capacity was 69 percent predicted.  There was no airflow obstruction.  Total lung capacity was 72 percent of predicted.  Diffusion was down to 31 percent predicted.  Patient had a 6 minute walk also.  Patient walked about 359 meters in 6 minutes.  She was 97 percent on room air.  The low O2 sat was 89 percent while walking.  \  \  Oct 27/2020 Spirometry, lung volume by gas dilution, and diffusion capacity measured October 22, 2020. The FEV1 to FVC ratio was 73% indicating no airflow obstruction measured by spirometry technique. The FEV1 was 81% of predicted at 2 L. fvc was 86% . Total lung capacity on lung    volume by gas dilution was 72% of predicted and a little low. Diffusion, uncorrected for anemia, was 41% of predicted and also low.   Patient has no obstruction. There is restriction measured. Diffusion is decreased. Clinical correlation recommended       5/8/2018 Pulmonary Functions, including spirometry and bronchodilator response and lung volumes and diffusion, study was done May 8, 2018.  Spirometry shows mild obstruction, loss vital capacity and obstruction and no bronchodilator response.   FEV1 is 65% or 1.68 liters.  Lung volumes show  loss of TLC with restriction 66%.  Diffusion shows reduced but falls within normal range when corrected for lung volumes.   Pulmonary functions show  Mild obstruction and also restriction. Clinical correlation recommended.     Mikal Serrano M.D.      Study was done at [facility]  Echo 10/12/2018 Normal left and right ventricular systolic functions with LVEF >55%.      Rhc 8/10/2022 [place]--Significant Diagnostic Studies: right heart cath  Right Heart Pressures RAP: 3 mmHg  RVP: 50/3 mmHg  Pulmonary artery pressure: 50/12 mmHg  Mean pulmonary artery pressure: 31 mmHg  Pulmonary capillary wedge pressure: 7 mmHg  Cardiac Output: 5.9 L/min  Cardiac index: 3 L/min/m2  Pulmonary vascular resistance: 4 HRU      6 minute walk study was  accomplished February 17, 2022.  Patient walked about 359 meters in 6 minutes.  She was 97 percent on room air.  The low O2 sat was 89 percent while walking.    6 minute walk study was accomplished September 15, 2021. Baseline room air saturation was 98%. With ambulation O2 sat fell to 87% by 2 minutes. Patient subsequently 2 L and then 3 L of oxygen to maintain sat in the low 90s. At the end of 6 minutes   walking on oxygen at 3 liters/minute the O2 saturation was 96%. Patient walked about 60% of the reference distance of 255 m.   6 min walk study was accomplished October 22, 2020. Baseline room air saturation was 98%. With ambulation O2 sat fell to 87% by 5 min. On 2 L of oxygen patient maintain sat in the low 90s subsequently. Patient walked about 133% of the reference distance   at 255 m.   6 min walk study was accomplished November 21, 2018.  Baseline room air saturation was 98%.  Walking on room air O2 sat did fall down to 91%.  Patient did walk 317 m or 73% of the reference distance    Plan:  Clinical impression is apparently straight forward and impression with management as below.     Edith was seen today for follow-up.    Diagnoses and all orders for this visit:    Chronic sinus complaints  -     montelukast (SINGULAIR) 10 mg tablet; Take 1 tablet (10 mg total) by mouth every evening.    Chronic obstructive pulmonary disease, unspecified COPD type  -     montelukast (SINGULAIR) 10 mg tablet; Take 1 tablet (10 mg total) by mouth every evening.    Other acute recurrent sinusitis  -     fluticasone propionate (FLONASE) 50 mcg/actuation nasal spray; 1 spray (50 mcg total) by Each Nostril route once daily.    Chronic seasonal allergic rhinitis  -     fluticasone propionate (FLONASE) 50 mcg/actuation nasal spray; 1 spray (50 mcg total) by Each Nostril route once daily.        Follow up in about 6 months (around 3/26/2023), or if symptoms worsen or fail to improve.    Discussed with patient above for education the  following:      Patient Instructions   Heart cath did not look too bad -- baseline NA.    Need to get portable oxygen concentrator repaired.    Would wish to see compliance report from cpap .    If new ct done -- bring on disc.       Evaluation took 44min to review ct and pft and rhc.

## 2022-09-30 ENCOUNTER — EXTERNAL CHRONIC CARE MANAGEMENT (OUTPATIENT)
Dept: PRIMARY CARE CLINIC | Facility: CLINIC | Age: 67
End: 2022-09-30
Payer: MEDICARE

## 2022-09-30 PROCEDURE — 99490 PR CHRONIC CARE MGMT, 1ST 20 MIN: ICD-10-PCS | Mod: S$PBB,,, | Performed by: NURSE PRACTITIONER

## 2022-09-30 PROCEDURE — 99490 CHRNC CARE MGMT STAFF 1ST 20: CPT | Mod: PBBFAC,PO | Performed by: NURSE PRACTITIONER

## 2022-09-30 PROCEDURE — 99490 CHRNC CARE MGMT STAFF 1ST 20: CPT | Mod: S$PBB,,, | Performed by: NURSE PRACTITIONER

## 2022-10-09 ENCOUNTER — PATIENT MESSAGE (OUTPATIENT)
Dept: ADMINISTRATIVE | Facility: OTHER | Age: 67
End: 2022-10-09
Payer: MEDICARE

## 2022-10-31 ENCOUNTER — EXTERNAL CHRONIC CARE MANAGEMENT (OUTPATIENT)
Dept: PRIMARY CARE CLINIC | Facility: CLINIC | Age: 67
End: 2022-10-31
Payer: MEDICARE

## 2022-10-31 PROCEDURE — 99490 CHRNC CARE MGMT STAFF 1ST 20: CPT | Mod: PBBFAC,PO | Performed by: NURSE PRACTITIONER

## 2022-10-31 PROCEDURE — 99490 CHRNC CARE MGMT STAFF 1ST 20: CPT | Mod: S$PBB,,, | Performed by: NURSE PRACTITIONER

## 2022-10-31 PROCEDURE — 99490 PR CHRONIC CARE MGMT, 1ST 20 MIN: ICD-10-PCS | Mod: S$PBB,,, | Performed by: NURSE PRACTITIONER

## 2022-11-14 ENCOUNTER — PATIENT MESSAGE (OUTPATIENT)
Dept: FAMILY MEDICINE | Facility: CLINIC | Age: 67
End: 2022-11-14
Payer: MEDICARE

## 2022-11-16 ENCOUNTER — HOSPITAL ENCOUNTER (OUTPATIENT)
Dept: RADIOLOGY | Facility: HOSPITAL | Age: 67
Discharge: HOME | End: 2022-11-16
Attending: PHYSICIAN ASSISTANT
Payer: MEDICARE

## 2022-11-16 ENCOUNTER — OFFICE VISIT (OUTPATIENT)
Dept: FAMILY MEDICINE | Facility: CLINIC | Age: 67
End: 2022-11-16
Payer: MEDICARE

## 2022-11-16 VITALS
WEIGHT: 203.69 LBS | DIASTOLIC BLOOD PRESSURE: 78 MMHG | TEMPERATURE: 98 F | HEIGHT: 66 IN | SYSTOLIC BLOOD PRESSURE: 118 MMHG | RESPIRATION RATE: 18 BRPM | BODY MASS INDEX: 32.73 KG/M2 | HEART RATE: 79 BPM | OXYGEN SATURATION: 97 %

## 2022-11-16 DIAGNOSIS — K42.9 UMBILICAL HERNIA WITHOUT OBSTRUCTION AND WITHOUT GANGRENE: Primary | ICD-10-CM

## 2022-11-16 DIAGNOSIS — K42.9 UMBILICAL HERNIA WITHOUT OBSTRUCTION AND WITHOUT GANGRENE: ICD-10-CM

## 2022-11-16 PROCEDURE — 99999 PR PBB SHADOW E&M-EST. PATIENT-LVL V: ICD-10-PCS | Mod: PBBFAC,,, | Performed by: PHYSICIAN ASSISTANT

## 2022-11-16 PROCEDURE — 74150 CT ABDOMEN WITHOUT CONTRAST: ICD-10-PCS | Mod: 26,,, | Performed by: INTERNAL MEDICINE

## 2022-11-16 PROCEDURE — 99215 OFFICE O/P EST HI 40 MIN: CPT | Mod: PBBFAC,25,PO | Performed by: PHYSICIAN ASSISTANT

## 2022-11-16 PROCEDURE — 99213 OFFICE O/P EST LOW 20 MIN: CPT | Mod: S$PBB,,, | Performed by: PHYSICIAN ASSISTANT

## 2022-11-16 PROCEDURE — 74150 CT ABDOMEN W/O CONTRAST: CPT | Mod: TC

## 2022-11-16 PROCEDURE — 74150 CT ABDOMEN W/O CONTRAST: CPT | Mod: 26,,, | Performed by: INTERNAL MEDICINE

## 2022-11-16 PROCEDURE — 99213 PR OFFICE/OUTPT VISIT, EST, LEVL III, 20-29 MIN: ICD-10-PCS | Mod: S$PBB,,, | Performed by: PHYSICIAN ASSISTANT

## 2022-11-16 PROCEDURE — 99999 PR PBB SHADOW E&M-EST. PATIENT-LVL V: CPT | Mod: PBBFAC,,, | Performed by: PHYSICIAN ASSISTANT

## 2022-11-16 PROCEDURE — 25500020 PHARM REV CODE 255: Performed by: PHYSICIAN ASSISTANT

## 2022-11-16 PROCEDURE — A9698 NON-RAD CONTRAST MATERIALNOC: HCPCS | Performed by: PHYSICIAN ASSISTANT

## 2022-11-16 RX ADMIN — BARIUM SULFATE 450 ML: 20 SUSPENSION ORAL at 01:11

## 2022-11-16 NOTE — PROGRESS NOTES
Health Maintenance Due   Topic     COVID-19 Vaccine (5 - Booster for Pfizer series) Not offered at this Facility

## 2022-11-16 NOTE — PROGRESS NOTES
Subjective:       Patient ID: Edith Tran is a 67 y.o. female.    Chief Complaint: Abdominal Pain and Edema (Abdominal)    HPI: 66 yo female with ILD, crohns, scleroderma presents for abdominal pain. Started Tyvaso on 11/1 which caused a cough. Began having pain around the navel area after that. Became severe on 11/13 and 11/14. No changed in BM no excess gas, had a BM today. + nausea.   Took nothing for pain.    Review of Systems   Constitutional:  Negative for fever.   Respiratory:  Positive for cough.    Gastrointestinal:  Positive for abdominal pain and nausea. Negative for change in bowel habit, constipation, diarrhea and change in bowel habit.       Objective:      Physical Exam  Constitutional:       Appearance: Normal appearance.   HENT:      Head: Normocephalic and atraumatic.   Abdominal:      Tenderness: There is abdominal tenderness in the periumbilical area.      Hernia: A hernia is present. Hernia is present in the umbilical area.       Neurological:      Mental Status: She is alert.   Psychiatric:         Mood and Affect: Mood normal.         Behavior: Behavior normal.       Assessment:       Problem List Items Addressed This Visit    None  Visit Diagnoses       Umbilical hernia without obstruction and without gangrene    -  Primary    Relevant Orders    CT Abdomen Without Contrast            Plan:         Edith was seen today for abdominal pain and edema.    Diagnoses and all orders for this visit:    Umbilical hernia without obstruction and without gangrene  -     CT Abdomen Without Contrast; no strangulation  -     Ambulatory referral/consult to General Surgery; Future; pt would like to see surgeon to discuss   -     advised a compression garment, avoid heavy lifting, controlled cough

## 2022-11-17 ENCOUNTER — OFFICE VISIT (OUTPATIENT)
Dept: SURGERY | Facility: CLINIC | Age: 67
End: 2022-11-17
Payer: MEDICARE

## 2022-11-17 VITALS
SYSTOLIC BLOOD PRESSURE: 137 MMHG | WEIGHT: 205.13 LBS | HEART RATE: 77 BPM | BODY MASS INDEX: 32.97 KG/M2 | DIASTOLIC BLOOD PRESSURE: 84 MMHG | HEIGHT: 66 IN | OXYGEN SATURATION: 97 %

## 2022-11-17 DIAGNOSIS — K42.9 UMBILICAL HERNIA WITHOUT OBSTRUCTION AND WITHOUT GANGRENE: Primary | ICD-10-CM

## 2022-11-17 PROCEDURE — 99202 PR OFFICE/OUTPT VISIT, NEW, LEVL II, 15-29 MIN: ICD-10-PCS | Mod: S$PBB,,, | Performed by: SURGERY

## 2022-11-17 PROCEDURE — 99214 OFFICE O/P EST MOD 30 MIN: CPT | Mod: PBBFAC | Performed by: SURGERY

## 2022-11-17 PROCEDURE — 99999 PR PBB SHADOW E&M-EST. PATIENT-LVL IV: CPT | Mod: PBBFAC,,, | Performed by: SURGERY

## 2022-11-17 PROCEDURE — 99202 OFFICE O/P NEW SF 15 MIN: CPT | Mod: S$PBB,,, | Performed by: SURGERY

## 2022-11-17 PROCEDURE — 99999 PR PBB SHADOW E&M-EST. PATIENT-LVL IV: ICD-10-PCS | Mod: PBBFAC,,, | Performed by: SURGERY

## 2022-11-17 NOTE — PROGRESS NOTES
68 y/o woman with history of abd swelling at umbilicus,     CT scan images reviewed.    Patient has small defect    PMH sig for pulomonary htn    Plan: cardiac clearance (risk stratification) prior to surgery    F/u after cardiology evaluation

## 2022-11-18 ENCOUNTER — OFFICE VISIT (OUTPATIENT)
Dept: CARDIOLOGY | Facility: CLINIC | Age: 67
End: 2022-11-18
Payer: MEDICARE

## 2022-11-18 VITALS
HEART RATE: 86 BPM | SYSTOLIC BLOOD PRESSURE: 148 MMHG | RESPIRATION RATE: 15 BRPM | HEIGHT: 66 IN | DIASTOLIC BLOOD PRESSURE: 80 MMHG | WEIGHT: 204.56 LBS | OXYGEN SATURATION: 96 % | BODY MASS INDEX: 32.88 KG/M2

## 2022-11-18 DIAGNOSIS — K42.9 UMBILICAL HERNIA WITHOUT OBSTRUCTION AND WITHOUT GANGRENE: ICD-10-CM

## 2022-11-18 DIAGNOSIS — R06.09 DOE (DYSPNEA ON EXERTION): ICD-10-CM

## 2022-11-18 DIAGNOSIS — I27.20 PULMONARY HYPERTENSION: Primary | ICD-10-CM

## 2022-11-18 DIAGNOSIS — K20.90 ESOPHAGITIS DETERMINED BY ENDOSCOPY: ICD-10-CM

## 2022-11-18 DIAGNOSIS — J44.9 COPD MIXED TYPE: ICD-10-CM

## 2022-11-18 DIAGNOSIS — R06.02 SOB (SHORTNESS OF BREATH): ICD-10-CM

## 2022-11-18 DIAGNOSIS — K21.9 GASTROESOPHAGEAL REFLUX DISEASE, UNSPECIFIED WHETHER ESOPHAGITIS PRESENT: ICD-10-CM

## 2022-11-18 DIAGNOSIS — M34.9 SCLERODERMA: ICD-10-CM

## 2022-11-18 DIAGNOSIS — M34.9 SYSTEMIC SCLEROSIS WITH LIMITED CUTANEOUS INVOLVEMENT: ICD-10-CM

## 2022-11-18 DIAGNOSIS — I10 ESSENTIAL HYPERTENSION: ICD-10-CM

## 2022-11-18 DIAGNOSIS — J84.9 INTERSTITIAL LUNG DISEASE: ICD-10-CM

## 2022-11-18 PROCEDURE — 99215 OFFICE O/P EST HI 40 MIN: CPT | Mod: PBBFAC | Performed by: INTERNAL MEDICINE

## 2022-11-18 PROCEDURE — 99204 OFFICE O/P NEW MOD 45 MIN: CPT | Mod: S$PBB,,, | Performed by: INTERNAL MEDICINE

## 2022-11-18 PROCEDURE — 93010 EKG 12-LEAD: ICD-10-PCS | Mod: S$PBB,,, | Performed by: INTERNAL MEDICINE

## 2022-11-18 PROCEDURE — 93010 ELECTROCARDIOGRAM REPORT: CPT | Mod: S$PBB,,, | Performed by: INTERNAL MEDICINE

## 2022-11-18 PROCEDURE — 99999 PR PBB SHADOW E&M-EST. PATIENT-LVL V: CPT | Mod: PBBFAC,,, | Performed by: INTERNAL MEDICINE

## 2022-11-18 PROCEDURE — 99204 PR OFFICE/OUTPT VISIT, NEW, LEVL IV, 45-59 MIN: ICD-10-PCS | Mod: S$PBB,,, | Performed by: INTERNAL MEDICINE

## 2022-11-18 PROCEDURE — 93005 ELECTROCARDIOGRAM TRACING: CPT | Mod: PBBFAC | Performed by: INTERNAL MEDICINE

## 2022-11-18 PROCEDURE — 99999 PR PBB SHADOW E&M-EST. PATIENT-LVL V: ICD-10-PCS | Mod: PBBFAC,,, | Performed by: INTERNAL MEDICINE

## 2022-11-18 NOTE — PROGRESS NOTES
CARDIOVASCULAR CONSULTATION    REASON FOR CONSULT:   Edith Tran is a 67 y.o. female who presents for evaluation    HISTORY OF PRESENT ILLNESS:     Patient is a pleasant 67-year-old lady with a medical history of pulmonary hypertension being managed at [facility], scleroderma, COPD, emphysema.  Needs to go for elective umbilical hernia repair.  Here for preoperative risk assessment.  Exercise tolerance limited to 1 city block because of dyspnea on exertion caused by her pulmonary issues.  Denies any chest pains at rest or on exertion.  Does state that she has significant family history of coronary artery disease      PAST MEDICAL HISTORY:     Past Medical History:   Diagnosis Date    Allergy     Arthritis     Back pain     Colon polyps     COPD (chronic obstructive pulmonary disease)     Emphysema of lung     GERD (gastroesophageal reflux disease)     Hypertension     Kidney stones     Obesity     Pneumonia     Pneumonia due to other staphylococcus     Scleroderma involving lung since she was 49 y/o    Sleep apnea     Trouble in sleeping        PAST SURGICAL HISTORY:     Past Surgical History:   Procedure Laterality Date    COLONOSCOPY N/A 8/7/2018    Procedure: COLONOSCOPY;  Surgeon: Ngozi Prince MD;  Location: [facility];  Service: Endoscopy;  Laterality: N/A;    COLONOSCOPY N/A 11/21/2019    Procedure: COLONOSCOPY;  Surgeon: Ngozi Prince MD;  Location: [facility];  Service: Endoscopy;  Laterality: N/A;    ESOPHAGEAL MANOMETRY WITH MEASUREMENT OF IMPEDANCE N/A 7/27/2020    Procedure: MANOMETRY, ESOPHAGUS, WITH IMPEDANCE MEASUREMENT;  Surgeon: Bhupendra Temple MD;  Location: [facility] ([address] [place] [address]);  Service: Endoscopy;  Laterality: N/A;  3/10 - pt confirmed apptCovid test ordered for 7/24 in [place]    ESOPHAGOGASTRODUODENOSCOPY N/A 8/7/2018    Procedure: EGD (ESOPHAGOGASTRODUODENOSCOPY);  Surgeon: Ngozi Prince MD;  Location: [facility];  Service: Endoscopy;  Laterality: N/A;    ESOPHAGOGASTRODUODENOSCOPY N/A  11/21/2019    Procedure: EGD (ESOPHAGOGASTRODUODENOSCOPY);  Surgeon: Ngozi Prince MD;  Location: [facility] ENDO;  Service: Endoscopy;  Laterality: N/A;    ESOPHAGOGASTRODUODENOSCOPY N/A 1/29/2020    Procedure: EGD (ESOPHAGOGASTRODUODENOSCOPY);  Surgeon: Ngozi Prince MD;  Location: [facility] ENDO;  Service: Endoscopy;  Laterality: N/A;    ESOPHAGOGASTRODUODENOSCOPY N/A 8/20/2020    Procedure: EGD (ESOPHAGOGASTRODUODENOSCOPY);  Surgeon: Ngozi Prince MD;  Location: [facility] ENDO;  Service: Endoscopy;  Laterality: N/A;    ESOPHAGOGASTRODUODENOSCOPY N/A 12/4/2020    Procedure: EGD (ESOPHAGOGASTRODUODENOSCOPY);  Surgeon: Ngozi Prince MD;  Location: [facility] ENDO;  Service: Endoscopy;  Laterality: N/A;    ESOPHAGOGASTRODUODENOSCOPY N/A 11/19/2021    Procedure: EGD (ESOPHAGOGASTRODUODENOSCOPY);  Surgeon: Ngozi Prince MD;  Location: [facility] ENDO;  Service: Endoscopy;  Laterality: N/A;       ALLERGIES AND MEDICATION:     Review of patient's allergies indicates:   Allergen Reactions    Hydroxychloroquine Other (See Comments)     Having eye reaction, needs to stop plaquenil and stay off of it.         Medication List            Accurate as of November 18, 2022 11:16 AM. If you have any questions, ask your nurse or doctor.                CONTINUE taking these medications      albuterol 90 mcg/actuation inhaler  Commonly known as: PROVENTIL/VENTOLIN HFA  2 puffs every 4 hours as needed for cough, wheeze, or shortness of breath     ergocalciferol 50,000 unit Cap  Commonly known as: ERGOCALCIFEROL  1 po twice a wk     fluticasone propionate 50 mcg/actuation nasal spray  Commonly known as: FLONASE  1 spray (50 mcg total) by Each Nostril route once daily.     losartan 25 MG tablet  Commonly known as: COZAAR  Take 1 tablet (25 mg total) by mouth once daily.     macitentan 10 mg Tab     magnesium oxide 400 mg (241.3 mg magnesium) tablet  Commonly known as: MAG-OX  Take 1 tablet (400 mg total) by mouth once daily.      montelukast 10 mg tablet  Commonly known as: SINGULAIR  Take 1 tablet (10 mg total) by mouth every evening.     mycophenolate 500 mg Tab  Commonly known as: CELLCEPT  Take 3 tablets (1,500 mg total) by mouth 2 (two) times daily.     MYRBETRIQ 50 mg Tb24  Generic drug: mirabegron  Take 1 tablet (50 mg total) by mouth once daily.     nintedanib 150 mg Cap  Commonly known as: OFEV     ondansetron 8 MG tablet  Commonly known as: ZOFRAN  Take 1 tablet (8 mg total) by mouth every 8 (eight) hours as needed for Nausea.     pantoprazole 40 MG tablet  Commonly known as: PROTONIX  TAKE 1 TABLET(40 MG) BY MOUTH TWICE DAILY     tadalafiL 20 MG Tab  Commonly known as: CIALIS  TAKE ONE TABLET BY MOUTH TWICE A DAY     traZODone 50 MG tablet  Commonly known as: DESYREL  TAKE 2 TABLETS(100 MG) BY MOUTH EVERY NIGHT AS NEEDED FOR INSOMNIA              SOCIAL HISTORY:     Social History     Socioeconomic History    Marital status:     Number of children: 1    Years of education: 14    Highest education level: Associate degree: occupational, technical, or vocational program   Occupational History    Occupation: Retired   Tobacco Use    Smoking status: Former     Packs/day: 0.50     Years: 27.00     Pack years: 13.50     Types: Cigarettes     Start date:      Quit date: 1995     Years since quittin.2    Smokeless tobacco: Never   Substance and Sexual Activity    Alcohol use: No    Drug use: No    Sexual activity: Not Currently     Social Determinants of Health     Financial Resource Strain: Low Risk     Difficulty of Paying Living Expenses: Not hard at all   Food Insecurity: No Food Insecurity    Worried About Running Out of Food in the Last Year: Never true    Ran Out of Food in the Last Year: Never true   Transportation Needs: No Transportation Needs    Lack of Transportation (Medical): No    Lack of Transportation (Non-Medical): No   Physical Activity: Insufficiently Active    Days of Exercise per Week: 3 days     "Minutes of Exercise per Session: 30 min   Stress: No Stress Concern Present    Feeling of Stress : Not at all   Social Connections: Moderately Isolated    Frequency of Communication with Friends and Family: More than three times a week    Frequency of Social Gatherings with Friends and Family: More than three times a week    Attends Zoroastrian Services: Never    Active Member of Clubs or Organizations: Yes    Attends Club or Organization Meetings: More than 4 times per year    Marital Status:    Housing Stability: Low Risk     Unable to Pay for Housing in the Last Year: No    Number of Places Lived in the Last Year: 1    Unstable Housing in the Last Year: No       FAMILY HISTORY:     Family History   Problem Relation Age of Onset    Kidney disease Mother     Cancer Father     Heart disease Brother     Mental illness Son     Glaucoma Neg Hx     Macular degeneration Neg Hx     Retinal detachment Neg Hx        REVIEW OF SYSTEMS:   Review of Systems   Constitutional: Negative.   HENT: Negative.     Eyes: Negative.    Cardiovascular:  Positive for dyspnea on exertion.   Respiratory:  Positive for shortness of breath.    Endocrine: Negative.    Hematologic/Lymphatic: Negative.    Skin: Negative.    Musculoskeletal: Negative.    Gastrointestinal: Negative.    Genitourinary: Negative.    Neurological: Negative.    Psychiatric/Behavioral: Negative.     Allergic/Immunologic: Negative.      A 10 point review of systems was performed and all the pertinent positives have been mentioned. Rest of review of systems was negative.        PHYSICAL EXAM:     Vitals:    11/18/22 1057   BP: (!) 148/80   Pulse: 86   Resp: 15    Body mass index is 33.02 kg/m².  Weight: 92.8 kg (204 lb 9.4 oz)   Height: 5' 6" (167.6 cm)     Physical Exam  Constitutional:       Appearance: Normal appearance. She is well-developed.   HENT:      Head: Normocephalic.   Eyes:      Pupils: Pupils are equal, round, and reactive to light.   Cardiovascular:    "   Rate and Rhythm: Normal rate and regular rhythm.   Pulmonary:      Effort: Pulmonary effort is normal.      Breath sounds: Normal breath sounds.   Abdominal:      General: Bowel sounds are normal.      Palpations: Abdomen is soft.      Tenderness: There is no abdominal tenderness.   Musculoskeletal:         General: Normal range of motion.      Cervical back: Normal range of motion and neck supple.   Skin:     General: Skin is warm.   Neurological:      Mental Status: She is alert and oriented to person, place, and time.         DATA:     Laboratory:  CBC:  Recent Labs   Lab 09/14/21  0818 12/14/21  0815 03/16/22  0801   WBC 7.35 5.90 6.10   Hemoglobin 14.2 12.8 13.5   Hematocrit 44.4 39.7 43.2   Platelets 212 176 185       CHEMISTRIES:  Recent Labs   Lab 11/03/21  1309 12/14/21  0815 03/16/22  0801   Glucose 83 94 87   Sodium 142 143 143   Potassium 3.7 3.9 4.3   BUN 10 9 12   Creatinine 0.9 0.8 0.8   eGFR if African American >60 >60 >60   eGFR if non African American >60 >60 >60   Calcium 9.7 9.2 9.5       CARDIAC BIOMARKERS:        COAGS:        LIPIDS/LFTS:  Recent Labs   Lab 12/16/20  0856 03/05/21  0829 11/03/21  1309 12/14/21  0815 03/16/22  0801 08/19/22  0758   Cholesterol 194  --   --   --   --  191   Triglycerides 110  --   --   --   --  102   HDL 60  --   --   --   --  65 H   LDL Calculated  --   --   --   --   --  106   LDL Cholesterol 112.0  --   --   --   --   --    Non-HDL Cholesterol 134  --   --   --   --  126   AST 13   < > 16 16 18  --    ALT 9 L   < > 15 16 17  --     < > = values in this interval not displayed.       No results found for: HGBA1C    TSH        The 10-year ASCVD risk score (He[place] BLANCHARD, et al., 2019) is: 11.3%    Values used to calculate the score:      Age: 67 years      Sex: Female      Is Non- : No      Diabetic: No      Tobacco smoker: No      Systolic Blood Pressure: 148 mmHg      Is BP treated: Yes      HDL Cholesterol: 65 mg/dL      Total  Cholesterol: 191 mg/dL             ASSESSMENT AND PLAN     Patient Active Problem List   Diagnosis    Scleroderma    Interstitial lung disease    GERD (gastroesophageal reflux disease)    Raynaud's disease    Systemic sclerosis with limited cutaneous involvement    Pulmonary hypertension    Colon polyps    Essential hypertension    Screening for colon cancer    Idiopathic small intestinal ulcers    Esophagitis determined by endoscopy    Diverticulosis    Encounter for well woman exam with routine gynecological exam    COPD mixed type    Obstructive sleep apnea    CASH (dyspnea on exertion)    Colitis    Esophageal stricture    On home oxygen therapy    Esophageal leukoplakia    Dysphagia    Esophagitis    Disorder involving the immune mechanism, unspecified    Chronic hypoxemic respiratory failure    Crohn's disease of small intestine without complication    Hiatal hernia    Thoracic ascending aortic aneurysm         Preoperative risk assessment in a patient with multiple risk factors for coronary artery disease and dyspnea on exertion.  Check 2D echo and stress test.  Follow-up after testing        Thank you very much for involving me in the care of your patient.  Please do not hesitate to contact me if there are any questions.      Marie Frederick MD, FACC, [facility]  Interventional Cardiologist, [facility].           This note was dictated with the help of speech recognition software.  There might be un-intended errors and/or substitutions.

## 2022-11-18 NOTE — PROGRESS NOTES
CARDIOVASCULAR CONSULTATION    REASON FOR CONSULT:   Edith Tran is a 67 y.o. female who presents for ***.      HISTORY OF PRESENT ILLNESS:           PAST MEDICAL HISTORY:     Past Medical History:   Diagnosis Date    Allergy     Arthritis     Back pain     Colon polyps     COPD (chronic obstructive pulmonary disease)     Emphysema of lung     GERD (gastroesophageal reflux disease)     Hypertension     Kidney stones     Obesity     Pneumonia     Pneumonia due to other staphylococcus     Scleroderma involving lung since she was 49 y/o    Sleep apnea     Trouble in sleeping        PAST SURGICAL HISTORY:     Past Surgical History:   Procedure Laterality Date    COLONOSCOPY N/A 8/7/2018    Procedure: COLONOSCOPY;  Surgeon: Ngozi Prince MD;  Location: [facility];  Service: Endoscopy;  Laterality: N/A;    COLONOSCOPY N/A 11/21/2019    Procedure: COLONOSCOPY;  Surgeon: Ngozi Prince MD;  Location: [facility];  Service: Endoscopy;  Laterality: N/A;    ESOPHAGEAL MANOMETRY WITH MEASUREMENT OF IMPEDANCE N/A 7/27/2020    Procedure: MANOMETRY, ESOPHAGUS, WITH IMPEDANCE MEASUREMENT;  Surgeon: Bhupendra Temple MD;  Location: [facility] ([address] [place] [address]);  Service: Endoscopy;  Laterality: N/A;  3/10 - pt confirmed apptCovid test ordered for 7/24 in [place]    ESOPHAGOGASTRODUODENOSCOPY N/A 8/7/2018    Procedure: EGD (ESOPHAGOGASTRODUODENOSCOPY);  Surgeon: Ngozi Prince MD;  Location: [facility];  Service: Endoscopy;  Laterality: N/A;    ESOPHAGOGASTRODUODENOSCOPY N/A 11/21/2019    Procedure: EGD (ESOPHAGOGASTRODUODENOSCOPY);  Surgeon: Ngozi Prince MD;  Location: [facility];  Service: Endoscopy;  Laterality: N/A;    ESOPHAGOGASTRODUODENOSCOPY N/A 1/29/2020    Procedure: EGD (ESOPHAGOGASTRODUODENOSCOPY);  Surgeon: Ngozi Prince MD;  Location: [facility];  Service: Endoscopy;  Laterality: N/A;    ESOPHAGOGASTRODUODENOSCOPY N/A 8/20/2020    Procedure: EGD (ESOPHAGOGASTRODUODENOSCOPY);  Surgeon: Ngozi Prince MD;   Location: [facility] ENDO;  Service: Endoscopy;  Laterality: N/A;    ESOPHAGOGASTRODUODENOSCOPY N/A 12/4/2020    Procedure: EGD (ESOPHAGOGASTRODUODENOSCOPY);  Surgeon: Ngozi Prince MD;  Location: [facility] ENDO;  Service: Endoscopy;  Laterality: N/A;    ESOPHAGOGASTRODUODENOSCOPY N/A 11/19/2021    Procedure: EGD (ESOPHAGOGASTRODUODENOSCOPY);  Surgeon: Ngozi Prince MD;  Location: [facility] ENDO;  Service: Endoscopy;  Laterality: N/A;       ALLERGIES AND MEDICATION:     Review of patient's allergies indicates:   Allergen Reactions    Hydroxychloroquine Other (See Comments)     Having eye reaction, needs to stop plaquenil and stay off of it.         Medication List            Accurate as of November 18, 2022 11:18 AM. If you have any questions, ask your nurse or doctor.                CONTINUE taking these medications      albuterol 90 mcg/actuation inhaler  Commonly known as: PROVENTIL/VENTOLIN HFA  2 puffs every 4 hours as needed for cough, wheeze, or shortness of breath     ergocalciferol 50,000 unit Cap  Commonly known as: ERGOCALCIFEROL  1 po twice a wk     fluticasone propionate 50 mcg/actuation nasal spray  Commonly known as: FLONASE  1 spray (50 mcg total) by Each Nostril route once daily.     losartan 25 MG tablet  Commonly known as: COZAAR  Take 1 tablet (25 mg total) by mouth once daily.     macitentan 10 mg Tab     magnesium oxide 400 mg (241.3 mg magnesium) tablet  Commonly known as: MAG-OX  Take 1 tablet (400 mg total) by mouth once daily.     montelukast 10 mg tablet  Commonly known as: SINGULAIR  Take 1 tablet (10 mg total) by mouth every evening.     mycophenolate 500 mg Tab  Commonly known as: CELLCEPT  Take 3 tablets (1,500 mg total) by mouth 2 (two) times daily.     MYRBETRIQ 50 mg Tb24  Generic drug: mirabegron  Take 1 tablet (50 mg total) by mouth once daily.     nintedanib 150 mg Cap  Commonly known as: OFEV     ondansetron 8 MG tablet  Commonly known as: ZOFRAN  Take 1 tablet (8 mg total) by mouth  every 8 (eight) hours as needed for Nausea.     pantoprazole 40 MG tablet  Commonly known as: PROTONIX  TAKE 1 TABLET(40 MG) BY MOUTH TWICE DAILY     tadalafiL 20 MG Tab  Commonly known as: CIALIS  TAKE ONE TABLET BY MOUTH TWICE A DAY     traZODone 50 MG tablet  Commonly known as: DESYREL  TAKE 2 TABLETS(100 MG) BY MOUTH EVERY NIGHT AS NEEDED FOR INSOMNIA              SOCIAL HISTORY:     Social History     Socioeconomic History    Marital status:     Number of children: 1    Years of education: 14    Highest education level: Associate degree: occupational, technical, or vocational program   Occupational History    Occupation: Retired   Tobacco Use    Smoking status: Former     Packs/day: 0.50     Years: 27.00     Pack years: 13.50     Types: Cigarettes     Start date:      Quit date: 1995     Years since quittin.2    Smokeless tobacco: Never   Substance and Sexual Activity    Alcohol use: No    Drug use: No    Sexual activity: Not Currently     Social Determinants of Health     Financial Resource Strain: Low Risk     Difficulty of Paying Living Expenses: Not hard at all   Food Insecurity: No Food Insecurity    Worried About Running Out of Food in the Last Year: Never true    Ran Out of Food in the Last Year: Never true   Transportation Needs: No Transportation Needs    Lack of Transportation (Medical): No    Lack of Transportation (Non-Medical): No   Physical Activity: Insufficiently Active    Days of Exercise per Week: 3 days    Minutes of Exercise per Session: 30 min   Stress: No Stress Concern Present    Feeling of Stress : Not at all   Social Connections: Moderately Isolated    Frequency of Communication with Friends and Family: More than three times a week    Frequency of Social Gatherings with Friends and Family: More than three times a week    Attends Jewish Services: Never    Active Member of Clubs or Organizations: Yes    Attends Club or Organization Meetings: More than 4 times per  "year    Marital Status:    Housing Stability: Low Risk     Unable to Pay for Housing in the Last Year: No    Number of Places Lived in the Last Year: 1    Unstable Housing in the Last Year: No       FAMILY HISTORY:     Family History   Problem Relation Age of Onset    Kidney disease Mother     Cancer Father     Heart disease Brother     Mental illness Son     Glaucoma Neg Hx     Macular degeneration Neg Hx     Retinal detachment Neg Hx        REVIEW OF SYSTEMS:   ROS    A 10 point review of systems was performed and all the pertinent positives have been mentioned. Rest of review of systems was negative.        PHYSICAL EXAM:     Vitals:    11/18/22 1057   BP: (!) 148/80   Pulse: 86   Resp: 15    Body mass index is 33.02 kg/m².  Weight: 92.8 kg (204 lb 9.4 oz)   Height: 5' 6" (167.6 cm)     Physical Exam      DATA:     Laboratory:  CBC:  Recent Labs   Lab 09/14/21  0818 12/14/21  0815 03/16/22  0801   WBC 7.35 5.90 6.10   Hemoglobin 14.2 12.8 13.5   Hematocrit 44.4 39.7 43.2   Platelets 212 176 185       CHEMISTRIES:  Recent Labs   Lab 11/03/21  1309 12/14/21  0815 03/16/22  0801   Glucose 83 94 87   Sodium 142 143 143   Potassium 3.7 3.9 4.3   BUN 10 9 12   Creatinine 0.9 0.8 0.8   eGFR if African American >60 >60 >60   eGFR if non African American >60 >60 >60   Calcium 9.7 9.2 9.5       CARDIAC BIOMARKERS:        COAGS:        LIPIDS/LFTS:  Recent Labs   Lab 12/16/20  0856 03/05/21  0829 11/03/21  1309 12/14/21  0815 03/16/22  0801 08/19/22  0758   Cholesterol 194  --   --   --   --  191   Triglycerides 110  --   --   --   --  102   HDL 60  --   --   --   --  65 H   LDL Calculated  --   --   --   --   --  106   LDL Cholesterol 112.0  --   --   --   --   --    Non-HDL Cholesterol 134  --   --   --   --  126   AST 13   < > 16 16 18  --    ALT 9 L   < > 15 16 17  --     < > = values in this interval not displayed.       No results found for: HGBA1C    TSH        The 10-year ASCVD risk score (Heaven BLANCHARD, et al., " 2019) is: 11.3%    Values used to calculate the score:      Age: 67 years      Sex: Female      Is Non- : No      Diabetic: No      Tobacco smoker: No      Systolic Blood Pressure: 148 mmHg      Is BP treated: Yes      HDL Cholesterol: 65 mg/dL      Total Cholesterol: 191 mg/dL             ASSESSMENT AND PLAN     Patient Active Problem List   Diagnosis    Scleroderma    Interstitial lung disease    GERD (gastroesophageal reflux disease)    Raynaud's disease    Systemic sclerosis with limited cutaneous involvement    Pulmonary hypertension    Colon polyps    Essential hypertension    Screening for colon cancer    Idiopathic small intestinal ulcers    Esophagitis determined by endoscopy    Diverticulosis    Encounter for well woman exam with routine gynecological exam    COPD mixed type    Obstructive sleep apnea    CASH (dyspnea on exertion)    Colitis    Esophageal stricture    On home oxygen therapy    Esophageal leukoplakia    Dysphagia    Esophagitis    Disorder involving the immune mechanism, unspecified    Chronic hypoxemic respiratory failure    Crohn's disease of small intestine without complication    Hiatal hernia    Thoracic ascending aortic aneurysm                 Thank you very much for involving me in the care of your patient.  Please do not hesitate to contact me if there are any questions.      Marie Frederick MD, FACC, [facility]  Interventional Cardiologist, [facility].           This note was dictated with the help of speech recognition software.  There might be un-intended errors and/or substitutions.

## 2022-11-21 ENCOUNTER — PATIENT MESSAGE (OUTPATIENT)
Dept: SURGERY | Facility: CLINIC | Age: 67
End: 2022-11-21
Payer: MEDICARE

## 2022-11-22 ENCOUNTER — PATIENT MESSAGE (OUTPATIENT)
Dept: CARDIOLOGY | Facility: CLINIC | Age: 67
End: 2022-11-22
Payer: MEDICARE

## 2022-11-30 ENCOUNTER — EXTERNAL CHRONIC CARE MANAGEMENT (OUTPATIENT)
Dept: PRIMARY CARE CLINIC | Facility: CLINIC | Age: 67
End: 2022-11-30
Payer: MEDICARE

## 2022-11-30 PROCEDURE — 99490 CHRNC CARE MGMT STAFF 1ST 20: CPT | Mod: PBBFAC,PO | Performed by: NURSE PRACTITIONER

## 2022-11-30 PROCEDURE — 99490 PR CHRONIC CARE MGMT, 1ST 20 MIN: ICD-10-PCS | Mod: S$PBB,,, | Performed by: NURSE PRACTITIONER

## 2022-11-30 PROCEDURE — 99490 CHRNC CARE MGMT STAFF 1ST 20: CPT | Mod: S$PBB,,, | Performed by: NURSE PRACTITIONER

## 2022-12-01 ENCOUNTER — HOSPITAL ENCOUNTER (OUTPATIENT)
Dept: CARDIOLOGY | Facility: HOSPITAL | Age: 67
Discharge: HOME | End: 2022-12-01
Attending: INTERNAL MEDICINE
Payer: MEDICARE

## 2022-12-01 ENCOUNTER — HOSPITAL ENCOUNTER (OUTPATIENT)
Dept: RADIOLOGY | Facility: HOSPITAL | Age: 67
Discharge: HOME | End: 2022-12-01
Attending: INTERNAL MEDICINE
Payer: MEDICARE

## 2022-12-01 DIAGNOSIS — K42.9 UMBILICAL HERNIA WITHOUT OBSTRUCTION AND WITHOUT GANGRENE: ICD-10-CM

## 2022-12-01 DIAGNOSIS — I27.20 PULMONARY HYPERTENSION: ICD-10-CM

## 2022-12-01 DIAGNOSIS — R06.02 SOB (SHORTNESS OF BREATH): ICD-10-CM

## 2022-12-01 LAB
ASCENDING AORTA: 3.22 CM
AV PEAK GRADIENT: 6 MMHG
AV VELOCITY RATIO: 0.69
CV ECHO LV RWT: 0.46 CM
CV STRESS BASE HR: 75 BPM
DIASTOLIC BLOOD PRESSURE: 79 MMHG
DOP CALC AO PEAK VEL: 1.24 M/S
DOP CALC LVOT AREA: 3.1 CM2
DOP CALC LVOT DIAMETER: 1.99 CM
DOP CALC LVOT PEAK VEL: 0.86 M/S
DOP CALC LVOT STROKE VOLUME: 61.86 CM3
DOP CALCLVOT PEAK VEL VTI: 19.9 CM
E WAVE DECELERATION TIME: 153.55 MSEC
E/A RATIO: 0.88
E/E' RATIO: 10.67 M/S
ECHO LV POSTERIOR WALL: 1.14 CM (ref 0.6–1.1)
EJECTION FRACTION: 65 %
FRACTIONAL SHORTENING: 37 % (ref 28–44)
INTERVENTRICULAR SEPTUM: 0.94 CM (ref 0.6–1.1)
IVC DIAMETER: 1.39 CM
IVRT: 96.89 MSEC
LA MAJOR: 5.75 CM
LA MINOR: 5.94 CM
LA WIDTH: 4.6 CM
LEFT ATRIUM SIZE: 4.36 CM
LEFT ATRIUM VOLUME: 99.62 CM3
LEFT INTERNAL DIMENSION IN SYSTOLE: 3.16 CM (ref 2.1–4)
LEFT VENTRICLE DIASTOLIC VOLUME: 117.49 ML
LEFT VENTRICLE SYSTOLIC VOLUME: 39.71 ML
LEFT VENTRICULAR INTERNAL DIMENSION IN DIASTOLE: 4.99 CM (ref 3.5–6)
LEFT VENTRICULAR MASS: 191.25 G
LV LATERAL E/E' RATIO: 10 M/S
LV SEPTAL E/E' RATIO: 11.43 M/S
LVOT MG: 2.29 MMHG
LVOT MV: 0.74 CM/S
MV PEAK A VEL: 0.91 M/S
MV PEAK E VEL: 0.8 M/S
MV STENOSIS PRESSURE HALF TIME: 44.53 MS
MV VALVE AREA P 1/2 METHOD: 4.94 CM2
NUC STRESS EJECTION FRACTION: 86 %
OHS CV CPX 85 PERCENT MAX PREDICTED HEART RATE MALE: 125
OHS CV CPX MAX PREDICTED HEART RATE: 147
OHS CV CPX PATIENT IS FEMALE: 1
OHS CV CPX PATIENT IS MALE: 0
OHS CV CPX PEAK DIASTOLIC BLOOD PRESSURE: 74 MMHG
OHS CV CPX PEAK HEAR RATE: 99 BPM
OHS CV CPX PEAK RATE PRESSURE PRODUCT: NORMAL
OHS CV CPX PEAK SYSTOLIC BLOOD PRESSURE: 127 MMHG
OHS CV CPX PERCENT MAX PREDICTED HEART RATE ACHIEVED: 67
OHS CV CPX RATE PRESSURE PRODUCT PRESENTING: NORMAL
PISA TR MAX VEL: 1.72 M/S
PV PEAK VELOCITY: 1.19 CM/S
RA MAJOR: 5.49 CM
RA PRESSURE: 3 MMHG
RA WIDTH: 4.5 CM
RIGHT VENTRICULAR END-DIASTOLIC DIMENSION: 3.65 CM
SINUS: 3.8 CM
STJ: 3.06 CM
SYSTOLIC BLOOD PRESSURE: 134 MMHG
TDI LATERAL: 0.08 M/S
TDI SEPTAL: 0.07 M/S
TDI: 0.08 M/S
TR MAX PG: 12 MMHG
TRICUSPID ANNULAR PLANE SYSTOLIC EXCURSION: 1.8 CM
TV REST PULMONARY ARTERY PRESSURE: 15 MMHG

## 2022-12-01 PROCEDURE — 93016 CV STRESS TEST SUPVJ ONLY: CPT | Mod: ,,, | Performed by: INTERNAL MEDICINE

## 2022-12-01 PROCEDURE — 93018 CV STRESS TEST I&R ONLY: CPT | Mod: ,,, | Performed by: INTERNAL MEDICINE

## 2022-12-01 PROCEDURE — 93018 NUCLEAR STRESS - CARDIOLOGY INTERPRETED (CUPID ONLY): ICD-10-PCS | Mod: ,,, | Performed by: INTERNAL MEDICINE

## 2022-12-01 PROCEDURE — 93306 ECHO (CUPID ONLY): ICD-10-PCS | Mod: 26,,, | Performed by: INTERNAL MEDICINE

## 2022-12-01 PROCEDURE — 93017 CV STRESS TEST TRACING ONLY: CPT

## 2022-12-01 PROCEDURE — 93016 NUCLEAR STRESS - CARDIOLOGY INTERPRETED (CUPID ONLY): ICD-10-PCS | Mod: ,,, | Performed by: INTERNAL MEDICINE

## 2022-12-01 PROCEDURE — 93306 TTE W/DOPPLER COMPLETE: CPT

## 2022-12-01 PROCEDURE — 63600175 PHARM REV CODE 636 W HCPCS: Performed by: INTERNAL MEDICINE

## 2022-12-01 PROCEDURE — A9502 TC99M TETROFOSMIN: HCPCS

## 2022-12-01 PROCEDURE — 78452 HT MUSCLE IMAGE SPECT MULT: CPT | Mod: 26,,, | Performed by: INTERNAL MEDICINE

## 2022-12-01 PROCEDURE — 93306 TTE W/DOPPLER COMPLETE: CPT | Mod: 26,,, | Performed by: INTERNAL MEDICINE

## 2022-12-01 PROCEDURE — 78452 NUCLEAR STRESS - CARDIOLOGY INTERPRETED (CUPID ONLY): ICD-10-PCS | Mod: 26,,, | Performed by: INTERNAL MEDICINE

## 2022-12-01 RX ORDER — REGADENOSON 0.08 MG/ML
0.4 INJECTION, SOLUTION INTRAVENOUS ONCE
Status: COMPLETED | OUTPATIENT
Start: 2022-12-01 | End: 2022-12-01

## 2022-12-01 RX ADMIN — REGADENOSON 0.4 MG: 0.08 INJECTION, SOLUTION INTRAVENOUS at 08:12

## 2022-12-10 NOTE — PATIENT INSTRUCTIONS
Lose 5 pounds.  Suggest [place] diet  Get a shingles shot and Tdap  at your pharmacy.    Thank you for choosing [facility].     Please fill out the patient experience survey.   Time-based billing (NON-critical care) Time-based billing (NON-critical care) Time-based billing (NON-critical care) Time-based billing (NON-critical care) Time-based billing (NON-critical care) Time-based billing (NON-critical care) Time-based billing (NON-critical care) Time-based billing (NON-critical care) Time-based billing (NON-critical care)

## 2022-12-15 ENCOUNTER — OFFICE VISIT (OUTPATIENT)
Dept: CARDIOLOGY | Facility: CLINIC | Age: 67
End: 2022-12-15
Payer: MEDICARE

## 2022-12-15 VITALS
HEART RATE: 99 BPM | SYSTOLIC BLOOD PRESSURE: 132 MMHG | BODY MASS INDEX: 32.19 KG/M2 | DIASTOLIC BLOOD PRESSURE: 81 MMHG | RESPIRATION RATE: 16 BRPM | WEIGHT: 199.44 LBS | OXYGEN SATURATION: 98 %

## 2022-12-15 DIAGNOSIS — M34.9 SCLERODERMA: Primary | ICD-10-CM

## 2022-12-15 DIAGNOSIS — G47.33 OBSTRUCTIVE SLEEP APNEA: ICD-10-CM

## 2022-12-15 DIAGNOSIS — R13.10 DYSPHAGIA, UNSPECIFIED TYPE: ICD-10-CM

## 2022-12-15 DIAGNOSIS — M34.9 SYSTEMIC SCLEROSIS WITH LIMITED CUTANEOUS INVOLVEMENT: ICD-10-CM

## 2022-12-15 DIAGNOSIS — I10 ESSENTIAL HYPERTENSION: ICD-10-CM

## 2022-12-15 DIAGNOSIS — I27.20 PULMONARY HYPERTENSION: ICD-10-CM

## 2022-12-15 DIAGNOSIS — J84.9 INTERSTITIAL LUNG DISEASE: ICD-10-CM

## 2022-12-15 DIAGNOSIS — J44.9 COPD MIXED TYPE: ICD-10-CM

## 2022-12-15 DIAGNOSIS — K21.9 GASTROESOPHAGEAL REFLUX DISEASE, UNSPECIFIED WHETHER ESOPHAGITIS PRESENT: ICD-10-CM

## 2022-12-15 DIAGNOSIS — R06.09 DOE (DYSPNEA ON EXERTION): ICD-10-CM

## 2022-12-15 DIAGNOSIS — K20.90 ESOPHAGITIS: ICD-10-CM

## 2022-12-15 PROCEDURE — 99214 OFFICE O/P EST MOD 30 MIN: CPT | Mod: S$PBB,,, | Performed by: INTERNAL MEDICINE

## 2022-12-15 PROCEDURE — 99213 OFFICE O/P EST LOW 20 MIN: CPT | Mod: PBBFAC | Performed by: INTERNAL MEDICINE

## 2022-12-15 PROCEDURE — 99999 PR PBB SHADOW E&M-EST. PATIENT-LVL III: CPT | Mod: PBBFAC,,, | Performed by: INTERNAL MEDICINE

## 2022-12-15 PROCEDURE — 99999 PR PBB SHADOW E&M-EST. PATIENT-LVL III: ICD-10-PCS | Mod: PBBFAC,,, | Performed by: INTERNAL MEDICINE

## 2022-12-15 PROCEDURE — 99214 PR OFFICE/OUTPT VISIT, EST, LEVL IV, 30-39 MIN: ICD-10-PCS | Mod: S$PBB,,, | Performed by: INTERNAL MEDICINE

## 2022-12-15 RX ORDER — TREPROSTINIL 16-32 MCG
KIT INHALATION
COMMUNITY
Start: 2022-11-18 | End: 2022-12-19 | Stop reason: SDUPTHER

## 2022-12-15 NOTE — PROGRESS NOTES
CARDIOVASCULAR CONSULTATION    REASON FOR CONSULT:   Edith Tran is a 67 y.o. female who presents for evaluation    HISTORY OF PRESENT ILLNESS:     Patient is a pleasant 67-year-old lady with a medical history of pulmonary hypertension being managed at [facility], scleroderma, COPD, emphysema.  Needs to go for elective umbilical hernia repair.  Here for preoperative risk assessment.  Exercise tolerance limited to 1 city block because of dyspnea on exertion caused by her pulmonary issues.  Denies any chest pains at rest or on exertion.  Does state that she has significant family history of coronary artery disease    Notes from dec 22:  Patient here for follow-up.  Stress test did not show any significant ischemia.  Echo did not show any significant wall motion abnormalities.        Normal myocardial perfusion scan. There is no evidence of myocardial ischemia or infarction.    There is a mild intensity perfusion abnormality in the anterior wall of the left ventricle, secondary to breast attenuation.    The gated perfusion images showed an ejection fraction of 86% post stress.    The EKG portion of this study is negative for ischemia.    The patient reported no chest pain during the stress test.    There were no arrhythmias during stress.      The left ventricle is normal in size with normal systolic function.  The estimated ejection fraction is 65%.  Normal left ventricular diastolic function.  Normal right ventricular size with normal right ventricular systolic function.  Normal central venous pressure (3 mmHg).  The estimated PA systolic pressure is 15 mmHg.      PAST MEDICAL HISTORY:     Past Medical History:   Diagnosis Date    Allergy     Arthritis     Back pain     Colon polyps     COPD (chronic obstructive pulmonary disease)     Emphysema of lung     GERD (gastroesophageal reflux disease)     Hypertension     Kidney stones     Obesity     Pneumonia     Pneumonia due to other staphylococcus     Scleroderma involving  lung since she was 49 y/o    Sleep apnea     Trouble in sleeping        PAST SURGICAL HISTORY:     Past Surgical History:   Procedure Laterality Date    COLONOSCOPY N/A 8/7/2018    Procedure: COLONOSCOPY;  Surgeon: Ngozi Prince MD;  Location: [facility] ENDO;  Service: Endoscopy;  Laterality: N/A;    COLONOSCOPY N/A 11/21/2019    Procedure: COLONOSCOPY;  Surgeon: Ngozi Prince MD;  Location: [facility] ENDO;  Service: Endoscopy;  Laterality: N/A;    ESOPHAGEAL MANOMETRY WITH MEASUREMENT OF IMPEDANCE N/A 7/27/2020    Procedure: MANOMETRY, ESOPHAGUS, WITH IMPEDANCE MEASUREMENT;  Surgeon: Bhupendra Temple MD;  Location: [facility] ENDO (4TH FLR);  Service: Endoscopy;  Laterality: N/A;  3[facility]10 - [facility]    ESOPHAGOGASTRODUODENOSCOPY N/A 8/7/2018    Procedure: EGD (ESOPHAGOGASTRODUODENOSCOPY);  Surgeon: Ngozi Prince MD;  Location: [facility];  Service: Endoscopy;  Laterality: N/A;    ESOPHAGOGASTRODUODENOSCOPY N/A 11/21/2019    Procedure: EGD (ESOPHAGOGASTRODUODENOSCOPY);  Surgeon: Ngozi Prince MD;  Location: [facility] ENDO;  Service: Endoscopy;  Laterality: N/A;    ESOPHAGOGASTRODUODENOSCOPY N/A 1/29/2020    Procedure: EGD (ESOPHAGOGASTRODUODENOSCOPY);  Surgeon: Ngozi Prince MD;  Location: [facility] ENDO;  Service: Endoscopy;  Laterality: N/A;    ESOPHAGOGASTRODUODENOSCOPY N/A 8/20/2020    Procedure: EGD (ESOPHAGOGASTRODUODENOSCOPY);  Surgeon: Ngozi Prince MD;  Location: [facility] ENDO;  Service: Endoscopy;  Laterality: N/A;    ESOPHAGOGASTRODUODENOSCOPY N/A 12/4/2020    Procedure: EGD (ESOPHAGOGASTRODUODENOSCOPY);  Surgeon: Ngozi Prince MD;  Location: [facility] ENDO;  Service: Endoscopy;  Laterality: N/A;    ESOPHAGOGASTRODUODENOSCOPY N/A 11/19/2021    Procedure: EGD (ESOPHAGOGASTRODUODENOSCOPY);  Surgeon: Ngozi Prince MD;  Location: [facility] ENDO;  Service: Endoscopy;  Laterality: N/A;       ALLERGIES AND MEDICATION:     Review of patient's allergies indicates:   Allergen  Reactions    Hydroxychloroquine Other (See Comments)     Having eye reaction, needs to stop plaquenil and stay off of it.         Medication List            Accurate as of December 15, 2022  3:13 PM. If you have any questions, ask your nurse or doctor.                CONTINUE taking these medications      albuterol 90 mcg/actuation inhaler  Commonly known as: PROVENTIL/VENTOLIN HFA  2 puffs every 4 hours as needed for cough, wheeze, or shortness of breath     ergocalciferol 50,000 unit Cap  Commonly known as: ERGOCALCIFEROL  1 po twice a wk     fluticasone propionate 50 mcg/actuation nasal spray  Commonly known as: FLONASE  1 spray (50 mcg total) by Each Nostril route once daily.     losartan 25 MG tablet  Commonly known as: COZAAR  Take 1 tablet (25 mg total) by mouth once daily.     macitentan 10 mg Tab     magnesium oxide 400 mg (241.3 mg magnesium) tablet  Commonly known as: MAG-OX  Take 1 tablet (400 mg total) by mouth once daily.     montelukast 10 mg tablet  Commonly known as: SINGULAIR  Take 1 tablet (10 mg total) by mouth every evening.     mycophenolate 500 mg Tab  Commonly known as: CELLCEPT  Take 3 tablets (1,500 mg total) by mouth 2 (two) times daily.     MYRBETRIQ 50 mg Tb24  Generic drug: mirabegron  Take 1 tablet (50 mg total) by mouth once daily.     nintedanib 150 mg Cap  Commonly known as: OFEV     ondansetron 8 MG tablet  Commonly known as: ZOFRAN  Take 1 tablet (8 mg total) by mouth every 8 (eight) hours as needed for Nausea.     pantoprazole 40 MG tablet  Commonly known as: PROTONIX  TAKE 1 TABLET(40 MG) BY MOUTH TWICE DAILY     tadalafiL 20 MG Tab  Commonly known as: CIALIS  TAKE ONE TABLET BY MOUTH TWICE A DAY     traZODone 50 MG tablet  Commonly known as: DESYREL  Take 1 tablet (50 mg total) by mouth nightly as needed for Insomnia. TAKE 2 TABLETS(100 MG) BY MOUTH EVERY NIGHT AS NEEDED FOR INSOMNIA Strength: 50 mg     TYVASO DPI 16 mcg (112)- 32 mcg (84) Ctdv  Generic drug: treprostiniL               SOCIAL HISTORY:     Social History     Socioeconomic History    Marital status:     Number of children: 1    Years of education: 14    Highest education level: Associate degree: occupational, technical, or vocational program   Occupational History    Occupation: Retired   Tobacco Use    Smoking status: Former     Packs/day: 0.50     Years: 27.00     Pack years: 13.50     Types: Cigarettes     Start date:      Quit date: 1995     Years since quittin.3    Smokeless tobacco: Never   Substance and Sexual Activity    Alcohol use: No    Drug use: No    Sexual activity: Not Currently     Social Determinants of Health     Financial Resource Strain: Low Risk     Difficulty of Paying Living Expenses: Not hard at all   Food Insecurity: No Food Insecurity    Worried About Running Out of Food in the Last Year: Never true    Ran Out of Food in the Last Year: Never true   Transportation Needs: No Transportation Needs    Lack of Transportation (Medical): No    Lack of Transportation (Non-Medical): No   Physical Activity: Insufficiently Active    Days of Exercise per Week: 3 days    Minutes of Exercise per Session: 30 min   Stress: No Stress Concern Present    Feeling of Stress : Not at all   Social Connections: Moderately Isolated    Frequency of Communication with Friends and Family: More than three times a week    Frequency of Social Gatherings with Friends and Family: More than three times a week    Attends Mandaeism Services: Never    Active Member of Clubs or Organizations: Yes    Attends Club or Organization Meetings: More than 4 times per year    Marital Status:    Housing Stability: Low Risk     Unable to Pay for Housing in the Last Year: No    Number of Places Lived in the Last Year: 1    Unstable Housing in the Last Year: No       FAMILY HISTORY:     Family History   Problem Relation Age of Onset    Kidney disease Mother     Cancer Father     Heart disease Brother     Mental illness  Son     Glaucoma Neg Hx     Macular degeneration Neg Hx     Retinal detachment Neg Hx        REVIEW OF SYSTEMS:   Review of Systems   Constitutional: Negative.   HENT: Negative.     Eyes: Negative.    Cardiovascular:  Positive for dyspnea on exertion.   Respiratory:  Positive for shortness of breath.    Endocrine: Negative.    Hematologic/Lymphatic: Negative.    Skin: Negative.    Musculoskeletal: Negative.    Gastrointestinal: Negative.    Genitourinary: Negative.    Neurological: Negative.    Psychiatric/Behavioral: Negative.     Allergic/Immunologic: Negative.      A 10 point review of systems was performed and all the pertinent positives have been mentioned. Rest of review of systems was negative.        PHYSICAL EXAM:     Vitals:    12/15/22 1420   BP: 132/81   Pulse: 99   Resp: 16    Body mass index is 32.19 kg/m².  Weight: 90.5 kg (199 lb 6.5 oz)         Physical Exam  Constitutional:       Appearance: Normal appearance. She is well-developed.   HENT:      Head: Normocephalic.   Eyes:      Pupils: Pupils are equal, round, and reactive to light.   Cardiovascular:      Rate and Rhythm: Normal rate and regular rhythm.   Pulmonary:      Effort: Pulmonary effort is normal.      Breath sounds: Normal breath sounds.   Abdominal:      General: Bowel sounds are normal.      Palpations: Abdomen is soft.      Tenderness: There is no abdominal tenderness.   Musculoskeletal:         General: Normal range of motion.      Cervical back: Normal range of motion and neck supple.   Skin:     General: Skin is warm.   Neurological:      Mental Status: She is alert and oriented to person, place, and time.         DATA:     Laboratory:  CBC:  Recent Labs   Lab 09/14/21  0818 12/14/21  0815 03/16/22  0801   WBC 7.35 5.90 6.10   Hemoglobin 14.2 12.8 13.5   Hematocrit 44.4 39.7 43.2   Platelets 212 176 185         CHEMISTRIES:  Recent Labs   Lab 11/03/21  1309 12/14/21  0815 03/16/22  0801   Glucose 83 94 87   Sodium 142 143 143    Potassium 3.7 3.9 4.3   BUN 10 9 12   Creatinine 0.9 0.8 0.8   eGFR if African American >60 >60 >60   eGFR if non African American >60 >60 >60   Calcium 9.7 9.2 9.5         CARDIAC BIOMARKERS:        COAGS:        LIPIDS/LFTS:  Recent Labs   Lab 12/16/20  0856 03/05/21  0829 11/03/21  1309 12/14/21  0815 03/16/22  0801 08/19/22  0758   Cholesterol 194  --   --   --   --  191   Triglycerides 110  --   --   --   --  102   HDL 60  --   --   --   --  65 H   LDL Calculated  --   --   --   --   --  106   LDL Cholesterol 112.0  --   --   --   --   --    Non-HDL Cholesterol 134  --   --   --   --  126   AST 13   < > 16 16 18  --    ALT 9 L   < > 15 16 17  --     < > = values in this interval not displayed.         No results found for: HGBA1C    TSH        The 10-year ASCVD risk score (Heaven BLANCHARD, et al., 2019) is: 9.1%    Values used to calculate the score:      Age: 67 years      Sex: Female      Is Non- : No      Diabetic: No      Tobacco smoker: No      Systolic Blood Pressure: 132 mmHg      Is BP treated: Yes      HDL Cholesterol: 65 mg/dL      Total Cholesterol: 191 mg/dL             ASSESSMENT AND PLAN     Patient Active Problem List   Diagnosis    Scleroderma    Interstitial lung disease    GERD (gastroesophageal reflux disease)    Raynaud's disease    Systemic sclerosis with limited cutaneous involvement    Pulmonary hypertension    Colon polyps    Essential hypertension    Screening for colon cancer    Idiopathic small intestinal ulcers    Esophagitis determined by endoscopy    Diverticulosis    Encounter for well woman exam with routine gynecological exam    COPD mixed type    Obstructive sleep apnea    CASH (dyspnea on exertion)    Colitis    Esophageal stricture    On home oxygen therapy    Esophageal leukoplakia    Dysphagia    Esophagitis    Disorder involving the immune mechanism, unspecified    Chronic hypoxemic respiratory failure    Crohn's disease of small intestine without  complication    Hiatal hernia    Thoracic ascending aortic aneurysm         Preoperative risk assessment in a patient with multiple risk factors for coronary artery disease and dyspnea on exertion.  Check 2D echo and stress test. St did not show ischemia, echo wnl.    Patient at low/mod risk for coronary ischemia for surgery.    Fu in 6 m     Thank you very much for involving me in the care of your patient.  Please do not hesitate to contact me if there are any questions.      Marie Frederick MD, FACC, [facility]  Interventional Cardiologist, [facility].           This note was dictated with the help of speech recognition software.  There might be un-intended errors and/or substitutions.

## 2022-12-16 ENCOUNTER — ANESTHESIA EVENT (OUTPATIENT)
Dept: SURGERY | Facility: HOSPITAL | Age: 67
End: 2022-12-16
Payer: MEDICARE

## 2022-12-16 ENCOUNTER — PATIENT MESSAGE (OUTPATIENT)
Dept: SURGERY | Facility: CLINIC | Age: 67
End: 2022-12-16
Payer: MEDICARE

## 2022-12-16 DIAGNOSIS — K42.9 UMBILICAL HERNIA WITHOUT OBSTRUCTION AND WITHOUT GANGRENE: Primary | ICD-10-CM

## 2022-12-16 DIAGNOSIS — K43.9 VENTRAL HERNIA WITHOUT OBSTRUCTION OR GANGRENE: ICD-10-CM

## 2022-12-16 RX ORDER — LIDOCAINE HYDROCHLORIDE 10 MG/ML
1 INJECTION, SOLUTION EPIDURAL; INFILTRATION; INTRACAUDAL; PERINEURAL ONCE
Status: CANCELLED | OUTPATIENT
Start: 2022-12-16 | End: 2022-12-16

## 2022-12-16 RX ORDER — SODIUM CHLORIDE 9 MG/ML
INJECTION, SOLUTION INTRAVENOUS CONTINUOUS
Status: CANCELLED | OUTPATIENT
Start: 2022-12-16

## 2022-12-17 NOTE — PROGRESS NOTES
Subjective:       Patient ID: Edith Tran is a pleasant 67 y.o. White female patient    Chief Complaint: Follow-up      Patient is a pt I saw last on 09/19/2022, see my last notes and the list of problems below.    HPI     Pt with PMH of pulmonary hypertension being managed at [facility], scleroderma, COPD, and emphysema (also see the list of problems below) who comes today for a regular f-up visit.  From our last visit:  - 11/16/2022 MARIPOSA Kathleen, for umbilical hernia.  - Cardiology (Dr. Frederick, with last visit on 12/15/2022)  - Surgery (Dr. Evans).  Pt is here today with her partner.  Her umbilical hernia started to bother her before last visit with IRENA Porter, and CT was done. Dr. Evans plans to repair the hernia on 12/27/2022. Due to complicated medical situation, cardiac clearance was requested.  Pt underwent TTE and stress test that were with no major findings, TTE Nl and no ischemia at stress test.  Pt stated to be at low/mod risk for coronary ischemia for surgery.  No request of medical clearance.  Pt states she is doing well today. She has no specific complaint. She denies worsening SOB, CP, her hernia is not bothering her these days.  She would like refills for several of her medications.  She is willing to take the COVID bivalent booster.    Patient Active Problem List   Diagnosis    Scleroderma    Interstitial lung disease    GERD (gastroesophageal reflux disease)    Raynaud's disease    Systemic sclerosis with limited cutaneous involvement    Pulmonary hypertension    Colon polyps    Essential hypertension    Screening for colon cancer    Idiopathic small intestinal ulcers    Esophagitis determined by endoscopy    Diverticulosis    Encounter for well woman exam with routine gynecological exam    COPD mixed type    Obstructive sleep apnea    CASH (dyspnea on exertion)    Colitis    Esophageal stricture    On home oxygen therapy    Esophageal leukoplakia    Dysphagia    Esophagitis    Disorder involving the  "immune mechanism, unspecified    Chronic hypoxemic respiratory failure    Crohn's disease of small intestine without complication    Hiatal hernia    Thoracic ascending aortic aneurysm          ACTIVE MEDICAL ISSUES:  Documented in Problem List     PAST MEDICAL HISTORY  Documented     PAST SURGICAL HISTORY:  Documented     SOCIAL HISTORY:  Documented     FAMILY HISTORY:  Documented     ALLERGIES AND MEDICATIONS: updated and reviewed.  Documented    Review of Systems   Constitutional: Negative.  Negative for diaphoresis.   HENT: Negative.     Eyes:  Negative for visual disturbance.   Respiratory:  Positive for shortness of breath (stable). Negative for cough (rather controlled presently) and chest tightness.    Cardiovascular:  Negative for chest pain.   Gastrointestinal:         Umbilical hernia   Neurological:  Negative for dizziness, syncope, weakness and headaches.   Psychiatric/Behavioral:  Negative for dysphoric mood. The patient is not nervous/anxious.      Objective:      Physical Exam  Constitutional:       Appearance: Normal appearance. She is obese.   HENT:      Head: Normocephalic and atraumatic.      Right Ear: Tympanic membrane normal.      Left Ear: Tympanic membrane normal.   Abdominal:      Tenderness: There is no abdominal tenderness.      Hernia: A hernia is present. Hernia is present in the umbilical area.       Neurological:      Mental Status: She is alert.   Psychiatric:         Mood and Affect: Mood normal.         Behavior: Behavior normal.       Vitals:    12/19/22 0905   BP: 122/78   BP Location: Right arm   Patient Position: Sitting   BP Method: Medium (Manual)   Pulse: 85   Resp: 17   Temp: 97.6 °F (36.4 °C)   TempSrc: Oral   SpO2: 99%   Weight: 91.3 kg (201 lb 4.5 oz)   Height: 5' 6" (1.676 m)     Body mass index is 32.49 kg/m².    RESULTS: Reviewed labs from last 12 months    Last Lab Results:     Lab Results   Component Value Date    WBC 6.10 03/16/2022    HGB 13.5 03/16/2022    HCT " 43.2 03/16/2022     03/16/2022     03/16/2022    K 4.3 03/16/2022     03/16/2022    CO2 24 03/16/2022    BUN 12 03/16/2022    CREATININE 0.8 03/16/2022    CALCIUM 9.5 03/16/2022    ALBUMIN 3.8 03/16/2022    AST 18 03/16/2022    ALT 17 03/16/2022    CHOL 191 08/19/2022    TRIG 102 08/19/2022    HDL 65 (H) 08/19/2022    LDLCALC 106 08/19/2022       Assessment:       1. Umbilical hernia without obstruction and without gangrene    2. Renovascular hypertension    3. Pulmonary hypertension    4. Scleroderma    5. Insomnia, unspecified type    6. Vitamin D deficiency    7. Cramping of hands        Plan:   Edith was seen today for follow-up.    Diagnoses and all orders for this visit:    Umbilical hernia without obstruction and without gangrene    See HPI, calm at this point, will have surgery by Dr. Evans on 12/27/2022, clearance provided by Cardiology, no request of medical clearance.     Renovascular hypertension  -     losartan (COZAAR) 25 MG tablet; Take 1 tablet (25 mg total) by mouth once daily.    BP at goal, same treatment.    Pulmonary hypertension    F-up [facility] [place].    Scleroderma  -     losartan (COZAAR) 25 MG tablet; Take 1 tablet (25 mg total) by mouth once daily.    See above.    Insomnia, unspecified type  -     traZODone (DESYREL) 100 MG tablet; Take one pill in the evening PRN    Pt would like to receive 100 mg tablets as takes 2 50 mg ones at this point.    Vitamin D deficiency  -     ergocalciferol (ERGOCALCIFEROL) 50,000 unit Cap; Take one cap a week    Refill provided.    Cramping of hands  -     magnesium oxide (MAG-OX) 400 mg (241.3 mg magnesium) tablet; Take 1 tablet (400 mg total) by mouth once daily.    Other orders  -     mirabegron (MYRBETRIQ) 50 mg Tb24; Take 1 tablet (50 mg total) by mouth once daily.    Advised pt to take the COVID bivalent booster.    Follow up in about 6 months (around 6/19/2023) for f-up.    This note was created by combination of typed  and  M-Modal dictation.  Transcription errors may be present.  If there are any questions, please contact me.

## 2022-12-19 ENCOUNTER — OFFICE VISIT (OUTPATIENT)
Dept: FAMILY MEDICINE | Facility: CLINIC | Age: 67
End: 2022-12-19
Payer: MEDICARE

## 2022-12-19 VITALS
DIASTOLIC BLOOD PRESSURE: 78 MMHG | HEART RATE: 85 BPM | HEIGHT: 66 IN | BODY MASS INDEX: 32.34 KG/M2 | SYSTOLIC BLOOD PRESSURE: 122 MMHG | WEIGHT: 201.25 LBS | OXYGEN SATURATION: 99 % | TEMPERATURE: 98 F | RESPIRATION RATE: 17 BRPM

## 2022-12-19 DIAGNOSIS — E55.9 VITAMIN D DEFICIENCY: ICD-10-CM

## 2022-12-19 DIAGNOSIS — M34.9 SCLERODERMA: ICD-10-CM

## 2022-12-19 DIAGNOSIS — I27.20 PULMONARY HYPERTENSION: ICD-10-CM

## 2022-12-19 DIAGNOSIS — G47.00 INSOMNIA, UNSPECIFIED TYPE: ICD-10-CM

## 2022-12-19 DIAGNOSIS — K42.9 UMBILICAL HERNIA WITHOUT OBSTRUCTION AND WITHOUT GANGRENE: Primary | ICD-10-CM

## 2022-12-19 DIAGNOSIS — R25.2 CRAMPING OF HANDS: ICD-10-CM

## 2022-12-19 DIAGNOSIS — I15.0 RENOVASCULAR HYPERTENSION: ICD-10-CM

## 2022-12-19 PROCEDURE — 99999 PR PBB SHADOW E&M-EST. PATIENT-LVL IV: CPT | Mod: PBBFAC,,, | Performed by: INTERNAL MEDICINE

## 2022-12-19 PROCEDURE — 99214 OFFICE O/P EST MOD 30 MIN: CPT | Mod: S$PBB,,, | Performed by: INTERNAL MEDICINE

## 2022-12-19 PROCEDURE — 99214 PR OFFICE/OUTPT VISIT, EST, LEVL IV, 30-39 MIN: ICD-10-PCS | Mod: S$PBB,,, | Performed by: INTERNAL MEDICINE

## 2022-12-19 PROCEDURE — 99999 PR PBB SHADOW E&M-EST. PATIENT-LVL IV: ICD-10-PCS | Mod: PBBFAC,,, | Performed by: INTERNAL MEDICINE

## 2022-12-19 PROCEDURE — 99214 OFFICE O/P EST MOD 30 MIN: CPT | Mod: PBBFAC,PO | Performed by: INTERNAL MEDICINE

## 2022-12-19 RX ORDER — ERGOCALCIFEROL 1.25 MG/1
CAPSULE ORAL
Qty: 12 CAPSULE | Refills: 3 | Status: SHIPPED | OUTPATIENT
Start: 2022-12-19

## 2022-12-19 RX ORDER — TREPROSTINIL 48 UG/1
INHALANT ORAL
COMMUNITY
Start: 2022-12-08 | End: 2023-08-09

## 2022-12-19 RX ORDER — TRAZODONE HYDROCHLORIDE 100 MG/1
TABLET ORAL
Qty: 90 TABLET | Refills: 3 | Status: SHIPPED | OUTPATIENT
Start: 2022-12-19 | End: 2023-07-07 | Stop reason: SDUPTHER

## 2022-12-19 RX ORDER — MIRABEGRON 50 MG/1
50 TABLET, FILM COATED, EXTENDED RELEASE ORAL DAILY
Qty: 90 TABLET | Refills: 3 | Status: SHIPPED | OUTPATIENT
Start: 2022-12-19 | End: 2023-08-09

## 2022-12-19 RX ORDER — TADALAFIL 20 MG/1
TABLET ORAL
COMMUNITY
Start: 2022-11-29 | End: 2022-12-19 | Stop reason: SDUPTHER

## 2022-12-19 RX ORDER — LOSARTAN POTASSIUM 25 MG/1
25 TABLET ORAL DAILY
Qty: 90 TABLET | Refills: 3 | Status: SHIPPED | OUTPATIENT
Start: 2022-12-19 | End: 2023-07-07 | Stop reason: SDUPTHER

## 2022-12-19 RX ORDER — TREPROSTINIL 16-32 MCG
KIT INHALATION
COMMUNITY
Start: 2022-11-01 | End: 2022-12-19 | Stop reason: SDUPTHER

## 2022-12-19 RX ORDER — LANOLIN ALCOHOL/MO/W.PET/CERES
400 CREAM (GRAM) TOPICAL DAILY
Qty: 90 TABLET | Refills: 3 | Status: SHIPPED | OUTPATIENT
Start: 2022-12-19 | End: 2023-07-07 | Stop reason: SDUPTHER

## 2022-12-20 ENCOUNTER — HOSPITAL ENCOUNTER (OUTPATIENT)
Dept: PREADMISSION TESTING | Facility: HOSPITAL | Age: 67
Discharge: HOME | End: 2022-12-20
Attending: SURGERY
Payer: MEDICARE

## 2022-12-20 ENCOUNTER — HOSPITAL ENCOUNTER (OUTPATIENT)
Dept: RADIOLOGY | Facility: HOSPITAL | Age: 67
Discharge: HOME | End: 2022-12-20
Attending: SURGERY
Payer: MEDICARE

## 2022-12-20 VITALS
TEMPERATURE: 97 F | DIASTOLIC BLOOD PRESSURE: 71 MMHG | HEIGHT: 66 IN | SYSTOLIC BLOOD PRESSURE: 112 MMHG | RESPIRATION RATE: 18 BRPM | HEART RATE: 90 BPM | WEIGHT: 203.5 LBS | BODY MASS INDEX: 32.71 KG/M2 | OXYGEN SATURATION: 99 %

## 2022-12-20 DIAGNOSIS — Z01.818 PREOP TESTING: Primary | ICD-10-CM

## 2022-12-20 LAB
ANION GAP SERPL CALC-SCNC: 12 MMOL/L (ref 8–16)
BASOPHILS # BLD AUTO: 0.03 K/UL (ref 0–0.2)
BASOPHILS NFR BLD: 0.7 % (ref 0–1.9)
BUN SERPL-MCNC: 8 MG/DL (ref 8–23)
CALCIUM SERPL-MCNC: 9.4 MG/DL (ref 8.7–10.5)
CHLORIDE SERPL-SCNC: 107 MMOL/L (ref 95–110)
CO2 SERPL-SCNC: 22 MMOL/L (ref 23–29)
CREAT SERPL-MCNC: 0.8 MG/DL (ref 0.5–1.4)
DIFFERENTIAL METHOD: ABNORMAL
EOSINOPHIL # BLD AUTO: 0.2 K/UL (ref 0–0.5)
EOSINOPHIL NFR BLD: 3.8 % (ref 0–8)
ERYTHROCYTE [DISTWIDTH] IN BLOOD BY AUTOMATED COUNT: 13.1 % (ref 11.5–14.5)
EST. GFR  (NO RACE VARIABLE): >60 ML/MIN/1.73 M^2
GLUCOSE SERPL-MCNC: 72 MG/DL (ref 70–110)
HCT VFR BLD AUTO: 39.6 % (ref 37–48.5)
HGB BLD-MCNC: 12.5 G/DL (ref 12–16)
IMM GRANULOCYTES # BLD AUTO: 0.01 K/UL (ref 0–0.04)
IMM GRANULOCYTES NFR BLD AUTO: 0.2 % (ref 0–0.5)
LYMPHOCYTES # BLD AUTO: 0.7 K/UL (ref 1–4.8)
LYMPHOCYTES NFR BLD: 16.3 % (ref 18–48)
MCH RBC QN AUTO: 29.5 PG (ref 27–31)
MCHC RBC AUTO-ENTMCNC: 31.6 G/DL (ref 32–36)
MCV RBC AUTO: 93 FL (ref 82–98)
MONOCYTES # BLD AUTO: 0.4 K/UL (ref 0.3–1)
MONOCYTES NFR BLD: 8.4 % (ref 4–15)
NEUTROPHILS # BLD AUTO: 3.2 K/UL (ref 1.8–7.7)
NEUTROPHILS NFR BLD: 70.6 % (ref 38–73)
NRBC BLD-RTO: 0 /100 WBC
PLATELET # BLD AUTO: 162 K/UL (ref 150–450)
PMV BLD AUTO: 11.4 FL (ref 9.2–12.9)
POTASSIUM SERPL-SCNC: 3.7 MMOL/L (ref 3.5–5.1)
RBC # BLD AUTO: 4.24 M/UL (ref 4–5.4)
SODIUM SERPL-SCNC: 141 MMOL/L (ref 136–145)
WBC # BLD AUTO: 4.53 K/UL (ref 3.9–12.7)

## 2022-12-20 PROCEDURE — 71046 X-RAY EXAM CHEST 2 VIEWS: CPT | Mod: TC,FY

## 2022-12-20 PROCEDURE — 80048 BASIC METABOLIC PNL TOTAL CA: CPT | Performed by: SURGERY

## 2022-12-20 PROCEDURE — 71046 X-RAY EXAM CHEST 2 VIEWS: CPT | Mod: 26,,, | Performed by: RADIOLOGY

## 2022-12-20 PROCEDURE — 71046 XR CHEST PA AND LATERAL PRE-OP: ICD-10-PCS | Mod: 26,,, | Performed by: RADIOLOGY

## 2022-12-20 PROCEDURE — 85025 COMPLETE CBC W/AUTO DIFF WBC: CPT | Performed by: SURGERY

## 2022-12-20 NOTE — PRE-PROCEDURE INSTRUCTIONS
Mikal Serrano MD sent to Christiane Dillon NP  Caller: Unspecified (Today,  9:18 AM)  Pt lung disease is not very very severe.  She needs monitoring but is cleared for elective procedures.  Needs mobilization post op and may need increase ox.             Previous Messages     ----- Message -----   From: Christiane Dillon NP   Sent: 12/20/2022   9:22 AM CST   To: Mikal Serrano MD, Pee Goddard MD   Subject: REPAIR, HERNIA, INCISIONAL OR VENTRAL  12/27     Hi Dr. Goddard and Dr. Serrano,     I saw Ms. Sharma for pre-op today. She is scheduled for a Hernia repair with Dr. Evans on 12/27 under general anesthesia. This patient has received cardiac clearance following testing. Just wanted to check with both of you to see if this patient can be cleared from a pulmonary standpoint? Are there any special recommendations for her?         Thank you,     CRISTO Dickerson   Anesthesia   Pre-Admit

## 2022-12-20 NOTE — ANESTHESIA PREPROCEDURE EVALUATION
12/20/2022  Edith Tran is a 67 y.o., female scheduled for REPAIR, HERNIA, INCISIONAL OR VENTRAL, WITHOUT HISTORY OF PRIOR REPAIR (Abdomen) on 12/27/2022.        Past Medical History:   Diagnosis Date    Allergy     Arthritis     Back pain     Colon polyps     COPD (chronic obstructive pulmonary disease)     Emphysema of lung     GERD (gastroesophageal reflux disease)     Hypertension     Kidney stones     Obesity     Pneumonia     Pneumonia due to other staphylococcus     Scleroderma involving lung since she was 49 y/o    Sleep apnea     Trouble in sleeping        Past Surgical History:   Procedure Laterality Date    COLONOSCOPY N/A 8/7/2018    Procedure: COLONOSCOPY;  Surgeon: Ngozi Prince MD;  Location: [facility];  Service: Endoscopy;  Laterality: N/A;    COLONOSCOPY N/A 11/21/2019    Procedure: COLONOSCOPY;  Surgeon: Ngozi Prince MD;  Location: [facility];  Service: Endoscopy;  Laterality: N/A;    ESOPHAGEAL MANOMETRY WITH MEASUREMENT OF IMPEDANCE N/A 7/27/2020    Procedure: MANOMETRY, ESOPHAGUS, WITH IMPEDANCE MEASUREMENT;  Surgeon: Bhupendra Temple MD;  Location: [facility] ([address] [place] [address]);  Service: Endoscopy;  Laterality: N/A;  3/10 - pt confirmed apptCovid test ordered for 7/24 in [place].[facility]    ESOPHAGOGASTRODUODENOSCOPY N/A 8/7/2018    Procedure: EGD (ESOPHAGOGASTRODUODENOSCOPY);  Surgeon: Ngozi Prince MD;  Location: [facility];  Service: Endoscopy;  Laterality: N/A;    ESOPHAGOGASTRODUODENOSCOPY N/A 11/21/2019    Procedure: EGD (ESOPHAGOGASTRODUODENOSCOPY);  Surgeon: Ngozi Prince MD;  Location: [facility];  Service: Endoscopy;  Laterality: N/A;    ESOPHAGOGASTRODUODENOSCOPY N/A 1/29/2020    Procedure: EGD (ESOPHAGOGASTRODUODENOSCOPY);  Surgeon: Ngozi Prince MD;  Location: [facility];  Service: Endoscopy;  Laterality: N/A;    ESOPHAGOGASTRODUODENOSCOPY N/A  8/20/2020    Procedure: EGD (ESOPHAGOGASTRODUODENOSCOPY);  Surgeon: Ngozi Prince MD;  Location: [facility] ENDO;  Service: Endoscopy;  Laterality: N/A;    ESOPHAGOGASTRODUODENOSCOPY N/A 12/4/2020    Procedure: EGD (ESOPHAGOGASTRODUODENOSCOPY);  Surgeon: Ngozi Prince MD;  Location: [facility] ENDO;  Service: Endoscopy;  Laterality: N/A;    ESOPHAGOGASTRODUODENOSCOPY N/A 11/19/2021    Procedure: EGD (ESOPHAGOGASTRODUODENOSCOPY);  Surgeon: Ngozi Prince MD;  Location: [facility] ENDO;  Service: Endoscopy;  Laterality: N/A;           Pre-op Assessment    I have reviewed the Patient Summary Reports.     I have reviewed the Nursing Notes. I have reviewed the NPO Status.   I have reviewed the Medications.     Review of Systems  Anesthesia Hx:  No problems with previous Anesthesia  Denies Family Hx of Anesthesia complications.   Denies Personal Hx of Anesthesia complications.   Social:  Former Smoker, Social Alcohol Use    Hematology/Oncology:  Hematology Normal   Oncology Normal     EENT/Dental:EENT/Dental Normal   Cardiovascular:   Exercise tolerance: good Denies Pacemaker. Hypertension  Denies Valvular problems/Murmurs.  Denies MI.   Denies CABG/stent.  CASH Nuclear Stress 12/1/22    Normal myocardial perfusion scan. There is no evidence of myocardial ischemia or infarction.    There is a mild intensity perfusion abnormality in the anterior wall of the left ventricle, secondary to breast attenuation.    The gated perfusion images showed an ejection fraction of 86% post stress.    The EKG portion of this study is negative for ischemia.    The patient reported no chest pain during the stress test.    There were no arrhythmias during stress.      ECHO 12/1/22  ? The left ventricle is normal in size with normal systolic function.  ? The estimated ejection fraction is 65%.  ? Normal left ventricular diastolic function.  ? Normal right ventricular size with normal right ventricular systolic function.  ? Normal central  "venous pressure (3 mmHg).  ? The estimated PA systolic pressure is 15 mmHg.       EKG 11/18/22:  Sinus rhythm with 1st degree A-V block   Nonspecific T wave abnormality   Abnormal ECG   No previous ECGs available   Confirmed by Philip Greenberg MD ([id number]) on 11/19/2022 9:35:37 AM       Pulmonary:   Denies Pneumonia COPD Shortness of breath Sleep Apnea, CPAP Uses 02 PRN 3L  Scleroderma of lung; fibrosis   Renal/:   renal calculi    Hepatic/GI:   PUD, Hiatal Hernia, GERD    Musculoskeletal:  Musculoskeletal Normal    Neurological:  Neurology Normal    Endocrine:  Endocrine Normal    Dermatological:  Skin Normal    Psych:  Psychiatric Normal           Physical Exam  General: Well nourished, Cooperative, Alert and Oriented    Airway:  Mallampati: III   Mouth Opening: Small, but > 3cm  TM Distance: Normal  Tongue: Normal  Neck ROM: Normal ROM    Dental:  Intact        Anesthesia Plan  Type of Anesthesia, risks & benefits discussed:    Anesthesia Type: Gen ETT  Intra-op Monitoring Plan: Standard ASA Monitors  Post Op Pain Control Plan: multimodal analgesia  Induction:  IV  Informed Consent: Informed consent signed with the Patient and all parties understand the risks and agree with anesthesia plan.  All questions answered.   ASA Score: 3  Day of Surgery Review of History & Physical: H&P Update referred to the surgeon/provider.  Anesthesia Plan Notes: PMHx significant for  GERD,COPD, CASH, ELAINE, systemic sclerosis, pulmonary hypertension, and ILD   Patient has been cleared by Dr. Frederick-"Preoperative risk assessment in a patient with multiple risk factors for coronary artery disease and dyspnea on exertion. Stress test did not show ischemia, echo wnl.Patient at low/mod risk for coronary ischemia for surgery."    Ready For Surgery From Anesthesia Perspective.     .      "

## 2022-12-20 NOTE — DISCHARGE INSTRUCTIONS

## 2022-12-27 ENCOUNTER — ANESTHESIA (OUTPATIENT)
Dept: SURGERY | Facility: HOSPITAL | Age: 67
End: 2022-12-27
Payer: MEDICARE

## 2022-12-27 ENCOUNTER — HOSPITAL ENCOUNTER (OUTPATIENT)
Facility: HOSPITAL | Age: 67
Discharge: HOME | End: 2022-12-27
Attending: SURGERY | Admitting: SURGERY
Payer: MEDICARE

## 2022-12-27 VITALS
OXYGEN SATURATION: 95 % | RESPIRATION RATE: 18 BRPM | BODY MASS INDEX: 32.84 KG/M2 | DIASTOLIC BLOOD PRESSURE: 60 MMHG | TEMPERATURE: 98 F | SYSTOLIC BLOOD PRESSURE: 116 MMHG | HEART RATE: 60 BPM | WEIGHT: 203.5 LBS

## 2022-12-27 DIAGNOSIS — K43.9 VENTRAL HERNIA WITHOUT OBSTRUCTION OR GANGRENE: ICD-10-CM

## 2022-12-27 DIAGNOSIS — K42.9 UMBILICAL HERNIA WITHOUT OBSTRUCTION AND WITHOUT GANGRENE: ICD-10-CM

## 2022-12-27 LAB — POCT GLUCOSE: 79 MG/DL (ref 70–110)

## 2022-12-27 PROCEDURE — 71000016 HC POSTOP RECOV ADDL HR: Performed by: SURGERY

## 2022-12-27 PROCEDURE — 63600175 PHARM REV CODE 636 W HCPCS: Performed by: ANESTHESIOLOGY

## 2022-12-27 PROCEDURE — 37000009 HC ANESTHESIA EA ADD 15 MINS: Performed by: SURGERY

## 2022-12-27 PROCEDURE — 82962 GLUCOSE BLOOD TEST: CPT | Performed by: SURGERY

## 2022-12-27 PROCEDURE — 63600175 PHARM REV CODE 636 W HCPCS: Performed by: NURSE ANESTHETIST, CERTIFIED REGISTERED

## 2022-12-27 PROCEDURE — 25000003 PHARM REV CODE 250: Performed by: ANESTHESIOLOGY

## 2022-12-27 PROCEDURE — D9220A PRA ANESTHESIA: ICD-10-PCS | Mod: CRNA,,, | Performed by: NURSE ANESTHETIST, CERTIFIED REGISTERED

## 2022-12-27 PROCEDURE — 71000033 HC RECOVERY, INTIAL HOUR: Performed by: SURGERY

## 2022-12-27 PROCEDURE — 36000706: Performed by: SURGERY

## 2022-12-27 PROCEDURE — D9220A PRA ANESTHESIA: Mod: CRNA,,, | Performed by: NURSE ANESTHETIST, CERTIFIED REGISTERED

## 2022-12-27 PROCEDURE — 49587 PR REPAIR UMBILICAL HERN,5+Y/O,STRANG: ICD-10-PCS | Mod: GC,,, | Performed by: SURGERY

## 2022-12-27 PROCEDURE — D9220A PRA ANESTHESIA: ICD-10-PCS | Mod: ANES,,, | Performed by: ANESTHESIOLOGY

## 2022-12-27 PROCEDURE — 36000707: Performed by: SURGERY

## 2022-12-27 PROCEDURE — 25000003 PHARM REV CODE 250: Performed by: SURGERY

## 2022-12-27 PROCEDURE — 49587 PR REPAIR UMBILICAL HERN,5+Y/O,STRANG: CPT | Mod: GC,,, | Performed by: SURGERY

## 2022-12-27 PROCEDURE — 71000015 HC POSTOP RECOV 1ST HR: Performed by: SURGERY

## 2022-12-27 PROCEDURE — 37000008 HC ANESTHESIA 1ST 15 MINUTES: Performed by: SURGERY

## 2022-12-27 PROCEDURE — 63600175 PHARM REV CODE 636 W HCPCS: Performed by: STUDENT IN AN ORGANIZED HEALTH CARE EDUCATION/TRAINING PROGRAM

## 2022-12-27 PROCEDURE — D9220A PRA ANESTHESIA: Mod: ANES,,, | Performed by: ANESTHESIOLOGY

## 2022-12-27 PROCEDURE — 25000003 PHARM REV CODE 250: Performed by: NURSE ANESTHETIST, CERTIFIED REGISTERED

## 2022-12-27 PROCEDURE — 71000039 HC RECOVERY, EACH ADD'L HOUR: Performed by: SURGERY

## 2022-12-27 RX ORDER — HYDROMORPHONE HYDROCHLORIDE 2 MG/ML
0.4 INJECTION, SOLUTION INTRAMUSCULAR; INTRAVENOUS; SUBCUTANEOUS
Status: CANCELLED | OUTPATIENT
Start: 2022-12-27

## 2022-12-27 RX ORDER — LIDOCAINE HYDROCHLORIDE 10 MG/ML
1 INJECTION, SOLUTION EPIDURAL; INFILTRATION; INTRACAUDAL; PERINEURAL ONCE
Status: CANCELLED | OUTPATIENT
Start: 2022-12-27 | End: 2022-12-27

## 2022-12-27 RX ORDER — CEFAZOLIN SODIUM 2 G/50ML
2 SOLUTION INTRAVENOUS
Status: COMPLETED | OUTPATIENT
Start: 2022-12-27 | End: 2022-12-27

## 2022-12-27 RX ORDER — CEFAZOLIN SODIUM 2 G/50ML
2 SOLUTION INTRAVENOUS
Status: DISCONTINUED | OUTPATIENT
Start: 2022-12-27 | End: 2022-12-27

## 2022-12-27 RX ORDER — ROCURONIUM BROMIDE 10 MG/ML
INJECTION, SOLUTION INTRAVENOUS
Status: DISCONTINUED | OUTPATIENT
Start: 2022-12-27 | End: 2022-12-27

## 2022-12-27 RX ORDER — LIDOCAINE HYDROCHLORIDE 10 MG/ML
1 INJECTION, SOLUTION EPIDURAL; INFILTRATION; INTRACAUDAL; PERINEURAL ONCE
Status: DISCONTINUED | OUTPATIENT
Start: 2022-12-27 | End: 2022-12-27 | Stop reason: SDUPTHER

## 2022-12-27 RX ORDER — ONDANSETRON 2 MG/ML
4 INJECTION INTRAMUSCULAR; INTRAVENOUS EVERY 12 HOURS PRN
Status: DISCONTINUED | OUTPATIENT
Start: 2022-12-27 | End: 2022-12-27 | Stop reason: HOSPADM

## 2022-12-27 RX ORDER — SODIUM CHLORIDE 0.9 % (FLUSH) 0.9 %
3 SYRINGE (ML) INJECTION
Status: DISCONTINUED | OUTPATIENT
Start: 2022-12-27 | End: 2022-12-27 | Stop reason: HOSPADM

## 2022-12-27 RX ORDER — FENTANYL CITRATE 50 UG/ML
INJECTION, SOLUTION INTRAMUSCULAR; INTRAVENOUS
Status: DISCONTINUED | OUTPATIENT
Start: 2022-12-27 | End: 2022-12-27

## 2022-12-27 RX ORDER — PROPOFOL 10 MG/ML
VIAL (ML) INTRAVENOUS
Status: DISCONTINUED | OUTPATIENT
Start: 2022-12-27 | End: 2022-12-27

## 2022-12-27 RX ORDER — SODIUM CHLORIDE 9 MG/ML
INJECTION, SOLUTION INTRAVENOUS CONTINUOUS
Status: DISCONTINUED | OUTPATIENT
Start: 2022-12-27 | End: 2022-12-27 | Stop reason: HOSPADM

## 2022-12-27 RX ORDER — LIDOCAINE HYDROCHLORIDE 10 MG/ML
1 INJECTION, SOLUTION EPIDURAL; INFILTRATION; INTRACAUDAL; PERINEURAL ONCE
Status: DISCONTINUED | OUTPATIENT
Start: 2022-12-27 | End: 2022-12-27 | Stop reason: HOSPADM

## 2022-12-27 RX ORDER — HYDROMORPHONE HYDROCHLORIDE 2 MG/ML
0.2 INJECTION, SOLUTION INTRAMUSCULAR; INTRAVENOUS; SUBCUTANEOUS EVERY 5 MIN PRN
Status: DISCONTINUED | OUTPATIENT
Start: 2022-12-27 | End: 2022-12-27 | Stop reason: HOSPADM

## 2022-12-27 RX ORDER — SODIUM CHLORIDE, SODIUM LACTATE, POTASSIUM CHLORIDE, CALCIUM CHLORIDE 600; 310; 30; 20 MG/100ML; MG/100ML; MG/100ML; MG/100ML
INJECTION, SOLUTION INTRAVENOUS CONTINUOUS
Status: CANCELLED | OUTPATIENT
Start: 2022-12-27

## 2022-12-27 RX ORDER — ACETAMINOPHEN 500 MG
1000 TABLET ORAL
Status: COMPLETED | OUTPATIENT
Start: 2022-12-27 | End: 2022-12-27

## 2022-12-27 RX ORDER — BUPIVACAINE HYDROCHLORIDE AND EPINEPHRINE 2.5; 5 MG/ML; UG/ML
INJECTION, SOLUTION EPIDURAL; INFILTRATION; INTRACAUDAL; PERINEURAL
Status: DISCONTINUED | OUTPATIENT
Start: 2022-12-27 | End: 2022-12-27 | Stop reason: HOSPADM

## 2022-12-27 RX ORDER — DOCUSATE SODIUM 100 MG/1
100 CAPSULE, LIQUID FILLED ORAL 2 TIMES DAILY PRN
Qty: 30 CAPSULE | Refills: 0 | Status: SHIPPED | OUTPATIENT
Start: 2022-12-27 | End: 2023-07-07

## 2022-12-27 RX ORDER — MIDAZOLAM HYDROCHLORIDE 1 MG/ML
INJECTION, SOLUTION INTRAMUSCULAR; INTRAVENOUS
Status: DISCONTINUED | OUTPATIENT
Start: 2022-12-27 | End: 2022-12-27

## 2022-12-27 RX ORDER — SODIUM CHLORIDE, SODIUM LACTATE, POTASSIUM CHLORIDE, CALCIUM CHLORIDE 600; 310; 30; 20 MG/100ML; MG/100ML; MG/100ML; MG/100ML
INJECTION, SOLUTION INTRAVENOUS CONTINUOUS
Status: DISCONTINUED | OUTPATIENT
Start: 2022-12-27 | End: 2022-12-27 | Stop reason: HOSPADM

## 2022-12-27 RX ORDER — ACETAMINOPHEN 500 MG
1000 TABLET ORAL ONCE
Status: CANCELLED | OUTPATIENT
Start: 2022-12-27 | End: 2022-12-27

## 2022-12-27 RX ORDER — LIDOCAINE HYDROCHLORIDE 20 MG/ML
INJECTION INTRAVENOUS
Status: DISCONTINUED | OUTPATIENT
Start: 2022-12-27 | End: 2022-12-27

## 2022-12-27 RX ORDER — FENTANYL CITRATE 50 UG/ML
25 INJECTION, SOLUTION INTRAMUSCULAR; INTRAVENOUS EVERY 5 MIN PRN
Status: DISCONTINUED | OUTPATIENT
Start: 2022-12-27 | End: 2022-12-27 | Stop reason: HOSPADM

## 2022-12-27 RX ORDER — DEXAMETHASONE SODIUM PHOSPHATE 4 MG/ML
INJECTION, SOLUTION INTRA-ARTICULAR; INTRALESIONAL; INTRAMUSCULAR; INTRAVENOUS; SOFT TISSUE
Status: DISCONTINUED | OUTPATIENT
Start: 2022-12-27 | End: 2022-12-27

## 2022-12-27 RX ORDER — OXYCODONE HYDROCHLORIDE 5 MG/1
5 TABLET ORAL EVERY 4 HOURS PRN
Qty: 20 TABLET | Refills: 0 | Status: SHIPPED | OUTPATIENT
Start: 2022-12-27 | End: 2023-07-07

## 2022-12-27 RX ADMIN — HYDROMORPHONE HYDROCHLORIDE 0.2 MG: 2 INJECTION INTRAMUSCULAR; INTRAVENOUS; SUBCUTANEOUS at 08:12

## 2022-12-27 RX ADMIN — LIDOCAINE HYDROCHLORIDE 100 MG: 20 INJECTION, SOLUTION INTRAVENOUS at 07:12

## 2022-12-27 RX ADMIN — ONDANSETRON 4 MG: 2 INJECTION, SOLUTION INTRAMUSCULAR; INTRAVENOUS at 07:12

## 2022-12-27 RX ADMIN — ROCURONIUM BROMIDE 30 MG: 10 INJECTION, SOLUTION INTRAVENOUS at 07:12

## 2022-12-27 RX ADMIN — HYDROMORPHONE HYDROCHLORIDE 0.2 MG: 2 INJECTION INTRAMUSCULAR; INTRAVENOUS; SUBCUTANEOUS at 09:12

## 2022-12-27 RX ADMIN — DEXAMETHASONE SODIUM PHOSPHATE 8 MG: 4 INJECTION, SOLUTION INTRAMUSCULAR; INTRAVENOUS at 07:12

## 2022-12-27 RX ADMIN — PROPOFOL 150 MG: 10 INJECTION, EMULSION INTRAVENOUS at 07:12

## 2022-12-27 RX ADMIN — SUGAMMADEX 200 MG: 100 INJECTION, SOLUTION INTRAVENOUS at 08:12

## 2022-12-27 RX ADMIN — SODIUM CHLORIDE, SODIUM LACTATE, POTASSIUM CHLORIDE, AND CALCIUM CHLORIDE: .6; .31; .03; .02 INJECTION, SOLUTION INTRAVENOUS at 07:12

## 2022-12-27 RX ADMIN — MIDAZOLAM HYDROCHLORIDE 2 MG: 1 INJECTION, SOLUTION INTRAMUSCULAR; INTRAVENOUS at 07:12

## 2022-12-27 RX ADMIN — ACETAMINOPHEN 1000 MG: 500 TABLET ORAL at 06:12

## 2022-12-27 RX ADMIN — FENTANYL CITRATE 100 MCG: 50 INJECTION, SOLUTION INTRAMUSCULAR; INTRAVENOUS at 07:12

## 2022-12-27 RX ADMIN — CEFAZOLIN SODIUM 2 G: 2 SOLUTION INTRAVENOUS at 07:12

## 2022-12-27 NOTE — H&P
[facility] [place] [facility]  General Surgery  History & Physical    Patient Name: Edith Tran  MRN: [id number]  Admission Date: 12/27/2022  Attending Physician: Trent Evans MD   Primary Care Provider: Anny Ruvalcaba MD    Patient information was obtained from patient and past medical records.     Subjective:     Chief Complaint/Reason for Admission: Umbilical Hernia repair    History of Present Illness:  68 y/o woman with history of abd swelling at umbilicus,      CT scan images reviewed.     Patient has small defect     PMH sig for pulomonary htn    No current facility-administered medications on file prior to encounter.     Current Outpatient Medications on File Prior to Encounter   Medication Sig    fluticasone propionate (FLONASE) 50 mcg/actuation nasal spray 1 spray (50 mcg total) by Each Nostril route once daily.    macitentan 10 mg Tab Take 10 mg by mouth once daily.    montelukast (SINGULAIR) 10 mg tablet Take 1 tablet (10 mg total) by mouth every evening.    mycophenolate (CELLCEPT) 500 mg Tab Take 3 tablets (1,500 mg total) by mouth 2 (two) times daily.    nintedanib 150 mg Cap Take 150 mg by mouth every 12 (twelve) hours.    ondansetron (ZOFRAN) 8 MG tablet Take 1 tablet (8 mg total) by mouth every 8 (eight) hours as needed for Nausea.    pantoprazole (PROTONIX) 40 MG tablet TAKE 1 TABLET(40 MG) BY MOUTH TWICE DAILY    tadalafiL (CIALIS) 20 MG Tab TAKE ONE TABLET BY MOUTH TWICE A DAY    albuterol (PROVENTIL/VENTOLIN HFA) 90 mcg/actuation inhaler 2 puffs every 4 hours as needed for cough, wheeze, or shortness of breath       Review of patient's allergies indicates:   Allergen Reactions    Hydroxychloroquine Other (See Comments)     Having eye reaction, needs to stop plaquenil and stay off of it.        Past Medical History:   Diagnosis Date    Allergy     Arthritis     Back pain     Colon polyps     COPD (chronic obstructive pulmonary disease)     Emphysema of lung     GERD (gastroesophageal reflux  disease)     Hypertension     Kidney stones     Obesity     Pneumonia     Pneumonia due to other staphylococcus     Scleroderma involving lung since she was 49 y/o    Sleep apnea     Trouble in sleeping      Past Surgical History:   Procedure Laterality Date    COLONOSCOPY N/A 8/7/2018    Procedure: COLONOSCOPY;  Surgeon: Ngozi Prince MD;  Location: [facility] ENDO;  Service: Endoscopy;  Laterality: N/A;    COLONOSCOPY N/A 11/21/2019    Procedure: COLONOSCOPY;  Surgeon: Ngozi Prince MD;  Location: [facility] ENDO;  Service: Endoscopy;  Laterality: N/A;    ESOPHAGEAL MANOMETRY WITH MEASUREMENT OF IMPEDANCE N/A 7/27/2020    Procedure: MANOMETRY, ESOPHAGUS, WITH IMPEDANCE MEASUREMENT;  Surgeon: hBupendra Temple MD;  [facility]  Service: Endoscopy;  Laterality: N/A;  3/10 - pt confirmed apptCovid test ordered for 7/24 in [place].[facility]    ESOPHAGOGASTRODUODENOSCOPY N/A 8/7/2018    Procedure: EGD (ESOPHAGOGASTRODUODENOSCOPY);  Surgeon: Ngozi Prince MD;  Location: [facility] ENDO;  Service: Endoscopy;  Laterality: N/A;    ESOPHAGOGASTRODUODENOSCOPY N/A 11/21/2019    Procedure: EGD (ESOPHAGOGASTRODUODENOSCOPY);  Surgeon: Ngozi Prince MD;  Location: [facility] ENDO;  Service: Endoscopy;  Laterality: N/A;    ESOPHAGOGASTRODUODENOSCOPY N/A 1/29/2020    Procedure: EGD (ESOPHAGOGASTRODUODENOSCOPY);  Surgeon: Ngozi Prince MD;  Location: [facility] ENDO;  Service: Endoscopy;  Laterality: N/A;    ESOPHAGOGASTRODUODENOSCOPY N/A 8/20/2020    Procedure: EGD (ESOPHAGOGASTRODUODENOSCOPY);  Surgeon: Ngozi Prince MD;  Location: [facility] ENDO;  Service: Endoscopy;  Laterality: N/A;    ESOPHAGOGASTRODUODENOSCOPY N/A 12/4/2020    Procedure: EGD (ESOPHAGOGASTRODUODENOSCOPY);  Surgeon: Ngozi Prince MD;  Location: [facility] ENDO;  Service: Endoscopy;  Laterality: N/A;    ESOPHAGOGASTRODUODENOSCOPY N/A 11/19/2021    Procedure: EGD (ESOPHAGOGASTRODUODENOSCOPY);  Surgeon: Ngozi Prince MD;  Location: [facility];  Service:  Endoscopy;  Laterality: N/A;     Family History       Problem Relation (Age of Onset)    Cancer Father    Heart disease Brother    Kidney disease Mother    Mental illness Son          Tobacco Use    Smoking status: Former     Packs/day: 0.50     Years: 27.00     Pack years: 13.50     Types: Cigarettes     Start date:      Quit date: 1995     Years since quittin.3    Smokeless tobacco: Never   Substance and Sexual Activity    Alcohol use: No    Drug use: No    Sexual activity: Not Currently     Review of Systems   Constitutional: Negative.    HENT: Negative.     Respiratory: Negative.     Cardiovascular: Negative.    Gastrointestinal: Negative.    Endocrine: Negative.    Genitourinary: Negative.    Musculoskeletal: Negative.    Neurological: Negative.    Hematological: Negative.    Psychiatric/Behavioral: Negative.     Objective:     Vital Signs (Most Recent):  Temp: 98 °F (36.7 °C) (22)  Pulse: 84 (22)  Resp: 16 (22)  BP: (!) 117/56 (22)  SpO2: 97 % (22)   Vital Signs (24h Range):  Temp:  [98 °F (36.7 °C)] 98 °F (36.7 °C)  Pulse:  [84] 84  Resp:  [16] 16  SpO2:  [97 %] 97 %  BP: (117)/(56) 117/56     Weight: 92.3 kg (203 lb 7.8 oz)  Body mass index is 32.84 kg/m².    Physical Exam  Vitals and nursing note reviewed.   Constitutional:       Appearance: Normal appearance. She is not ill-appearing.   HENT:      Head: Normocephalic.      Mouth/Throat:      Mouth: Mucous membranes are moist.      Pharynx: Oropharynx is clear.   Eyes:      General: No scleral icterus.     Extraocular Movements: Extraocular movements intact.      Conjunctiva/sclera: Conjunctivae normal.   Cardiovascular:      Rate and Rhythm: Normal rate.      Pulses: Normal pulses.   Pulmonary:      Effort: Pulmonary effort is normal. No respiratory distress.      Breath sounds: No wheezing.   Abdominal:      General: Abdomen is flat. Bowel sounds are normal. There is no distension.       Palpations: Abdomen is soft.      Hernia: A hernia is present.   Musculoskeletal:         General: No swelling or tenderness. Normal range of motion.      Cervical back: Normal range of motion.   Skin:     General: Skin is warm and dry.      Coloration: Skin is not jaundiced or pale.   Neurological:      General: No focal deficit present.      Mental Status: She is alert and oriented to person, place, and time.   Psychiatric:         Mood and Affect: Mood normal.       Significant Labs:  I have reviewed all pertinent lab results within the past 24 hours.    Significant Diagnostics:  I have reviewed all pertinent imaging results/findings within the past 24 hours.    Assessment/Plan:     There are no hospital problems to display for this patient.    VTE Risk Mitigation (From admission, onward)           Ordered     IP VTE HIGH RISK PATIENT  Once         12/27/22 0657     Place sequential compression device  Until discontinued         12/27/22 0657     Place sequential compression device  Until discontinued         12/27/22 0558                  66 yo female with umbilical hernia.    - Plan for open umbilical hernia repair w / wo mesh  - Consent in chart  - Site marked  - NPO    Jerry Barron MD  General Surgery  [facility] - Surgery

## 2022-12-27 NOTE — TRANSFER OF CARE
Anesthesia Transfer of Care Note    Patient: Edith Tran    Procedure(s) Performed: Procedure(s) (LRB):  REPAIR, HERNIA umbilical, INCISIONAL OR VENTRAL, WITHOUT HISTORY OF PRIOR REPAIR (N/A)    Patient location: PACU    Transport from OR: Transported from OR on 6-10 L/min O2 by face mask with adequate spontaneous ventilation    Post pain: adequate analgesia    Post assessment: no apparent anesthetic complications    Post vital signs: stable    Level of consciousness: sedated    Nausea/Vomiting: no nausea/vomiting    Complications: none    Transfer of care protocol was followed      Last vitals:   Visit Vitals  BP (!) 122/56 (BP Location: Right arm, Patient Position: Lying)   Pulse 69   Temp 36.4 °C (97.6 °F) (Oral)   Resp (!) 23   Wt 92.3 kg (203 lb 7.8 oz)   SpO2 100%   Breastfeeding No   BMI 32.84 kg/m²

## 2022-12-27 NOTE — BRIEF OP NOTE
[facility] [place] - Surgery  Brief Operative Note    Surgery Date: 12/27/2022     Surgeon(s) and Role:     * Trent Evans MD - Primary    Assisting Surgeon: None    Pre-op Diagnosis:  Umbilical hernia without obstruction and without gangrene [K42.9]    Post-op Diagnosis:  Post-Op Diagnosis Codes:     * Umbilical hernia without obstruction and without gangrene [K42.9]    Procedure(s) (LRB):  REPAIR, HERNIA umbilical, INCISIONAL OR VENTRAL, WITHOUT HISTORY OF PRIOR REPAIR (N/A)    Anesthesia: Choice    Operative Findings: Small fat-containing umbilical hernia. Incarcerated. Fascial defect 1.5cm.    Estimated Blood Loss: 15mL    Specimens:   Specimen (24h ago, onward)      None              Discharge Note    OUTCOME: Patient tolerated treatment/procedure well without complication and is now ready for discharge.    DISPOSITION: Home or Self Care    FINAL DIAGNOSIS:  Umbilical hernia without obstruction and without gangrene    FOLLOWUP: In clinic    DISCHARGE INSTRUCTIONS:  No discharge procedures on file.

## 2022-12-27 NOTE — ANESTHESIA PROCEDURE NOTES
Condition stable, understands discharge instructions, prescription x 1 e-scribed to pharmacy of choice, discharged home.     Intubation    Date/Time: 12/27/2022 7:23 AM  Performed by: Monie Pringle CRNA  Authorized by: Mark Hadley MD     Intubation:     Induction:  Intravenous    Intubated:  Postinduction    Mask Ventilation:  Easy mask    Attempts:  1    Attempted By:  CRNA    Method of Intubation:  Video laryngoscopy    Blade:  Qureshi 3    Laryngeal View Grade: Grade I - full view of cords      Difficult Airway Encountered?: No      Complications:  None    Airway Device:  Oral endotracheal tube    Airway Device Size:  7.0    Style/Cuff Inflation:  Cuffed (inflated to minimal occlusive pressure)    Inflation Amount (mL):  6    Tube secured:  21    Placement Verified By:  Capnometry    Complicating Factors:  None    Findings Post-Intubation:  BS equal bilateral

## 2022-12-27 NOTE — PATIENT INSTRUCTIONS
Post-op instructions:    - Keep steri-strips on for 10 days or until the fall off on their own  - OK for regular diet  - No driving while taking narcotic medication  - No lifting more than 10 pounds for 4 weeks  - OK to shower with warm soap and water. No soaking in any water  - Clinic visit to be scheduled in two weeks

## 2022-12-27 NOTE — DISCHARGE INSTRUCTIONS
Nathan Zimmerman and Jakob  Office # [phone]    Discharge Instructions for Same Day Surgery     Call the office for and appointment if one has not already been made.     Diet: Drink plenty of fluids the first 48 hours and you may resume your   usual diet.     Activity: No heavy lifting (over 10 pounds), pushing or pulling until your   post op visit. Your doctor's office may have told you to limit your lifting to less weight, or even no weight.  Be sure to follow those instructions.    Note: You may ride in a car and you may drive when comfortable.     Do not drive, drink alcohol, or sign legal documents for 24 hours, or if taking narcotic pain medication.    Dressings: Remove the dressing 24 hours after surgery. You may shower  24 hours after surgery and you may wash your hair.     If you have steri strips ( appears to be strips of white tape) on   your incision, leave them on. In 5-7 days they will begin to fall off.    Drains: If you have a drain, measure and record the drainage. Bring this information with you on your office visit.     Medical: Call the doctor for any of the following problems: fever above 101,   severe pain, bleeding, or abdominal distention (swelling).   If constipated you may take any stool softener you choose.     Occasionally small areas of skin numbness or an unpleasant skin sensation can result. Also, you may find that your incision is swollen and tender for a few days.  Some redness around sutures and staples is a normal reaction, but if the discomfort persists or worsens, call you doctor.             Fall Prevention  Millions of people fall every year and injure themselves. You may have had anesthesia or sedation which may increase your risk of falling. You may have health issues that put you at an increased risk of falling.     Here are ways to reduce your risk of falling.    Make your home safe by keeping walkways clear of objects you may trip over.  Use non-slip pads under  rugs. Do not use area rugs or small throw rugs.  Use non-slip mats in bathtubs and showers.  Install handrails and lights on staircases.  Do not walk in poorly lit areas.  Do not stand on chairs or wobbly ladders.  Use caution when reaching overhead or looking upward. This position can cause a loss of balance.  Be sure your shoes fit properly, have non-slip bottoms and are in good condition.   Wear shoes both inside and out. Avoid going barefoot or wearing slippers.  Be cautious when going up and down stairs, curbs, and when walking on uneven sidewalks.  If your balance is poor, consider using a cane or walker.  If your fall was related to alcohol use, stop or limit alcohol intake.   If your fall was related to use of sleeping medicines, talk to your doctor about this. You may need to reduce your dosage at bedtime if you awaken during the night to go to the bathroom.    To reduce the need for nighttime bathroom trips:  Avoid drinking fluids for several hours before going to bed  Empty your bladder before going to bed  Men can keep a urinal at the bedside  Stay as active as you can. Balance, flexibility, strength, and endurance all come from exercise. They all play a role in preventing falls. Ask your healthcare provider which types of activity are right for you.  Get your vision checked on a regular basis.  If you have pets, know where they are before you stand up or walk so you don't trip over them.  Use night lights.

## 2022-12-27 NOTE — OP NOTE
[facility]  Surgery Department  Operative Note    SUMMARY     Date of Procedure: 12/27/2022     Procedure: Procedure(s) (LRB):  REPAIR, HERNIA umbilical, INCISIONAL OR VENTRAL, WITHOUT HISTORY OF PRIOR REPAIR (N/A)     Surgeon(s) and Role:     * Trent Evans MD - Primary    Assisting Surgeon: JUAN LUIS Barron    Pre-Operative Diagnosis: Umbilical hernia without obstruction and without gangrene [K42.9]    Post-Operative Diagnosis: Post-Op Diagnosis Codes:     * Umbilical hernia without obstruction and without gangrene [K42.9]    Anesthesia: Choice    Operative Findings (including complications, if any): Small fat-containing umbilical hernia. Incarcerated. Fascial defect 1.5cm.    Description of Technical Procedures: After the patient was identified and evaluated in the pre-op area, she was taken to the operating room in preparation for surgery. After a successful intubation, the anesthesia team administered general anesthesia. The patient was then positioned in supine position with arms out and appropriately padded. The patient was then prepped and draped in a sterile fashion. A timeout was performed and each member of the team agreed to proceed with the procedure.              A 3 cm incision was made with a 15 Blade scalpel in a vertical direction within the umbilicus. The subcutaneous tissue was divided with electrocautery and blunt dissection was used to isolate the hernia sac from the rest of the subcutaneous tissue.  A small, fat-containing hernia was found protruding through a small fascial defect within the deep portion of the umbilical stalk. There was no evidence of necrosis or unviable appearing tissue. A small amount of adipose tissue that was contained within the hernia sac was transected away from the sac itself. The remaining hernia contents were then reduced back into the abdomen. This left us with a 1.5 cm fascial defect. The fascia was cleared and a 0 PDS suture was placed through the  fascia in a figure of eight pattern. This was tied down and fascia was then inspected for any further defects.  The wound was then thoroughly irrigated and hemostasis was achieved using Bovie electrocautery.  A 3-0 Vicryl suture was then used to approximate the dermis. This was followed by a running 4-0 vicryl suture in a subcuticular fashion. Steristrips were applied to the wound and a small island dressing placed on top. She was then successfully extubated and transferred to the Recovery Area for postoperative monitoring.     Significant Surgical Tasks Conducted by the Assistant(s), if Applicable: None    Estimated Blood Loss (EBL): 15mL           Implants: * No implants in log *    Specimens:   Specimen (24h ago, onward)      None                    Condition: Good    Disposition: PACU - hemodynamically stable.

## 2022-12-30 ENCOUNTER — PATIENT MESSAGE (OUTPATIENT)
Dept: PULMONOLOGY | Facility: CLINIC | Age: 67
End: 2022-12-30
Payer: MEDICARE

## 2022-12-30 ENCOUNTER — OFFICE VISIT (OUTPATIENT)
Dept: PULMONOLOGY | Facility: CLINIC | Age: 67
End: 2022-12-30
Payer: MEDICARE

## 2022-12-30 DIAGNOSIS — J40 BRONCHITIS: Primary | ICD-10-CM

## 2022-12-30 DIAGNOSIS — R05.1 ACUTE COUGH: ICD-10-CM

## 2022-12-30 PROCEDURE — 99441 PR PHYSICIAN TELEPHONE EVALUATION 5-10 MIN: ICD-10-PCS | Mod: 95,,, | Performed by: NURSE PRACTITIONER

## 2022-12-30 PROCEDURE — 99441 PR PHYSICIAN TELEPHONE EVALUATION 5-10 MIN: CPT | Mod: 95,,, | Performed by: NURSE PRACTITIONER

## 2022-12-30 RX ORDER — BENZONATATE 100 MG/1
100 CAPSULE ORAL 3 TIMES DAILY PRN
Qty: 30 CAPSULE | Refills: 0 | Status: SHIPPED | OUTPATIENT
Start: 2022-12-30 | End: 2023-01-09

## 2022-12-30 RX ORDER — AMOXICILLIN AND CLAVULANATE POTASSIUM 875; 125 MG/1; MG/1
1 TABLET, FILM COATED ORAL 2 TIMES DAILY
Qty: 20 TABLET | Refills: 0 | Status: SHIPPED | OUTPATIENT
Start: 2022-12-30 | End: 2023-01-09

## 2022-12-30 RX ORDER — CODEINE PHOSPHATE AND GUAIFENESIN 10; 100 MG/5ML; MG/5ML
5 SOLUTION ORAL EVERY 4 HOURS PRN
Qty: 210 ML | Refills: 0 | Status: SHIPPED | OUTPATIENT
Start: 2022-12-30 | End: 2023-01-06

## 2022-12-30 NOTE — PROGRESS NOTES
Established Patient - Audio Only Telehealth Visit     The patient location is: home, [facility]  The chief complaint leading to consultation is: allergies and cough  Visit type: Virtual visit with audio only (telephone)  Total time spent with patient: 7 minutes       The reason for the audio only service rather than synchronous audio and video virtual visit was related to technical difficulties or patient preference/necessity.     Each patient to whom I provide medical services by telemedicine is:  (1) informed of the relationship between the physician and patient and the respective role of any other health care provider with respect to management of the patient; and (2) notified that they may decline to receive medical services by telemedicine and may withdraw from such care at any time. Patient verbally consented to receive this service via voice-only telephone call.       HPI:   12/30/22- complaint of recurrent cough, onset 1 week, had recent abdominal surgery, cough causes severe pain, worse in late evenings. Productive yellow mucous       Review of Systems:  a review of eleven systems covering constitutional, Eye, HEENT, Psych, Respiratory, Cardiac, GI, , Musculoskeletal, Endocrine, Dermatologic was negative except for pertinent findings as listed ABOVE and below: pertinent positive as above, rest is good  Cough    Exam:   Constitutional:  oriented to person, place, and time.  appears well-developed. No distress.     Pulmonary/Chest: Effort normal and audible breath sounds normal. No accessory muscle usage or stridor. No apnea and no tachypnea. No respiratory distress,     Psychiatric: normal mood and affect. behavior is normal. Judgment and thought content normal.       Plan:  Clinical impression is apparently straight forward and impression with management as below.    Diagnoses and all orders for this visit:    Bronchitis  -     guaiFENesin-codeine 100-10 mg/5 ml (TUSSI-ORGANIDIN NR)  mg/5 mL  syrup; Take 5 mLs by mouth every 4 (four) hours as needed for Cough.  -     amoxicillin-clavulanate 875-125mg (AUGMENTIN) 875-125 mg per tablet; Take 1 tablet by mouth 2 (two) times daily. for 10 days    Acute cough  -     guaiFENesin-codeine 100-10 mg/5 ml (TUSSI-ORGANIDIN NR)  mg/5 mL syrup; Take 5 mLs by mouth every 4 (four) hours as needed for Cough.  -     amoxicillin-clavulanate 875-125mg (AUGMENTIN) 875-125 mg per tablet; Take 1 tablet by mouth 2 (two) times daily. for 10 days  -     benzonatate (TESSALON) 100 MG capsule; Take 1 capsule (100 mg total) by mouth 3 (three) times daily as needed for Cough.        Follow up if symptoms worsen or fail to improve.    Discussed with patient above for education the following:      Patient Instructions   Use codeine cough syrup sparingly, this will decrease cough and allow you to rest at night, do not take with other pain or sleeping medications.                    This service was not originating from a related E/M service provided within the previous 7 days nor will  to an E/M service or procedure within the next 24 hours or my soonest available appointment.  Prevailing standard of care was able to be met in this audio-only visit.

## 2022-12-30 NOTE — PATIENT INSTRUCTIONS
Use codeine cough syrup sparingly, this will decrease cough and allow you to rest at night, do not take with other pain or sleeping medications.

## 2022-12-31 ENCOUNTER — EXTERNAL CHRONIC CARE MANAGEMENT (OUTPATIENT)
Dept: PRIMARY CARE CLINIC | Facility: CLINIC | Age: 67
End: 2022-12-31
Payer: MEDICARE

## 2022-12-31 PROCEDURE — 99490 CHRNC CARE MGMT STAFF 1ST 20: CPT | Mod: PBBFAC,PO | Performed by: NURSE PRACTITIONER

## 2022-12-31 PROCEDURE — 99439 PR CHRONIC CARE MGMT, EA ADDTL 20 MIN: ICD-10-PCS | Mod: S$PBB,,, | Performed by: NURSE PRACTITIONER

## 2022-12-31 PROCEDURE — 99439 CHRNC CARE MGMT STAF EA ADDL: CPT | Mod: S$PBB,,, | Performed by: NURSE PRACTITIONER

## 2022-12-31 PROCEDURE — 99439 CHRNC CARE MGMT STAF EA ADDL: CPT | Mod: PBBFAC,PO | Performed by: NURSE PRACTITIONER

## 2022-12-31 PROCEDURE — 99490 PR CHRONIC CARE MGMT, 1ST 20 MIN: ICD-10-PCS | Mod: S$PBB,,, | Performed by: NURSE PRACTITIONER

## 2022-12-31 PROCEDURE — 99490 CHRNC CARE MGMT STAFF 1ST 20: CPT | Mod: S$PBB,,, | Performed by: NURSE PRACTITIONER

## 2023-01-04 DIAGNOSIS — J47.9 BRONCHIECTASIS WITHOUT COMPLICATION: ICD-10-CM

## 2023-01-04 DIAGNOSIS — J44.9 CHRONIC OBSTRUCTIVE PULMONARY DISEASE, UNSPECIFIED COPD TYPE: ICD-10-CM

## 2023-01-04 RX ORDER — ALBUTEROL SULFATE 90 UG/1
AEROSOL, METERED RESPIRATORY (INHALATION)
Qty: 8 G | Refills: 18 | Status: SHIPPED | OUTPATIENT
Start: 2023-01-04

## 2023-01-10 ENCOUNTER — OFFICE VISIT (OUTPATIENT)
Dept: SURGERY | Facility: CLINIC | Age: 68
End: 2023-01-10
Payer: MEDICARE

## 2023-01-10 VITALS
BODY MASS INDEX: 31.29 KG/M2 | DIASTOLIC BLOOD PRESSURE: 79 MMHG | WEIGHT: 194.69 LBS | HEART RATE: 88 BPM | SYSTOLIC BLOOD PRESSURE: 121 MMHG | OXYGEN SATURATION: 97 % | HEIGHT: 66 IN

## 2023-01-10 DIAGNOSIS — K43.9 VENTRAL HERNIA WITHOUT OBSTRUCTION OR GANGRENE: Primary | ICD-10-CM

## 2023-01-10 PROCEDURE — 99999 PR PBB SHADOW E&M-EST. PATIENT-LVL IV: CPT | Mod: PBBFAC,,, | Performed by: SURGERY

## 2023-01-10 PROCEDURE — 99024 PR POST-OP FOLLOW-UP VISIT: ICD-10-PCS | Mod: POP,,, | Performed by: SURGERY

## 2023-01-10 PROCEDURE — 99999 PR PBB SHADOW E&M-EST. PATIENT-LVL IV: ICD-10-PCS | Mod: PBBFAC,,, | Performed by: SURGERY

## 2023-01-10 PROCEDURE — 99024 POSTOP FOLLOW-UP VISIT: CPT | Mod: POP,,, | Performed by: SURGERY

## 2023-01-10 PROCEDURE — 99214 OFFICE O/P EST MOD 30 MIN: CPT | Mod: PBBFAC | Performed by: SURGERY

## 2023-01-10 NOTE — PROGRESS NOTES
66 y/o woman with history of hernia repair, now about 2 weeks out.  No complaints this am, reports feeling well.    PE: incision c/d/i no evidence of infection or hernia    Impression: post op, doing well    Plan: gradually resume normal activity, normal diet, and follow up as needed.

## 2023-01-10 NOTE — ANESTHESIA POSTPROCEDURE EVALUATION
Anesthesia Post Evaluation    Patient: Edith Tran    Procedure(s) Performed: Procedure(s) (LRB):  REPAIR, HERNIA umbilical, INCISIONAL OR VENTRAL, WITHOUT HISTORY OF PRIOR REPAIR (N/A)    Final Anesthesia Type: general      Patient location during evaluation: PACU  Patient participation: Yes- Able to Participate  Level of consciousness: awake and alert  Post-procedure vital signs: reviewed and stable  Pain management: adequate  Airway patency: patent    PONV status at discharge: No PONV  Anesthetic complications: no      Cardiovascular status: blood pressure returned to baseline and hemodynamically stable  Respiratory status: unassisted and spontaneous ventilation  Hydration status: euvolemic  Follow-up not needed.          Vitals Value Taken Time   /60 12/27/22 1100   Temp 36.4 °C (97.6 °F) 12/27/22 1100   Pulse 60 12/27/22 1100   Resp 18 12/27/22 1100   SpO2 95 % 12/27/22 1100         Event Time   Out of Recovery 09:55:00         Pain/Kranthi Score: No data recorded

## 2023-01-23 ENCOUNTER — IMMUNIZATION (OUTPATIENT)
Dept: PRIMARY CARE CLINIC | Facility: CLINIC | Age: 68
End: 2023-01-23
Payer: MEDICARE

## 2023-01-23 DIAGNOSIS — Z23 NEED FOR VACCINATION: Primary | ICD-10-CM

## 2023-01-23 PROCEDURE — 0124A COVID-19, MRNA, LNP-S, BIVALENT BOOSTER, PF, 30 MCG/0.3 ML DOSE: ICD-10-PCS | Mod: S$GLB,,, | Performed by: FAMILY MEDICINE

## 2023-01-23 PROCEDURE — 91312 COVID-19, MRNA, LNP-S, BIVALENT BOOSTER, PF, 30 MCG/0.3 ML DOSE: CPT | Mod: S$GLB,,, | Performed by: FAMILY MEDICINE

## 2023-01-23 PROCEDURE — 91312 COVID-19, MRNA, LNP-S, BIVALENT BOOSTER, PF, 30 MCG/0.3 ML DOSE: ICD-10-PCS | Mod: S$GLB,,, | Performed by: FAMILY MEDICINE

## 2023-01-23 PROCEDURE — 0124A COVID-19, MRNA, LNP-S, BIVALENT BOOSTER, PF, 30 MCG/0.3 ML DOSE: CPT | Mod: S$GLB,,, | Performed by: FAMILY MEDICINE

## 2023-01-31 ENCOUNTER — EXTERNAL CHRONIC CARE MANAGEMENT (OUTPATIENT)
Dept: PRIMARY CARE CLINIC | Facility: CLINIC | Age: 68
End: 2023-01-31
Payer: MEDICARE

## 2023-01-31 PROCEDURE — 99439 CHRNC CARE MGMT STAF EA ADDL: CPT | Mod: S$PBB,,, | Performed by: NURSE PRACTITIONER

## 2023-01-31 PROCEDURE — 99439 CHRNC CARE MGMT STAF EA ADDL: CPT | Mod: PBBFAC,PO,27 | Performed by: NURSE PRACTITIONER

## 2023-01-31 PROCEDURE — 99490 CHRNC CARE MGMT STAFF 1ST 20: CPT | Mod: S$PBB,,, | Performed by: NURSE PRACTITIONER

## 2023-01-31 PROCEDURE — 99490 CHRNC CARE MGMT STAFF 1ST 20: CPT | Mod: PBBFAC,PO | Performed by: NURSE PRACTITIONER

## 2023-01-31 PROCEDURE — 99439 PR CHRONIC CARE MGMT, EA ADDTL 20 MIN: ICD-10-PCS | Mod: S$PBB,,, | Performed by: NURSE PRACTITIONER

## 2023-01-31 PROCEDURE — 99490 PR CHRONIC CARE MGMT, 1ST 20 MIN: ICD-10-PCS | Mod: S$PBB,,, | Performed by: NURSE PRACTITIONER

## 2023-02-01 ENCOUNTER — TELEPHONE (OUTPATIENT)
Dept: PULMONOLOGY | Facility: CLINIC | Age: 68
End: 2023-02-01
Payer: MEDICARE

## 2023-02-28 ENCOUNTER — EXTERNAL CHRONIC CARE MANAGEMENT (OUTPATIENT)
Dept: PRIMARY CARE CLINIC | Facility: CLINIC | Age: 68
End: 2023-02-28
Payer: MEDICARE

## 2023-02-28 PROCEDURE — 99490 PR CHRONIC CARE MGMT, 1ST 20 MIN: ICD-10-PCS | Mod: S$PBB,,, | Performed by: INTERNAL MEDICINE

## 2023-02-28 PROCEDURE — 99490 CHRNC CARE MGMT STAFF 1ST 20: CPT | Mod: PBBFAC,PO | Performed by: INTERNAL MEDICINE

## 2023-02-28 PROCEDURE — 99490 CHRNC CARE MGMT STAFF 1ST 20: CPT | Mod: S$PBB,,, | Performed by: INTERNAL MEDICINE

## 2023-03-27 ENCOUNTER — OFFICE VISIT (OUTPATIENT)
Dept: PULMONOLOGY | Facility: CLINIC | Age: 68
End: 2023-03-27
Payer: MEDICARE

## 2023-03-27 VITALS
OXYGEN SATURATION: 95 % | DIASTOLIC BLOOD PRESSURE: 70 MMHG | HEIGHT: 66 IN | SYSTOLIC BLOOD PRESSURE: 101 MMHG | WEIGHT: 191.38 LBS | BODY MASS INDEX: 30.76 KG/M2 | HEART RATE: 104 BPM

## 2023-03-27 DIAGNOSIS — J96.11 CHRONIC HYPOXEMIC RESPIRATORY FAILURE: Primary | ICD-10-CM

## 2023-03-27 DIAGNOSIS — J84.9 INTERSTITIAL LUNG DISEASE: ICD-10-CM

## 2023-03-27 DIAGNOSIS — M34.9 SCLERODERMA: ICD-10-CM

## 2023-03-27 DIAGNOSIS — G47.33 OBSTRUCTIVE SLEEP APNEA: ICD-10-CM

## 2023-03-27 PROCEDURE — 99999 PR PBB SHADOW E&M-EST. PATIENT-LVL IV: CPT | Mod: PBBFAC,,, | Performed by: INTERNAL MEDICINE

## 2023-03-27 PROCEDURE — 99214 OFFICE O/P EST MOD 30 MIN: CPT | Mod: PBBFAC,PO | Performed by: INTERNAL MEDICINE

## 2023-03-27 PROCEDURE — 99999 PR PBB SHADOW E&M-EST. PATIENT-LVL IV: ICD-10-PCS | Mod: PBBFAC,,, | Performed by: INTERNAL MEDICINE

## 2023-03-27 PROCEDURE — 99214 PR OFFICE/OUTPT VISIT, EST, LEVL IV, 30-39 MIN: ICD-10-PCS | Mod: S$PBB,,, | Performed by: INTERNAL MEDICINE

## 2023-03-27 PROCEDURE — 99214 OFFICE O/P EST MOD 30 MIN: CPT | Mod: S$PBB,,, | Performed by: INTERNAL MEDICINE

## 2023-03-27 RX ORDER — BENZONATATE 200 MG/1
200 CAPSULE ORAL 3 TIMES DAILY PRN
Qty: 30 CAPSULE | Refills: 5 | Status: SHIPPED | OUTPATIENT
Start: 2023-03-27 | End: 2023-04-06

## 2023-03-27 RX ORDER — AMOXICILLIN AND CLAVULANATE POTASSIUM 875; 125 MG/1; MG/1
1 TABLET, FILM COATED ORAL 2 TIMES DAILY
Qty: 20 TABLET | Refills: 2 | Status: SHIPPED | OUTPATIENT
Start: 2023-03-27 | End: 2023-07-07

## 2023-03-27 NOTE — PROGRESS NOTES
3/27/2023   3/27/2023    Edith Tran  Office Note    Chief Complaint   Patient presents with    Follow-up     6 month f/u        HPI:  3/27/2023 on tyvaso since November and doing better, has bronchial infections 1-2 with amoxil   Pt on ofev -- no diarrhea of  significance.    Pt feels less sob.      9/26/2022 no new issues, uses cpap with good results, uses ox for activity exercise. Misses ox concentrator--getting repairs.       Pt was having constant cough prior to august rhc.    Patient Instructions   Heart cath did not look too bad -- baseline NA.    Need to get portable oxygen concentrator repaired.    Would wish to see compliance report from cpap .    If new ct done -- bring on disc.       Evaluation took 44min to review ct and pft and rhc.    Had pft 9/21/2022 -- fvc 71%, fev1 74%, tlc 78%, dlco 27%.  Six min walk 349 meters in 6 min, and needed with 2 lpm needed.  Pft worsened from 2/2022.  Ox needs stable    Cough remitted -- was constant.     Swallows ok.  Eats slowly.      Rhc last month-- [place].    3/28/2022 not using symbicort, dose use occ albuterol which ppt headaches??    Feels like stb le-- uses ox more as feels more sob.  Pt does care at [facility].      Uses cpap -- had ahi 6.3 in past.  Patient Instructions   Need compliance report from cpap?  Ahi was 6.3 yrs ago.  You are compliant.    Ct chest and 6 min walk and pulm functions are stable to sl better.    9/16/2021 - six min walk distance same as October 2020 at 255 m, batteries on poc run out doing errands over 2 h rs..    Not monitoring ox walking.  ues 3lpm pulse.    Uses filter on cpap to get new.   Had 3 vaccines .   On rx and bladder doing better.   Pt not needing rescue,use symbicort.    Ct in 2019, and pft 2020.   Patient Instructions   Would do ct chest and pft and six min walk prior to seeing next rheumatologist, or in 6 months.     Continue opsumit, tadalafil, symbicort, ofev,     Call if needed    Re check 6  months.  3/16/2021 concerned re covid vaccine and immujne suppression.  Breathing stable, has bladder urgency, has cpap mask problems - like f30 airfit.      Patient Instructions     Six min walk today walked 255 meters, and oxygen fell at 3 minutes to 88, and needed 3 lpm oxygen- 3/16/2021      6 min walk study was accomplished October 22, 2020. Baseline room air saturation was 98%. With ambulation O2 sat fell to 87% by 5 min. On 2 L of oxygen patient maintain sat in the low 90s subsequently. Patient walked about 133% of the reference distance   at 255 m.      6 min walk on August 21, 2018 was accomplished.  Patient's baseline O2 sat was 96% on room air.  After walking in her sats fell to 88% at 5 min.  On 2 L of oxygen sat was maintained in the mid 90s.  The patient was able to walk 390 m or 91% of the   reference value.     You may have spastic bladder.  Would recheck urine.  If sign symptoms may need to see urology?       You have and use portable oxygen concentrator. You should try to use more and be more active.      Get vaccine,      Walk studies suggest some loss in function from 2018 to 2020 but stable from October to March now.      Lung capacity was fairly good October 2020-  Spirometry, lung volume by gas dilution, and diffusion capacity measured October 22, 2020. The FEV1 to FVC ratio was 73% indicating no airflow obstruction measured by spirometry technique. The FEV1 was 81% of predicted at 2 L. Total lung capacity on lung    volume by gas dilution was 72% of predicted and a little low. Diffusion, uncorrected for anemia, was 41% of predicted and also low.   Patient has no obstruction. There is restriction measured. Diffusion is decreased. Clinical correlation recommended           Would do a six min walk prior to return in 6 months.    9/30/2020- uses 02 out of house, had scratchy throat with am cough lately. Uses cpap nightly all night usually 4-7 hrs/night.      Sees Dr Arboleda- Dr Barnes  left.    Had been seeing Dr Goddard, had pft and 6 min walk, saw Dr Goddard on 8/3/2020 - told all stable,  Cannot locate pft /walk test in care everywhere.  Usually studied yearly.  Pt feels stable otherwise.  3/27/2019 ct chest viewed with ild/honeycombing.  Follows for ofev trial at [facility].    Pt continues on ofev and cellcept and cialis and opsumit     Patient Instructions   If antibiotic needed - azithromycin daily for 3 days, last 10, simple antibiotic.     Prednisone 5 mg - may use if cough bad.    Should have result of right heart cath, and august pulm function and six min walk- will ask to obtain.  Will need to make sure can continue opsumit/cialias    Can follow six min walk every 3 months - placed standing.    You need to stay on ofev for pulmonary fibrosis, uses tadalafil and optusmit for pulmonary hypertension.  October 8, 2019- Has PFT and 6 min walk scheduled Nov 2019 by Dr. Barnes Rheumatologist at [place]. Wearing supplemental oxygen at home day/night set at 2L [place], currently on Symbicort daily, states benefit in breathing. No recent prednisone use. SOB controlled. No cough, no chest tightness, no wheeze.   Wear cpap nightly, states benefiting greatly but want different mask, tries to read before bed, mask over nasal bridge does not allow glasses to rest correctly.  Patient Instructions   Order sent for different CPAP mask  Continue to use nightly for Obstructive sleep apnea    Continue supplemental oxygen    Will review the results of PFT and 6 min walk from [place] at next appointment.    Continue Symbicort daily.     Use Fela perles and prednisone as needed for cough.     April 3, 2019- Onset 1 week: Cough productive white in color, sore throat, post nasal drip, sinus drainage yellow, flushed cheeks, complaints improving tx with antibiotics no prednisone use. Cough worse when lying down.    Current therapy for scleroderma, pulmonary htn, and pulmonary fibrosis Opsumit/tadafanil and Ofev/cellcept.  "No diarrhea no complaints.  Currently using Symbicort 1 puff daily. Using supplemental oxygen at 2L [place] for Shortness of breath, states greatly beneficial, pt states able to walk further and keep up with company while on oxygen.      Nov 27, trelegy not covered- not using, had flu shot 8 am with sorenes later day and huge swollen arm with  Pain thhat night.   No cough. On opsumit/tadafanil, and on ofev/cellcept.  Stable. No diarrhea.  Sees pulm htn and  Rheum [facility].  Last 6 min walk 02  Angelo 91 slow pace.      Aug 21, uses trelegy, no c/o.  No 0x arranges- sat 90 falls to 87 at 2 mins- walked 307 meters or 71%.  Dx Crohn's.  No abx nor prednisone.  cpap good and sinus good.  Instructions:Would monitor 6 min walk every 3 months.    307 meters was last distance.  Six min walk.  Monitor ct chest yearly in March - sooner if worsens.  Needs 02 for activity.  Use medications for bronchitis.   Stay active.    July11,2018has had raynauld's for yrs, pt had severe mobility problems issues leading to dx slceroderma 2007 - "rock bottom".  Has had swallow sticking with  2-3 dilations with last over 3 yrs ago.  Pt has had pulm htn on opsumit and talafadil,  Pt also on ofev in a study, and cellcept.  Also low dose prednisone.  Pt dx osas and uses cpap nightly 8 hrs - had used 02 but off 02 for 3 yrs.   Pt had had 6 min walks but none recently- none last yr at least.  Pt gets bronchitis with cough and yellow mucous.  Has occ wheezes.  Had exacerbations in feb and June - typically 2-3 yrly.  Tessalon helps.  Uses combivent occ ppt headaches with some breathing benefit.     Uses flonase regular.  Was on asthma rx for yrs.        The chief compliant  problem is stable  PFSH:  Past Medical History:   Diagnosis Date    Allergy     Arthritis     Back pain     Colon polyps     COPD (chronic obstructive pulmonary disease)     Emphysema of lung     GERD (gastroesophageal reflux disease)     Hypertension     Kidney stones     Obesity     " Pneumonia     Pneumonia due to other staphylococcus     Scleroderma involving lung since she was 49 y/o    Sleep apnea     Trouble in sleeping          Past Surgical History:   Procedure Laterality Date    COLONOSCOPY N/A 8/7/2018    Procedure: COLONOSCOPY;  Surgeon: Ngozi Prince MD;  Location: [facility];  Service: Endoscopy;  Laterality: N/A;    COLONOSCOPY N/A 11/21/2019    Procedure: COLONOSCOPY;  Surgeon: Ngozi Prince MD;  Location: [facility] ENDO;  Service: Endoscopy;  Laterality: N/A;    ESOPHAGEAL MANOMETRY WITH MEASUREMENT OF IMPEDANCE N/A 7/27/2020    Procedure: MANOMETRY, ESOPHAGUS, WITH IMPEDANCE MEASUREMENT;  Surgeon: Bhupendra Temple MD;  [facility]  Service: Endoscopy;  Laterality: N/A;  3/10 - [facility]    ESOPHAGOGASTRODUODENOSCOPY N/A 8/7/2018    Procedure: EGD (ESOPHAGOGASTRODUODENOSCOPY);  Surgeon: Ngozi Prince MD;  Location: [facility];  Service: Endoscopy;  Laterality: N/A;    ESOPHAGOGASTRODUODENOSCOPY N/A 11/21/2019    Procedure: EGD (ESOPHAGOGASTRODUODENOSCOPY);  Surgeon: Ngozi Prince MD;  Location: [facility];  Service: Endoscopy;  Laterality: N/A;    ESOPHAGOGASTRODUODENOSCOPY N/A 1/29/2020    Procedure: EGD (ESOPHAGOGASTRODUODENOSCOPY);  Surgeon: Ngozi Prince MD;  Location: [facility] ENDO;  Service: Endoscopy;  Laterality: N/A;    ESOPHAGOGASTRODUODENOSCOPY N/A 8/20/2020    Procedure: EGD (ESOPHAGOGASTRODUODENOSCOPY);  Surgeon: Ngozi Prince MD;  Location: [facility] ENDO;  Service: Endoscopy;  Laterality: N/A;    ESOPHAGOGASTRODUODENOSCOPY N/A 12/4/2020    Procedure: EGD (ESOPHAGOGASTRODUODENOSCOPY);  Surgeon: Ngozi Prince MD;  Location: [facility] ENDO;  Service: Endoscopy;  Laterality: N/A;    ESOPHAGOGASTRODUODENOSCOPY N/A 11/19/2021    Procedure: EGD (ESOPHAGOGASTRODUODENOSCOPY);  Surgeon: Ngozi Prince MD;  Location: [facility] ENDO;  Service: Endoscopy;  Laterality: N/A;    REPAIR, HERNIA, INCISIONAL OR  "VENTRAL, WITHOUT HISTORY OF PRIOR REPAIR N/A 2022    Procedure: REPAIR, HERNIA umbilical, INCISIONAL OR VENTRAL, WITHOUT HISTORY OF PRIOR REPAIR;  Surgeon: Trent Evans MD;  Location: [facility];  Service: General;  Laterality: N/A;  RN PREOP 2022, PT INSTRUCTED TO BRING ALL MEDS WITH HER ON AM OF SURGERY     Social History     Tobacco Use    Smoking status: Former     Packs/day: 0.50     Years: 27.00     Pack years: 13.50     Types: Cigarettes     Start date:      Quit date: 1995     Years since quittin.5    Smokeless tobacco: Never   Substance Use Topics    Alcohol use: No    Drug use: No     Family History   Problem Relation Age of Onset    Kidney disease Mother     Cancer Father     Heart disease Brother     Mental illness Son     Glaucoma Neg Hx     Macular degeneration Neg Hx     Retinal detachment Neg Hx      Review of patient's allergies indicates:   Allergen Reactions    Plaquenil [hydroxychloroquine]      Having eye reaction, needs to stop plaquenil and stay off of it.        Performance Status:The patient's activity level is housebound activities.      Review of Systems:  a review of eleven systems covering constitutional, Eye, HEENT, Psych, Respiratory, Cardiac, GI, , Musculoskeletal, Endocrine, Dermatologic was negative except for pertinent findings as listed ABOVE and below:  pertinent positive as above, rest is good       Exam:Comprehensive exam done. /70 (BP Location: Left arm, Patient Position: Sitting, BP Method: Medium (Automatic))   Pulse 104   Ht 5' 6" (1.676 m)   Wt 86.8 kg (191 lb 5.8 oz)   SpO2 95% Comment: on room air at rest  BMI 30.89 kg/m²   Exam included Vitals as listed, and patient's appearance and affect and alertness and mood, oral exam for yeast and hygiene and pharynx lesions and Mallapatti (M) score, neck with inspection for jvd and masses and thyroid abnormalities and lymph nodes (supraclavicular and infraclavicular nodes and axillary also " examined and noted if abn), chest exam included symmetry and effort and fremitus and percussion and auscultation, cardiac exam included rhythm and gallops and murmur and rubs and jvd and edema, abdominal exam for mass and hepatosplenomegaly and tenderness and hernias and bowel sounds, Musculoskeletal exam with muscle tone and posture and mobility/gait and  strength, and skin for rashes and cyanosis and pallor and turgor, extremity for clubbing.  Findings were normal except for pertinent findings listed below:M2, bilat rales. No  Edema nor clubbing.       Radiographs (ct chest and cxr) reviewed: view by direct vision  March 2018 ct not too bad with cysts- viewed 8/21/18  CT CHEST WITHOUT CONTRAST 03/27/2019   Severe interstitial fibrosis with peripheral honeycombing and multiple bilateral lower lobe bulla consistent with the history of scleroderma.  This is essentially unchanged from March 21, 2018.  Continued mild mediastinal adenopathy also unchanged.  No acute findings.  Small hiatal hernia.  Stable 2 cm complicated cyst of the left kidney.       Labs  noted  Lab Results   Component Value Date    WBC 4.53 12/20/2022    HGB 12.5 12/20/2022    HCT 39.6 12/20/2022    MCV 93 12/20/2022     12/20/2022        PFT results reviewed   Pulmonary Functions, including spirometry and bronchodilator response and lung volumes and diffusion, study was done May 8, 2018.  Spirometry shows mild obstruction, loss vital capacity and obstruction and no bronchodilator response.   FEV1 is 65% or 1.68 liters.  Lung volumes show  loss of TLC with restriction 66%.  Diffusion shows reduced but falls within normal range when corrected for lung volumes.   Pulmonary functions show  Mild obstruction and also restriction. Clinical correlation recommended.   Mikal Serrano M.D.    Echo 10/12/2018 Normal left and right ventricular systolic functions with LVEF >55%.    Plan:  Clinical impression is apparently straight forward and  impression with management as below.     Edith was seen today for follow-up.    Diagnoses and all orders for this visit:    Chronic hypoxemic respiratory failure  -     amoxicillin-clavulanate 875-125mg (AUGMENTIN) 875-125 mg per tablet; Take 1 tablet by mouth 2 (two) times daily.    Obstructive sleep apnea    Interstitial lung disease  -     amoxicillin-clavulanate 875-125mg (AUGMENTIN) 875-125 mg per tablet; Take 1 tablet by mouth 2 (two) times daily.  -     benzonatate (TESSALON) 200 MG capsule; Take 1 capsule (200 mg total) by mouth 3 (three) times daily as needed for Cough.    Scleroderma            Follow up in about 6 months (around 9/27/2023), or if symptoms worsen or fail to improve.    Discussed with patient above for education the following:      Patient Instructions   Last cpap from [facility] -- should check compliance report.    Diffusion has fallen from 9.5 -October 2020 to 8.9 --2/2022 to 7.2-- 1/2023--- was 7.3 9/2022.  Diffusion may decrease from pulmonary hypertension or lung tissue disease.  Six min walk was good.     Ct chest looked better to our view 2/2022 c/w 2019 as far as lung tissue.      Use augmentin as needed, call if significant infection.    May be good to follow up ct chest later in year.      Low diffusion suggest low oxygen walking..              Edithbebe Tran  Office Note    Chief Complaint   Patient presents with    Follow-up     6 month f/u        HPI:    9/16/2021 - six min walk distance same as October 2020 at 255 m, batteries on poc run out doing errands over 2 h rs..    Not monitoring ox walking.  ues 3lpm pulse.    Uses filter on cpap to get new.   Had 3 vaccines .   On rx and bladder doing better.   Pt not needing rescue,use symbicort.        Ct in 2019, and pft 2020.     Patient Instructions   Would do ct chest and pft and six min walk prior to seeing next rheumatologist, or in 6 months.     Continue opsumit, tadalafil, symbicort, ofev,     Call if needed    Re check 6  months.    3/16/2021 concerned re covid vaccine and immujne suppression.  Breathing stable, has bladder urgency, has cpap mask problems - like f30 airfit.      Patient Instructions     Six min walk today walked 255 meters, and oxygen fell at 3 minutes to 88, and needed 3 lpm oxygen- 3/16/2021      6 min walk study was accomplished October 22, 2020. Baseline room air saturation was 98%. With ambulation O2 sat fell to 87% by 5 min. On 2 L of oxygen patient maintain sat in the low 90s subsequently. Patient walked about 133% of the reference distance   at 255 m.      6 min walk on August 21, 2018 was accomplished.  Patient's baseline O2 sat was 96% on room air.  After walking in her sats fell to 88% at 5 min.  On 2 L of oxygen sat was maintained in the mid 90s.  The patient was able to walk 390 m or 91% of the   reference value.     You may have spastic bladder.  Would recheck urine.  If sign symptoms may need to see urology?       You have and use portable oxygen concentrator. You should try to use more and be more active.      Get vaccine,      Walk studies suggest some loss in function from 2018 to 2020 but stable from October to March now.      Lung capacity was fairly good October 2020-  Spirometry, lung volume by gas dilution, and diffusion capacity measured October 22, 2020. The FEV1 to FVC ratio was 73% indicating no airflow obstruction measured by spirometry technique. The FEV1 was 81% of predicted at 2 L. Total lung capacity on lung    volume by gas dilution was 72% of predicted and a little low. Diffusion, uncorrected for anemia, was 41% of predicted and also low.   Patient has no obstruction. There is restriction measured. Diffusion is decreased. Clinical correlation recommended           Would do a six min walk prior to return in 6 months.    9/30/2020- uses 02 out of house, had scratchy throat with am cough lately. Uses cpap nightly all night usually 4-7 hrs/night.      Sees Dr Arboleda- Dr Barnes  left.    Had been seeing Dr Goddard, had pft and 6 min walk, saw Dr Goddard on 8/3/2020 - told all stable,  Cannot locate pft /walk test in care everywhere.  Usually studied yearly.  Pt feels stable otherwise.  3/27/2019 ct chest viewed with ild/honeycombing.  Follows for ofev trial at [facility].    Pt continues on ofev and cellcept and cialis and opsumit     Patient Instructions   If antibiotic needed - azithromycin daily for 3 days, last 10, simple antibiotic.     Prednisone 5 mg - may use if cough bad.    Should have result of right heart cath, and august pulm function and six min walk- will ask to obtain.  Will need to make sure can continue opsumit/cialias    Can follow six min walk every 3 months - placed standing.    You need to stay on ofev for pulmonary fibrosis, uses tadalafil and optusmit for pulmonary hypertension.  October 8, 2019- Has PFT and 6 min walk scheduled Nov 2019 by Dr. Barnes Rheumatologist at [place]. Wearing supplemental oxygen at home day/night set at 2L [place], currently on Symbicort daily, states benefit in breathing. No recent prednisone use. SOB controlled. No cough, no chest tightness, no wheeze.   Wear cpap nightly, states benefiting greatly but want different mask, tries to read before bed, mask over nasal bridge does not allow glasses to rest correctly.  Patient Instructions   Order sent for different CPAP mask  Continue to use nightly for Obstructive sleep apnea    Continue supplemental oxygen    Will review the results of PFT and 6 min walk from [place] at next appointment.    Continue Symbicort daily.     Use Feal perles and prednisone as needed for cough.     April 3, 2019- Onset 1 week: Cough productive white in color, sore throat, post nasal drip, sinus drainage yellow, flushed cheeks, complaints improving tx with antibiotics no prednisone use. Cough worse when lying down.    Current therapy for scleroderma, pulmonary htn, and pulmonary fibrosis Opsumit/tadafanil and Ofev/cellcept.  Pneumonia     Pneumonia due to other staphylococcus     Scleroderma involving lung since she was 47 y/o    Sleep apnea     Trouble in sleeping          Past Surgical History:   Procedure Laterality Date    COLONOSCOPY N/A 8/7/2018    Procedure: COLONOSCOPY;  Surgeon: Ngozi Prince MD;  Location: [facility];  Service: Endoscopy;  Laterality: N/A;    COLONOSCOPY N/A 11/21/2019    Procedure: COLONOSCOPY;  Surgeon: Ngozi Prince MD;  Location: [facility] ENDO;  Service: Endoscopy;  Laterality: N/A;    ESOPHAGEAL MANOMETRY WITH MEASUREMENT OF IMPEDANCE N/A 7/27/2020    Procedure: MANOMETRY, ESOPHAGUS, WITH IMPEDANCE MEASUREMENT;  Surgeon: Bhupendra Temple MD;  [facility]  Service: Endoscopy;  Laterality: N/A;  3/10 - pt [facility]    ESOPHAGOGASTRODUODENOSCOPY N/A 8/7/2018    Procedure: EGD (ESOPHAGOGASTRODUODENOSCOPY);  Surgeon: Ngozi Prince MD;  Location: [facility];  Service: Endoscopy;  Laterality: N/A;    ESOPHAGOGASTRODUODENOSCOPY N/A 11/21/2019    Procedure: EGD (ESOPHAGOGASTRODUODENOSCOPY);  Surgeon: Ngozi Prince MD;  Location: [facility];  Service: Endoscopy;  Laterality: N/A;    ESOPHAGOGASTRODUODENOSCOPY N/A 1/29/2020    Procedure: EGD (ESOPHAGOGASTRODUODENOSCOPY);  Surgeon: Ngozi Prince MD;  Location: [facility] END[facility];  Service: Endoscopy;  Laterality: N/A;    ESOPHAGOGASTRODUODENOSCOPY N/A 8/20/2020    Procedure: EGD (ESOPHAGOGASTRODUODENOSCOPY);  Surgeon: Ngozi Prince MD;  Location: [facility] ENDO;  Service: Endoscopy;  Laterality: N/A;    ESOPHAGOGASTRODUODENOSCOPY N/A 12/4/2020    Procedure: EGD (ESOPHAGOGASTRODUODENOSCOPY);  Surgeon: Ngozi Prince MD;  Location: [facility] END[facility];  Service: Endoscopy;  Laterality: N/A;    ESOPHAGOGASTRODUODENOSCOPY N/A 11/19/2021    Procedure: EGD (ESOPHAGOGASTRODUODENOSCOPY);  Surgeon: Ngozi Prince MD;  Location: [facility] ENDO;  Service: Endoscopy;  Laterality: N/A;    REPAIR, HERNIA, INCISIONAL OR  "VENTRAL, WITHOUT HISTORY OF PRIOR REPAIR N/A 2022    Procedure: REPAIR, HERNIA umbilical, INCISIONAL OR VENTRAL, WITHOUT HISTORY OF PRIOR REPAIR;  Surgeon: Trent Evans MD;  Location: [facility];  Service: General;  Laterality: N/A;  RN PREOP 2022, PT INSTRUCTED TO BRING ALL MEDS WITH HER ON AM OF SURGERY     Social History     Tobacco Use    Smoking status: Former     Packs/day: 0.50     Years: 27.00     Pack years: 13.50     Types: Cigarettes     Start date:      Quit date: 1995     Years since quittin.5    Smokeless tobacco: Never   Substance Use Topics    Alcohol use: No    Drug use: No     Family History   Problem Relation Age of Onset    Kidney disease Mother     Cancer Father     Heart disease Brother     Mental illness Son     Glaucoma Neg Hx     Macular degeneration Neg Hx     Retinal detachment Neg Hx      Review of patient's allergies indicates:   Allergen Reactions    Plaquenil [hydroxychloroquine]      Having eye reaction, needs to stop plaquenil and stay off of it.        Performance Status:The patient's activity level is housebound activities.      Review of Systems:  a review of eleven systems covering constitutional, Eye, HEENT, Psych, Respiratory, Cardiac, GI, , Musculoskeletal, Endocrine, Dermatologic was negative except for pertinent findings as listed ABOVE and below:  pertinent positive as above, rest is good       Exam:Comprehensive exam done. /70 (BP Location: Left arm, Patient Position: Sitting, BP Method: Medium (Automatic))   Pulse 104   Ht 5' 6" (1.676 m)   Wt 86.8 kg (191 lb 5.8 oz)   SpO2 95% Comment: on room air at rest  BMI 30.89 kg/m²   Exam included Vitals as listed, and patient's appearance and affect and alertness and mood, oral exam for yeast and hygiene and pharynx lesions and Mallapatti (M) score, neck with inspection for jvd and masses and thyroid abnormalities and lymph nodes (supraclavicular and infraclavicular nodes and axillary also " examined and noted if abn), chest exam included symmetry and effort and fremitus and percussion and auscultation, cardiac exam included rhythm and gallops and murmur and rubs and jvd and edema, abdominal exam for mass and hepatosplenomegaly and tenderness and hernias and bowel sounds, Musculoskeletal exam with muscle tone and posture and mobility/gait and  strength, and skin for rashes and cyanosis and pallor and turgor, extremity for clubbing.  Findings were normal except for pertinent findings listed below:M2, bilat rales. No  Edema mild clubbing.       Radiographs (ct chest and cxr) reviewed: view by direct vision  March 2018 ct not too bad with cysts- viewed 8/21/18  CT CHEST WITHOUT CONTRAST 03/27/2019   Severe interstitial fibrosis with peripheral honeycombing and multiple bilateral lower lobe bulla consistent with the history of scleroderma.  This is essentially unchanged from March 21, 2018.  Continued mild mediastinal adenopathy also unchanged.  No acute findings.  Small hiatal hernia.  Stable 2 cm complicated cyst of the left kidney.       Labs  noted  Lab Results   Component Value Date    WBC 4.53 12/20/2022    HGB 12.5 12/20/2022    HCT 39.6 12/20/2022    MCV 93 12/20/2022     12/20/2022        PFT results reviewed     February 10, 2022-patient dropped out pulmonary functions from February 7, 2022. Forced vital capacity was 69 percent predicted.  There was no airflow obstruction.  Total lung capacity was 72 percent of predicted.  Diffusion was down to 31 percent predicted.  Patient had a 6 minute walk also.  Patient walked about 359 meters in 6 minutes.  She was 97 percent on room air.  The low O2 sat was 89 percent while walking.  \  \  Oct 27/2020 Spirometry, lung volume by gas dilution, and diffusion capacity measured October 22, 2020. The FEV1 to FVC ratio was 73% indicating no airflow obstruction measured by spirometry technique. The FEV1 was 81% of predicted at 2 L. fvc was 86% . Total  lung capacity on lung    volume by gas dilution was 72% of predicted and a little low. Diffusion, uncorrected for anemia, was 41% of predicted and also low.   Patient has no obstruction. There is restriction measured. Diffusion is decreased. Clinical correlation recommended       5/8/2018 Pulmonary Functions, including spirometry and bronchodilator response and lung volumes and diffusion, study was done May 8, 2018.  Spirometry shows mild obstruction, loss vital capacity and obstruction and no bronchodilator response.   FEV1 is 65% or 1.68 liters.  Lung volumes show  loss of TLC with restriction 66%.  Diffusion shows reduced but falls within normal range when corrected for lung volumes.   Pulmonary functions show  Mild obstruction and also restriction. Clinical correlation recommended.     Mikal Serrano M.D.      Study was done at [facility]  Echo 10/12/2018 Normal left and right ventricular systolic functions with LVEF >55%.      Rhc 8/10/2022 [place]--Significant Diagnostic Studies: right heart cath  Right Heart Pressures RAP: 3 mmHg  RVP: 50/3 mmHg  Pulmonary artery pressure: 50/12 mmHg  Mean pulmonary artery pressure: 31 mmHg  Pulmonary capillary wedge pressure: 7 mmHg  Cardiac Output: 5.9 L/min  Cardiac index: 3 L/min/m2  Pulmonary vascular resistance: 4 HRU      6 minute walk study was accomplished February 17, 2022.  Patient walked about 359 meters in 6 minutes.  She was 97 percent on room air.  The low O2 sat was 89 percent while walking.    6 minute walk study was accomplished September 15, 2021. Baseline room air saturation was 98%. With ambulation O2 sat fell to 87% by 2 minutes. Patient subsequently 2 L and then 3 L of oxygen to maintain sat in the low 90s. At the end of 6 minutes   walking on oxygen at 3 liters/minute the O2 saturation was 96%. Patient walked about 60% of the reference distance of 255 m.   6 min walk study was accomplished October 22, 2020. Baseline room air  saturation was 98%. With ambulation O2 sat fell to 87% by 5 min. On 2 L of oxygen patient maintain sat in the low 90s subsequently. Patient walked about 133% of the reference distance   at 255 m.   6 min walk study was accomplished November 21, 2018.  Baseline room air saturation was 98%.  Walking on room air O2 sat did fall down to 91%.  Patient did walk 317 m or 73% of the reference distance    Plan:  Clinical impression is apparently straight forward and impression with management as below.     Edith was seen today for follow-up.    Diagnoses and all orders for this visit:    Chronic hypoxemic respiratory failure  -     amoxicillin-clavulanate 875-125mg (AUGMENTIN) 875-125 mg per tablet; Take 1 tablet by mouth 2 (two) times daily.    Obstructive sleep apnea    Interstitial lung disease  -     amoxicillin-clavulanate 875-125mg (AUGMENTIN) 875-125 mg per tablet; Take 1 tablet by mouth 2 (two) times daily.  -     benzonatate (TESSALON) 200 MG capsule; Take 1 capsule (200 mg total) by mouth 3 (three) times daily as needed for Cough.    Scleroderma            Follow up in about 6 months (around 9/27/2023), or if symptoms worsen or fail to improve.    Discussed with patient above for education the following:      Patient Instructions   Last cpap from [facility] -- should check compliance report.    Diffusion has fallen from 9.5 -October 2020 to 8.9 --2/2022 to 7.2-- 1/2023--- was 7.3 9/2022.  Diffusion may decrease from pulmonary hypertension or lung tissue disease.  Six min walk was good.     Ct chest looked better to our view 2/2022 c/w 2019 as far as lung tissue.      Use augmentin as needed, call if significant infection.    May be good to follow up ct chest later in year.      Low diffusion suggest low oxygen walking..      Eval took 30 min

## 2023-03-27 NOTE — PATIENT INSTRUCTIONS
Last cpap from [facility] -- should check compliance report.    Diffusion has fallen from 9.5 -October 2020 to 8.9 --2/2022 to 7.2-- 1/2023--- was 7.3 9/2022.  Diffusion may decrease from pulmonary hypertension or lung tissue disease.  Six min walk was good.     Ct chest looked better to our view 2/2022 c/w 2019 as far as lung tissue.      Use augmentin as needed, call if significant infection.    May be good to follow up ct chest later in year.      Low diffusion suggest low oxygen walking..

## 2023-03-31 ENCOUNTER — EXTERNAL CHRONIC CARE MANAGEMENT (OUTPATIENT)
Dept: PRIMARY CARE CLINIC | Facility: CLINIC | Age: 68
End: 2023-03-31
Payer: MEDICARE

## 2023-03-31 PROCEDURE — 99490 CHRNC CARE MGMT STAFF 1ST 20: CPT | Mod: PBBFAC,PO | Performed by: INTERNAL MEDICINE

## 2023-03-31 PROCEDURE — 99490 CHRNC CARE MGMT STAFF 1ST 20: CPT | Mod: S$PBB,,, | Performed by: INTERNAL MEDICINE

## 2023-03-31 PROCEDURE — 99439 CHRNC CARE MGMT STAF EA ADDL: CPT | Mod: PBBFAC,PO | Performed by: INTERNAL MEDICINE

## 2023-03-31 PROCEDURE — 99439 CHRNC CARE MGMT STAF EA ADDL: CPT | Mod: S$PBB,,, | Performed by: INTERNAL MEDICINE

## 2023-03-31 PROCEDURE — 99439 PR CHRONIC CARE MGMT, EA ADDTL 20 MIN: ICD-10-PCS | Mod: S$PBB,,, | Performed by: INTERNAL MEDICINE

## 2023-03-31 PROCEDURE — 99490 PR CHRONIC CARE MGMT, 1ST 20 MIN: ICD-10-PCS | Mod: S$PBB,,, | Performed by: INTERNAL MEDICINE

## 2023-04-06 ENCOUNTER — PATIENT MESSAGE (OUTPATIENT)
Dept: ADMINISTRATIVE | Facility: OTHER | Age: 68
End: 2023-04-06
Payer: MEDICARE

## 2023-04-25 ENCOUNTER — TELEPHONE (OUTPATIENT)
Dept: PULMONOLOGY | Facility: CLINIC | Age: 68
End: 2023-04-25
Payer: MEDICARE

## 2023-05-02 ENCOUNTER — PES CALL (OUTPATIENT)
Dept: ADMINISTRATIVE | Facility: CLINIC | Age: 68
End: 2023-05-02
Payer: MEDICARE

## 2023-06-14 ENCOUNTER — PES CALL (OUTPATIENT)
Dept: ADMINISTRATIVE | Facility: CLINIC | Age: 68
End: 2023-06-14
Payer: MEDICARE

## 2023-07-07 ENCOUNTER — LAB VISIT (OUTPATIENT)
Dept: LAB | Facility: HOSPITAL | Age: 68
End: 2023-07-07
Attending: FAMILY MEDICINE
Payer: MEDICARE

## 2023-07-07 ENCOUNTER — OFFICE VISIT (OUTPATIENT)
Dept: FAMILY MEDICINE | Facility: CLINIC | Age: 68
End: 2023-07-07
Payer: MEDICARE

## 2023-07-07 VITALS
OXYGEN SATURATION: 98 % | TEMPERATURE: 98 F | WEIGHT: 179 LBS | SYSTOLIC BLOOD PRESSURE: 118 MMHG | HEART RATE: 80 BPM | DIASTOLIC BLOOD PRESSURE: 70 MMHG | BODY MASS INDEX: 28.77 KG/M2 | HEIGHT: 66 IN

## 2023-07-07 DIAGNOSIS — K30 DELAYED GASTRIC EMPTYING: ICD-10-CM

## 2023-07-07 DIAGNOSIS — E53.8 VITAMIN B12 DEFICIENCY: ICD-10-CM

## 2023-07-07 DIAGNOSIS — Z12.31 BREAST CANCER SCREENING BY MAMMOGRAM: ICD-10-CM

## 2023-07-07 DIAGNOSIS — Z99.81 ON HOME OXYGEN THERAPY: ICD-10-CM

## 2023-07-07 DIAGNOSIS — M34.9 SCLERODERMA: ICD-10-CM

## 2023-07-07 DIAGNOSIS — N32.81 OAB (OVERACTIVE BLADDER): ICD-10-CM

## 2023-07-07 DIAGNOSIS — K22.2 ESOPHAGEAL STRICTURE: ICD-10-CM

## 2023-07-07 DIAGNOSIS — J96.11 CHRONIC RESPIRATORY FAILURE WITH HYPOXIA: ICD-10-CM

## 2023-07-07 DIAGNOSIS — I27.20 PULMONARY HYPERTENSION: ICD-10-CM

## 2023-07-07 DIAGNOSIS — E55.9 VITAMIN D DEFICIENCY: ICD-10-CM

## 2023-07-07 DIAGNOSIS — I10 ESSENTIAL HYPERTENSION: ICD-10-CM

## 2023-07-07 DIAGNOSIS — N28.1 RENAL CYST: ICD-10-CM

## 2023-07-07 DIAGNOSIS — R25.2 CRAMPING OF HANDS: ICD-10-CM

## 2023-07-07 DIAGNOSIS — I71.20 THORACIC AORTIC ANEURYSM (TAA), UNSPECIFIED PART, UNSPECIFIED WHETHER RUPTURED: ICD-10-CM

## 2023-07-07 DIAGNOSIS — I73.00 RAYNAUD'S DISEASE WITHOUT GANGRENE: Primary | ICD-10-CM

## 2023-07-07 DIAGNOSIS — D84.9 IMMUNOSUPPRESSION: ICD-10-CM

## 2023-07-07 DIAGNOSIS — R79.9 ABNORMAL FINDING OF BLOOD CHEMISTRY, UNSPECIFIED: ICD-10-CM

## 2023-07-07 DIAGNOSIS — Z87.19 H/O HERNIA REPAIR: ICD-10-CM

## 2023-07-07 DIAGNOSIS — Z98.890 H/O HERNIA REPAIR: ICD-10-CM

## 2023-07-07 DIAGNOSIS — K22.4 ESOPHAGEAL DYSMOTILITY: ICD-10-CM

## 2023-07-07 DIAGNOSIS — J84.9 INTERSTITIAL LUNG DISEASE: ICD-10-CM

## 2023-07-07 DIAGNOSIS — G47.33 OSA ON CPAP: ICD-10-CM

## 2023-07-07 DIAGNOSIS — Z98.890 H/O UMBILICAL HERNIA REPAIR: ICD-10-CM

## 2023-07-07 DIAGNOSIS — Z87.19 H/O UMBILICAL HERNIA REPAIR: ICD-10-CM

## 2023-07-07 DIAGNOSIS — G47.00 INSOMNIA, UNSPECIFIED TYPE: ICD-10-CM

## 2023-07-07 DIAGNOSIS — R79.9 ABNORMAL BLOOD CHEMISTRY: ICD-10-CM

## 2023-07-07 LAB
25(OH)D3+25(OH)D2 SERPL-MCNC: 41 NG/ML (ref 30–96)
ALBUMIN SERPL BCP-MCNC: 4.2 G/DL (ref 3.5–5.2)
ALP SERPL-CCNC: 49 U/L (ref 55–135)
ALT SERPL W/O P-5'-P-CCNC: 8 U/L (ref 10–44)
ANION GAP SERPL CALC-SCNC: 5 MMOL/L (ref 8–16)
AST SERPL-CCNC: 14 U/L (ref 10–40)
BASOPHILS # BLD AUTO: 0.03 K/UL (ref 0–0.2)
BASOPHILS NFR BLD: 0.6 % (ref 0–1.9)
BILIRUB SERPL-MCNC: 0.5 MG/DL (ref 0.1–1)
BUN SERPL-MCNC: 12 MG/DL (ref 8–23)
CALCIUM SERPL-MCNC: 9.1 MG/DL (ref 8.7–10.5)
CHLORIDE SERPL-SCNC: 106 MMOL/L (ref 95–110)
CHOLEST SERPL-MCNC: 204 MG/DL (ref 120–199)
CHOLEST/HDLC SERPL: 3.2 {RATIO} (ref 2–5)
CO2 SERPL-SCNC: 26 MMOL/L (ref 23–29)
CREAT SERPL-MCNC: 0.8 MG/DL (ref 0.5–1.4)
CRP SERPL-MCNC: 0.1 MG/DL
DIFFERENTIAL METHOD: ABNORMAL
EOSINOPHIL # BLD AUTO: 0.3 K/UL (ref 0–0.5)
EOSINOPHIL NFR BLD: 5.3 % (ref 0–8)
ERYTHROCYTE [DISTWIDTH] IN BLOOD BY AUTOMATED COUNT: 13.2 % (ref 11.5–14.5)
ERYTHROCYTE [SEDIMENTATION RATE] IN BLOOD BY WESTERGREN METHOD: 2 MM/HR (ref 0–20)
EST. GFR  (NO RACE VARIABLE): >60 ML/MIN/1.73 M^2
ESTIMATED AVG GLUCOSE: 97 MG/DL (ref 68–131)
GLUCOSE SERPL-MCNC: 92 MG/DL (ref 70–110)
HBA1C MFR BLD: 5 % (ref 4.5–6.2)
HCT VFR BLD AUTO: 39.3 % (ref 37–48.5)
HDLC SERPL-MCNC: 63 MG/DL (ref 40–75)
HDLC SERPL: 30.9 % (ref 20–50)
HGB BLD-MCNC: 12.6 G/DL (ref 12–16)
IMM GRANULOCYTES # BLD AUTO: 0.02 K/UL (ref 0–0.04)
IMM GRANULOCYTES NFR BLD AUTO: 0.4 % (ref 0–0.5)
LDLC SERPL CALC-MCNC: 117.8 MG/DL (ref 63–159)
LYMPHOCYTES # BLD AUTO: 0.8 K/UL (ref 1–4.8)
LYMPHOCYTES NFR BLD: 15.2 % (ref 18–48)
MCH RBC QN AUTO: 30.3 PG (ref 27–31)
MCHC RBC AUTO-ENTMCNC: 32.1 G/DL (ref 32–36)
MCV RBC AUTO: 95 FL (ref 82–98)
MONOCYTES # BLD AUTO: 0.3 K/UL (ref 0.3–1)
MONOCYTES NFR BLD: 6.9 % (ref 4–15)
NEUTROPHILS # BLD AUTO: 3.5 K/UL (ref 1.8–7.7)
NEUTROPHILS NFR BLD: 71.6 % (ref 38–73)
NONHDLC SERPL-MCNC: 141 MG/DL
NRBC BLD-RTO: 0 /100 WBC
PLATELET # BLD AUTO: 147 K/UL (ref 150–450)
PMV BLD AUTO: 11.3 FL (ref 9.2–12.9)
POTASSIUM SERPL-SCNC: 4 MMOL/L (ref 3.5–5.1)
PROT SERPL-MCNC: 7 G/DL (ref 6–8.4)
RBC # BLD AUTO: 4.16 M/UL (ref 4–5.4)
SODIUM SERPL-SCNC: 137 MMOL/L (ref 136–145)
TRIGL SERPL-MCNC: 116 MG/DL (ref 30–150)
TSH SERPL DL<=0.005 MIU/L-ACNC: 1.65 UIU/ML (ref 0.34–5.6)
VIT B12 SERPL-MCNC: 248 PG/ML (ref 210–950)
WBC # BLD AUTO: 4.93 K/UL (ref 3.9–12.7)

## 2023-07-07 PROCEDURE — 86431 RHEUMATOID FACTOR QUANT: CPT | Performed by: FAMILY MEDICINE

## 2023-07-07 PROCEDURE — 80053 COMPREHEN METABOLIC PANEL: CPT | Performed by: FAMILY MEDICINE

## 2023-07-07 PROCEDURE — 84443 ASSAY THYROID STIM HORMONE: CPT | Performed by: FAMILY MEDICINE

## 2023-07-07 PROCEDURE — 86038 ANTINUCLEAR ANTIBODIES: CPT | Performed by: FAMILY MEDICINE

## 2023-07-07 PROCEDURE — 83036 HEMOGLOBIN GLYCOSYLATED A1C: CPT | Performed by: FAMILY MEDICINE

## 2023-07-07 PROCEDURE — 86140 C-REACTIVE PROTEIN: CPT | Performed by: FAMILY MEDICINE

## 2023-07-07 PROCEDURE — 85025 COMPLETE CBC W/AUTO DIFF WBC: CPT | Performed by: FAMILY MEDICINE

## 2023-07-07 PROCEDURE — 82306 VITAMIN D 25 HYDROXY: CPT | Performed by: FAMILY MEDICINE

## 2023-07-07 PROCEDURE — 99215 OFFICE O/P EST HI 40 MIN: CPT | Mod: S$GLB,,, | Performed by: FAMILY MEDICINE

## 2023-07-07 PROCEDURE — 99215 PR OFFICE/OUTPT VISIT, EST, LEVL V, 40-54 MIN: ICD-10-PCS | Mod: S$GLB,,, | Performed by: FAMILY MEDICINE

## 2023-07-07 PROCEDURE — 85651 RBC SED RATE NONAUTOMATED: CPT | Performed by: FAMILY MEDICINE

## 2023-07-07 PROCEDURE — 36415 COLL VENOUS BLD VENIPUNCTURE: CPT | Performed by: FAMILY MEDICINE

## 2023-07-07 PROCEDURE — 80061 LIPID PANEL: CPT | Performed by: FAMILY MEDICINE

## 2023-07-07 PROCEDURE — 82607 VITAMIN B-12: CPT | Performed by: FAMILY MEDICINE

## 2023-07-07 RX ORDER — PANTOPRAZOLE SODIUM 40 MG/1
40 TABLET, DELAYED RELEASE ORAL 2 TIMES DAILY
Qty: 180 TABLET | Refills: 1 | Status: SHIPPED | OUTPATIENT
Start: 2023-07-07 | End: 2024-01-19 | Stop reason: SDUPTHER

## 2023-07-07 RX ORDER — LOSARTAN POTASSIUM 25 MG/1
25 TABLET ORAL DAILY
Qty: 90 TABLET | Refills: 1 | Status: SHIPPED | OUTPATIENT
Start: 2023-07-07 | End: 2023-12-26 | Stop reason: SDUPTHER

## 2023-07-07 RX ORDER — TRAZODONE HYDROCHLORIDE 100 MG/1
TABLET ORAL
Qty: 90 TABLET | Refills: 1 | Status: SHIPPED | OUTPATIENT
Start: 2023-07-07 | End: 2024-01-16

## 2023-07-07 RX ORDER — LANOLIN ALCOHOL/MO/W.PET/CERES
400 CREAM (GRAM) TOPICAL DAILY
Qty: 90 TABLET | Refills: 1 | Status: SHIPPED | OUTPATIENT
Start: 2023-07-07 | End: 2024-02-05

## 2023-07-07 RX ORDER — MYCOPHENOLATE MOFETIL 500 MG/1
1500 TABLET ORAL 2 TIMES DAILY
Qty: 520 TABLET | Refills: 1 | Status: SHIPPED | OUTPATIENT
Start: 2023-07-07 | End: 2023-12-26 | Stop reason: SDUPTHER

## 2023-07-07 NOTE — PATIENT INSTRUCTIONS
[facility]  Nieves Blanchard MD  [address] [facility] [address] [place] [address]  Phone: [phone]  Fax: [phone]

## 2023-07-08 LAB — RHEUMATOID FACT SERPL-ACNC: 10.1 IU/ML

## 2023-07-09 LAB
ANA NOTE: ABNORMAL
ANA NUCLEOLAR TITR SER: ABNORMAL {TITER}
ANA TITR SER IF: POSITIVE {TITER}

## 2023-07-10 PROBLEM — J44.9 COPD MIXED TYPE: Status: RESOLVED | Noted: 2018-11-27 | Resolved: 2023-07-10

## 2023-07-10 PROBLEM — K50.00 CROHN'S DISEASE OF SMALL INTESTINE WITHOUT COMPLICATION: Status: RESOLVED | Noted: 2021-03-31 | Resolved: 2023-07-10

## 2023-07-11 NOTE — PROGRESS NOTES
Subjective:       Patient ID: Edith Tran is a 68 y.o. female.    Chief Complaint: Establish Care    Here for new patient visit.  Scleroderma and Raynaud's.  Esophageal stricture in the past.  Esophageal dysmotility.  Obstructive sleep apnea on CPAP benefitting from it.  Hypertension which is controlled.  Interstitial lung disease.  Pulmonary hypertension.  Chronic respiratory failure with hypoxemia.  Immunosuppression.  Thoracic aortic aneurysm seen by Dr. Serrano.  This is new since 2019 CT scan of the chest.  Has a renal cyst.   3 para 1 A/B 2.  Had an umbilical hernia repair back in 2022.  Some left posterior chest pain increased by sitting for about 2 months now.  Okay the last few days.  Hard to get comfortable.  Also has overactive bladder.    Social history former smoker for 20 years.  No alcohol.  Retired personal care attendant.      Family history.  Brother had heart transplant.  Brother with coronary artery disease mother coronary artery disease.  Father cancer possibly stomach.    Physical examination.  Vital signs noted.  Neck without adenopathy.  No bruit.  Chest crackles.  Heart regular rate rhythm.  Without gallop.  Abdomen bowel sounds are positive soft and nontender.  Extremities without edema no sclerodactyly.  Positive pedal pulses.      Objective:        Assessment:       1. Raynaud's disease without gangrene    2. Pulmonary hypertension    3. Insomnia, unspecified type    4. Cramping of hands    5. Scleroderma    6. Delayed gastric emptying    7. Interstitial lung disease    8. On home oxygen therapy    9. OAB (overactive bladder)    10. ELAINE on CPAP    11. Essential hypertension    12. Chronic respiratory failure with hypoxia    13. BMI 29.0-29.9,adult    14. Thoracic aortic aneurysm (TAA), unspecified part, unspecified whether ruptured    15. Renal cyst    16. Esophageal dysmotility    17. H/O hernia repair    18. Breast cancer screening by mammogram    19. Vitamin B12  deficiency    20. Abnormal blood chemistry    21. Vitamin D deficiency    22. Abnormal finding of blood chemistry, unspecified    23. H/O umbilical hernia repair    24. Esophageal stricture    25. Immunosuppression        Plan:       Raynaud's disease without gangrene  -     Ambulatory referral/consult to Rheumatology; Future; Expected date: 07/14/2023    Pulmonary hypertension  -     mycophenolate (CELLCEPT) 500 mg Tab; Take 3 tablets (1,500 mg total) by mouth 2 (two) times daily.  Dispense: 520 tablet; Refill: 1    Insomnia, unspecified type  -     traZODone (DESYREL) 100 MG tablet; Take one pill in the evening PRN  Dispense: 90 tablet; Refill: 1    Cramping of hands  -     magnesium oxide (MAG-OX) 400 mg (241.3 mg magnesium) tablet; Take 1 tablet (400 mg total) by mouth once daily.  Dispense: 90 tablet; Refill: 1    Scleroderma  -     losartan (COZAAR) 25 MG tablet; Take 1 tablet (25 mg total) by mouth once daily.  Dispense: 90 tablet; Refill: 1  -     Ambulatory referral/consult to Rheumatology; Future; Expected date: 07/14/2023    Delayed gastric emptying  -     pantoprazole (PROTONIX) 40 MG tablet; Take 1 tablet (40 mg total) by mouth 2 (two) times daily.  Dispense: 180 tablet; Refill: 1    Interstitial lung disease    On home oxygen therapy    OAB (overactive bladder)    ELAINE on CPAP    Essential hypertension  -     Hemoglobin A1C; Future; Expected date: 07/07/2023  -     CBC Auto Differential; Future; Expected date: 07/07/2023  -     Comprehensive Metabolic Panel; Future; Expected date: 07/07/2023  -     Lipid Panel; Future; Expected date: 07/07/2023  -     TSH; Future; Expected date: 07/07/2023  -     Sedimentation rate; Future; Expected date: 07/07/2023  -     SHILPI Screen w/Reflex; Future; Expected date: 07/07/2023  -     C-Reactive Protein; Future; Expected date: 07/07/2023  -     Rheumatoid Factor; Future; Expected date: 07/07/2023  -     Ambulatory referral/consult to Rheumatology; Future; Expected date:  07/14/2023    Chronic respiratory failure with hypoxia    BMI 29.0-29.9,adult    Thoracic aortic aneurysm (TAA), unspecified part, unspecified whether ruptured  -     CT Chest Without Contrast; Future; Expected date: 07/07/2023    Renal cyst    Esophageal dysmotility    H/O hernia repair    Breast cancer screening by mammogram  -     Mammo Digital Screening Bilat; Future; Expected date: 07/07/2023    Vitamin B12 deficiency  -     Vitamin B12; Future; Expected date: 07/07/2023    Abnormal blood chemistry    Vitamin D deficiency  -     Vitamin D; Future; Expected date: 07/07/2023    Abnormal finding of blood chemistry, unspecified  -     Hemoglobin A1C; Future; Expected date: 07/07/2023    H/O umbilical hernia repair    Esophageal stricture    Immunosuppression      Get an A1c CBC CMP lipids B12 TSH sed rate SHILPI CRP rheumatoid factor vitamin-D level.  Mammogram ordered.  CT chest without contrast to follow-up the aneurysm.  To rheumatology.  Refer her to provider in [place].  Follow-up with Dr. Serrano also.

## 2023-07-13 ENCOUNTER — TELEPHONE (OUTPATIENT)
Dept: FAMILY MEDICINE | Facility: CLINIC | Age: 68
End: 2023-07-13
Payer: MEDICARE

## 2023-07-20 ENCOUNTER — HOSPITAL ENCOUNTER (OUTPATIENT)
Dept: RADIOLOGY | Facility: HOSPITAL | Age: 68
Discharge: HOME | End: 2023-07-20
Attending: FAMILY MEDICINE
Payer: MEDICARE

## 2023-07-20 DIAGNOSIS — Z12.31 BREAST CANCER SCREENING BY MAMMOGRAM: ICD-10-CM

## 2023-07-20 DIAGNOSIS — I71.20 THORACIC AORTIC ANEURYSM (TAA), UNSPECIFIED PART, UNSPECIFIED WHETHER RUPTURED: ICD-10-CM

## 2023-07-20 PROCEDURE — 77067 SCR MAMMO BI INCL CAD: CPT | Mod: TC,PO

## 2023-07-20 PROCEDURE — 71250 CT THORAX DX C-: CPT | Mod: TC,PO

## 2023-07-21 ENCOUNTER — TELEPHONE (OUTPATIENT)
Dept: FAMILY MEDICINE | Facility: CLINIC | Age: 68
End: 2023-07-21
Payer: MEDICARE

## 2023-07-21 DIAGNOSIS — I71.20 THORACIC AORTIC ANEURYSM (TAA), UNSPECIFIED PART, UNSPECIFIED WHETHER RUPTURED: ICD-10-CM

## 2023-07-21 DIAGNOSIS — N28.1 RENAL CYST: Primary | ICD-10-CM

## 2023-07-21 NOTE — TELEPHONE ENCOUNTER
----- Message from Duane Julio III, MD sent at 7/20/2023 10:20 PM CDT -----  Aortic aneurysm is present.  Would recommend she have an appointment with vascular surgery.  Dr. Koch.  Also there is a lump in the left kidney.  We need to get an ultrasound of this.  ABNORMAL refer to specialist for further evaluation

## 2023-07-24 ENCOUNTER — HOSPITAL ENCOUNTER (OUTPATIENT)
Dept: RADIOLOGY | Facility: HOSPITAL | Age: 68
Discharge: HOME | End: 2023-07-24
Attending: FAMILY MEDICINE
Payer: MEDICARE

## 2023-07-24 DIAGNOSIS — N28.1 RENAL CYST: ICD-10-CM

## 2023-07-24 PROCEDURE — 76770 US EXAM ABDO BACK WALL COMP: CPT | Mod: 26,,, | Performed by: RADIOLOGY

## 2023-07-24 PROCEDURE — 76770 US EXAM ABDO BACK WALL COMP: CPT | Mod: TC

## 2023-07-24 PROCEDURE — 76770 US KIDNEY: ICD-10-PCS | Mod: 26,,, | Performed by: RADIOLOGY

## 2023-07-26 ENCOUNTER — TELEPHONE (OUTPATIENT)
Dept: FAMILY MEDICINE | Facility: CLINIC | Age: 68
End: 2023-07-26
Payer: MEDICARE

## 2023-07-26 ENCOUNTER — TELEPHONE (OUTPATIENT)
Dept: VASCULAR SURGERY | Facility: CLINIC | Age: 68
End: 2023-07-26
Payer: MEDICARE

## 2023-07-26 NOTE — TELEPHONE ENCOUNTER
----- Message from Duane Julio III, MD sent at 7/24/2023 11:05 PM CDT -----  FOLLOW-UP AS RECOMMENDED

## 2023-07-26 NOTE — TELEPHONE ENCOUNTER
----- Message from Vincent Malik sent at 7/26/2023 12:07 PM CDT -----  Regarding: referral  Type:  Sooner Apoointment Request    Caller is requesting a sooner appointment.  Caller declined first available appointment listed below.  Caller will not accept being placed on the waitlist and is requesting a message be sent to doctor.    Name of Caller:Pt  When is the first available appointment?none  Symptoms:Referral / Sharp  Would the patient rather a call back or a response via [facility]? Call back    Best Call Back Number:[phone]    Additional Information: Please advise -- Thank you

## 2023-08-04 ENCOUNTER — TELEPHONE (OUTPATIENT)
Dept: FAMILY MEDICINE | Facility: CLINIC | Age: 68
End: 2023-08-04
Payer: MEDICARE

## 2023-08-07 ENCOUNTER — OFFICE VISIT (OUTPATIENT)
Dept: FAMILY MEDICINE | Facility: CLINIC | Age: 68
End: 2023-08-07
Payer: MEDICARE

## 2023-08-07 VITALS
OXYGEN SATURATION: 96 % | WEIGHT: 175.06 LBS | HEIGHT: 66 IN | HEART RATE: 56 BPM | BODY MASS INDEX: 28.14 KG/M2 | SYSTOLIC BLOOD PRESSURE: 120 MMHG | TEMPERATURE: 98 F | DIASTOLIC BLOOD PRESSURE: 74 MMHG

## 2023-08-07 DIAGNOSIS — Z99.81 DEPENDENCE ON SUPPLEMENTAL OXYGEN: ICD-10-CM

## 2023-08-07 DIAGNOSIS — Z01.419 WELL WOMAN EXAM: ICD-10-CM

## 2023-08-07 DIAGNOSIS — M34.9 SCLERODERMA: ICD-10-CM

## 2023-08-07 DIAGNOSIS — I71.20 THORACIC AORTIC ANEURYSM WITHOUT RUPTURE, UNSPECIFIED PART: ICD-10-CM

## 2023-08-07 DIAGNOSIS — R26.9 ABNORMALITY OF GAIT AND MOBILITY: ICD-10-CM

## 2023-08-07 DIAGNOSIS — Z00.00 ENCOUNTER FOR PREVENTIVE HEALTH EXAMINATION: Primary | ICD-10-CM

## 2023-08-07 DIAGNOSIS — I10 ESSENTIAL HYPERTENSION: ICD-10-CM

## 2023-08-07 PROCEDURE — 99215 OFFICE O/P EST HI 40 MIN: CPT | Mod: PBBFAC,PO | Performed by: NURSE PRACTITIONER

## 2023-08-07 PROCEDURE — G0439 PPPS, SUBSEQ VISIT: HCPCS | Mod: ,,, | Performed by: NURSE PRACTITIONER

## 2023-08-07 PROCEDURE — 99999 PR PBB SHADOW E&M-EST. PATIENT-LVL V: CPT | Mod: PBBFAC,,, | Performed by: NURSE PRACTITIONER

## 2023-08-07 PROCEDURE — 99999 PR PBB SHADOW E&M-EST. PATIENT-LVL V: ICD-10-PCS | Mod: PBBFAC,,, | Performed by: NURSE PRACTITIONER

## 2023-08-07 PROCEDURE — G0439 PR MEDICARE ANNUAL WELLNESS SUBSEQUENT VISIT: ICD-10-PCS | Mod: ,,, | Performed by: NURSE PRACTITIONER

## 2023-08-07 NOTE — PROGRESS NOTES
"  Edith Tran presented for a  Medicare AWV and comprehensive Health Risk Assessment today. The following components were reviewed and updated:    Medical history  Family History  Social history  Allergies and Current Medications  Health Risk Assessment  Health Maintenance  Care Team         ** See Completed Assessments for Annual Wellness Visit within the encounter summary.**         The following assessments were completed:  Living Situation  CAGE  Depression Screening  Timed Get Up and Go  Whisper Test  Cognitive Function Screening  Nutrition Screening  ADL Screening  PAQ Screening    Clock in media     Vitals:    08/07/23 1106   BP: 120/74   Pulse: (!) 56   Temp: 98.1 °F (36.7 °C)   TempSrc: Oral   SpO2: 96%   Weight: 79.4 kg (175 lb 0.7 oz)   Height: 5' 6" (1.676 m)     Body mass index is 28.25 kg/m².  Physical Exam  Constitutional:       Appearance: She is well-developed.   HENT:      Head: Normocephalic and atraumatic.      Nose: Nose normal.   Eyes:      General: Lids are normal.      Conjunctiva/sclera: Conjunctivae normal.   Cardiovascular:      Rate and Rhythm: Normal rate.   Pulmonary:      Effort: Pulmonary effort is normal.   Neurological:      Mental Status: She is alert and oriented to person, place, and time.   Psychiatric:         Speech: Speech normal.         Behavior: Behavior normal.               Diagnoses and health risks identified today and associated recommendations/orders:        1. Encounter for preventive health examination  Discussed health maintenance guidelines appropriate for age.    Review for Opioid Screening: Patient does not have rx for Opioids.    Review for Substance Use Disorders: Patient does not use substance.'      2. Thoracic aortic aneurysm without rupture, unspecified part  Appears stable on Imaging, patient is scheduled to see Dr. Koch     3. Essential hypertension  Controlled, continue current medication regimen  Low salt diet  Increase physical activity  Followed " by pcp      4. Scleroderma  Stable, continue current medications, no longer has rheumatology       5. Well woman exam    - Ambulatory referral/consult to Gynecology; Future    6. Dependence on supplemental oxygen  Continue current use     7. Abnormality of gait and mobility  Stable, continue to monitor     Provided Edith with a 5-10 year written screening schedule and personal prevention plan. Recommendations were developed using the USPSTF age appropriate recommendations. Education, counseling, and referrals were provided as needed. After Visit Summary printed and given to patient which includes a list of additional screenings\tests needed.    Follow up for One year for Annual Wellness Visit.    Rosanne Moreno NP      I offered to discuss advanced care planning, including how to pick a person who would make decisions for you if you were unable to make them for yourself, called a health care power of , and what kind of decisions you might make such as use of life sustaining treatments such as ventilators and tube feeding when faced with a life limiting illness recorded on a living will that they will need to know. (How you want to be cared for as you near the end of your natural life)     X  Patient has advanced directives written and agrees to provide copies to the institution.

## 2023-08-07 NOTE — PATIENT INSTRUCTIONS
Counseling and Referral of Other Preventative  (Italic type indicates deductible and co-insurance are waived)    Patient Name: Edith Tran  Today's Date: 8/7/2023    Health Maintenance       Date Due Completion Date    COVID-19 Vaccine (6 - Pfizer risk series) 03/20/2023 1/23/2023    Cervical Cancer Screening 10/11/2023 10/11/2018    Influenza Vaccine (1) 09/01/2023 9/19/2022    DEXA Scan 03/11/2024 3/11/2021    Mammogram 07/20/2024 7/20/2023    Pneumococcal Vaccines (Age 65+) (4 - PPSV23 or PCV20) 11/04/2024 11/4/2019    Colorectal Cancer Screening 11/21/2024 11/21/2019    Hemoglobin A1c (Diabetic Prevention Screening) 07/07/2026 7/7/2023    Lipid Panel 07/07/2028 7/7/2023    TETANUS VACCINE 07/21/2030 7/21/2020        Orders Placed This Encounter   Procedures    Ambulatory referral/consult to Gynecology     The following information is provided to all patients.  This information is to help you find resources for any of the problems found today that may be affecting your health:                Living healthy guide: www.[facility].[place].gov      Understanding Diabetes: www.diabetes.org      Eating healthy: www.cdc.gov/healthyweight      CDC home safety checklist: www.cdc.gov/steadi/patient.html      Agency on Aging: www.goea.[place].[facility]      Alcoholics anonymous (AA): www.aa.org      Physical Activity: www.jean claude.nih.gov/pv2qtbn      Tobacco use: www.quitwithusla.org

## 2023-08-09 ENCOUNTER — OFFICE VISIT (OUTPATIENT)
Dept: VASCULAR SURGERY | Facility: CLINIC | Age: 68
End: 2023-08-09
Payer: MEDICARE

## 2023-08-09 VITALS
HEIGHT: 66 IN | BODY MASS INDEX: 28.25 KG/M2 | SYSTOLIC BLOOD PRESSURE: 108 MMHG | HEART RATE: 88 BPM | DIASTOLIC BLOOD PRESSURE: 76 MMHG

## 2023-08-09 DIAGNOSIS — I71.21 ANEURYSM OF ASCENDING AORTA WITHOUT RUPTURE: ICD-10-CM

## 2023-08-09 PROBLEM — I71.43 INFRARENAL ABDOMINAL AORTIC ANEURYSM (AAA) WITHOUT RUPTURE: Status: ACTIVE | Noted: 2023-08-09

## 2023-08-09 PROCEDURE — 99999 PR PBB SHADOW E&M-EST. PATIENT-LVL III: ICD-10-PCS | Mod: PBBFAC,,, | Performed by: THORACIC SURGERY (CARDIOTHORACIC VASCULAR SURGERY)

## 2023-08-09 PROCEDURE — 99213 OFFICE O/P EST LOW 20 MIN: CPT | Mod: PBBFAC,PN | Performed by: THORACIC SURGERY (CARDIOTHORACIC VASCULAR SURGERY)

## 2023-08-09 PROCEDURE — 99204 OFFICE O/P NEW MOD 45 MIN: CPT | Mod: S$PBB,,, | Performed by: THORACIC SURGERY (CARDIOTHORACIC VASCULAR SURGERY)

## 2023-08-09 PROCEDURE — 99204 PR OFFICE/OUTPT VISIT, NEW, LEVL IV, 45-59 MIN: ICD-10-PCS | Mod: S$PBB,,, | Performed by: THORACIC SURGERY (CARDIOTHORACIC VASCULAR SURGERY)

## 2023-08-09 PROCEDURE — 99999 PR PBB SHADOW E&M-EST. PATIENT-LVL III: CPT | Mod: PBBFAC,,, | Performed by: THORACIC SURGERY (CARDIOTHORACIC VASCULAR SURGERY)

## 2023-08-09 RX ORDER — TREPROSTINIL 64 UG/1
INHALANT ORAL
COMMUNITY
Start: 2023-07-13

## 2023-08-09 RX ORDER — TADALAFIL 20 MG/1
40 TABLET ORAL EVERY OTHER DAY
COMMUNITY
Start: 2023-07-21

## 2023-08-09 NOTE — PROGRESS NOTES
This patient has an ascending thoracic aortic aneurysm.  It is 4.5 cm in greatest transverse dimension.  She appears to be asymptomatic.  She does have back pain adjacent to her spine on the left side at about the 6th to 8th rib.  This seems to be quite localized.    Her past history is noted.  She has pulmonary fibrosis.  Medicines are part of the epic record.  She is not a smoker.  She is had no recent surgeries.  Family history is really not pertinent.    On exam vital signs are stable.  Pupils are equal and round reactive to light.  Neck is supple.  Chest is equal breath sounds.  Heart is in a regular rate and rhythm.  Abdomen is benign.  Perfusion to the legs and feet seems to be satisfactory.    The patient has an ascending thoracic aortic aneurysm at 4.5 cm in greatest transverse dimension.  She is asymptomatic from this.    Recommendation is for a repeat CT scan of the chest in 6-8 months.

## 2023-08-11 DIAGNOSIS — I71.21 ANEURYSM OF ASCENDING AORTA WITHOUT RUPTURE: Primary | ICD-10-CM

## 2023-08-16 ENCOUNTER — OFFICE VISIT (OUTPATIENT)
Dept: OBSTETRICS AND GYNECOLOGY | Facility: CLINIC | Age: 68
End: 2023-08-16
Payer: MEDICARE

## 2023-08-16 VITALS
HEART RATE: 96 BPM | DIASTOLIC BLOOD PRESSURE: 62 MMHG | WEIGHT: 173.94 LBS | HEIGHT: 66 IN | SYSTOLIC BLOOD PRESSURE: 99 MMHG | BODY MASS INDEX: 27.95 KG/M2

## 2023-08-16 DIAGNOSIS — Z01.419 WELL WOMAN EXAM: Primary | ICD-10-CM

## 2023-08-16 DIAGNOSIS — N95.2 VAGINAL ATROPHY: ICD-10-CM

## 2023-08-16 DIAGNOSIS — Z12.4 SCREENING FOR MALIGNANT NEOPLASM OF CERVIX: ICD-10-CM

## 2023-08-16 PROCEDURE — 99999 PR PBB SHADOW E&M-EST. PATIENT-LVL IV: ICD-10-PCS | Mod: PBBFAC,,, | Performed by: STUDENT IN AN ORGANIZED HEALTH CARE EDUCATION/TRAINING PROGRAM

## 2023-08-16 PROCEDURE — 87624 HPV HI-RISK TYP POOLED RSLT: CPT | Performed by: STUDENT IN AN ORGANIZED HEALTH CARE EDUCATION/TRAINING PROGRAM

## 2023-08-16 PROCEDURE — 99999 PR PBB SHADOW E&M-EST. PATIENT-LVL IV: CPT | Mod: PBBFAC,,, | Performed by: STUDENT IN AN ORGANIZED HEALTH CARE EDUCATION/TRAINING PROGRAM

## 2023-08-16 PROCEDURE — 88175 CYTOPATH C/V AUTO FLUID REDO: CPT | Performed by: STUDENT IN AN ORGANIZED HEALTH CARE EDUCATION/TRAINING PROGRAM

## 2023-08-16 PROCEDURE — G0101 PR CA SCREEN;PELVIC/BREAST EXAM: ICD-10-PCS | Mod: S$PBB,,, | Performed by: STUDENT IN AN ORGANIZED HEALTH CARE EDUCATION/TRAINING PROGRAM

## 2023-08-16 PROCEDURE — 99214 OFFICE O/P EST MOD 30 MIN: CPT | Mod: PBBFAC,PO | Performed by: STUDENT IN AN ORGANIZED HEALTH CARE EDUCATION/TRAINING PROGRAM

## 2023-08-16 PROCEDURE — G0101 CA SCREEN;PELVIC/BREAST EXAM: HCPCS | Mod: S$PBB,,, | Performed by: STUDENT IN AN ORGANIZED HEALTH CARE EDUCATION/TRAINING PROGRAM

## 2023-08-16 NOTE — PROGRESS NOTES
[facility]    Subjective:     Chief Complaint:   Chief Complaint   Patient presents with    Annual Exam       Patient's last menstrual period was No LMP recorded. Patient is postmenopausal.  Contraception: Postmenopausal.  HRT: None.    2023    Edith Tran is a 68 y.o. female  who presents for an annual exam.  The patient has no GYN complaints today.  She participates in regular exercise: walking and exercising at the [employer].  She does not smoke.  She wears seatbelts.  She is taking a multivitamin.  She denies any domestic violence.    Menopause at 52. Denies any vaginal bleeding or pelvic pain.    Reports wearing 3 L of oxygen, not wearing oxygen at today's visit. Hx of scleroderma, Thoracic aortic aneurysm, pulmonary HTN, and OAB.   She has been following up with specialists for management.   She is waiting for mybetriq to be filled. Plans to follow up with urology PRN.     Last Pap: years ago. Denies any history of abnormal pap smears.  Last mammogram: 23. Results: normal--routine follow-up in 12 months.    FH: She reports that her father had some type of cancer in his abdomen but patient is unsure of what type.   Breast cancer: none.  Colon cancer: none.  Endometrial cancer: none.  Ovarian cancer: none.      OB History    Para Term  AB Living   3 1 1   2 1   SAB IAB Ectopic Multiple Live Births     2     1      # Outcome Date GA Lbr Hemant/2nd Weight Sex Delivery Anes PTL Lv   3 Term     M Vag-Spont   RAO   2 IAB            1 IAB                Past Medical History:   Diagnosis Date    Allergy     Arthritis     Back pain     Colon polyps     COPD (chronic obstructive pulmonary disease)     Emphysema of lung     GERD (gastroesophageal reflux disease)     Hypertension     Kidney stones     Obesity     Pneumonia     Pneumonia due to other staphylococcus     Scleroderma involving lung since she was 49 y/o    Sleep apnea     Trouble in sleeping       Past Surgical History:   Procedure Laterality Date    COLONOSCOPY N/A 8/7/2018    Procedure: COLONOSCOPY;  Surgeon: Ngozi Prince MD;  Location: [facility] [place] ENDO;  Service: Endoscopy;  Laterality: N/A;    COLONOSCOPY N/A 11/21/2019    Procedure: COLONOSCOPY;  Surgeon: Ngozi Prince MD;  Location: [facility] [place] ENDO;  Service: Endoscopy;  Laterality: N/A;    ESOPHAGEAL MANOMETRY WITH MEASUREMENT OF IMPEDANCE N/A 7/27/2020    Procedure: MANOMETRY, ESOPHAGUS, WITH IMPEDANCE MEASUREMENT;  Surgeon: Bhupendra Temple MD;  [facility]  Service: Endoscopy;  Laterality: N/A;  3/10 - pt confirmed apptCovid test ordered for 7/24 in [place].[facility]    ESOPHAGOGASTRODUODENOSCOPY N/A 8/7/2018    Procedure: EGD (ESOPHAGOGASTRODUODENOSCOPY);  Surgeon: Ngozi Prince MD;  Location: [facility];  Service: Endoscopy;  Laterality: N/A;    ESOPHAGOGASTRODUODENOSCOPY N/A 11/21/2019    Procedure: EGD (ESOPHAGOGASTRODUODENOSCOPY);  Surgeon: Ngozi Prince MD;  Location: [facility];  Service: Endoscopy;  Laterality: N/A;    ESOPHAGOGASTRODUODENOSCOPY N/A 1/29/2020    Procedure: EGD (ESOPHAGOGASTRODUODENOSCOPY);  Surgeon: Ngozi Prince MD;  Location: [facility];  Service: Endoscopy;  Laterality: N/A;    ESOPHAGOGASTRODUODENOSCOPY N/A 8/20/2020    Procedure: EGD (ESOPHAGOGASTRODUODENOSCOPY);  Surgeon: Ngozi Prince MD;  Location: [facility];  Service: Endoscopy;  Laterality: N/A;    ESOPHAGOGASTRODUODENOSCOPY N/A 12/4/2020    Procedure: EGD (ESOPHAGOGASTRODUODENOSCOPY);  Surgeon: Ngozi Prince MD;  Location: [facility] [place] ENDO;  Service: Endoscopy;  Laterality: N/A;    ESOPHAGOGASTRODUODENOSCOPY N/A 11/19/2021    Procedure: EGD (ESOPHAGOGASTRODUODENOSCOPY);  Surgeon: Ngozi Prince MD;  Location: [facility] [place] ENDO;  Service: Endoscopy;  Laterality: N/A;    REPAIR, HERNIA, INCISIONAL OR VENTRAL, WITHOUT HISTORY OF PRIOR REPAIR N/A 12/27/2022    Procedure: REPAIR, HERNIA umbilical, INCISIONAL OR VENTRAL, WITHOUT HISTORY OF PRIOR  REPAIR;  Surgeon: Trent Evans MD;  Location: [facility];  Service: General;  Laterality: N/A;  RN PREOP 2022, PT INSTRUCTED TO BRING ALL MEDS WITH HER ON AM OF SURGERY     Review of patient's allergies indicates:   Allergen Reactions    Hydroxychloroquine Other (See Comments)     Having eye reaction, needs to stop plaquenil and stay off of it.        Social History     Socioeconomic History    Marital status:     Number of children: 1    Years of education: 14    Highest education level: Associate degree: occupational, technical, or vocational program   Occupational History    Occupation: Retired   Tobacco Use    Smoking status: Former     Current packs/day: 0.00     Average packs/day: 0.5 packs/day for 27.7 years (13.8 ttl pk-yrs)     Types: Cigarettes     Start date:      Quit date: 1995     Years since quittin.9    Smokeless tobacco: Never   Substance and Sexual Activity    Alcohol use: Yes     Comment: occ    Drug use: No    Sexual activity: Not Currently     Social Determinants of Health     Financial Resource Strain: Low Risk  (2023)    Overall Financial Resource Strain (CARDIA)     Difficulty of Paying Living Expenses: Not hard at all   Food Insecurity: No Food Insecurity (2023)    Hunger Vital Sign     Worried About Running Out of Food in the Last Year: Never true     Ran Out of Food in the Last Year: Never true   Transportation Needs: No Transportation Needs (2023)    PRAPARE - Transportation     Lack of Transportation (Medical): No     Lack of Transportation (Non-Medical): No   Physical Activity: Insufficiently Active (2023)    Exercise Vital Sign     Days of Exercise per Week: 3 days     Minutes of Exercise per Session: 30 min   Stress: No Stress Concern Present (2023)    [employer] - Occupational Stress Questionnaire     Feeling of Stress : Not at all   Social Connections: Moderately Isolated (2023)    Social Connection  and Isolation Panel [NHANES]     Frequency of Communication with Friends and Family: More than three times a week     Frequency of Social Gatherings with Friends and Family: More than three times a week     Attends Oriental orthodox Services: Never     Active Member of Clubs or Organizations: Yes     Attends Club or Organization Meetings: More than 4 times per year     Marital Status:    Housing Stability: Low Risk  (8/7/2023)    Housing Stability Vital Sign     Unable to Pay for Housing in the Last Year: No     Number of Places Lived in the Last Year: 1     Unstable Housing in the Last Year: No       Family History   Problem Relation Age of Onset    Kidney disease Mother     Cancer Father     Heart disease Brother     Mental illness Son     Glaucoma Neg Hx     Macular degeneration Neg Hx     Retinal detachment Neg Hx        Medications  Current Outpatient Medications on File Prior to Visit   Medication Sig Dispense Refill Last Dose    albuterol (PROVENTIL/VENTOLIN HFA) 90 mcg/actuation inhaler 2 puffs every 4 hours as needed for cough, wheeze, or shortness of breath 8 g 18 Taking    ergocalciferol (ERGOCALCIFEROL) 50,000 unit Cap Take one cap a week 12 capsule 3 Taking    fluticasone propionate (FLONASE) 50 mcg/actuation nasal spray 1 spray (50 mcg total) by Each Nostril route once daily. 16 mL 11 Taking    losartan (COZAAR) 25 MG tablet Take 1 tablet (25 mg total) by mouth once daily. 90 tablet 1 Taking    macitentan 10 mg Tab Take 10 mg by mouth once daily.   Taking    magnesium oxide (MAG-OX) 400 mg (241.3 mg magnesium) tablet Take 1 tablet (400 mg total) by mouth once daily. 90 tablet 1 Taking    montelukast (SINGULAIR) 10 mg tablet Take 1 tablet (10 mg total) by mouth every evening. 30 tablet 11 Taking    mycophenolate (CELLCEPT) 500 mg Tab Take 3 tablets (1,500 mg total) by mouth 2 (two) times daily. 520 tablet 1 Taking    nintedanib 150 mg Cap Take 150 mg by mouth every 12 (twelve) hours.   Taking     "ondansetron (ZOFRAN) 8 MG tablet Take 1 tablet (8 mg total) by mouth every 8 (eight) hours as needed for Nausea. 30 tablet 0 Taking    pantoprazole (PROTONIX) 40 MG tablet Take 1 tablet (40 mg total) by mouth 2 (two) times daily. 180 tablet 1 Taking    tadalafil (ADCIRCA) 20 mg Tab Take 20 mg by mouth every other day.   Taking    traZODone (DESYREL) 100 MG tablet Take one pill in the evening PRN 90 tablet 1 Taking    TYVASO DPI 64 mcg CtDv Inhale into the lungs.   Taking       Review of Systems   Constitutional: Negative for appetite change, fever and unexpected weight change.   Respiratory: Negative for cough and shortness of breath.    Cardiovascular: Negative for chest pain and palpitations.   Gastrointestinal: Negative for abdominal distention, constipation, nausea and vomiting.   Genitourinary: Negative for dyspareunia, dysuria, hematuria and pelvic pain.        GYN ROS per HPI.   Musculoskeletal: Negative for gait problem and myalgias.   Skin: Negative for rash.   Neurological: Negative for dizziness, light-headedness and headaches.   Psychiatric/Behavioral: The patient is not nervous/anxious.      Objective:     BP 99/62 (BP Location: Right arm, Patient Position: Sitting, BP Method: Medium (Automatic))   Pulse 96   Ht 5' 6" (1.676 m)   Wt 78.9 kg (173 lb 15.1 oz)   BMI 28.08 kg/m²     Physical Exam  Exam conducted with a chaperone present.   Constitutional:       General: She is not in acute distress.  HENT:      Head: Normocephalic.   Eyes:      General: No scleral icterus.  Cardiovascular:      Rate and Rhythm: Normal rate and regular rhythm.   Pulmonary:      Effort: Pulmonary effort is normal. No respiratory distress.   Genitourinary:     General: Normal vulva.      Pubic Area: No rash.       Labia:         Right: No rash or tenderness.         Left: No rash or tenderness.       Vagina: No tenderness or bleeding.      Cervix: No cervical motion tenderness.      Uterus: Not tender.       Adnexa:        "  Right: No mass or tenderness.          Left: No mass or tenderness.     Neurological:      General: No focal deficit present.      Mental Status: She is alert.   Psychiatric:         Mood and Affect: Mood normal.         Assessment:     1. Well woman exam    2. Screening for malignant neoplasm of cervix    3. Vaginal atrophy      Plan:     68 y.o. female presents today for her annual exam.     1. Well woman exam  - Ambulatory referral/consult to Gynecology    2. Screening for malignant neoplasm of cervix  - Liquid-Based Pap Smear, Screening  - HPV High Risk Genotypes, PCR    3. Vaginal atrophy      Follow up in about 1 year (around 8/16/2024) for annual exam or sooner if any GYN issues arise.      The above was reviewed and discussed with the patient.    Annual exam and screening issues based on the patient's age and family history were discussed.     Discussed the physical exam findings with the patient.  We discussed the etiology of atrophic vaginitis and potential issues including vaginal discomfort and potential findings of hematuria on urinalysis if inappropriate clean-catch was not performed.  Conservative versus medical management discussed and at this time, she declined further treatment. She was instructed to contact the clinic if she becomes symptomatic or changes her mind.      - Pap and HPV testing performed.   - Mammogram 7-20-23. Repeat in 1 year.   - Colonoscopy due in 2024.  - Tobacco cessation N/A.    - DEXA N/A3-11-21. Normal BMD. Repeat in 2024.  - Counseled to take daily multivitamin. If patient is of reproductive age and not on contraception, to take prenatal vitamin. Patient has been counseled on the vitamin D and calcium requirements per ACOG recommendations.     Age         Calcium(mg/day)       Vitamin D (IU/day)  9-18                1300                       600  19-50              1000                       600  51-70              1200                       600  >70                  1200                        800      The patient's questions were answered, and she agrees with the current plan.      The patient was counseled today on the new ACS guidelines for cervical cytology screening as well as the current recommendations for breast cancer screening. She was counseled to follow up with her PCP for other routine health maintenance.      Toshia Armijo PA-C  08/16/2023

## 2023-08-18 ENCOUNTER — OFFICE VISIT (OUTPATIENT)
Dept: FAMILY MEDICINE | Facility: CLINIC | Age: 68
End: 2023-08-18
Payer: MEDICARE

## 2023-08-18 VITALS
BODY MASS INDEX: 28.3 KG/M2 | HEIGHT: 66 IN | HEART RATE: 78 BPM | OXYGEN SATURATION: 100 % | TEMPERATURE: 97 F | WEIGHT: 176.06 LBS | SYSTOLIC BLOOD PRESSURE: 102 MMHG | DIASTOLIC BLOOD PRESSURE: 70 MMHG

## 2023-08-18 DIAGNOSIS — D69.6 THROMBOCYTOPENIA: ICD-10-CM

## 2023-08-18 DIAGNOSIS — I10 HYPERTENSION, ESSENTIAL: Primary | ICD-10-CM

## 2023-08-18 DIAGNOSIS — M34.9 SCLERODERMA: ICD-10-CM

## 2023-08-18 DIAGNOSIS — R07.89 POSTERIOR CHEST PAIN: ICD-10-CM

## 2023-08-18 DIAGNOSIS — J84.9 INTERSTITIAL LUNG DISEASE: ICD-10-CM

## 2023-08-18 DIAGNOSIS — I71.20 THORACIC AORTIC ANEURYSM WITHOUT RUPTURE, UNSPECIFIED PART: ICD-10-CM

## 2023-08-18 DIAGNOSIS — N28.1 ACQUIRED RENAL CYST OF LEFT KIDNEY: ICD-10-CM

## 2023-08-18 DIAGNOSIS — I73.00 RAYNAUD'S DISEASE WITHOUT GANGRENE: ICD-10-CM

## 2023-08-18 DIAGNOSIS — E53.8 VITAMIN B12 DEFICIENCY: ICD-10-CM

## 2023-08-18 PROCEDURE — 99214 OFFICE O/P EST MOD 30 MIN: CPT | Mod: S$GLB,,, | Performed by: FAMILY MEDICINE

## 2023-08-18 PROCEDURE — 99214 PR OFFICE/OUTPT VISIT, EST, LEVL IV, 30-39 MIN: ICD-10-PCS | Mod: S$GLB,,, | Performed by: FAMILY MEDICINE

## 2023-08-20 PROBLEM — N28.1 ACQUIRED RENAL CYST OF LEFT KIDNEY: Status: ACTIVE | Noted: 2023-08-20

## 2023-08-20 PROBLEM — E53.8 VITAMIN B12 DEFICIENCY: Status: ACTIVE | Noted: 2023-08-20

## 2023-08-20 NOTE — PROGRESS NOTES
Subjective:       Patient ID: Edith Tran is a 68 y.o. female.    Chief Complaint: Follow-up    Follow-up of hypertension.  Blood pressure controlled.  Thoracic aortic aneurysm.  CT chest July.  Repeat in January to March of 2024.  Did see vascular surgery.  Raynaud's no rheumatologist yet.  Interstitial lung disease.  Her pulmonologist left.  BMI of 28.  Left renal mass turned out to be a 1.8 cm cyst.  As scleroderma.  B12 deficiency.  Thrombocytopenia a platelet 47,000.  Some posterior chest pain left.  Continuous pain.  But still present.  DEXA scan okay.  Pain is been present.  A1c 5 TSH normal.  CRP normal vitamin-D 40 SHILPI strongly positive at 12 80 scleroderma pattern.  Cholesterol 204 HDL 63.  CMP normal.  CBC normal.    Physical examination.  Vital signs noted.  Neck bruit.  Chest clear.  Heart regular rate rhythm.  Abdomen bowel sounds positive soft nontender.  Extremities skin ulcerations.  Left posterior chest just left of the midline pain.  Nontender.        Objective:        Assessment:       1. Hypertension, essential    2. Scleroderma    3. Thoracic aortic aneurysm without rupture, unspecified part    4. Raynaud's disease without gangrene    5. Interstitial lung disease    6. BMI 28.0-28.9,adult    7. Acquired renal cyst of left kidney    8. Vitamin B12 deficiency    9. Posterior chest pain    10. Thrombocytopenia        Plan:       Hypertension, essential    Scleroderma  -     Ambulatory referral/consult to Rheumatology; Future; Expected date: 08/25/2023    Thoracic aortic aneurysm without rupture, unspecified part  -     X-Ray Thoracic Spine 4 or more views; Future; Expected date: 08/18/2023  -     Ambulatory referral/consult to Physical/Occupational Therapy; Future; Expected date: 08/25/2023    Raynaud's disease without gangrene    Interstitial lung disease    BMI 28.0-28.9,adult    Acquired renal cyst of left kidney    Vitamin B12 deficiency    Posterior chest pain    Thrombocytopenia    Follow  her platelet count.  To Rheumatology possibly Dr. Jaswinder blanca.  Sublingual B12 5000 per day.  X-ray thoracic spine.  No compression fracture is seen.  May need to get an MRI thoracic spine.  Physical the x-ray.  Tylenol p.r.n. pain.

## 2023-08-21 LAB
HPV HR 12 DNA SPEC QL NAA+PROBE: NEGATIVE
HPV16 AG SPEC QL: NEGATIVE
HPV18 DNA SPEC QL NAA+PROBE: NEGATIVE

## 2023-08-23 LAB
FINAL PATHOLOGIC DIAGNOSIS: NORMAL
Lab: NORMAL

## 2023-08-24 ENCOUNTER — HOSPITAL ENCOUNTER (OUTPATIENT)
Dept: RADIOLOGY | Facility: HOSPITAL | Age: 68
Discharge: HOME | End: 2023-08-24
Attending: FAMILY MEDICINE
Payer: MEDICARE

## 2023-08-24 DIAGNOSIS — I71.20 THORACIC AORTIC ANEURYSM WITHOUT RUPTURE, UNSPECIFIED PART: ICD-10-CM

## 2023-08-24 PROCEDURE — 72074 X-RAY EXAM THORAC SPINE4/>VW: CPT | Mod: TC

## 2023-08-24 PROCEDURE — 72074 X-RAY EXAM THORAC SPINE4/>VW: CPT | Mod: 26,,, | Performed by: RADIOLOGY

## 2023-08-24 PROCEDURE — 72074 XR THORACIC SPINE 4 OR MORE VIEWS: ICD-10-PCS | Mod: 26,,, | Performed by: RADIOLOGY

## 2023-08-29 ENCOUNTER — PATIENT MESSAGE (OUTPATIENT)
Dept: FAMILY MEDICINE | Facility: CLINIC | Age: 68
End: 2023-08-29
Payer: MEDICARE

## 2023-09-01 ENCOUNTER — OFFICE VISIT (OUTPATIENT)
Dept: RHEUMATOLOGY | Facility: CLINIC | Age: 68
End: 2023-09-01
Payer: MEDICARE

## 2023-09-01 VITALS
DIASTOLIC BLOOD PRESSURE: 83 MMHG | BODY MASS INDEX: 28.34 KG/M2 | HEIGHT: 66 IN | HEART RATE: 87 BPM | SYSTOLIC BLOOD PRESSURE: 124 MMHG | WEIGHT: 176.38 LBS

## 2023-09-01 DIAGNOSIS — I27.20 PULMONARY HYPERTENSION: ICD-10-CM

## 2023-09-01 DIAGNOSIS — J84.9 INTERSTITIAL LUNG DISEASE: ICD-10-CM

## 2023-09-01 DIAGNOSIS — D84.9 IMMUNOSUPPRESSION: ICD-10-CM

## 2023-09-01 DIAGNOSIS — Z79.899 HIGH RISK MEDICATION USE: ICD-10-CM

## 2023-09-01 DIAGNOSIS — K21.9 GASTROESOPHAGEAL REFLUX DISEASE, UNSPECIFIED WHETHER ESOPHAGITIS PRESENT: ICD-10-CM

## 2023-09-01 DIAGNOSIS — M34.9 LIMITED SYSTEMIC SCLEROSIS: Primary | ICD-10-CM

## 2023-09-01 DIAGNOSIS — I73.00 RAYNAUD'S DISEASE WITHOUT GANGRENE: ICD-10-CM

## 2023-09-01 PROCEDURE — 99215 PR OFFICE/OUTPT VISIT, EST, LEVL V, 40-54 MIN: ICD-10-PCS | Mod: S$PBB,,, | Performed by: STUDENT IN AN ORGANIZED HEALTH CARE EDUCATION/TRAINING PROGRAM

## 2023-09-01 PROCEDURE — 99214 OFFICE O/P EST MOD 30 MIN: CPT | Mod: PBBFAC,PN | Performed by: STUDENT IN AN ORGANIZED HEALTH CARE EDUCATION/TRAINING PROGRAM

## 2023-09-01 PROCEDURE — 99215 OFFICE O/P EST HI 40 MIN: CPT | Mod: S$PBB,,, | Performed by: STUDENT IN AN ORGANIZED HEALTH CARE EDUCATION/TRAINING PROGRAM

## 2023-09-01 PROCEDURE — 99999 PR PBB SHADOW E&M-EST. PATIENT-LVL IV: CPT | Mod: PBBFAC,,, | Performed by: STUDENT IN AN ORGANIZED HEALTH CARE EDUCATION/TRAINING PROGRAM

## 2023-09-01 PROCEDURE — 99999 PR PBB SHADOW E&M-EST. PATIENT-LVL IV: ICD-10-PCS | Mod: PBBFAC,,, | Performed by: STUDENT IN AN ORGANIZED HEALTH CARE EDUCATION/TRAINING PROGRAM

## 2023-09-01 NOTE — PROGRESS NOTES
"Subjective:      Patient ID: Edith Tran is a 68 y.o. female.    Chief Complaint: Disease Management    HPI    Rheumatologic History:     - Diagnosis/es:   - limited systemic sclerosis diagnosed in  by Dr Barnes and characterized by inflammatory arthritis, esophageal dysmotility, GERD, Raynaud's, interstitial lung disease, and pulmonary hypertension   - Crohn's disease diagnosed around 2019, not active  - Social History: Former smoker, denies alcohol intake  - Family History: No autoimmune conditions  - Gyn History: , 3 intentional abortions  - Positive serologies: -  - Negative serologies: -  - Infectious screening labs: -  - Imaging:   - EGD (2020): Grade A esophagitis and mild Schatzki ring - dilation was performed.  Small hiatial hernia.   - TTE (2023) LVEF >55%, PASP and CVP WNL   - DEXA (3/2021) normal   - PFT (2023) completed at [facility]   - CT chest (2023) ascending aorta measures 4.2 x 4.5 cm in maximum diameter. There are extensive subpleural reticulations and honeycombing throughout the upper and lower lobes compatible with idiopathic pulmonary fibrosis. There is bronchiectasis. There are no confluent infiltrates or pleural effusions. There are mildly prominent mediastinal lymph nodes most likely reactive. The esophagus is normal  The visualized portion of the upper abdomen demonstrates a 15 mm hyperdense mass arising from the upper pole the left kidney. Follow-up ultrasound is recommended to determine if this represents hemorrhagic cyst or solid mass adrenal glands are normal. There are degenerative changes of the spine. The musculature is normal.  - Previous Treatments:   - HCQ 200mg BID: caused "eye problems"   - MTX   - Reglan  - Current Treatments:    - MMF 1500mg BID   - Tadalafil (Adcirca) 40mg daily  - Opsumit (macitentan)  - Tyvaso (treprostinil)   - Ofev   - Protonix 40mg BID  Interval History:   This is a 68-year-old woman with history of AAA, insomnia, ELAINE on CPAP, esophageal " "stricture, diverticulosis, B12 deficiency, esophageal leukoplakia, chronic back pain, and limited systemic sclerosis diagnosed in 2009 by Dr Barnes and characterized by inflammatory arthritis, esophageal dysmotility, GERD, Raynaud's, interstitial lung disease, and pulmonary hypertension. Currently, she does report telangiectasias, acid reflux is stable, sclerodactyly, stable Raynaud's, sicca symptoms, joint pain and swelling involving the hands, and generalized weakness.    The patient denies fevers, patchy alopecia, oral/nasal ulcers,  rashes, photosensitivity, pleuritic chest pain, shortness of breath, dysphagia, abdominal pain, diarrhea, bloody stool, numbness/tingling.    Objective:   /83   Pulse 87   Ht 5' 6" (1.676 m)   Wt 80 kg (176 lb 5.9 oz)   BMI 28.47 kg/m²   Physical Exam   Constitutional: normal appearance.   HENT:   Head: Normocephalic and atraumatic.   Cardiovascular: Normal rate, regular rhythm and normal heart sounds.   Pulmonary/Chest: She has rales (Lung bases).   Musculoskeletal:      Comments: Trace tenderness and swelling of bilateral 2nd and 3rd MCPs   Neurological: She is alert.   Skin: Skin is warm and dry. No rash noted.   + Minimal telangiectasias  + Sclerodactyly  + Abnormal nailfold capillaries  No digital pitting       Right Side Rheumatological Exam     Muscle Strength (0-5 scale):  Deltoid:  5  Biceps: 5/5   Triceps:  5  : 5/5   Iliopsoas: 5  Quadriceps:  5   Distal Lower Extremity: 5    Left Side Rheumatological Exam     Muscle Strength (0-5 scale):  Deltoid:  5  Biceps: 5/5   Triceps:  5  :  5/5   Iliopsoas: 5  Quadriceps:  5   Distal Lower Extremity: 5        No data to display     Assessment:     1. Limited systemic sclerosis    2. Interstitial lung disease    3. Raynaud's disease without gangrene    4. Pulmonary hypertension    5. Gastroesophageal reflux disease, unspecified whether esophagitis present    6. Immunosuppression    7. High risk medication use  "     This is a 68-year-old woman with history of AAA, insomnia, ELAINE on CPAP, esophageal stricture, diverticulosis, B12 deficiency, esophageal leukoplakia, chronic back pain, and limited systemic sclerosis diagnosed in 2009 by Dr Barnes and characterized by inflammatory arthritis, telangiectasias, esophageal dysmotility, GERD, Raynaud's, abnormal nailfold capillaries interstitial lung disease, and pulmonary hypertension on MMF 1500 mg b.i.d., Adcirca 40 mg daily, Opsumit, Tyvaso, Ofev, and Protonix 40 mg b.i.d... Currently, she does report telangiectasias, acid reflux is stable, sclerodactyly, stable Raynaud's, sicca symptoms, joint pain and swelling involving the hands, and generalized weakness. She does have mildly active inflammatory arthritis but would like to monitor on current medications for now.     Plan:     Problem List Items Addressed This Visit          Pulmonary    Interstitial lung disease    Relevant Orders    Anti-scleroderma antibody    RNA polymerase III Ab, IgG    PM-Scl Antibody by Immunodiffusion    Anti Sm/RNP Antibody    Th/To Antibody    MyoMarker Panel 3    C3 Complement    Anti-DNA Ab, Double-Stranded    C4 Complement    CBC Auto Differential    Comprehensive Metabolic Panel    C-Reactive Protein    Protein/Creatinine Ratio, Urine    Sedimentation rate    Urinalysis    Thiopurine Methyltrans (TPMT) Genotyping    Hepatitis B surface antigen    HBcAB    Hepatitis B surface antibody    Hepatitis C antibody    Quantiferon Gold TB    CK    Aldolase       Cardiac/Vascular    Raynaud's disease    Relevant Orders    Anti-scleroderma antibody    RNA polymerase III Ab, IgG    PM-Scl Antibody by Immunodiffusion    Anti Sm/RNP Antibody    Th/To Antibody    MyoMarker Panel 3    C3 Complement    Anti-DNA Ab, Double-Stranded    C4 Complement    CBC Auto Differential    Comprehensive Metabolic Panel    C-Reactive Protein    Protein/Creatinine Ratio, Urine    Sedimentation rate    Urinalysis    Thiopurine  Methyltrans (TPMT) Genotyping    Hepatitis B surface antigen    HBcAB    Hepatitis B surface antibody    Hepatitis C antibody    Quantiferon Gold TB    CK    Aldolase    Pulmonary hypertension    Relevant Orders    Anti-scleroderma antibody    RNA polymerase III Ab, IgG    PM-Scl Antibody by Immunodiffusion    Anti Sm/RNP Antibody    Th/To Antibody    MyoMarker Panel 3    C3 Complement    Anti-DNA Ab, Double-Stranded    C4 Complement    CBC Auto Differential    Comprehensive Metabolic Panel    C-Reactive Protein    Protein/Creatinine Ratio, Urine    Sedimentation rate    Urinalysis    Thiopurine Methyltrans (TPMT) Genotyping    Hepatitis B surface antigen    HBcAB    Hepatitis B surface antibody    Hepatitis C antibody    Quantiferon Gold TB    CK    Aldolase       Immunology/Multi System    Immunosuppression       GI    GERD (gastroesophageal reflux disease)    Relevant Orders    Anti-scleroderma antibody    RNA polymerase III Ab, IgG    PM-Scl Antibody by Immunodiffusion    Anti Sm/RNP Antibody    Th/To Antibody    MyoMarker Panel 3    C3 Complement    Anti-DNA Ab, Double-Stranded    C4 Complement    CBC Auto Differential    Comprehensive Metabolic Panel    C-Reactive Protein    Protein/Creatinine Ratio, Urine    Sedimentation rate    Urinalysis    Thiopurine Methyltrans (TPMT) Genotyping    Hepatitis B surface antigen    HBcAB    Hepatitis B surface antibody    Hepatitis C antibody    Quantiferon Gold TB    CK    Aldolase     Other Visit Diagnoses       Limited systemic sclerosis    -  Primary    Relevant Orders    Anti-scleroderma antibody    RNA polymerase III Ab, IgG    PM-Scl Antibody by Immunodiffusion    Anti Sm/RNP Antibody    Th/To Antibody    MyoMarker Panel 3    C3 Complement    Anti-DNA Ab, Double-Stranded    C4 Complement    CBC Auto Differential    Comprehensive Metabolic Panel    C-Reactive Protein    Protein/Creatinine Ratio, Urine    Sedimentation rate    Urinalysis    Thiopurine Methyltrans  (TPMT) Genotyping    Hepatitis B surface antigen    HBcAB    Hepatitis B surface antibody    Hepatitis C antibody    Quantiferon Gold TB    CK    Aldolase    High risk medication use              - MMF 1500mg BID  - Tadalafil (Adcirca) 40mg daily  - Opsumit (macitentan)  - Tyvaso (treprostinil)  - Ofev  - Protonix 40mg BID  - Scleroderma labs  - Pre-DMARD labs  - Immunizations: COVID x 4 (most recent 1/2023), flu (9/2022), PCV13 (8/2019), PPV23 (11/2019), Shingrix x 2 (2020); patient needs PCV 20  - Obtain DEXA at follow up visit    Follow up in 3 months    50 minutes of total time spent on the encounter, which includes face to face time and non-face to face time preparing to see the patient (eg, review of tests), Obtaining and/or reviewing separately obtained history, Documenting clinical information in the electronic or other health record, Independently interpreting results (not separately reported) and communicating results to the patient/family/caregiver, or Care coordination (not separately reported).       Nieves Blanchard M.D.  Rheumatology Dept  [place], [place]

## 2023-09-08 ENCOUNTER — LAB VISIT (OUTPATIENT)
Dept: LAB | Facility: HOSPITAL | Age: 68
End: 2023-09-08
Attending: STUDENT IN AN ORGANIZED HEALTH CARE EDUCATION/TRAINING PROGRAM
Payer: MEDICARE

## 2023-09-08 DIAGNOSIS — I73.00 RAYNAUD'S DISEASE WITHOUT GANGRENE: ICD-10-CM

## 2023-09-08 DIAGNOSIS — I27.20 PULMONARY HYPERTENSION: ICD-10-CM

## 2023-09-08 DIAGNOSIS — K21.9 GASTROESOPHAGEAL REFLUX DISEASE, UNSPECIFIED WHETHER ESOPHAGITIS PRESENT: ICD-10-CM

## 2023-09-08 DIAGNOSIS — M34.9 LIMITED SYSTEMIC SCLEROSIS: ICD-10-CM

## 2023-09-08 DIAGNOSIS — J84.9 INTERSTITIAL LUNG DISEASE: ICD-10-CM

## 2023-09-08 LAB
BILIRUB UR QL STRIP: NEGATIVE
CLARITY UR REFRACT.AUTO: CLEAR
COLOR UR AUTO: YELLOW
CREAT UR-MCNC: 121 MG/DL (ref 15–325)
GLUCOSE UR QL STRIP: NEGATIVE
HGB UR QL STRIP: NEGATIVE
KETONES UR QL STRIP: NEGATIVE
LEUKOCYTE ESTERASE UR QL STRIP: NEGATIVE
NITRITE UR QL STRIP: NEGATIVE
PH UR STRIP: 6 [PH] (ref 5–8)
PROT UR QL STRIP: NEGATIVE
PROT UR-MCNC: 9 MG/DL (ref 0–15)
PROT/CREAT UR: 0.07 MG/G{CREAT} (ref 0–0.2)
SP GR UR STRIP: 1.02 (ref 1–1.03)
URN SPEC COLLECT METH UR: NORMAL

## 2023-09-08 PROCEDURE — 81003 URINALYSIS AUTO W/O SCOPE: CPT | Performed by: STUDENT IN AN ORGANIZED HEALTH CARE EDUCATION/TRAINING PROGRAM

## 2023-09-08 PROCEDURE — 82570 ASSAY OF URINE CREATININE: CPT | Performed by: STUDENT IN AN ORGANIZED HEALTH CARE EDUCATION/TRAINING PROGRAM

## 2023-09-27 ENCOUNTER — OFFICE VISIT (OUTPATIENT)
Dept: PULMONOLOGY | Facility: CLINIC | Age: 68
End: 2023-09-27
Payer: MEDICARE

## 2023-09-27 ENCOUNTER — OFFICE VISIT (OUTPATIENT)
Dept: OBSTETRICS AND GYNECOLOGY | Facility: CLINIC | Age: 68
End: 2023-09-27
Payer: MEDICARE

## 2023-09-27 VITALS
HEART RATE: 74 BPM | WEIGHT: 173.5 LBS | DIASTOLIC BLOOD PRESSURE: 72 MMHG | BODY MASS INDEX: 27.88 KG/M2 | SYSTOLIC BLOOD PRESSURE: 130 MMHG | HEIGHT: 66 IN

## 2023-09-27 VITALS
DIASTOLIC BLOOD PRESSURE: 79 MMHG | WEIGHT: 172.31 LBS | HEART RATE: 89 BPM | BODY MASS INDEX: 27.81 KG/M2 | SYSTOLIC BLOOD PRESSURE: 112 MMHG | OXYGEN SATURATION: 96 %

## 2023-09-27 DIAGNOSIS — M34.9 SCLERODERMA: ICD-10-CM

## 2023-09-27 DIAGNOSIS — N95.2 VAGINAL ATROPHY: Primary | ICD-10-CM

## 2023-09-27 DIAGNOSIS — J44.9 CHRONIC OBSTRUCTIVE PULMONARY DISEASE, UNSPECIFIED COPD TYPE: ICD-10-CM

## 2023-09-27 DIAGNOSIS — J84.10 PULMONARY FIBROSIS: ICD-10-CM

## 2023-09-27 DIAGNOSIS — I71.21 ANEURYSM OF ASCENDING AORTA WITHOUT RUPTURE: ICD-10-CM

## 2023-09-27 DIAGNOSIS — R09.89 CHRONIC SINUS COMPLAINTS: ICD-10-CM

## 2023-09-27 DIAGNOSIS — J47.9 BRONCHIECTASIS WITHOUT COMPLICATION: ICD-10-CM

## 2023-09-27 DIAGNOSIS — J84.9 INTERSTITIAL LUNG DISEASE: ICD-10-CM

## 2023-09-27 DIAGNOSIS — G47.33 OSA ON CPAP: Primary | ICD-10-CM

## 2023-09-27 PROCEDURE — 99213 PR OFFICE/OUTPT VISIT, EST, LEVL III, 20-29 MIN: ICD-10-PCS | Mod: S$PBB,,, | Performed by: STUDENT IN AN ORGANIZED HEALTH CARE EDUCATION/TRAINING PROGRAM

## 2023-09-27 PROCEDURE — 99214 OFFICE O/P EST MOD 30 MIN: CPT | Mod: S$PBB,,, | Performed by: INTERNAL MEDICINE

## 2023-09-27 PROCEDURE — 99213 OFFICE O/P EST LOW 20 MIN: CPT | Mod: S$PBB,,, | Performed by: STUDENT IN AN ORGANIZED HEALTH CARE EDUCATION/TRAINING PROGRAM

## 2023-09-27 PROCEDURE — 99213 OFFICE O/P EST LOW 20 MIN: CPT | Mod: PBBFAC,PO | Performed by: INTERNAL MEDICINE

## 2023-09-27 PROCEDURE — 99214 PR OFFICE/OUTPT VISIT, EST, LEVL IV, 30-39 MIN: ICD-10-PCS | Mod: S$PBB,,, | Performed by: INTERNAL MEDICINE

## 2023-09-27 PROCEDURE — 99999 PR PBB SHADOW E&M-EST. PATIENT-LVL III: CPT | Mod: PBBFAC,,, | Performed by: INTERNAL MEDICINE

## 2023-09-27 PROCEDURE — 99999 PR PBB SHADOW E&M-EST. PATIENT-LVL III: ICD-10-PCS | Mod: PBBFAC,,, | Performed by: INTERNAL MEDICINE

## 2023-09-27 PROCEDURE — 99999 PR PBB SHADOW E&M-EST. PATIENT-LVL III: CPT | Mod: PBBFAC,,, | Performed by: STUDENT IN AN ORGANIZED HEALTH CARE EDUCATION/TRAINING PROGRAM

## 2023-09-27 PROCEDURE — 99999 PR PBB SHADOW E&M-EST. PATIENT-LVL III: ICD-10-PCS | Mod: PBBFAC,,, | Performed by: STUDENT IN AN ORGANIZED HEALTH CARE EDUCATION/TRAINING PROGRAM

## 2023-09-27 PROCEDURE — 99213 OFFICE O/P EST LOW 20 MIN: CPT | Mod: PBBFAC,27,PO | Performed by: STUDENT IN AN ORGANIZED HEALTH CARE EDUCATION/TRAINING PROGRAM

## 2023-09-27 RX ORDER — MONTELUKAST SODIUM 10 MG/1
10 TABLET ORAL NIGHTLY
Qty: 30 TABLET | Refills: 11 | Status: SHIPPED | OUTPATIENT
Start: 2023-09-27

## 2023-09-27 RX ORDER — ESTRADIOL 10 UG/1
INSERT VAGINAL
Qty: 24 TABLET | Refills: 3 | Status: SHIPPED | OUTPATIENT
Start: 2023-09-27 | End: 2024-03-11

## 2023-09-27 NOTE — PATIENT INSTRUCTIONS
Ct  chest viewed and ascending aneurysm seen looking same 2/2022 to 7/2023-- you saw Dr Romero and are to have follow up ct in 6 months (no growth to our view), 12/2022 echo did not suggest valve problems       lung tissue was slight worsening from 2/2022 to 7/2023 - ofev ongoing for years.  Would check pulmonary functions     Occasionally beta blocker used for aneurysms.   You have no palpitations but have some anxiety.  Could dose low dose toprol and continue if less anxiety and no light headed/dizziness.           spirometry bronchodilator, lung volume by gas dilution, diffusion capacity measured February 17, 2022. Spirometry falls within normal limits. There is no airflow obstruction measured. The FEV1 is 85% predicted. There is no significant bronchodilator   response.       Lung volumes showed total lung capacity to be 69% of predicted and low. diffusion is also decreased to 40% of predicted.       There is evidence for restriction and loss of diffusion. There is no airflow obstruction nor bronchodilator response. Clinical correlation recommended  Spirometry, lung volume by gas dilution, and diffusion capacity measured October 22, 2020. The FEV1 to FVC ratio was 73% indicating no airflow obstruction measured by spirometry technique. The FEV1 was 81% of predicted at 2 L. Total lung capacity on lung    volume by gas dilution was 72% of predicted and a little low. Diffusion, uncorrected for anemia, was 41% of predicted and also low.   Patient has no obstruction. There is restriction measured. Diffusion is decreased. Clinical correlation recommended        Ofev dose is maximal.  May consider increased rx Dr Wayne if pulmonary function worsens..  .

## 2023-09-27 NOTE — PROGRESS NOTES
9/27/2023 9/27/2023    Edith Tran  Office Note    Chief Complaint   Patient presents with    Follow-up       HPI:  9/27/2023 no lung infections, on ofev, no bowel issues, on weight watcher and lost 30 lbs....            3/27/2023 on tyvaso since November and doing better, has bronchial infections 1-2 with amoxil   Pt on ofev -- no diarrhea of  significance.    Pt feels less sob.  Patient Instructions   Last cpap from [facility] -- should check compliance report.  Diffusion has fallen from 9.5 -October 2020 to 8.9 --2/2022 to 7.2-- 1/2023--- was 7.3 9/2022.  Diffusion may decrease from pulmonary hypertension or lung tissue disease.  Six min walk was good.   Ct chest looked better to our view 2/2022 c/w 2019 as far as lung tissue.    Use augmentin as needed, call if significant infection.  May be good to follow up ct chest later in year.  Low diffusion suggest low oxygen walking..      9/26/2022 no new issues, uses cpap with good results, uses ox for activity exercise. Misses ox concentrator--getting repairs.       Pt was having constant cough prior to august rhc.    Patient Instructions   Heart cath did not look too bad -- baseline NA.    Need to get portable oxygen concentrator repaired.    Would wish to see compliance report from cpap .    If new ct done -- bring on disc.       Evaluation took 44min to review ct and pft and rhc.    Had pft 9/21/2022 -- fvc 71%, fev1 74%, tlc 78%, dlco 27%.  Six min walk 349 meters in 6 min, and needed with 2 lpm needed.  Pft worsened from 2/2022.  Ox needs stable    Cough remitted -- was constant.     Swallows ok.  Eats slowly.      Rhc last month-- [place].    3/28/2022 not using symbicort, dose use occ albuterol which ppt headaches??    Feels like stb le-- uses ox more as feels more sob.  [facility]      Uses cpap -- had ahi 6.3 in past.  Patient Instructions   Need compliance report from cpap?  Ahi was 6.3 yrs ago.  You are compliant.    Ct chest and 6  min walk and pulm functions are stable to sl better.    9/16/2021 - six min walk distance same as October 2020 at 255 m, batteries on poc run out doing errands over 2 h rs..    Not monitoring ox walking.  ues 3lpm pulse.    Uses filter on cpap to get new.   Had 3 vaccines .   On rx and bladder doing better.   Pt not needing rescue,use symbicort.    Ct in 2019, and pft 2020.   Patient Instructions   Would do ct chest and pft and six min walk prior to seeing next rheumatologist, or in 6 months.     Continue opsumit, tadalafil, symbicort, ofev,     Call if needed    Re check 6 months.  3/16/2021 concerned re covid vaccine and immujne suppression.  Breathing stable, has bladder urgency, has cpap mask problems - like f30 airfit.      Patient Instructions     Six min walk today walked 255 meters, and oxygen fell at 3 minutes to 88, and needed 3 lpm oxygen- 3/16/2021      6 min walk study was accomplished October 22, 2020. Baseline room air saturation was 98%. With ambulation O2 sat fell to 87% by 5 min. On 2 L of oxygen patient maintain sat in the low 90s subsequently. Patient walked about 133% of the reference distance   at 255 m.      6 min walk on August 21, 2018 was accomplished.  Patient's baseline O2 sat was 96% on room air.  After walking in her sats fell to 88% at 5 min.  On 2 L of oxygen sat was maintained in the mid 90s.  The patient was able to walk 390 m or 91% of the   reference value.     You may have spastic bladder.  Would recheck urine.  If sign symptoms may need to see urology?       You have and use portable oxygen concentrator. You should try to use more and be more active.      Get vaccine,      Walk studies suggest some loss in function from 2018 to 2020 but stable from October to March now.      Lung capacity was fairly good October 2020-  Spirometry, lung volume by gas dilution, and diffusion capacity measured October 22, 2020. The FEV1 to FVC ratio was 73% indicating no airflow obstruction  measured by spirometry technique. The FEV1 was 81% of predicted at 2 L. Total lung capacity on lung    volume by gas dilution was 72% of predicted and a little low. Diffusion, uncorrected for anemia, was 41% of predicted and also low.   Patient has no obstruction. There is restriction measured. Diffusion is decreased. Clinical correlation recommended           Would do a six min walk prior to return in 6 months.    9/30/2020- uses 02 out of house, had scratchy throat with am cough lately. Uses cpap nightly all night usually 4-7 hrs/night.      Sees Dr Arboleda- Dr Barnes left.    Had been seeing Dr Goddard, had pft and 6 min walk, saw Dr Goddard on 8/3/2020 - told all stable,  Cannot locate pft /walk test in care everywhere.  Usually studied yearly.  Pt feels stable otherwise.  3/27/2019 ct chest viewed with ild/honeycombing.  Follows for ofev trial at [facility] [place] [facility].    Pt continues on ofev and cellcept and cialis and opsumit     Patient Instructions   If antibiotic needed - azithromycin daily for 3 days, last 10, simple antibiotic.     Prednisone 5 mg - may use if cough bad.    Should have result of right heart cath, and august pulm function and six min walk- will ask to obtain.  Will need to make sure can continue opsumit/cialias    Can follow six min walk every 3 months - placed standing.    You need to stay on ofev for pulmonary fibrosis, uses tadalafil and optusmit for pulmonary hypertension.  October 8, 2019- Has PFT and 6 min walk scheduled Nov 2019 by Dr. Barnes Rheumatologist at [place]. Wearing supplemental oxygen at home day/night set at 2L NC, currently on Symbicort daily, states benefit in breathing. No recent prednisone use. SOB controlled. No cough, no chest tightness, no wheeze.   Wear cpap nightly, states benefiting greatly but want different mask, tries to read before bed, mask over nasal bridge does not allow glasses to rest correctly.  Patient Instructions   Order sent for different CPAP mask  Continue to  "use nightly for Obstructive sleep apnea    Continue supplemental oxygen    Will review the results of PFT and 6 min walk from [place] at next appointment.    Continue Symbicort daily.     Use Fela perles and prednisone as needed for cough.     April 3, 2019- Onset 1 week: Cough productive white in color, sore throat, post nasal drip, sinus drainage yellow, flushed cheeks, complaints improving tx with antibiotics no prednisone use. Cough worse when lying down.    Current therapy for scleroderma, pulmonary htn, and pulmonary fibrosis Opsumit/tadafanil and Ofev/cellcept. No diarrhea no complaints.  Currently using Symbicort 1 puff daily. Using supplemental oxygen at 2L NC for Shortness of breath, states greatly beneficial, pt states able to walk further and keep up with company while on oxygen.      Nov 27, trelegy not covered- not using, had flu shot 8 am with sorenes later day and huge swollen arm with  Pain thhat night.   No cough. On opsumit/tadafanil, and on ofev/cellcept.  Stable. No diarrhea.  Sees pulm htn and  [facility].  Last 6 min walk 02  Angelo 91 slow pace.      Aug 21, uses trelegy, no c/o.  No 0x arranges- sat 90 falls to 87 at 2 mins- walked 307 meters or 71%.  Dx Crohn's.  No abx nor prednisone.  cpap good and sinus good.  Instructions:Would monitor 6 min walk every 3 months.    307 meters was last distance.  Six min walk.  Monitor ct chest yearly in March - sooner if worsens.  Needs 02 for activity.  Use medications for bronchitis.   Stay active.    July11,2018has had raynauld's for yrs, pt had severe mobility problems issues leading to dx slceroderma 2007 - "rock bottom".  Has had swallow sticking with  2-3 dilations with last over 3 yrs ago.  Pt has had pulm htn on opsumit and talafadil,  Pt also on ofev in a study, and cellcept.  Also low dose prednisone.  Pt dx osas and uses cpap nightly 8 hrs - had used 02 but off 02 for 3 yrs.   Pt had had 6 min walks but none recently- none last yr at " least.  Pt gets bronchitis with cough and yellow mucous.  Has occ wheezes.  Had exacerbations in feb and June - typically 2-3 yrly.  Tessalon helps.  Uses combivent occ ppt headaches with some breathing benefit.     Uses flonase regular.  Was on asthma rx for yrs.        The chief compliant  problem is stable  PFSH:  Past Medical History:   Diagnosis Date    Allergy     Arthritis     Back pain     Colon polyps     COPD (chronic obstructive pulmonary disease)     Emphysema of lung     GERD (gastroesophageal reflux disease)     Hypertension     Kidney stones     Obesity     Pneumonia     Pneumonia due to other staphylococcus     Scleroderma involving lung since she was 49 y/o    Sleep apnea     Trouble in sleeping          Past Surgical History:   Procedure Laterality Date    COLONOSCOPY N/A 8/7/2018    Procedure: COLONOSCOPY;  Surgeon: Ngozi Prince MD;  Location: [facility];  Service: Endoscopy;  Laterality: N/A;    COLONOSCOPY N/A 11/21/2019    Procedure: COLONOSCOPY;  Surgeon: Ngozi Prince MD;  Location: [facility];  Service: Endoscopy;  Laterality: N/A;    ESOPHAGEAL MANOMETRY WITH MEASUREMENT OF IMPEDANCE N/A 7/27/2020    Procedure: MANOMETRY, ESOPHAGUS, WITH IMPEDANCE MEASUREMENT;  Surgeon: Bhupendra Temple MD;  Location: [facility] ([address] [place] [address]);  Service: Endoscopy;  Laterality: N/A;  3/10 - pt confirmed apptCovid test ordered for 7/24 in [place]    ESOPHAGOGASTRODUODENOSCOPY N/A 8/7/2018    Procedure: EGD (ESOPHAGOGASTRODUODENOSCOPY);  Surgeon: Ngozi Prince MD;  Location: [facility];  Service: Endoscopy;  Laterality: N/A;    ESOPHAGOGASTRODUODENOSCOPY N/A 11/21/2019    Procedure: EGD (ESOPHAGOGASTRODUODENOSCOPY);  Surgeon: Ngozi Prince MD;  Location: [facility];  Service: Endoscopy;  Laterality: N/A;    ESOPHAGOGASTRODUODENOSCOPY N/A 1/29/2020    Procedure: EGD (ESOPHAGOGASTRODUODENOSCOPY);  Surgeon: Ngozi Prince MD;  Location: [facility];  Service: Endoscopy;  Laterality: N/A;     ESOPHAGOGASTRODUODENOSCOPY N/A 2020    Procedure: EGD (ESOPHAGOGASTRODUODENOSCOPY);  Surgeon: Ngozi Prince MD;  Location: [facility];  Service: Endoscopy;  Laterality: N/A;    ESOPHAGOGASTRODUODENOSCOPY N/A 2020    Procedure: EGD (ESOPHAGOGASTRODUODENOSCOPY);  Surgeon: Ngozi Prince MD;  Location: [facility];  Service: Endoscopy;  Laterality: N/A;    ESOPHAGOGASTRODUODENOSCOPY N/A 2021    Procedure: EGD (ESOPHAGOGASTRODUODENOSCOPY);  Surgeon: Ngozi Prince MD;  Location: [facility];  Service: Endoscopy;  Laterality: N/A;    REPAIR, HERNIA, INCISIONAL OR VENTRAL, WITHOUT HISTORY OF PRIOR REPAIR N/A 2022    Procedure: REPAIR, HERNIA umbilical, INCISIONAL OR VENTRAL, WITHOUT HISTORY OF PRIOR REPAIR;  Surgeon: Trent Evans MD;  Location: [facility];  Service: General;  Laterality: N/A;  RN PREOP 2022, PT INSTRUCTED TO BRING ALL MEDS WITH HER ON AM OF SURGERY     Social History     Tobacco Use    Smoking status: Former     Current packs/day: 0.00     Average packs/day: 0.5 packs/day for 27.7 years (13.8 ttl pk-yrs)     Types: Cigarettes     Start date:      Quit date: 1995     Years since quittin.0    Smokeless tobacco: Never   Substance Use Topics    Alcohol use: Yes     Comment: occ    Drug use: No     Family History   Problem Relation Age of Onset    Kidney disease Mother     Cancer Father     Heart disease Brother     Mental illness Son     Glaucoma Neg Hx     Macular degeneration Neg Hx     Retinal detachment Neg Hx      Review of patient's allergies indicates:   Allergen Reactions    Plaquenil [hydroxychloroquine]      Having eye reaction, needs to stop plaquenil and stay off of it.        Performance Status:The patient's activity level is housebound activities.      Review of Systems:  a review of eleven systems covering constitutional, Eye, HEENT, Psych, Respiratory, Cardiac, GI, , Musculoskeletal, Endocrine, Dermatologic was negative except for pertinent  findings as listed ABOVE and below:  pertinent positive as above, rest is good       Exam:Comprehensive exam done. /79 (BP Location: Left arm, Patient Position: Sitting, BP Method: Small (Automatic))   Pulse 89   Wt 78.1 kg (172 lb 4.6 oz)   SpO2 96% Comment: on room air at rest  BMI 27.81 kg/m²   Exam included Vitals as listed, and patient's appearance and affect and alertness and mood, oral exam for yeast and hygiene and pharynx lesions and Mallapatti (M) score, neck with inspection for jvd and masses and thyroid abnormalities and lymph nodes (supraclavicular and infraclavicular nodes and axillary also examined and noted if abn), chest exam included symmetry and effort and fremitus and percussion and auscultation, cardiac exam included rhythm and gallops and murmur and rubs and jvd and edema, abdominal exam for mass and hepatosplenomegaly and tenderness and hernias and bowel sounds, Musculoskeletal exam with muscle tone and posture and mobility/gait and  strength, and skin for rashes and cyanosis and pallor and turgor, extremity for clubbing.  Findings were normal except for pertinent findings listed below:M2, bilat rales. No  Edema nor clubbing.       Radiographs (ct chest and cxr) reviewed: view by direct vision  March 2018 ct not too bad with cysts- viewed 8/21/18  CT CHEST WITHOUT CONTRAST 03/27/2019   Severe interstitial fibrosis with peripheral honeycombing and multiple bilateral lower lobe bulla consistent with the history of scleroderma.  This is essentially unchanged from March 21, 2018.  Continued mild mediastinal adenopathy also unchanged.  No acute findings.  Small hiatal hernia.  Stable 2 cm complicated cyst of the left kidney.       Labs  noted  Lab Results   Component Value Date    WBC 4.47 09/08/2023    HGB 12.6 09/08/2023    HCT 40.7 09/08/2023    MCV 95 09/08/2023     09/08/2023        PFT results reviewed   Pulmonary Functions, including spirometry and bronchodilator  response and lung volumes and diffusion, study was done May 8, 2018.  Spirometry shows mild obstruction, loss vital capacity and obstruction and no bronchodilator response.   FEV1 is 65% or 1.68 liters.  Lung volumes show  loss of TLC with restriction 66%.  Diffusion shows reduced but falls within normal range when corrected for lung volumes.   Pulmonary functions show  Mild obstruction and also restriction. Clinical correlation recommended.   Mikal Serrano M.D.    Echo 10/12/2018 Normal left and right ventricular systolic functions with LVEF >55%.    Plan:  Clinical impression is apparently straight forward and impression with management as below.     Edith was seen today for follow-up.    Diagnoses and all orders for this visit:    ELAINE on CPAP  -     CPAP/BIPAP SUPPLIES    Scleroderma    Interstitial lung disease    Pulmonary fibrosis  -     Complete PFT without bronchodilator; Future    Bronchiectasis without complication    Aneurysm of ascending aorta without rupture    Chronic sinus complaints  -     montelukast (SINGULAIR) 10 mg tablet; Take 1 tablet (10 mg total) by mouth every evening.    Chronic obstructive pulmonary disease, unspecified COPD type  -     montelukast (SINGULAIR) 10 mg tablet; Take 1 tablet (10 mg total) by mouth every evening.              Follow up in about 6 months (around 3/27/2024), or if symptoms worsen or fail to improve.    Discussed with patient above for education the following:      Patient Instructions   Ct  chest viewed and ascending aneurysm seen looking same 2/2022 to 7/2023-- you saw Dr Romero and are to have follow up ct in 6 months (no growth to our view), 12/2022 echo did not suggest valve problems       lung tissue was slight worsening from 2/2022 to 7/2023 - ofev ongoing for years.  Would check pulmonary functions     Occasionally beta blocker used for aneurysms.   You have no palpitations but have some anxiety.  Could dose low dose toprol and continue if less anxiety and  no light headed/dizziness.           spirometry bronchodilator, lung volume by gas dilution, diffusion capacity measured February 17, 2022. Spirometry falls within normal limits. There is no airflow obstruction measured. The FEV1 is 85% predicted. There is no significant bronchodilator   response.       Lung volumes showed total lung capacity to be 69% of predicted and low. diffusion is also decreased to 40% of predicted.       There is evidence for restriction and loss of diffusion. There is no airflow obstruction nor bronchodilator response. Clinical correlation recommended  Spirometry, lung volume by gas dilution, and diffusion capacity measured October 22, 2020. The FEV1 to FVC ratio was 73% indicating no airflow obstruction measured by spirometry technique. The FEV1 was 81% of predicted at 2 L. Total lung capacity on lung    volume by gas dilution was 72% of predicted and a little low. Diffusion, uncorrected for anemia, was 41% of predicted and also low.   Patient has no obstruction. There is restriction measured. Diffusion is decreased. Clinical correlation recommended        Ofev dose is maximal.  May consider increased rx Dr Wayne if pulmonary function worsens..  .          Edith Tran  Office Note    Chief Complaint   Patient presents with    Follow-up       HPI:    9/16/2021 - six min walk distance same as October 2020 at 255 m, batteries on poc run out doing errands over 2 h rs..    Not monitoring ox walking.  ues 3lpm pulse.    Uses filter on cpap to get new.   Had 3 vaccines .   On rx and bladder doing better.   Pt not needing rescue,use symbicort.        Ct in 2019, and pft 2020.     Patient Instructions   Would do ct chest and pft and six min walk prior to seeing next rheumatologist, or in 6 months.     Continue opsumit, tadalafil, symbicort, ofev,     Call if needed    Re check 6 months.    3/16/2021 concerned re covid vaccine and immujne suppression.  Breathing stable, has bladder urgency, has  cpap mask problems - like f30 airfit.      Patient Instructions     Six min walk today walked 255 meters, and oxygen fell at 3 minutes to 88, and needed 3 lpm oxygen- 3/16/2021      6 min walk study was accomplished October 22, 2020. Baseline room air saturation was 98%. With ambulation O2 sat fell to 87% by 5 min. On 2 L of oxygen patient maintain sat in the low 90s subsequently. Patient walked about 133% of the reference distance   at 255 m.      6 min walk on August 21, 2018 was accomplished.  Patient's baseline O2 sat was 96% on room air.  After walking in her sats fell to 88% at 5 min.  On 2 L of oxygen sat was maintained in the mid 90s.  The patient was able to walk 390 m or 91% of the   reference value.     You may have spastic bladder.  Would recheck urine.  If sign symptoms may need to see urology?       You have and use portable oxygen concentrator. You should try to use more and be more active.      Get vaccine,      Walk studies suggest some loss in function from 2018 to 2020 but stable from October to March now.      Lung capacity was fairly good October 2020-  Spirometry, lung volume by gas dilution, and diffusion capacity measured October 22, 2020. The FEV1 to FVC ratio was 73% indicating no airflow obstruction measured by spirometry technique. The FEV1 was 81% of predicted at 2 L. Total lung capacity on lung    volume by gas dilution was 72% of predicted and a little low. Diffusion, uncorrected for anemia, was 41% of predicted and also low.   Patient has no obstruction. There is restriction measured. Diffusion is decreased. Clinical correlation recommended           Would do a six min walk prior to return in 6 months.    9/30/2020- uses 02 out of house, had scratchy throat with am cough lately. Uses cpap nightly all night usually 4-7 hrs/night.      Sees Dr Arboleda- Dr Barnes left.    Had been seeing Dr Goddard, had pft and 6 min walk, saw Dr Goddard on 8/3/2020 - told all stable,  Cannot locate pft  /walk test in care everywhere.  Usually studied yearly.  Pt feels stable otherwise.  3/27/2019 ct chest viewed with ild/honeycombing.  Follows for ofev trial at [facility].    Pt continues on ofev and cellcept and cialis and opsumit     Patient Instructions   If antibiotic needed - azithromycin daily for 3 days, last 10, simple antibiotic.     Prednisone 5 mg - may use if cough bad.    Should have result of right heart cath, and august pulm function and six min walk- will ask to obtain.  Will need to make sure can continue opsumit/cialias    Can follow six min walk every 3 months - placed standing.    You need to stay on ofev for pulmonary fibrosis, uses tadalafil and optusmit for pulmonary hypertension.  October 8, 2019- Has PFT and 6 min walk scheduled Nov 2019 by Dr. Barnes Rheumatologist at [place]. Wearing supplemental oxygen at home day/night set at 2L NC, currently on Symbicort daily, states benefit in breathing. No recent prednisone use. SOB controlled. No cough, no chest tightness, no wheeze.   Wear cpap nightly, states benefiting greatly but want different mask, tries to read before bed, mask over nasal bridge does not allow glasses to rest correctly.  Patient Instructions   Order sent for different CPAP mask  Continue to use nightly for Obstructive sleep apnea    Continue supplemental oxygen    Will review the results of PFT and 6 min walk from [place] at next appointment.    Continue Symbicort daily.     Use Fela perles and prednisone as needed for cough.     April 3, 2019- Onset 1 week: Cough productive white in color, sore throat, post nasal drip, sinus drainage yellow, flushed cheeks, complaints improving tx with antibiotics no prednisone use. Cough worse when lying down.    Current therapy for scleroderma, pulmonary htn, and pulmonary fibrosis Opsumit/tadafanil and Ofev/cellcept. No diarrhea no complaints.  Currently using Symbicort 1 puff daily. Using supplemental oxygen at 2L NC for Shortness of  "breath, states greatly beneficial, pt states able to walk further and keep up with company while on oxygen.      Nov 27, trelegy not covered- not using, had flu shot 8 am with sorenes later day and huge swollen arm with  Pain thhat night.   No cough. On opsumit/tadafanil, and on ofev/cellcept.  Stable. No diarrhea.  Sees pulm htn and  [facility].  Last 6 min walk 02  Angelo 91 slow pace.      Aug 21, uses trelegy, no c/o.  No 0x arranges- sat 90 falls to 87 at 2 mins- walked 307 meters or 71%.  Dx Crohn's.  No abx nor prednisone.  cpap good and sinus good.  Instructions:Would monitor 6 min walk every 3 months.    307 meters was last distance.  Six min walk.  Monitor ct chest yearly in March - sooner if worsens.  Needs 02 for activity.  Use medications for bronchitis.   Stay active.    July11,2018has had raynauld's for yrs, pt had severe mobility problems issues leading to dx slceroderma 2007 - "rock bottom".  Has had swallow sticking with  2-3 dilations with last over 3 yrs ago.  Pt has had pulm htn on opsumit and talafadil,  Pt also on ofev in a study, and cellcept.  Also low dose prednisone.  Pt dx osas and uses cpap nightly 8 hrs - had used 02 but off 02 for 3 yrs.   Pt had had 6 min walks but none recently- none last yr at least.  Pt gets bronchitis with cough and yellow mucous.  Has occ wheezes.  Had exacerbations in feb and June - typically 2-3 yrly.  Tessalon helps.  Uses combivent occ ppt headaches with some breathing benefit.     Uses flonase regular.  Was on asthma rx for yrs.        The chief compliant  problem is stable  PFSH:  Past Medical History:   Diagnosis Date    Allergy     Arthritis     Back pain     Colon polyps     COPD (chronic obstructive pulmonary disease)     Emphysema of lung     GERD (gastroesophageal reflux disease)     Hypertension     Kidney stones     Obesity     Pneumonia     Pneumonia due to other staphylococcus     Scleroderma involving lung since she was 49 y/o    Sleep apnea     " Trouble in sleeping          Past Surgical History:   Procedure Laterality Date    COLONOSCOPY N/A 8/7/2018    Procedure: COLONOSCOPY;  Surgeon: Ngozi Prince MD;  Location: [facility];  Service: Endoscopy;  Laterality: N/A;    COLONOSCOPY N/A 11/21/2019    Procedure: COLONOSCOPY;  Surgeon: Ngozi Prince MD;  Location: [facility] ENDO;  Service: Endoscopy;  Laterality: N/A;    ESOPHAGEAL MANOMETRY WITH MEASUREMENT OF IMPEDANCE N/A 7/27/2020    Procedure: MANOMETRY, ESOPHAGUS, WITH IMPEDANCE MEASUREMENT;  Surgeon: Bhupendra Temple MD;  Location: [facility] ENDO ([address] [place] [address]);  Service: Endoscopy;  Laterality: N/A;  3/10 - pt confirmed apptCovid test ordered for 7/24 in [facility]    ESOPHAGOGASTRODUODENOSCOPY N/A 8/7/2018    Procedure: EGD (ESOPHAGOGASTRODUODENOSCOPY);  Surgeon: Ngozi Prince MD;  Location: [facility];  Service: Endoscopy;  Laterality: N/A;    ESOPHAGOGASTRODUODENOSCOPY N/A 11/21/2019    Procedure: EGD (ESOPHAGOGASTRODUODENOSCOPY);  Surgeon: Ngozi Prince MD;  Location: [facility];  Service: Endoscopy;  Laterality: N/A;    ESOPHAGOGASTRODUODENOSCOPY N/A 1/29/2020    Procedure: EGD (ESOPHAGOGASTRODUODENOSCOPY);  Surgeon: Ngozi Prince MD;  Location: [facility];  Service: Endoscopy;  Laterality: N/A;    ESOPHAGOGASTRODUODENOSCOPY N/A 8/20/2020    Procedure: EGD (ESOPHAGOGASTRODUODENOSCOPY);  Surgeon: Ngozi Prince MD;  Location: [facility];  Service: Endoscopy;  Laterality: N/A;    ESOPHAGOGASTRODUODENOSCOPY N/A 12/4/2020    Procedure: EGD (ESOPHAGOGASTRODUODENOSCOPY);  Surgeon: Ngozi Prince MD;  Location: [facility];  Service: Endoscopy;  Laterality: N/A;    ESOPHAGOGASTRODUODENOSCOPY N/A 11/19/2021    Procedure: EGD (ESOPHAGOGASTRODUODENOSCOPY);  Surgeon: Ngozi Prince MD;  Location: [facility];  Service: Endoscopy;  Laterality: N/A;    REPAIR, HERNIA, INCISIONAL OR VENTRAL, WITHOUT HISTORY OF PRIOR REPAIR N/A 12/27/2022    Procedure: REPAIR, HERNIA umbilical, INCISIONAL OR VENTRAL,  WITHOUT HISTORY OF PRIOR REPAIR;  Surgeon: Trent Evans MD;  Location: [facility];  Service: General;  Laterality: N/A;  RN PREOP 2022, PT INSTRUCTED TO BRING ALL MEDS WITH HER ON AM OF SURGERY     Social History     Tobacco Use    Smoking status: Former     Current packs/day: 0.00     Average packs/day: 0.5 packs/day for 27.7 years (13.8 ttl pk-yrs)     Types: Cigarettes     Start date:      Quit date: 1995     Years since quittin.0    Smokeless tobacco: Never   Substance Use Topics    Alcohol use: Yes     Comment: occ    Drug use: No     Family History   Problem Relation Age of Onset    Kidney disease Mother     Cancer Father     Heart disease Brother     Mental illness Son     Glaucoma Neg Hx     Macular degeneration Neg Hx     Retinal detachment Neg Hx      Review of patient's allergies indicates:   Allergen Reactions    Plaquenil [hydroxychloroquine]      Having eye reaction, needs to stop plaquenil and stay off of it.        Performance Status:The patient's activity level is housebound activities.      Review of Systems:  a review of eleven systems covering constitutional, Eye, HEENT, Psych, Respiratory, Cardiac, GI, , Musculoskeletal, Endocrine, Dermatologic was negative except for pertinent findings as listed ABOVE and below:  pertinent positive as above, rest is good       Exam:Comprehensive exam done. /79 (BP Location: Left arm, Patient Position: Sitting, BP Method: Small (Automatic))   Pulse 89   Wt 78.1 kg (172 lb 4.6 oz)   SpO2 96% Comment: on room air at rest  BMI 27.81 kg/m²   Exam included Vitals as listed, and patient's appearance and affect and alertness and mood, oral exam for yeast and hygiene and pharynx lesions and Mallapatti (M) score, neck with inspection for jvd and masses and thyroid abnormalities and lymph nodes (supraclavicular and infraclavicular nodes and axillary also examined and noted if abn), chest exam included symmetry and effort and fremitus and  percussion and auscultation, cardiac exam included rhythm and gallops and murmur and rubs and jvd and edema, abdominal exam for mass and hepatosplenomegaly and tenderness and hernias and bowel sounds, Musculoskeletal exam with muscle tone and posture and mobility/gait and  strength, and skin for rashes and cyanosis and pallor and turgor, extremity for clubbing.  Findings were normal except for pertinent findings listed below:M2, bilat rales. No  Edema mild clubbing.       Radiographs (ct chest and cxr) reviewed: view by direct vision  March 2018 ct not too bad with cysts- viewed 8/21/18  CT CHEST WITHOUT CONTRAST 03/27/2019   Severe interstitial fibrosis with peripheral honeycombing and multiple bilateral lower lobe bulla consistent with the history of scleroderma.  This is essentially unchanged from March 21, 2018.  Continued mild mediastinal adenopathy also unchanged.  No acute findings.  Small hiatal hernia.  Stable 2 cm complicated cyst of the left kidney.       Labs  noted  Lab Results   Component Value Date    WBC 4.47 09/08/2023    HGB 12.6 09/08/2023    HCT 40.7 09/08/2023    MCV 95 09/08/2023     09/08/2023        PFT results reviewed     February 10, 2022-patient dropped out pulmonary functions from February 7, 2022. Forced vital capacity was 69 percent predicted.  There was no airflow obstruction.  Total lung capacity was 72 percent of predicted.  Diffusion was down to 31 percent predicted.  Patient had a 6 minute walk also.  Patient walked about 359 meters in 6 minutes.  She was 97 percent on room air.  The low O2 sat was 89 percent while walking.  \  \  Oct 27/2020 Spirometry, lung volume by gas dilution, and diffusion capacity measured October 22, 2020. The FEV1 to FVC ratio was 73% indicating no airflow obstruction measured by spirometry technique. The FEV1 was 81% of predicted at 2 L. fvc was 86% . Total lung capacity on lung    volume by gas dilution was 72% of predicted and a little low.  Diffusion, uncorrected for anemia, was 41% of predicted and also low.   Patient has no obstruction. There is restriction measured. Diffusion is decreased. Clinical correlation recommended       5/8/2018 Pulmonary Functions, including spirometry and bronchodilator response and lung volumes and diffusion, study was done May 8, 2018.  Spirometry shows mild obstruction, loss vital capacity and obstruction and no bronchodilator response.   FEV1 is 65% or 1.68 liters.  Lung volumes show  loss of TLC with restriction 66%.  Diffusion shows reduced but falls within normal range when corrected for lung volumes.   Pulmonary functions show  Mild obstruction and also restriction. Clinical correlation recommended.     Mikal Serrano M.D.      Study was done at [facility]  Echo 10/12/2018 Normal left and right ventricular systolic functions with LVEF >55%.      Rhc 8/10/2022 [place]--Significant Diagnostic Studies: right heart cath  Right Heart Pressures RAP: 3 mmHg  RVP: 50/3 mmHg  Pulmonary artery pressure: 50/12 mmHg  Mean pulmonary artery pressure: 31 mmHg  Pulmonary capillary wedge pressure: 7 mmHg  Cardiac Output: 5.9 L/min  Cardiac index: 3 L/min/m2  Pulmonary vascular resistance: 4 HRU      6 minute walk study was accomplished February 17, 2022.  Patient walked about 359 meters in 6 minutes.  She was 97 percent on room air.  The low O2 sat was 89 percent while walking.    6 minute walk study was accomplished September 15, 2021. Baseline room air saturation was 98%. With ambulation O2 sat fell to 87% by 2 minutes. Patient subsequently 2 L and then 3 L of oxygen to maintain sat in the low 90s. At the end of 6 minutes   walking on oxygen at 3 liters/minute the O2 saturation was 96%. Patient walked about 60% of the reference distance of 255 m.   6 min walk study was accomplished October 22, 2020. Baseline room air saturation was 98%. With ambulation O2 sat fell to 87% by 5 min. On 2 L of oxygen patient  maintain sat in the low 90s subsequently. Patient walked about 133% of the reference distance   at 255 m.   6 min walk study was accomplished November 21, 2018.  Baseline room air saturation was 98%.  Walking on room air O2 sat did fall down to 91%.  Patient did walk 317 m or 73% of the reference distance    Plan:  Clinical impression is apparently straight forward and impression with management as below.     Edith was seen today for follow-up.    Diagnoses and all orders for this visit:    ELAINE on CPAP  -     CPAP/BIPAP SUPPLIES    Scleroderma    Interstitial lung disease    Pulmonary fibrosis  -     Complete PFT without bronchodilator; Future    Bronchiectasis without complication    Aneurysm of ascending aorta without rupture    Chronic sinus complaints  -     montelukast (SINGULAIR) 10 mg tablet; Take 1 tablet (10 mg total) by mouth every evening.    Chronic obstructive pulmonary disease, unspecified COPD type  -     montelukast (SINGULAIR) 10 mg tablet; Take 1 tablet (10 mg total) by mouth every evening.              Follow up in about 6 months (around 3/27/2024), or if symptoms worsen or fail to improve.    Discussed with patient above for education the following:      Patient Instructions   Ct  chest viewed and ascending aneurysm seen looking same 2/2022 to 7/2023-- you saw Dr Romero and are to have follow up ct in 6 months (no growth to our view), 12/2022 echo did not suggest valve problems       lung tissue was slight worsening from 2/2022 to 7/2023 - ofev ongoing for years.  Would check pulmonary functions     Occasionally beta blocker used for aneurysms.   You have no palpitations but have some anxiety.  Could dose low dose toprol and continue if less anxiety and no light headed/dizziness.           spirometry bronchodilator, lung volume by gas dilution, diffusion capacity measured February 17, 2022. Spirometry falls within normal limits. There is no airflow obstruction measured. The FEV1 is 85% predicted.  There is no significant bronchodilator   response.       Lung volumes showed total lung capacity to be 69% of predicted and low. diffusion is also decreased to 40% of predicted.       There is evidence for restriction and loss of diffusion. There is no airflow obstruction nor bronchodilator response. Clinical correlation recommended  Spirometry, lung volume by gas dilution, and diffusion capacity measured October 22, 2020. The FEV1 to FVC ratio was 73% indicating no airflow obstruction measured by spirometry technique. The FEV1 was 81% of predicted at 2 L. Total lung capacity on lung    volume by gas dilution was 72% of predicted and a little low. Diffusion, uncorrected for anemia, was 41% of predicted and also low.   Patient has no obstruction. There is restriction measured. Diffusion is decreased. Clinical correlation recommended        Ofev dose is maximal.  May consider increased rx Dr Wayne if pulmonary function worsens..  .

## 2023-09-27 NOTE — PROGRESS NOTES
[facility] Note    Subjective:     Chief Complaint: Painful [place]      HPI:  2023    68-year-old female presents as an established patient wanting to start something for vaginal atrophy.  She reports being sexually active recently and noticed some pain with sex and vaginal dryness.  She was diagnosed with vaginal atrophy at her well-woman visit 2023, however at the time she declined treatment.  She is interested in starting treatment for her issues.      GYN & OB History  No LMP recorded. Patient is postmenopausal.     Date of Last Pap: 2023    OB History    Para Term  AB Living   3 1 1   2 1   SAB IAB Ectopic Multiple Live Births     2     1      # Outcome Date GA Lbr Hemant/2nd Weight Sex Delivery Anes PTL Lv   3 Term 1975    M Vag-Spont   RAO   2 IAB            1 IAB                Past Medical History:   Diagnosis Date    Allergy     Arthritis     Back pain     Colon polyps     COPD (chronic obstructive pulmonary disease)     Emphysema of lung     GERD (gastroesophageal reflux disease)     Hypertension     Kidney stones     Obesity     Pneumonia     Pneumonia due to other staphylococcus     Scleroderma involving lung since she was 49 y/o    Sleep apnea     Trouble in sleeping      Past Surgical History:   Procedure Laterality Date    COLONOSCOPY N/A 2018    Procedure: COLONOSCOPY;  Surgeon: Ngozi Prince MD;  Location: [facility];  Service: Endoscopy;  Laterality: N/A;    COLONOSCOPY N/A 2019    Procedure: COLONOSCOPY;  Surgeon: Ngozi Prince MD;  Location: [facility];  Service: Endoscopy;  Laterality: N/A;    ESOPHAGEAL MANOMETRY WITH MEASUREMENT OF IMPEDANCE N/A 2020    Procedure: MANOMETRY, ESOPHAGUS, WITH IMPEDANCE MEASUREMENT;  Surgeon: Bhupendra Temple MD;  Location: [facility] ([address] [place] [address]);  Service: Endoscopy;  Laterality: N/A;  3/10 - pt confirmed apptCovid test ordered for  in [place]    ESOPHAGOGASTRODUODENOSCOPY N/A  8/7/2018    Procedure: EGD (ESOPHAGOGASTRODUODENOSCOPY);  Surgeon: Ngozi Prince MD;  Location: [facility] ENDO;  Service: Endoscopy;  Laterality: N/A;    ESOPHAGOGASTRODUODENOSCOPY N/A 11/21/2019    Procedure: EGD (ESOPHAGOGASTRODUODENOSCOPY);  Surgeon: Ngozi Prince MD;  Location: [facility] ENDO;  Service: Endoscopy;  Laterality: N/A;    ESOPHAGOGASTRODUODENOSCOPY N/A 1/29/2020    Procedure: EGD (ESOPHAGOGASTRODUODENOSCOPY);  Surgeon: Ngozi Prince MD;  Location: [facility] ENDO;  Service: Endoscopy;  Laterality: N/A;    ESOPHAGOGASTRODUODENOSCOPY N/A 8/20/2020    Procedure: EGD (ESOPHAGOGASTRODUODENOSCOPY);  Surgeon: Ngozi Prince MD;  Location: [facility] ENDO;  Service: Endoscopy;  Laterality: N/A;    ESOPHAGOGASTRODUODENOSCOPY N/A 12/4/2020    Procedure: EGD (ESOPHAGOGASTRODUODENOSCOPY);  Surgeon: Ngozi Prince MD;  Location: [facility] ENDO;  Service: Endoscopy;  Laterality: N/A;    ESOPHAGOGASTRODUODENOSCOPY N/A 11/19/2021    Procedure: EGD (ESOPHAGOGASTRODUODENOSCOPY);  Surgeon: Ngozi Prince MD;  Location: [facility];  Service: Endoscopy;  Laterality: N/A;    REPAIR, HERNIA, INCISIONAL OR VENTRAL, WITHOUT HISTORY OF PRIOR REPAIR N/A 12/27/2022    Procedure: REPAIR, HERNIA umbilical, INCISIONAL OR VENTRAL, WITHOUT HISTORY OF PRIOR REPAIR;  Surgeon: Trent Evans MD;  Location: [facility];  Service: General;  Laterality: N/A;  RN PREOP 12/20/2022, PT INSTRUCTED TO BRING ALL MEDS WITH HER ON AM OF SURGERY     Review of patient's allergies indicates:   Allergen Reactions    Hydroxychloroquine Other (See Comments)     Having eye reaction, needs to stop plaquenil and stay off of it.        Social History     Socioeconomic History    Marital status:     Number of children: 1    Years of education: 14    Highest education level: Associate degree: occupational, technical, or vocational program   Occupational History    Occupation: Retired   Tobacco Use    Smoking status: Former     Current packs/day: 0.00      Average packs/day: 0.5 packs/day for 27.7 years (13.8 ttl pk-yrs)     Types: Cigarettes     Start date:      Quit date: 1995     Years since quittin.0    Smokeless tobacco: Never   Substance and Sexual Activity    Alcohol use: Not Currently     Comment: occ    Drug use: No    Sexual activity: Yes     Partners: Male     Social Determinants of Health     Financial Resource Strain: Low Risk  (2023)    Overall Financial Resource Strain (CARDIA)     Difficulty of Paying Living Expenses: Not hard at all   Food Insecurity: No Food Insecurity (2023)    Hunger Vital Sign     Worried About Running Out of Food in the Last Year: Never true     Ran Out of Food in the Last Year: Never true   Transportation Needs: No Transportation Needs (2023)    PRAPARE - Transportation     Lack of Transportation (Medical): No     Lack of Transportation (Non-Medical): No   Physical Activity: Insufficiently Active (2023)    Exercise Vital Sign     Days of Exercise per Week: 3 days     Minutes of Exercise per Session: 30 min   Stress: No Stress Concern Present (2023)    [employer] - Occupational Stress Questionnaire     Feeling of Stress : Not at all   Social Connections: Moderately Isolated (2023)    Social Connection and Isolation Panel [NHANES]     Frequency of Communication with Friends and Family: More than three times a week     Frequency of Social Gatherings with Friends and Family: More than three times a week     Attends [employer] Services: Never     Active Member of Clubs or Organizations: Yes     Attends Club or Organization Meetings: More than 4 times per year     Marital Status:    Housing Stability: Low Risk  (2023)    Housing Stability Vital Sign     Unable to Pay for Housing in the Last Year: No     Number of Places Lived in the Last Year: 1     Unstable Housing in the Last Year: No       Family History   Problem Relation Age of Onset    Kidney  disease Mother     Cancer Father     Heart disease Brother     Mental illness Son     Glaucoma Neg Hx     Macular degeneration Neg Hx     Retinal detachment Neg Hx        Medications  Current Outpatient Medications on File Prior to Visit   Medication Sig Dispense Refill Last Dose    albuterol (PROVENTIL/VENTOLIN HFA) 90 mcg/actuation inhaler 2 puffs every 4 hours as needed for cough, wheeze, or shortness of breath 8 g 18 Taking    ergocalciferol (ERGOCALCIFEROL) 50,000 unit Cap Take one cap a week 12 capsule 3 Taking    fluticasone propionate (FLONASE) 50 mcg/actuation nasal spray 1 spray (50 mcg total) by Each Nostril route once daily. 16 mL 11 Taking    losartan (COZAAR) 25 MG tablet Take 1 tablet (25 mg total) by mouth once daily. 90 tablet 1 Taking    macitentan 10 mg Tab Take 10 mg by mouth once daily.   Taking    magnesium oxide (MAG-OX) 400 mg (241.3 mg magnesium) tablet Take 1 tablet (400 mg total) by mouth once daily. 90 tablet 1 Taking    montelukast (SINGULAIR) 10 mg tablet Take 1 tablet (10 mg total) by mouth every evening. 30 tablet 11 Taking    mycophenolate (CELLCEPT) 500 mg Tab Take 3 tablets (1,500 mg total) by mouth 2 (two) times daily. 520 tablet 1 Taking    nintedanib 150 mg Cap Take 150 mg by mouth every 12 (twelve) hours.   Taking    ondansetron (ZOFRAN) 8 MG tablet Take 1 tablet (8 mg total) by mouth every 8 (eight) hours as needed for Nausea. 30 tablet 0 Taking    pantoprazole (PROTONIX) 40 MG tablet Take 1 tablet (40 mg total) by mouth 2 (two) times daily. 180 tablet 1 Taking    tadalafil (ADCIRCA) 20 mg Tab Take 20 mg by mouth every other day.   Taking    traZODone (DESYREL) 100 MG tablet Take one pill in the evening PRN 90 tablet 1 Taking    TYVASO DPI 64 mcg CtDv Inhale into the lungs.   Taking    [DISCONTINUED] montelukast (SINGULAIR) 10 mg tablet Take 1 tablet (10 mg total) by mouth every evening. 30 tablet 11        Review of Systems   Constitutional: Negative for appetite change,  "fever and unexpected weight change.   Respiratory: Negative for cough and shortness of breath.    Cardiovascular: Negative for chest pain and palpitations.   Genitourinary: Negative for dyspareunia, dysuria, hematuria and pelvic pain.        GYN ROS per HPI.   Psychiatric/Behavioral: The patient is not nervous/anxious.      Objective:     /72 (BP Location: Left arm, Patient Position: Sitting, BP Method: Medium (Manual))   Pulse 74   Ht 5' 6" (1.676 m)   Wt 78.7 kg (173 lb 8 oz)   BMI 28.00 kg/m²     Physical Exam  Constitutional:       General: She is not in acute distress.  HENT:      Head: Normocephalic.   Pulmonary:      Effort: Pulmonary effort is normal. No respiratory distress.   Genitourinary:     Comments: Previous physical exam noted vaginal atrophy.  Patient declined gynecologic exam today.  Neurological:      General: No focal deficit present.      Mental Status: She is alert.   Psychiatric:         Mood and Affect: Mood normal.         Behavior: Behavior normal.         Assessment:     1. Vaginal atrophy      Plan:     1. Vaginal atrophy  - estradioL (VAGIFEM) 10 mcg Tab; Place 1 tablet into the vagina nightly for 1 week. Then place 1 tablet twice a week.  Dispense: 24 tablet; Refill: 3      Since the patient was seen approximately 1 month ago and vaginal atrophy was diagnosed at the previous visit, we decided to not pursue a pelvic exam at today's visit since it was very little that would change in 1 month.    Discussed the physical exam findings with the patient.  We discussed the etiology of atrophic vaginitis and potential issues including vaginal discomfort.  Conservative versus medical management discussed and at this time, she elects to start vaginal estrogen.    We discussed the pros, cons, risks, benefits, alternatives and indications of hormone replacement therapy in detail.  This included, but was not limited to cardiovascular and coronary artery disease issues such as stroke and " heart attack, blood clotting issues including DVTs and PEs and cancerous and associations.  Short term and long term issues with hormone replacement therapy were discussed.  Proper use, potential side effects and issues associated with medications prescribed were reviewed and discussed with the patient.       We will base further evaluation on her response to treatment over time.  She will follow-up as needed or in 3 months for re-evaluation.       Follow up in about 3 months (around 12/27/2023).  Follow-up sooner if needed.       Toshia Armijo PA-C  09/27/2023

## 2023-10-03 ENCOUNTER — PATIENT MESSAGE (OUTPATIENT)
Dept: OBSTETRICS AND GYNECOLOGY | Facility: CLINIC | Age: 68
End: 2023-10-03
Payer: MEDICARE

## 2023-10-03 DIAGNOSIS — N95.2 VAGINAL ATROPHY: Primary | ICD-10-CM

## 2023-10-04 ENCOUNTER — PATIENT MESSAGE (OUTPATIENT)
Dept: ADMINISTRATIVE | Facility: OTHER | Age: 68
End: 2023-10-04
Payer: MEDICARE

## 2023-10-04 ENCOUNTER — HOSPITAL ENCOUNTER (OUTPATIENT)
Dept: PULMONOLOGY | Facility: HOSPITAL | Age: 68
Discharge: HOME | End: 2023-10-04
Attending: INTERNAL MEDICINE
Payer: MEDICARE

## 2023-10-04 DIAGNOSIS — J84.10 PULMONARY FIBROSIS: ICD-10-CM

## 2023-10-04 LAB
DLCO SINGLE BREATH LLN: 17.08
DLCO SINGLE BREATH PRE REF: 33.4 %
DLCO SINGLE BREATH REF: 22.82
DLCOC SBVA LLN: 2.97
DLCOC SBVA REF: 4.33
DLCOC SINGLE BREATH LLN: 17.08
DLCOC SINGLE BREATH REF: 22.82
DLCOVA LLN: 2.97
DLCOVA PRE REF: 48.8 %
DLCOVA PRE: 2.11 ML/(MIN*MMHG*L) (ref 2.97–5.68)
DLCOVA REF: 4.33
ERV LLN: -16449.3
ERV PRE REF: 135.6 %
ERV REF: 0.7
FEF 25 75 LLN: 0.94
FEF 25 75 PRE REF: 88.7 %
FEF 25 75 REF: 2.01
FEV1 FVC LLN: 65
FEV1 FVC PRE REF: 97.8 %
FEV1 FVC REF: 78
FEV1 LLN: 1.76
FEV1 PRE REF: 92.4 %
FEV1 REF: 2.4
FRCPLETH LLN: 2
FRCPLETH PREREF: 83.8 %
FRCPLETH REF: 2.82
FVC LLN: 2.28
FVC PRE REF: 93.7 %
FVC REF: 3.1
IVC PRE: 2.69 L (ref 2.28–3.97)
IVC SINGLE BREATH LLN: 2.28
IVC SINGLE BREATH PRE REF: 86.9 %
IVC SINGLE BREATH REF: 3.1
MVV LLN: 79
MVV PRE REF: 84.9 %
MVV REF: 93
PEF LLN: 4.29
PEF PRE REF: 107.3 %
PEF REF: 6.1
PRE DLCO: 7.61 ML/(MIN*MMHG) (ref 17.08–28.55)
PRE ERV: 0.95 L (ref -16449.3–16450.7)
PRE FEF 25 75: 1.78 L/S (ref 0.94–3.51)
PRE FET 100: 8.21 SEC
PRE FEV1 FVC: 76.3 % (ref 64.99–89.25)
PRE FEV1: 2.22 L (ref 1.76–3.02)
PRE FRC PL: 2.37 L (ref 2–3.65)
PRE FVC: 2.9 L (ref 2.28–3.97)
PRE MVV: 78.87 L/MIN (ref 78.94–106.8)
PRE PEF: 6.55 L/S (ref 4.29–7.92)
PRE RV: 1.41 L (ref 1.55–2.7)
PRE TLC: 4.32 L (ref 4.29–6.26)
RAW LLN: 3.06
RAW PRE REF: 137.1 %
RAW PRE: 4.19 CMH2O*S/L (ref 3.06–3.06)
RAW REF: 3.06
RV LLN: 1.55
RV PRE REF: 66.7 %
RV REF: 2.12
RVTLC LLN: 32
RVTLC PRE REF: 77.8 %
RVTLC PRE: 32.75 % (ref 32.49–51.67)
RVTLC REF: 42
TLC LLN: 4.29
TLC PRE REF: 81.9 %
TLC REF: 5.27
VA PRE: 3.6 L (ref 5.12–5.12)
VA SINGLE BREATH LLN: 5.12
VA SINGLE BREATH PRE REF: 70.3 %
VA SINGLE BREATH REF: 5.12
VC LLN: 2.28
VC PRE REF: 93.7 %
VC PRE: 2.9 L (ref 2.28–3.97)
VC REF: 3.1

## 2023-10-04 PROCEDURE — 94010 BREATHING CAPACITY TEST: CPT

## 2023-10-04 PROCEDURE — 94726 PULM FUNCT TST PLETHYSMOGRAP: ICD-10-PCS | Mod: 26,,, | Performed by: INTERNAL MEDICINE

## 2023-10-04 PROCEDURE — 94729 DIFFUSING CAPACITY: CPT | Mod: 26,,, | Performed by: INTERNAL MEDICINE

## 2023-10-04 PROCEDURE — 94729 PR C02/MEMBANE DIFFUSE CAPACITY: ICD-10-PCS | Mod: 26,,, | Performed by: INTERNAL MEDICINE

## 2023-10-04 PROCEDURE — 94010 BREATHING CAPACITY TEST: ICD-10-PCS | Mod: 26,,, | Performed by: INTERNAL MEDICINE

## 2023-10-04 PROCEDURE — 94729 DIFFUSING CAPACITY: CPT

## 2023-10-04 PROCEDURE — 94726 PLETHYSMOGRAPHY LUNG VOLUMES: CPT

## 2023-10-04 PROCEDURE — 94010 BREATHING CAPACITY TEST: CPT | Mod: 26,,, | Performed by: INTERNAL MEDICINE

## 2023-10-04 PROCEDURE — 94726 PLETHYSMOGRAPHY LUNG VOLUMES: CPT | Mod: 26,,, | Performed by: INTERNAL MEDICINE

## 2023-10-09 PROBLEM — J96.11 CHRONIC RESPIRATORY FAILURE WITH HYPOXIA: Status: RESOLVED | Noted: 2020-12-18 | Resolved: 2023-10-09

## 2023-10-12 ENCOUNTER — PATIENT MESSAGE (OUTPATIENT)
Dept: ADMINISTRATIVE | Facility: OTHER | Age: 68
End: 2023-10-12
Payer: MEDICARE

## 2023-12-15 ENCOUNTER — TELEPHONE (OUTPATIENT)
Dept: PULMONOLOGY | Facility: CLINIC | Age: 68
End: 2023-12-15
Payer: MEDICARE

## 2023-12-26 DIAGNOSIS — I27.20 PULMONARY HYPERTENSION: ICD-10-CM

## 2023-12-26 DIAGNOSIS — M34.9 SCLERODERMA: ICD-10-CM

## 2023-12-26 RX ORDER — LOSARTAN POTASSIUM 25 MG/1
25 TABLET ORAL DAILY
Qty: 90 TABLET | Refills: 1 | Status: SHIPPED | OUTPATIENT
Start: 2023-12-26 | End: 2024-12-25

## 2023-12-26 RX ORDER — MYCOPHENOLATE MOFETIL 500 MG/1
1500 TABLET ORAL 2 TIMES DAILY
Qty: 520 TABLET | Refills: 1 | Status: SHIPPED | OUTPATIENT
Start: 2023-12-26 | End: 2024-01-19 | Stop reason: SDUPTHER

## 2023-12-26 NOTE — TELEPHONE ENCOUNTER
Care Due:                  Date            Visit Type   Department     Provider  --------------------------------------------------------------------------------                                EP -                              PRIMARY      [facility]  Last Visit: 08-      C[facility] ([facility]  Next Visit: None Scheduled  None         None Found                                                            Last  Test          Frequency    Reason                     Performed    Due Date  --------------------------------------------------------------------------------    Office Visit  6 months...  mycophenolate............  08- 02-    CBC.........  1 months...  mycophenolate............  09-   10-    CMP.........  3 months...  mycophenolate............  09- 12-    Mg Level....  12 months..  magnesium................  Not Found    Overdue    [employer] Embedded Care Due Messages. Reference number: [id number].   12/26/2023 5:58:36 PM CST

## 2023-12-27 ENCOUNTER — OFFICE VISIT (OUTPATIENT)
Dept: OBSTETRICS AND GYNECOLOGY | Facility: CLINIC | Age: 68
End: 2023-12-27
Payer: MEDICARE

## 2023-12-27 VITALS
WEIGHT: 178.38 LBS | BODY MASS INDEX: 28.67 KG/M2 | DIASTOLIC BLOOD PRESSURE: 66 MMHG | HEIGHT: 66 IN | RESPIRATION RATE: 16 BRPM | SYSTOLIC BLOOD PRESSURE: 110 MMHG

## 2023-12-27 DIAGNOSIS — N95.2 VAGINAL ATROPHY: Primary | ICD-10-CM

## 2023-12-27 PROCEDURE — 99999 PR PBB SHADOW E&M-EST. PATIENT-LVL III: ICD-10-PCS | Mod: PBBFAC,,, | Performed by: STUDENT IN AN ORGANIZED HEALTH CARE EDUCATION/TRAINING PROGRAM

## 2023-12-27 PROCEDURE — 99213 OFFICE O/P EST LOW 20 MIN: CPT | Mod: S$PBB,,, | Performed by: STUDENT IN AN ORGANIZED HEALTH CARE EDUCATION/TRAINING PROGRAM

## 2023-12-27 PROCEDURE — 99213 OFFICE O/P EST LOW 20 MIN: CPT | Mod: PBBFAC,PO | Performed by: STUDENT IN AN ORGANIZED HEALTH CARE EDUCATION/TRAINING PROGRAM

## 2023-12-27 PROCEDURE — 99999 PR PBB SHADOW E&M-EST. PATIENT-LVL III: CPT | Mod: PBBFAC,,, | Performed by: STUDENT IN AN ORGANIZED HEALTH CARE EDUCATION/TRAINING PROGRAM

## 2023-12-27 PROCEDURE — 99213 PR OFFICE/OUTPT VISIT, EST, LEVL III, 20-29 MIN: ICD-10-PCS | Mod: S$PBB,,, | Performed by: STUDENT IN AN ORGANIZED HEALTH CARE EDUCATION/TRAINING PROGRAM

## 2023-12-27 NOTE — PROGRESS NOTES
[facility] Note    Subjective:     Chief Complaint: Follow-up (3 month follow up)      HPI:  2023    68-year-old female presents as an established patient wanting to start something for vaginal atrophy.  She reports being sexually active recently and noticed some pain with sex and vaginal dryness.  She was diagnosed with vaginal atrophy at her well-woman visit 2023, however at the time she declined treatment.  She is interested in starting treatment for her issues.    2023  68-YO female presents for a 3 month follow up after starting estradiol vaginally for her vaginal atrophy. She reports the medication is working well and she is using it twice a week.  Denies any bleeding after sex or pain during sex. She plans to continue using the medication.        GYN & OB History  No LMP recorded. Patient is postmenopausal.     Date of Last Pap: 2023    OB History    Para Term  AB Living   3 1 1   2 1   SAB IAB Ectopic Multiple Live Births     2     1      # Outcome Date GA Lbr Hemant/2nd Weight Sex Delivery Anes PTL Lv   3 Term 1975    M Vag-Spont   RAO   2 IAB            1 IAB                Past Medical History:   Diagnosis Date    Allergy     Arthritis     Back pain     Colon polyps     COPD (chronic obstructive pulmonary disease)     Emphysema of lung     GERD (gastroesophageal reflux disease)     Hypertension     Kidney stones     Obesity     Pneumonia     Pneumonia due to other staphylococcus     Scleroderma involving lung since she was 47 y/o    Sleep apnea     Trouble in sleeping      Past Surgical History:   Procedure Laterality Date    COLONOSCOPY N/A 2018    Procedure: COLONOSCOPY;  Surgeon: Ngozi Prince MD;  Location: [facility];  Service: Endoscopy;  Laterality: N/A;    COLONOSCOPY N/A 2019    Procedure: COLONOSCOPY;  Surgeon: Ngozi Prince MD;  Location: [facility];  Service: Endoscopy;  Laterality: N/A;    ESOPHAGEAL MANOMETRY  WITH MEASUREMENT OF IMPEDANCE N/A 7/27/2020    Procedure: MANOMETRY, ESOPHAGUS, WITH IMPEDANCE MEASUREMENT;  Surgeon: Bhupendra Temple MD;  [facility]  Service: Endoscopy;  Laterality: N/A;  3/10 - pt confirmed apptCovid test ordered for 7/24 in [place].[facility]    ESOPHAGOGASTRODUODENOSCOPY N/A 8/7/2018    Procedure: EGD (ESOPHAGOGASTRODUODENOSCOPY);  Surgeon: Ngozi Prince MD;  Location: [facility];  Service: Endoscopy;  Laterality: N/A;    ESOPHAGOGASTRODUODENOSCOPY N/A 11/21/2019    Procedure: EGD (ESOPHAGOGASTRODUODENOSCOPY);  Surgeon: Ngozi Prince MD;  Location: [facility];  Service: Endoscopy;  Laterality: N/A;    ESOPHAGOGASTRODUODENOSCOPY N/A 1/29/2020    Procedure: EGD (ESOPHAGOGASTRODUODENOSCOPY);  Surgeon: Ngozi Prince MD;  Location: [facility];  Service: Endoscopy;  Laterality: N/A;    ESOPHAGOGASTRODUODENOSCOPY N/A 8/20/2020    Procedure: EGD (ESOPHAGOGASTRODUODENOSCOPY);  Surgeon: Ngozi Prince MD;  Location: [facility];  Service: Endoscopy;  Laterality: N/A;    ESOPHAGOGASTRODUODENOSCOPY N/A 12/4/2020    Procedure: EGD (ESOPHAGOGASTRODUODENOSCOPY);  Surgeon: Ngozi Prince MD;  Location: [facility];  Service: Endoscopy;  Laterality: N/A;    ESOPHAGOGASTRODUODENOSCOPY N/A 11/19/2021    Procedure: EGD (ESOPHAGOGASTRODUODENOSCOPY);  Surgeon: Ngozi Prince MD;  Location: [facility];  Service: Endoscopy;  Laterality: N/A;    REPAIR, HERNIA, INCISIONAL OR VENTRAL, WITHOUT HISTORY OF PRIOR REPAIR N/A 12/27/2022    Procedure: REPAIR, HERNIA umbilical, INCISIONAL OR VENTRAL, WITHOUT HISTORY OF PRIOR REPAIR;  Surgeon: Trent Evans MD;  Location: [facility];  Service: General;  Laterality: N/A;  RN PREOP 12/20/2022, PT INSTRUCTED TO BRING ALL MEDS WITH HER ON AM OF SURGERY     Review of patient's allergies indicates:   Allergen Reactions    Hydroxychloroquine Other (See Comments)     Having eye reaction, needs to stop plaquenil and stay off of it.        Social History      Socioeconomic History    Marital status:     Number of children: 1    Years of education: 14    Highest education level: Associate degree: occupational, technical, or vocational program   Occupational History    Occupation: Retired   Tobacco Use    Smoking status: Former     Current packs/day: 0.00     Average packs/day: 0.5 packs/day for 27.7 years (13.8 ttl pk-yrs)     Types: Cigarettes     Start date:      Quit date: 1995     Years since quittin.3    Smokeless tobacco: Never   Substance and Sexual Activity    Alcohol use: Not Currently     Comment: occ    Drug use: No    Sexual activity: Yes     Partners: Male     Social Determinants of Health     Financial Resource Strain: Low Risk  (2023)    Overall Financial Resource Strain (CARDIA)     Difficulty of Paying Living Expenses: Not hard at all   Food Insecurity: No Food Insecurity (2023)    Hunger Vital Sign     Worried About Running Out of Food in the Last Year: Never true     Ran Out of Food in the Last Year: Never true   Transportation Needs: No Transportation Needs (2023)    PRAPARE - Transportation     Lack of Transportation (Medical): No     Lack of Transportation (Non-Medical): No   Physical Activity: Insufficiently Active (2023)    Exercise Vital Sign     Days of Exercise per Week: 3 days     Minutes of Exercise per Session: 30 min   Stress: No Stress Concern Present (2023)    [employer] - Occupational Stress Questionnaire     Feeling of Stress : Not at all   Social Connections: Moderately Isolated (2023)    Social Connection and Isolation Panel [NHANES]     Frequency of Communication with Friends and Family: More than three times a week     Frequency of Social Gatherings with Friends and Family: More than three times a week     Attends Holiness Services: Never     Active Member of Clubs or Organizations: Yes     Attends Club or Organization Meetings: More than 4 times per year      Marital Status:    Housing Stability: Low Risk  (8/7/2023)    Housing Stability Vital Sign     Unable to Pay for Housing in the Last Year: No     Number of Places Lived in the Last Year: 1     Unstable Housing in the Last Year: No       Family History   Problem Relation Age of Onset    Kidney disease Mother     Cancer Father     Heart disease Brother     Mental illness Son     Glaucoma Neg Hx     Macular degeneration Neg Hx     Retinal detachment Neg Hx        Medications  Current Outpatient Medications on File Prior to Visit   Medication Sig Dispense Refill Last Dose    albuterol (PROVENTIL/VENTOLIN HFA) 90 mcg/actuation inhaler 2 puffs every 4 hours as needed for cough, wheeze, or shortness of breath 8 g 18 Taking    conjugated estrogens (PREMARIN) vaginal cream Place a small pea-sized amount (1 g) in the vagina daily for 2 weeks. Then place a small pea-sized amount (1 g) in the vagina twice a week. 1 applicator 4 Taking    ergocalciferol (ERGOCALCIFEROL) 50,000 unit Cap Take one cap a week 12 capsule 3 Taking    fluticasone propionate (FLONASE) 50 mcg/actuation nasal spray 1 spray (50 mcg total) by Each Nostril route once daily. 16 mL 11 Taking    losartan (COZAAR) 25 MG tablet Take 1 tablet (25 mg total) by mouth once daily. 90 tablet 1 Taking    macitentan 10 mg Tab Take 10 mg by mouth once daily.   Taking    magnesium oxide (MAG-OX) 400 mg (241.3 mg magnesium) tablet Take 1 tablet (400 mg total) by mouth once daily. 90 tablet 1 Taking    montelukast (SINGULAIR) 10 mg tablet Take 1 tablet (10 mg total) by mouth every evening. 30 tablet 11 Taking    mycophenolate (CELLCEPT) 500 mg Tab Take 3 tablets (1,500 mg total) by mouth 2 (two) times daily. 520 tablet 1 Taking    nintedanib 150 mg Cap Take 150 mg by mouth every 12 (twelve) hours.   Taking    ondansetron (ZOFRAN) 8 MG tablet Take 1 tablet (8 mg total) by mouth every 8 (eight) hours as needed for Nausea. 30 tablet 0 Taking    pantoprazole  "(PROTONIX) 40 MG tablet Take 1 tablet (40 mg total) by mouth 2 (two) times daily. 180 tablet 1 Taking    tadalafil (ADCIRCA) 20 mg Tab Take 20 mg by mouth every other day.   Taking    traZODone (DESYREL) 100 MG tablet Take one pill in the evening PRN 90 tablet 1 Taking    TYVASO DPI 64 mcg CtDv Inhale into the lungs.   Taking    [DISCONTINUED] estradioL (VAGIFEM) 10 mcg Tab Place 1 tablet into the vagina nightly for 1 week. Then place 1 tablet twice a week. 24 tablet 3        Review of Systems   Constitutional: Negative for appetite change, fever and unexpected weight change.   Respiratory: Negative for cough and shortness of breath.    Cardiovascular: Negative for chest pain and palpitations.   Genitourinary: Negative for dyspareunia, dysuria, hematuria and pelvic pain.        GYN ROS per HPI.   Psychiatric/Behavioral: The patient is not nervous/anxious.      Objective:     /66 (BP Location: Left arm, Patient Position: Sitting, BP Method: Medium (Manual))   Resp 16   Ht 5' 6" (1.676 m)   Wt 80.9 kg (178 lb 5.6 oz)   BMI 28.79 kg/m²     Physical Exam  Constitutional:       General: She is not in acute distress.  HENT:      Head: Normocephalic.   Pulmonary:      Effort: Pulmonary effort is normal. No respiratory distress.   Neurological:      General: No focal deficit present.      Mental Status: She is alert.   Psychiatric:         Mood and Affect: Mood normal.         Behavior: Behavior normal.         Assessment:     1. Vaginal atrophy      Plan:     1. Vaginal atrophy      Continue vaginal estrogen as needed.        Follow up in about 8 months (around 8/17/2024) for annual exam or sooner if any GYN issues arise.  Follow-up sooner if needed.       Toshia Armijo PA-C  12/27/2023   "

## 2024-01-10 RX ORDER — MIRABEGRON 50 MG/1
50 TABLET, FILM COATED, EXTENDED RELEASE ORAL
Qty: 90 TABLET | Refills: 3 | OUTPATIENT
Start: 2024-01-10

## 2024-01-10 NOTE — TELEPHONE ENCOUNTER
Refill Decision Note   Edith Tran  is requesting a refill authorization.  Brief Assessment and Rationale for Refill:  Quick Discontinue     Medication Therapy Plan:  Med d/c by Edith Ag MA on 8/9/2023; [facility]      Comments:     Note composed:10:14 AM 01/10/2024

## 2024-01-10 NOTE — TELEPHONE ENCOUNTER
No care due was identified.  Health [facility] Embedded Care Due Messages. Reference number: [id number].   1/10/2024 6:35:42 AM CST

## 2024-01-13 DIAGNOSIS — R25.2 CRAMPING OF HANDS: ICD-10-CM

## 2024-01-13 DIAGNOSIS — K30 DELAYED GASTRIC EMPTYING: ICD-10-CM

## 2024-01-13 NOTE — TELEPHONE ENCOUNTER
No care due was identified.  Health [facility] Embedded Care Due Messages. Reference number: [id number].   1/13/2024 9:55:32 AM CST

## 2024-01-14 DIAGNOSIS — G47.00 INSOMNIA, UNSPECIFIED TYPE: ICD-10-CM

## 2024-01-14 RX ORDER — LANOLIN ALCOHOL/MO/W.PET/CERES
400 CREAM (GRAM) TOPICAL
Qty: 90 TABLET | Refills: 1 | OUTPATIENT
Start: 2024-01-14

## 2024-01-14 RX ORDER — PANTOPRAZOLE SODIUM 40 MG/1
40 TABLET, DELAYED RELEASE ORAL 2 TIMES DAILY
Qty: 180 TABLET | Refills: 1 | OUTPATIENT
Start: 2024-01-14

## 2024-01-14 NOTE — TELEPHONE ENCOUNTER
No care due was identified.  Health [facility] Embedded Care Due Messages. Reference number: [id number].   1/14/2024 6:27:08 AM CST

## 2024-01-14 NOTE — TELEPHONE ENCOUNTER
Refill Routing Note   Medication(s) are not appropriate for processing by [facility] for the following reason(s):        Outside of protocol  Protonix total daily dose greater than 40 mg daily; ROUTE    [facility] action(s):  Route        Medication Therapy Plan: Mag-Oxide is outside of [facility] protocol; ROUTE      Appointments  past 12m or future 3m with PCP    Date Provider   Last Visit   8/18/2023 Duane Julio III, MD   Next Visit   Visit date not found Duane Julio III, MD   ED visits in past 90 days: 0        Note composed:10:50 AM 01/14/2024

## 2024-01-16 RX ORDER — TRAZODONE HYDROCHLORIDE 100 MG/1
TABLET ORAL
Qty: 90 TABLET | Refills: 1 | Status: SHIPPED | OUTPATIENT
Start: 2024-01-16

## 2024-01-16 NOTE — TELEPHONE ENCOUNTER
Refill Decision Note   Edith Tran  is requesting a refill authorization.  Brief Assessment and Rationale for Refill:  Approve     Medication Therapy Plan:         Comments:     Note composed:4:04 AM 01/16/2024

## 2024-01-17 NOTE — PROGRESS NOTES
Subjective:      Patient ID: Edith Tran is a 68 y.o. female.    Chief Complaint: Disease Management    HPI    Rheumatologic History:      - Diagnosis/es:              - limited systemic sclerosis diagnosed in  by Dr Barnes and characterized by SHILPI 1:1280 nucleolar, inflammatory arthritis, esophageal dysmotility, GERD, Raynaud's, interstitial lung disease, and pulmonary hypertension              - Crohn's disease diagnosed around 2019, not active  - Social History: Former smoker, denies alcohol intake  - Family History: No autoimmune conditions  - Gyn History: , 3 intentional abortions  - Positive serologies: SHILPI 1:1280 nucleolar  - Negative serologies: Scl 70, RNAP III, Th/To, myomarker panel,PM/Scl, Sm/RNP, dsDNA, RF  - Infectious screening labs:  Negative hepatitis-B, C, and QuantiFERON (2023)  - TPMT normal metabolizer  - Imaging:              - EGD (2020): Grade A esophagitis and mild Schatzki ring - dilation was performed.  Small hiatial hernia.              - TTE (2023) LVEF >55%, PASP and CVP WNL              - DEXA (3/2021) normal              - PFT (10/2023) FVC 2.9L <- 2.67L <- 2.76L, DLCO 33.4% <- 39.6% <- 40.7%              - CT chest (2023) ascending aorta measures 4.2 x 4.5 cm in maximum diameter. There are extensive subpleural reticulations and honeycombing throughout the upper and lower lobes compatible with idiopathic pulmonary fibrosis. There is bronchiectasis. There are no confluent infiltrates or pleural effusions. There are mildly prominent mediastinal lymph nodes most likely reactive. The esophagus is normal  The visualized portion of the upper abdomen demonstrates a 15 mm hyperdense mass arising from the upper pole the left kidney. Follow-up ultrasound is recommended to determine if this represents hemorrhagic cyst or solid mass adrenal glands are normal. There are degenerative changes of the spine. The musculature is normal.  - Previous Treatments:              - HCQ  "200mg BID: caused "eye problems"              - MTX              - Reglan  - Current Treatments:               - MMF 1500mg BID              - Tadalafil (Adcirca) 40mg daily  - Opsumit (macitentan)  - Tyvaso (treprostinil)              - Ofev              - Protonix 40mg BID  Interval History:   At last visit, she had mildly active inflammatory arthritis but wanted to monitor on current medications. She has noticed worse Raynaud's and rare sticking in her throat when she eats certain types of food, but denies shortness of breath, GERD, joint pain/swelling, and worsening skin changes.     Objective:   /73   Pulse 98   Ht 5' 6" (1.676 m)   Wt 82 kg (180 lb 12.4 oz)   BMI 29.18 kg/m²   Physical Exam   Constitutional: normal appearance. No distress.   HENT:   Head: Normocephalic and atraumatic.   Cardiovascular: Normal rate, regular rhythm and normal heart sounds.   Pulmonary/Chest: Effort normal and breath sounds normal.   Musculoskeletal:      Comments: Synovial hypertrophy of bilateral 2nd and 3rd MCPs   Neurological: She is alert.   Skin: Skin is warm and dry. No rash noted.   + Minimal telangiectasias  + Sclerodactyly  + Abnormal nailfold capillaries  + Cold fingers  No digital pitting     No data to display     Assessment:     1. Limited systemic sclerosis    2. Raynaud's disease without gangrene    3. Interstitial lung disease    4. Pulmonary hypertension    5. High risk medication use    6. Immunosuppression    7. Gastroesophageal reflux disease, unspecified whether esophagitis present    8. Delayed gastric emptying      This is a 68-year-old woman with history of AAA, insomnia, ELAINE on CPAP, esophageal stricture, diverticulosis, B12 deficiency, esophageal leukoplakia, chronic back pain, and limited systemic sclerosis diagnosed in 2009 by Dr Barnes and characterized by inflammatory arthritis, telangiectasias, esophageal dysmotility, GERD, Raynaud's, abnormal nailfold capillaries interstitial lung " disease, and pulmonary hypertension on MMF 1500 mg b.i.d., Adcirca 40 mg daily, Opsumit, Tyvaso, Ofev, and Protonix 40 mg b.i.d.    She has noticed worse Raynaud's and rare sticking in her throat when she eats certain types of food, but denies shortness of breath, GERD, joint pain/swelling, and worsening skin changes. DLCO has worsened but FVC has improved which may reflect worsening pulmonary hypertension.  The patient states that her pulmonologist had mentioned possibly starting her on an infusion.  I will reach out to her.  I suspect that she means rituximab, however rituximab infusions can cause temporary hypotension and her blood pressure is on the lower side.  In addition, her Raynaud's is worsening with the cold weather and she may require tighter control.  I feel that having cardiology on board will be helpful and I referred her to the advanced heart failure clinic.     Plan:     Problem List Items Addressed This Visit          Pulmonary    Interstitial lung disease    Relevant Orders    CBC Auto Differential    Comprehensive Metabolic Panel    Sedimentation rate    C-Reactive Protein    IMMUNOGLOBULINS (IGG, IGA, IGM) QUANTITATIVE    Ambulatory referral/consult to Cardiology       Cardiac/Vascular    Raynaud's disease    Relevant Orders    CBC Auto Differential    Comprehensive Metabolic Panel    Sedimentation rate    C-Reactive Protein    IMMUNOGLOBULINS (IGG, IGA, IGM) QUANTITATIVE    Pulmonary hypertension    Relevant Medications    mycophenolate (CELLCEPT) 500 mg Tab    Other Relevant Orders    CBC Auto Differential    Comprehensive Metabolic Panel    Sedimentation rate    C-Reactive Protein    IMMUNOGLOBULINS (IGG, IGA, IGM) QUANTITATIVE    Ambulatory referral/consult to Cardiology       Immunology/Multi System    Immunosuppression       GI    GERD (gastroesophageal reflux disease)     Other Visit Diagnoses       Limited systemic sclerosis    -  Primary    Relevant Orders    CBC Auto Differential     Comprehensive Metabolic Panel    Sedimentation rate    C-Reactive Protein    IMMUNOGLOBULINS (IGG, IGA, IGM) QUANTITATIVE    Ambulatory referral/consult to Cardiology    High risk medication use        Delayed gastric emptying        Relevant Medications    pantoprazole (PROTONIX) 40 MG tablet           - I have not put the order in for rituximab yet as I would like to discuss with pulmonology first.  However I did discuss potential adverse effects of rituximab including hypotension, infusion reactions, infection, blood count, liver and kidney function abnormalities.  ACR patient information on rituximab was provided.  - MMF 1500mg BID  - Tadalafil (Adcirca) 40mg daily  - Opsumit (macitentan)  - Tyvaso (treprostinil)  - Ofev  - Protonix 40mg BID  - Pre-DMARD labs due 9/2024  - Immunizations: COVID x 4 (most recent 1/2023), flu (9/2022), PCV13 (8/2019), PPV23 (11/2019), Shingrix x 2 (2020); patient needs PCV 20  - Obtain DEXA at follow up visit    Follow up in 3 months    30 minutes of total time spent on the encounter, which includes face to face time and non-face to face time preparing to see the patient (eg, review of tests), Obtaining and/or reviewing separately obtained history, Documenting clinical information in the electronic or other health record, Independently interpreting results (not separately reported) and communicating results to the patient/family/caregiver, or Care coordination (not separately reported).     This note was prepared with [employer] Direct voice recognition transcription software. Garbled syntax, mangled pronouns, and other bizarre constructions may be attributed to that software system       Nieves Blanchard M.D.  Rheumatology Dept  [place], [place]

## 2024-01-19 ENCOUNTER — OFFICE VISIT (OUTPATIENT)
Dept: RHEUMATOLOGY | Facility: CLINIC | Age: 69
End: 2024-01-19
Payer: MEDICARE

## 2024-01-19 VITALS
HEIGHT: 66 IN | DIASTOLIC BLOOD PRESSURE: 73 MMHG | BODY MASS INDEX: 29.05 KG/M2 | WEIGHT: 180.75 LBS | HEART RATE: 98 BPM | SYSTOLIC BLOOD PRESSURE: 110 MMHG

## 2024-01-19 DIAGNOSIS — Z79.899 HIGH RISK MEDICATION USE: ICD-10-CM

## 2024-01-19 DIAGNOSIS — K30 DELAYED GASTRIC EMPTYING: ICD-10-CM

## 2024-01-19 DIAGNOSIS — K21.9 GASTROESOPHAGEAL REFLUX DISEASE, UNSPECIFIED WHETHER ESOPHAGITIS PRESENT: ICD-10-CM

## 2024-01-19 DIAGNOSIS — J84.9 INTERSTITIAL LUNG DISEASE: ICD-10-CM

## 2024-01-19 DIAGNOSIS — I27.20 PULMONARY HYPERTENSION: ICD-10-CM

## 2024-01-19 DIAGNOSIS — D84.9 IMMUNOSUPPRESSION: ICD-10-CM

## 2024-01-19 DIAGNOSIS — I73.00 RAYNAUD'S DISEASE WITHOUT GANGRENE: ICD-10-CM

## 2024-01-19 DIAGNOSIS — M34.9 LIMITED SYSTEMIC SCLEROSIS: Primary | ICD-10-CM

## 2024-01-19 PROCEDURE — 99999 PR PBB SHADOW E&M-EST. PATIENT-LVL V: CPT | Mod: PBBFAC,,, | Performed by: STUDENT IN AN ORGANIZED HEALTH CARE EDUCATION/TRAINING PROGRAM

## 2024-01-19 PROCEDURE — 99215 OFFICE O/P EST HI 40 MIN: CPT | Mod: S$GLB,,, | Performed by: STUDENT IN AN ORGANIZED HEALTH CARE EDUCATION/TRAINING PROGRAM

## 2024-01-19 RX ORDER — PANTOPRAZOLE SODIUM 40 MG/1
40 TABLET, DELAYED RELEASE ORAL 2 TIMES DAILY
Qty: 180 TABLET | Refills: 1 | Status: SHIPPED | OUTPATIENT
Start: 2024-01-19

## 2024-01-19 RX ORDER — MYCOPHENOLATE MOFETIL 500 MG/1
1500 TABLET ORAL 2 TIMES DAILY
Qty: 520 TABLET | Refills: 1 | Status: SHIPPED | OUTPATIENT
Start: 2024-01-19

## 2024-01-20 ENCOUNTER — PATIENT MESSAGE (OUTPATIENT)
Dept: RHEUMATOLOGY | Facility: CLINIC | Age: 69
End: 2024-01-20
Payer: MEDICARE

## 2024-01-22 ENCOUNTER — LAB VISIT (OUTPATIENT)
Dept: LAB | Facility: HOSPITAL | Age: 69
End: 2024-01-22
Attending: STUDENT IN AN ORGANIZED HEALTH CARE EDUCATION/TRAINING PROGRAM
Payer: MEDICARE

## 2024-01-22 DIAGNOSIS — J84.9 INTERSTITIAL LUNG DISEASE: ICD-10-CM

## 2024-01-22 DIAGNOSIS — M34.9 LIMITED SYSTEMIC SCLEROSIS: ICD-10-CM

## 2024-01-22 DIAGNOSIS — I27.20 PULMONARY HYPERTENSION: ICD-10-CM

## 2024-01-22 DIAGNOSIS — I73.00 RAYNAUD'S DISEASE WITHOUT GANGRENE: ICD-10-CM

## 2024-01-22 LAB
ALBUMIN SERPL BCP-MCNC: 4.1 G/DL (ref 3.5–5.2)
ALP SERPL-CCNC: 55 U/L (ref 55–135)
ALT SERPL W/O P-5'-P-CCNC: 13 U/L (ref 10–44)
ANION GAP SERPL CALC-SCNC: 11 MMOL/L (ref 8–16)
AST SERPL-CCNC: 16 U/L (ref 10–40)
BASOPHILS # BLD AUTO: 0.05 K/UL (ref 0–0.2)
BASOPHILS NFR BLD: 0.9 % (ref 0–1.9)
BILIRUB SERPL-MCNC: 0.6 MG/DL (ref 0.1–1)
BUN SERPL-MCNC: 14 MG/DL (ref 8–23)
CALCIUM SERPL-MCNC: 9.9 MG/DL (ref 8.7–10.5)
CHLORIDE SERPL-SCNC: 106 MMOL/L (ref 95–110)
CO2 SERPL-SCNC: 23 MMOL/L (ref 23–29)
CREAT SERPL-MCNC: 0.8 MG/DL (ref 0.5–1.4)
CRP SERPL-MCNC: 0.9 MG/L (ref 0–8.2)
DIFFERENTIAL METHOD BLD: ABNORMAL
EOSINOPHIL # BLD AUTO: 0.2 K/UL (ref 0–0.5)
EOSINOPHIL NFR BLD: 3.8 % (ref 0–8)
ERYTHROCYTE [DISTWIDTH] IN BLOOD BY AUTOMATED COUNT: 13.2 % (ref 11.5–14.5)
ERYTHROCYTE [SEDIMENTATION RATE] IN BLOOD BY WESTERGREN METHOD: 10 MM/HR (ref 0–20)
EST. GFR  (NO RACE VARIABLE): >60 ML/MIN/1.73 M^2
GLUCOSE SERPL-MCNC: 86 MG/DL (ref 70–110)
HCT VFR BLD AUTO: 42 % (ref 37–48.5)
HGB BLD-MCNC: 13.2 G/DL (ref 12–16)
IMM GRANULOCYTES # BLD AUTO: 0.03 K/UL (ref 0–0.04)
IMM GRANULOCYTES NFR BLD AUTO: 0.5 % (ref 0–0.5)
LYMPHOCYTES # BLD AUTO: 0.8 K/UL (ref 1–4.8)
LYMPHOCYTES NFR BLD: 15.2 % (ref 18–48)
MCH RBC QN AUTO: 29.5 PG (ref 27–31)
MCHC RBC AUTO-ENTMCNC: 31.4 G/DL (ref 32–36)
MCV RBC AUTO: 94 FL (ref 82–98)
MONOCYTES # BLD AUTO: 0.4 K/UL (ref 0.3–1)
MONOCYTES NFR BLD: 7 % (ref 4–15)
NEUTROPHILS # BLD AUTO: 4 K/UL (ref 1.8–7.7)
NEUTROPHILS NFR BLD: 72.6 % (ref 38–73)
NRBC BLD-RTO: 0 /100 WBC
PLATELET # BLD AUTO: 167 K/UL (ref 150–450)
PMV BLD AUTO: 10.4 FL (ref 9.2–12.9)
POTASSIUM SERPL-SCNC: 4.5 MMOL/L (ref 3.5–5.1)
PROT SERPL-MCNC: 7.3 G/DL (ref 6–8.4)
RBC # BLD AUTO: 4.47 M/UL (ref 4–5.4)
SODIUM SERPL-SCNC: 140 MMOL/L (ref 136–145)
WBC # BLD AUTO: 5.46 K/UL (ref 3.9–12.7)

## 2024-01-22 PROCEDURE — 85025 COMPLETE CBC W/AUTO DIFF WBC: CPT | Performed by: STUDENT IN AN ORGANIZED HEALTH CARE EDUCATION/TRAINING PROGRAM

## 2024-01-22 PROCEDURE — 85651 RBC SED RATE NONAUTOMATED: CPT | Performed by: STUDENT IN AN ORGANIZED HEALTH CARE EDUCATION/TRAINING PROGRAM

## 2024-01-22 PROCEDURE — 36415 COLL VENOUS BLD VENIPUNCTURE: CPT | Performed by: STUDENT IN AN ORGANIZED HEALTH CARE EDUCATION/TRAINING PROGRAM

## 2024-01-22 PROCEDURE — 80053 COMPREHEN METABOLIC PANEL: CPT | Performed by: STUDENT IN AN ORGANIZED HEALTH CARE EDUCATION/TRAINING PROGRAM

## 2024-01-22 PROCEDURE — 86140 C-REACTIVE PROTEIN: CPT | Performed by: STUDENT IN AN ORGANIZED HEALTH CARE EDUCATION/TRAINING PROGRAM

## 2024-01-31 ENCOUNTER — PATIENT MESSAGE (OUTPATIENT)
Dept: RHEUMATOLOGY | Facility: CLINIC | Age: 69
End: 2024-01-31
Payer: MEDICARE

## 2024-01-31 ENCOUNTER — TELEPHONE (OUTPATIENT)
Dept: VASCULAR SURGERY | Facility: CLINIC | Age: 69
End: 2024-01-31
Payer: MEDICARE

## 2024-01-31 NOTE — TELEPHONE ENCOUNTER
----- Message from Sarah Hoskins sent at 1/31/2024  8:43 AM CST -----  Contact: Patient  Type:  Sooner Appointment Request    Caller is requesting a sooner appointment.  Caller declined first available appointment listed below.  Caller will not accept being placed on the waitlist and is requesting a message be sent to doctor.    Name of Caller:  Patient  When is the first available appointment?  N/A    Would the patient rather a call back or a response via [facility]?   Call back  Best Call Back Number:  [phone]    Additional Information:   States she would like to speak with someone to schedule an appointment for Dr Koch to check her aneurysm - states she received a notice in the mail - please call - thank you

## 2024-02-05 DIAGNOSIS — R25.2 CRAMPING OF HANDS: ICD-10-CM

## 2024-02-05 RX ORDER — LANOLIN ALCOHOL/MO/W.PET/CERES
400 CREAM (GRAM) TOPICAL
Qty: 90 TABLET | Refills: 0 | Status: SHIPPED | OUTPATIENT
Start: 2024-02-05 | End: 2024-05-20 | Stop reason: SDUPTHER

## 2024-02-05 NOTE — TELEPHONE ENCOUNTER
No care due was identified.  Health [facility] Embedded Care Due Messages. Reference number: [id number].   2/05/2024 6:24:34 AM CST

## 2024-02-09 ENCOUNTER — HOSPITAL ENCOUNTER (OUTPATIENT)
Dept: RADIOLOGY | Facility: HOSPITAL | Age: 69
Discharge: HOME | End: 2024-02-09
Attending: THORACIC SURGERY (CARDIOTHORACIC VASCULAR SURGERY)
Payer: MEDICARE

## 2024-02-09 DIAGNOSIS — I71.21 ANEURYSM OF ASCENDING AORTA WITHOUT RUPTURE: ICD-10-CM

## 2024-02-09 PROCEDURE — 25500020 PHARM REV CODE 255: Mod: PO | Performed by: THORACIC SURGERY (CARDIOTHORACIC VASCULAR SURGERY)

## 2024-02-09 PROCEDURE — 71275 CT ANGIOGRAPHY CHEST: CPT | Mod: TC,PO

## 2024-02-09 RX ADMIN — IOHEXOL 100 ML: 350 INJECTION, SOLUTION INTRAVENOUS at 08:02

## 2024-02-15 DIAGNOSIS — I71.21 ANEURYSM OF ASCENDING AORTA WITHOUT RUPTURE: Primary | ICD-10-CM

## 2024-03-05 ENCOUNTER — TELEPHONE (OUTPATIENT)
Dept: TRANSPLANT | Facility: CLINIC | Age: 69
End: 2024-03-05
Payer: MEDICARE

## 2024-03-05 DIAGNOSIS — R06.82 TACHYPNEA: ICD-10-CM

## 2024-03-05 DIAGNOSIS — I27.9 CHRONIC PULMONARY HEART DISEASE: ICD-10-CM

## 2024-03-05 DIAGNOSIS — Z79.899 POLYPHARMACY: Primary | ICD-10-CM

## 2024-03-11 ENCOUNTER — HOSPITAL ENCOUNTER (OUTPATIENT)
Dept: PULMONOLOGY | Facility: CLINIC | Age: 69
Discharge: HOME | End: 2024-03-11
Payer: MEDICARE

## 2024-03-11 ENCOUNTER — LAB VISIT (OUTPATIENT)
Dept: LAB | Facility: HOSPITAL | Age: 69
End: 2024-03-11
Payer: MEDICARE

## 2024-03-11 ENCOUNTER — OFFICE VISIT (OUTPATIENT)
Dept: TRANSPLANT | Facility: CLINIC | Age: 69
End: 2024-03-11
Payer: MEDICARE

## 2024-03-11 VITALS
WEIGHT: 188.69 LBS | SYSTOLIC BLOOD PRESSURE: 107 MMHG | HEART RATE: 84 BPM | DIASTOLIC BLOOD PRESSURE: 59 MMHG | HEIGHT: 66 IN | OXYGEN SATURATION: 98 % | BODY MASS INDEX: 30.33 KG/M2

## 2024-03-11 VITALS — BODY MASS INDEX: 28.93 KG/M2 | WEIGHT: 180 LBS | HEIGHT: 66 IN

## 2024-03-11 DIAGNOSIS — D84.9 IMMUNOSUPPRESSION: ICD-10-CM

## 2024-03-11 DIAGNOSIS — R06.82 TACHYPNEA: ICD-10-CM

## 2024-03-11 DIAGNOSIS — Z79.899 POLYPHARMACY: ICD-10-CM

## 2024-03-11 DIAGNOSIS — I27.20 PULMONARY HYPERTENSION: Primary | ICD-10-CM

## 2024-03-11 DIAGNOSIS — M34.9 LIMITED SYSTEMIC SCLEROSIS: ICD-10-CM

## 2024-03-11 DIAGNOSIS — I71.21 ANEURYSM OF ASCENDING AORTA WITHOUT RUPTURE: ICD-10-CM

## 2024-03-11 DIAGNOSIS — I27.9 CHRONIC PULMONARY HEART DISEASE: ICD-10-CM

## 2024-03-11 DIAGNOSIS — J84.9 INTERSTITIAL LUNG DISEASE: ICD-10-CM

## 2024-03-11 DIAGNOSIS — G47.33 OSA ON CPAP: ICD-10-CM

## 2024-03-11 DIAGNOSIS — M34.9 SYSTEMIC SCLEROSIS WITH LIMITED CUTANEOUS INVOLVEMENT: ICD-10-CM

## 2024-03-11 LAB
ALBUMIN SERPL BCP-MCNC: 3.7 G/DL (ref 3.5–5.2)
ALP SERPL-CCNC: 65 U/L (ref 55–135)
ALT SERPL W/O P-5'-P-CCNC: 10 U/L (ref 10–44)
ANION GAP SERPL CALC-SCNC: 7 MMOL/L (ref 8–16)
AST SERPL-CCNC: 12 U/L (ref 10–40)
BASOPHILS # BLD AUTO: 0.05 K/UL (ref 0–0.2)
BASOPHILS NFR BLD: 0.9 % (ref 0–1.9)
BILIRUB SERPL-MCNC: 0.3 MG/DL (ref 0.1–1)
BNP SERPL-MCNC: <10 PG/ML (ref 0–99)
BUN SERPL-MCNC: 14 MG/DL (ref 8–23)
CALCIUM SERPL-MCNC: 9.4 MG/DL (ref 8.7–10.5)
CHLORIDE SERPL-SCNC: 110 MMOL/L (ref 95–110)
CO2 SERPL-SCNC: 25 MMOL/L (ref 23–29)
CREAT SERPL-MCNC: 0.8 MG/DL (ref 0.5–1.4)
DIFFERENTIAL METHOD BLD: ABNORMAL
EOSINOPHIL # BLD AUTO: 0.2 K/UL (ref 0–0.5)
EOSINOPHIL NFR BLD: 4.2 % (ref 0–8)
ERYTHROCYTE [DISTWIDTH] IN BLOOD BY AUTOMATED COUNT: 13.2 % (ref 11.5–14.5)
EST. GFR  (NO RACE VARIABLE): >60 ML/MIN/1.73 M^2
GLUCOSE SERPL-MCNC: 52 MG/DL (ref 70–110)
HCT VFR BLD AUTO: 41.9 % (ref 37–48.5)
HGB BLD-MCNC: 12.9 G/DL (ref 12–16)
IMM GRANULOCYTES # BLD AUTO: 0.03 K/UL (ref 0–0.04)
IMM GRANULOCYTES NFR BLD AUTO: 0.6 % (ref 0–0.5)
LYMPHOCYTES # BLD AUTO: 0.9 K/UL (ref 1–4.8)
LYMPHOCYTES NFR BLD: 16.9 % (ref 18–48)
MAGNESIUM SERPL-MCNC: 1.7 MG/DL (ref 1.6–2.6)
MCH RBC QN AUTO: 29.8 PG (ref 27–31)
MCHC RBC AUTO-ENTMCNC: 30.8 G/DL (ref 32–36)
MCV RBC AUTO: 97 FL (ref 82–98)
MONOCYTES # BLD AUTO: 0.4 K/UL (ref 0.3–1)
MONOCYTES NFR BLD: 8.3 % (ref 4–15)
NEUTROPHILS # BLD AUTO: 3.7 K/UL (ref 1.8–7.7)
NEUTROPHILS NFR BLD: 69.1 % (ref 38–73)
NRBC BLD-RTO: 0 /100 WBC
PLATELET # BLD AUTO: 154 K/UL (ref 150–450)
PMV BLD AUTO: 11.4 FL (ref 9.2–12.9)
POTASSIUM SERPL-SCNC: 4 MMOL/L (ref 3.5–5.1)
PROT SERPL-MCNC: 6.7 G/DL (ref 6–8.4)
RBC # BLD AUTO: 4.33 M/UL (ref 4–5.4)
SODIUM SERPL-SCNC: 142 MMOL/L (ref 136–145)
WBC # BLD AUTO: 5.28 K/UL (ref 3.9–12.7)

## 2024-03-11 PROCEDURE — 83880 ASSAY OF NATRIURETIC PEPTIDE: CPT

## 2024-03-11 PROCEDURE — 94618 PULMONARY STRESS TESTING: CPT | Mod: S$GLB,,, | Performed by: INTERNAL MEDICINE

## 2024-03-11 PROCEDURE — 85025 COMPLETE CBC W/AUTO DIFF WBC: CPT

## 2024-03-11 PROCEDURE — 36415 COLL VENOUS BLD VENIPUNCTURE: CPT

## 2024-03-11 PROCEDURE — 99999 PR PBB SHADOW E&M-EST. PATIENT-LVL IV: CPT | Mod: PBBFAC,,,

## 2024-03-11 PROCEDURE — 80053 COMPREHEN METABOLIC PANEL: CPT

## 2024-03-11 PROCEDURE — 99204 OFFICE O/P NEW MOD 45 MIN: CPT | Mod: S$GLB,,,

## 2024-03-11 PROCEDURE — 83735 ASSAY OF MAGNESIUM: CPT

## 2024-03-11 NOTE — H&P (VIEW-ONLY)
Subjective:    Patient ID:  Edith Tran is a 69 y.o. female who presents for evaluation of Pulmonary Hypertension.    HPI   Mrs. Abebe was referred by Dr. Blanchard (rheum) for follow-up on PH diagnosis. Mrs. Abebe has pulmonary hypertension,systemic sclerosis (MMF), ILD (OFEV), ELAINE on CPAP, inflammatory arthritis, esophageal dysmotility, GERD, Raynaud's.   Follows with Dr. Serrano (Pulm). Previously seen by Dr. Goddard for PH. He noted that patient was started on PH meds despite normal ECHO and normal RHC from 2013. Stayed on the meds because she felt there was improvement. RHC done 8/2022 did show mild-mod pre-capillary PH while on Tyvaso (10/2022), opsumit, and tadalafil.    Before appointment, spoke to Mrs. Abebe about following with two PH groups. She has been following with the [facility] team and made clear that she should follow with just one team to avoid confusion with medication and treatment. Mrs. Abebe said she wanted to continue this visit.    Mrs. Abebe is here upon referral by her rheumatologist. She is on macitentan, tadalafil, and Tyvaso 64 mcg (DPI) ([facility]). She reports tolerating medication, but does have a cough with Tyvaso. Mrs. Abebe wears 3L O2 with exertion, but walk today shows that she maintains O2 sat of 92% on room air, with distance of 335m. She uses CPAP + O2 consistently. She does not take any diuretics. Patient denies chest pain, chest pressure, syncope, pre-syncope, lightheadedness, dizziness, PND, orthopnea, LE edema, abdominal pain, abdominal pressure, or N/V/F/C.      PH RISK FACTORS YES NO COMMENT   Anorexigens     [] []    Connective Tissue Disease:  [x] []    Raynauds:  [x] []    DVT/PE:  [] [x]    On anticoagulation:  [] [x]    HIV:  [] [x]    Left Heart Disease: [x] []    COPD:  [] [x]    On oxygen:  [] [x]    Kidney Disease:  [] []    Liver Disease:  [] []    Thyroid Disease: [] [x]    On Thyroid replacement:  [] [x]    Obesity:  [] [x]    Sleep Apnea: [x] []     On CPAP:  [x] []      6MWT:   3/11/2024   6MW    6MWT Status completed without stopping    Patient Reported Dyspnea;Dizziness;Lightheadedness    Was O2 used? No    6MW Distance walked (feet) 1100 feet    Distance walked (meters) 335.28 meters    Did patient stop? No    Oxygen Saturation 99 %    Supplemental Oxygen Room Air    Heart Rate 87 bpm    Blood Pressure 116/67    [place] Dyspnea Rating  moderate    Oxygen Saturation 92 %    Supplemental Oxygen Room Air    Heart Rate 99 bpm    Blood Pressure 133/64    [place] Dyspnea Rating  very heavy    Recovery Time (seconds) 63 seconds    Oxygen Saturation 95 %    Supplemental Oxygen Room Air    Heart Rate 93 bpm      ECHO ([facility]) 5/25/2023  PHYSICIAN INTERPRETATION by Adrianna Camarena MD:   SUMMARY:    1. Normal left ventricular systolic function. LVEF of > 55%.    2. LV diastolic function is normal.    3. Mildly reduced right ventricular systolic function.    4. Normal pulmonary artery pressure at rest.    5. Mild tricuspid regurgitation.    6. Normal central venous pressure.     RHYTHM: Sinus rhythm. The average ventricular rate was 69 bpm.   LEFT VENTRICLE: Calculated left ventricular geometry shows concentric remodeling pattern. Normal left ventricular cavity size. Global left ventricular systolic function is normal. Left ventricular ejection fraction is greater than 55%. No left ventricular wall motion abnormalities. Diastolic dysfunction is absent. The left ventricular diastolic function is normal.   LV HEMODYNAMICS: The calculated cardiac output is 3.61 l/min. The LV stroke volume index is 27.1 ml/m². Borderline increased estimated systemic vascular resistance of 1537 dynes·s/cm5. Borderline decreased estimated cardiac power output of 0.58 Villareal.   RIGHT VENTRICLE: The right ventricular size is normal. Overall RV systolic function is mildly reduced. Abnormal tricuspid annular plane systolic excursion (TAPSE), measuring 1.3 cm. Abnormal tricuspid valve free wall annular  tissue Doppler systolic excursion (s') measuring 9 cm/s.   LEFT ATRIUM: The left atrium is normal in size, with a left atrial volume of 29.2 ml/m².   RIGHT ATRIUM: The right atrium is normal qualitatively. Estimated right atrial pressure is 3 mmHg. RA volume index 21.5 ml/m2.   AORTIC VALVE: The native aortic valve is trileaflet. Normal estimated aortic valve area. No evidence of aortic sclerosis or stenosis. No aortic valve regurgitation is seen.   MITRAL VALVE: The mitral valve appears normal. No evidence of mitral valve stenosis. Trace mitral valve regurgitation.   TRICUSPID VALVE: Normal tricuspid valve leaflets. Mild tricuspid regurgitation is present.   PULMONIC VALVE: Trace pulmonic regurgitation. No evidence of pulmonic stenosis.   AORTA: The visualized portions of the aortic root, aortic arch, abdominal aorta and descending thoracic aorta are normal. The largest aortic root diameter in diastole measures 3.3 cm. The ascending aorta was not well visualized.   PULMONARY ARTERY: The estimated right ventricular systolic pressure is normal at 24 mmHg.   VENA CAVAE: The inferior vena cava is small, measuring 1.4 cm in diameter. The IVC demonstrates greater than 50% decrease in size with respiration.   VENOUS: A normal flow pattern is recorded from the right upper pulmonary vein.     ECHO: 12/1/2022  The left ventricle is normal in size with normal systolic function.  The estimated ejection fraction is 65%.  Normal left ventricular diastolic function.  Normal right ventricular size with normal right ventricular systolic function.  Normal central venous pressure (3 mmHg).  The estimated PA systolic pressure is 15 mmHg.  _____________________    Normal myocardial perfusion scan. There is no evidence of myocardial ischemia or infarction.    There is a mild intensity perfusion abnormality in the anterior wall of the left ventricle, secondary to breast attenuation.    The gated perfusion images showed an ejection  fraction of 86% post stress.    The EKG portion of this study is negative for ischemia.    The patient reported no chest pain during the stress test.    There were no arrhythmias during stress.    RHC: 8/2022  Right Heart Pressures   RAP: 3 mmHg   RVP: 50/3 mmHg   Pulmonary artery pressure: 50/12 mmHg   Mean pulmonary artery pressure: 31 mmHg   Pulmonary capillary wedge pressure: 7 mmHg   Cardiac Output: 5.9 L/min   Cardiac index: 3 L/min/m2   Pulmonary vascular resistance: 4 HRU   Oxygen saturation measurements were obtained at the following locations:   SVC: 67%   Main Pulmonary Artery: 71%   During Exercise:   PAP 65/20 mmHg   mPAP 35 mmHg   PCWP 15 mmHg   CO by TD: 7.3 L/min   PVR 4 Wood units     CT CHEST:  CT THORAX:  The ascending aorta measures 4.2 x 4.5 cm in maximum diameter. The aortic root measures 3.8 x 4.0 cm.  The aortic arch and descending thoracic aorta are normal in caliber.  The heart is normal in size. There is no pericardial effusion. There is mild coronary artery calcification.     LUNGS: There are extensive subpleural reticulations and honeycombing throughout the upper and lower lobes compatible with idiopathic pulmonary fibrosis. There is bronchiectasis.     There are no confluent infiltrates or pleural effusions.  There are mildly prominent mediastinal lymph nodes most likely reactive.  The esophagus is normal  The visualized portion of the upper abdomen demonstrates a 15 mm hyperdense mass arising from the upper pole the left kidney. Follow-up ultrasound is recommended to determine if this represents hemorrhagic cyst or solid mass adrenal glands are normal. There are degenerative changes of the spine. The musculature is normal.     IMPRESSION: 4.2 x 4.5 cm a sending abdominal aortic aneurysm     Findings consistent with idiopathic pulmonary fibrosis     15 mm round hyperdense mass arising from the upper pole the left kidney. Follow-up ultrasound is recommended to exclude solid mass versus  "hemorrhagic cyst     PFTs: 10/4/2023  Spirometry normal. FVC 94%; FEV1 93%, TLC 82%; DLCO 33%.     Review of Systems   Constitutional: Negative.   HENT: Negative.     Cardiovascular:  Positive for dyspnea on exertion. Negative for chest pain, leg swelling, near-syncope and syncope.   Respiratory:  Positive for shortness of breath and sleep disturbances due to breathing.    Endocrine: Negative.    Skin: Negative.    Musculoskeletal: Negative.    Gastrointestinal: Negative.    Neurological: Negative.    Psychiatric/Behavioral: Negative.          Objective: BP (!) 107/59 (BP Location: Right arm, Patient Position: Sitting, BP Method: Medium (Automatic))   Pulse 84   Ht 5' 6" (1.676 m)   Wt 85.6 kg (188 lb 11.4 oz)   SpO2 98%   BMI 30.46 kg/m²       Physical Exam  Constitutional:       General: She is not in acute distress.     Appearance: Normal appearance. She is not ill-appearing.   HENT:      Head: Normocephalic.   Cardiovascular:      Rate and Rhythm: Normal rate and regular rhythm.      Pulses: Normal pulses.      Heart sounds: Normal heart sounds.   Pulmonary:      Effort: Pulmonary effort is normal.      Breath sounds: Normal breath sounds.   Abdominal:      Palpations: Abdomen is soft.   Musculoskeletal:         General: Normal range of motion.      Cervical back: Normal range of motion.   Skin:     General: Skin is warm and dry.      Capillary Refill: Capillary refill takes less than 2 seconds.   Neurological:      Mental Status: She is oriented to person, place, and time.   Psychiatric:         Mood and Affect: Mood normal.         Behavior: Behavior normal.           Lab Results   Component Value Date    BNP <10 03/11/2024     03/11/2024    K 4.0 03/11/2024    MG 1.7 03/11/2024     03/11/2024    CO2 25 03/11/2024    BUN 14 03/11/2024    CREATININE 0.8 03/11/2024    GLU 52 (L) 03/11/2024    HGBA1C 5.0 07/07/2023    AST 12 03/11/2024    ALT 10 03/11/2024    ALBUMIN 3.7 03/11/2024    PROT 6.7 " "03/11/2024    BILITOT 0.3 03/11/2024    CHOL 204 (H) 07/07/2023    HDL 63 07/07/2023    LDLCALC 117.8 07/07/2023    TRIG 116 07/07/2023       Magnesium   Date Value Ref Range Status   03/11/2024 1.7 1.6 - 2.6 mg/dL Final       Lab Results   Component Value Date    WBC 5.28 03/11/2024    HGB 12.9 03/11/2024    HCT 41.9 03/11/2024    MCV 97 03/11/2024     03/11/2024       No results found for: "INR", "PROTIME"    BNP   Date Value Ref Range Status   03/11/2024 <10 0 - 99 pg/mL Final     Comment:     Values of less than 100 pg/ml are consistent with non-CHF populations.   03/19/2018 <10 0 - 99 pg/mL Final     Comment:     Values of less than 100 pg/ml are consistent with non-CHF populations.                 Assessment:       1. Pulmonary hypertension    2. Limited systemic sclerosis    3. Interstitial lung disease    4. Systemic sclerosis with limited cutaneous involvement    5. Immunosuppression    6. ELAINE on CPAP    7. Aneurysm of ascending aorta without rupture         Plan:    Mrs. bAebe has PAH based on outside cath report. She is currently on triple therapy. Recommend a RHC since it has been 2 years. This will give us new baseline hemodynamics to direct treatment and determine prognosis. Mrs. Abebe is agreeable to the procedure. Says she would like to stay with [facility] since she has other doctors here.    Mrs. Abebe was consented for the procedure in clinic.      No medication changes.    Schedule right heart cath with labs and appointment afterwards.    Please call Marla at [phone] with any questions.    Recommend 2 gram sodium restriction   Encourage physical activity with graded exercise program.  Requested patient to weigh themselves daily, and to notify us if their weight increases by more than 3 lbs in 1 day or 5 lbs in 1 week.    Thank you for allowing us to participate in the cardiovascular management of this patient. We look forward to partnering in their care. Please contact us with " any questions or concerns you may have regarding this patient.     Sharmila Snyder DNP, APRN  Pulmonary Hypertension Department    [facility]  Dr. Francis Snyder DNP, APRN  Marla Ivory, KEYANNAN, RN

## 2024-03-11 NOTE — PATIENT INSTRUCTIONS
No medication changes.    Schedule right heart cath with labs and appointment afterwards.    Please call Marla at [phone] with any questions.    Recommend 2 gram sodium restriction   Encourage physical activity with graded exercise program.  Requested patient to weigh themselves daily, and to notify us if their weight increases by more than 3 lbs in 1 day or 5 lbs in 1 week.

## 2024-03-11 NOTE — PROGRESS NOTES
Subjective:    Patient ID:  Edith Tran is a 69 y.o. female who presents for evaluation of Pulmonary Hypertension.    HPI   Mrs. Abebe was referred by Dr. Blanchard (rheum) for follow-up on PH diagnosis. Mrs. Abebe has pulmonary hypertension,systemic sclerosis (MMF), ILD (OFEV), ELAINE on CPAP, inflammatory arthritis, esophageal dysmotility, GERD, Raynaud's.   Follows with Dr. Serrano (Pulm). Previously seen by Dr. Goddard for PH. He noted that patient was started on PH meds despite normal ECHO and normal RHC from 2013. Stayed on the meds because she felt there was improvement. RHC done 8/2022 did show mild-mod pre-capillary PH while on Tyvaso (10/2022), opsumit, and tadalafil.    Before appointment, spoke to Mrs. Abebe about following with two PH groups. She has been following with the [facility] team and made clear that she should follow with just one team to avoid confusion with medication and treatment. Mrs. Abebe said she wanted to continue this visit.    Mrs. Abebe is here upon referral by her rheumatologist. She is on macitentan, tadalafil, and Tyvaso 64 mcg (DPI) ([facility]). She reports tolerating medication, but does have a cough with Tyvaso. Mrs. Abebe wears 3L O2 with exertion, but walk today shows that she maintains O2 sat of 92% on room air, with distance of 335m. She uses CPAP + O2 consistently. She does not take any diuretics. Patient denies chest pain, chest pressure, syncope, pre-syncope, lightheadedness, dizziness, PND, orthopnea, LE edema, abdominal pain, abdominal pressure, or N/V/F/C.      PH RISK FACTORS YES NO COMMENT   Anorexigens     [] []    Connective Tissue Disease:  [x] []    Raynauds:  [x] []    DVT/PE:  [] [x]    On anticoagulation:  [] [x]    HIV:  [] [x]    Left Heart Disease: [x] []    COPD:  [] [x]    On oxygen:  [] [x]    Kidney Disease:  [] []    Liver Disease:  [] []    Thyroid Disease: [] [x]    On Thyroid replacement:  [] [x]    Obesity:  [] [x]    Sleep Apnea: [x] []     On CPAP:  [x] []      6MWT:   3/11/2024   6MW    6MWT Status completed without stopping    Patient Reported Dyspnea;Dizziness;Lightheadedness    Was O2 used? No    6MW Distance walked (feet) 1100 feet    Distance walked (meters) 335.28 meters    Did patient stop? No    Oxygen Saturation 99 %    Supplemental Oxygen Room Air    Heart Rate 87 bpm    Blood Pressure 116/67    Lowell Dyspnea Rating  moderate    Oxygen Saturation 92 %    Supplemental Oxygen Room Air    Heart Rate 99 bpm    Blood Pressure 133/64    Lowell Dyspnea Rating  very heavy    Recovery Time (seconds) 63 seconds    Oxygen Saturation 95 %    Supplemental Oxygen Room Air    Heart Rate 93 bpm      ECHO ([facility]) 5/25/2023  PHYSICIAN INTERPRETATION by Adrianna Camarena MD:   SUMMARY:    1. Normal left ventricular systolic function. LVEF of > 55%.    2. LV diastolic function is normal.    3. Mildly reduced right ventricular systolic function.    4. Normal pulmonary artery pressure at rest.    5. Mild tricuspid regurgitation.    6. Normal central venous pressure.     RHYTHM: Sinus rhythm. The average ventricular rate was 69 bpm.   LEFT VENTRICLE: Calculated left ventricular geometry shows concentric remodeling pattern. Normal left ventricular cavity size. Global left ventricular systolic function is normal. Left ventricular ejection fraction is greater than 55%. No left ventricular wall motion abnormalities. Diastolic dysfunction is absent. The left ventricular diastolic function is normal.   LV HEMODYNAMICS: The calculated cardiac output is 3.61 l/min. The LV stroke volume index is 27.1 ml/m². Borderline increased estimated systemic vascular resistance of 1537 dynes·s/cm5. Borderline decreased estimated cardiac power output of 0.58 Vill[place]al.   RIGHT VENTRICLE: The right ventricular size is normal. Overall RV systolic function is mildly reduced. Abnormal tricuspid annular plane systolic excursion (TAPSE), measuring 1.3 cm. Abnormal tricuspid valve free wall annular  tissue Doppler systolic excursion (s') measuring 9 cm/s.   LEFT ATRIUM: The left atrium is normal in size, with a left atrial volume of 29.2 ml/m².   RIGHT ATRIUM: The right atrium is normal qualitatively. Estimated right atrial pressure is 3 mmHg. RA volume index 21.5 ml/m2.   AORTIC VALVE: The native aortic valve is trileaflet. Normal estimated aortic valve area. No evidence of aortic sclerosis or stenosis. No aortic valve regurgitation is seen.   MITRAL VALVE: The mitral valve appears normal. No evidence of mitral valve stenosis. Trace mitral valve regurgitation.   TRICUSPID VALVE: Normal tricuspid valve leaflets. Mild tricuspid regurgitation is present.   PULMONIC VALVE: Trace pulmonic regurgitation. No evidence of pulmonic stenosis.   AORTA: The visualized portions of the aortic root, aortic arch, abdominal aorta and descending thoracic aorta are normal. The largest aortic root diameter in diastole measures 3.3 cm. The ascending aorta was not well visualized.   PULMONARY ARTERY: The estimated right ventricular systolic pressure is normal at 24 mmHg.   VENA CAVAE: The inferior vena cava is small, measuring 1.4 cm in diameter. The IVC demonstrates greater than 50% decrease in size with respiration.   VENOUS: A normal flow pattern is recorded from the right upper pulmonary vein.     ECHO: 12/1/2022  The left ventricle is normal in size with normal systolic function.  The estimated ejection fraction is 65%.  Normal left ventricular diastolic function.  Normal right ventricular size with normal right ventricular systolic function.  Normal central venous pressure (3 mmHg).  The estimated PA systolic pressure is 15 mmHg.  _____________________    Normal myocardial perfusion scan. There is no evidence of myocardial ischemia or infarction.    There is a mild intensity perfusion abnormality in the anterior wall of the left ventricle, secondary to breast attenuation.    The gated perfusion images showed an ejection  fraction of 86% post stress.    The EKG portion of this study is negative for ischemia.    The patient reported no chest pain during the stress test.    There were no arrhythmias during stress.    RHC: 8/2022  Right Heart Pressures   RAP: 3 mmHg   RVP: 50/3 mmHg   Pulmonary artery pressure: 50/12 mmHg   Mean pulmonary artery pressure: 31 mmHg   Pulmonary capillary wedge pressure: 7 mmHg   Cardiac Output: 5.9 L/min   Cardiac index: 3 L/min/m2   Pulmonary vascular resistance: 4 HRU   Oxygen saturation measurements were obtained at the following locations:   SVC: 67%   Main Pulmonary Artery: 71%   During Exercise:   PAP 65/20 mmHg   mPAP 35 mmHg   PCWP 15 mmHg   CO by TD: 7.3 L/min   PVR 4 Wood units     CT CHEST:  CT THORAX:  The ascending aorta measures 4.2 x 4.5 cm in maximum diameter. The aortic root measures 3.8 x 4.0 cm.  The aortic arch and descending thoracic aorta are normal in caliber.  The heart is normal in size. There is no pericardial effusion. There is mild coronary artery calcification.     LUNGS: There are extensive subpleural reticulations and honeycombing throughout the upper and lower lobes compatible with idiopathic pulmonary fibrosis. There is bronchiectasis.     There are no confluent infiltrates or pleural effusions.  There are mildly prominent mediastinal lymph nodes most likely reactive.  The esophagus is normal  The visualized portion of the upper abdomen demonstrates a 15 mm hyperdense mass arising from the upper pole the left kidney. Follow-up ultrasound is recommended to determine if this represents hemorrhagic cyst or solid mass adrenal glands are normal. There are degenerative changes of the spine. The musculature is normal.     IMPRESSION: 4.2 x 4.5 cm a sending abdominal aortic aneurysm     Findings consistent with idiopathic pulmonary fibrosis     15 mm round hyperdense mass arising from the upper pole the left kidney. Follow-up ultrasound is recommended to exclude solid mass versus  "hemorrhagic cyst     PFTs: 10/4/2023  Spirometry normal. FVC 94%; FEV1 93%, TLC 82%; DLCO 33%.     Review of Systems   Constitutional: Negative.   HENT: Negative.     Cardiovascular:  Positive for dyspnea on exertion. Negative for chest pain, leg swelling, near-syncope and syncope.   Respiratory:  Positive for shortness of breath and sleep disturbances due to breathing.    Endocrine: Negative.    Skin: Negative.    Musculoskeletal: Negative.    Gastrointestinal: Negative.    Neurological: Negative.    Psychiatric/Behavioral: Negative.          Objective: BP (!) 107/59 (BP Location: Right arm, Patient Position: Sitting, BP Method: Medium (Automatic))   Pulse 84   Ht 5' 6" (1.676 m)   Wt 85.6 kg (188 lb 11.4 oz)   SpO2 98%   BMI 30.46 kg/m²       Physical Exam  Constitutional:       General: She is not in acute distress.     Appearance: Normal appearance. She is not ill-appearing.   HENT:      Head: Normocephalic.   Cardiovascular:      Rate and Rhythm: Normal rate and regular rhythm.      Pulses: Normal pulses.      Heart sounds: Normal heart sounds.   Pulmonary:      Effort: Pulmonary effort is normal.      Breath sounds: Normal breath sounds.   Abdominal:      Palpations: Abdomen is soft.   Musculoskeletal:         General: Normal range of motion.      Cervical back: Normal range of motion.   Skin:     General: Skin is warm and dry.      Capillary Refill: Capillary refill takes less than 2 seconds.   Neurological:      Mental Status: She is oriented to person, place, and time.   Psychiatric:         Mood and Affect: Mood normal.         Behavior: Behavior normal.           Lab Results   Component Value Date    BNP <10 03/11/2024     03/11/2024    K 4.0 03/11/2024    MG 1.7 03/11/2024     03/11/2024    CO2 25 03/11/2024    BUN 14 03/11/2024    CREATININE 0.8 03/11/2024    GLU 52 (L) 03/11/2024    HGBA1C 5.0 07/07/2023    AST 12 03/11/2024    ALT 10 03/11/2024    ALBUMIN 3.7 03/11/2024    PROT 6.7 " "03/11/2024    BILITOT 0.3 03/11/2024    CHOL 204 (H) 07/07/2023    HDL 63 07/07/2023    LDLCALC 117.8 07/07/2023    TRIG 116 07/07/2023       Magnesium   Date Value Ref Range Status   03/11/2024 1.7 1.6 - 2.6 mg/dL Final       Lab Results   Component Value Date    WBC 5.28 03/11/2024    HGB 12.9 03/11/2024    HCT 41.9 03/11/2024    MCV 97 03/11/2024     03/11/2024       No results found for: "INR", "PROTIME"    BNP   Date Value Ref Range Status   03/11/2024 <10 0 - 99 pg/mL Final     Comment:     Values of less than 100 pg/ml are consistent with non-CHF populations.   03/19/2018 <10 0 - 99 pg/mL Final     Comment:     Values of less than 100 pg/ml are consistent with non-CHF populations.                 Assessment:       1. Pulmonary hypertension    2. Limited systemic sclerosis    3. Interstitial lung disease    4. Systemic sclerosis with limited cutaneous involvement    5. Immunosuppression    6. ELAINE on CPAP    7. Aneurysm of ascending aorta without rupture         Plan:    Mrs. Abebe has PAH based on outside cath report. She is currently on triple therapy. Recommend a RHC since it has been 2 years. This will give us new baseline hemodynamics to direct treatment and determine prognosis. Mrs. Abebe is agreeable to the procedure. Says she would like to stay with [facility] since she has other doctors here.    Mrs. Abebe was consented for the procedure in clinic.      No medication changes.    Schedule right heart cath with labs and appointment afterwards.    Please call Marla at [phone] with any questions.    Recommend 2 gram sodium restriction   Encourage physical activity with graded exercise program.  Requested patient to weigh themselves daily, and to notify us if their weight increases by more than 3 lbs in 1 day or 5 lbs in 1 week.    Thank you for allowing us to participate in the cardiovascular management of this patient. We look forward to partnering in their care. Please contact us with " any questions or concerns you may have regarding this patient.     Sharmila Snyder DNP, APRN  Pulmonary Hypertension Department    [facility]  Dr. Francis Snyder DNP, APRN  Marla Ivory, KEYANNAN, RN

## 2024-03-12 NOTE — PROCEDURES
Edith Tran is a 69 y.o.  female patient, who presents for a 6 minute walk test ordered by ZAIRA Snyder.  The diagnosis is Qualify for Oxygen, Pulmonary Hypertension.  The patient's BMI is 29.1 kg/m2.  Predicted distance (lower limit of normal) is 294.15 meters.      Test Results:    The test was completed without stopping. The total time walked was 360 seconds. During walking, the patient reported:  Dyspnea, Dizziness, Lightheadedness. The patient used no assistive devices during testing.     03/11/2024---------Distance: 335.28 meters (1100 feet)     O2 Sat % Supplemental Oxygen Heart Rate Blood Pressure Lowell Scale   Pre-exercise  (Resting) 99 % Room Air 87 bpm 116/67 mmHg 3   During Exercise 92 % Room Air 99 bpm 133/64 mmHg 7-8   Post-exercise  (Recovery) 95 % Room Air  93 bpm       Recovery Time: 63 seconds    Performing nurse/tech: JERROD Armas      PREVIOUS STUDY:   The patient has not had a previous study.      CLINICAL INTERPRETATION:  Six minute walk distance is 335.28 meters (1100 feet) with very heavy dyspnea.  During exercise, there was significant desaturation while breathing room air.  Both blood pressure and heart rate remained stable with walking.  The patient reported non-pulmonary symptoms during exercise.  No previous study performed.  Based upon age and body mass index, exercise capacity is normal.

## 2024-03-27 ENCOUNTER — OFFICE VISIT (OUTPATIENT)
Dept: PULMONOLOGY | Facility: CLINIC | Age: 69
End: 2024-03-27
Payer: MEDICARE

## 2024-03-27 VITALS
SYSTOLIC BLOOD PRESSURE: 105 MMHG | HEART RATE: 86 BPM | OXYGEN SATURATION: 99 % | BODY MASS INDEX: 30.2 KG/M2 | HEIGHT: 66 IN | DIASTOLIC BLOOD PRESSURE: 68 MMHG | WEIGHT: 187.94 LBS

## 2024-03-27 DIAGNOSIS — J84.9 INTERSTITIAL LUNG DISEASE: ICD-10-CM

## 2024-03-27 DIAGNOSIS — M34.9 SCLERODERMA INVOLVING LUNG: Primary | ICD-10-CM

## 2024-03-27 DIAGNOSIS — I27.20 PULMONARY HYPERTENSION: ICD-10-CM

## 2024-03-27 DIAGNOSIS — Z99.81 ON HOME OXYGEN THERAPY: ICD-10-CM

## 2024-03-27 DIAGNOSIS — J44.9 CHRONIC OBSTRUCTIVE PULMONARY DISEASE, UNSPECIFIED COPD TYPE: ICD-10-CM

## 2024-03-27 PROCEDURE — 99999 PR PBB SHADOW E&M-EST. PATIENT-LVL III: CPT | Mod: PBBFAC,,, | Performed by: INTERNAL MEDICINE

## 2024-03-27 PROCEDURE — 99213 OFFICE O/P EST LOW 20 MIN: CPT | Mod: S$GLB,,, | Performed by: INTERNAL MEDICINE

## 2024-03-27 NOTE — PROGRESS NOTES
3/27/2024   3/27/2024    Edith Tran  Office Note    Chief Complaint   Patient presents with    Follow-up       HPI:    3/27/2024 no cough, no gi c/o on ofev, does streching and balance twice wk with walks. For right heart cath next wk.on cellcept and tyvaso, and adcirca....  Pft 10/2023 sl better thatn 10/2020 and 2/22...   dlco 7.6.    Cta lung viewed from 2/2024 compared to 2022 and 7/2023 -- no sign chagnes..  Cpap ongoing well..  Swallow difficulty somewhat worse and for endosocpy and ? Dilation?    Six min walk3/11/2024-   CLINICAL INTERPRETATION:  Six minute walk distance is 335.28 meters (1100 feet) with very heavy dyspnea.  During exercise, there was significant desaturation while breathing room air.         RHC: 8/2022  Right Heart Pressures   RAP: 3 mmHg   RVP: 50/3 mmHg   Pulmonary artery pressure: 50/12 mmHg   Mean pulmonary artery pressure: 31 mmHg   Pulmonary capillary wedge pressure: 7 mmHg   Cardiac Output: 5.9 L/min   Cardiac index: 3 L/min/m2   Pulmonary vascular resistance: 4 HRU   Oxygen saturation measurements were obtained at the following locations:   SVC: 67%   Main Pulmonary Artery: 71%   During Exercise:   PAP 65/20 mmHg   mPAP 35 mmHg   PCWP 15 mmHg   CO by TD: 7.3 L/min   PVR 4 Wood units     9/27/2023 no lung infections, on ofev, no bowel issues, on weight watcher and lost 30 lbs....        Patient Instructions   Ct  chest viewed and ascending aneurysm seen looking same 2/2022 to 7/2023-- you saw Dr Romero and are to have follow up ct in 6 months (no growth to our view), 12/2022 echo did not suggest valve problems       lung tissue was slight worsening from 2/2022 to 7/2023 - ofev ongoing for years.  Would check pulmonary functions     Occasionally beta blocker used for aneurysms.   You have no palpitations but have some anxiety.  Could dose low dose toprol and continue if less anxiety and no light headed/dizziness.           spirometry bronchodilator, lung volume by gas dilution,  diffusion capacity measured February 17, 2022. Spirometry falls within normal limits. There is no airflow obstruction measured. The FEV1 is 85% predicted. There is no significant bronchodilator   response.       Lung volumes showed total lung capacity to be 69% of predicted and low. diffusion is also decreased to 40% of predicted.       There is evidence for restriction and loss of diffusion. There is no airflow obstruction nor bronchodilator response. Clinical correlation recommended  Spirometry, lung volume by gas dilution, and diffusion capacity measured October 22, 2020. The FEV1 to FVC ratio was 73% indicating no airflow obstruction measured by spirometry technique. The FEV1 was 81% of predicted at 2 L. Total lung capacity on lung    volume by gas dilution was 72% of predicted and a little low. Diffusion, uncorrected for anemia, was 41% of predicted and also low.   Patient has no obstruction. There is restriction measured. Diffusion is decreased. Clinical correlation recommended        Ofev dose is maximal.  May consider increased rx Dr Wayne if pulmonary function worsens..    3/27/2023 on tyvaso since November and doing better, has bronchial infections 1-2 with amoxil   Pt on ofev -- no diarrhea of  significance.    Pt feels less sob.  Patient Instructions   Last cpap from [facility] -- should check compliance report.  Diffusion has fallen from 9.5 -October 2020 to 8.9 --2/2022 to 7.2-- 1/2023--- was 7.3 9/2022.  Diffusion may decrease from pulmonary hypertension or lung tissue disease.  Six min walk was good.   Ct chest looked better to our view 2/2022 c/w 2019 as far as lung tissue.    Use augmentin as needed, call if significant infection.  May be good to follow up ct chest later in year.  Low diffusion suggest low oxygen walking..      9/26/2022 no new issues, uses cpap with good results, uses ox for activity exercise. Misses ox concentrator--getting repairs.       Pt was having constant cough prior to  august rhc.    Patient Instructions   Heart cath did not look too bad -- baseline NA.    Need to get portable oxygen concentrator repaired.    Would wish to see compliance report from cpap .    If new ct done -- bring on disc.       Evaluation took 44min to review ct and pft and rhc.    Had pft 9/21/2022 -- fvc 71%, fev1 74%, tlc 78%, dlco 27%.  Six min walk 349 meters in 6 min, and needed with 2 lpm needed.  Pft worsened from 2/2022.  Ox needs stable    Cough remitted -- was constant.     Swallows ok.  Eats slowly.      Rhc last month-- [place].    3/28/2022 not using symbicort, dose use occ albuterol which ppt headaches??    Feels like stb le-- uses ox more as feels more sob.  Pt does care at [facility].      Uses cpap -- had ahi 6.3 in past.  Patient Instructions   Need compliance report from cpap?  Ahi was 6.3 yrs ago.  You are compliant.    Ct chest and 6 min walk and pulm functions are stable to sl better.    9/16/2021 - six min walk distance same as October 2020 at 255 m, batteries on poc run out doing errands over 2 h rs..    Not monitoring ox walking.  ues 3lpm pulse.    Uses filter on cpap to get new.   Had 3 vaccines .   On rx and bladder doing better.   Pt not needing rescue,use symbicort.    Ct in 2019, and pft 2020.   Patient Instructions   Would do ct chest and pft and six min walk prior to seeing next rheumatologist, or in 6 months.     Continue opsumit, tadalafil, symbicort, ofev,     Call if needed    Re check 6 months.  3/16/2021 concerned re covid vaccine and immujne suppression.  Breathing stable, has bladder urgency, has cpap mask problems - like f30 airfit.      Patient Instructions     Six min walk today walked 255 meters, and oxygen fell at 3 minutes to 88, and needed 3 lpm oxygen- 3/16/2021      6 min walk study was accomplished October 22, 2020. Baseline room air saturation was 98%. With ambulation O2 sat fell to 87% by 5 min. On 2 L of oxygen patient maintain sat in the low 90s  subsequently. Patient walked about 133% of the reference distance   at 255 m.      6 min walk on August 21, 2018 was accomplished.  Patient's baseline O2 sat was 96% on room air.  After walking in her sats fell to 88% at 5 min.  On 2 L of oxygen sat was maintained in the mid 90s.  The patient was able to walk 390 m or 91% of the   reference value.     You may have spastic bladder.  Would recheck urine.  If sign symptoms may need to see urology?       You have and use portable oxygen concentrator. You should try to use more and be more active.      Get vaccine,      Walk studies suggest some loss in function from 2018 to 2020 but stable from October to March now.      Lung capacity was fairly good October 2020-  Spirometry, lung volume by gas dilution, and diffusion capacity measured October 22, 2020. The FEV1 to FVC ratio was 73% indicating no airflow obstruction measured by spirometry technique. The FEV1 was 81% of predicted at 2 L. Total lung capacity on lung    volume by gas dilution was 72% of predicted and a little low. Diffusion, uncorrected for anemia, was 41% of predicted and also low.   Patient has no obstruction. There is restriction measured. Diffusion is decreased. Clinical correlation recommended           Would do a six min walk prior to return in 6 months.    9/30/2020- uses 02 out of house, had scratchy throat with am cough lately. Uses cpap nightly all night usually 4-7 hrs/night.      Sees Dr Arboleda- Dr Barnes left.    Had been seeing Dr Goddard, had pft and 6 min walk, saw Dr Goddard on 8/3/2020 - told all stable,  Cannot locate pft /walk test in care everywhere.  Usually studied yearly.  Pt feels stable otherwise.  3/27/2019 ct chest viewed with ild/honeycombing.  Follows for ofev trial at [facility].    Pt continues on ofev and cellcept and cialis and opsumit     Patient Instructions   If antibiotic needed - azithromycin daily for 3 days, last 10, simple antibiotic.     Prednisone 5 mg - may use if cough  bad.    Should have result of right heart cath, and august pulm function and six min walk- will ask to obtain.  Will need to make sure can continue opsumit/cialias    Can follow six min walk every 3 months - placed standing.    You need to stay on ofev for pulmonary fibrosis, uses tadalafil and optusmit for pulmonary hypertension.  October 8, 2019- Has PFT and 6 min walk scheduled Nov 2019 by Dr. Barnes Rheumatologist at [place]. Wearing supplemental oxygen at home day/night set at 2L [place], currently on Symbicort daily, states benefit in breathing. No recent prednisone use. SOB controlled. No cough, no chest tightness, no wheeze.   Wear cpap nightly, states benefiting greatly but want different mask, tries to read before bed, mask over nasal bridge does not allow glasses to rest correctly.  Patient Instructions   Order sent for different CPAP mask  Continue to use nightly for Obstructive sleep apnea    Continue supplemental oxygen    Will review the results of PFT and 6 min walk from [place] at next appointment.    Continue Symbicort daily.     Use Fela perles and prednisone as needed for cough.     April 3, 2019- Onset 1 week: Cough productive white in color, sore throat, post nasal drip, sinus drainage yellow, flushed cheeks, complaints improving tx with antibiotics no prednisone use. Cough worse when lying down.    Current therapy for scleroderma, pulmonary htn, and pulmonary fibrosis Opsumit/tadafanil and Ofev/cellcept. No diarrhea no complaints.  Currently using Symbicort 1 puff daily. Using supplemental oxygen at 2L [place] for Shortness of breath, states greatly beneficial, pt states able to walk further and keep up with company while on oxygen.      Nov 27, trelegy not covered- not using, had flu shot 8 am with sorenes later day and huge swollen arm with  Pain thhat night.   No cough. On opsumit/tadafanil, and on ofev/cellcept.  Stable. No diarrhea.  Sees pulm htn and  [facility].  Last 6 min walk 02  Angelo  "91 slow pace.      Aug 21, uses trelegy, no c/o.  No 0x arranges- sat 90 falls to 87 at 2 mins- walked 307 meters or 71%.  Dx Crohn's.  No abx nor prednisone.  cpap good and sinus good.  Instructions:Would monitor 6 min walk every 3 months.    307 meters was last distance.  Six min walk.  Monitor ct chest yearly in March - sooner if worsens.  Needs 02 for activity.  Use medications for bronchitis.   Stay active.    July11,2018has had raynauld's for yrs, pt had severe mobility problems issues leading to dx slceroderma 2007 - "rock bottom".  Has had swallow sticking with  2-3 dilations with last over 3 yrs ago.  Pt has had pulm htn on opsumit and talafadil,  Pt also on ofev in a study, and cellcept.  Also low dose prednisone.  Pt dx osas and uses cpap nightly 8 hrs - had used 02 but off 02 for 3 yrs.   Pt had had 6 min walks but none recently- none last yr at least.  Pt gets bronchitis with cough and yellow mucous.  Has occ wheezes.  Had exacerbations in feb and June - typically 2-3 yrly.  Tessalon helps.  Uses combivent occ ppt headaches with some breathing benefit.     Uses flonase regular.  Was on asthma rx for yrs.        The chief compliant  problem is stable  PFSH:  Past Medical History:   Diagnosis Date    Allergy     Arthritis     Back pain     Colon polyps     COPD (chronic obstructive pulmonary disease)     Emphysema of lung     GERD (gastroesophageal reflux disease)     Hypertension     Kidney stones     Obesity     Pneumonia     Pneumonia due to other staphylococcus     Scleroderma involving lung since she was 47 y/o    Sleep apnea     Trouble in sleeping          Past Surgical History:   Procedure Laterality Date    COLONOSCOPY N/A 8/7/2018    Procedure: COLONOSCOPY;  Surgeon: Ngozi Prince MD;  Location: [facility];  Service: Endoscopy;  Laterality: N/A;    COLONOSCOPY N/A 11/21/2019    Procedure: COLONOSCOPY;  Surgeon: Ngozi Prince MD;  Location: [facility];  Service: Endoscopy;  Laterality: " N/A;    ESOPHAGEAL MANOMETRY WITH MEASUREMENT OF IMPEDANCE N/A 7/27/2020    Procedure: MANOMETRY, ESOPHAGUS, WITH IMPEDANCE MEASUREMENT;  Surgeon: Bhupendra Temple MD;  Location: [facility] ([address] [place] [address]);  Service: Endoscopy;  Laterality: N/A;  3/10 - pt confirmed apptCovid test ordered for 7/24 in [place].[facility]    ESOPHAGOGASTRODUODENOSCOPY N/A 8/7/2018    Procedure: EGD (ESOPHAGOGASTRODUODENOSCOPY);  Surgeon: Ngozi Prince MD;  Location: [facility];  Service: Endoscopy;  Laterality: N/A;    ESOPHAGOGASTRODUODENOSCOPY N/A 11/21/2019    Procedure: EGD (ESOPHAGOGASTRODUODENOSCOPY);  Surgeon: Ngozi Prince MD;  Location: [facility];  Service: Endoscopy;  Laterality: N/A;    ESOPHAGOGASTRODUODENOSCOPY N/A 1/29/2020    Procedure: EGD (ESOPHAGOGASTRODUODENOSCOPY);  Surgeon: Ngozi Prince MD;  Location: [facility];  Service: Endoscopy;  Laterality: N/A;    ESOPHAGOGASTRODUODENOSCOPY N/A 8/20/2020    Procedure: EGD (ESOPHAGOGASTRODUODENOSCOPY);  Surgeon: Ngozi Prince MD;  Location: [facility];  Service: Endoscopy;  Laterality: N/A;    ESOPHAGOGASTRODUODENOSCOPY N/A 12/4/2020    Procedure: EGD (ESOPHAGOGASTRODUODENOSCOPY);  Surgeon: Ngozi Prince MD;  Location: [facility];  Service: Endoscopy;  Laterality: N/A;    ESOPHAGOGASTRODUODENOSCOPY N/A 11/19/2021    Procedure: EGD (ESOPHAGOGASTRODUODENOSCOPY);  Surgeon: Ngozi Prince MD;  Location: [facility];  Service: Endoscopy;  Laterality: N/A;    REPAIR, HERNIA, INCISIONAL OR VENTRAL, WITHOUT HISTORY OF PRIOR REPAIR N/A 12/27/2022    Procedure: REPAIR, HERNIA umbilical, INCISIONAL OR VENTRAL, WITHOUT HISTORY OF PRIOR REPAIR;  Surgeon: Trent Evans MD;  Location: [facility];  Service: General;  Laterality: N/A;  RN PREOP 12/20/2022, PT INSTRUCTED TO BRING ALL MEDS WITH HER ON AM OF SURGERY     Social History     Tobacco Use    Smoking status: Former     Current packs/day: 0.00     Average packs/day: 0.5 packs/day for 27.7 years (13.8 ttl pk-yrs)     Types:  "Cigarettes     Start date:      Quit date: 1995     Years since quittin.5    Smokeless tobacco: Never   Substance Use Topics    Alcohol use: Not Currently     Comment: occ    Drug use: No     Family History   Problem Relation Age of Onset    Kidney disease Mother     Cancer Father     Heart disease Brother     Mental illness Son     Glaucoma Neg Hx     Macular degeneration Neg Hx     Retinal detachment Neg Hx      Review of patient's allergies indicates:   Allergen Reactions    Plaquenil [hydroxychloroquine]      Having eye reaction, needs to stop plaquenil and stay off of it.        Performance Status:The patient's activity level is housebound activities.      Review of Systems:  a review of eleven systems covering constitutional, Eye, HEENT, Psych, Respiratory, Cardiac, GI, , Musculoskeletal, Endocrine, Dermatologic was negative except for pertinent findings as listed ABOVE and below:  pertinent positive as above, rest is good       Exam:Comprehensive exam done. /68 (BP Location: Right arm, Patient Position: Sitting, BP Method: Small (Automatic))   Pulse 86   Ht 5' 6" (1.676 m)   Wt 85.2 kg (187 lb 15.1 oz)   SpO2 99% Comment: on room air at rest  BMI 30.33 kg/m²   Exam included Vitals as listed, and patient's appearance and affect and alertness and mood, oral exam for yeast and hygiene and pharynx lesions and Mallapatti (M) score, neck with inspection for jvd and masses and thyroid abnormalities and lymph nodes (supraclavicular and infraclavicular nodes and axillary also examined and noted if abn), chest exam included symmetry and effort and fremitus and percussion and auscultation, cardiac exam included rhythm and gallops and murmur and rubs and jvd and edema, abdominal exam for mass and hepatosplenomegaly and tenderness and hernias and bowel sounds, Musculoskeletal exam with muscle tone and posture and mobility/gait and  strength, and skin for rashes and cyanosis and pallor and " turgor, extremity for clubbing.  Findings were normal except for pertinent findings listed below:M2, bilat rales. No  Edema nor clubbing.       Radiographs (ct chest and cxr) reviewed: view by direct vision  March 2018 ct not too bad with cysts- viewed 8/21/18  CT CHEST WITHOUT CONTRAST 03/27/2019   Severe interstitial fibrosis with peripheral honeycombing and multiple bilateral lower lobe bulla consistent with the history of scleroderma.  This is essentially unchanged from March 21, 2018.  Continued mild mediastinal adenopathy also unchanged.  No acute findings.  Small hiatal hernia.  Stable 2 cm complicated cyst of the left kidney.       Labs  noted  Lab Results   Component Value Date    WBC 5.28 03/11/2024    HGB 12.9 03/11/2024    HCT 41.9 03/11/2024    MCV 97 03/11/2024     03/11/2024        PFT results reviewed   Pulmonary Functions, including spirometry and bronchodilator response and lung volumes and diffusion, study was done May 8, 2018.  Spirometry shows mild obstruction, loss vital capacity and obstruction and no bronchodilator response.   FEV1 is 65% or 1.68 liters.  Lung volumes show  loss of TLC with restriction 66%.  Diffusion shows reduced but falls within normal range when corrected for lung volumes.   Pulmonary functions show  Mild obstruction and also restriction. Clinical correlation recommended.   Mikal Serrano M.D.    Echo 10/12/2018 Normal left and right ventricular systolic functions with LVEF >55%.    Plan:  Clinical impression is apparently straight forward and impression with management as below.     Edith was seen today for follow-up.    Diagnoses and all orders for this visit:    Scleroderma involving lung    Interstitial lung disease    On home oxygen therapy    Chronic obstructive pulmonary disease, unspecified COPD type    Pulmonary hypertension                Follow up in about 6 months (around 9/27/2024), or if symptoms worsen or fail to improve.    Discussed with patient  above for education the following:      Patient Instructions   Ct and breathing test viewed-- stable -- re check next 2025-- could do sooner if worsens.....      Rsv vaccine recommended..    In six months  - recheck to assure no new symptoms...  need ct/breathing test next yr for evaluation.              Edith Tran  Office Note    Chief Complaint   Patient presents with    Follow-up       HPI:    9/16/2021 - six min walk distance same as October 2020 at 255 m, batteries on poc run out doing errands over 2 h rs..    Not monitoring ox walking.  ues 3lpm pulse.    Uses filter on cpap to get new.   Had 3 vaccines .   On rx and bladder doing better.   Pt not needing rescue,use symbicort.        Ct in 2019, and pft 2020.     Patient Instructions   Would do ct chest and pft and six min walk prior to seeing next rheumatologist, or in 6 months.     Continue opsumit, tadalafil, symbicort, ofev,     Call if needed    Re check 6 months.    3/16/2021 concerned re covid vaccine and immujne suppression.  Breathing stable, has bladder urgency, has cpap mask problems - like f30 airfit.      Patient Instructions     Six min walk today walked 255 meters, and oxygen fell at 3 minutes to 88, and needed 3 lpm oxygen- 3/16/2021      6 min walk study was accomplished October 22, 2020. Baseline room air saturation was 98%. With ambulation O2 sat fell to 87% by 5 min. On 2 L of oxygen patient maintain sat in the low 90s subsequently. Patient walked about 133% of the reference distance   at 255 m.      6 min walk on August 21, 2018 was accomplished.  Patient's baseline O2 sat was 96% on room air.  After walking in her sats fell to 88% at 5 min.  On 2 L of oxygen sat was maintained in the mid 90s.  The patient was able to walk 390 m or 91% of the   reference value.     You may have spastic bladder.  Would recheck urine.  If sign symptoms may need to see urology?       You have and use portable oxygen concentrator. You should try to use more  and be more active.      Get vaccine,      Walk studies suggest some loss in function from 2018 to 2020 but stable from October to March now.      Lung capacity was fairly good October 2020-  Spirometry, lung volume by gas dilution, and diffusion capacity measured October 22, 2020. The FEV1 to FVC ratio was 73% indicating no airflow obstruction measured by spirometry technique. The FEV1 was 81% of predicted at 2 L. Total lung capacity on lung    volume by gas dilution was 72% of predicted and a little low. Diffusion, uncorrected for anemia, was 41% of predicted and also low.   Patient has no obstruction. There is restriction measured. Diffusion is decreased. Clinical correlation recommended           Would do a six min walk prior to return in 6 months.    9/30/2020- uses 02 out of house, had scratchy throat with am cough lately. Uses cpap nightly all night usually 4-7 hrs/night.      Sees Dr Arboleda- Dr Barnes left.    Had been seeing Dr Goddard, had pft and 6 min walk, saw Dr Goddard on 8/3/2020 - told all stable,  Cannot locate pft /walk test in care everywhere.  Usually studied yearly.  Pt feels stable otherwise.  3/27/2019 ct chest viewed with ild/honeycombing.  Follows for ofev trial at [facility].    Pt continues on ofev and cellcept and cialis and opsumit     Patient Instructions   If antibiotic needed - azithromycin daily for 3 days, last 10, simple antibiotic.     Prednisone 5 mg - may use if cough bad.    Should have result of right heart cath, and august pulm function and six min walk- will ask to obtain.  Will need to make sure can continue opsumit/cialias    Can follow six min walk every 3 months - placed standing.    You need to stay on ofev for pulmonary fibrosis, uses tadalafil and optusmit for pulmonary hypertension.  October 8, 2019- Has PFT and 6 min walk scheduled Nov 2019 by Dr. Barnes Rheumatologist at [place]. Wearing supplemental oxygen at home day/night set at 2L NC, currently on Symbicort daily,  states benefit in breathing. No recent prednisone use. SOB controlled. No cough, no chest tightness, no wheeze.   Wear cpap nightly, states benefiting greatly but want different mask, tries to read before bed, mask over nasal bridge does not allow glasses to rest correctly.  Patient Instructions   Order sent for different CPAP mask  Continue to use nightly for Obstructive sleep apnea    Continue supplemental oxygen    Will review the results of PFT and 6 min walk from [place] at next appointment.    Continue Symbicort daily.     Use Fela perles and prednisone as needed for cough.     April 3, 2019- Onset 1 week: Cough productive white in color, sore throat, post nasal drip, sinus drainage yellow, flushed cheeks, complaints improving tx with antibiotics no prednisone use. Cough worse when lying down.    Current therapy for scleroderma, pulmonary htn, and pulmonary fibrosis Opsumit/tadafanil and Ofev/cellcept. No diarrhea no complaints.  Currently using Symbicort 1 puff daily. Using supplemental oxygen at 2L [place] for Shortness of breath, states greatly beneficial, pt states able to walk further and keep up with company while on oxygen.      Nov 27, trelegy not covered- not using, had flu shot 8 am with sorenes later day and huge swollen arm with  Pain thhat night.   No cough. On opsumit/tadafanil, and on ofev/cellcept.  Stable. No diarrhea.  Sees pulm htn and  [facility].  Last 6 min walk 02  Angelo 91 slow pace.      Aug 21, uses trelegy, no c/o.  No 0x arranges- sat 90 falls to 87 at 2 mins- walked 307 meters or 71%.  Dx Crohn's.  No abx nor prednisone.  cpap good and sinus good.  Instructions:Would monitor 6 min walk every 3 months.    307 meters was last distance.  Six min walk.  Monitor ct chest yearly in March - sooner if worsens.  Needs 02 for activity.  Use medications for bronchitis.   Stay active.    July11,2018has had raynauld's for yrs, pt had severe mobility problems issues leading to dx slceroderma 2007 -  ""rock bottom".  Has had swallow sticking with  2-3 dilations with last over 3 yrs ago.  Pt has had pulm htn on opsumit and talafadil,  Pt also on ofev in a study, and cellcept.  Also low dose prednisone.  Pt dx osas and uses cpap nightly 8 hrs - had used 02 but off 02 for 3 yrs.   Pt had had 6 min walks but none recently- none last yr at least.  Pt gets bronchitis with cough and yellow mucous.  Has occ wheezes.  Had exacerbations in feb and June - typically 2-3 yrly.  Tessalon helps.  Uses combivent occ ppt headaches with some breathing benefit.     Uses flonase regular.  Was on asthma rx for yrs.        The chief compliant  problem is stable  PFSH:  Past Medical History:   Diagnosis Date    Allergy     Arthritis     Back pain     Colon polyps     COPD (chronic obstructive pulmonary disease)     Emphysema of lung     GERD (gastroesophageal reflux disease)     Hypertension     Kidney stones     Obesity     Pneumonia     Pneumonia due to other staphylococcus     Scleroderma involving lung since she was 47 y/o    Sleep apnea     Trouble in sleeping          Past Surgical History:   Procedure Laterality Date    COLONOSCOPY N/A 8/7/2018    Procedure: COLONOSCOPY;  Surgeon: Ngozi Prince MD;  Location[facility];  Service: Endoscopy;  Laterality: N/A;    COLONOSCOPY N/A 11/21/2019    Procedure: COLONOSCOPY;  Surgeon: Ngozi Prince MD;  Location: [facility];  Service: Endoscopy;  Laterality: N/A;    ESOPHAGEAL MANOMETRY WITH MEASUREMENT OF IMPEDANCE N/A 7/27/2020    Procedure: MANOMETRY, ESOPHAGUS, WITH IMPEDANCE MEASUREMENT;  Surgeon: Bhupendra Temple MD;  Location: [facility] ([address] [place] [address]);  Service: Endoscopy;  Laterality: N/A;  3/10 - pt confirmed apptCovid test ordered for 7/24 in [facility]    ESOPHAGOGASTRODUODENOSCOPY N/A 8/7/2018    Procedure: EGD (ESOPHAGOGASTRODUODENOSCOPY);  Surgeon: Ngozi Prince MD;  Location: [facility];  Service: Endoscopy;  Laterality: N/A;    ESOPHAGOGASTRODUODENOSCOPY N/A 11/21/2019    " Procedure: EGD (ESOPHAGOGASTRODUODENOSCOPY);  Surgeon: Ngozi Prince MD;  Location: [facility] END[facility];  Service: Endoscopy;  Laterality: N/A;    ESOPHAGOGASTRODUODENOSCOPY N/A 2020    Procedure: EGD (ESOPHAGOGASTRODUODENOSCOPY);  Surgeon: Ngozi Prince MD;  Location: [facility] END[facility];  Service: Endoscopy;  Laterality: N/A;    ESOPHAGOGASTRODUODENOSCOPY N/A 2020    Procedure: EGD (ESOPHAGOGASTRODUODENOSCOPY);  Surgeon: Ngozi Prince MD;  Location: [facility];  Service: Endoscopy;  Laterality: N/A;    ESOPHAGOGASTRODUODENOSCOPY N/A 2020    Procedure: EGD (ESOPHAGOGASTRODUODENOSCOPY);  Surgeon: Ngozi Prince MD;  Location: [facility];  Service: Endoscopy;  Laterality: N/A;    ESOPHAGOGASTRODUODENOSCOPY N/A 2021    Procedure: EGD (ESOPHAGOGASTRODUODENOSCOPY);  Surgeon: Ngozi Prince MD;  Location: [facility];  Service: Endoscopy;  Laterality: N/A;    REPAIR, HERNIA, INCISIONAL OR VENTRAL, WITHOUT HISTORY OF PRIOR REPAIR N/A 2022    Procedure: REPAIR, HERNIA umbilical, INCISIONAL OR VENTRAL, WITHOUT HISTORY OF PRIOR REPAIR;  Surgeon: Trent Evans MD;  Location: [facility];  Service: General;  Laterality: N/A;  RN PREOP 2022, PT INSTRUCTED TO BRING ALL MEDS WITH HER ON AM OF SURGERY     Social History     Tobacco Use    Smoking status: Former     Current packs/day: 0.00     Average packs/day: 0.5 packs/day for 27.7 years (13.8 ttl pk-yrs)     Types: Cigarettes     Start date:      Quit date: 1995     Years since quittin.5    Smokeless tobacco: Never   Substance Use Topics    Alcohol use: Not Currently     Comment: occ    Drug use: No     Family History   Problem Relation Age of Onset    Kidney disease Mother     Cancer Father     Heart disease Brother     Mental illness Son     Glaucoma Neg Hx     Macular degeneration Neg Hx     Retinal detachment Neg Hx      Review of patient's allergies indicates:   Allergen Reactions    Plaquenil [hydroxychloroquine]      Having  "eye reaction, needs to stop plaquenil and stay off of it.        Performance Status:The patient's activity level is housebound activities.      Review of Systems:  a review of eleven systems covering constitutional, Eye, HEENT, Psych, Respiratory, Cardiac, GI, , Musculoskeletal, Endocrine, Dermatologic was negative except for pertinent findings as listed ABOVE and below:  pertinent positive as above, rest is good       Exam:Comprehensive exam done. /68 (BP Location: Right arm, Patient Position: Sitting, BP Method: Small (Automatic))   Pulse 86   Ht 5' 6" (1.676 m)   Wt 85.2 kg (187 lb 15.1 oz)   SpO2 99% Comment: on room air at rest  BMI 30.33 kg/m²   Exam included Vitals as listed, and patient's appearance and affect and alertness and mood, oral exam for yeast and hygiene and pharynx lesions and Mallapatti (M) score, neck with inspection for jvd and masses and thyroid abnormalities and lymph nodes (supraclavicular and infraclavicular nodes and axillary also examined and noted if abn), chest exam included symmetry and effort and fremitus and percussion and auscultation, cardiac exam included rhythm and gallops and murmur and rubs and jvd and edema, abdominal exam for mass and hepatosplenomegaly and tenderness and hernias and bowel sounds, Musculoskeletal exam with muscle tone and posture and mobility/gait and  strength, and skin for rashes and cyanosis and pallor and turgor, extremity for clubbing.  Findings were normal except for pertinent findings listed below:M2, bilat rales. No  Edema mild clubbing.       Radiographs (ct chest and cxr) reviewed: view by direct vision  March 2018 ct not too bad with cysts- viewed 8/21/18  CT CHEST WITHOUT CONTRAST 03/27/2019   Severe interstitial fibrosis with peripheral honeycombing and multiple bilateral lower lobe bulla consistent with the history of scleroderma.  This is essentially unchanged from March 21, 2018.  Continued mild mediastinal adenopathy also " unchanged.  No acute findings.  Small hiatal hernia.  Stable 2 cm complicated cyst of the left kidney.       Labs  noted  Lab Results   Component Value Date    WBC 5.28 03/11/2024    HGB 12.9 03/11/2024    HCT 41.9 03/11/2024    MCV 97 03/11/2024     03/11/2024        PFT results reviewed     February 10, 2022-patient dropped out pulmonary functions from February 7, 2022. Forced vital capacity was 69 percent predicted.  There was no airflow obstruction.  Total lung capacity was 72 percent of predicted.  Diffusion was down to 31 percent predicted.  Patient had a 6 minute walk also.  Patient walked about 359 meters in 6 minutes.  She was 97 percent on room air.  The low O2 sat was 89 percent while walking.  \  \  Oct 27/2020 Spirometry, lung volume by gas dilution, and diffusion capacity measured October 22, 2020. The FEV1 to FVC ratio was 73% indicating no airflow obstruction measured by spirometry technique. The FEV1 was 81% of predicted at 2 L. fvc was 86% . Total lung capacity on lung    volume by gas dilution was 72% of predicted and a little low. Diffusion, uncorrected for anemia, was 41% of predicted and also low.   Patient has no obstruction. There is restriction measured. Diffusion is decreased. Clinical correlation recommended       5/8/2018 Pulmonary Functions, including spirometry and bronchodilator response and lung volumes and diffusion, study was done May 8, 2018.  Spirometry shows mild obstruction, loss vital capacity and obstruction and no bronchodilator response.   FEV1 is 65% or 1.68 liters.  Lung volumes show  loss of TLC with restriction 66%.  Diffusion shows reduced but falls within normal range when corrected for lung volumes.   Pulmonary functions show  Mild obstruction and also restriction. Clinical correlation recommended.     Mikal Serrano M.D.      [facility]  Echo 10/12/2018 Normal left and right ventricular systolic functions with LVEF  >55%.      [facility] 8/10/2022 [place]--Significant Diagnostic Studies: right heart cath  Right Heart Pressures RAP: 3 mmHg  RVP: 50/3 mmHg  Pulmonary artery pressure: 50/12 mmHg  Mean pulmonary artery pressure: 31 mmHg  Pulmonary capillary wedge pressure: 7 mmHg  Cardiac Output: 5.9 L/min  Cardiac index: 3 L/min/m2  Pulmonary vascular resistance: 4 HRU      6 minute walk study was accomplished February 17, 2022.  Patient walked about 359 meters in 6 minutes.  She was 97 percent on room air.  The low O2 sat was 89 percent while walking.    6 minute walk study was accomplished September 15, 2021. Baseline room air saturation was 98%. With ambulation O2 sat fell to 87% by 2 minutes. Patient subsequently 2 L and then 3 L of oxygen to maintain sat in the low 90s. At the end of 6 minutes   walking on oxygen at 3 liters/minute the O2 saturation was 96%. Patient walked about 60% of the reference distance of 255 m.   6 min walk study was accomplished October 22, 2020. Baseline room air saturation was 98%. With ambulation O2 sat fell to 87% by 5 min. On 2 L of oxygen patient maintain sat in the low 90s subsequently. Patient walked about 133% of the reference distance   at 255 m.   6 min walk study was accomplished November 21, 2018.  Baseline room air saturation was 98%.  Walking on room air O2 sat did fall down to 91%.  Patient did walk 317 m or 73% of the reference distance    Plan:  Clinical impression is apparently straight forward and impression with management as below.     Edith was seen today for follow-up.    Diagnoses and all orders for this visit:    Scleroderma involving lung    Interstitial lung disease    On home oxygen therapy    Chronic obstructive pulmonary disease, unspecified COPD type    Pulmonary hypertension                Follow up in about 6 months (around 9/27/2024), or if symptoms worsen or fail to improve.    Discussed with patient above for education the following:      Patient Instructions   Ct and  breathing test viewed-- stable -- re check next 2025-- could do sooner if worsens.....      Rsv vaccine recommended..    In six months  - recheck to assure no new symptoms...  need ct/breathing test next yr for evaluation.          Angely took 30   min

## 2024-03-27 NOTE — PATIENT INSTRUCTIONS
Ct and breathing test viewed-- stable -- re check next 2025-- could do sooner if worsens.....      Rsv vaccine recommended..    In six months  - recheck to assure no new symptoms...  need ct/breathing test next yr for evaluation.

## 2024-04-04 ENCOUNTER — HOSPITAL ENCOUNTER (OUTPATIENT)
Facility: HOSPITAL | Age: 69
Discharge: HOME | End: 2024-04-04
Attending: INTERNAL MEDICINE | Admitting: INTERNAL MEDICINE
Payer: MEDICARE

## 2024-04-04 VITALS
BODY MASS INDEX: 29.57 KG/M2 | RESPIRATION RATE: 16 BRPM | TEMPERATURE: 98 F | DIASTOLIC BLOOD PRESSURE: 69 MMHG | OXYGEN SATURATION: 99 % | HEIGHT: 66 IN | SYSTOLIC BLOOD PRESSURE: 119 MMHG | WEIGHT: 184 LBS | HEART RATE: 71 BPM

## 2024-04-04 DIAGNOSIS — I27.20 PULMONARY HTN: ICD-10-CM

## 2024-04-04 DIAGNOSIS — I27.20 PULMONARY HYPERTENSION: ICD-10-CM

## 2024-04-04 PROCEDURE — C1894 INTRO/SHEATH, NON-LASER: HCPCS | Performed by: INTERNAL MEDICINE

## 2024-04-04 PROCEDURE — 93451 RIGHT HEART CATH: CPT | Performed by: INTERNAL MEDICINE

## 2024-04-04 PROCEDURE — C1751 CATH, INF, PER/CENT/MIDLINE: HCPCS | Performed by: INTERNAL MEDICINE

## 2024-04-04 PROCEDURE — 25000003 PHARM REV CODE 250: Performed by: INTERNAL MEDICINE

## 2024-04-04 PROCEDURE — 93451 RIGHT HEART CATH: CPT | Mod: 26,,, | Performed by: INTERNAL MEDICINE

## 2024-04-04 RX ORDER — LIDOCAINE HYDROCHLORIDE 20 MG/ML
INJECTION, SOLUTION INFILTRATION; PERINEURAL
Status: DISCONTINUED | OUTPATIENT
Start: 2024-04-04 | End: 2024-04-04 | Stop reason: HOSPADM

## 2024-04-04 RX ORDER — SODIUM CHLORIDE 0.9 G/100ML
IRRIGANT IRRIGATION
Status: DISCONTINUED | OUTPATIENT
Start: 2024-04-04 | End: 2024-04-04 | Stop reason: HOSPADM

## 2024-04-04 NOTE — DISCHARGE INSTRUCTIONS

## 2024-04-04 NOTE — NURSING
Right neck dressing remains CDI. No bleeding or hematoma noted. Patient discharged per MD orders. Instructions given on medications, wound care, activity, signs of infection, when to call MD, and follow up appointments. Pt verbalized understanding. Patient and family used wc off unit to private vehicle

## 2024-04-04 NOTE — DISCHARGE SUMMARY
[facility] Lab  Discharge Note  Short Stay    Procedure(s) (LRB):  INSERTION, CATHETER, RIGHT HEART (Right)      OUTCOME: Patient tolerated treatment/procedure well without complication and is now ready for discharge.       Medication List        ASK your doctor about these medications      albuterol 90 mcg/actuation inhaler  Commonly known as: PROVENTIL/VENTOLIN HFA  2 puffs every 4 hours as needed for cough, wheeze, or shortness of breath     conjugated estrogens vaginal cream  Commonly known as: PREMARIN  Place a small pea-sized amount (1 g) in the vagina daily for 2 weeks. Then place a small pea-sized amount (1 g) in the vagina twice a week.     ergocalciferol 50,000 unit Cap  Commonly known as: ERGOCALCIFEROL  Take one cap a week     fluticasone propionate 50 mcg/actuation nasal spray  Commonly known as: FLONASE  1 spray (50 mcg total) by Each Nostril route once daily.     losartan 25 MG tablet  Commonly known as: COZAAR  Take 1 tablet (25 mg total) by mouth once daily.     macitentan 10 mg Tab     magnesium oxide 400 mg (241.3 mg magnesium) tablet  Commonly known as: MAG-OX  TAKE 1 TABLET(400 MG) BY MOUTH EVERY DAY     montelukast 10 mg tablet  Commonly known as: SINGULAIR  Take 1 tablet (10 mg total) by mouth every evening.     mycophenolate 500 mg Tab  Commonly known as: CELLCEPT  Take 3 tablets (1,500 mg total) by mouth 2 (two) times daily.     nintedanib 150 mg Cap  Commonly known as: OFEV     ondansetron 8 MG tablet  Commonly known as: ZOFRAN  Take 1 tablet (8 mg total) by mouth every 8 (eight) hours as needed for Nausea.     pantoprazole 40 MG tablet  Commonly known as: PROTONIX  Take 1 tablet (40 mg total) by mouth 2 (two) times daily.     tadalafil 20 mg Tab  Commonly known as: ADCIRCA     traZODone 100 MG tablet  Commonly known as: DESYREL  TAKE 1 TABLET BY MOUTH IN THE EVENING AS NEEDED     TYVASO DPI 64 mcg Ctdv  Generic drug: treprostiniL                DISPOSITION: Home or Self Care    FINAL  DIAGNOSIS:  Pulmonary HTN    FOLLOWUP: In clinic    DISCHARGE INSTRUCTIONS:      Cardiac diet    Activity as tolerated.    Puma Contreras MD

## 2024-04-04 NOTE — BRIEF OP NOTE
[facility] Lab  Brief Operative Note  Cardiology    SUMMARY     Surgery Date: 4/4/2024     Surgeon(s) and Role:     * Puma Contreras MD - Primary    Assisting Surgeon: None    Pre-op Diagnosis:  Pulmonary hypertension [I27.20]    Post-op Diagnosis: Post-Op Diagnosis Codes:     * Pulmonary hypertension [I27.20]    Procedure Performed: RHC    Description of the findings of the procedure: RHC performed via the right IJ. Patient tolerated the procedure well. Normal left and right side filling pressures (RA= 5 mm of Hg, PCWP=10 mm of Hg). Normal pulmonary pressures (PA=30/12 mm of Hg, PAean=20 mm of Hg). Normal cardiac index and output  (CI=2.7 L/min/m2, CO=5.3 L/min ).    Estimated Blood Loss: < 10 cc    Plan:   - Routine post-cath care  - Normal hemodynamics.     Puma Contreras MD

## 2024-04-04 NOTE — Clinical Note
The PA catheter was repositioned to the main pulmonary artery. Hemodynamics were performed. O2 saturation was measured at 72%. CO = 5.30  CI = 2.74

## 2024-04-04 NOTE — PLAN OF CARE
Received report from Yue brumfield RN. Patient s/p [facility], AAOx3. VSS, no c/o pain or discomfort at this time, resp even and unlabored on RA. Gauze/tegaderm dressing to right neck is CDI. No active bleeding. No hematoma noted. Post procedure protocol reviewed with patient and patient's family. Understanding verbalized. Family members at bedside. Nurse call bell within reach.

## 2024-04-04 NOTE — INTERVAL H&P NOTE
Patient seen and examined. No interval change since last H&P. Here for a RHC to assess her hemodynamics.   Access via the right IJ  7 Fr venous sheath  Risks and benefits of the procedure were explained to the patient. All questions were answered.  Consents were signed and in the chart.    Puma Contreras MD

## 2024-04-08 ENCOUNTER — LAB VISIT (OUTPATIENT)
Dept: LAB | Facility: HOSPITAL | Age: 69
End: 2024-04-08
Attending: STUDENT IN AN ORGANIZED HEALTH CARE EDUCATION/TRAINING PROGRAM
Payer: MEDICARE

## 2024-04-08 DIAGNOSIS — J84.9 INTERSTITIAL LUNG DISEASE: ICD-10-CM

## 2024-04-08 DIAGNOSIS — I27.20 PULMONARY HYPERTENSION: ICD-10-CM

## 2024-04-08 DIAGNOSIS — I73.00 RAYNAUD'S DISEASE WITHOUT GANGRENE: ICD-10-CM

## 2024-04-08 DIAGNOSIS — M34.9 LIMITED SYSTEMIC SCLEROSIS: ICD-10-CM

## 2024-04-08 LAB
ALBUMIN SERPL BCP-MCNC: 3.7 G/DL (ref 3.5–5.2)
ALP SERPL-CCNC: 60 U/L (ref 55–135)
ALT SERPL W/O P-5'-P-CCNC: 10 U/L (ref 10–44)
ANION GAP SERPL CALC-SCNC: 10 MMOL/L (ref 8–16)
AST SERPL-CCNC: 14 U/L (ref 10–40)
BASOPHILS # BLD AUTO: 0.04 K/UL (ref 0–0.2)
BASOPHILS NFR BLD: 0.8 % (ref 0–1.9)
BILIRUB SERPL-MCNC: 0.4 MG/DL (ref 0.1–1)
BUN SERPL-MCNC: 13 MG/DL (ref 8–23)
CALCIUM SERPL-MCNC: 9.5 MG/DL (ref 8.7–10.5)
CHLORIDE SERPL-SCNC: 109 MMOL/L (ref 95–110)
CO2 SERPL-SCNC: 23 MMOL/L (ref 23–29)
CREAT SERPL-MCNC: 0.7 MG/DL (ref 0.5–1.4)
CRP SERPL-MCNC: 1.1 MG/L (ref 0–8.2)
DIFFERENTIAL METHOD BLD: ABNORMAL
EOSINOPHIL # BLD AUTO: 0.2 K/UL (ref 0–0.5)
EOSINOPHIL NFR BLD: 3.8 % (ref 0–8)
ERYTHROCYTE [DISTWIDTH] IN BLOOD BY AUTOMATED COUNT: 13.2 % (ref 11.5–14.5)
ERYTHROCYTE [SEDIMENTATION RATE] IN BLOOD BY WESTERGREN METHOD: 16 MM/HR (ref 0–20)
EST. GFR  (NO RACE VARIABLE): >60 ML/MIN/1.73 M^2
GLUCOSE SERPL-MCNC: 71 MG/DL (ref 70–110)
HCT VFR BLD AUTO: 40.4 % (ref 37–48.5)
HGB BLD-MCNC: 12.7 G/DL (ref 12–16)
IMM GRANULOCYTES # BLD AUTO: 0.03 K/UL (ref 0–0.04)
IMM GRANULOCYTES NFR BLD AUTO: 0.6 % (ref 0–0.5)
LYMPHOCYTES # BLD AUTO: 0.9 K/UL (ref 1–4.8)
LYMPHOCYTES NFR BLD: 16.3 % (ref 18–48)
MCH RBC QN AUTO: 30.1 PG (ref 27–31)
MCHC RBC AUTO-ENTMCNC: 31.4 G/DL (ref 32–36)
MCV RBC AUTO: 96 FL (ref 82–98)
MONOCYTES # BLD AUTO: 0.4 K/UL (ref 0.3–1)
MONOCYTES NFR BLD: 7.1 % (ref 4–15)
NEUTROPHILS # BLD AUTO: 3.7 K/UL (ref 1.8–7.7)
NEUTROPHILS NFR BLD: 71.4 % (ref 38–73)
NRBC BLD-RTO: 0 /100 WBC
PLATELET # BLD AUTO: 158 K/UL (ref 150–450)
PMV BLD AUTO: 10.8 FL (ref 9.2–12.9)
POTASSIUM SERPL-SCNC: 3.9 MMOL/L (ref 3.5–5.1)
PROT SERPL-MCNC: 6.5 G/DL (ref 6–8.4)
RBC # BLD AUTO: 4.22 M/UL (ref 4–5.4)
SODIUM SERPL-SCNC: 142 MMOL/L (ref 136–145)
WBC # BLD AUTO: 5.2 K/UL (ref 3.9–12.7)

## 2024-04-08 PROCEDURE — 86140 C-REACTIVE PROTEIN: CPT | Performed by: STUDENT IN AN ORGANIZED HEALTH CARE EDUCATION/TRAINING PROGRAM

## 2024-04-08 PROCEDURE — 85025 COMPLETE CBC W/AUTO DIFF WBC: CPT | Performed by: STUDENT IN AN ORGANIZED HEALTH CARE EDUCATION/TRAINING PROGRAM

## 2024-04-08 PROCEDURE — 82784 ASSAY IGA/IGD/IGG/IGM EACH: CPT | Mod: 91 | Performed by: STUDENT IN AN ORGANIZED HEALTH CARE EDUCATION/TRAINING PROGRAM

## 2024-04-08 PROCEDURE — 80053 COMPREHEN METABOLIC PANEL: CPT | Performed by: STUDENT IN AN ORGANIZED HEALTH CARE EDUCATION/TRAINING PROGRAM

## 2024-04-08 PROCEDURE — 85651 RBC SED RATE NONAUTOMATED: CPT | Performed by: STUDENT IN AN ORGANIZED HEALTH CARE EDUCATION/TRAINING PROGRAM

## 2024-04-09 LAB
IGA SERPL-MCNC: 155 MG/DL (ref 61–356)
IGG SERPL-MCNC: 710 MG/DL (ref 767–1590)
IGM SERPL-MCNC: 66 MG/DL (ref 37–286)

## 2024-04-17 ENCOUNTER — OFFICE VISIT (OUTPATIENT)
Dept: GASTROENTEROLOGY | Facility: CLINIC | Age: 69
End: 2024-04-17
Payer: MEDICARE

## 2024-04-17 VITALS
DIASTOLIC BLOOD PRESSURE: 79 MMHG | BODY MASS INDEX: 31.04 KG/M2 | HEART RATE: 74 BPM | SYSTOLIC BLOOD PRESSURE: 128 MMHG | WEIGHT: 193.13 LBS | HEIGHT: 66 IN | RESPIRATION RATE: 18 BRPM

## 2024-04-17 DIAGNOSIS — R13.10 DYSPHAGIA, UNSPECIFIED TYPE: Primary | ICD-10-CM

## 2024-04-17 DIAGNOSIS — K30 DELAYED GASTRIC EMPTYING: ICD-10-CM

## 2024-04-17 PROCEDURE — 99999 PR PBB SHADOW E&M-EST. PATIENT-LVL III: CPT | Mod: PBBFAC,,, | Performed by: INTERNAL MEDICINE

## 2024-04-17 PROCEDURE — 3008F BODY MASS INDEX DOCD: CPT | Mod: CPTII,S$GLB,, | Performed by: INTERNAL MEDICINE

## 2024-04-17 PROCEDURE — 99214 OFFICE O/P EST MOD 30 MIN: CPT | Mod: S$GLB,,, | Performed by: INTERNAL MEDICINE

## 2024-04-17 PROCEDURE — 3078F DIAST BP <80 MM HG: CPT | Mod: CPTII,S$GLB,, | Performed by: INTERNAL MEDICINE

## 2024-04-17 PROCEDURE — 3074F SYST BP LT 130 MM HG: CPT | Mod: CPTII,S$GLB,, | Performed by: INTERNAL MEDICINE

## 2024-04-17 PROCEDURE — 1126F AMNT PAIN NOTED NONE PRSNT: CPT | Mod: CPTII,S$GLB,, | Performed by: INTERNAL MEDICINE

## 2024-04-17 PROCEDURE — 4010F ACE/ARB THERAPY RXD/TAKEN: CPT | Mod: CPTII,S$GLB,, | Performed by: INTERNAL MEDICINE

## 2024-04-17 NOTE — PROGRESS NOTES
"[facility] Note    CC: Dysphagia, GERD, Scleroderma Esophagus     HPI 69 y.o. female with history of scleroderma associated with pulmonary fibrosis and pulmonary HTN on Cellcept, Nintedanib, and Tadalafil presents for follow up of chronic, mild, dysphagia primarily for solids as well as chronic GERD, both due to scleroderma esophagus. A BID PPI controls her GERD though she admits to intermittent nausea. She has a history of esophageal leukoplakiaand her last EGD with esophageal biopsies was in 2021. She also has mild TI Crohn's and denies abdominal pain or diarrhea. She is not on specific therapy for Crohn's due to other immunosuppressant agents. Her last colonoscopy was in 2019 was notable for few small TI ulcers (normal pathology) as well as normal colonic mucosa (pathology without chronic or active inflammation). Overall she is feels well.     Past Medical History:   Diagnosis Date    Allergy     Arthritis     Back pain     Colon polyps     COPD (chronic obstructive pulmonary disease)     Emphysema of lung     GERD (gastroesophageal reflux disease)     Hypertension     Kidney stones     Obesity     Pneumonia     Pneumonia due to other staphylococcus     Pulmonary fibrosis     Pulmonary hypertension     Scleroderma involving lung since she was 49 y/o    Sleep apnea     Trouble in sleeping        Allergies and Medications reviewed     Review of Systems  General ROS: negative for - chills, fever or weight loss  Cardiovascular ROS: no chest pain or dyspnea on exertion  Gastrointestinal ROS: + GERD, + chronic dysphagia, no abdominal pain    Physical Examination  /79 (BP Location: Left arm, Patient Position: Sitting)   Pulse 74   Resp 18   Ht 5' 6" (1.676 m)   Wt 87.6 kg (193 lb 2 oz)   BMI 31.17 kg/m²   General appearance: alert, cooperative, no distress  HENT: Normocephalic, atraumatic, neck symmetrical, no nasal discharge, sclera anicteric   Lungs: clear to auscultation bilaterally, " symmetric chest wall expansion bilaterally  Heart: regular rate and rhythm without rub; no displacement of the PMI   Abdomen: soft, nontender,nondistended, BS active, no masses appreciated  Extremities: extremities symmetric; no clubbing, cyanosis, or edema        Labs:  Lab Results   Component Value Date    WBC 5.20 04/08/2024    HGB 12.7 04/08/2024    HCT 40.4 04/08/2024    MCV 96 04/08/2024     04/08/2024       CMP  Sodium   Date Value Ref Range Status   04/08/2024 142 136 - 145 mmol/L Final     Potassium   Date Value Ref Range Status   04/08/2024 3.9 3.5 - 5.1 mmol/L Final     Chloride   Date Value Ref Range Status   04/08/2024 109 95 - 110 mmol/L Final     CO2   Date Value Ref Range Status   04/08/2024 23 23 - 29 mmol/L Final     Glucose   Date Value Ref Range Status   04/08/2024 71 70 - 110 mg/dL Final     BUN   Date Value Ref Range Status   04/08/2024 13 8 - 23 mg/dL Final     Creatinine   Date Value Ref Range Status   04/08/2024 0.7 0.5 - 1.4 mg/dL Final     Calcium   Date Value Ref Range Status   04/08/2024 9.5 8.7 - 10.5 mg/dL Final     Total Protein   Date Value Ref Range Status   04/08/2024 6.5 6.0 - 8.4 g/dL Final     Albumin   Date Value Ref Range Status   04/08/2024 3.7 3.5 - 5.2 g/dL Final     Total Bilirubin   Date Value Ref Range Status   04/08/2024 0.4 0.1 - 1.0 mg/dL Final     Comment:     For infants and newborns, interpretation of results should be based  on gestational age, weight and in agreement with clinical  observations.    Premature Infant recommended reference ranges:  Up to 24 hours.............<8.0 mg/dL  Up to 48 hours............<12.0 mg/dL  3-5 days..................<15.0 mg/dL  6-29 days.................<15.0 mg/dL       Alkaline Phosphatase   Date Value Ref Range Status   04/08/2024 60 55 - 135 U/L Final     AST   Date Value Ref Range Status   04/08/2024 14 10 - 40 U/L Final     ALT   Date Value Ref Range Status   04/08/2024 10 10 - 44 U/L Final     Anion Gap   Date Value  Ref Range Status   04/08/2024 10 8 - 16 mmol/L Final     eGFR   Date Value Ref Range Status   04/08/2024 >60 >60 mL/min/1.73 m^2 Final         Assessment:   69 y.o. female with history of scleroderma associated with pulmonary fibrosis and pulmonary HTN on Cellcept, Nintedanib, and Tadalafil, presents for follow up of mild TI Crohn's disease (not on therapy due to multiple immunosuppressants) as well as well managed GERD and dysphagia due to scleroderma esophagus. She has a history of esophageal leukoplakia.     Plan:  -Schedule EGD for surveillance of esophageal leukoplakia  -Continue daily PPI  -Zofran PRN      Ngozi Prince MD  [facility]  [address], [address]  [place] [address]  Office: [phone]  Fax: [phone]

## 2024-04-18 ENCOUNTER — PATIENT OUTREACH (OUTPATIENT)
Dept: ADMINISTRATIVE | Facility: HOSPITAL | Age: 69
End: 2024-04-18
Payer: MEDICARE

## 2024-04-18 ENCOUNTER — PATIENT MESSAGE (OUTPATIENT)
Dept: ADMINISTRATIVE | Facility: HOSPITAL | Age: 69
End: 2024-04-18
Payer: MEDICARE

## 2024-04-18 DIAGNOSIS — Z78.0 MENOPAUSE: ICD-10-CM

## 2024-04-18 NOTE — PROGRESS NOTES
Population Health Chart Review & Patient Outreach Details      Additional [facility] Health Notes:               Updates Requested / Reviewed:        Health Maintenance Topics Overdue:      VBHM Score: 1     Osteoporosis Screening    Influenza Vaccine  RSV Vaccine                  Health Maintenance Topic(s) Outreach Outcomes & Actions Taken:    Osteoporosis Screening - Outreach Outcomes & Actions Taken  : Dexa Order Placed

## 2024-04-18 NOTE — PROGRESS NOTES
Subjective:      Patient ID: Edith Tran is a 69 y.o. female.    Chief Complaint: Disease Management    HPI    Rheumatologic History:      - Diagnosis/es:              - limited systemic sclerosis diagnosed in  by Dr Barnes and characterized by SHILPI 1:1280 nucleolar, inflammatory arthritis, esophageal dysmotility, GERD, Raynaud's, interstitial lung disease, and pulmonary hypertension              - Crohn's disease diagnosed around 2019, not active  - Social History: Former smoker, denies alcohol intake  - Family History: No autoimmune conditions  - Gyn History: , 3 intentional abortions  - Positive serologies: SHILPI 1:1280 nucleolar  - Negative serologies: Scl 70, RNAP III, Th/To, myomarker panel,PM/Scl, Sm/RNP, dsDNA, RF  - Infectious screening labs:  Negative hepatitis-B, C, and QuantiFERON (2023)  - TPMT normal metabolizer  - Imaging:              - EGD (2020): Grade A esophagitis and mild Schatzki ring - dilation was performed.  Small hiatial hernia.              - TTE (2023) LVEF >55%, PASP and CVP WNL              - DEXA (3/2021) normal              - PFT (10/2023) FVC 2.9L <- 2.67L <- 2.76L, DLCO 33.4% <- 39.6% <- 40.7%              - CT chest (2023) ascending aorta measures 4.2 x 4.5 cm in maximum diameter. There are extensive subpleural reticulations and honeycombing throughout the upper and lower lobes compatible with idiopathic pulmonary fibrosis. There is bronchiectasis. There are no confluent infiltrates or pleural effusions. There are mildly prominent mediastinal lymph nodes most likely reactive. The esophagus is normal  The visualized portion of the upper abdomen demonstrates a 15 mm hyperdense mass arising from the upper pole the left kidney. Follow-up ultrasound is recommended to determine if this represents hemorrhagic cyst or solid mass adrenal glands are normal. There are degenerative changes of the spine. The musculature is normal.   - RHC (2024) normal pulmonary  "pressures  - Previous Treatments:              - HCQ 200mg BID: caused "eye problems"              - MTX              - Reglan  - Current Treatments:               - MMF 1500mg BID              - Tadalafil (Adcirca) 40mg daily  - Opsumit (macitentan)  - Tyvaso (treprostinil)              - Ofev              - Protonix 40mg BID  Interval History:   At last visit, she had noticed worse Raynaud's and rare sticking in her throat when she eats certain types of food, but denied shortness of breath, GERD, joint pain/swelling, and worsening skin changes. She had an RHC which showed normal pulmonary pressures. Currently, she reports burning in her toes, occasional blue fingers, stable shortness of breath, and food sticking in her throat. She is scheduled for an EGD. I spoke with Dr Amrita Neal who had recommended trying rituximab due to worsening shortness of breath and progression on CT chest.    Objective:   /75   Pulse 93   Ht 5' 6" (1.676 m)   Wt 87 kg (191 lb 12.8 oz)   BMI 30.96 kg/m²   Physical Exam   Constitutional: normal appearance. No distress.   HENT:   Head: Normocephalic and atraumatic.   Cardiovascular: Normal rate, regular rhythm and normal heart sounds.   Pulmonary/Chest: Effort normal. She has rales (Bibasilar).   Musculoskeletal:      Comments: Synovial hypertrophy of bilateral 2nd and 3rd MCPs   Neurological: She is alert.   Skin: Skin is warm and dry. No rash noted.   + Minimal telangiectasias  + Sclerodactyly  + Abnormal nailfold capillaries  + Cold fingers  No digital pitting       No data to display     Labs independently reviewed by me (4/8/24)  CBC WBC, HGB, PLT WNL   CMP WNL  ESR CRP WNL      Assessment:     1. Limited systemic sclerosis    2. Raynaud's disease without gangrene    3. Interstitial lung disease    4. Pulmonary hypertension    5. High risk medication use    6. Drug-induced immunodeficiency    7. Gastroesophageal reflux disease, unspecified whether esophagitis " present    8. Delayed gastric emptying      This is a 69-year-old woman with history of AAA, insomnia, ELAINE on CPAP, esophageal stricture, diverticulosis, B12 deficiency, esophageal leukoplakia, chronic back pain, and limited systemic sclerosis diagnosed in 2009 by Dr Barnes and characterized by inflammatory arthritis, telangiectasias, esophageal dysmotility, GERD, Raynaud's, abnormal nailfold capillaries interstitial lung disease, and pulmonary hypertension on MMF 1500 mg b.i.d., Adcirca 40 mg daily, Opsumit, Tyvaso, Ofev, and Protonix 40 mg b.i.d.    Currently, she reports burning in her toes, occasional blue fingers, stable shortness of breath, and food sticking in her throat. She is scheduled for an EGD. I spoke with Dr Amrita Neal who had recommended trying rituximab due to worsening shortness of breath and progression on CT chest.  She had an RHC which showed normal pulmonary pressures. I discussed trial of rituximab with the patient but she is hesitant due to the potential risks of the medication.        Plan:     Problem List Items Addressed This Visit          Pulmonary    Interstitial lung disease    Relevant Orders    C-Reactive Protein    CBC Auto Differential    Creatinine, Serum    ALT (SGPT)    AST (SGOT)    Sedimentation rate    Urinalysis    Protein/Creatinine Ratio, Urine       Cardiac/Vascular    Raynaud's disease    Relevant Orders    C-Reactive Protein    CBC Auto Differential    Creatinine, Serum    ALT (SGPT)    AST (SGOT)    Sedimentation rate    Urinalysis    Protein/Creatinine Ratio, Urine    Pulmonary hypertension    Relevant Orders    C-Reactive Protein    CBC Auto Differential    Creatinine, Serum    ALT (SGPT)    AST (SGOT)    Sedimentation rate    Urinalysis    Protein/Creatinine Ratio, Urine       Immunology/Multi System    Immunosuppression       GI    GERD (gastroesophageal reflux disease)       Palliative Care    High risk medication use    Relevant Orders    C-Reactive Protein     CBC Auto Differential    Creatinine, Serum    ALT (SGPT)    AST (SGOT)    Sedimentation rate    Urinalysis    Protein/Creatinine Ratio, Urine     Other Visit Diagnoses       Limited systemic sclerosis    -  Primary    Relevant Orders    C-Reactive Protein    CBC Auto Differential    Creatinine, Serum    ALT (SGPT)    AST (SGOT)    Sedimentation rate    Urinalysis    Protein/Creatinine Ratio, Urine    Delayed gastric emptying              1.) Scleroderma   - I have not put the order in for rituximab yet as I would like to discuss with pulmonology first.  However I did discuss potential adverse effects of rituximab including hypotension, infusion reactions, infection, blood count, liver and kidney function abnormalities.  ACR patient information on rituximab was provided.  - MMF 1500mg BID  - Tadalafil (Adcirca) 40mg daily  - Opsumit (macitentan)  - Tyvaso (treprostinil)  - Ofev  - Protonix 40mg BID    2.) Drug induce immunodeficiency  3.) High risk medication use  - CBC, CMP, ESR, CRP every 12 weeks  - Pre-DMARD labs due 9/2024  - Immunizations: COVID x 4 (most recent 1/2023), flu (9/2022), PCV13 (8/2019), PPV23 (11/2019), Shingrix x 2 (2020); patient needs PCV 20  - Obtain DEXA at follow up visit    Follow up in 3 months    30 minutes of total time spent on the encounter, which includes face to face time and non-face to face time preparing to see the patient (eg, review of tests), Obtaining and/or reviewing separately obtained history, Documenting clinical information in the electronic or other health record, Independently interpreting results (not separately reported) and communicating results to the patient/family/caregiver, or Care coordination (not separately reported).     This note was prepared with [employer] Direct voice recognition transcription software. Garbled syntax, mangled pronouns, and other bizarre constructions may be attributed to that software system       Nieves Blanchard  M.D.  Rheumatology Dept  [place], [place]     English

## 2024-04-19 ENCOUNTER — OFFICE VISIT (OUTPATIENT)
Dept: RHEUMATOLOGY | Facility: CLINIC | Age: 69
End: 2024-04-19
Payer: MEDICARE

## 2024-04-19 VITALS
DIASTOLIC BLOOD PRESSURE: 75 MMHG | BODY MASS INDEX: 30.82 KG/M2 | HEART RATE: 93 BPM | SYSTOLIC BLOOD PRESSURE: 117 MMHG | WEIGHT: 191.81 LBS | HEIGHT: 66 IN

## 2024-04-19 DIAGNOSIS — D84.821 DRUG-INDUCED IMMUNODEFICIENCY: ICD-10-CM

## 2024-04-19 DIAGNOSIS — J84.9 INTERSTITIAL LUNG DISEASE: ICD-10-CM

## 2024-04-19 DIAGNOSIS — Z79.899 DRUG-INDUCED IMMUNODEFICIENCY: ICD-10-CM

## 2024-04-19 DIAGNOSIS — K30 DELAYED GASTRIC EMPTYING: ICD-10-CM

## 2024-04-19 DIAGNOSIS — K21.9 GASTROESOPHAGEAL REFLUX DISEASE, UNSPECIFIED WHETHER ESOPHAGITIS PRESENT: ICD-10-CM

## 2024-04-19 DIAGNOSIS — I27.20 PULMONARY HYPERTENSION: ICD-10-CM

## 2024-04-19 DIAGNOSIS — I73.00 RAYNAUD'S DISEASE WITHOUT GANGRENE: ICD-10-CM

## 2024-04-19 DIAGNOSIS — M34.9 LIMITED SYSTEMIC SCLEROSIS: Primary | ICD-10-CM

## 2024-04-19 DIAGNOSIS — Z79.899 HIGH RISK MEDICATION USE: ICD-10-CM

## 2024-04-19 PROCEDURE — 4010F ACE/ARB THERAPY RXD/TAKEN: CPT | Mod: CPTII,S$GLB,, | Performed by: STUDENT IN AN ORGANIZED HEALTH CARE EDUCATION/TRAINING PROGRAM

## 2024-04-19 PROCEDURE — 1160F RVW MEDS BY RX/DR IN RCRD: CPT | Mod: CPTII,S$GLB,, | Performed by: STUDENT IN AN ORGANIZED HEALTH CARE EDUCATION/TRAINING PROGRAM

## 2024-04-19 PROCEDURE — 1101F PT FALLS ASSESS-DOCD LE1/YR: CPT | Mod: CPTII,S$GLB,, | Performed by: STUDENT IN AN ORGANIZED HEALTH CARE EDUCATION/TRAINING PROGRAM

## 2024-04-19 PROCEDURE — 1159F MED LIST DOCD IN RCRD: CPT | Mod: CPTII,S$GLB,, | Performed by: STUDENT IN AN ORGANIZED HEALTH CARE EDUCATION/TRAINING PROGRAM

## 2024-04-19 PROCEDURE — 1126F AMNT PAIN NOTED NONE PRSNT: CPT | Mod: CPTII,S$GLB,, | Performed by: STUDENT IN AN ORGANIZED HEALTH CARE EDUCATION/TRAINING PROGRAM

## 2024-04-19 PROCEDURE — 99999 PR PBB SHADOW E&M-EST. PATIENT-LVL IV: CPT | Mod: PBBFAC,,, | Performed by: STUDENT IN AN ORGANIZED HEALTH CARE EDUCATION/TRAINING PROGRAM

## 2024-04-19 PROCEDURE — 3074F SYST BP LT 130 MM HG: CPT | Mod: CPTII,S$GLB,, | Performed by: STUDENT IN AN ORGANIZED HEALTH CARE EDUCATION/TRAINING PROGRAM

## 2024-04-19 PROCEDURE — 3288F FALL RISK ASSESSMENT DOCD: CPT | Mod: CPTII,S$GLB,, | Performed by: STUDENT IN AN ORGANIZED HEALTH CARE EDUCATION/TRAINING PROGRAM

## 2024-04-19 PROCEDURE — 3078F DIAST BP <80 MM HG: CPT | Mod: CPTII,S$GLB,, | Performed by: STUDENT IN AN ORGANIZED HEALTH CARE EDUCATION/TRAINING PROGRAM

## 2024-04-19 PROCEDURE — 3008F BODY MASS INDEX DOCD: CPT | Mod: CPTII,S$GLB,, | Performed by: STUDENT IN AN ORGANIZED HEALTH CARE EDUCATION/TRAINING PROGRAM

## 2024-04-19 PROCEDURE — 99215 OFFICE O/P EST HI 40 MIN: CPT | Mod: S$GLB,,, | Performed by: STUDENT IN AN ORGANIZED HEALTH CARE EDUCATION/TRAINING PROGRAM

## 2024-04-19 ASSESSMENT — ROUTINE ASSESSMENT OF PATIENT INDEX DATA (RAPID3)
TOTAL RAPID3 SCORE: 2.5
PAIN SCORE: 3
FATIGUE SCORE: 0
PSYCHOLOGICAL DISTRESS SCORE: 0
PATIENT GLOBAL ASSESSMENT SCORE: 1.5
MDHAQ FUNCTION SCORE: 0.9

## 2024-04-24 ENCOUNTER — HOSPITAL ENCOUNTER (OUTPATIENT)
Dept: RADIOLOGY | Facility: CLINIC | Age: 69
Discharge: HOME | End: 2024-04-24
Attending: FAMILY MEDICINE
Payer: MEDICARE

## 2024-04-24 DIAGNOSIS — Z78.0 MENOPAUSE: ICD-10-CM

## 2024-04-24 PROCEDURE — 77080 DXA BONE DENSITY AXIAL: CPT | Mod: TC,PO

## 2024-04-24 PROCEDURE — 77080 DXA BONE DENSITY AXIAL: CPT | Mod: 26,,, | Performed by: RADIOLOGY

## 2024-04-25 RX ORDER — ONDANSETRON HYDROCHLORIDE 8 MG/1
8 TABLET, FILM COATED ORAL EVERY 8 HOURS PRN
Qty: 30 TABLET | Refills: 3 | Status: SHIPPED | OUTPATIENT
Start: 2024-04-25 | End: 2024-06-11

## 2024-04-30 DIAGNOSIS — R06.02 SHORTNESS OF BREATH: Primary | ICD-10-CM

## 2024-05-08 ENCOUNTER — OFFICE VISIT (OUTPATIENT)
Dept: OPTOMETRY | Facility: CLINIC | Age: 69
End: 2024-05-08
Payer: COMMERCIAL

## 2024-05-08 DIAGNOSIS — H25.13 NUCLEAR SCLEROSIS, BILATERAL: ICD-10-CM

## 2024-05-08 DIAGNOSIS — H04.123 DRY EYE SYNDROME, BILATERAL: ICD-10-CM

## 2024-05-08 DIAGNOSIS — Z01.00 EXAMINATION OF EYES AND VISION: Primary | ICD-10-CM

## 2024-05-08 DIAGNOSIS — H26.9 CORTICAL CATARACT: ICD-10-CM

## 2024-05-08 DIAGNOSIS — H35.361 MACULAR DRUSEN, RIGHT: ICD-10-CM

## 2024-05-08 DIAGNOSIS — H52.7 REFRACTIVE ERROR: ICD-10-CM

## 2024-05-08 PROCEDURE — 92014 COMPRE OPH EXAM EST PT 1/>: CPT | Mod: S$GLB,,, | Performed by: OPTOMETRIST

## 2024-05-08 PROCEDURE — 99999 PR PBB SHADOW E&M-EST. PATIENT-LVL III: CPT | Mod: PBBFAC,,, | Performed by: OPTOMETRIST

## 2024-05-08 PROCEDURE — 92015 DETERMINE REFRACTIVE STATE: CPT | Mod: S$GLB,,, | Performed by: OPTOMETRIST

## 2024-05-08 NOTE — PROGRESS NOTES
HPI     Annual Exam     Additional comments: LDE: 05/05/2022           Comments    70 YO female presents today for an annual eye exam. Patient notes trouble   with blurred vision distance and near. Wears glasses all the time. Notes   dry eye, used drops in the past recently fell out of habit.     Vision has been gradually getting blurry over past 2 years          Last edited by Alfie Erwin, OD on 5/8/2024  8:39 AM.            Assessment /Plan     For exam results, see Encounter Report.    Examination of eyes and vision    Nuclear sclerosis, bilateral    Cortical cataract  -     Ambulatory referral/consult to Ophthalmology; Future; Expected date: 06/08/2024    Refractive error      1. Examination of eyes and vision    2. Nuclear sclerosis, bilateral  3. Cortical cataract  Mild to moderate, OD > OS  OD visually significant, myopic shift BCVA 20/40- with refraction  Discussed options, set up cataract eval with Dr. Desir    - Ambulatory referral/consult to Ophthalmology; Future    4. Refractive error  Dispensed updated spectacle Rx. Discussed various spectacle lens options. Discussed adaptation period to new specs.   Demonstrated new spec Rx vs current specs in phoropter with patient satisfaction    5. Retinal drusen   Few, non central druse -- not VS  Recommend sunglasses, eat fish/green leafy vegetables  Denies smoking  Observe     6. MELISA OU  Discussed ocular affects of dry eyes. Recommend OTC artificial tears 2-4 times a day in both eyes. Discussed chronicity of MELISA. RTC if symptoms not alleviated by continued use of artificial tears.

## 2024-05-17 ENCOUNTER — ANESTHESIA (OUTPATIENT)
Dept: ENDOSCOPY | Facility: HOSPITAL | Age: 69
End: 2024-05-17
Payer: MEDICARE

## 2024-05-17 ENCOUNTER — HOSPITAL ENCOUNTER (OUTPATIENT)
Facility: HOSPITAL | Age: 69
Discharge: HOME | End: 2024-05-17
Attending: INTERNAL MEDICINE | Admitting: INTERNAL MEDICINE
Payer: MEDICARE

## 2024-05-17 ENCOUNTER — ANESTHESIA EVENT (OUTPATIENT)
Dept: ENDOSCOPY | Facility: HOSPITAL | Age: 69
End: 2024-05-17
Payer: MEDICARE

## 2024-05-17 DIAGNOSIS — R13.10 DYSPHAGIA: ICD-10-CM

## 2024-05-17 PROCEDURE — D9220A PRA ANESTHESIA: Mod: CRNA,,, | Performed by: NURSE ANESTHETIST, CERTIFIED REGISTERED

## 2024-05-17 PROCEDURE — 88305 TISSUE EXAM BY PATHOLOGIST: CPT | Mod: TC | Performed by: PATHOLOGY

## 2024-05-17 PROCEDURE — 43239 EGD BIOPSY SINGLE/MULTIPLE: CPT | Mod: 59,,, | Performed by: INTERNAL MEDICINE

## 2024-05-17 PROCEDURE — C1726 CATH, BAL DIL, NON-VASCULAR: HCPCS | Performed by: INTERNAL MEDICINE

## 2024-05-17 PROCEDURE — D9220A PRA ANESTHESIA: Mod: ANES,,, | Performed by: ANESTHESIOLOGY

## 2024-05-17 PROCEDURE — 25000003 PHARM REV CODE 250: Performed by: INTERNAL MEDICINE

## 2024-05-17 PROCEDURE — 43239 EGD BIOPSY SINGLE/MULTIPLE: CPT | Mod: 59 | Performed by: INTERNAL MEDICINE

## 2024-05-17 PROCEDURE — 27201012 HC FORCEPS, HOT/COLD, DISP: Performed by: INTERNAL MEDICINE

## 2024-05-17 PROCEDURE — 25000003 PHARM REV CODE 250: Performed by: NURSE ANESTHETIST, CERTIFIED REGISTERED

## 2024-05-17 PROCEDURE — 37000008 HC ANESTHESIA 1ST 15 MINUTES: Performed by: INTERNAL MEDICINE

## 2024-05-17 PROCEDURE — 63600175 PHARM REV CODE 636 W HCPCS: Performed by: NURSE ANESTHETIST, CERTIFIED REGISTERED

## 2024-05-17 PROCEDURE — 43249 ESOPH EGD DILATION <30 MM: CPT | Mod: ,,, | Performed by: INTERNAL MEDICINE

## 2024-05-17 PROCEDURE — 43249 ESOPH EGD DILATION <30 MM: CPT | Performed by: INTERNAL MEDICINE

## 2024-05-17 RX ORDER — PROPOFOL 10 MG/ML
VIAL (ML) INTRAVENOUS
Status: DISCONTINUED | OUTPATIENT
Start: 2024-05-17 | End: 2024-05-17

## 2024-05-17 RX ORDER — SODIUM CHLORIDE 9 MG/ML
INJECTION, SOLUTION INTRAVENOUS CONTINUOUS
Status: DISCONTINUED | OUTPATIENT
Start: 2024-05-17 | End: 2024-05-17 | Stop reason: HOSPADM

## 2024-05-17 RX ORDER — LIDOCAINE HYDROCHLORIDE 20 MG/ML
INJECTION INTRAVENOUS
Status: DISCONTINUED | OUTPATIENT
Start: 2024-05-17 | End: 2024-05-17

## 2024-05-17 RX ADMIN — PROPOFOL 50 MG: 10 INJECTION, EMULSION INTRAVENOUS at 08:05

## 2024-05-17 RX ADMIN — SODIUM CHLORIDE: 9 INJECTION, SOLUTION INTRAVENOUS at 08:05

## 2024-05-17 RX ADMIN — PROPOFOL 100 MG: 10 INJECTION, EMULSION INTRAVENOUS at 08:05

## 2024-05-17 RX ADMIN — PROPOFOL 20 MG: 10 INJECTION, EMULSION INTRAVENOUS at 08:05

## 2024-05-17 RX ADMIN — LIDOCAINE HYDROCHLORIDE 100 MG: 20 INJECTION, SOLUTION INTRAVENOUS at 08:05

## 2024-05-17 NOTE — ANESTHESIA POSTPROCEDURE EVALUATION
Anesthesia Post Evaluation    Patient: Edith Tran    Procedure(s) Performed: Procedure(s) (LRB):  EGD (ESOPHAGOGASTRODUODENOSCOPY) (N/A)    Final Anesthesia Type: general      Patient location during evaluation: PACU  Patient participation: Yes- Able to Participate  Level of consciousness: awake and alert  Post-procedure vital signs: reviewed and stable  Pain management: adequate  Airway patency: patent    PONV status at discharge: No PONV  Anesthetic complications: no      Cardiovascular status: blood pressure returned to baseline  Respiratory status: unassisted  Hydration status: euvolemic  Follow-up not needed.              Vitals Value Taken Time   /90 05/17/24 1032   Temp  05/17/24 1100   Pulse 74 05/17/24 1037   Resp 18 05/17/24 0930   SpO2 99 % 05/17/24 1037   Vitals shown include unfiled device data.      No case tracking events are documented in the log.      Pain/Kranthi Score: No data recorded

## 2024-05-17 NOTE — ANESTHESIA PREPROCEDURE EVALUATION
05/17/2024  Edith Tran is a 69 y.o., female.      Pre-op Assessment    I have reviewed the Patient Summary Reports.     I have reviewed the Nursing Notes. I have reviewed the NPO Status.   I have reviewed the Medications.     Review of Systems  Anesthesia Hx:  No problems with previous Anesthesia                Social:  Former Smoker       Cardiovascular:     Hypertension            CASH         Shortness of Breath Dyspnea On Extertion                   Hypertension         Pulmonary:  Pneumonia COPD   Shortness of breath  Sleep Apnea    Chronic Obstructive Pulmonary Disease (COPD):           Obstructive Sleep Apnea (ELAINE).       Pulmonary Infection:  Pneumonia.     Renal/:  Chronic Renal Disease        Kidney Function/Disease             Hepatic/GI:   PUD, Hiatal Hernia, GERD      Gerd, Peptic Ulcer Disease    Hernia, Hiatal Hernia      Neurological:    Neuromuscular Disease,                                 Neuromuscular Disease   Endocrine:        Obesity / BMI > 30      Physical Exam  General: Well nourished    Airway:  Mallampati: II   Mouth Opening: Normal  TM Distance: Normal  Neck ROM: Normal ROM        Anesthesia Plan  Type of Anesthesia, risks & benefits discussed:    Anesthesia Type: Gen Natural Airway  Intra-op Monitoring Plan: Standard ASA Monitors  Induction:  IV  Informed Consent: Informed consent signed with the Patient and all parties understand the risks and agree with anesthesia plan.  All questions answered.   ASA Score: 3    Ready For Surgery From Anesthesia Perspective.     .

## 2024-05-17 NOTE — PROVATION PATIENT INSTRUCTIONS
Discharge Summary/Instructions after an Endoscopic Procedure  Patient Name: Edith Tran  Patient MRN: [id number]  Patient YOB: 1955  Friday, May 17, 2024  Ngozi Prince MD  Dear patient,  As a result of recent federal legislation (The Federal Cures Act), you may   receive lab or pathology results from your procedure in your [facility]   account before your physician is able to contact you. Your physician or   their representative will relay the results to you with their   recommendations at their soonest availability.  Thank you,  RESTRICTIONS:  During your procedure today, you received medications for sedation.  These   medications may affect your judgment, balance and coordination.  Therefore,   for 24 hours, you have the following restrictions:   - DO NOT drive a car, operate machinery, make legal/financial decisions,   sign important papers or drink alcohol.    ACTIVITY:  Today: no heavy lifting, straining or running due to procedural   sedation/anesthesia.  The following day: return to full activity including work.  DIET:  Eat and drink normally unless instructed otherwise.     TREATMENT FOR COMMON SIDE EFFECTS:  - Mild abdominal pain, nausea, belching, bloating or excessive gas:  rest,   eat lightly and use a heating pad.  - Sore Throat: treat with throat lozenges and/or gargle with warm salt   water.  - Because air was used during the procedure, expelling large amounts of air   from your rectum or belching is normal.  - If a bowel prep was taken, you may not have a bowel movement for 1-3 days.    This is normal.  SYMPTOMS TO WATCH FOR AND REPORT TO YOUR PHYSICIAN:  1. Abdominal pain or bloating, other than gas cramps.  2. Chest pain.  3. Back pain.  4. Signs of infection such as: chills or fever occurring within 24 hours   after the procedure.  5. Rectal bleeding, which would show as bright red, maroon, or black stools.   (A tablespoon of blood from the rectum is not serious,  especially if   hemorrhoids are present.)  6. Vomiting.  7. Weakness or dizziness.  GO DIRECTLY TO THE NEAREST EMERGENCY ROOM IF YOU HAVE ANY OF THE FOLLOWING:      Difficulty breathing              Chills and/or fever over 101 F   Persistent vomiting and/or vomiting blood   Severe abdominal pain   Severe chest pain   Black, tarry stools   Bleeding- more than one tablespoon   Any other symptom or condition that you feel may need urgent attention  Your doctor recommends these additional instructions:  If any biopsies were taken, your doctors clinic will contact you in 1 to 2   weeks with any results.  - Await pathology results.   - Discharge patient to home (with escort).   - Patient has a contact number available for emergencies.  The signs and   symptoms of potential delayed complications were discussed with the   patient.  Return to normal activities tomorrow.  Written discharge   instructions were provided to the patient.   - Resume previous diet.   - Continue present medications.  For questions, problems or results please call your physician - Ngozi Prince MD at Work:  [phone].  [facility] PHONE NUMBER: [phone]  IF A COMPLICATION OR EMERGENCY SITUATION ARISES AND YOU ARE UNABLE TO REACH   YOUR PHYSICIAN - GO DIRECTLY TO THE EMERGENCY ROOM.  Ngozi Prince MD  5/17/2024 9:06:56 AM  This report has been verified and signed electronically.  Dear patient,  As a result of recent federal legislation (The Federal Cures Act), you may   receive lab or pathology results from your procedure in your [facility]   account before your physician is able to contact you. Your physician or   their representative will relay the results to you with their   recommendations at their soonest availability.  Thank you,  PROVATION

## 2024-05-17 NOTE — H&P
"[facility]    CC: Dysphagia, esophageal leukoplakia      HPI 69 y.o. female presents for EGD    Past Medical History:   Diagnosis Date    Allergy     Arthritis     Back pain     Colon polyps     COPD (chronic obstructive pulmonary disease)     Emphysema of lung     GERD (gastroesophageal reflux disease)     Hypertension     Kidney stones     Obesity     Pneumonia     Pneumonia due to other staphylococcus     Pulmonary fibrosis     Pulmonary hypertension     Scleroderma involving lung since she was 47 y/o    Sleep apnea     Trouble in sleeping        Allergies and Medications reviewed     Review of Systems  General ROS: negative for - chills, fever or weight loss  Cardiovascular ROS: no chest pain or dyspnea on exertion  Gastrointestinal ROS: + dysphagia    Physical Examination  /65 (BP Location: Left arm, Patient Position: Lying)   Pulse 88   Temp 98.1 °F (36.7 °C) (Skin)   Resp 18   Ht 5' 6" (1.676 m)   Wt 86.2 kg (190 lb)   SpO2 99%   Breastfeeding No   BMI 30.67 kg/m²   General appearance: alert, cooperative, no distress  HENT: Normocephalic, atraumatic, neck symmetrical, no nasal discharge, sclera anicteric   Lungs: clear to auscultation bilaterally, symmetric chest wall expansion bilaterally  Heart: regular rate and rhythm without rub; no displacement of the PMI   Abdomen: soft  Extremities: extremities symmetric; no clubbing, cyanosis, or edema        Labs:  Lab Results   Component Value Date    WBC 5.20 04/08/2024    HGB 12.7 04/08/2024    HCT 40.4 04/08/2024    MCV 96 04/08/2024     04/08/2024         Assessment:   69 y.o. female presents to follow up dysphagia and esophageal leukoplakia    Plan:  -Proceed to EGD    Ngozi Prince MD  [facility]  [address], [address]  [place] [address]  Office: [phone]  Fax: [phone]   "

## 2024-05-17 NOTE — TRANSFER OF CARE
"Anesthesia Transfer of Care Note    Patient: Edith Tran    Procedure(s) Performed: Procedure(s) (LRB):  EGD (ESOPHAGOGASTRODUODENOSCOPY) (N/A)    Patient location: GI    Anesthesia Type: general    Transport from OR: Transported from OR on room air with adequate spontaneous ventilation    Post pain: adequate analgesia    Post assessment: no apparent anesthetic complications    Post vital signs: stable    Level of consciousness: sedated    Nausea/Vomiting: no nausea/vomiting    Complications: none    Transfer of care protocol was followed      Last vitals: Visit Vitals  /65 (BP Location: Left arm, Patient Position: Lying)   Pulse 88   Temp 36.7 °C (98.1 °F) (Skin)   Resp 18   Ht 5' 6" (1.676 m)   Wt 86.2 kg (190 lb)   SpO2 99%   Breastfeeding No   BMI 30.67 kg/m²     "

## 2024-05-17 NOTE — PLAN OF CARE
Patient is being driven home by Jaret. He was called and is in parking lot. Patient is waiting to speak with Dr Prince.

## 2024-05-20 VITALS
RESPIRATION RATE: 18 BRPM | OXYGEN SATURATION: 100 % | WEIGHT: 190 LBS | BODY MASS INDEX: 30.53 KG/M2 | HEART RATE: 66 BPM | HEIGHT: 66 IN | DIASTOLIC BLOOD PRESSURE: 64 MMHG | SYSTOLIC BLOOD PRESSURE: 104 MMHG | TEMPERATURE: 98 F

## 2024-05-20 DIAGNOSIS — R25.2 CRAMPING OF HANDS: ICD-10-CM

## 2024-05-20 DIAGNOSIS — E55.9 VITAMIN D DEFICIENCY: ICD-10-CM

## 2024-05-20 RX ORDER — LANOLIN ALCOHOL/MO/W.PET/CERES
400 CREAM (GRAM) TOPICAL DAILY
Qty: 90 TABLET | Refills: 0 | Status: SHIPPED | OUTPATIENT
Start: 2024-05-20

## 2024-05-20 RX ORDER — ERGOCALCIFEROL 1.25 MG/1
CAPSULE ORAL
Qty: 12 CAPSULE | Refills: 3 | Status: SHIPPED | OUTPATIENT
Start: 2024-05-20

## 2024-05-20 NOTE — TELEPHONE ENCOUNTER
No care due was identified.  [facility] Embedded Care Due Messages. Reference number: [id number].   5/20/2024 9:51:11 AM CDT   regular

## 2024-06-03 DIAGNOSIS — J44.9 CHRONIC OBSTRUCTIVE PULMONARY DISEASE, UNSPECIFIED COPD TYPE: ICD-10-CM

## 2024-06-03 DIAGNOSIS — J47.9 BRONCHIECTASIS WITHOUT COMPLICATION: ICD-10-CM

## 2024-06-03 RX ORDER — ALBUTEROL SULFATE 90 UG/1
AEROSOL, METERED RESPIRATORY (INHALATION)
Qty: 8 G | Refills: 11 | Status: SHIPPED | OUTPATIENT
Start: 2024-06-03

## 2024-06-08 DIAGNOSIS — K30 DELAYED GASTRIC EMPTYING: ICD-10-CM

## 2024-06-11 RX ORDER — ONDANSETRON HYDROCHLORIDE 8 MG/1
TABLET, FILM COATED ORAL
Qty: 30 TABLET | Refills: 3 | Status: SHIPPED | OUTPATIENT
Start: 2024-06-11

## 2024-06-12 ENCOUNTER — OFFICE VISIT (OUTPATIENT)
Dept: OPTOMETRY | Facility: CLINIC | Age: 69
End: 2024-06-12
Payer: MEDICARE

## 2024-06-12 DIAGNOSIS — H52.7 REFRACTIVE ERROR: Primary | ICD-10-CM

## 2024-06-12 PROCEDURE — 99499 UNLISTED E&M SERVICE: CPT | Mod: S$GLB,,, | Performed by: OPTOMETRIST

## 2024-06-12 PROCEDURE — 99999 PR PBB SHADOW E&M-EST. PATIENT-LVL III: CPT | Mod: PBBFAC,,, | Performed by: OPTOMETRIST

## 2024-06-12 NOTE — PROGRESS NOTES
HPI    68 YO female presents today for trouble with reading wearing new glasses.   Notes distance is fine, and had Vashti check glasses. Notes no issues   with old glasses.   Last edited by Ashley Adams on 6/12/2024  1:47 PM.            Assessment /Plan     For exam results, see Encounter Report.    Refractive error      Discussed options -- large myopic shift  Improved distance with new specs, recommend lighting for near  Scheduled for cataract eval next month with Dr. Desir

## 2024-07-10 DIAGNOSIS — M34.9 SCLERODERMA: ICD-10-CM

## 2024-07-10 RX ORDER — LOSARTAN POTASSIUM 25 MG/1
25 TABLET ORAL
Qty: 90 TABLET | Refills: 0 | Status: SHIPPED | OUTPATIENT
Start: 2024-07-10

## 2024-07-10 NOTE — TELEPHONE ENCOUNTER
No care due was identified.  [facility] Embedded Care Due Messages. Reference number: [id number].   7/10/2024 6:21:59 PM CDT

## 2024-07-11 NOTE — TELEPHONE ENCOUNTER
Refill Decision Note   Edith Tran  is requesting a refill authorization.  Brief Assessment and Rationale for Refill:  Approve     Medication Therapy Plan:         Comments:     Note composed:8:51 PM 07/10/2024

## 2024-07-12 ENCOUNTER — OFFICE VISIT (OUTPATIENT)
Dept: OPHTHALMOLOGY | Facility: CLINIC | Age: 69
End: 2024-07-12
Payer: MEDICARE

## 2024-07-12 DIAGNOSIS — Q14.1 CONGENITAL HYPERTROPHY OF RETINAL PIGMENT EPITHELIUM OF BOTH EYES: ICD-10-CM

## 2024-07-12 DIAGNOSIS — H25.813 COMBINED FORMS OF AGE-RELATED CATARACT, BILATERAL: Primary | ICD-10-CM

## 2024-07-12 PROCEDURE — 99999 PR PBB SHADOW E&M-EST. PATIENT-LVL III: CPT | Mod: PBBFAC,,, | Performed by: OPHTHALMOLOGY

## 2024-07-12 NOTE — PROGRESS NOTES
HPI    DLS: 6/12/2024-CE per KN     Pt states wearing new specs. States vision is not as sharp as it use to   be. Hard to see tv. Trouble with glare.     States sees floaters OU. Has been there forever. May have changed over the   years. Tries not to pay attention to them.     Denies pain/ FOL/ floaters.     OTC tears OU 1-2 x a day and gel QPM- working alright. Eyes always feel   tired, when using gtts eyes feel better.     Last edited by Alyssa May on 7/12/2024  8:04 AM.            Assessment /Plan     For exam results, see Encounter Report.    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 Combined forms of age-related cataract, bilateral  Patient with visually significant cataract impacting abiliity ADLs (reading, driving, PM driving, glare).  Discussed options, R & B, expectations, patient voices good understanding and wishes to proceed with procedure. Patient will likely benefit from sx.    Schedule CEIOL OD then OS  Likely monofocal dist IOL    RTC preop  Needs clearance    2. 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

## 2024-07-15 ENCOUNTER — LAB VISIT (OUTPATIENT)
Dept: LAB | Facility: HOSPITAL | Age: 69
End: 2024-07-15
Attending: STUDENT IN AN ORGANIZED HEALTH CARE EDUCATION/TRAINING PROGRAM
Payer: MEDICARE

## 2024-07-15 DIAGNOSIS — J84.9 INTERSTITIAL LUNG DISEASE: ICD-10-CM

## 2024-07-15 DIAGNOSIS — Z79.899 HIGH RISK MEDICATION USE: ICD-10-CM

## 2024-07-15 DIAGNOSIS — M34.9 LIMITED SYSTEMIC SCLEROSIS: ICD-10-CM

## 2024-07-15 DIAGNOSIS — I27.20 PULMONARY HYPERTENSION: ICD-10-CM

## 2024-07-15 DIAGNOSIS — I73.00 RAYNAUD'S DISEASE WITHOUT GANGRENE: ICD-10-CM

## 2024-07-15 LAB
ALT SERPL W/O P-5'-P-CCNC: 11 U/L (ref 10–44)
AST SERPL-CCNC: 14 U/L (ref 10–40)
BASOPHILS # BLD AUTO: 0.03 K/UL (ref 0–0.2)
BASOPHILS NFR BLD: 0.3 % (ref 0–1.9)
BILIRUB UR QL STRIP: NEGATIVE
CLARITY UR: CLEAR
COLOR UR: COLORLESS
CREAT SERPL-MCNC: 0.8 MG/DL (ref 0.5–1.4)
CREAT UR-MCNC: 24.9 MG/DL (ref 15–325)
CRP SERPL-MCNC: 6.2 MG/L (ref 0–8.2)
DIFFERENTIAL METHOD BLD: ABNORMAL
EOSINOPHIL # BLD AUTO: 0.2 K/UL (ref 0–0.5)
EOSINOPHIL NFR BLD: 2.2 % (ref 0–8)
ERYTHROCYTE [DISTWIDTH] IN BLOOD BY AUTOMATED COUNT: 13.2 % (ref 11.5–14.5)
ERYTHROCYTE [SEDIMENTATION RATE] IN BLOOD BY WESTERGREN METHOD: 10 MM/HR (ref 0–20)
EST. GFR  (NO RACE VARIABLE): >60 ML/MIN/1.73 M^2
GLUCOSE UR QL STRIP: NEGATIVE
HCT VFR BLD AUTO: 40.2 % (ref 37–48.5)
HGB BLD-MCNC: 12.9 G/DL (ref 12–16)
HGB UR QL STRIP: NEGATIVE
IMM GRANULOCYTES # BLD AUTO: 0.03 K/UL (ref 0–0.04)
IMM GRANULOCYTES NFR BLD AUTO: 0.3 % (ref 0–0.5)
KETONES UR QL STRIP: NEGATIVE
LEUKOCYTE ESTERASE UR QL STRIP: NEGATIVE
LYMPHOCYTES # BLD AUTO: 0.9 K/UL (ref 1–4.8)
LYMPHOCYTES NFR BLD: 9.6 % (ref 18–48)
MCH RBC QN AUTO: 31.2 PG (ref 27–31)
MCHC RBC AUTO-ENTMCNC: 32.1 G/DL (ref 32–36)
MCV RBC AUTO: 97 FL (ref 82–98)
MONOCYTES # BLD AUTO: 0.4 K/UL (ref 0.3–1)
MONOCYTES NFR BLD: 4.5 % (ref 4–15)
NEUTROPHILS # BLD AUTO: 7.9 K/UL (ref 1.8–7.7)
NEUTROPHILS NFR BLD: 83.1 % (ref 38–73)
NITRITE UR QL STRIP: NEGATIVE
NRBC BLD-RTO: 0 /100 WBC
PH UR STRIP: 6 [PH] (ref 5–8)
PLATELET # BLD AUTO: 156 K/UL (ref 150–450)
PMV BLD AUTO: 11.1 FL (ref 9.2–12.9)
PROT UR QL STRIP: NEGATIVE
PROT UR-MCNC: <7 MG/DL (ref 0–15)
PROT/CREAT UR: NORMAL MG/G{CREAT} (ref 0–0.2)
RBC # BLD AUTO: 4.14 M/UL (ref 4–5.4)
SP GR UR STRIP: 1 (ref 1–1.03)
URN SPEC COLLECT METH UR: ABNORMAL
UROBILINOGEN UR STRIP-ACNC: NEGATIVE EU/DL
WBC # BLD AUTO: 9.46 K/UL (ref 3.9–12.7)

## 2024-07-15 PROCEDURE — 84460 ALANINE AMINO (ALT) (SGPT): CPT | Performed by: STUDENT IN AN ORGANIZED HEALTH CARE EDUCATION/TRAINING PROGRAM

## 2024-07-15 PROCEDURE — 81003 URINALYSIS AUTO W/O SCOPE: CPT | Performed by: STUDENT IN AN ORGANIZED HEALTH CARE EDUCATION/TRAINING PROGRAM

## 2024-07-15 PROCEDURE — 85025 COMPLETE CBC W/AUTO DIFF WBC: CPT | Performed by: STUDENT IN AN ORGANIZED HEALTH CARE EDUCATION/TRAINING PROGRAM

## 2024-07-15 PROCEDURE — 84450 TRANSFERASE (AST) (SGOT): CPT | Performed by: STUDENT IN AN ORGANIZED HEALTH CARE EDUCATION/TRAINING PROGRAM

## 2024-07-15 PROCEDURE — 36415 COLL VENOUS BLD VENIPUNCTURE: CPT | Performed by: STUDENT IN AN ORGANIZED HEALTH CARE EDUCATION/TRAINING PROGRAM

## 2024-07-15 PROCEDURE — 86140 C-REACTIVE PROTEIN: CPT | Performed by: STUDENT IN AN ORGANIZED HEALTH CARE EDUCATION/TRAINING PROGRAM

## 2024-07-15 PROCEDURE — 85651 RBC SED RATE NONAUTOMATED: CPT | Performed by: STUDENT IN AN ORGANIZED HEALTH CARE EDUCATION/TRAINING PROGRAM

## 2024-07-15 PROCEDURE — 82565 ASSAY OF CREATININE: CPT | Performed by: STUDENT IN AN ORGANIZED HEALTH CARE EDUCATION/TRAINING PROGRAM

## 2024-07-15 PROCEDURE — 82570 ASSAY OF URINE CREATININE: CPT | Performed by: STUDENT IN AN ORGANIZED HEALTH CARE EDUCATION/TRAINING PROGRAM

## 2024-07-16 ENCOUNTER — CLINICAL SUPPORT (OUTPATIENT)
Dept: CARDIAC REHAB | Facility: HOSPITAL | Age: 69
End: 2024-07-16
Payer: MEDICARE

## 2024-07-16 DIAGNOSIS — J84.9 INTERSTITIAL LUNG DISEASE: Primary | ICD-10-CM

## 2024-07-16 PROCEDURE — 93797 PHYS/QHP OP CAR RHAB WO ECG: CPT

## 2024-07-19 NOTE — PROGRESS NOTES
Subjective:      Patient ID: Edith Tran is a 69 y.o. female.    Chief Complaint: Disease Management    HPI    Rheumatologic History:      - Diagnosis/es:              - limited systemic sclerosis diagnosed in  by Dr Barnes and characterized by SHILPI 1:1280 nucleolar, inflammatory arthritis, esophageal dysmotility, GERD, Raynaud's, interstitial lung disease, and pulmonary hypertension              - Crohn's disease diagnosed around 2019, not active  - Social History: Former smoker, denies alcohol intake  - Family History: No autoimmune conditions  - Gyn History: , 3 intentional abortions  - Positive serologies: SHILPI 1:1280 nucleolar  - Negative serologies: Scl 70, RNAP III, Th/To, myomarker panel,PM/Scl, Sm/RNP, dsDNA, RF  - Infectious screening labs:  Negative hepatitis-B, C, and QuantiFERON (2023)  - TPMT normal metabolizer  - Imaging:              - EGD (2020): Grade A esophagitis and mild Schatzki ring - dilation was performed.  Small hiatial hernia.              - TTE (2023) LVEF >55%, PASP and CVP WNL              - DEXA (3/2021) normal              - PFT (10/2023) FVC 2.9L <- 2.67L <- 2.76L, DLCO 33.4% <- 39.6% <- 40.7%              - CT chest (2023) ascending aorta measures 4.2 x 4.5 cm in maximum diameter. There are extensive subpleural reticulations and honeycombing throughout the upper and lower lobes compatible with idiopathic pulmonary fibrosis. There is bronchiectasis. There are no confluent infiltrates or pleural effusions. There are mildly prominent mediastinal lymph nodes most likely reactive. The esophagus is normal  The visualized portion of the upper abdomen demonstrates a 15 mm hyperdense mass arising from the upper pole the left kidney. Follow-up ultrasound is recommended to determine if this represents hemorrhagic cyst or solid mass adrenal glands are normal. There are degenerative changes of the spine. The musculature is normal.              - RHC (2024) normal  "pulmonary pressures  - Previous Treatments:              - HCQ 200mg BID: caused "eye problems"              - MTX              - Reglan  - Current Treatments:               - MMF 1500mg BID              - Tadalafil (Adcirca) 40mg daily  - Opsumit (macitentan)  - Tyvaso (treprostinil)              - Ofev              - Protonix 40mg BID  Interval History:   She states that at her pulmonologist was pleased with her progress at her last visit and did not feel she needed to take rituximab. She is still having some dysphagia even after  esophageal dilation. She is still having acid reflux. She is still having Raynaud's but has not gotten oral ulcers. Her biggest complaint at the moment is burning pain in her left foot.    Objective:   /83   Pulse 87   Wt 88.7 kg (195 lb 8.8 oz)   BMI 31.56 kg/m²   Physical Exam   Constitutional: normal appearance. No distress.   HENT:   Head: Normocephalic and atraumatic.   Cardiovascular: Normal rate, regular rhythm and normal heart sounds.   Pulmonary/Chest: Effort normal. She has rales (Bibasilar).   Musculoskeletal:      Comments: Synovial hypertrophy of bilateral 2nd and 3rd MCPs  No swelling in the feet   Neurological: She is alert.   Skin: Skin is warm and dry. No rash noted.   + Minimal telangiectasias  + Sclerodactyly  + Abnormal nailfold capillaries  + Cold fingers  No digital pitting       No data to display     Labs (7/15/24)  CBC WBC, HGB, PLT WNL   CR, AST, ALT WNL  ESR WNL   CRP WNL   UA UPC WNL    Assessment:     1. Limited systemic sclerosis    2. Paresthesia of foot    3. Raynaud's disease without gangrene    4. Interstitial lung disease    5. Pulmonary hypertension    6. High risk medication use    7. Drug-induced immunodeficiency      This is a 69-year-old woman with history of AAA, insomnia, ELAINE on CPAP, esophageal stricture, diverticulosis, B12 deficiency, esophageal leukoplakia, chronic back pain, and limited systemic sclerosis diagnosed in 2009 by Dr" Molly and characterized by inflammatory arthritis, telangiectasias, esophageal dysmotility, GERD, Raynaud's, abnormal nailfold capillaries interstitial lung disease, and pulmonary hypertension on MMF 1500 mg b.i.d., Adcirca 40 mg daily, Opsumit, Tyvaso, Ofev, and Protonix 40 mg b.i.d. Patient states pulm told her it would be okay to hold off on rituximab for now. Obtain EMG NCV of the LLE to evaluate for neuropathy. Patient to discuss dysphagia and GERD with her gastroenterologist.    Plan:     Problem List Items Addressed This Visit          Pulmonary    Interstitial lung disease    Relevant Orders    C-Reactive Protein    CBC Auto Differential    Creatinine, Serum    ALT (SGPT)    AST (SGOT)    Sedimentation rate       Cardiac/Vascular    Raynaud's disease    Relevant Orders    C-Reactive Protein    CBC Auto Differential    Creatinine, Serum    ALT (SGPT)    AST (SGOT)    Sedimentation rate    Pulmonary hypertension    Relevant Orders    C-Reactive Protein    CBC Auto Differential    Creatinine, Serum    ALT (SGPT)    AST (SGOT)    Sedimentation rate       Palliative Care    High risk medication use    Relevant Orders    C-Reactive Protein    CBC Auto Differential    Creatinine, Serum    ALT (SGPT)    AST (SGOT)    Sedimentation rate     Other Visit Diagnoses       Limited systemic sclerosis    -  Primary    Relevant Orders    C-Reactive Protein    CBC Auto Differential    Creatinine, Serum    ALT (SGPT)    AST (SGOT)    Sedimentation rate    Paresthesia of foot        Relevant Orders    EMG W/ ULTRASOUND AND NERVE CONDUCTION TEST 1 Extremity    Drug-induced immunodeficiency        Relevant Orders    C-Reactive Protein    CBC Auto Differential    Creatinine, Serum    ALT (SGPT)    AST (SGOT)    Sedimentation rate          1.) Scleroderma   - MMF 1500mg BID. Hold for infection  - Pulm HTN: Tadalafil (Adcirca) 40mg daily, Opsumit (macitentan), Tyvaso (treprostinil)  - ILD: Ofev  - GERD: Protonix 40mg BID  -  Dysphagia: persistent after dilation, patient should discuss with GI  - Raynaud's: stable, no ulcers     2.) Drug induce immunodeficiency  3.) High risk medication use  - CBC, CMP, ESR, CRP every 12 weeks  - Pre-DMARD labs due 9/2024  - Immunizations: COVID x 4 (most recent 1/2023), flu (9/2022), PCV13 (8/2019), PPV23 (11/2019), Shingrix x 2 (2020); patient needs PCV 20  - Obtain DEXA at follow up visit     4.) Paresthesias, left foot  - EMG-NCV    Follow up in 3 months    30 minutes of total time spent on the encounter, which includes face to face time and non-face to face time preparing to see the patient (eg, review of tests), Obtaining and/or reviewing separately obtained history, Documenting clinical information in the electronic or other health record, Independently interpreting results (not separately reported) and communicating results to the patient/family/caregiver, or Care coordination (not separately reported).     This note was prepared with [employer] Direct voice recognition transcription software. Garbled syntax, mangled pronouns, and other bizarre constructions may be attributed to that software system       Nieves Blanchard M.D.  Rheumatology Dept  [place], [place]

## 2024-07-22 ENCOUNTER — OFFICE VISIT (OUTPATIENT)
Dept: RHEUMATOLOGY | Facility: CLINIC | Age: 69
End: 2024-07-22
Payer: MEDICARE

## 2024-07-22 VITALS
DIASTOLIC BLOOD PRESSURE: 83 MMHG | HEART RATE: 87 BPM | WEIGHT: 195.56 LBS | SYSTOLIC BLOOD PRESSURE: 115 MMHG | BODY MASS INDEX: 31.56 KG/M2

## 2024-07-22 DIAGNOSIS — Z79.899 HIGH RISK MEDICATION USE: ICD-10-CM

## 2024-07-22 DIAGNOSIS — I73.00 RAYNAUD'S DISEASE WITHOUT GANGRENE: ICD-10-CM

## 2024-07-22 DIAGNOSIS — M34.9 LIMITED SYSTEMIC SCLEROSIS: Primary | ICD-10-CM

## 2024-07-22 DIAGNOSIS — D84.821 DRUG-INDUCED IMMUNODEFICIENCY: ICD-10-CM

## 2024-07-22 DIAGNOSIS — Z79.899 DRUG-INDUCED IMMUNODEFICIENCY: ICD-10-CM

## 2024-07-22 DIAGNOSIS — J84.9 INTERSTITIAL LUNG DISEASE: ICD-10-CM

## 2024-07-22 DIAGNOSIS — R20.2 PARESTHESIA OF FOOT: ICD-10-CM

## 2024-07-22 DIAGNOSIS — I27.20 PULMONARY HYPERTENSION: ICD-10-CM

## 2024-07-22 PROCEDURE — 99215 OFFICE O/P EST HI 40 MIN: CPT | Mod: S$GLB,,, | Performed by: STUDENT IN AN ORGANIZED HEALTH CARE EDUCATION/TRAINING PROGRAM

## 2024-07-22 PROCEDURE — 3079F DIAST BP 80-89 MM HG: CPT | Mod: CPTII,S$GLB,, | Performed by: STUDENT IN AN ORGANIZED HEALTH CARE EDUCATION/TRAINING PROGRAM

## 2024-07-22 PROCEDURE — 3074F SYST BP LT 130 MM HG: CPT | Mod: CPTII,S$GLB,, | Performed by: STUDENT IN AN ORGANIZED HEALTH CARE EDUCATION/TRAINING PROGRAM

## 2024-07-22 PROCEDURE — 3008F BODY MASS INDEX DOCD: CPT | Mod: CPTII,S$GLB,, | Performed by: STUDENT IN AN ORGANIZED HEALTH CARE EDUCATION/TRAINING PROGRAM

## 2024-07-22 PROCEDURE — 3288F FALL RISK ASSESSMENT DOCD: CPT | Mod: CPTII,S$GLB,, | Performed by: STUDENT IN AN ORGANIZED HEALTH CARE EDUCATION/TRAINING PROGRAM

## 2024-07-22 PROCEDURE — 1101F PT FALLS ASSESS-DOCD LE1/YR: CPT | Mod: CPTII,S$GLB,, | Performed by: STUDENT IN AN ORGANIZED HEALTH CARE EDUCATION/TRAINING PROGRAM

## 2024-07-22 PROCEDURE — 1126F AMNT PAIN NOTED NONE PRSNT: CPT | Mod: CPTII,S$GLB,, | Performed by: STUDENT IN AN ORGANIZED HEALTH CARE EDUCATION/TRAINING PROGRAM

## 2024-07-22 PROCEDURE — 1160F RVW MEDS BY RX/DR IN RCRD: CPT | Mod: CPTII,S$GLB,, | Performed by: STUDENT IN AN ORGANIZED HEALTH CARE EDUCATION/TRAINING PROGRAM

## 2024-07-22 PROCEDURE — 99999 PR PBB SHADOW E&M-EST. PATIENT-LVL III: CPT | Mod: PBBFAC,,, | Performed by: STUDENT IN AN ORGANIZED HEALTH CARE EDUCATION/TRAINING PROGRAM

## 2024-07-22 PROCEDURE — 1159F MED LIST DOCD IN RCRD: CPT | Mod: CPTII,S$GLB,, | Performed by: STUDENT IN AN ORGANIZED HEALTH CARE EDUCATION/TRAINING PROGRAM

## 2024-07-22 PROCEDURE — 4010F ACE/ARB THERAPY RXD/TAKEN: CPT | Mod: CPTII,S$GLB,, | Performed by: STUDENT IN AN ORGANIZED HEALTH CARE EDUCATION/TRAINING PROGRAM

## 2024-07-22 ASSESSMENT — ROUTINE ASSESSMENT OF PATIENT INDEX DATA (RAPID3)
PATIENT GLOBAL ASSESSMENT SCORE: 5.5
TOTAL RAPID3 SCORE: 4.67
FATIGUE SCORE: 1.1
MDHAQ FUNCTION SCORE: 0.9
PAIN SCORE: 5.5
PSYCHOLOGICAL DISTRESS SCORE: 1.1

## 2024-07-23 ENCOUNTER — CLINICAL SUPPORT (OUTPATIENT)
Dept: CARDIAC REHAB | Facility: HOSPITAL | Age: 69
End: 2024-07-23
Payer: MEDICARE

## 2024-07-23 DIAGNOSIS — J84.9 INTERSTITIAL LUNG DISEASE: Primary | ICD-10-CM

## 2024-07-23 PROCEDURE — 94626 PHY/QHP OP PULM RHB W/MNTR: CPT

## 2024-07-23 NOTE — TELEPHONE ENCOUNTER
----- Message from Duane Julio III, MD sent at 7/9/2023  9:18 PM CDT -----  SHILPI positive scleroderma pattern as expected.  FOLLOW-UP AS RECOMMENDED  
----- Message from Duane Julio III, MD sent at 7/9/2023  9:18 PM CDT -----  SHILPI positive scleroderma pattern as expected.  FOLLOW-UP AS RECOMMENDED  
No

## 2024-07-25 ENCOUNTER — CLINICAL SUPPORT (OUTPATIENT)
Dept: CARDIAC REHAB | Facility: HOSPITAL | Age: 69
End: 2024-07-25
Payer: MEDICARE

## 2024-07-25 DIAGNOSIS — J84.9 INTERSTITIAL LUNG DISEASE: Primary | ICD-10-CM

## 2024-07-25 PROCEDURE — 94626 PHY/QHP OP PULM RHB W/MNTR: CPT

## 2024-07-26 ENCOUNTER — TELEPHONE (OUTPATIENT)
Dept: RHEUMATOLOGY | Facility: CLINIC | Age: 69
End: 2024-07-26
Payer: MEDICARE

## 2024-07-26 DIAGNOSIS — J30.2 CHRONIC SEASONAL ALLERGIC RHINITIS: ICD-10-CM

## 2024-07-26 DIAGNOSIS — M34.9 SCLERODERMA: ICD-10-CM

## 2024-07-26 DIAGNOSIS — K30 DELAYED GASTRIC EMPTYING: ICD-10-CM

## 2024-07-26 DIAGNOSIS — R25.2 CRAMPING OF HANDS: ICD-10-CM

## 2024-07-26 DIAGNOSIS — I27.20 PULMONARY HYPERTENSION: ICD-10-CM

## 2024-07-26 DIAGNOSIS — G47.00 INSOMNIA, UNSPECIFIED TYPE: ICD-10-CM

## 2024-07-26 DIAGNOSIS — J01.81 OTHER ACUTE RECURRENT SINUSITIS: ICD-10-CM

## 2024-07-26 RX ORDER — LANOLIN ALCOHOL/MO/W.PET/CERES
400 CREAM (GRAM) TOPICAL DAILY
Qty: 90 TABLET | Refills: 0 | OUTPATIENT
Start: 2024-07-26

## 2024-07-26 RX ORDER — PANTOPRAZOLE SODIUM 40 MG/1
40 TABLET, DELAYED RELEASE ORAL 2 TIMES DAILY
Qty: 180 TABLET | Refills: 1 | Status: SHIPPED | OUTPATIENT
Start: 2024-07-26

## 2024-07-26 RX ORDER — MYCOPHENOLATE MOFETIL 500 MG/1
1500 TABLET ORAL 2 TIMES DAILY
Qty: 520 TABLET | Refills: 1 | Status: SHIPPED | OUTPATIENT
Start: 2024-07-26

## 2024-07-26 RX ORDER — LOSARTAN POTASSIUM 25 MG/1
25 TABLET ORAL
Qty: 90 TABLET | Refills: 0 | Status: CANCELLED | OUTPATIENT
Start: 2024-07-26

## 2024-07-26 RX ORDER — TRAZODONE HYDROCHLORIDE 100 MG/1
TABLET ORAL
Qty: 90 TABLET | Refills: 1 | OUTPATIENT
Start: 2024-07-26

## 2024-07-26 NOTE — TELEPHONE ENCOUNTER
Care Due:                  Date            Visit Type   Department     Provider  --------------------------------------------------------------------------------                                EP -                              PRIMARY      [facility]  Last Visit: 08-      [facility]  Next Visit: None Scheduled  None         None Found                                                            Last  Test          Frequency    Reason                     Performed    Due Date  --------------------------------------------------------------------------------    Office Visit  12 months..  ergocalciferol, magnesium  08- 08-    Vitamin D...  12 months..  ergocalciferol...........  07- 07-    [employer] Embedded Care Due Messages. Reference number: [id number].   7/26/2024 7:29:18 AM CDT

## 2024-07-26 NOTE — TELEPHONE ENCOUNTER
No care due was identified.  [facility] Embedded Care Due Messages. Reference number: [id number].   7/26/2024 7:29:50 AM CDT

## 2024-07-26 NOTE — TELEPHONE ENCOUNTER
----- Message from Lila Fabian sent at 7/26/2024  1:19 PM CDT -----  Type : Patient Call          Who Called : MICHELE ( [employer] )          Does the patient know what this is regarding?:  Calling regarding prior auth for mycophenolate (CELLCEPT) 500 mg Tab - Medication has been denied ; please advise            Would the patient rather a call back or a response via [facility]? Call                Best Call Back Number: [phone]          Additional Information:

## 2024-07-26 NOTE — TELEPHONE ENCOUNTER
Medication approved per patient chart      Prior Authorization Portal   View all authorizations for this medication  Approved   7/26/2024  2:05 PM  Sending user: Frida Serrano   Payer: [employer] - Medicare Case ID: [id number]    [phone]    [phone]    Notes     Time User Attachment    Filled 7/26/2024 with $0 copay 7/26/2024  2:05 PM Frida Serrano

## 2024-07-29 RX ORDER — FLUTICASONE PROPIONATE 50 MCG
1 SPRAY, SUSPENSION (ML) NASAL DAILY
Qty: 16 ML | Refills: 11 | Status: SHIPPED | OUTPATIENT
Start: 2024-07-29

## 2024-07-31 DIAGNOSIS — G47.00 INSOMNIA, UNSPECIFIED TYPE: ICD-10-CM

## 2024-07-31 RX ORDER — TRAZODONE HYDROCHLORIDE 100 MG/1
TABLET ORAL
Qty: 90 TABLET | Refills: 1 | OUTPATIENT
Start: 2024-07-31

## 2024-07-31 NOTE — TELEPHONE ENCOUNTER
No care due was identified.  [facility] Embedded Care Due Messages. Reference number: [id number].   7/31/2024 6:42:47 AM CDT

## 2024-07-31 NOTE — TELEPHONE ENCOUNTER
Refill Decision Note   Edith Tran  is requesting a refill authorization.  Brief Assessment and Rationale for Refill:  Quick Discontinue     Medication Therapy Plan: PREVIOUSLY DENIED PT NEEDS AN APPT      Medication reconciliation completed: Yes   Comments:     Note composed:10:14 AM 07/31/2024

## 2024-07-31 NOTE — TELEPHONE ENCOUNTER
Call Documentation    Person Called: Patient Call Time: 10:10 AM   Spoke with: PATIENT 07/31/2024        Reason for call: PREVIOUSLY DENIED PT NEEDS AN APPT      Patient stated: PT WILL CALL OFFICE FOR APPT AND A REFILL      Clarification details and Actions Taken: PT. WILL MAKE AN APPT     Current Medication Requested:   Requested Prescriptions     Pending Prescriptions Disp Refills    traZODone (DESYREL) 100 MG tablet [Pharmacy Med Name: TRAZODONE 100MG TABLETS] 90 tablet 1     Sig: TAKE 1 TABLET BY MOUTH IN THE EVENING AS NEEDED      [facility]   Note composed: 07/31/2024 10:11 AM

## 2024-08-01 ENCOUNTER — CLINICAL SUPPORT (OUTPATIENT)
Dept: CARDIAC REHAB | Facility: HOSPITAL | Age: 69
End: 2024-08-01
Payer: MEDICARE

## 2024-08-01 DIAGNOSIS — J84.9 INTERSTITIAL LUNG DISEASE: Primary | ICD-10-CM

## 2024-08-01 PROCEDURE — 94626 PHY/QHP OP PULM RHB W/MNTR: CPT

## 2024-08-06 ENCOUNTER — CLINICAL SUPPORT (OUTPATIENT)
Dept: CARDIAC REHAB | Facility: HOSPITAL | Age: 69
End: 2024-08-06
Payer: MEDICARE

## 2024-08-06 DIAGNOSIS — J84.9 ILD (INTERSTITIAL LUNG DISEASE): Primary | ICD-10-CM

## 2024-08-06 PROCEDURE — 94626 PHY/QHP OP PULM RHB W/MNTR: CPT

## 2024-08-07 ENCOUNTER — TELEPHONE (OUTPATIENT)
Dept: TRANSPLANT | Facility: CLINIC | Age: 69
End: 2024-08-07
Payer: MEDICARE

## 2024-08-08 ENCOUNTER — CLINICAL SUPPORT (OUTPATIENT)
Dept: CARDIAC REHAB | Facility: HOSPITAL | Age: 69
End: 2024-08-08
Payer: MEDICARE

## 2024-08-08 DIAGNOSIS — J84.9 ILD (INTERSTITIAL LUNG DISEASE): Primary | ICD-10-CM

## 2024-08-08 PROCEDURE — 94626 PHY/QHP OP PULM RHB W/MNTR: CPT

## 2024-08-12 ENCOUNTER — HOSPITAL ENCOUNTER (OUTPATIENT)
Dept: RADIOLOGY | Facility: HOSPITAL | Age: 69
Discharge: HOME | End: 2024-08-12
Attending: THORACIC SURGERY (CARDIOTHORACIC VASCULAR SURGERY)
Payer: MEDICARE

## 2024-08-12 DIAGNOSIS — I71.21 ANEURYSM OF ASCENDING AORTA WITHOUT RUPTURE: Primary | ICD-10-CM

## 2024-08-12 DIAGNOSIS — I71.21 ANEURYSM OF ASCENDING AORTA WITHOUT RUPTURE: ICD-10-CM

## 2024-08-12 PROCEDURE — 71275 CT ANGIOGRAPHY CHEST: CPT | Mod: 26,,, | Performed by: RADIOLOGY

## 2024-08-12 PROCEDURE — 71275 CT ANGIOGRAPHY CHEST: CPT | Mod: TC

## 2024-08-12 PROCEDURE — 25500020 PHARM REV CODE 255

## 2024-08-12 RX ADMIN — IOHEXOL 75 ML: 350 INJECTION, SOLUTION INTRAVENOUS at 08:08

## 2024-08-13 ENCOUNTER — CLINICAL SUPPORT (OUTPATIENT)
Dept: CARDIAC REHAB | Facility: HOSPITAL | Age: 69
End: 2024-08-13
Payer: MEDICARE

## 2024-08-13 DIAGNOSIS — J84.9 ILD (INTERSTITIAL LUNG DISEASE): Primary | ICD-10-CM

## 2024-08-13 PROCEDURE — 94626 PHY/QHP OP PULM RHB W/MNTR: CPT

## 2024-08-15 ENCOUNTER — CLINICAL SUPPORT (OUTPATIENT)
Dept: CARDIAC REHAB | Facility: HOSPITAL | Age: 69
End: 2024-08-15
Payer: MEDICARE

## 2024-08-15 DIAGNOSIS — J84.9 ILD (INTERSTITIAL LUNG DISEASE): Primary | ICD-10-CM

## 2024-08-15 PROCEDURE — 94626 PHY/QHP OP PULM RHB W/MNTR: CPT

## 2024-08-15 NOTE — PROGRESS NOTES
Daily report scanned into EMR by Eulalia Bartholomew, RRT  Monitored by Eulalia Bartholomew, RRT

## 2024-08-20 ENCOUNTER — CLINICAL SUPPORT (OUTPATIENT)
Dept: CARDIAC REHAB | Facility: HOSPITAL | Age: 69
End: 2024-08-20
Payer: MEDICARE

## 2024-08-20 DIAGNOSIS — J84.9 ILD (INTERSTITIAL LUNG DISEASE): Primary | ICD-10-CM

## 2024-08-20 PROCEDURE — 94626 PHY/QHP OP PULM RHB W/MNTR: CPT

## 2024-08-22 ENCOUNTER — CLINICAL SUPPORT (OUTPATIENT)
Dept: CARDIAC REHAB | Facility: HOSPITAL | Age: 69
End: 2024-08-22
Payer: MEDICARE

## 2024-08-22 DIAGNOSIS — J84.9 ILD (INTERSTITIAL LUNG DISEASE): Primary | ICD-10-CM

## 2024-08-22 PROCEDURE — 93798 PHYS/QHP OP CAR RHAB W/ECG: CPT

## 2024-08-27 ENCOUNTER — CLINICAL SUPPORT (OUTPATIENT)
Dept: CARDIAC REHAB | Facility: HOSPITAL | Age: 69
End: 2024-08-27
Payer: MEDICARE

## 2024-08-27 DIAGNOSIS — J84.9 ILD (INTERSTITIAL LUNG DISEASE): Primary | ICD-10-CM

## 2024-08-27 PROCEDURE — 94626 PHY/QHP OP PULM RHB W/MNTR: CPT

## 2024-08-29 ENCOUNTER — CLINICAL SUPPORT (OUTPATIENT)
Dept: CARDIAC REHAB | Facility: HOSPITAL | Age: 69
End: 2024-08-29
Payer: MEDICARE

## 2024-08-29 DIAGNOSIS — J84.9 ILD (INTERSTITIAL LUNG DISEASE): Primary | ICD-10-CM

## 2024-08-29 PROCEDURE — 94626 PHY/QHP OP PULM RHB W/MNTR: CPT

## 2024-08-30 DIAGNOSIS — R05.9 COUGH: ICD-10-CM

## 2024-08-30 DIAGNOSIS — K52.9 ACUTE GASTROENTERITIS: ICD-10-CM

## 2024-08-30 DIAGNOSIS — M34.9 SCLERODERMA: ICD-10-CM

## 2024-08-31 ENCOUNTER — HOSPITAL ENCOUNTER (INPATIENT)
Facility: HOSPITAL | Age: 69
LOS: 1 days | Discharge: HOME | DRG: 871 | End: 2024-09-02
Attending: EMERGENCY MEDICINE | Admitting: FAMILY MEDICINE
Payer: MEDICARE

## 2024-08-31 DIAGNOSIS — U07.1 COVID: Primary | ICD-10-CM

## 2024-08-31 DIAGNOSIS — R00.0 TACHYCARDIA: ICD-10-CM

## 2024-08-31 PROBLEM — J96.01 ACUTE RESPIRATORY FAILURE WITH HYPOXIA: Status: ACTIVE | Noted: 2024-08-31

## 2024-08-31 PROBLEM — A41.9 SEPSIS: Status: ACTIVE | Noted: 2020-12-18

## 2024-08-31 LAB
25(OH)D3+25(OH)D2 SERPL-MCNC: 39 NG/ML (ref 30–96)
ALBUMIN SERPL BCP-MCNC: 4.3 G/DL (ref 3.5–5.2)
ALP SERPL-CCNC: 45 U/L (ref 55–135)
ALT SERPL W/O P-5'-P-CCNC: 10 U/L (ref 10–44)
ANION GAP SERPL CALC-SCNC: 8 MMOL/L (ref 8–16)
APTT PPP: 29.8 SEC (ref 21–32)
AST SERPL-CCNC: 15 U/L (ref 10–40)
BACTERIA #/AREA URNS HPF: NORMAL /HPF
BASOPHILS # BLD AUTO: 0.02 K/UL (ref 0–0.2)
BASOPHILS NFR BLD: 0.3 % (ref 0–1.9)
BILIRUB SERPL-MCNC: 0.6 MG/DL (ref 0.1–1)
BILIRUB UR QL STRIP: NEGATIVE
BNP SERPL-MCNC: 23 PG/ML (ref 0–99)
BUN SERPL-MCNC: 14 MG/DL (ref 8–23)
CALCIUM SERPL-MCNC: 9.3 MG/DL (ref 8.7–10.5)
CHLORIDE SERPL-SCNC: 103 MMOL/L (ref 95–110)
CK SERPL-CCNC: 73 U/L (ref 20–180)
CLARITY UR: ABNORMAL
CO2 SERPL-SCNC: 25 MMOL/L (ref 23–29)
COLOR UR: YELLOW
CREAT SERPL-MCNC: 0.9 MG/DL (ref 0.5–1.4)
D DIMER PPP IA.FEU-MCNC: 1.02 MG/L FEU (ref 0–0.49)
DIFFERENTIAL METHOD BLD: ABNORMAL
EOSINOPHIL # BLD AUTO: 0.1 K/UL (ref 0–0.5)
EOSINOPHIL NFR BLD: 0.8 % (ref 0–8)
ERYTHROCYTE [DISTWIDTH] IN BLOOD BY AUTOMATED COUNT: 13.1 % (ref 11.5–14.5)
ERYTHROCYTE [SEDIMENTATION RATE] IN BLOOD BY WESTERGREN METHOD: 15 MM/HR (ref 0–20)
EST. GFR  (NO RACE VARIABLE): >60 ML/MIN/1.73 M^2
FERRITIN SERPL-MCNC: 147.5 NG/ML (ref 20–300)
GLUCOSE SERPL-MCNC: 121 MG/DL (ref 70–110)
GLUCOSE UR QL STRIP: NEGATIVE
HCT VFR BLD AUTO: 40.9 % (ref 37–48.5)
HGB BLD-MCNC: 13 G/DL (ref 12–16)
HGB UR QL STRIP: ABNORMAL
IMM GRANULOCYTES # BLD AUTO: 0.03 K/UL (ref 0–0.04)
IMM GRANULOCYTES NFR BLD AUTO: 0.5 % (ref 0–0.5)
INFLUENZA A, MOLECULAR: NEGATIVE
INFLUENZA B, MOLECULAR: NEGATIVE
INR PPP: 1 (ref 0.8–1.2)
KETONES UR QL STRIP: NEGATIVE
LACTATE SERPL-SCNC: 0.8 MMOL/L (ref 0.5–1.9)
LDH SERPL L TO P-CCNC: 1.01 MMOL/L (ref 0.5–2.2)
LDH SERPL L TO P-CCNC: 169 U/L (ref 110–260)
LEUKOCYTE ESTERASE UR QL STRIP: ABNORMAL
LYMPHOCYTES # BLD AUTO: 0.4 K/UL (ref 1–4.8)
LYMPHOCYTES NFR BLD: 6.5 % (ref 18–48)
MCH RBC QN AUTO: 30.7 PG (ref 27–31)
MCHC RBC AUTO-ENTMCNC: 31.8 G/DL (ref 32–36)
MCV RBC AUTO: 97 FL (ref 82–98)
MICROSCOPIC COMMENT: NORMAL
MONOCYTES # BLD AUTO: 0.7 K/UL (ref 0.3–1)
MONOCYTES NFR BLD: 10.5 % (ref 4–15)
NEUTROPHILS # BLD AUTO: 5.3 K/UL (ref 1.8–7.7)
NEUTROPHILS NFR BLD: 81.4 % (ref 38–73)
NITRITE UR QL STRIP: NEGATIVE
NRBC BLD-RTO: 0 /100 WBC
OHS QRS DURATION: 64 MS
OHS QTC CALCULATION: 422 MS
PH UR STRIP: 6 [PH] (ref 5–8)
PLATELET # BLD AUTO: 139 K/UL (ref 150–450)
PMV BLD AUTO: 11.3 FL (ref 9.2–12.9)
POTASSIUM SERPL-SCNC: 3.6 MMOL/L (ref 3.5–5.1)
PROCALCITONIN SERPL IA-MCNC: 0.07 NG/ML (ref 0–0.5)
PROT SERPL-MCNC: 7.3 G/DL (ref 6–8.4)
PROT UR QL STRIP: NEGATIVE
PROTHROMBIN TIME: 11.2 SEC (ref 9–12.5)
RBC # BLD AUTO: 4.24 M/UL (ref 4–5.4)
RBC #/AREA URNS HPF: 3 /HPF (ref 0–4)
SAMPLE: NORMAL
SARS-COV-2 RDRP RESP QL NAA+PROBE: POSITIVE
SODIUM SERPL-SCNC: 136 MMOL/L (ref 136–145)
SP GR UR STRIP: 1.01 (ref 1–1.03)
SPECIMEN SOURCE: NORMAL
SQUAMOUS #/AREA URNS HPF: 13 /HPF
TROPONIN I SERPL HS-MCNC: 4.4 PG/ML (ref 0–14.9)
URN SPEC COLLECT METH UR: ABNORMAL
UROBILINOGEN UR STRIP-ACNC: NEGATIVE EU/DL
WBC # BLD AUTO: 6.49 K/UL (ref 3.9–12.7)
WBC #/AREA URNS HPF: 4 /HPF (ref 0–5)

## 2024-08-31 PROCEDURE — 87502 INFLUENZA DNA AMP PROBE: CPT | Performed by: EMERGENCY MEDICINE

## 2024-08-31 PROCEDURE — 80053 COMPREHEN METABOLIC PANEL: CPT | Performed by: EMERGENCY MEDICINE

## 2024-08-31 PROCEDURE — 83615 LACTATE (LD) (LDH) ENZYME: CPT | Performed by: NURSE PRACTITIONER

## 2024-08-31 PROCEDURE — G0378 HOSPITAL OBSERVATION PER HR: HCPCS

## 2024-08-31 PROCEDURE — 96366 THER/PROPH/DIAG IV INF ADDON: CPT

## 2024-08-31 PROCEDURE — XW033E5 INTRODUCTION OF REMDESIVIR ANTI-INFECTIVE INTO PERIPHERAL VEIN, PERCUTANEOUS APPROACH, NEW TECHNOLOGY GROUP 5: ICD-10-PCS | Performed by: EMERGENCY MEDICINE

## 2024-08-31 PROCEDURE — 96367 TX/PROPH/DG ADDL SEQ IV INF: CPT

## 2024-08-31 PROCEDURE — 84145 PROCALCITONIN (PCT): CPT | Performed by: NURSE PRACTITIONER

## 2024-08-31 PROCEDURE — 96365 THER/PROPH/DIAG IV INF INIT: CPT

## 2024-08-31 PROCEDURE — 82306 VITAMIN D 25 HYDROXY: CPT | Performed by: NURSE PRACTITIONER

## 2024-08-31 PROCEDURE — 83880 ASSAY OF NATRIURETIC PEPTIDE: CPT | Performed by: NURSE PRACTITIONER

## 2024-08-31 PROCEDURE — 81001 URINALYSIS AUTO W/SCOPE: CPT | Performed by: EMERGENCY MEDICINE

## 2024-08-31 PROCEDURE — 25000003 PHARM REV CODE 250: Performed by: NURSE PRACTITIONER

## 2024-08-31 PROCEDURE — 85651 RBC SED RATE NONAUTOMATED: CPT | Performed by: NURSE PRACTITIONER

## 2024-08-31 PROCEDURE — 87040 BLOOD CULTURE FOR BACTERIA: CPT | Mod: 59 | Performed by: EMERGENCY MEDICINE

## 2024-08-31 PROCEDURE — 82550 ASSAY OF CK (CPK): CPT | Performed by: NURSE PRACTITIONER

## 2024-08-31 PROCEDURE — 85025 COMPLETE CBC W/AUTO DIFF WBC: CPT | Performed by: EMERGENCY MEDICINE

## 2024-08-31 PROCEDURE — 63600175 PHARM REV CODE 636 W HCPCS: Performed by: EMERGENCY MEDICINE

## 2024-08-31 PROCEDURE — U0002 COVID-19 LAB TEST NON-CDC: HCPCS | Performed by: EMERGENCY MEDICINE

## 2024-08-31 PROCEDURE — 82728 ASSAY OF FERRITIN: CPT | Performed by: NURSE PRACTITIONER

## 2024-08-31 PROCEDURE — 85730 THROMBOPLASTIN TIME PARTIAL: CPT | Performed by: NURSE PRACTITIONER

## 2024-08-31 PROCEDURE — 84484 ASSAY OF TROPONIN QUANT: CPT | Performed by: NURSE PRACTITIONER

## 2024-08-31 PROCEDURE — 96372 THER/PROPH/DIAG INJ SC/IM: CPT | Performed by: NURSE PRACTITIONER

## 2024-08-31 PROCEDURE — 85379 FIBRIN DEGRADATION QUANT: CPT | Performed by: NURSE PRACTITIONER

## 2024-08-31 PROCEDURE — 25000003 PHARM REV CODE 250: Performed by: EMERGENCY MEDICINE

## 2024-08-31 PROCEDURE — 83605 ASSAY OF LACTIC ACID: CPT | Performed by: EMERGENCY MEDICINE

## 2024-08-31 PROCEDURE — 99285 EMERGENCY DEPT VISIT HI MDM: CPT | Mod: 25

## 2024-08-31 PROCEDURE — 85610 PROTHROMBIN TIME: CPT | Performed by: NURSE PRACTITIONER

## 2024-08-31 PROCEDURE — 63600175 PHARM REV CODE 636 W HCPCS: Performed by: NURSE PRACTITIONER

## 2024-08-31 PROCEDURE — 36415 COLL VENOUS BLD VENIPUNCTURE: CPT | Performed by: EMERGENCY MEDICINE

## 2024-08-31 RX ORDER — LANOLIN ALCOHOL/MO/W.PET/CERES
800 CREAM (GRAM) TOPICAL
Status: DISCONTINUED | OUTPATIENT
Start: 2024-08-31 | End: 2024-09-02 | Stop reason: HOSPADM

## 2024-08-31 RX ORDER — MYCOPHENOLATE MOFETIL 500 MG/1
1500 TABLET ORAL 2 TIMES DAILY
Status: CANCELLED | OUTPATIENT
Start: 2024-08-31

## 2024-08-31 RX ORDER — GUAIFENESIN AND DEXTROMETHORPHAN HYDROBROMIDE 10; 100 MG/5ML; MG/5ML
10 SYRUP ORAL EVERY 4 HOURS PRN
Status: DISCONTINUED | OUTPATIENT
Start: 2024-08-31 | End: 2024-09-02 | Stop reason: HOSPADM

## 2024-08-31 RX ORDER — SODIUM CHLORIDE 0.9 % (FLUSH) 0.9 %
10 SYRINGE (ML) INJECTION
Status: DISCONTINUED | OUTPATIENT
Start: 2024-08-31 | End: 2024-09-02 | Stop reason: HOSPADM

## 2024-08-31 RX ORDER — ALBUTEROL SULFATE 0.83 MG/ML
2.5 SOLUTION RESPIRATORY (INHALATION) EVERY 8 HOURS
Status: DISCONTINUED | OUTPATIENT
Start: 2024-09-01 | End: 2024-09-02 | Stop reason: HOSPADM

## 2024-08-31 RX ORDER — SODIUM,POTASSIUM PHOSPHATES 280-250MG
2 POWDER IN PACKET (EA) ORAL
Status: DISCONTINUED | OUTPATIENT
Start: 2024-08-31 | End: 2024-09-02 | Stop reason: HOSPADM

## 2024-08-31 RX ORDER — IBUPROFEN 400 MG/1
800 TABLET ORAL
Status: DISPENSED | OUTPATIENT
Start: 2024-08-31 | End: 2024-09-01

## 2024-08-31 RX ORDER — FLUTICASONE PROPIONATE 50 MCG
1 SPRAY, SUSPENSION (ML) NASAL DAILY
Status: CANCELLED | OUTPATIENT
Start: 2024-09-01

## 2024-08-31 RX ORDER — ACETAMINOPHEN 325 MG/1
650 TABLET ORAL EVERY 4 HOURS PRN
Status: DISCONTINUED | OUTPATIENT
Start: 2024-08-31 | End: 2024-09-02 | Stop reason: HOSPADM

## 2024-08-31 RX ORDER — MONTELUKAST SODIUM 10 MG/1
10 TABLET ORAL NIGHTLY
Status: CANCELLED | OUTPATIENT
Start: 2024-08-31

## 2024-08-31 RX ORDER — ACETAMINOPHEN 500 MG
1000 TABLET ORAL
Status: COMPLETED | OUTPATIENT
Start: 2024-08-31 | End: 2024-08-31

## 2024-08-31 RX ORDER — ENOXAPARIN SODIUM 100 MG/ML
1 INJECTION SUBCUTANEOUS 2 TIMES DAILY
Status: DISCONTINUED | OUTPATIENT
Start: 2024-08-31 | End: 2024-09-02 | Stop reason: HOSPADM

## 2024-08-31 RX ORDER — TALC
6 POWDER (GRAM) TOPICAL NIGHTLY PRN
Status: DISCONTINUED | OUTPATIENT
Start: 2024-08-31 | End: 2024-09-02 | Stop reason: HOSPADM

## 2024-08-31 RX ORDER — ONDANSETRON HYDROCHLORIDE 2 MG/ML
4 INJECTION, SOLUTION INTRAVENOUS EVERY 6 HOURS PRN
Status: DISCONTINUED | OUTPATIENT
Start: 2024-08-31 | End: 2024-09-02 | Stop reason: HOSPADM

## 2024-08-31 RX ORDER — ASCORBIC ACID 500 MG
500 TABLET ORAL 2 TIMES DAILY
Status: DISCONTINUED | OUTPATIENT
Start: 2024-08-31 | End: 2024-09-02 | Stop reason: HOSPADM

## 2024-08-31 RX ORDER — ALBUTEROL SULFATE 90 UG/1
2 INHALANT RESPIRATORY (INHALATION) EVERY 4 HOURS PRN
Status: CANCELLED | OUTPATIENT
Start: 2024-08-31

## 2024-08-31 RX ORDER — LANOLIN ALCOHOL/MO/W.PET/CERES
400 CREAM (GRAM) TOPICAL DAILY
Status: CANCELLED | OUTPATIENT
Start: 2024-09-01

## 2024-08-31 RX ORDER — PANTOPRAZOLE SODIUM 40 MG/1
40 TABLET, DELAYED RELEASE ORAL 2 TIMES DAILY
Status: CANCELLED | OUTPATIENT
Start: 2024-08-31

## 2024-08-31 RX ORDER — TRAZODONE HYDROCHLORIDE 50 MG/1
100 TABLET ORAL NIGHTLY PRN
Status: CANCELLED | OUTPATIENT
Start: 2024-08-31

## 2024-08-31 RX ORDER — ALBUTEROL SULFATE 90 UG/1
2 INHALANT RESPIRATORY (INHALATION) EVERY 8 HOURS
Status: DISCONTINUED | OUTPATIENT
Start: 2024-09-01 | End: 2024-08-31

## 2024-08-31 RX ORDER — OXYCODONE HYDROCHLORIDE 5 MG/1
5 TABLET ORAL EVERY 6 HOURS PRN
Status: DISCONTINUED | OUTPATIENT
Start: 2024-08-31 | End: 2024-09-02 | Stop reason: HOSPADM

## 2024-08-31 RX ADMIN — ENOXAPARIN SODIUM 90 MG: 100 INJECTION SUBCUTANEOUS at 09:08

## 2024-08-31 RX ADMIN — OXYCODONE HYDROCHLORIDE AND ACETAMINOPHEN 500 MG: 500 TABLET ORAL at 09:08

## 2024-08-31 RX ADMIN — SODIUM CHLORIDE 1000 ML: 9 INJECTION, SOLUTION INTRAVENOUS at 05:08

## 2024-08-31 RX ADMIN — REMDESIVIR 200 MG: 100 INJECTION, POWDER, LYOPHILIZED, FOR SOLUTION INTRAVENOUS at 11:08

## 2024-08-31 RX ADMIN — ACETAMINOPHEN 1000 MG: 500 TABLET, FILM COATED ORAL at 02:08

## 2024-08-31 RX ADMIN — VANCOMYCIN HYDROCHLORIDE 1750 MG: 1 INJECTION, POWDER, LYOPHILIZED, FOR SOLUTION INTRAVENOUS at 04:08

## 2024-08-31 RX ADMIN — PIPERACILLIN SODIUM AND TAZOBACTAM SODIUM 3.38 G: 3; .375 INJECTION, POWDER, LYOPHILIZED, FOR SOLUTION INTRAVENOUS at 09:08

## 2024-08-31 RX ADMIN — PIPERACILLIN AND TAZOBACTAM 4.5 G: 4; .5 INJECTION, POWDER, LYOPHILIZED, FOR SOLUTION INTRAVENOUS; PARENTERAL at 02:08

## 2024-08-31 NOTE — ED PROVIDER NOTES
Encounter Date: 8/31/2024       History     Chief Complaint   Patient presents with    Cough    Chills    Nasal Congestion     Reports symptoms started 2 days ago     Chest Pain     Due to coughing       69-year-old female history of COPD, emphysema, pneumonia interstitial lung disease presents with 2 days of runny nose cough congestion fever chills and myalgias.  Patient has not taken any medications at home prior to arrival.  She is on oxygen at night but not around the clock.    The history is provided by the patient.     Review of patient's allergies indicates:   Allergen Reactions    Hydroxychloroquine Other (See Comments)     Having eye reaction, needs to stop plaquenil and stay off of it.      Past Medical History:   Diagnosis Date    Allergy     Arthritis     Back pain     Colon polyps     COPD (chronic obstructive pulmonary disease)     Emphysema of lung     GERD (gastroesophageal reflux disease)     Hypertension     Kidney stones     Obesity     Pneumonia     Pneumonia due to other staphylococcus     Pulmonary fibrosis     Pulmonary hypertension     Scleroderma involving lung since she was 47 y/o    Sleep apnea     Trouble in sleeping      Past Surgical History:   Procedure Laterality Date    COLONOSCOPY N/A 8/7/2018    Procedure: COLONOSCOPY;  Surgeon: Ngozi Prince MD;  Location: [facility];  Service: Endoscopy;  Laterality: N/A;    COLONOSCOPY N/A 11/21/2019    Procedure: COLONOSCOPY;  Surgeon: Ngozi Prince MD;  Location: [facility];  Service: Endoscopy;  Laterality: N/A;    ESOPHAGEAL MANOMETRY WITH MEASUREMENT OF IMPEDANCE N/A 7/27/2020    Procedure: MANOMETRY, ESOPHAGUS, WITH IMPEDANCE MEASUREMENT;  Surgeon: Bhupendra Temple MD;  Location: [facility] ([address] [place] [address]);  Service: Endoscopy;  Laterality: N/A;  3/10 - pt confirmed apptCovid test ordered for 7/24 in [place].[facility]    ESOPHAGOGASTRODUODENOSCOPY N/A 8/7/2018    Procedure: EGD (ESOPHAGOGASTRODUODENOSCOPY);  Surgeon: Ngozi Prince MD;  Location:  [facility] ENDO;  Service: Endoscopy;  Laterality: N/A;    ESOPHAGOGASTRODUODENOSCOPY N/A 11/21/2019    Procedure: EGD (ESOPHAGOGASTRODUODENOSCOPY);  Surgeon: Ngozi Prince MD;  Location: [facility] ENDO;  Service: Endoscopy;  Laterality: N/A;    ESOPHAGOGASTRODUODENOSCOPY N/A 1/29/2020    Procedure: EGD (ESOPHAGOGASTRODUODENOSCOPY);  Surgeon: Ngozi Prince MD;  Location: [facility] ENDO;  Service: Endoscopy;  Laterality: N/A;    ESOPHAGOGASTRODUODENOSCOPY N/A 8/20/2020    Procedure: EGD (ESOPHAGOGASTRODUODENOSCOPY);  Surgeon: Ngozi Prince MD;  Location: [facility] ENDO;  Service: Endoscopy;  Laterality: N/A;    ESOPHAGOGASTRODUODENOSCOPY N/A 12/4/2020    Procedure: EGD (ESOPHAGOGASTRODUODENOSCOPY);  Surgeon: Ngozi Prince MD;  Location: [facility] ENDO;  Service: Endoscopy;  Laterality: N/A;    ESOPHAGOGASTRODUODENOSCOPY N/A 11/19/2021    Procedure: EGD (ESOPHAGOGASTRODUODENOSCOPY);  Surgeon: Ngozi Prince MD;  Location: [facility] ENDO;  Service: Endoscopy;  Laterality: N/A;    ESOPHAGOGASTRODUODENOSCOPY N/A 5/17/2024    Procedure: EGD (ESOPHAGOGASTRODUODENOSCOPY);  Surgeon: Ngozi Prince MD;  Location: [facility];  Service: Endoscopy;  Laterality: N/A;    REPAIR, HERNIA, INCISIONAL OR VENTRAL, WITHOUT HISTORY OF PRIOR REPAIR N/A 12/27/2022    Procedure: REPAIR, HERNIA umbilical, INCISIONAL OR VENTRAL, WITHOUT HISTORY OF PRIOR REPAIR;  Surgeon: Trent Evans MD;  Location: [facility] OR;  Service: General;  Laterality: N/A;  RN PREOP 12/20/2022, PT INSTRUCTED TO BRING ALL MEDS WITH HER ON AM OF SURGERY    RIGHT HEART CATHETERIZATION Right 4/4/2024    Procedure: INSERTION, CATHETER, RIGHT HEART;  Surgeon: Puma Conterras MD;  Location: [facility] CATH LAB;  Service: Cardiology;  Laterality: Right;     Family History   Problem Relation Name Age of Onset    Kidney disease Mother      Cancer Father      Heart disease Brother      Mental illness Son      Glaucoma Neg Hx      Macular degeneration Neg Hx      Retinal detachment Neg  Hx       Social History     Tobacco Use    Smoking status: Former     Current packs/day: 0.00     Average packs/day: 0.5 packs/day for 27.7 years (13.8 ttl pk-yrs)     Types: Cigarettes     Start date:      Quit date: 1995     Years since quittin.0    Smokeless tobacco: Never   Substance Use Topics    Alcohol use: Not Currently     Comment: occ    Drug use: No     Review of Systems   Constitutional:  Positive for activity change, appetite change, chills, fatigue and fever.   HENT:  Positive for rhinorrhea.    Respiratory:  Positive for cough.    Musculoskeletal:  Positive for myalgias.   Neurological:  Positive for headaches.   All other systems reviewed and are negative.      Physical Exam     Initial Vitals [24 1327]   BP Pulse Resp Temp SpO2   (!) 143/80 (!) 116 (!) 22 (!) 102.7 °F (39.3 °C) (!) 88 %      MAP       --         Physical Exam    Nursing note and vitals reviewed.  Constitutional: She appears well-developed and well-nourished.   Warm to touch   HENT:   Head: Normocephalic and atraumatic.   Eyes: EOM are normal.   Neck: Neck supple.   Normal range of motion.  Cardiovascular:  Regular rhythm and normal heart sounds.           Pulmonary/Chest: Breath sounds normal. No respiratory distress. She has no wheezes. She has no rales.   Musculoskeletal:         General: Normal range of motion.      Cervical back: Normal range of motion and neck supple.     Neurological: She is alert and oriented to person, place, and time.   Skin: Skin is warm and dry.   Psychiatric: She has a normal mood and affect. Her behavior is normal. Judgment and thought content normal.         ED Course   Critical Care    Date/Time: 2024 9:19 PM    Performed by: Lobo Javier MD  Authorized by: Cierra Bhandari MD  Direct patient critical care time: 30 minutes  Additional history critical care time: 5 minutes  Ordering / reviewing critical care time: 5 minutes  Documentation critical care time: 4  minutes  Consulting other physicians critical care time: 3 minutes  Total critical care time (exclusive of procedural time) : 47 minutes  Critical care was necessary to treat or prevent imminent or life-threatening deterioration of the following conditions: hypoxia.  Critical care was time spent personally by me on the following activities: discussions with consultants, evaluation of patient's response to treatment, examination of patient, ordering and review of laboratory studies, ordering and performing treatments and interventions, obtaining history from patient or surrogate, ordering and review of radiographic studies, pulse oximetry, re-evaluation of patient's condition and review of old charts.        Labs Reviewed   CBC W/ AUTO DIFFERENTIAL - Abnormal       Result Value    WBC 6.49      RBC 4.24      Hemoglobin 13.0      Hematocrit 40.9      MCV 97      MCH 30.7      MCHC 31.8 (*)     RDW 13.1      Platelets 139 (*)     MPV 11.3      Immature Granulocytes 0.5      Gran # (ANC) 5.3      Immature Grans (Abs) 0.03      Lymph # 0.4 (*)     Mono # 0.7      Eos # 0.1      Baso # 0.02      nRBC 0      Gran % 81.4 (*)     Lymph % 6.5 (*)     Mono % 10.5      Eosinophil % 0.8      Basophil % 0.3      Differential Method Automated     COMPREHENSIVE METABOLIC PANEL - Abnormal    Sodium 136      Potassium 3.6      Chloride 103      CO2 25      Glucose 121 (*)     BUN 14      Creatinine 0.9      Calcium 9.3      Total Protein 7.3      Albumin 4.3      Total Bilirubin 0.6      Alkaline Phosphatase 45 (*)     AST 15      ALT 10      eGFR >60.0      Anion Gap 8     URINALYSIS, REFLEX TO URINE CULTURE - Abnormal    Specimen UA Urine, Clean Catch      Color, UA Yellow      Appearance, UA Hazy (*)     pH, UA 6.0      Specific Gravity, UA 1.015      Protein, UA Negative      Glucose, UA Negative      Ketones, UA Negative      Bilirubin (UA) Negative      Occult Blood UA 1+ (*)     Nitrite, UA Negative      Urobilinogen, UA  Negative      Leukocytes, UA 1+ (*)     Narrative:     Specimen Source->Urine   SARS-COV-2 RNA AMPLIFICATION, QUAL - Abnormal    SARS-CoV-2 RNA, Amplification, Qual Positive (*)    CULTURE, BLOOD    Blood Culture, Routine No Growth to date      Narrative:     Aerobic and anaerobic   CULTURE, BLOOD    Blood Culture, Routine No Growth to date      Narrative:     Aerobic and anaerobic   INFLUENZA A AND B ANTIGEN    Influenza A, Molecular Negative      Influenza B, Molecular Negative      Flu A & B Source Nasal swab      Narrative:     Specimen Source->Nasopharyngeal Swab   URINALYSIS MICROSCOPIC    RBC, UA 3      WBC, UA 4      Bacteria Rare      Squam Epithel, UA 13      Microscopic Comment SEE COMMENT      Narrative:     Specimen Source->Urine   LACTIC ACID, PLASMA    Lactate (Lactic Acid) 0.8     SEDIMENTATION RATE    Sed Rate 15      Narrative:     In order to access the algorithm for troponin high  sensitivity orders access the address below:  [email or web address] SENSITIVITY  [email or web address]   CK    CPK 73      Narrative:     In order to access the algorithm for troponin high  sensitivity orders access the address below:  [email or web address]/[email or web address] SENSITIVITY  [email or web address]   LACTATE DEHYDROGENASE          Narrative:     In order to access the algorithm for troponin high  sensitivity orders access the address below:  [email or web address] SENSITIVITY  [email or web address]   FERRITIN    Ferritin 147.5      Narrative:     In order to access the algorithm for troponin high  sensitivity orders access the address below:  [email or web address] SENSITIVITY  [email or web address]   TROPONIN I HIGH SENSITIVITY    Troponin I High Sensitivity 4.4      Narrative:     In order to access the algorithm for troponin high  sensitivity orders access the address below:  [email or web address]/[email or web address] SENSITIVITY  [email or web address]    B-TYPE NATRIURETIC PEPTIDE    BNP 23      Narrative:     In order to access the algorithm for troponin high  sensitivity orders access the address below:  [email or web address] [email or web address]   PROCALCITONIN    Procalcitonin 0.072      Narrative:     In order to access the algorithm for troponin high  sensitivity orders access the address below:  [email or web address] SENSITIVITY  [email or web address]   VITAMIN D   PROTIME-INR   APTT   D DIMER, QUANTITATIVE   ISTAT LACTATE    POC Lactate 1.01      Sample VENOUS     POCT LACTATE        ECG Results              EKG 12-lead (In process)        Collection Time Result Time QRS Duration OHS QTC Calculation    08/31/24 14:43:28 08/31/24 15:38:57 64 422                     In process by Interface, [facility] (08/31/24 15:39:04)                   Narrative:    Test Reason : R00.0,    Vent. Rate : 095 BPM     Atrial Rate : 095 BPM     P-R Int : 234 ms          QRS Dur : 064 ms      QT Int : 336 ms       P-R-T Axes : 033 017 047 degrees     QTc Int : 422 ms    Sinus rhythm with 1st degree A-V block  Nonspecific T wave abnormality  Abnormal ECG  When compared with ECG of 01-DEC-2022 08:11,  Previous ECG has undetermined rhythm, needs review  Criteria for Anterior infarct are no longer Present  Nonspecific T wave abnormality has replaced inverted T waves in Anterior  leads  T wave amplitude has decreased in Lateral leads    Referred By: [email or web address]   SELF           Confirmed By:                                   Imaging Results              X-Ray Chest AP Portable (Final result)  Result time 08/31/24 15:00:52      Final result by Miquel Rizvi MD (08/31/24 15:00:52)                   Impression:      No evidence of acute cardiopulmonary disease.      Electronically signed by: Miquel Rizvi  Date:    08/31/2024  Time:    15:00               Narrative:    EXAMINATION:  XR CHEST AP PORTABLE    CLINICAL HISTORY:  Sepsis;  cough, chills    FINDINGS:  Portable chest radiograph at 13:43 hours compared to prior exams shows the cardiomediastinal silhouette and pulmonary vasculature are within normal limits.    The lungs are normally expanded, with no consolidation, large pleural effusion, or evidence of pulmonary edema.  Scattered interstitial opacities are unchanged, with no pneumothorax or acute fracture.                                       Medications   ibuprofen tablet 800 mg (800 mg Oral Not Given 8/31/24 1400)   sodium chloride 0.9% flush 10 mL (has no administration in time range)   remdesivir 200 mg in 0.9% NaCl 250 mL infusion (has no administration in time range)   remdesivir 100 mg in 0.9% NaCl 250 mL infusion (has no administration in time range)   ascorbic acid (vitamin C) tablet 500 mg (has no administration in time range)   multivitamin tablet (has no administration in time range)   dexAMETHasone tablet 6 mg (has no administration in time range)   potassium bicarbonate disintegrating tablet 50 mEq (has no administration in time range)   potassium bicarbonate disintegrating tablet 35 mEq (has no administration in time range)   potassium bicarbonate disintegrating tablet 60 mEq (has no administration in time range)   potassium, sodium phosphates 280-160-250 mg packet 2 packet (has no administration in time range)   potassium, sodium phosphates 280-160-250 mg packet 2 packet (has no administration in time range)   potassium, sodium phosphates 280-160-250 mg packet 2 packet (has no administration in time range)   magnesium oxide tablet 800 mg (has no administration in time range)   magnesium oxide tablet 800 mg (has no administration in time range)   acetaminophen tablet 650 mg (has no administration in time range)   oxyCODONE immediate release tablet 5 mg (has no administration in time range)   enoxaparin injection 90 mg (has no administration in time range)   ondansetron injection 4 mg (has no administration in time range)    dextromethorphan-guaiFENesin  mg/5 ml liquid 10 mL (has no administration in time range)   melatonin tablet 6 mg (has no administration in time range)   vancomycin - pharmacy to dose (has no administration in time range)   piperacillin-tazobactam 3.375 g in dextrose 5 % 100 mL IVPB (ready to mix) (has no administration in time range)   albuterol nebulizer solution 2.5 mg (has no administration in time range)   piperacillin-tazobactam 4.5 g in dextrose 5 % 100 mL IVPB (ready to mix) (0 g Intravenous Stopped 8/31/24 1531)   vancomycin (VANCOCIN) 1,750 mg in D5W 500 mL IVPB (0 mg Intravenous Stopped 8/31/24 1859)   acetaminophen tablet 1,000 mg (1,000 mg Oral Given 8/31/24 1406)   sodium chloride 0.9% bolus 1,000 mL 1,000 mL (1,000 mLs Intravenous New Bag 8/31/24 1702)     Medical Decision Making  69-year-old female with COPD presents with cough chills nasal congestion, febrile in the ER.  COVID is positive.  No distress in the emergency room.  She will be admitted for COVID hypoxia.    Amount and/or Complexity of Data Reviewed  Labs: ordered. Decision-making details documented in ED Course.  Radiology: ordered. Decision-making details documented in ED Course.  ECG/medicine tests: ordered and independent interpretation performed. Decision-making details documented in ED Course.    Risk  OTC drugs.  Prescription drug management.  Decision regarding hospitalization.               ED Course as of 08/31/24 2120   Sat Aug 31, 2024   1328 Temp(!): 102.7 °F (39.3 °C) [EF]   1329 Temp Source: Oral [EF]   1329 Pulse(!): 116 [EF]   1329 Resp(!): 22 [EF]   1329 SpO2(!): 88 % [EF]   1446 Sinus rhythm first-degree AV block 95 beats per minute normal axis no ST elevation or depression or T-wave inversion independently interpreted [EF]   1508 X-Ray Chest AP Portable [EF]   1549 Temp(!): 101.2 °F (38.4 °C) [EF]   1550 POC Lactate: 1.01 [EF]   1621 Influenza A, Molecular: Negative [EF]   1621 Influenza B, Molecular: Negative  [EF]   1712 Lab had not run covid, they are doing now [EF]   1724 SARS-CoV-2 RNA, Amplification, Qual(!!): Positive  Covid pos as expected will have to be admitted [EF]   1737 Jackie to admit [EF]      ED Course User Index  [EF] Lobo Javier MD                           Clinical Impression:  Final diagnoses:  [R00.0] Tachycardia          ED Disposition Condition    Observation                 Lobo Javier MD  08/31/24 1844

## 2024-08-31 NOTE — ASSESSMENT & PLAN NOTE
History of interstitial lung disease, COPD, scleroderma Left lung    Patient with Hypoxic Respiratory failure which is Acute on chronic.  she is on home oxygen at 2 LPM at night. Supplemental oxygen was provided and noted-      .   Signs/symptoms of respiratory failure include- tachypnea, increased work of breathing, respiratory distress, use of accessory muscles, wheezing, and cyanosis. Contributing diagnoses includes - COPD, Interstitial lung disease, and covid  Labs and images were reviewed. Patient Has not had a recent ABG. Will treat underlying causes and adjust management of respiratory failure as follows-   Inhalers, supplemental oxygen, steroids.

## 2024-08-31 NOTE — HPI
69-year-old female history of tobacco use (10 PY hx, quit 25 years ago), HTN, systemic sclerosis complicated by ILD/PH/Raynaud's/esophageal involvement, and ELAINE on CPAP  COPD, emphysema, pneumonia interstitial lung disease presents with 2 days of runny nose cough congestion fever chills and myalgias.  Patient has not taken any medications at home prior to arrival.  She is on oxygen at night but not around the clock.  In the ER, T-max 102.7°, tachycardic at 1:16 a.m., tachypneic at 22, hypotensive 96/54 hypoxic at 88% on room air.  Placed on supplemental oxygen septic workup started   Tylenol p.o. given IV Zosyn given, COVID positive, influenza negative, white count 6 UA negative lactic 1.01, blood culture sent, chest x-ray with no consolidation, large pleural effusion, or evidence of pulmonary edema. Scattered interstitial opacities are unchanged.  Patient on supplemental oxygen.  Admit to hospital medicine for further medical management

## 2024-08-31 NOTE — ASSESSMENT & PLAN NOTE
"This patient does have evidence of infective focus  My overall impression is sepsis.  Source: Respiratory  Antibiotics given-   Antibiotics (72h ago, onward)      Start     Stop Route Frequency Ordered    08/31/24 2200  piperacillin-tazobactam 3.375 g in dextrose 5 % 100 mL IVPB (ready to mix)         -- IV Every 8 hours (non-standard times) 08/31/24 1833    08/31/24 1932  vancomycin - pharmacy to dose  (vancomycin IVPB (PEDS and ADULTS))        Placed in "And" Linked Group    -- IV pharmacy to manage frequency 08/31/24 1833    08/31/24 1400  vancomycin (VANCOCIN) 1,750 mg in D5W 500 mL IVPB  (Sepsis Workup)         09/01/24 0159 IV ED 1 Time 08/31/24 1334          Latest lactate reviewed-  Recent Labs   Lab 08/31/24  1400   POCLAC 1.01     Organ dysfunction indicated by Acute respiratory failure    Fluid challenge 1 L NS given     Post- resuscitation assessment No - Post resuscitation assessment not needed       Will Not start Pressors- Levophed for MAP of 65  Source control achieved by:   "

## 2024-08-31 NOTE — PROGRESS NOTES
Automatic Inhaler to Nebulizer Interchange    albuterol (Ventolin, ProAir, or Proventil)  mcg given multiple times per day changed to albuterol 2.5 mg Q8H  per [facility] Automatic Therapeutic Substitutions Protocol.    Please contact pharmacy at extension [phone] with any questions.     Thank you,   Jean-Pierre Tamayo

## 2024-08-31 NOTE — ASSESSMENT & PLAN NOTE
Patient is identified as Severe COVID-19 based on hypoxemia with O2 saturations <94% on room air or on ambulation   Initiate standard COVID protocols; COVID-19 testing ,Infection Control notification  and isolation- respiratory, contact and droplet per protocol    Diagnostics: CBC, CMP, Procalcitonin, Ferritin, CRP, Blood Culture x2, Portable CXR, and UA and culture    Management: Maintain oxygen saturations 92-96% via Nasal Cannula  LPM and monitor with continuous/intermittent pulse oximetry. , Inhaled bronchodilators as needed for shortness of breath., and Continuous cardiac monitoring.    Advance Care Planning  Current advance care plan has not been discussed with patient/family/POA and patient currently wishes Full Code.

## 2024-08-31 NOTE — ASSESSMENT & PLAN NOTE
Chronic, controlled. Latest blood pressure and vitals reviewed-     Temp:  [101.2 °F (38.4 °C)-102.7 °F (39.3 °C)]   Pulse:  []   Resp:  [10-22]   BP: ()/(54-80)   SpO2:  [88 %-98 %] .   Home meds for hypertension were reviewed and noted below.   Hypertension Medications               losartan (COZAAR) 25 MG tablet TAKE 1 TABLET(25 MG) BY MOUTH EVERY DAY            While in the hospital, will manage blood pressure as follows; Continue home antihypertensive regimen    Will utilize p.r.n. blood pressure medication only if patient's blood pressure greater than 180/110 and she develops symptoms such as worsening chest pain or shortness of breath.

## 2024-08-31 NOTE — PROGRESS NOTES
Automatic Inhaler to Nebulizer Interchange    albuterol (Ventolin, ProAir, or Proventil)  mcg given multiple times per day changed to albuterol 2.5 mg Q8H  per [facility] Automatic Therapeutic Substitutions Protocol.    Please contact pharmacy at extension [phone] with any questions.     Thank you,   Jean-Pierre Tamayo

## 2024-09-01 LAB
ALBUMIN SERPL BCP-MCNC: 3.7 G/DL (ref 3.5–5.2)
ALP SERPL-CCNC: 35 U/L (ref 55–135)
ALT SERPL W/O P-5'-P-CCNC: 9 U/L (ref 10–44)
ANION GAP SERPL CALC-SCNC: 9 MMOL/L (ref 8–16)
AST SERPL-CCNC: 16 U/L (ref 10–40)
BILIRUB SERPL-MCNC: 0.5 MG/DL (ref 0.1–1)
BUN SERPL-MCNC: 10 MG/DL (ref 8–23)
CALCIUM SERPL-MCNC: 8 MG/DL (ref 8.7–10.5)
CHLORIDE SERPL-SCNC: 107 MMOL/L (ref 95–110)
CO2 SERPL-SCNC: 22 MMOL/L (ref 23–29)
CREAT SERPL-MCNC: 0.7 MG/DL (ref 0.5–1.4)
CRP SERPL-MCNC: 4.2 MG/DL
EST. GFR  (NO RACE VARIABLE): >60 ML/MIN/1.73 M^2
GLUCOSE SERPL-MCNC: 94 MG/DL (ref 70–110)
MAGNESIUM SERPL-MCNC: 1.7 MG/DL (ref 1.6–2.6)
PHOSPHATE SERPL-MCNC: 2.5 MG/DL (ref 2.7–4.5)
POTASSIUM SERPL-SCNC: 3.6 MMOL/L (ref 3.5–5.1)
PROT SERPL-MCNC: 6.1 G/DL (ref 6–8.4)
SODIUM SERPL-SCNC: 138 MMOL/L (ref 136–145)

## 2024-09-01 PROCEDURE — 94761 N-INVAS EAR/PLS OXIMETRY MLT: CPT

## 2024-09-01 PROCEDURE — 25000242 PHARM REV CODE 250 ALT 637 W/ HCPCS: Performed by: HOSPITALIST

## 2024-09-01 PROCEDURE — 63600175 PHARM REV CODE 636 W HCPCS: Performed by: NURSE PRACTITIONER

## 2024-09-01 PROCEDURE — 94640 AIRWAY INHALATION TREATMENT: CPT

## 2024-09-01 PROCEDURE — 12000002 HC ACUTE/MED SURGE SEMI-PRIVATE ROOM

## 2024-09-01 PROCEDURE — 25000003 PHARM REV CODE 250: Performed by: NURSE PRACTITIONER

## 2024-09-01 PROCEDURE — 80053 COMPREHEN METABOLIC PANEL: CPT | Performed by: NURSE PRACTITIONER

## 2024-09-01 PROCEDURE — 96375 TX/PRO/DX INJ NEW DRUG ADDON: CPT

## 2024-09-01 PROCEDURE — 96366 THER/PROPH/DIAG IV INF ADDON: CPT

## 2024-09-01 PROCEDURE — 96367 TX/PROPH/DG ADDL SEQ IV INF: CPT

## 2024-09-01 PROCEDURE — 99406 BEHAV CHNG SMOKING 3-10 MIN: CPT

## 2024-09-01 PROCEDURE — 86140 C-REACTIVE PROTEIN: CPT | Performed by: NURSE PRACTITIONER

## 2024-09-01 PROCEDURE — 36415 COLL VENOUS BLD VENIPUNCTURE: CPT | Performed by: NURSE PRACTITIONER

## 2024-09-01 PROCEDURE — 94799 UNLISTED PULMONARY SVC/PX: CPT

## 2024-09-01 PROCEDURE — 99900035 HC TECH TIME PER 15 MIN (STAT)

## 2024-09-01 PROCEDURE — 99900031 HC PATIENT EDUCATION (STAT)

## 2024-09-01 PROCEDURE — 27000221 HC OXYGEN, UP TO 24 HOURS

## 2024-09-01 PROCEDURE — 27000207 HC ISOLATION

## 2024-09-01 PROCEDURE — 83735 ASSAY OF MAGNESIUM: CPT | Performed by: NURSE PRACTITIONER

## 2024-09-01 PROCEDURE — 25000003 PHARM REV CODE 250: Performed by: FAMILY MEDICINE

## 2024-09-01 PROCEDURE — 96372 THER/PROPH/DIAG INJ SC/IM: CPT | Performed by: NURSE PRACTITIONER

## 2024-09-01 PROCEDURE — 84100 ASSAY OF PHOSPHORUS: CPT | Performed by: NURSE PRACTITIONER

## 2024-09-01 RX ORDER — FAMOTIDINE 10 MG/ML
20 INJECTION INTRAVENOUS 2 TIMES DAILY
Status: DISCONTINUED | OUTPATIENT
Start: 2024-09-01 | End: 2024-09-02 | Stop reason: HOSPADM

## 2024-09-01 RX ORDER — GUAIFENESIN 100 MG/5ML
200 SOLUTION ORAL EVERY 4 HOURS PRN
Status: DISCONTINUED | OUTPATIENT
Start: 2024-09-01 | End: 2024-09-02 | Stop reason: HOSPADM

## 2024-09-01 RX ORDER — LANOLIN ALCOHOL/MO/W.PET/CERES
800 CREAM (GRAM) TOPICAL ONCE
Status: COMPLETED | OUTPATIENT
Start: 2024-09-01 | End: 2024-09-01

## 2024-09-01 RX ADMIN — THERA TABS 1 TABLET: TAB at 08:09

## 2024-09-01 RX ADMIN — Medication 800 MG: at 01:09

## 2024-09-01 RX ADMIN — REMDESIVIR 100 MG: 100 INJECTION, POWDER, LYOPHILIZED, FOR SOLUTION INTRAVENOUS at 09:09

## 2024-09-01 RX ADMIN — DEXAMETHASONE 6 MG: 4 TABLET ORAL at 08:09

## 2024-09-01 RX ADMIN — GUAIFENESIN 200 MG: 200 SOLUTION ORAL at 08:09

## 2024-09-01 RX ADMIN — CEFTRIAXONE SODIUM 1 G: 1 INJECTION, POWDER, FOR SOLUTION INTRAMUSCULAR; INTRAVENOUS at 11:09

## 2024-09-01 RX ADMIN — Medication 800 MG: at 09:09

## 2024-09-01 RX ADMIN — OXYCODONE HYDROCHLORIDE AND ACETAMINOPHEN 500 MG: 500 TABLET ORAL at 08:09

## 2024-09-01 RX ADMIN — FAMOTIDINE 20 MG: 10 INJECTION, SOLUTION INTRAVENOUS at 09:09

## 2024-09-01 RX ADMIN — VANCOMYCIN HYDROCHLORIDE 1750 MG: 500 INJECTION, POWDER, LYOPHILIZED, FOR SOLUTION INTRAVENOUS at 05:09

## 2024-09-01 RX ADMIN — ENOXAPARIN SODIUM 90 MG: 100 INJECTION SUBCUTANEOUS at 08:09

## 2024-09-01 RX ADMIN — FAMOTIDINE 20 MG: 10 INJECTION, SOLUTION INTRAVENOUS at 10:09

## 2024-09-01 RX ADMIN — ALBUTEROL SULFATE 2.5 MG: 2.5 SOLUTION RESPIRATORY (INHALATION) at 04:09

## 2024-09-01 RX ADMIN — ALBUTEROL SULFATE 2.5 MG: 2.5 SOLUTION RESPIRATORY (INHALATION) at 07:09

## 2024-09-01 RX ADMIN — POTASSIUM BICARBONATE 50 MEQ: 977.5 TABLET, EFFERVESCENT ORAL at 09:09

## 2024-09-01 RX ADMIN — GUAIFENESIN AND DEXTROMETHORPHAN 10 ML: 100; 10 SYRUP ORAL at 11:09

## 2024-09-01 RX ADMIN — PIPERACILLIN SODIUM AND TAZOBACTAM SODIUM 3.38 G: 3; .375 INJECTION, POWDER, LYOPHILIZED, FOR SOLUTION INTRAVENOUS at 05:09

## 2024-09-01 NOTE — PLAN OF CARE
09/01/24 1355   SMITH Message   Medicare Outpatient and Observation Notification regarding financial responsibility Other (comments);Explained to patient/caregiver   Date SMITH was signed 09/01/24   Time SMITH was signed [id number]

## 2024-09-01 NOTE — PROGRESS NOTES
[facility] Medicine  Progress Note    Patient Name: Edith Tran  MRN: [id number]  Patient Class: IP- Inpatient   Admission Date: 8/31/2024  Length of Stay: 0 days  Attending Physician: Alisson Howard DO  Primary Care Provider: Duane Julio III, MD        Subjective:     Principal Problem:Sepsis        HPI:  69-year-old female history of tobacco use (10 PY hx, quit 25 years ago), HTN, systemic sclerosis complicated by ILD/PH/Raynaud's/esophageal involvement, and ELAINE on CPAP  COPD, emphysema, pneumonia interstitial lung disease presents with 2 days of runny nose cough congestion fever chills and myalgias.  Patient has not taken any medications at home prior to arrival.  She is on oxygen at night but not around the clock.  In the ER, T-max 102.7°, tachycardic at 1:16 a.m., tachypneic at 22, hypotensive 96/54 hypoxic at 88% on room air.  Placed on supplemental oxygen septic workup started   Tylenol p.o. given IV Zosyn given, COVID positive, influenza negative, white count 6 UA negative lactic 1.01, blood culture sent, chest x-ray with no consolidation, large pleural effusion, or evidence of pulmonary edema. Scattered interstitial opacities are unchanged.  Patient on supplemental oxygen.  Admit to hospital medicine for further medical management    Overview/Hospital Course:  69-year-old female with a past medical history of hypertension, systemic sclerosis complicated by interstitial lung disease, pulmonary hypertension Raynaud's and esophageal involvement, obstructive sleep apnea on CPAP, COPD, emphysema who presented to the emergency department for the evaluation of runny nose, cough, congestion, fever chills and body aches.  Patient was evaluated in the emergency department she was found to be COVID positive had a temperature maximum of 102 0.7°, she was tachycardic and tachypneic and hypotensive.  Oxygen saturations on admission was 88% on room air.  Patient underwent sepsis workup,  vanc and Zosyn were administered as well as IV fluids, IV remdesivir, and p.o. Tylenol.  Chest x-ray with stable appearance from previous imaging.  Patient admitted to medical-surgical unit with telemetry monitoring.  She was closely monitored and has remained hemodynamically stable.  Patient continued on 2 L nasal cannula oxygen and remained afebrile.  Remdesivir continued as well as steroids and antibiotics.    Interval History:  Patient is sitting up in bed, reports feeling much better this morning.  Does report a dry cough.  She is maintained on 2 L nasal cannula satting 98%.    Review of Systems   Constitutional:  Positive for activity change, appetite change, chills and fever.   Respiratory:  Positive for cough and shortness of breath.    Cardiovascular:  Negative for chest pain.   Gastrointestinal:  Negative for abdominal distention, blood in stool, constipation, nausea and vomiting.   Neurological:  Negative for dizziness, light-headedness and numbness.     Objective:     Vital Signs (Most Recent):  Temp: 98.3 °F (36.8 °C) (09/01/24 1146)  Pulse: 85 (09/01/24 1146)  Resp: 19 (09/01/24 1146)  BP: (!) 110/52 (09/01/24 1146)  SpO2: 99 % (09/01/24 1146) Vital Signs (24h Range):  Temp:  [97.9 °F (36.6 °C)-102.7 °F (39.3 °C)] 98.3 °F (36.8 °C)  Pulse:  [] 85  Resp:  [10-22] 19  SpO2:  [88 %-100 %] 99 %  BP: ()/(52-80) 110/52     Weight: 88 kg (194 lb)  Body mass index is 31.31 kg/m².    Intake/Output Summary (Last 24 hours) at 9/1/2024 1311  Last data filed at 9/1/2024 1304  Gross per 24 hour   Intake 1120 ml   Output 0 ml   Net 1120 ml         Physical Exam  Constitutional:       Appearance: Normal appearance.   HENT:      Head: Normocephalic.      Nose: Nose normal.      Mouth/Throat:      Mouth: Mucous membranes are moist.      Pharynx: Oropharynx is clear.   Eyes:      Extraocular Movements: Extraocular movements intact.      Conjunctiva/sclera: Conjunctivae normal.   Cardiovascular:      Rate and  Rhythm: Normal rate and regular rhythm.      Pulses: Normal pulses.      Heart sounds: Normal heart sounds.   Pulmonary:      Effort: Pulmonary effort is normal.      Breath sounds: Rhonchi present.   Abdominal:      General: Bowel sounds are normal. There is no distension.      Palpations: Abdomen is soft.      Tenderness: There is no abdominal tenderness. There is no guarding.   Skin:     General: Skin is warm.      Capillary Refill: Capillary refill takes less than 2 seconds.   Neurological:      General: No focal deficit present.      Mental Status: She is alert and oriented to person, place, and time.   Psychiatric:         Mood and Affect: Mood normal.             Significant Labs: All pertinent labs within the past 24 hours have been reviewed.  CBC:   Recent Labs   Lab 08/31/24  1347   WBC 6.49   HGB 13.0   HCT 40.9   *     CMP:   Recent Labs   Lab 08/31/24  1347 09/01/24  0833    138   K 3.6 3.6    107   CO2 25 22*   * 94   BUN 14 10   CREATININE 0.9 0.7   CALCIUM 9.3 8.0*   PROT 7.3 6.1   ALBUMIN 4.3 3.7   BILITOT 0.6 0.5   ALKPHOS 45* 35*   AST 15 16   ALT 10 9*   ANIONGAP 8 9     Recent Lab Results  (Last 5 results in the past 24 hours)        09/01/24  0833   08/31/24  2029   08/31/24  1518   08/31/24  1509   08/31/24  1410        Influenza A, Molecular       Negative         Influenza B, Molecular       Negative         Procalcitonin   0.072  Comment: The Procalcitonin test is intended to aid in the risk assessment of   critically ill patients on their first day of ICU admission for   progression   to severe sepsis and septic shock.    A result of <0.50 ng/mL is associated with a low risk of severe   sepsis   and/or septic shock.  This does not exclude localized infection.    A result between 0.50 ng/mL and 2.0 ng/mL should be interpreted with   consideration of the patient's history and is recommended to retest   within 6   to 24 hours.        A result of >2.0 ng/mL is  associated with a high risk of severe   sepsis   and/or septic shock.    Procalcitonin may not be accurate among patients with   localized  infection, recent trauma or major surgery, immunosuppressed   state,  invasive fungal infection, renal dysfunction. Decisions   regarding  initiation or continuation of antibiotic therapy should not be   based  solely on procalcitonin levels.               Albumin 3.7               ALP 35               ALT 9               Anion Gap 9               Appearance, UA     Hazy           PTT   29.8  Comment: Refer to local heparin nomogram for intensity/dose specific   therapeutic   range.               AST 16               Bacteria, UA     Rare           Bilirubin (UA)     Negative           BILIRUBIN TOTAL 0.5  Comment: For infants and newborns, interpretation of results should be based  on gestational age, weight and in agreement with clinical  observations.    Premature Infant recommended reference ranges:  Up to 24 hours.............<8.0 mg/dL  Up to 48 hours............<12.0 mg/dL  3-5 days..................<15.0 mg/dL  6-29 days.................<15.0 mg/dL                 Blood Culture, Routine         No Growth to date  [P]       BNP   23  Comment: Values of less than 100 pg/ml are consistent with non-CHF populations.             BUN 10               Calcium 8.0               Chloride 107               CO2 22               Color, UA     Yellow           CPK   73             Creatinine 0.7               CRP 4.20  Comment: CRP-Normal Application expected values:   <1.0        mg/dL   Normal Range  1.0 - 5.0  mg/dL   Indicates mild inflammation  5.0 - 10.0 mg/dL   Indicates severe inflammation  >10.0        mg/dL   Represents serious processes and   frequently         indicates the presence of a bacterial   infection.                  D-Dimer   1.02  Comment: The quantitative D-dimer assay should be used as an aid in   the diagnosis of deep vein thrombosis and pulmonary  embolism  in patients with the appropriate presentation and clinical  history. The upper limit of the reference interval and the clinical   cut off   point are identical. Causes of a positive (>0.50 mg/L FEU) D-Dimer   test  include, but are not limited to: DVT, PE, DIC, thrombolytic   therapy, anticoagulant therapy, recent surgery, trauma, or   pregnancy, disseminated malignancy, aortic aneurysm, cirrhosis,  and severe infection. False negative results may occur in   patients with distal DVT.  DDimer critical result(s) repeated. Called and verbal readback   obtained from Koffi Randolph Rn by AS2 08/31/2024 21:33               eGFR >60.0               Ferritin   147.5             Flu A & B Source       Nasal swab         Glucose 94               Glucose, UA     Negative           INR   1.0  Comment: Coumadin Therapy:  2.0 - 3.0 for INR for all indicators except mechanical heart valves  and antiphospholipid syndromes which should use 2.5 - 3.5.               Ketones, UA     Negative           Lactic Acid Level   0.8  Comment: Falsely low lactic acid results can be found in samples   containing >=13.0 mg/dL total bilirubin and/or >=3.5 mg/dL   direct bilirubin.               Lactate Dehydrogenase   169  Comment: Results are increased in hemolyzed samples.             Leukocyte Esterase, UA     1+           Magnesium  1.7               Microscopic Comment     SEE COMMENT  Comment: Other formed elements not mentioned in the report are not   present in the microscopic examination.              NITRITE UA     Negative           Blood, UA     1+           pH, UA     6.0           Phosphorus Level 2.5               Potassium 3.6               PROTEIN TOTAL 6.1               Protein, UA     Negative  Comment: Recommend a 24 hour urine protein or a urine   protein/creatinine ratio if globulin induced proteinuria is  clinically suspected.             PT   11.2             RBC, UA     3           SARS-CoV-2 RNA, Amplification,  Qual       Positive  Comment: This test utilizes isothermal nucleic acid amplification technology   to   detect the SARS-CoV-2 RdRp nucleic acid segment. The analytical   sensitivity   (limit of detection) is 500 copies/swab.     A POSITIVE result is indicative of the presence of SARS-CoV-2 RNA;   clinical   correlation with patient history and other diagnostic information is   necessary to determine patient infection status.    A NEGATIVE result means that SARS-CoV-2 nucleic acids are not present   above   the limit of detection. A NEGATIVE result should be treated as   presumptive.   It does not rule out the possibility of COVID-19 and should not be   the sole   basis for treatment decisions. If COVID-19 is strongly suspected   based on   clinical and exposure history, re-testing using an alternate   molecular assay   should be considered.     This test is FDA approved.Performance characteristics of this test   has been   independently verified by [facility] in conjunction   with   [facility] Department of Pathology and Laboratory   Medicine.           Sed Rate   15             Sodium 138               Spec Grav UA     1.015           Specimen UA     Urine, Clean Catch           Squam Epithel, UA     13           Troponin I High Sensitivity   4.4  Comment: Troponin results differ between methods. Do not use   results between Troponin methods interchangeably.    Alkaline Phospatase levels above 400 U/L may   cause false positive results.    Access hsTnI should not be used for patients taking   Asfotase nikkie (Strensiq).               UROBILINOGEN UA     Negative           Vitamin D   39  Comment: Vitamin D deficiency.........<10 ng/mL                              Vitamin D insufficiency......10-29 ng/mL       Vitamin D sufficiency........> or equal to 30 ng/mL  Vitamin D toxicity............>100 ng/mL               WBC, UA     4                                   [P] - Preliminary Result   "             Significant Imaging: I have reviewed all pertinent imaging results/findings within the past 24 hours.    Assessment/Plan:      * Sepsis  This patient does have evidence of infective focus  My overall impression is sepsis.  Source: Respiratory  Antibiotics given-   Antibiotics (72h ago, onward)      Start     Stop Route Frequency Ordered    09/01/24 1700  vancomycin (VANCOCIN) 1,750 mg in D5W 500 mL IVPB         -- IV Every 24 hours (non-standard times) 08/31/24 2148    09/01/24 1215  cefTRIAXone (ROCEPHIN) 1 g in dextrose 5 % 100 mL IVPB (ready to mix)         -- IV Every 24 hours (non-standard times) 09/01/24 1114    08/31/24 1932  vancomycin - pharmacy to dose  (vancomycin IVPB (PEDS and ADULTS))        Placed in "And" Linked Group    -- IV pharmacy to manage frequency 08/31/24 1833          Latest lactate reviewed-  Recent Labs   Lab 08/31/24  1400 08/31/24 2029   LACTATE  --  0.8   POCLAC 1.01  --        Organ dysfunction indicated by Acute respiratory failure    Fluid challenge 1 L NS given     Post- resuscitation assessment No - Post resuscitation assessment not needed       Will Not start Pressors- Levophed for MAP of 65  Source control achieved by:     COVID  Patient is identified as Severe COVID-19 based on hypoxemia with O2 saturations <94% on room air or on ambulation   Initiate standard COVID protocols; COVID-19 testing ,Infection Control notification  and isolation- respiratory, contact and droplet per protocol    Diagnostics: CBC, CMP, Procalcitonin, Ferritin, CRP, Blood Culture x2, Portable CXR, and UA and culture    Management: Maintain oxygen saturations 92-96% via Nasal Cannula  LPM and monitor with continuous/intermittent pulse oximetry. , Inhaled bronchodilators as needed for shortness of breath., and Continuous cardiac monitoring.    Advance Care Planning Current advance care plan has not been discussed with patient/family/POA and patient currently wishes Full Code.     Acute " respiratory failure with hypoxia  History of interstitial lung disease, COPD, scleroderma Left lung    Patient with Hypoxic Respiratory failure which is Acute on chronic.  she is on home oxygen at 2 LPM at night. Supplemental oxygen was provided and noted-      .   Signs/symptoms of respiratory failure include- tachypnea, increased work of breathing, respiratory distress, use of accessory muscles, wheezing, and cyanosis. Contributing diagnoses includes - COPD, Interstitial lung disease, and covid  Labs and images were reviewed. Patient Has not had a recent ABG. Will treat underlying causes and adjust management of respiratory failure as follows-   Inhalers, supplemental oxygen, steroids.    ELAINE on CPAP  Chronic   Continue home regimen      Hypertension, essential  Chronic, controlled. Latest blood pressure and vitals reviewed-     Temp:  [97.9 °F (36.6 °C)-102.7 °F (39.3 °C)]   Pulse:  []   Resp:  [10-22]   BP: ()/(52-80)   SpO2:  [88 %-100 %] .   Home meds for hypertension were reviewed and noted below.   Hypertension Medications               losartan (COZAAR) 25 MG tablet TAKE 1 TABLET(25 MG) BY MOUTH EVERY DAY            While in the hospital, will manage blood pressure as follows; Continue home antihypertensive regimen    Will utilize p.r.n. blood pressure medication only if patient's blood pressure greater than 180/110 and she develops symptoms such as worsening chest pain or shortness of breath.      VTE Risk Mitigation (From admission, onward)           Ordered     enoxaparin injection 90 mg  2 times daily         08/31/24 4033                    Discharge Planning   TODD:      Code Status: Full Code   Is the patient medically ready for discharge?:     Reason for patient still in hospital (select all that apply): Patient trending condition and Treatment                     CRISTO Pena  Department of Hospital Medicine   Pending sale to [facility]

## 2024-09-01 NOTE — SUBJECTIVE & OBJECTIVE
Past Medical History:   Diagnosis Date    Allergy     Arthritis     Back pain     Colon polyps     COPD (chronic obstructive pulmonary disease)     Emphysema of lung     GERD (gastroesophageal reflux disease)     Hypertension     Kidney stones     Obesity     Pneumonia     Pneumonia due to other staphylococcus     Pulmonary fibrosis     Pulmonary hypertension     Scleroderma involving lung since she was 47 y/o    Sleep apnea     Trouble in sleeping        Past Surgical History:   Procedure Laterality Date    COLONOSCOPY N/A 8/7/2018    Procedure: COLONOSCOPY;  Surgeon: Ngozi Prince MD;  Location: [facility];  Service: Endoscopy;  Laterality: N/A;    COLONOSCOPY N/A 11/21/2019    Procedure: COLONOSCOPY;  Surgeon: Ngozi Prince MD;  Location: [facility];  Service: Endoscopy;  Laterality: N/A;    ESOPHAGEAL MANOMETRY WITH MEASUREMENT OF IMPEDANCE N/A 7/27/2020    Procedure: MANOMETRY, ESOPHAGUS, WITH IMPEDANCE MEASUREMENT;  Surgeon: Bhupendra Temple MD;  Location: [facility] ([address] [place] [address]);  Service: Endoscopy;  Laterality: N/A;  3/10 - pt confirmed apptCovid test ordered for 7/24 in [place]    ESOPHAGOGASTRODUODENOSCOPY N/A 8/7/2018    Procedure: EGD (ESOPHAGOGASTRODUODENOSCOPY);  Surgeon: Ngozi Prince MD;  Location: [facility];  Service: Endoscopy;  Laterality: N/A;    ESOPHAGOGASTRODUODENOSCOPY N/A 11/21/2019    Procedure: EGD (ESOPHAGOGASTRODUODENOSCOPY);  Surgeon: Ngozi Prince MD;  Location: [facility];  Service: Endoscopy;  Laterality: N/A;    ESOPHAGOGASTRODUODENOSCOPY N/A 1/29/2020    Procedure: EGD (ESOPHAGOGASTRODUODENOSCOPY);  Surgeon: Ngozi Prince MD;  Location: [facility];  Service: Endoscopy;  Laterality: N/A;    ESOPHAGOGASTRODUODENOSCOPY N/A 8/20/2020    Procedure: EGD (ESOPHAGOGASTRODUODENOSCOPY);  Surgeon: Ngozi Prince MD;  Location: [facility];  Service: Endoscopy;  Laterality: N/A;    ESOPHAGOGASTRODUODENOSCOPY N/A 12/4/2020    Procedure: EGD (ESOPHAGOGASTRODUODENOSCOPY);   Surgeon: Ngozi Prince MD;  Location: [facility] ENDO;  Service: Endoscopy;  Laterality: N/A;    ESOPHAGOGASTRODUODENOSCOPY N/A 11/19/2021    Procedure: EGD (ESOPHAGOGASTRODUODENOSCOPY);  Surgeon: Ngozi Prince MD;  Location: [facility] ENDO;  Service: Endoscopy;  Laterality: N/A;    ESOPHAGOGASTRODUODENOSCOPY N/A 5/17/2024    Procedure: EGD (ESOPHAGOGASTRODUODENOSCOPY);  Surgeon: Ngozi Prince MD;  Location: [facility] ENDO;  Service: Endoscopy;  Laterality: N/A;    REPAIR, HERNIA, INCISIONAL OR VENTRAL, WITHOUT HISTORY OF PRIOR REPAIR N/A 12/27/2022    Procedure: REPAIR, HERNIA umbilical, INCISIONAL OR VENTRAL, WITHOUT HISTORY OF PRIOR REPAIR;  Surgeon: Trent Evans MD;  Location: [facility] OR;  Service: General;  Laterality: N/A;  RN PREOP 12/20/2022, PT INSTRUCTED TO BRING ALL MEDS WITH HER ON AM OF SURGERY    RIGHT HEART CATHETERIZATION Right 4/4/2024    Procedure: INSERTION, CATHETER, RIGHT HEART;  Surgeon: Puma Contreras MD;  Location: [facility] CATH LAB;  Service: Cardiology;  Laterality: Right;       Review of patient's allergies indicates:   Allergen Reactions    Hydroxychloroquine Other (See Comments)     Having eye reaction, needs to stop plaquenil and stay off of it.        No current facility-administered medications on file prior to encounter.     Current Outpatient Medications on File Prior to Encounter   Medication Sig    ergocalciferol (ERGOCALCIFEROL) 50,000 unit Cap Take one cap a week (Patient taking differently: Take 50,000 Units by mouth every 7 days. Take one cap a week)    fluticasone propionate (FLONASE) 50 mcg/actuation nasal spray 1 spray (50 mcg total) by Each Nostril route once daily.    losartan (COZAAR) 25 MG tablet TAKE 1 TABLET(25 MG) BY MOUTH EVERY DAY (Patient taking differently: Take 25 mg by mouth once daily.)    macitentan 10 mg Tab Take 10 mg by mouth once daily.    magnesium oxide (MAG-OX) 400 mg (241.3 mg magnesium) tablet Take 1 tablet (400 mg total) by mouth once daily.     montelukast (SINGULAIR) 10 mg tablet Take 1 tablet (10 mg total) by mouth every evening.    mycophenolate (CELLCEPT) 500 mg Tab Take 3 tablets (1,500 mg total) by mouth 2 (two) times daily.    nintedanib 150 mg Cap Take 150 mg by mouth every 12 (twelve) hours.    ondansetron (ZOFRAN) 8 MG tablet TAKE 1 TABLET(8 MG) BY MOUTH EVERY 8 HOURS AS NEEDED FOR NAUSEA (Patient taking differently: Take 8 mg by mouth every 8 (eight) hours as needed.)    pantoprazole (PROTONIX) 40 MG tablet Take 1 tablet (40 mg total) by mouth 2 (two) times daily.    tadalafil (ADCIRCA) 20 mg Tab Take 40 mg by mouth once daily.    traZODone (DESYREL) 100 MG tablet TAKE 1 TABLET BY MOUTH IN THE EVENING AS NEEDED (Patient taking differently: Take 100 mg by mouth nightly as needed. TAKE 1 TABLET BY MOUTH IN THE EVENING AS NEEDED)    TYVASO DPI 64 mcg CtDv Inhale 1 puff into the lungs 4 (four) times daily.    albuterol (PROVENTIL/VENTOLIN HFA) 90 mcg/actuation inhaler 2 puffs every 4 hours as needed for cough, wheeze, or shortness of breath (Patient taking differently: Inhale 2 puffs into the lungs every 4 (four) hours as needed. 2 puffs every 4 hours as needed for cough, wheeze, or shortness of breath)    conjugated estrogens (PREMARIN) vaginal cream Place a small pea-sized amount (1 g) in the vagina daily for 2 weeks. Then place a small pea-sized amount (1 g) in the vagina twice a week. (Patient taking differently: Place 0.5 g vaginally twice a week. Place a small pea-sized amount (1 g) in the vagina daily for 2 weeks. Then place a small pea-sized amount (1 g) in the vagina twice a week.)     Family History       Problem Relation (Age of Onset)    Cancer Father    Heart disease Brother    Kidney disease Mother    Mental illness Son          Tobacco Use    Smoking status: Former     Current packs/day: 0.00     Average packs/day: 0.5 packs/day for 27.7 years (13.8 ttl pk-yrs)     Types: Cigarettes     Start date: 1968     Quit date: 9/2/1995      Years since quittin.0    Smokeless tobacco: Never   Substance and Sexual Activity    Alcohol use: Not Currently     Comment: occ    Drug use: No    Sexual activity: Yes     Partners: Male     Review of Systems   Constitutional:  Positive for fatigue and fever.   HENT:  Positive for rhinorrhea. Negative for facial swelling and sore throat.    Respiratory:  Positive for cough and shortness of breath.    Cardiovascular: Negative.    Gastrointestinal: Negative.    Endocrine: Negative.    Genitourinary: Negative.    Musculoskeletal:  Positive for myalgias.   Neurological:  Positive for weakness. Negative for dizziness and light-headedness.   Psychiatric/Behavioral: Negative.       Objective:     Vital Signs (Most Recent):  Temp: (!) 101.2 °F (38.4 °C) (24 1548)  Pulse: 88 (24 1632)  Resp: 17 (24 1632)  BP: (!) 106/58 (24 1708)  SpO2: 96 % (24 1632) Vital Signs (24h Range):  Temp:  [101.2 °F (38.4 °C)-102.7 °F (39.3 °C)] 101.2 °F (38.4 °C)  Pulse:  [] 88  Resp:  [10-22] 17  SpO2:  [88 %-98 %] 96 %  BP: ()/(54-80) 106/58     Weight: 88 kg (194 lb)  Body mass index is 31.31 kg/m².     Physical Exam  Vitals reviewed.   Constitutional:       General: She is in acute distress.      Appearance: Normal appearance.   HENT:      Head: Normocephalic and atraumatic.      Mouth/Throat:      Mouth: Mucous membranes are moist.   Eyes:      Pupils: Pupils are equal, round, and reactive to light.   Cardiovascular:      Rate and Rhythm: Normal rate and regular rhythm.      Pulses: Normal pulses.      Heart sounds: No murmur heard.  Pulmonary:      Effort: Tachypnea and accessory muscle usage present.      Breath sounds: Examination of the right-upper field reveals wheezing. Examination of the left-upper field reveals wheezing. Examination of the right-middle field reveals wheezing. Examination of the left-middle field reveals wheezing. Examination of the right-lower field reveals decreased  breath sounds. Examination of the left-lower field reveals decreased breath sounds. Decreased breath sounds and wheezing present.      Comments: On supplemental oxygen  Abdominal:      General: Bowel sounds are normal. There is no distension.      Palpations: Abdomen is soft.      Tenderness: There is no abdominal tenderness.   Musculoskeletal:         General: Normal range of motion.      Cervical back: Normal range of motion and neck supple.   Skin:     General: Skin is warm and dry.   Neurological:      Mental Status: She is alert. Mental status is at baseline.   Psychiatric:         Mood and Affect: Mood normal.              CRANIAL NERVES     CN III, IV, VI   Pupils are equal, round, and reactive to light.       Significant Labs: All pertinent labs within the past 24 hours have been reviewed.  Recent Lab Results         08/31/24  1518   08/31/24  1509   08/31/24  1400   08/31/24  1347        Influenza A, Molecular   Negative           Influenza B, Molecular   Negative           Albumin       4.3       ALP       45       ALT       10       Anion Gap       8       Appearance, UA Hazy             AST       15       Bacteria, UA Rare             Baso #       0.02       Basophil %       0.3       Bilirubin (UA) Negative             BILIRUBIN TOTAL       0.6  Comment: For infants and newborns, interpretation of results should be based  on gestational age, weight and in agreement with clinical  observations.    Premature Infant recommended reference ranges:  Up to 24 hours.............<8.0 mg/dL  Up to 48 hours............<12.0 mg/dL  3-5 days..................<15.0 mg/dL  6-29 days.................<15.0 mg/dL         BUN       14       Calcium       9.3       Chloride       103       CO2       25       Color, UA Yellow             Creatinine       0.9       Differential Method       Automated       eGFR       >60.0       Eos #       0.1       Eos %       0.8       Flu A & B Source   Nasal swab           Glucose        121       Glucose, UA Negative             Gran # (ANC)       5.3       Gran %       81.4       Hematocrit       40.9       Hemoglobin       13.0       Immature Grans (Abs)       0.03  Comment: Mild elevation in immature granulocytes is non specific and   can be seen in a variety of conditions including stress response,   acute inflammation, trauma and pregnancy. Correlation with other   laboratory and clinical findings is essential.         Immature Granulocytes       0.5       Ketones, UA Negative             Leukocyte Esterase, UA 1+             Lymph #       0.4       Lymph %       6.5       MCH       30.7       MCHC       31.8       MCV       97       Microscopic Comment SEE COMMENT  Comment: Other formed elements not mentioned in the report are not   present in the microscopic examination.                Mono #       0.7       Mono %       10.5       MPV       11.3       NITRITE UA Negative             nRBC       0       Blood, UA 1+             pH, UA 6.0             Platelet Count       139       POC Lactate     1.01         Potassium       3.6       PROTEIN TOTAL       7.3       Protein, UA Negative  Comment: Recommend a 24 hour urine protein or a urine   protein/creatinine ratio if globulin induced proteinuria is  clinically suspected.               RBC       4.24       RBC, UA 3             RDW       13.1       Sample     VENOUS         SARS-CoV-2 RNA, Amplification, Qual   Positive  Comment: This test utilizes isothermal nucleic acid amplification technology   to   detect the SARS-CoV-2 RdRp nucleic acid segment. The analytical   sensitivity   (limit of detection) is 500 copies/swab.     A POSITIVE result is indicative of the presence of SARS-CoV-2 RNA;   clinical   correlation with patient history and other diagnostic information is   necessary to determine patient infection status.    A NEGATIVE result means that SARS-CoV-2 nucleic acids are not present   above   the limit of detection. A NEGATIVE  result should be treated as   presumptive.   It does not rule out the possibility of COVID-19 and should not be   the sole   basis for treatment decisions. If COVID-19 is strongly suspected   based on   clinical and exposure history, re-testing using an alternate   molecular assay   should be considered.     This test is FDA approved.Performance characteristics of this test   has been   independently verified by [facility] in conjunction   with   [facility] Department of Pathology and Laboratory   Medicine.             Sodium       136       Spec Grav UA 1.015             Specimen UA Urine, Clean Catch             Squam Epithel, UA 13             UROBILINOGEN UA Negative             WBC, UA 4             WBC       6.49               Significant Imaging: I have reviewed all pertinent imaging results/findings within the past 24 hours.  Imaging Results              X-Ray Chest AP Portable (Final result)  Result time 08/31/24 15:00:52      Final result by Miquel Rizvi MD (08/31/24 15:00:52)                   Impression:      No evidence of acute cardiopulmonary disease.      Electronically signed by: Miquel Rizvi  Date:    08/31/2024  Time:    15:00               Narrative:    EXAMINATION:  XR CHEST AP PORTABLE    CLINICAL HISTORY:  Sepsis; cough, chills    FINDINGS:  Portable chest radiograph at 13:43 hours compared to prior exams shows the cardiomediastinal silhouette and pulmonary vasculature are within normal limits.    The lungs are normally expanded, with no consolidation, large pleural effusion, or evidence of pulmonary edema.  Scattered interstitial opacities are unchanged, with no pneumothorax or acute fracture.

## 2024-09-01 NOTE — NURSING
Nurses Note -- 4 Eyes      8/31/2024   10:09 PM      Skin assessed during: Admit      [x] No Altered Skin Integrity Present    []Prevention Measures Documented      [] Yes- Altered Skin Integrity Present or Discovered   [] LDA Added if Not in Epic (Describe Wound)   [] New Altered Skin Integrity was Present on Admit and Documented in LDA   [] Wound Image Taken    Wound Care Consulted? No    Attending Nurse:  Autumn Michel RN    Second RN/Staff Member: Giulia

## 2024-09-01 NOTE — SUBJECTIVE & OBJECTIVE
Interval History:  Patient is sitting up in bed, reports feeling much better this morning.  Does report a dry cough.  She is maintained on 2 L nasal cannula satting 98%.    Review of Systems   Constitutional:  Positive for activity change, appetite change, chills and fever.   Respiratory:  Positive for cough and shortness of breath.    Cardiovascular:  Negative for chest pain.   Gastrointestinal:  Negative for abdominal distention, blood in stool, constipation, nausea and vomiting.   Neurological:  Negative for dizziness, light-headedness and numbness.     Objective:     Vital Signs (Most Recent):  Temp: 98.3 °F (36.8 °C) (09/01/24 1146)  Pulse: 85 (09/01/24 1146)  Resp: 19 (09/01/24 1146)  BP: (!) 110/52 (09/01/24 1146)  SpO2: 99 % (09/01/24 1146) Vital Signs (24h Range):  Temp:  [97.9 °F (36.6 °C)-102.7 °F (39.3 °C)] 98.3 °F (36.8 °C)  Pulse:  [] 85  Resp:  [10-22] 19  SpO2:  [88 %-100 %] 99 %  BP: ()/(52-80) 110/52     Weight: 88 kg (194 lb)  Body mass index is 31.31 kg/m².    Intake/Output Summary (Last 24 hours) at 9/1/2024 1311  Last data filed at 9/1/2024 1304  Gross per 24 hour   Intake 1120 ml   Output 0 ml   Net 1120 ml         Physical Exam  Constitutional:       Appearance: Normal appearance.   HENT:      Head: Normocephalic.      Nose: Nose normal.      Mouth/Throat:      Mouth: Mucous membranes are moist.      Pharynx: Oropharynx is clear.   Eyes:      Extraocular Movements: Extraocular movements intact.      Conjunctiva/sclera: Conjunctivae normal.   Cardiovascular:      Rate and Rhythm: Normal rate and regular rhythm.      Pulses: Normal pulses.      Heart sounds: Normal heart sounds.   Pulmonary:      Effort: Pulmonary effort is normal.      Breath sounds: Rhonchi present.   Abdominal:      General: Bowel sounds are normal. There is no distension.      Palpations: Abdomen is soft.      Tenderness: There is no abdominal tenderness. There is no guarding.   Skin:     General: Skin is warm.       Capillary Refill: Capillary refill takes less than 2 seconds.   Neurological:      General: No focal deficit present.      Mental Status: She is alert and oriented to person, place, and time.   Psychiatric:         Mood and Affect: Mood normal.             Significant Labs: All pertinent labs within the past 24 hours have been reviewed.  CBC:   Recent Labs   Lab 08/31/24  1347   WBC 6.49   HGB 13.0   HCT 40.9   *     CMP:   Recent Labs   Lab 08/31/24  1347 09/01/24  0833    138   K 3.6 3.6    107   CO2 25 22*   * 94   BUN 14 10   CREATININE 0.9 0.7   CALCIUM 9.3 8.0*   PROT 7.3 6.1   ALBUMIN 4.3 3.7   BILITOT 0.6 0.5   ALKPHOS 45* 35*   AST 15 16   ALT 10 9*   ANIONGAP 8 9     Recent Lab Results  (Last 5 results in the past 24 hours)        09/01/24  0833   08/31/24  2029   08/31/24  1518   08/31/24  1509   08/31/24  1410        Influenza A, Molecular       Negative         Influenza B, Molecular       Negative         Procalcitonin   0.072  Comment: The Procalcitonin test is intended to aid in the risk assessment of   critically ill patients on their first day of ICU admission for   progression   to severe sepsis and septic shock.    A result of <0.50 ng/mL is associated with a low risk of severe   sepsis   and/or septic shock.  This does not exclude localized infection.    A result between 0.50 ng/mL and 2.0 ng/mL should be interpreted with   consideration of the patient's history and is recommended to retest   within 6   to 24 hours.        A result of >2.0 ng/mL is associated with a high risk of severe   sepsis   and/or septic shock.    Procalcitonin may not be accurate among patients with   localized  infection, recent trauma or major surgery, immunosuppressed   state,  invasive fungal infection, renal dysfunction. Decisions   regarding  initiation or continuation of antibiotic therapy should not be   based  solely on procalcitonin levels.               Albumin 3.7                ALP 35               ALT 9               Anion Gap 9               Appearance, UA     Hazy           PTT   29.8  Comment: Refer to local heparin nomogram for intensity/dose specific   therapeutic   range.               AST 16               Bacteria, UA     Rare           Bilirubin (UA)     Negative           BILIRUBIN TOTAL 0.5  Comment: For infants and newborns, interpretation of results should be based  on gestational age, weight and in agreement with clinical  observations.    Premature Infant recommended reference ranges:  Up to 24 hours.............<8.0 mg/dL  Up to 48 hours............<12.0 mg/dL  3-5 days..................<15.0 mg/dL  6-29 days.................<15.0 mg/dL                 Blood Culture, Routine         No Growth to date  [P]       BNP   23  Comment: Values of less than 100 pg/ml are consistent with non-CHF populations.             BUN 10               Calcium 8.0               Chloride 107               CO2 22               Color, UA     Yellow           CPK   73             Creatinine 0.7               CRP 4.20  Comment: CRP-Normal Application expected values:   <1.0        mg/dL   Normal Range  1.0 - 5.0  mg/dL   Indicates mild inflammation  5.0 - 10.0 mg/dL   Indicates severe inflammation  >10.0        mg/dL   Represents serious processes and   frequently         indicates the presence of a bacterial   infection.                  D-Dimer   1.02  Comment: The quantitative D-dimer assay should be used as an aid in   the diagnosis of deep vein thrombosis and pulmonary embolism  in patients with the appropriate presentation and clinical  history. The upper limit of the reference interval and the clinical   cut off   point are identical. Causes of a positive (>0.50 mg/L FEU) D-Dimer   test  include, but are not limited to: DVT, PE, DIC, thrombolytic   therapy, anticoagulant therapy, recent surgery, trauma, or   pregnancy, disseminated malignancy, aortic aneurysm, cirrhosis,  and severe  infection. False negative results may occur in   patients with distal DVT.  DDimer critical result(s) repeated. Called and verbal readback   obtained from Koffi Randolph Rn by AS2 08/31/2024 21:33               eGFR >60.0               Ferritin   147.5             Flu A & B Source       Nasal swab         Glucose 94               Glucose, UA     Negative           INR   1.0  Comment: Coumadin Therapy:  2.0 - 3.0 for INR for all indicators except mechanical heart valves  and antiphospholipid syndromes which should use 2.5 - 3.5.               Ketones, UA     Negative           Lactic Acid Level   0.8  Comment: Falsely low lactic acid results can be found in samples   containing >=13.0 mg/dL total bilirubin and/or >=3.5 mg/dL   direct bilirubin.               Lactate Dehydrogenase   169  Comment: Results are increased in hemolyzed samples.             Leukocyte Esterase, UA     1+           Magnesium  1.7               Microscopic Comment     SEE COMMENT  Comment: Other formed elements not mentioned in the report are not   present in the microscopic examination.              NITRITE UA     Negative           Blood, UA     1+           pH, UA     6.0           Phosphorus Level 2.5               Potassium 3.6               PROTEIN TOTAL 6.1               Protein, UA     Negative  Comment: Recommend a 24 hour urine protein or a urine   protein/creatinine ratio if globulin induced proteinuria is  clinically suspected.             PT   11.2             RBC, UA     3           SARS-CoV-2 RNA, Amplification, Qual       Positive  Comment: This test utilizes isothermal nucleic acid amplification technology   to   detect the SARS-CoV-2 RdRp nucleic acid segment. The analytical   sensitivity   (limit of detection) is 500 copies/swab.     A POSITIVE result is indicative of the presence of SARS-CoV-2 RNA;   clinical   correlation with patient history and other diagnostic information is   necessary to determine patient infection  status.    A NEGATIVE result means that SARS-CoV-2 nucleic acids are not present   above   the limit of detection. A NEGATIVE result should be treated as   presumptive.   It does not rule out the possibility of COVID-19 and should not be   the sole   basis for treatment decisions. If COVID-19 is strongly suspected   based on   clinical and exposure history, re-testing using an alternate   molecular assay   should be considered.     This test is FDA approved.Performance characteristics of this test   has been   independently verified by [facility] in conjunction   with   [facility] Department of Pathology and Laboratory   Medicine.           Sed Rate   15             Sodium 138               Spec Grav UA     1.015           Specimen UA     Urine, Clean Catch           Squam Epithel, UA     13           Troponin I High Sensitivity   4.4  Comment: Troponin results differ between methods. Do not use   results between Troponin methods interchangeably.    Alkaline Phospatase levels above 400 U/L may   cause false positive results.    Access hsTnI should not be used for patients taking   Asfotase nikkie (Strensiq).               UROBILINOGEN UA     Negative           Vitamin D   39  Comment: Vitamin D deficiency.........<10 ng/mL                              Vitamin D insufficiency......10-29 ng/mL       Vitamin D sufficiency........> or equal to 30 ng/mL  Vitamin D toxicity............>100 ng/mL               WBC, UA     4                                   [P] - Preliminary Result               Significant Imaging: I have reviewed all pertinent imaging results/findings within the past 24 hours.

## 2024-09-01 NOTE — CARE UPDATE
09/01/24 0738   Patient Assessment/Suction   Level of Consciousness (AVPU) alert   Respiratory Effort Normal;Unlabored   Expansion/Accessory Muscles/Retractions expansion symmetric;no retractions;no use of accessory muscles   All Lung Fields Breath Sounds Anterior:;Lateral:;diminished   Rhythm/Pattern, Respiratory pattern regular;unlabored   PRE-TX-O2   Device (Oxygen Therapy) nasal cannula   Flow (L/min) (Oxygen Therapy) 2   SpO2 98 %   Pulse Oximetry Type Intermittent   $ Pulse Oximetry - Multiple Charge Pulse Oximetry - Multiple   Pulse 86   Resp 18   Aerosol Therapy   $ Aerosol Therapy Charges Aerosol Treatment   Daily Review of Necessity (SVN) completed   Respiratory Treatment Status (SVN) given   Treatment Route (SVN) mask;oxygen   Patient Position HOB elevated   Post Treatment Assessment (SVN) breath sounds improved   Signs of Intolerance (SVN) none   Education   $ Education Bronchodilator;15 min

## 2024-09-01 NOTE — ASSESSMENT & PLAN NOTE
Chronic, controlled. Latest blood pressure and vitals reviewed-     Temp:  [97.9 °F (36.6 °C)-102.7 °F (39.3 °C)]   Pulse:  []   Resp:  [10-22]   BP: ()/(52-80)   SpO2:  [88 %-100 %] .   Home meds for hypertension were reviewed and noted below.   Hypertension Medications               losartan (COZAAR) 25 MG tablet TAKE 1 TABLET(25 MG) BY MOUTH EVERY DAY            While in the hospital, will manage blood pressure as follows; Continue home antihypertensive regimen    Will utilize p.r.n. blood pressure medication only if patient's blood pressure greater than 180/110 and she develops symptoms such as worsening chest pain or shortness of breath.

## 2024-09-01 NOTE — ASSESSMENT & PLAN NOTE
"This patient does have evidence of infective focus  My overall impression is sepsis.  Source: Respiratory  Antibiotics given-   Antibiotics (72h ago, onward)    Start     Stop Route Frequency Ordered    09/01/24 1700  vancomycin (VANCOCIN) 1,750 mg in D5W 500 mL IVPB         -- IV Every 24 hours (non-standard times) 08/31/24 2148    09/01/24 1215  cefTRIAXone (ROCEPHIN) 1 g in dextrose 5 % 100 mL IVPB (ready to mix)         -- IV Every 24 hours (non-standard times) 09/01/24 1114    08/31/24 1932  vancomycin - pharmacy to dose  (vancomycin IVPB (PEDS and ADULTS))        Placed in "And" Linked Group    -- IV pharmacy to manage frequency 08/31/24 1833        Latest lactate reviewed-  Recent Labs   Lab 08/31/24  1400 08/31/24 2029   LACTATE  --  0.8   POCLAC 1.01  --        Organ dysfunction indicated by Acute respiratory failure    Fluid challenge 1 L NS given     Post- resuscitation assessment No - Post resuscitation assessment not needed       Will Not start Pressors- Levophed for MAP of 65  Source control achieved by:   "

## 2024-09-01 NOTE — HOSPITAL COURSE
69-year-old female with a past medical history of hypertension, systemic sclerosis complicated by interstitial lung disease, pulmonary hypertension Raynaud's and esophageal involvement, obstructive sleep apnea on CPAP, COPD, emphysema who presented to the emergency department for the evaluation of runny nose, cough, congestion, fever chills and body aches.  Patient was evaluated in the emergency department she was found to be COVID positive had a temperature maximum of 102 0.7°, she was tachycardic and tachypneic and hypotensive.  Oxygen saturations on admission was 88% on room air.  Patient underwent sepsis workup, vanc and Zosyn were administered as well as IV fluids, IV remdesivir, and p.o. Tylenol.  Chest x-ray with stable appearance from previous imaging.  Patient admitted to medical-surgical unit with telemetry monitoring.  She was closely monitored and has remained hemodynamically stable.  Patient continued on 2 L nasal cannula oxygen at night and as needed.  She reports that she was on room air without oxygen for the majority of the day today.  Oxygen sats have remained 98-99%.  The patient has also remained afebrile over 24 hours.  The patient has completed 3 doses of remdesivir.  We will discharge home patient with Decadron as well as Ceftin 500 mg twice daily for 5 days and azithromycin Z-Ned.  The patient is to follow up with her pulmonologist Dr. Serrano and PCP within 1 week.  Discussed with patient to return to the emergency department for any concerning symptoms such as worsening shortness of breath, fever, weakness, chest pain or any other concerning symptoms.    Patient evaluated on morning of discharge and is appropriate for discharge at this time.

## 2024-09-01 NOTE — ASSESSMENT & PLAN NOTE
Chronic   Continue home regimen     [Initial Visit] : an initial visit for [FreeTextEntry2] : his right knee.

## 2024-09-01 NOTE — PLAN OF CARE
[facility]  Initial Discharge Assessment       Primary Care Provider: Duane Julio III, MD    Admission Diagnosis: Tachycardia [R00.0]    Admission Date: 8/31/2024  Expected Discharge Date:     Transition of Care Barriers: None    Payor: [employer] / Plan: [employer] / Product Type: Medicare Advantage /     Extended Emergency Contact Information  Primary Emergency Contact: DeleonKyung           [facility]  Mobile Phone: [phone]  Relation: Sister    Discharge Plan A: Home  Discharge Plan B: Home with family      [id number] - [place], [place] - [address]  [address]  [facility] [address]  Phone: [phone] Fax: [phone]    SW spoke with patient via telephone [phone] to complete discharge planning assessment.  Patient alert and oriented xs 4.  Patient verified all demographic information on facesheet is correct.  Patient verified PCP is Dr. Julio.  Patient verified primary health insurance is [employer] and secondary is [place] Medicaid.  Patient with NO home health but has listed DME.  Patient states she has home concentrator with portable concentrator on 3L service through [facility].  Patient with NO POA or Living Will.  Patient not on dialysis or medication coumadin.  Patient with no 30 day admission.  Patient with no financial issues at this time.  Patient boyfriend will provide transportation upon discharge from facility.  Patient independent with ADLs, live alone, drives self.      Initial Assessment (most recent)       Adult Discharge Assessment - 09/01/24 1355          Discharge Assessment    Assessment Type Discharge Planning Assessment     Confirmed/corrected address, phone number and insurance Yes     Confirmed Demographics Correct on Facesheet     Source of Information patient     Does patient/caregiver understand observation status Yes     Communicated TODD with  patient/caregiver Yes     People in Home alone     Facility Arrived From: home     Do you expect to return to your current living situation? Yes     Do you have help at home or someone to help you manage your care at home? Yes     Who are your caregiver(s) and their phone number(s)? boyfriend     Prior to hospitilization cognitive status: Alert/Oriented     Current cognitive status: Alert/Oriented     Walking or Climbing Stairs Difficulty yes     Walking or Climbing Stairs ambulation difficulty, requires equipment;stair climbing difficulty, requires equipment     Dressing/Bathing Difficulty yes     Dressing/Bathing bathing difficulty, requires equipment;dressing difficulty, requires equipment     Equipment Currently Used at Home walker, rolling;oxygen;CPAP   [employer]    Readmission within 30 days? No     Patient currently being followed by outpatient case management? No     Do you currently have service(s) that help you manage your care at home? No     Do you take prescription medications? Yes     Do you have prescription coverage? Yes     Do you have any problems affording any of your prescribed medications? No     Is the patient taking medications as prescribed? yes     Who is going to help you get home at discharge? boyfriend     How do you get to doctors appointments? car, drives self     Are you on dialysis? No     Do you take coumadin? No     Discharge Plan A Home     Discharge Plan B Home with family     DME Needed Upon Discharge  none     Discharge Plan discussed with: Patient     Transition of Care Barriers None

## 2024-09-01 NOTE — H&P
[facility] - Emergency Dept  Hospital Medicine  History & Physical    Patient Name: Edith Tran  MRN: [id number]  Patient Class: OP- Observation  Admission Date: 8/31/2024  Attending Physician: Cierra Bhandari MD   Primary Care Provider: Duane Julio III, MD         Patient information was obtained from patient, past medical records, and ER records.     Subjective:     Principal Problem:Sepsis    Chief Complaint:   Chief Complaint   Patient presents with    Cough    Chills    Nasal Congestion     Reports symptoms started 2 days ago     Chest Pain     Due to coughing         HPI: 69-year-old female history of tobacco use (10 PY hx, quit 25 years ago), HTN, systemic sclerosis complicated by ILD/PH/Raynaud's/esophageal involvement, and ELAINE on CPAP  COPD, emphysema, pneumonia interstitial lung disease presents with 2 days of runny nose cough congestion fever chills and myalgias.  Patient has not taken any medications at home prior to arrival.  She is on oxygen at night but not around the clock.  In the ER, T-max 102.7°, tachycardic at 1:16 a.m., tachypneic at 22, hypotensive 96/54 hypoxic at 88% on room air.  Placed on supplemental oxygen septic workup started   Tylenol p.o. given IV Zosyn given, COVID positive, influenza negative, white count 6 UA negative lactic 1.01, blood culture sent, chest x-ray with no consolidation, large pleural effusion, or evidence of pulmonary edema. Scattered interstitial opacities are unchanged.  Patient on supplemental oxygen.  Admit to hospital medicine for further medical management    Past Medical History:   Diagnosis Date    Allergy     Arthritis     Back pain     Colon polyps     COPD (chronic obstructive pulmonary disease)     Emphysema of lung     GERD (gastroesophageal reflux disease)     Hypertension     Kidney stones     Obesity     Pneumonia     Pneumonia due to other staphylococcus     Pulmonary fibrosis     Pulmonary hypertension     Scleroderma  involving lung since she was 47 y/o    Sleep apnea     Trouble in sleeping        Past Surgical History:   Procedure Laterality Date    COLONOSCOPY N/A 8/7/2018    Procedure: COLONOSCOPY;  Surgeon: Ngozi Prince MD;  Location: [facility];  Service: Endoscopy;  Laterality: N/A;    COLONOSCOPY N/A 11/21/2019    Procedure: COLONOSCOPY;  Surgeon: Ngozi Prince MD;  Location: [facility] ENDO;  Service: Endoscopy;  Laterality: N/A;    ESOPHAGEAL MANOMETRY WITH MEASUREMENT OF IMPEDANCE N/A 7/27/2020    Procedure: MANOMETRY, ESOPHAGUS, WITH IMPEDANCE MEASUREMENT;  Surgeon: Bhupendra Temple MD;  Location: [facility] ENDO (4TH FLR);  Service: Endoscopy;  Laterality: N/A;  3/10 - [facility]    ESOPHAGOGASTRODUODENOSCOPY N/A 8/7/2018    Procedure: EGD (ESOPHAGOGASTRODUODENOSCOPY);  Surgeon: Ngozi Prince MD;  Location: [facility];  Service: Endoscopy;  Laterality: N/A;    ESOPHAGOGASTRODUODENOSCOPY N/A 11/21/2019    Procedure: EGD (ESOPHAGOGASTRODUODENOSCOPY);  Surgeon: Ngozi Prince MD;  Location: [facility];  Service: Endoscopy;  Laterality: N/A;    ESOPHAGOGASTRODUODENOSCOPY N/A 1/29/2020    Procedure: EGD (ESOPHAGOGASTRODUODENOSCOPY);  Surgeon: Ngozi Prince MD;  Location: [facility];  Service: Endoscopy;  Laterality: N/A;    ESOPHAGOGASTRODUODENOSCOPY N/A 8/20/2020    Procedure: EGD (ESOPHAGOGASTRODUODENOSCOPY);  Surgeon: Ngozi Prince MD;  Location: [facility];  Service: Endoscopy;  Laterality: N/A;    ESOPHAGOGASTRODUODENOSCOPY N/A 12/4/2020    Procedure: EGD (ESOPHAGOGASTRODUODENOSCOPY);  Surgeon: Ngozi Prince MD;  Location: [facility] ENDO;  Service: Endoscopy;  Laterality: N/A;    ESOPHAGOGASTRODUODENOSCOPY N/A 11/19/2021    Procedure: EGD (ESOPHAGOGASTRODUODENOSCOPY);  Surgeon: Ngozi Prince MD;  Location: [facility];  Service: Endoscopy;  Laterality: N/A;    ESOPHAGOGASTRODUODENOSCOPY N/A 5/17/2024    Procedure: EGD (ESOPHAGOGASTRODUODENOSCOPY);  Surgeon:  Ngozi Prince MD;  Location: [facility] ENDO;  Service: Endoscopy;  Laterality: N/A;    REPAIR, HERNIA, INCISIONAL OR VENTRAL, WITHOUT HISTORY OF PRIOR REPAIR N/A 12/27/2022    Procedure: REPAIR, HERNIA umbilical, INCISIONAL OR VENTRAL, WITHOUT HISTORY OF PRIOR REPAIR;  Surgeon: Trent Evans MD;  Location: [facility] OR;  Service: General;  Laterality: N/A;  RN PREOP 12/20/2022, PT INSTRUCTED TO BRING ALL MEDS WITH HER ON AM OF SURGERY    RIGHT HEART CATHETERIZATION Right 4/4/2024    Procedure: INSERTION, CATHETER, RIGHT HEART;  Surgeon: Puma Contreras MD;  Location: [facility] CATH LAB;  Service: Cardiology;  Laterality: Right;       Review of patient's allergies indicates:   Allergen Reactions    Hydroxychloroquine Other (See Comments)     Having eye reaction, needs to stop plaquenil and stay off of it.        No current facility-administered medications on file prior to encounter.     Current Outpatient Medications on File Prior to Encounter   Medication Sig    ergocalciferol (ERGOCALCIFEROL) 50,000 unit Cap Take one cap a week (Patient taking differently: Take 50,000 Units by mouth every 7 days. Take one cap a week)    fluticasone propionate (FLONASE) 50 mcg/actuation nasal spray 1 spray (50 mcg total) by Each Nostril route once daily.    losartan (COZAAR) 25 MG tablet TAKE 1 TABLET(25 MG) BY MOUTH EVERY DAY (Patient taking differently: Take 25 mg by mouth once daily.)    macitentan 10 mg Tab Take 10 mg by mouth once daily.    magnesium oxide (MAG-OX) 400 mg (241.3 mg magnesium) tablet Take 1 tablet (400 mg total) by mouth once daily.    montelukast (SINGULAIR) 10 mg tablet Take 1 tablet (10 mg total) by mouth every evening.    mycophenolate (CELLCEPT) 500 mg Tab Take 3 tablets (1,500 mg total) by mouth 2 (two) times daily.    nintedanib 150 mg Cap Take 150 mg by mouth every 12 (twelve) hours.    ondansetron (ZOFRAN) 8 MG tablet TAKE 1 TABLET(8 MG) BY MOUTH EVERY 8 HOURS AS NEEDED FOR NAUSEA (Patient taking  differently: Take 8 mg by mouth every 8 (eight) hours as needed.)    pantoprazole (PROTONIX) 40 MG tablet Take 1 tablet (40 mg total) by mouth 2 (two) times daily.    tadalafil (ADCIRCA) 20 mg Tab Take 40 mg by mouth once daily.    traZODone (DESYREL) 100 MG tablet TAKE 1 TABLET BY MOUTH IN THE EVENING AS NEEDED (Patient taking differently: Take 100 mg by mouth nightly as needed. TAKE 1 TABLET BY MOUTH IN THE EVENING AS NEEDED)    TYVASO DPI 64 mcg CtDv Inhale 1 puff into the lungs 4 (four) times daily.    albuterol (PROVENTIL/VENTOLIN HFA) 90 mcg/actuation inhaler 2 puffs every 4 hours as needed for cough, wheeze, or shortness of breath (Patient taking differently: Inhale 2 puffs into the lungs every 4 (four) hours as needed. 2 puffs every 4 hours as needed for cough, wheeze, or shortness of breath)    conjugated estrogens (PREMARIN) vaginal cream Place a small pea-sized amount (1 g) in the vagina daily for 2 weeks. Then place a small pea-sized amount (1 g) in the vagina twice a week. (Patient taking differently: Place 0.5 g vaginally twice a week. Place a small pea-sized amount (1 g) in the vagina daily for 2 weeks. Then place a small pea-sized amount (1 g) in the vagina twice a week.)     Family History       Problem Relation (Age of Onset)    Cancer Father    Heart disease Brother    Kidney disease Mother    Mental illness Son          Tobacco Use    Smoking status: Former     Current packs/day: 0.00     Average packs/day: 0.5 packs/day for 27.7 years (13.8 ttl pk-yrs)     Types: Cigarettes     Start date:      Quit date: 1995     Years since quittin.0    Smokeless tobacco: Never   Substance and Sexual Activity    Alcohol use: Not Currently     Comment: occ    Drug use: No    Sexual activity: Yes     Partners: Male     Review of Systems   Constitutional:  Positive for fatigue and fever.   HENT:  Positive for rhinorrhea. Negative for facial swelling and sore throat.    Respiratory:  Positive for  cough and shortness of breath.    Cardiovascular: Negative.    Gastrointestinal: Negative.    Endocrine: Negative.    Genitourinary: Negative.    Musculoskeletal:  Positive for myalgias.   Neurological:  Positive for weakness. Negative for dizziness and light-headedness.   Psychiatric/Behavioral: Negative.       Objective:     Vital Signs (Most Recent):  Temp: (!) 101.2 °F (38.4 °C) (08/31/24 1548)  Pulse: 88 (08/31/24 1632)  Resp: 17 (08/31/24 1632)  BP: (!) 106/58 (08/31/24 1708)  SpO2: 96 % (08/31/24 1632) Vital Signs (24h Range):  Temp:  [101.2 °F (38.4 °C)-102.7 °F (39.3 °C)] 101.2 °F (38.4 °C)  Pulse:  [] 88  Resp:  [10-22] 17  SpO2:  [88 %-98 %] 96 %  BP: ()/(54-80) 106/58     Weight: 88 kg (194 lb)  Body mass index is 31.31 kg/m².     Physical Exam  Vitals reviewed.   Constitutional:       General: She is in acute distress.      Appearance: Normal appearance.   HENT:      Head: Normocephalic and atraumatic.      Mouth/Throat:      Mouth: Mucous membranes are moist.   Eyes:      Pupils: Pupils are equal, round, and reactive to light.   Cardiovascular:      Rate and Rhythm: Normal rate and regular rhythm.      Pulses: Normal pulses.      Heart sounds: No murmur heard.  Pulmonary:      Effort: Tachypnea and accessory muscle usage present.      Breath sounds: Examination of the right-upper field reveals wheezing. Examination of the left-upper field reveals wheezing. Examination of the right-middle field reveals wheezing. Examination of the left-middle field reveals wheezing. Examination of the right-lower field reveals decreased breath sounds. Examination of the left-lower field reveals decreased breath sounds. Decreased breath sounds and wheezing present.      Comments: On supplemental oxygen  Abdominal:      General: Bowel sounds are normal. There is no distension.      Palpations: Abdomen is soft.      Tenderness: There is no abdominal tenderness.   Musculoskeletal:         General: Normal range  of motion.      Cervical back: Normal range of motion and neck supple.   Skin:     General: Skin is warm and dry.   Neurological:      Mental Status: She is alert. Mental status is at baseline.   Psychiatric:         Mood and Affect: Mood normal.              CRANIAL NERVES     CN III, IV, VI   Pupils are equal, round, and reactive to light.       Significant Labs: All pertinent labs within the past 24 hours have been reviewed.  Recent Lab Results         08/31/24  1518   08/31/24  1509   08/31/24  1400   08/31/24  1347        Influenza A, Molecular   Negative           Influenza B, Molecular   Negative           Albumin       4.3       ALP       45       ALT       10       Anion Gap       8       Appearance, UA Hazy             AST       15       Bacteria, UA Rare             Baso #       0.02       Basophil %       0.3       Bilirubin (UA) Negative             BILIRUBIN TOTAL       0.6  Comment: For infants and newborns, interpretation of results should be based  on gestational age, weight and in agreement with clinical  observations.    Premature Infant recommended reference ranges:  Up to 24 hours.............<8.0 mg/dL  Up to 48 hours............<12.0 mg/dL  3-5 days..................<15.0 mg/dL  6-29 days.................<15.0 mg/dL         BUN       14       Calcium       9.3       Chloride       103       CO2       25       Color, UA Yellow             Creatinine       0.9       Differential Method       Automated       eGFR       >60.0       Eos #       0.1       Eos %       0.8       Flu A & B Source   Nasal swab           Glucose       121       Glucose, UA Negative             Gran # (ANC)       5.3       Gran %       81.4       Hematocrit       40.9       Hemoglobin       13.0       Immature Grans (Abs)       0.03  Comment: Mild elevation in immature granulocytes is non specific and   can be seen in a variety of conditions including stress response,   acute inflammation, trauma and pregnancy.  Correlation with other   laboratory and clinical findings is essential.         Immature Granulocytes       0.5       Ketones, UA Negative             Leukocyte Esterase, UA 1+             Lymph #       0.4       Lymph %       6.5       MCH       30.7       MCHC       31.8       MCV       97       Microscopic Comment SEE COMMENT  Comment: Other formed elements not mentioned in the report are not   present in the microscopic examination.                Mono #       0.7       Mono %       10.5       MPV       11.3       NITRITE UA Negative             nRBC       0       Blood, UA 1+             pH, UA 6.0             Platelet Count       139       POC Lactate     1.01         Potassium       3.6       PROTEIN TOTAL       7.3       Protein, UA Negative  Comment: Recommend a 24 hour urine protein or a urine   protein/creatinine ratio if globulin induced proteinuria is  clinically suspected.               RBC       4.24       RBC, UA 3             RDW       13.1       Sample     VENOUS         SARS-CoV-2 RNA, Amplification, Qual   Positive  Comment: This test utilizes isothermal nucleic acid amplification technology   to   detect the SARS-CoV-2 RdRp nucleic acid segment. The analytical   sensitivity   (limit of detection) is 500 copies/swab.     A POSITIVE result is indicative of the presence of SARS-CoV-2 RNA;   clinical   correlation with patient history and other diagnostic information is   necessary to determine patient infection status.    A NEGATIVE result means that SARS-CoV-2 nucleic acids are not present   above   the limit of detection. A NEGATIVE result should be treated as   presumptive.   It does not rule out the possibility of COVID-19 and should not be   the sole   basis for treatment decisions. If COVID-19 is strongly suspected   based on   clinical and exposure history, re-testing using an alternate   molecular assay   should be considered.     This test is FDA approved.Performance characteristics of  "this test   has been   independently verified by [facility] in conjunction   with   [facility] Department of Pathology and Laboratory   Medicine.             Sodium       136       Spec Grav UA 1.015             Specimen UA Urine, Clean Catch             Squam Epithel, UA 13             UROBILINOGEN UA Negative             WBC, UA 4             WBC       6.49               Significant Imaging: I have reviewed all pertinent imaging results/findings within the past 24 hours.  Imaging Results              X-Ray Chest AP Portable (Final result)  Result time 08/31/24 15:00:52      Final result by Miquel Rizvi MD (08/31/24 15:00:52)                   Impression:      No evidence of acute cardiopulmonary disease.      Electronically signed by: Miquel Rizvi  Date:    08/31/2024  Time:    15:00               Narrative:    EXAMINATION:  XR CHEST AP PORTABLE    CLINICAL HISTORY:  Sepsis; cough, chills    FINDINGS:  Portable chest radiograph at 13:43 hours compared to prior exams shows the cardiomediastinal silhouette and pulmonary vasculature are within normal limits.    The lungs are normally expanded, with no consolidation, large pleural effusion, or evidence of pulmonary edema.  Scattered interstitial opacities are unchanged, with no pneumothorax or acute fracture.                                     Assessment/Plan:     * Sepsis  This patient does have evidence of infective focus  My overall impression is sepsis.  Source: Respiratory  Antibiotics given-   Antibiotics (72h ago, onward)      Start     Stop Route Frequency Ordered    08/31/24 2200  piperacillin-tazobactam 3.375 g in dextrose 5 % 100 mL IVPB (ready to mix)         -- IV Every 8 hours (non-standard times) 08/31/24 1833    08/31/24 1932  vancomycin - pharmacy to dose  (vancomycin IVPB (PEDS and ADULTS))        Placed in "And" Linked Group    -- IV pharmacy to manage frequency 08/31/24 1833    08/31/24 1400  vancomycin (VANCOCIN) " 1,750 mg in D5W 500 mL IVPB  (Sepsis Workup)         09/01/24 0159 IV ED 1 Time 08/31/24 1334          Latest lactate reviewed-  Recent Labs   Lab 08/31/24  1400   POCLAC 1.01     Organ dysfunction indicated by Acute respiratory failure    Fluid challenge 1 L NS given     Post- resuscitation assessment No - Post resuscitation assessment not needed       Will Not start Pressors- Levophed for MAP of 65  Source control achieved by:     COVID  Patient is identified as Severe COVID-19 based on hypoxemia with O2 saturations <94% on room air or on ambulation   Initiate standard COVID protocols; COVID-19 testing ,Infection Control notification  and isolation- respiratory, contact and droplet per protocol    Diagnostics: CBC, CMP, Procalcitonin, Ferritin, CRP, Blood Culture x2, Portable CXR, and UA and culture    Management: Maintain oxygen saturations 92-96% via Nasal Cannula  LPM and monitor with continuous/intermittent pulse oximetry. , Inhaled bronchodilators as needed for shortness of breath., and Continuous cardiac monitoring.    Advance Care Planning Current advance care plan has not been discussed with patient/family/POA and patient currently wishes Full Code.     Acute respiratory failure with hypoxia  History of interstitial lung disease, COPD, scleroderma Left lung    Patient with Hypoxic Respiratory failure which is Acute on chronic.  she is on home oxygen at 2 LPM at night. Supplemental oxygen was provided and noted-      .   Signs/symptoms of respiratory failure include- tachypnea, increased work of breathing, respiratory distress, use of accessory muscles, wheezing, and cyanosis. Contributing diagnoses includes - COPD, Interstitial lung disease, and covid  Labs and images were reviewed. Patient Has not had a recent ABG. Will treat underlying causes and adjust management of respiratory failure as follows-   Inhalers, supplemental oxygen, steroids.    ELAINE on CPAP  Chronic   Continue home  regimen      Hypertension, essential  Chronic, controlled. Latest blood pressure and vitals reviewed-     Temp:  [101.2 °F (38.4 °C)-102.7 °F (39.3 °C)]   Pulse:  []   Resp:  [10-22]   BP: ()/(54-80)   SpO2:  [88 %-98 %] .   Home meds for hypertension were reviewed and noted below.   Hypertension Medications               losartan (COZAAR) 25 MG tablet TAKE 1 TABLET(25 MG) BY MOUTH EVERY DAY            While in the hospital, will manage blood pressure as follows; Continue home antihypertensive regimen    Will utilize p.r.n. blood pressure medication only if patient's blood pressure greater than 180/110 and she develops symptoms such as worsening chest pain or shortness of breath.      VTE Risk Mitigation (From admission, onward)           Ordered     enoxaparin injection 90 mg  2 times daily         08/31/24 1831                         On 08/31/2024, patient should be placed in hospital observation services under my care in collaboration with Dr. Bhandari.      Automatic Inhaler to Nebulizer Interchange    albuterol (Ventolin, ProAir, or Proventil)  mcg given multiple times per day changed to albuterol 2.5 mg Q8H  per [facility] Automatic Therapeutic Substitutions Protocol.    Please contact pharmacy at extension [phone] with any questions.     Thank you,   Jean-Pierre Cota NP  Department of Hospital Medicine  [facility] - Emergency Dept

## 2024-09-01 NOTE — PROGRESS NOTES
"Pharmacokinetic Initial Assessment: IV Vancomycin    Assessment/Plan:    Initiate intravenous vancomycin with loading dose of 1750 mg once followed by a maintenance dose of vancomycin 1750 mg IV every 24 hours  Desired empiric serum trough concentration is 10 to 15 mcg/mL  Draw vancomycin trough level 60 min prior to third dose on 09/02 at approximately 1600  Pharmacy will continue to follow and monitor vancomycin.      Please contact pharmacy at extension [phone] with any questions regarding this assessment.     Thank you for the consult,   Jean-Pierre Tamayo       Patient brief summary:  Edith Tran is a 69 y.o. female initiated on antimicrobial therapy with IV Vancomycin for treatment of suspected lower respiratory infection    Drug Allergies:   Review of patient's allergies indicates:   Allergen Reactions    Hydroxychloroquine Other (See Comments)     Having eye reaction, needs to stop plaquenil and stay off of it.        Actual Body Weight:   88 kg    Renal Function:   Estimated Creatinine Clearance: 65.9 mL/min (based on SCr of 0.9 mg/dL).,     CBC (last 72 hours):  Recent Labs   Lab Result Units 08/31/24  1347   WBC K/uL 6.49   Hemoglobin g/dL 13.0   Hematocrit % 40.9   Platelets K/uL 139*   Gran % % 81.4*   Lymph % % 6.5*   Mono % % 10.5   Eosinophil % % 0.8   Basophil % % 0.3   Differential Method  Automated       Metabolic Panel (last 72 hours):  Recent Labs   Lab Result Units 08/31/24  1347 08/31/24  1518   Sodium mmol/L 136  --    Potassium mmol/L 3.6  --    Chloride mmol/L 103  --    CO2 mmol/L 25  --    Glucose mg/dL 121*  --    Glucose, UA   --  Negative   BUN mg/dL 14  --    Creatinine mg/dL 0.9  --    Albumin g/dL 4.3  --    Total Bilirubin mg/dL 0.6  --    Alkaline Phosphatase U/L 45*  --    AST U/L 15  --    ALT U/L 10  --        Drug levels (last 3 results):  No results for input(s): "VANCOMYCINRA", "VANCORANDOM", "VANCOMYCINPE", "VANCOPEAK", "VANCOMYCINTR", "VANCOTROUGH" in the last 72 " hours.    Microbiologic Results:  Microbiology Results (last 7 days)       Procedure Component Value Units Date/Time    Blood culture x two cultures. Draw prior to antibiotics. [[id number]] Collected: 08/31/24 1400    Order Status: Completed Specimen: Blood from Peripheral, Antecubital, Right Updated: 08/31/24 2117     Blood Culture, Routine No Growth to date    Narrative:      Aerobic and anaerobic    Blood culture x two cultures. Draw prior to antibiotics. [[id number]] Collected: 08/31/24 1410    Order Status: Completed Specimen: Blood from Peripheral, Antecubital, Left Updated: 08/31/24 2117     Blood Culture, Routine No Growth to date    Narrative:      Aerobic and anaerobic

## 2024-09-01 NOTE — PLAN OF CARE
Problem: Adult Inpatient Plan of Care  Goal: Patient-Specific Goal (Individualized)  9/1/2024 0013 by Autumn Michel RN  Outcome: Progressing  9/1/2024 0011 by Autumn Michel RN  Outcome: Progressing     Problem: Sepsis/Septic Shock  Goal: Optimal Coping  9/1/2024 0013 by Autumn Michel RN  Outcome: Progressing  9/1/2024 0011 by Autumn Michel RN  Outcome: Progressing  Goal: Absence of Bleeding  9/1/2024 0013 by Autumn Michel RN  Outcome: Progressing  9/1/2024 0011 by Autumn Michel RN  Outcome: Progressing  Goal: Blood Glucose Level Within Targeted Range  9/1/2024 0013 by Autumn Michel RN  Outcome: Progressing  9/1/2024 0011 by Autumn Michel RN  Outcome: Progressing  Goal: Absence of Infection Signs and Symptoms  9/1/2024 0013 by Autumn Michel RN  Outcome: Progressing  9/1/2024 0011 by Autumn Michel RN  Outcome: Progressing  Goal: Optimal Nutrition Intake  9/1/2024 0013 by Autumn Michel RN  Outcome: Progressing  9/1/2024 0011 by Autumn Michel RN  Outcome: Progressing

## 2024-09-01 NOTE — PT/OT/SLP PROGRESS
Physical Therapy      Patient Name:  Edith Tran   MRN:  [id number]    Patient not seen today secondary to Other (Comment) (therapist unavailable.). Will follow-up 9/2/24.

## 2024-09-02 VITALS
SYSTOLIC BLOOD PRESSURE: 110 MMHG | OXYGEN SATURATION: 97 % | HEIGHT: 66 IN | DIASTOLIC BLOOD PRESSURE: 60 MMHG | RESPIRATION RATE: 19 BRPM | HEART RATE: 68 BPM | WEIGHT: 194 LBS | TEMPERATURE: 97 F | BODY MASS INDEX: 31.18 KG/M2

## 2024-09-02 DIAGNOSIS — U07.1 COVID-19 VIRUS DETECTED: ICD-10-CM

## 2024-09-02 LAB
ALBUMIN SERPL BCP-MCNC: 3.7 G/DL (ref 3.5–5.2)
ALP SERPL-CCNC: 36 U/L (ref 55–135)
ALT SERPL W/O P-5'-P-CCNC: 10 U/L (ref 10–44)
ANION GAP SERPL CALC-SCNC: 5 MMOL/L (ref 8–16)
AST SERPL-CCNC: 14 U/L (ref 10–40)
BASOPHILS # BLD AUTO: 0.02 K/UL (ref 0–0.2)
BASOPHILS NFR BLD: 0.5 % (ref 0–1.9)
BILIRUB SERPL-MCNC: 0.3 MG/DL (ref 0.1–1)
BUN SERPL-MCNC: 15 MG/DL (ref 8–23)
CALCIUM SERPL-MCNC: 8.6 MG/DL (ref 8.7–10.5)
CHLORIDE SERPL-SCNC: 108 MMOL/L (ref 95–110)
CO2 SERPL-SCNC: 27 MMOL/L (ref 23–29)
CREAT SERPL-MCNC: 0.6 MG/DL (ref 0.5–1.4)
DIFFERENTIAL METHOD BLD: ABNORMAL
EOSINOPHIL # BLD AUTO: 0.1 K/UL (ref 0–0.5)
EOSINOPHIL NFR BLD: 1.2 % (ref 0–8)
ERYTHROCYTE [DISTWIDTH] IN BLOOD BY AUTOMATED COUNT: 13 % (ref 11.5–14.5)
EST. GFR  (NO RACE VARIABLE): >60 ML/MIN/1.73 M^2
GLUCOSE SERPL-MCNC: 90 MG/DL (ref 70–110)
HCT VFR BLD AUTO: 35.6 % (ref 37–48.5)
HGB BLD-MCNC: 11.5 G/DL (ref 12–16)
IMM GRANULOCYTES # BLD AUTO: 0.01 K/UL (ref 0–0.04)
IMM GRANULOCYTES NFR BLD AUTO: 0.2 % (ref 0–0.5)
LYMPHOCYTES # BLD AUTO: 0.8 K/UL (ref 1–4.8)
LYMPHOCYTES NFR BLD: 18.5 % (ref 18–48)
MAGNESIUM SERPL-MCNC: 1.9 MG/DL (ref 1.6–2.6)
MCH RBC QN AUTO: 30.9 PG (ref 27–31)
MCHC RBC AUTO-ENTMCNC: 32.3 G/DL (ref 32–36)
MCV RBC AUTO: 96 FL (ref 82–98)
MONOCYTES # BLD AUTO: 0.5 K/UL (ref 0.3–1)
MONOCYTES NFR BLD: 11.6 % (ref 4–15)
NEUTROPHILS # BLD AUTO: 2.8 K/UL (ref 1.8–7.7)
NEUTROPHILS NFR BLD: 68 % (ref 38–73)
NRBC BLD-RTO: 0 /100 WBC
PHOSPHATE SERPL-MCNC: 2.6 MG/DL (ref 2.7–4.5)
PLATELET # BLD AUTO: 127 K/UL (ref 150–450)
PMV BLD AUTO: 11.5 FL (ref 9.2–12.9)
POTASSIUM SERPL-SCNC: 3.7 MMOL/L (ref 3.5–5.1)
PROT SERPL-MCNC: 6.2 G/DL (ref 6–8.4)
RBC # BLD AUTO: 3.72 M/UL (ref 4–5.4)
SODIUM SERPL-SCNC: 140 MMOL/L (ref 136–145)
WBC # BLD AUTO: 4.06 K/UL (ref 3.9–12.7)

## 2024-09-02 PROCEDURE — 36415 COLL VENOUS BLD VENIPUNCTURE: CPT | Performed by: NURSE PRACTITIONER

## 2024-09-02 PROCEDURE — 80053 COMPREHEN METABOLIC PANEL: CPT | Performed by: NURSE PRACTITIONER

## 2024-09-02 PROCEDURE — 83735 ASSAY OF MAGNESIUM: CPT | Performed by: NURSE PRACTITIONER

## 2024-09-02 PROCEDURE — 85025 COMPLETE CBC W/AUTO DIFF WBC: CPT | Performed by: NURSE PRACTITIONER

## 2024-09-02 PROCEDURE — 99900031 HC PATIENT EDUCATION (STAT)

## 2024-09-02 PROCEDURE — 94761 N-INVAS EAR/PLS OXIMETRY MLT: CPT

## 2024-09-02 PROCEDURE — 99900035 HC TECH TIME PER 15 MIN (STAT)

## 2024-09-02 PROCEDURE — 63600175 PHARM REV CODE 636 W HCPCS: Performed by: NURSE PRACTITIONER

## 2024-09-02 PROCEDURE — 94640 AIRWAY INHALATION TREATMENT: CPT

## 2024-09-02 PROCEDURE — 25000242 PHARM REV CODE 250 ALT 637 W/ HCPCS: Performed by: HOSPITALIST

## 2024-09-02 PROCEDURE — 94660 CPAP INITIATION&MGMT: CPT

## 2024-09-02 PROCEDURE — 97161 PT EVAL LOW COMPLEX 20 MIN: CPT

## 2024-09-02 PROCEDURE — 84100 ASSAY OF PHOSPHORUS: CPT | Performed by: NURSE PRACTITIONER

## 2024-09-02 PROCEDURE — 25000003 PHARM REV CODE 250: Performed by: NURSE PRACTITIONER

## 2024-09-02 PROCEDURE — 27000221 HC OXYGEN, UP TO 24 HOURS

## 2024-09-02 RX ORDER — ASCORBIC ACID 500 MG
500 TABLET ORAL 2 TIMES DAILY
COMMUNITY
Start: 2024-09-02 | End: 2024-09-09

## 2024-09-02 RX ORDER — AZITHROMYCIN 250 MG/1
TABLET, FILM COATED ORAL
Qty: 6 TABLET | Refills: 0 | Status: SHIPPED | OUTPATIENT
Start: 2024-09-02 | End: 2024-09-07

## 2024-09-02 RX ORDER — DEXAMETHASONE 4 MG/1
4 TABLET ORAL DAILY
Qty: 5 TABLET | Refills: 0 | Status: SHIPPED | OUTPATIENT
Start: 2024-09-02 | End: 2024-09-06 | Stop reason: SDUPTHER

## 2024-09-02 RX ORDER — CEFUROXIME AXETIL 500 MG/1
500 TABLET ORAL 2 TIMES DAILY
Qty: 10 TABLET | Refills: 0 | Status: SHIPPED | OUTPATIENT
Start: 2024-09-02 | End: 2024-09-07

## 2024-09-02 RX ADMIN — ENOXAPARIN SODIUM 90 MG: 100 INJECTION SUBCUTANEOUS at 08:09

## 2024-09-02 RX ADMIN — OXYCODONE HYDROCHLORIDE AND ACETAMINOPHEN 500 MG: 500 TABLET ORAL at 08:09

## 2024-09-02 RX ADMIN — THERA TABS 1 TABLET: TAB at 08:09

## 2024-09-02 RX ADMIN — FAMOTIDINE 20 MG: 10 INJECTION, SOLUTION INTRAVENOUS at 08:09

## 2024-09-02 RX ADMIN — ALBUTEROL SULFATE 2.5 MG: 2.5 SOLUTION RESPIRATORY (INHALATION) at 12:09

## 2024-09-02 RX ADMIN — REMDESIVIR 100 MG: 100 INJECTION, POWDER, LYOPHILIZED, FOR SOLUTION INTRAVENOUS at 08:09

## 2024-09-02 RX ADMIN — ALBUTEROL SULFATE 2.5 MG: 2.5 SOLUTION RESPIRATORY (INHALATION) at 07:09

## 2024-09-02 RX ADMIN — DEXAMETHASONE 6 MG: 4 TABLET ORAL at 08:09

## 2024-09-02 NOTE — DISCHARGE SUMMARY
[facility] Medicine  Discharge Summary      Patient Name: Edith Tran  MRN: [id number]  Banner: [id number]  Patient Class: IP- Inpatient  Admission Date: 8/31/2024  Hospital Length of Stay: 1 days  Discharge Date and Time:  09/02/2024 10:56 AM  Attending Physician: Candis Garza MD   Discharging Provider: CRISTO Pena  Primary Care Provider: Duane Julio III, MD    Primary Care Team: Networked reference to record PCT     HPI:   69-year-old female history of tobacco use (10 PY hx, quit 25 years ago), HTN, systemic sclerosis complicated by ILD/PH/Raynaud's/esophageal involvement, and ELAINE on CPAP  COPD, emphysema, pneumonia interstitial lung disease presents with 2 days of runny nose cough congestion fever chills and myalgias.  Patient has not taken any medications at home prior to arrival.  She is on oxygen at night but not around the clock.  In the ER, T-max 102.7°, tachycardic at 1:16 a.m., tachypneic at 22, hypotensive 96/54 hypoxic at 88% on room air.  Placed on supplemental oxygen septic workup started   Tylenol p.o. given IV Zosyn given, COVID positive, influenza negative, white count 6 UA negative lactic 1.01, blood culture sent, chest x-ray with no consolidation, large pleural effusion, or evidence of pulmonary edema. Scattered interstitial opacities are unchanged.  Patient on supplemental oxygen.  Admit to hospital medicine for further medical management    * No surgery found *      Hospital Course:   69-year-old female with a past medical history of hypertension, systemic sclerosis complicated by interstitial lung disease, pulmonary hypertension Raynaud's and esophageal involvement, obstructive sleep apnea on CPAP, COPD, emphysema who presented to the emergency department for the evaluation of runny nose, cough, congestion, fever chills and body aches.  Patient was evaluated in the emergency department she was found to be COVID positive had a temperature maximum of 102  "0.7°, she was tachycardic and tachypneic and hypotensive.  Oxygen saturations on admission was 88% on room air.  Patient underwent sepsis workup, vanc and Zosyn were administered as well as IV fluids, IV remdesivir, and p.o. Tylenol.  Chest x-ray with stable appearance from previous imaging.  Patient admitted to medical-surgical unit with telemetry monitoring.  She was closely monitored and has remained hemodynamically stable.  Patient continued on 2 L nasal cannula oxygen at night and as needed.  She reports that she was on room air without oxygen for the majority of the day today.  Oxygen sats have remained 98-99%.  The patient has also remained afebrile over 24 hours.  The patient has completed 3 doses of remdesivir.  We will discharge home patient with Decadron as well as Ceftin 500 mg twice daily for 5 days and azithromycin Z-Ned.  The patient is to follow up with her pulmonologist Dr. Serrano and PCP within 1 week.  Discussed with patient to return to the emergency department for any concerning symptoms such as worsening shortness of breath, fever, weakness, chest pain or any other concerning symptoms.    Patient evaluated on morning of discharge and is appropriate for discharge at this time.       Goals of Care Treatment Preferences:  Code Status: Full Code         Consults:   Consults (From admission, onward)          Status Ordering Provider     Pharmacy to dose Vancomycin consult  Once        Provider:  (Not yet assigned)   Placed in "And" Linked Group    Acknowledged JANEEN JEFF            No new Assessment & Plan notes have been filed under this hospital service since the last note was generated.  Service: Hospital Medicine    Final Active Diagnoses:    Diagnosis Date Noted POA    PRINCIPAL PROBLEM:  Sepsis [A41.9] 12/18/2020 Yes    Acute respiratory failure with hypoxia [J96.01] 08/31/2024 Yes    COVID [U07.1] 08/31/2024 Yes    ELAINE on CPAP [G47.33] 04/03/2019 Yes    Hypertension, essential [I10] " 08/01/2018 Yes      Problems Resolved During this Admission:       Discharged Condition: good    Disposition: Home or Self Care    Follow Up:   Follow-up Information       Mikal Serrano MD Follow up in 1 week(s).    Specialty: Pulmonary Disease  Contact information:  [address]  [address]  [place] [address] [phone]               Duane Julio III, MD Follow up in 1 week(s).    Specialty: Family Medicine  Contact information:  [address]  [address]  [place] [id number] [phone]                           Patient Instructions:      Diet Cardiac     Notify your health care provider if you experience any of the following:  temperature >100.4     Notify your health care provider if you experience any of the following:  persistent nausea and vomiting or diarrhea     Notify your health care provider if you experience any of the following:  severe uncontrolled pain     Notify your health care provider if you experience any of the following:  redness, tenderness, or signs of infection (pain, swelling, redness, odor or green/yellow discharge around incision site)     Notify your health care provider if you experience any of the following:  difficulty breathing or increased cough     Notify your health care provider if you experience any of the following:  severe persistent headache     Notify your health care provider if you experience any of the following:  worsening rash     Notify your health care provider if you experience any of the following:  persistent dizziness, light-headedness, or visual disturbances     Notify your health care provider if you experience any of the following:  increased confusion or weakness     Activity as tolerated       Significant Diagnostic Studies: Labs: BMP:   Recent Labs   Lab 08/31/24  1347 09/01/24  0833 09/02/24  0658   * 94 90    138 140   K 3.6 3.6 3.7    107 108   CO2 25 22* 27   BUN 14 10 15   CREATININE 0.9 0.7 0.6   CALCIUM 9.3 8.0* 8.6*    MG  --  1.7 1.9   , CMP   Recent Labs   Lab 08/31/24  1347 09/01/24  0833 09/02/24  0658    138 140   K 3.6 3.6 3.7    107 108   CO2 25 22* 27   * 94 90   BUN 14 10 15   CREATININE 0.9 0.7 0.6   CALCIUM 9.3 8.0* 8.6*   PROT 7.3 6.1 6.2   ALBUMIN 4.3 3.7 3.7   BILITOT 0.6 0.5 0.3   ALKPHOS 45* 35* 36*   AST 15 16 14   ALT 10 9* 10   ANIONGAP 8 9 5*   , and CBC   Recent Labs   Lab 08/31/24  1347 09/02/24  0658   WBC 6.49 4.06   HGB 13.0 11.5*   HCT 40.9 35.6*   * 127*     Microbiology: Blood Culture   Lab Results   Component Value Date    LABBLOO No Growth to date 08/31/2024    LABBLOO No Growth to date 08/31/2024       Pending Diagnostic Studies:       Procedure Component Value Units Date/Time    VANCOMYCIN, TROUGH [[id number]]     Order Status: Sent Lab Status: No result     Specimen: Blood            Medications:  Reconciled Home Medications:      Medication List        START taking these medications      ascorbic acid (vitamin C) 500 MG tablet  Commonly known as: VITAMIN C  Take 1 tablet (500 mg total) by mouth 2 (two) times daily. for 7 days     azithromycin 250 MG tablet  Commonly known as: Z-PRECIOUS  Take 2 tablets by mouth on day 1; Take 1 tablet by mouth on days 2-5     cefUROXime 500 MG tablet  Commonly known as: CEFTIN  Take 1 tablet (500 mg total) by mouth 2 (two) times daily. for 5 days     dexAMETHasone 4 MG Tab  Commonly known as: DECADRON  Take 1 tablet (4 mg total) by mouth once daily. for 5 days            CHANGE how you take these medications      albuterol 90 mcg/actuation inhaler  Commonly known as: PROVENTIL/VENTOLIN HFA  2 puffs every 4 hours as needed for cough, wheeze, or shortness of breath  What changed:   how much to take  how to take this  when to take this  reasons to take this     conjugated estrogens vaginal cream  Commonly known as: PREMARIN  Place a small pea-sized amount (1 g) in the vagina daily for 2 weeks. Then place a small pea-sized amount (1 g) in the  vagina twice a week.  What changed:   how much to take  how to take this  when to take this     ergocalciferol 50,000 unit Cap  Commonly known as: ERGOCALCIFEROL  Take one cap a week  What changed:   how much to take  how to take this  when to take this     losartan 25 MG tablet  Commonly known as: COZAAR  TAKE 1 TABLET(25 MG) BY MOUTH EVERY DAY  What changed: when to take this     ondansetron 8 MG tablet  Commonly known as: ZOFRAN  TAKE 1 TABLET(8 MG) BY MOUTH EVERY 8 HOURS AS NEEDED FOR NAUSEA  What changed: See the new instructions.     traZODone 100 MG tablet  Commonly known as: DESYREL  TAKE 1 TABLET BY MOUTH IN THE EVENING AS NEEDED  What changed:   how much to take  how to take this  when to take this  reasons to take this            CONTINUE taking these medications      fluticasone propionate 50 mcg/actuation nasal spray  Commonly known as: FLONASE  1 spray (50 mcg total) by Each Nostril route once daily.     macitentan 10 mg Tab  Take 10 mg by mouth once daily.     magnesium oxide 400 mg (241.3 mg magnesium) tablet  Commonly known as: MAG-OX  Take 1 tablet (400 mg total) by mouth once daily.     montelukast 10 mg tablet  Commonly known as: SINGULAIR  Take 1 tablet (10 mg total) by mouth every evening.     mycophenolate 500 mg Tab  Commonly known as: CELLCEPT  Take 3 tablets (1,500 mg total) by mouth 2 (two) times daily.     nintedanib 150 mg Cap  Commonly known as: OFEV  Take 150 mg by mouth every 12 (twelve) hours.     pantoprazole 40 MG tablet  Commonly known as: PROTONIX  Take 1 tablet (40 mg total) by mouth 2 (two) times daily.     tadalafil 20 mg Tab  Commonly known as: ADCIRCA  Take 40 mg by mouth once daily.     TYVASO DPI 64 mcg Ctdv  Generic drug: treprostiniL  Inhale 1 puff into the lungs 4 (four) times daily.              Indwelling Lines/Drains at time of discharge:   Lines/Drains/Airways       None                   Time spent on the discharge of patient: 35 minutes         Megan Lagunas  FNP  Department of Hospital Medicine  [facility]

## 2024-09-02 NOTE — NURSING
Pt given verbal and written discharge instructions including new prescriptions and follow-up appointments. Encouraged use of mask to maintain airborne, contact, and droplet precautions. Pt verbalized an understanding of instructions. Awaiting personal transport. No apparent barriers to discharge noted.

## 2024-09-02 NOTE — PT/OT/SLP EVAL
Physical Therapy Evaluation and Discharge Note    Patient Name:  Edith Tran   MRN:  [id number]    Recommendations:     Discharge Recommendations: No Therapy Indicated (resume cardiac rehab)  Discharge Equipment Recommendations: none   Barriers to discharge:  medical status    Assessment:     Edith Tran is a 69 y.o. female admitted with a medical diagnosis of Sepsis. .  At this time, patient is functioning at their prior level of function and does not require further acute PT services.     Pt reports that she has been mobilizing around the room with out issue.     Recent Surgery: * No surgery found *      Plan:     During this hospitalization, patient does not require further acute PT services.  Please re-consult if situation changes.      Subjective     Chief Complaint: none,  Patient/Family Comments/goals: go home today  Pain/Comfort:  Pain Rating 1: 0/10    Patients cultural, spiritual, Oriental orthodox conflicts given the current situation: no    Living Environment:  Pt lives alone in a one story home with no JASMEET. Pt reports going to the senior center and cardiac rehab a few times a week  Prior to admission, patients level of function was Independent no AD.  Equipment used at home: oxygen.  DME owned (not currently used): none.  Upon discharge, patient will have assistance from self.    Objective:     Communicated with RN prior to session.  Patient found HOB elevated with peripheral IV, telemetry, oxygen upon PT entry to room.    General Precautions: Standard, airborne, droplet, contact (none)    Orthopedic Precautions:N/A   Braces: N/A  Respiratory Status: Nasal cannula, flow 3 L/min    Exams:  Cognitive Exam:  Patient is oriented to Person, Place, Time, and Situation  RLE ROM: WFL  RLE Strength: WFL  LLE ROM: WFL  LLE Strength: WFL    Functional Mobility:  Bed Mobility:     Supine to Sit: independence  Sit to Supine: independence  Transfers:     Sit to Stand:  independence with no AD  Gait: 35 ft with no AD and  supervision,no loss of balance or shortness of breath    AM-PAC 6 CLICK MOBILITY  Total Score:24       Treatment and Education:  Pt educated on POC, discharge recommendation, pacing/energy conservation, importance of time OOB, use of call bell to seek assistance as needed and fall prevention      AM-PAC 6 CLICK MOBILITY  Total Score:24     Patient left HOB elevated with all lines intact and call button in reach.    GOALS:   Multidisciplinary Problems       Physical Therapy Goals       Not on file                    History:     Past Medical History:   Diagnosis Date    Allergy     Arthritis     Back pain     Colon polyps     COPD (chronic obstructive pulmonary disease)     Emphysema of lung     GERD (gastroesophageal reflux disease)     Hypertension     Kidney stones     Obesity     Pneumonia     Pneumonia due to other staphylococcus     Pulmonary fibrosis     Pulmonary hypertension     Scleroderma involving lung since she was 49 y/o    Sleep apnea     Trouble in sleeping        Past Surgical History:   Procedure Laterality Date    COLONOSCOPY N/A 8/7/2018    Procedure: COLONOSCOPY;  Surgeon: Ngozi Prince MD;  Location: [facility];  Service: Endoscopy;  Laterality: N/A;    COLONOSCOPY N/A 11/21/2019    Procedure: COLONOSCOPY;  Surgeon: Ngozi Prince MD;  Location: [facility];  Service: Endoscopy;  Laterality: N/A;    ESOPHAGEAL MANOMETRY WITH MEASUREMENT OF IMPEDANCE N/A 7/27/2020    Procedure: MANOMETRY, ESOPHAGUS, WITH IMPEDANCE MEASUREMENT;  Surgeon: Bhupendra Temple MD;  Location: [facility] ([address] [place] [address]);  Service: Endoscopy;  Laterality: N/A;  3/10 - pt confirmed apptCovid test ordered for 7/24 in [place].[facility]    ESOPHAGOGASTRODUODENOSCOPY N/A 8/7/2018    Procedure: EGD (ESOPHAGOGASTRODUODENOSCOPY);  Surgeon: Ngozi Prince MD;  Location: [facility];  Service: Endoscopy;  Laterality: N/A;    ESOPHAGOGASTRODUODENOSCOPY N/A 11/21/2019    Procedure: EGD (ESOPHAGOGASTRODUODENOSCOPY);  Surgeon: Ngozi Prince  MD;  Location: [facility];  Service: Endoscopy;  Laterality: N/A;    ESOPHAGOGASTRODUODENOSCOPY N/A 1/29/2020    Procedure: EGD (ESOPHAGOGASTRODUODENOSCOPY);  Surgeon: Ngozi Prince MD;  Location: [facility];  Service: Endoscopy;  Laterality: N/A;    ESOPHAGOGASTRODUODENOSCOPY N/A 8/20/2020    Procedure: EGD (ESOPHAGOGASTRODUODENOSCOPY);  Surgeon: Ngozi Prince MD;  Location: [facility] ENDO;  Service: Endoscopy;  Laterality: N/A;    ESOPHAGOGASTRODUODENOSCOPY N/A 12/4/2020    Procedure: EGD (ESOPHAGOGASTRODUODENOSCOPY);  Surgeon: Ngozi Prince MD;  Location: [facility] ENDO;  Service: Endoscopy;  Laterality: N/A;    ESOPHAGOGASTRODUODENOSCOPY N/A 11/19/2021    Procedure: EGD (ESOPHAGOGASTRODUODENOSCOPY);  Surgeon: Ngozi Prince MD;  Location: [facility] ENDO;  Service: Endoscopy;  Laterality: N/A;    ESOPHAGOGASTRODUODENOSCOPY N/A 5/17/2024    Procedure: EGD (ESOPHAGOGASTRODUODENOSCOPY);  Surgeon: Ngozi Prince MD;  Location: [facility];  Service: Endoscopy;  Laterality: N/A;    REPAIR, HERNIA, INCISIONAL OR VENTRAL, WITHOUT HISTORY OF PRIOR REPAIR N/A 12/27/2022    Procedure: REPAIR, HERNIA umbilical, INCISIONAL OR VENTRAL, WITHOUT HISTORY OF PRIOR REPAIR;  Surgeon: Trent Evans MD;  Location: [facility];  Service: General;  Laterality: N/A;  RN PREOP 12/20/2022, PT INSTRUCTED TO BRING ALL MEDS WITH HER ON AM OF SURGERY    RIGHT HEART CATHETERIZATION Right 4/4/2024    Procedure: INSERTION, CATHETER, RIGHT HEART;  Surgeon: Puma Contreras MD;  Location: [facility] CATH LAB;  Service: Cardiology;  Laterality: Right;       Time Tracking:     PT Received On: 09/02/24  PT Start Time: 1020     PT Stop Time: 1026  PT Total Time (min): 6 min     Billable Minutes: Evaluation 6      09/02/2024

## 2024-09-02 NOTE — PLAN OF CARE
Problem: Adult Inpatient Plan of Care  Goal: Absence of Hospital-Acquired Illness or Injury  Outcome: Progressing     Problem: Sepsis/Septic Shock  Goal: Optimal Coping  Outcome: Progressing

## 2024-09-02 NOTE — CARE UPDATE
09/02/24 0735   Patient Assessment/Suction   Level of Consciousness (AVPU) alert   Respiratory Effort Unlabored;Normal   Expansion/Accessory Muscles/Retractions no use of accessory muscles;no retractions;expansion symmetric   All Lung Fields Breath Sounds diminished   Rhythm/Pattern, Respiratory unlabored;pattern regular;depth regular;chest wiggle adequate;no shortness of breath reported   Cough Frequency infrequent   Cough Type good;nonproductive   Skin Integrity   $ Wound Care Tech Time 15 min   Area Observed Bridge of nose   Skin Appearance without discoloration   PRE-TX-O2   Device (Oxygen Therapy) room air   $ Is the patient on Low Flow Oxygen? Yes   SpO2 97 %   Pulse Oximetry Type Intermittent   $ Pulse Oximetry - Multiple Charge Pulse Oximetry - Multiple   Pulse 69   Resp 19   Aerosol Therapy   $ Aerosol Therapy Charges Aerosol Treatment   Daily Review of Necessity (SVN) completed   Respiratory Treatment Status (SVN) given   Treatment Route (SVN) oxygen;mask   Patient Position HOB elevated   Post Treatment Assessment (SVN) increased aeration   Signs of Intolerance (SVN) none   Breath Sounds Post-Respiratory Treatment   Throughout All Fields Post-Treatment All Fields   Throughout All Fields Post-Treatment aeration increased   Post-treatment Heart Rate (beats/min) 70   Post-treatment Resp Rate (breaths/min) 19   Preset CPAP/BiPAP Settings   Mode Of Delivery Standby;CPAP   $ CPAP/BiPAP Daily Charge BiPAP/CPAP Daily   Equipment Type DeVilbiss   Education   $ Education Bronchodilator;15 min

## 2024-09-02 NOTE — PLAN OF CARE
CM met with pt to discuss discharge and HH. Pt states that she cannot imagine that they will do anything for her that she can't do herself and does not want HH. States that she plans to stay with a friend initially and that she may be leaving town for a couple days.  Denies discharge needs or barriers to discharge and states that a friend is on his way to discharge her. Discharge orders and chart reviewed with no further post-acute discharge needs identified at this time.  At this time, patient is cleared for discharge from Case Management standpoint.        09/02/24 1218   Final Note   Assessment Type Final Discharge Note   Anticipated Discharge Disposition Home   What phone number can be called within the next 1-3 days to see how you are doing after discharge? [phone]   Hospital Resources/Appts/Education Provided Appointments scheduled and added to AVS   Post-Acute Status   Post-Acute Authorization Home Health   Home Health Status Patient declined/refused   Discharge Delays None known at this time

## 2024-09-03 RX ORDER — BENZONATATE 200 MG/1
200 CAPSULE ORAL 3 TIMES DAILY PRN
Qty: 30 CAPSULE | Refills: 0 | Status: SHIPPED | OUTPATIENT
Start: 2024-09-03

## 2024-09-03 RX ORDER — AMOXICILLIN AND CLAVULANATE POTASSIUM 875; 125 MG/1; MG/1
1 TABLET, FILM COATED ORAL 2 TIMES DAILY
Qty: 20 TABLET | Refills: 0 | Status: SHIPPED | OUTPATIENT
Start: 2024-09-03

## 2024-09-04 ENCOUNTER — PATIENT OUTREACH (OUTPATIENT)
Dept: ADMINISTRATIVE | Facility: CLINIC | Age: 69
End: 2024-09-04
Payer: MEDICARE

## 2024-09-04 NOTE — PROGRESS NOTES
C3 nurse attempted to contact Edith Tran for a TCC post hospital discharge follow up call. No answer. No voicemail available. The patient has a scheduled HOSFU appointment with Duane Julio III on 09/13/24 @ 7243.

## 2024-09-05 LAB
BACTERIA BLD CULT: NORMAL
BACTERIA BLD CULT: NORMAL

## 2024-09-06 ENCOUNTER — OFFICE VISIT (OUTPATIENT)
Dept: FAMILY MEDICINE | Facility: CLINIC | Age: 69
End: 2024-09-06
Payer: MEDICARE

## 2024-09-06 ENCOUNTER — TELEPHONE (OUTPATIENT)
Dept: FAMILY MEDICINE | Facility: CLINIC | Age: 69
End: 2024-09-06
Payer: MEDICARE

## 2024-09-06 ENCOUNTER — PATIENT OUTREACH (OUTPATIENT)
Dept: FAMILY MEDICINE | Facility: CLINIC | Age: 69
End: 2024-09-06
Payer: MEDICARE

## 2024-09-06 VITALS
HEART RATE: 103 BPM | HEIGHT: 66 IN | BODY MASS INDEX: 31.82 KG/M2 | OXYGEN SATURATION: 97 % | WEIGHT: 198 LBS | TEMPERATURE: 98 F

## 2024-09-06 DIAGNOSIS — J18.9 PNEUMONIA DUE TO INFECTIOUS ORGANISM, UNSPECIFIED LATERALITY, UNSPECIFIED PART OF LUNG: ICD-10-CM

## 2024-09-06 DIAGNOSIS — M34.9 SCLERODERMA: ICD-10-CM

## 2024-09-06 DIAGNOSIS — J96.11 CHRONIC RESPIRATORY FAILURE WITH HYPOXIA: ICD-10-CM

## 2024-09-06 DIAGNOSIS — I10 HYPERTENSION, ESSENTIAL: Primary | ICD-10-CM

## 2024-09-06 DIAGNOSIS — G47.00 INSOMNIA, UNSPECIFIED TYPE: ICD-10-CM

## 2024-09-06 DIAGNOSIS — Z12.11 COLON CANCER SCREENING: ICD-10-CM

## 2024-09-06 DIAGNOSIS — I71.20 THORACIC AORTIC ANEURYSM WITHOUT RUPTURE, UNSPECIFIED PART: ICD-10-CM

## 2024-09-06 DIAGNOSIS — D69.6 THROMBOCYTOPENIA: ICD-10-CM

## 2024-09-06 DIAGNOSIS — U07.1 COVID: ICD-10-CM

## 2024-09-06 DIAGNOSIS — I27.20 PULMONARY HYPERTENSION: ICD-10-CM

## 2024-09-06 DIAGNOSIS — J44.9 CHRONIC OBSTRUCTIVE PULMONARY DISEASE, UNSPECIFIED COPD TYPE: ICD-10-CM

## 2024-09-06 DIAGNOSIS — G47.33 OSA ON CPAP: ICD-10-CM

## 2024-09-06 PROCEDURE — 1101F PT FALLS ASSESS-DOCD LE1/YR: CPT | Mod: CPTII,S$GLB,, | Performed by: FAMILY MEDICINE

## 2024-09-06 PROCEDURE — 1160F RVW MEDS BY RX/DR IN RCRD: CPT | Mod: CPTII,S$GLB,, | Performed by: FAMILY MEDICINE

## 2024-09-06 PROCEDURE — 1159F MED LIST DOCD IN RCRD: CPT | Mod: CPTII,S$GLB,, | Performed by: FAMILY MEDICINE

## 2024-09-06 PROCEDURE — G2211 COMPLEX E/M VISIT ADD ON: HCPCS | Mod: S$GLB,,, | Performed by: FAMILY MEDICINE

## 2024-09-06 PROCEDURE — 1125F AMNT PAIN NOTED PAIN PRSNT: CPT | Mod: CPTII,S$GLB,, | Performed by: FAMILY MEDICINE

## 2024-09-06 PROCEDURE — 1111F DSCHRG MED/CURRENT MED MERGE: CPT | Mod: CPTII,S$GLB,, | Performed by: FAMILY MEDICINE

## 2024-09-06 PROCEDURE — 3008F BODY MASS INDEX DOCD: CPT | Mod: CPTII,S$GLB,, | Performed by: FAMILY MEDICINE

## 2024-09-06 PROCEDURE — 99214 OFFICE O/P EST MOD 30 MIN: CPT | Mod: S$GLB,,, | Performed by: FAMILY MEDICINE

## 2024-09-06 PROCEDURE — 99999 PR PBB SHADOW E&M-EST. PATIENT-LVL IV: CPT | Mod: PBBFAC,,, | Performed by: FAMILY MEDICINE

## 2024-09-06 PROCEDURE — 4010F ACE/ARB THERAPY RXD/TAKEN: CPT | Mod: CPTII,S$GLB,, | Performed by: FAMILY MEDICINE

## 2024-09-06 PROCEDURE — 3288F FALL RISK ASSESSMENT DOCD: CPT | Mod: CPTII,S$GLB,, | Performed by: FAMILY MEDICINE

## 2024-09-06 RX ORDER — TRAZODONE HYDROCHLORIDE 100 MG/1
TABLET ORAL
Qty: 90 TABLET | Refills: 1 | Status: SHIPPED | OUTPATIENT
Start: 2024-09-06

## 2024-09-06 RX ORDER — DEXAMETHASONE 4 MG/1
4 TABLET ORAL DAILY
Qty: 5 TABLET | Refills: 0 | Status: SHIPPED | OUTPATIENT
Start: 2024-09-06 | End: 2024-09-11

## 2024-09-06 RX ORDER — DEXAMETHASONE 4 MG/1
4 TABLET ORAL EVERY 12 HOURS
COMMUNITY

## 2024-09-06 NOTE — TELEPHONE ENCOUNTER
Discharge Information    Discharge Date:   9/2/2024    Primary Discharge Diagnosis:  sepsis    Discharge Summary:  Reviewed      Medication & Order Review    Were medication changes made or new medications added?   No    If so, has the patient filled the prescriptions?  na patient stated she did not get sent home with new meds    Was Home Health ordered? No, patient stated she was offered it but decline it    If so, has Home Health contacted patient and/or initiated services?  na    Name of Home Health Agency? N/A    Durable Medical Equipment ordered?  No    If so, has the DME provider contacted patient and delivered equipment?  N/A    Follow Up    Any problems since discharge? No    How is the patient feeling since returning home?  patient stated she is getting stronger every day     Have you set up recommended follow up appointments?  scheduled with pulmonology rehab 9/10  and dr alvarado [facility] today     Schedule Hospital Follow-up appointment within 7-14 days (preferably 7).      Notes:  TCC can be billed       Jonelle Sanchez LPN

## 2024-09-06 NOTE — TELEPHONE ENCOUNTER
Called went through tcc questions. Documented. Offered today 9:30. Hung phone up around 8:56. She is coming today     ----- Message from Jovanni Richardson sent at 9/6/2024  8:36 AM CDT -----  Regarding: return call  Contact: Yohana for patient  Type:  Patient Returning Call    Who Called:Yohana with Patient Outreach TCC  Who Left Message for Patient:office nurse  Does the patient know what this is regarding?:please call patient about [employer] usage since she has Covid now and wants to know how long do she have to stay in  Would the patient rather a call back or a response via [facility]? Please advise  Best Call Back Number:[phone]  Additional Information:

## 2024-09-06 NOTE — PROGRESS NOTES
C3 nurse spoke with Edith Tran for a TCC post hospital discharge follow up call. The patient has a scheduled HOSFU appointment with Duane Julio III on 09/13/24 @ 4679.

## 2024-09-06 NOTE — PROGRESS NOTES
SCRIBE #1 NOTE: I, Maribell Patrick, am scribing for, and in the presence of, Duane Julio III, MD. I have scribed the entire note.     Subjective:       Patient ID: Edith Tran is a 69 y.o. female.    Chief Complaint: Follow-up (Hosp f/u)    Edith Tran is a 69 y.o. female who presents to the clinic s/p recent hospitalization at [facility] from 8/31/24 to 9/2/24 and was diagnosed with Covid-19. ELAINE (using CPAP regularly, compliant, benefiting from it). On home oxygen therapy, having to use it more frequently now. COPD. Interstitial lung disease. Scleroderma. Chronic respiratory failure with hypoxia. Prior to her hospital stay she had a fever and cough for a few days. She was prescribed antibiotics (500 mg Ceftin b.i.d. for 5 days and azithromycin Z-Ned, which she is still taking) and steroids (Decadron). No fever currently. Productive cough (white sputum today and bloody last night). Chest x-ray performed during hospital visit was okay. Last CT Chest was February 2024. Has some fatigue today. She has received 5 Covid vaccines in total. She still needs to follow up with her pulmonologist Dr. Serrano. Thrombocytopenia. Platelet count is holding steady at 127,000. She sees her Rheuamtologist Dr. Wayne next month. Hypertension. Cardiovascular good. No chest pain. No palpitations. Chronic respiratory failure with hypoxia. She has not yet received the RSV or flu vaccines, but states she will get them. Colonoscopy is coming up, due November 2024, last colonoscopy done with Dr. Norris in November 2019.           Review of Systems   Constitutional:  Positive for fatigue. Negative for fever.   HENT: Negative.     Eyes: Negative.    Respiratory:  Positive for cough.    Cardiovascular: Negative.  Negative for chest pain and palpitations.   Gastrointestinal: Negative.    Endocrine: Negative.    Genitourinary: Negative.    Musculoskeletal: Negative.    Skin: Negative.    Allergic/Immunologic: Negative.    Neurological: Negative.     Hematological: Negative.    Psychiatric/Behavioral: Negative.     All other systems reviewed and are negative.      Objective:      Physical examination: Vital signs noted. No acute distress. No carotid bruit. Regular heart rate and rhythm. Crackles to the lung fields, secondary to the fibrosis. Abdomen bowel sounds are positive soft and nontender. Extremities without edema. 2+ pedal pulses.      Assessment:       1. Hypertension, essential    2. Insomnia, unspecified type    3. Scleroderma    4. Chronic obstructive pulmonary disease, unspecified COPD type    5. COVID    6. Pulmonary hypertension    7. Thrombocytopenia    8. ELAINE on CPAP    9. Pneumonia due to infectious organism, unspecified laterality, unspecified part of lung    10. Thoracic aortic aneurysm without rupture, unspecified part    11. Chronic respiratory failure with hypoxia        Plan:       Hypertension, essential    Insomnia, unspecified type  -     traZODone (DESYREL) 100 MG tablet; TAKE 1 TABLET BY MOUTH IN THE EVENING AS NEEDED  Dispense: 90 tablet; Refill: 1    Scleroderma    Chronic obstructive pulmonary disease, unspecified COPD type    COVID    Pulmonary hypertension    Thrombocytopenia    ELAINE on CPAP    Pneumonia due to infectious organism, unspecified laterality, unspecified part of lung    Thoracic aortic aneurysm without rupture, unspecified part    Chronic respiratory failure with hypoxia    Other orders  -     dexAMETHasone (DECADRON) 4 MG Tab; Take 1 tablet (4 mg total) by mouth once daily. for 5 days  Dispense: 5 tablet; Refill: 0    Recommend RSV vaccine.  Mammogram ordered.  Colonoscopy will be due in November.  Dexamethasone 4 mg a day for 5 more days.  Trazodone 100 mg HS.  Recommend flu shot also.    I, Dr Duane Julio, personally performed the services described in this documentation. All medical record entries made by the scribe were at my direction and in my presence. I have reviewed the chart and agree that the record  reflects my personal performance and is accurate and complete.

## 2024-09-07 ENCOUNTER — PATIENT MESSAGE (OUTPATIENT)
Dept: FAMILY MEDICINE | Facility: CLINIC | Age: 69
End: 2024-09-07
Payer: MEDICARE

## 2024-09-09 ENCOUNTER — PATIENT MESSAGE (OUTPATIENT)
Dept: GASTROENTEROLOGY | Facility: CLINIC | Age: 69
End: 2024-09-09
Payer: MEDICARE

## 2024-09-17 ENCOUNTER — CLINICAL SUPPORT (OUTPATIENT)
Dept: CARDIAC REHAB | Facility: HOSPITAL | Age: 69
End: 2024-09-17
Payer: MEDICARE

## 2024-09-17 DIAGNOSIS — J84.9 ILD (INTERSTITIAL LUNG DISEASE): Primary | ICD-10-CM

## 2024-09-17 PROCEDURE — 94626 PHY/QHP OP PULM RHB W/MNTR: CPT

## 2024-09-19 ENCOUNTER — CLINICAL SUPPORT (OUTPATIENT)
Dept: CARDIAC REHAB | Facility: HOSPITAL | Age: 69
End: 2024-09-19
Payer: MEDICARE

## 2024-09-19 DIAGNOSIS — J84.9 ILD (INTERSTITIAL LUNG DISEASE): Primary | ICD-10-CM

## 2024-09-19 PROCEDURE — 94626 PHY/QHP OP PULM RHB W/MNTR: CPT

## 2024-09-24 ENCOUNTER — CLINICAL SUPPORT (OUTPATIENT)
Dept: CARDIAC REHAB | Facility: HOSPITAL | Age: 69
End: 2024-09-24
Payer: MEDICARE

## 2024-09-24 DIAGNOSIS — J84.9 ILD (INTERSTITIAL LUNG DISEASE): Primary | ICD-10-CM

## 2024-09-24 PROCEDURE — 93798 PHYS/QHP OP CAR RHAB W/ECG: CPT

## 2024-09-30 ENCOUNTER — OFFICE VISIT (OUTPATIENT)
Dept: PHYSICAL MEDICINE AND REHAB | Facility: CLINIC | Age: 69
End: 2024-09-30
Payer: MEDICARE

## 2024-09-30 VITALS — BODY MASS INDEX: 31.66 KG/M2 | WEIGHT: 197 LBS | HEIGHT: 66 IN

## 2024-09-30 DIAGNOSIS — R20.2 PARESTHESIA OF FOOT: ICD-10-CM

## 2024-09-30 DIAGNOSIS — G57.32 SUPERFICIAL PERONEAL NERVE NEUROPATHY, LEFT: Primary | ICD-10-CM

## 2024-09-30 PROCEDURE — 95909 NRV CNDJ TST 5-6 STUDIES: CPT | Mod: S$GLB,,, | Performed by: STUDENT IN AN ORGANIZED HEALTH CARE EDUCATION/TRAINING PROGRAM

## 2024-09-30 PROCEDURE — 99499 UNLISTED E&M SERVICE: CPT | Mod: S$GLB,,, | Performed by: STUDENT IN AN ORGANIZED HEALTH CARE EDUCATION/TRAINING PROGRAM

## 2024-09-30 PROCEDURE — 95886 MUSC TEST DONE W/N TEST COMP: CPT | Mod: S$GLB,,, | Performed by: STUDENT IN AN ORGANIZED HEALTH CARE EDUCATION/TRAINING PROGRAM

## 2024-09-30 PROCEDURE — 99999 PR PBB SHADOW E&M-EST. PATIENT-LVL I: CPT | Mod: PBBFAC,,, | Performed by: STUDENT IN AN ORGANIZED HEALTH CARE EDUCATION/TRAINING PROGRAM

## 2024-09-30 NOTE — PROGRESS NOTES
[facility]  Physical Medicine and Rehabilitation   [address] [place] [address], [address]  [place] [address]      Full Name: Edith Tran Gender: Female  Patient ID: [id number] YOB: 1955      Visit Date: 9/30/2024 09:08  Age: 69 Years 7 Months Old  Examining Physician: Arjun Ashley MD  Referring Physician:  Nieves Blanchard MD   Height: 5 feet 6 inch  Weight: 197 lbs  Conclusion: Numbness, tingling, and burning of LLE mostly in the toes.       Sensory NCS      Nerve / Sites Rec. Site Onset Lat Peak Lat NP Amp PP Amp Segments Distance Velocity     ms ms µV µV  cm m/s   L Sural - Ankle (Calf)      Calf Ankle 1.82 2.92 14.5 23.4 Calf - Ankle 14 77      Ref.   <=4.60 >=3.0 >=5.0 Ref.     L Superficial peroneal - Ankle      Lat leg Ankle 2.34 2.92 5.8 7.0 Lat leg - Ankle 14 60      Ref.   <=4.60 >=3.0 >=4.8 Ref.     R Superficial peroneal - Ankle      Lat leg Ankle 2.34 3.13 13.4 6.0 Lat leg - Ankle 14 60      Ref.   <=4.40 >=5.0 >=5.0 Ref.         Motor NCS      Nerve / Sites Muscle Latency Ref. Amplitude Ref. Amp % Duration Segments Distance Lat Diff Velocity Ref.     ms ms mV mV % ms  cm ms m/s m/s   L Peroneal - EDB      Ankle EDB 3.02 <=6.00 6.8 >=2.5 100 6.46 Ankle - EDB 8         Fib head EDB 9.90  6.1  90 6.98 Fib head - Ankle 36 6.87 52 >=39      Pop fossa EDB 11.15  5.8  85.6 6.82 Pop fossa - Fib head 10 1.25 80 >=39   L Tibial - AH      Ankle AH 3.13 <=6.00 9.7 >=4.0 100 5.83 Ankle - AH 8         Pop fossa AH 13.07  8.2  83.9 5.05 Pop fossa - Ankle 42 9.95 42 >=39       EMG Summary Table     Spontaneous MUAP Recruitment   Muscle IA Fib PSW Fasc Other Amp Dur. PPP Pattern   L. Vastus medialis N None None None . N N N N   L. Tibialis anterior N None None None . N N N N   L. Tibialis posterior N None None None . N N N N   L. Gastrocnemius (Medial head) N None None None . N N N N   L. Peroneus longus N None None None . N N N N       Summary    The motor conduction test was  normal in all 2 of the tested nerves: L Peroneal - EDB, L Tibial - AH.    The sensory conduction test was normal in all 3 of the tested nerves: L Sural - Ankle (Calf), L Superficial peroneal - Ankle, R Superficial peroneal - Ankle.    The needle EMG study was normal in all 5 tested muscles: L. Vastus medialis, L. Tibialis anterior, L. Tibialis posterior, L. Gastrocnemius (Medial head), L. Peroneus longus.    Conclusion:     Abnormal study     EMG and nerve conduction study of the LEFT lower extremity revealed the following:      Reduced amplitude of the left superficial peroneal nerve when compared to the right. 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      There was no electrodiagnostic evidence of plexopathy, peripheral polyneuropathy or myopathy in the left lower extremity.       PLAN: Results reviewed with patient. Recommended f/u with referring provider for additional management.   ____________________________  Arjun Ashley MD

## 2024-10-01 ENCOUNTER — CLINICAL SUPPORT (OUTPATIENT)
Dept: CARDIAC REHAB | Facility: HOSPITAL | Age: 69
End: 2024-10-01
Payer: MEDICARE

## 2024-10-01 DIAGNOSIS — J84.9 ILD (INTERSTITIAL LUNG DISEASE): Primary | ICD-10-CM

## 2024-10-01 PROCEDURE — 94626 PHY/QHP OP PULM RHB W/MNTR: CPT

## 2024-10-02 ENCOUNTER — OFFICE VISIT (OUTPATIENT)
Dept: PULMONOLOGY | Facility: CLINIC | Age: 69
End: 2024-10-02
Payer: MEDICARE

## 2024-10-02 VITALS
HEART RATE: 97 BPM | OXYGEN SATURATION: 95 % | BODY MASS INDEX: 32.01 KG/M2 | SYSTOLIC BLOOD PRESSURE: 104 MMHG | DIASTOLIC BLOOD PRESSURE: 72 MMHG | WEIGHT: 199.19 LBS | HEIGHT: 66 IN

## 2024-10-02 DIAGNOSIS — J44.9 CHRONIC OBSTRUCTIVE PULMONARY DISEASE, UNSPECIFIED COPD TYPE: ICD-10-CM

## 2024-10-02 DIAGNOSIS — G47.00 INSOMNIA, UNSPECIFIED TYPE: ICD-10-CM

## 2024-10-02 DIAGNOSIS — Z99.81 ON HOME OXYGEN THERAPY: ICD-10-CM

## 2024-10-02 DIAGNOSIS — J30.2 SEASONAL ALLERGIES: Primary | ICD-10-CM

## 2024-10-02 PROCEDURE — 3074F SYST BP LT 130 MM HG: CPT | Mod: CPTII,S$GLB,, | Performed by: INTERNAL MEDICINE

## 2024-10-02 PROCEDURE — 99999 PR PBB SHADOW E&M-EST. PATIENT-LVL IV: CPT | Mod: PBBFAC,,, | Performed by: INTERNAL MEDICINE

## 2024-10-02 PROCEDURE — 1111F DSCHRG MED/CURRENT MED MERGE: CPT | Mod: CPTII,S$GLB,, | Performed by: INTERNAL MEDICINE

## 2024-10-02 PROCEDURE — 3288F FALL RISK ASSESSMENT DOCD: CPT | Mod: CPTII,S$GLB,, | Performed by: INTERNAL MEDICINE

## 2024-10-02 PROCEDURE — 3008F BODY MASS INDEX DOCD: CPT | Mod: CPTII,S$GLB,, | Performed by: INTERNAL MEDICINE

## 2024-10-02 PROCEDURE — 99213 OFFICE O/P EST LOW 20 MIN: CPT | Mod: S$GLB,,, | Performed by: INTERNAL MEDICINE

## 2024-10-02 PROCEDURE — 4010F ACE/ARB THERAPY RXD/TAKEN: CPT | Mod: CPTII,S$GLB,, | Performed by: INTERNAL MEDICINE

## 2024-10-02 PROCEDURE — 1159F MED LIST DOCD IN RCRD: CPT | Mod: CPTII,S$GLB,, | Performed by: INTERNAL MEDICINE

## 2024-10-02 PROCEDURE — 1101F PT FALLS ASSESS-DOCD LE1/YR: CPT | Mod: CPTII,S$GLB,, | Performed by: INTERNAL MEDICINE

## 2024-10-02 PROCEDURE — 3078F DIAST BP <80 MM HG: CPT | Mod: CPTII,S$GLB,, | Performed by: INTERNAL MEDICINE

## 2024-10-02 RX ORDER — TRAZODONE HYDROCHLORIDE 100 MG/1
150 TABLET ORAL NIGHTLY PRN
Qty: 135 TABLET | Refills: 1 | Status: SHIPPED | OUTPATIENT
Start: 2024-10-02

## 2024-10-02 RX ORDER — LEVOCETIRIZINE DIHYDROCHLORIDE 5 MG/1
5 TABLET, FILM COATED ORAL NIGHTLY
Qty: 30 TABLET | Refills: 11 | Status: SHIPPED | OUTPATIENT
Start: 2024-10-02 | End: 2025-10-02

## 2024-10-02 RX ORDER — AZELASTINE 1 MG/ML
2 SPRAY, METERED NASAL 2 TIMES DAILY
Qty: 30 ML | Refills: 11 | Status: SHIPPED | OUTPATIENT
Start: 2024-10-02 | End: 2025-10-02

## 2024-10-02 RX ORDER — IBUPROFEN 100 MG/5ML
SUSPENSION, ORAL (FINAL DOSE FORM) ORAL
COMMUNITY
Start: 2024-08-31

## 2024-10-02 NOTE — PATIENT INSTRUCTIONS
You are having symptoms consistent with seasonal allergies-- use zyrtec (or allergra/claritin).   If above not effective -- may use xyzal.  Could use nasal antihistamine with astelin --- paper scripts for above xyzal/astelin..    Would consider oxygen exercising...      Would follow up crp esr--- if not feeling better after antihistamine    Chest xray/ct viewed and stable...      Do pft and walk test in Jan      Use trazodone 100-150 bedtime as needed..

## 2024-10-02 NOTE — PROGRESS NOTES
10/2/2024   10/2/2024    Edith Tran  Office Note    Chief Complaint   Patient presents with    Follow-up    Interstitial Lung Disease       HPI:    10/2/2024 pt had covid admit 8/31-- hosp 2 days with fever, low bp. Pt still feeling poorly, uses ox for sleep and prn...  Had esophageal stretching 3 months ago-- no great improvement  Going through pulm rehab--       3/27/2024 no cough, no gi c/o on ofev, does streching and balance twice wk with walks. For right heart cath next wk.on cellcept and tyvaso, and adcirca....  Pft 10/2023 sl better thatn 10/2020 and 2/22...   dlco 7.6.    Cta lung viewed from 2/2024 compared to 2022 and 7/2023 -- no sign chagnes..  Cpap ongoing well..  Swallow difficulty somewhat worse and for endosocpy and ? Dilation?    Six min walk3/11/2024-   CLINICAL INTERPRETATION:  Six minute walk distance is 335.28 meters (1100 feet) with very heavy dyspnea.  During exercise, there was significant desaturation while breathing room air.         RHC: 8/2022  Right Heart Pressures   RAP: 3 mmHg   RVP: 50/3 mmHg   Pulmonary artery pressure: 50/12 mmHg   Mean pulmonary artery pressure: 31 mmHg   Pulmonary capillary wedge pressure: 7 mmHg   Cardiac Output: 5.9 L/min   Cardiac index: 3 L/min/m2   Pulmonary vascular resistance: 4 HRU   Oxygen saturation measurements were obtained at the following locations:   SVC: 67%   Main Pulmonary Artery: 71%   During Exercise:   PAP 65/20 mmHg   mPAP 35 mmHg   PCWP 15 mmHg   CO by TD: 7.3 L/min   PVR 4 Wood units   Patient Instructions   Ct and breathing test viewed-- stable -- re check next 2025-- could do sooner if worsens.....    Rsv vaccine recommended..  In six months  - recheck to assure no new symptoms...  need ct/breathing test next yr for evaluation.    9/27/2023 no lung infections, on ofev, no bowel issues, on weight watcher and lost 30 lbs....        Patient Instructions   Ct  chest viewed and ascending aneurysm seen looking same 2/2022 to 7/2023-- you  saw Dr Romero and are to have follow up ct in 6 months (no growth to our view), 12/2022 echo did not suggest valve problems       lung tissue was slight worsening from 2/2022 to 7/2023 - ofev ongoing for years.  Would check pulmonary functions     Occasionally beta blocker used for aneurysms.   You have no palpitations but have some anxiety.  Could dose low dose toprol and continue if less anxiety and no light headed/dizziness.           spirometry bronchodilator, lung volume by gas dilution, diffusion capacity measured February 17, 2022. Spirometry falls within normal limits. There is no airflow obstruction measured. The FEV1 is 85% predicted. There is no significant bronchodilator   response.       Lung volumes showed total lung capacity to be 69% of predicted and low. diffusion is also decreased to 40% of predicted.       There is evidence for restriction and loss of diffusion. There is no airflow obstruction nor bronchodilator response. Clinical correlation recommended  Spirometry, lung volume by gas dilution, and diffusion capacity measured October 22, 2020. The FEV1 to FVC ratio was 73% indicating no airflow obstruction measured by spirometry technique. The FEV1 was 81% of predicted at 2 L. Total lung capacity on lung    volume by gas dilution was 72% of predicted and a little low. Diffusion, uncorrected for anemia, was 41% of predicted and also low.   Patient has no obstruction. There is restriction measured. Diffusion is decreased. Clinical correlation recommended        Ofev dose is maximal.  May consider increased rx Dr Wayne if pulmonary function worsens..    3/27/2023 on tyvaso since November and doing better, has bronchial infections 1-2 with amoxil   Pt on ofev -- no diarrhea of  significance.    Pt feels less sob.  Patient Instructions   Last cpap from [facility] -- should check compliance report.  Diffusion has fallen from 9.5 -October 2020 to 8.9 --2/2022 to 7.2-- 1/2023--- was 7.3 9/2022.  Diffusion  may decrease from pulmonary hypertension or lung tissue disease.  Six min walk was good.   Ct chest looked better to our view 2/2022 c/w 2019 as far as lung tissue.    Use augmentin as needed, call if significant infection.  May be good to follow up ct chest later in year.  Low diffusion suggest low oxygen walking..      9/26/2022 no new issues, uses cpap with good results, uses ox for activity exercise. Misses ox concentrator--getting repairs.       Pt was having constant cough prior to august rhc.    Patient Instructions   Heart cath did not look too bad -- baseline NA.    Need to get portable oxygen concentrator repaired.    Would wish to see compliance report from cpap .    If new ct done -- bring on disc.       Evaluation took 44min to review ct and pft and rhc.    Had pft 9/21/2022 -- fvc 71%, fev1 74%, tlc 78%, dlco 27%.  Six min walk 349 meters in 6 min, and needed with 2 lpm needed.  Pft worsened from 2/2022.  Ox needs stable    Cough remitted -- was constant.     Swallows ok.  Eats slowly.      Rhc last month-- [place].    3/28/2022 not using symbicort, dose use occ albuterol which ppt headaches??    Feels like stb le-- uses ox more as feels more sob.  [facility]      Uses cpap -- had ahi 6.3 in past.  Patient Instructions   Need compliance report from cpap?  Ahi was 6.3 yrs ago.  You are compliant.    Ct chest and 6 min walk and pulm functions are stable to sl better.    9/16/2021 - six min walk distance same as October 2020 at 255 m, batteries on poc run out doing errands over 2 h rs..    Not monitoring ox walking.  ues 3lpm pulse.    Uses filter on cpap to get new.   Had 3 vaccines .   On rx and bladder doing better.   Pt not needing rescue,use symbicort.    Ct in 2019, and pft 2020.   Patient Instructions   Would do ct chest and pft and six min walk prior to seeing next rheumatologist, or in 6 months.     Continue opsumit, tadalafil, symbicort, ofev,     Call if needed    Re  check 6 months.  3/16/2021 concerned re covid vaccine and immujne suppression.  Breathing stable, has bladder urgency, has cpap mask problems - like f30 airfit.      Patient Instructions     Six min walk today walked 255 meters, and oxygen fell at 3 minutes to 88, and needed 3 lpm oxygen- 3/16/2021      6 min walk study was accomplished October 22, 2020. Baseline room air saturation was 98%. With ambulation O2 sat fell to 87% by 5 min. On 2 L of oxygen patient maintain sat in the low 90s subsequently. Patient walked about 133% of the reference distance   at 255 m.      6 min walk on August 21, 2018 was accomplished.  Patient's baseline O2 sat was 96% on room air.  After walking in her sats fell to 88% at 5 min.  On 2 L of oxygen sat was maintained in the mid 90s.  The patient was able to walk 390 m or 91% of the   reference value.     You may have spastic bladder.  Would recheck urine.  If sign symptoms may need to see urology?       You have and use portable oxygen concentrator. You should try to use more and be more active.      Get vaccine,      Walk studies suggest some loss in function from 2018 to 2020 but stable from October to March now.      Lung capacity was fairly good October 2020-  Spirometry, lung volume by gas dilution, and diffusion capacity measured October 22, 2020. The FEV1 to FVC ratio was 73% indicating no airflow obstruction measured by spirometry technique. The FEV1 was 81% of predicted at 2 L. Total lung capacity on lung    volume by gas dilution was 72% of predicted and a little low. Diffusion, uncorrected for anemia, was 41% of predicted and also low.   Patient has no obstruction. There is restriction measured. Diffusion is decreased. Clinical correlation recommended           Would do a six min walk prior to return in 6 months.    9/30/2020- uses 02 out of house, had scratchy throat with am cough lately. Uses cpap nightly all night usually 4-7 hrs/night.      Sees Dr Arboleda- Dr Barnes  left.    Had been seeing Dr Goddard, had pft and 6 min walk, saw Dr Goddard on 8/3/2020 - told all stable,  Cannot locate pft /walk test in care everywhere.  Usually studied yearly.  Pt feels stable otherwise.  3/27/2019 ct chest viewed with ild/honeycombing.  [place] [facility]    Pt continues on ofev and cellcept and cialis and opsumit     Patient Instructions   If antibiotic needed - azithromycin daily for 3 days, last 10, simple antibiotic.     Prednisone 5 mg - may use if cough bad.    Should have result of right heart cath, and august pulm function and six min walk- will ask to obtain.  Will need to make sure can continue opsumit/cialias    Can follow six min walk every 3 months - placed standing.    You need to stay on ofev for pulmonary fibrosis, uses tadalafil and optusmit for pulmonary hypertension.  October 8, 2019- Has PFT and 6 min walk scheduled Nov 2019 by Dr. Barnes Rheumatologist at [place]. Wearing supplemental oxygen at home day/night set at 2L [place], currently on Symbicort daily, states benefit in breathing. No recent prednisone use. SOB controlled. No cough, no chest tightness, no wheeze.   Wear cpap nightly, states benefiting greatly but want different mask, tries to read before bed, mask over nasal bridge does not allow glasses to rest correctly.  Patient Instructions   Order sent for different CPAP mask  Continue to use nightly for Obstructive sleep apnea    Continue supplemental oxygen    Will review the results of PFT and 6 min walk from [place] at next appointment.    Continue Symbicort daily.     Use Fela perles and prednisone as needed for cough.     April 3, 2019- Onset 1 week: Cough productive white in color, sore throat, post nasal drip, sinus drainage yellow, flushed cheeks, complaints improving tx with antibiotics no prednisone use. Cough worse when lying down.    Current therapy for scleroderma, pulmonary htn, and pulmonary fibrosis Opsumit/tadafanil and Ofev/cellcept.  "No diarrhea no complaints.  Currently using Symbicort 1 puff daily. Using supplemental oxygen at 2L [place] for Shortness of breath, states greatly beneficial, pt states able to walk further and keep up with company while on oxygen.      Nov 27, trelegy not covered- not using, had flu shot 8 am with sorenes later day and huge swollen arm with  Pain thhat night.   No cough. On opsumit/tadafanil, and on ofev/cellcept.  Stable. No diarrhea.  Sees pulm htn and  [facility].  Last 6 min walk 02  Angelo 91 slow pace.      Aug 21, uses trelegy, no c/o.  No 0x arranges- sat 90 falls to 87 at 2 mins- walked 307 meters or 71%.  Dx Crohn's.  No abx nor prednisone.  cpap good and sinus good.  Instructions:Would monitor 6 min walk every 3 months.    307 meters was last distance.  Six min walk.  Monitor ct chest yearly in March - sooner if worsens.  Needs 02 for activity.  Use medications for bronchitis.   Stay active.    July11,2018has had raynauld's for yrs, pt had severe mobility problems issues leading to dx slceroderma 2007 - "rock bottom".  Has had swallow sticking with  2-3 dilations with last over 3 yrs ago.  Pt has had pulm htn on opsumit and talafadil,  Pt also on ofev in a study, and cellcept.  Also low dose prednisone.  Pt dx osas and uses cpap nightly 8 hrs - had used 02 but off 02 for 3 yrs.   Pt had had 6 min walks but none recently- none last yr at least.  Pt gets bronchitis with cough and yellow mucous.  Has occ wheezes.  Had exacerbations in feb and June - typically 2-3 yrly.  Tessalon helps.  Uses combivent occ ppt headaches with some breathing benefit.     Uses flonase regular.  Was on asthma rx for yrs.        The chief compliant  problem is stable  PFSH:  Past Medical History:   Diagnosis Date    Allergy     Arthritis     Back pain     Colon polyps     COPD (chronic obstructive pulmonary disease)     Emphysema of lung     GERD (gastroesophageal reflux disease)     Hypertension     Kidney stones     Obesity     " Pneumonia     Pneumonia due to other staphylococcus     Pulmonary fibrosis     Pulmonary hypertension     Scleroderma involving lung since she was 47 y/o    Sleep apnea     Trouble in sleeping          Past Surgical History:   Procedure Laterality Date    COLONOSCOPY N/A 8/7/2018    Procedure: COLONOSCOPY;  Surgeon: Ngozi Prince MD;  Location: [facility];  Service: Endoscopy;  Laterality: N/A;    COLONOSCOPY N/A 11/21/2019    Procedure: COLONOSCOPY;  Surgeon: Ngozi Prince MD;  Location: [facility] ENDO;  Service: Endoscopy;  Laterality: N/A;    ESOPHAGEAL MANOMETRY WITH MEASUREMENT OF IMPEDANCE N/A 7/27/2020    Procedure: MANOMETRY, ESOPHAGUS, WITH IMPEDANCE MEASUREMENT;  Surgeon: Bhupendra Temple MD;  Location: [facility] ENDO ([address] [place] [address]);  Service: Endoscopy;  Laterality: N/A;  3/10 - pt confirmed apptCovid test ordered for 7/24 in [place]    ESOPHAGOGASTRODUODENOSCOPY N/A 8/7/2018    Procedure: EGD (ESOPHAGOGASTRODUODENOSCOPY);  Surgeon: Ngozi Prince MD;  Location: [facility];  Service: Endoscopy;  Laterality: N/A;    ESOPHAGOGASTRODUODENOSCOPY N/A 11/21/2019    Procedure: EGD (ESOPHAGOGASTRODUODENOSCOPY);  Surgeon: Ngozi Prince MD;  Location: [facility];  Service: Endoscopy;  Laterality: N/A;    ESOPHAGOGASTRODUODENOSCOPY N/A 1/29/2020    Procedure: EGD (ESOPHAGOGASTRODUODENOSCOPY);  Surgeon: Ngozi Prince MD;  Location: [facility] ENDO;  Service: Endoscopy;  Laterality: N/A;    ESOPHAGOGASTRODUODENOSCOPY N/A 8/20/2020    Procedure: EGD (ESOPHAGOGASTRODUODENOSCOPY);  Surgeon: Ngozi Prince MD;  Location: [facility] ENDO;  Service: Endoscopy;  Laterality: N/A;    ESOPHAGOGASTRODUODENOSCOPY N/A 12/4/2020    Procedure: EGD (ESOPHAGOGASTRODUODENOSCOPY);  Surgeon: Ngozi Prince MD;  Location: [facility] ENDO;  Service: Endoscopy;  Laterality: N/A;    ESOPHAGOGASTRODUODENOSCOPY N/A 11/19/2021    Procedure: EGD (ESOPHAGOGASTRODUODENOSCOPY);  Surgeon: Ngozi Prince MD;  Location: [facility];  Service: Endoscopy;   Laterality: N/A;    ESOPHAGOGASTRODUODENOSCOPY N/A 2024    Procedure: EGD (ESOPHAGOGASTRODUODENOSCOPY);  Surgeon: Ngozi Prince MD;  Location: [facility];  Service: Endoscopy;  Laterality: N/A;    REPAIR, HERNIA, INCISIONAL OR VENTRAL, WITHOUT HISTORY OF PRIOR REPAIR N/A 2022    Procedure: REPAIR, HERNIA umbilical, INCISIONAL OR VENTRAL, WITHOUT HISTORY OF PRIOR REPAIR;  Surgeon: Trent Evans MD;  Location: [facility] OR;  Service: General;  Laterality: N/A;  RN PREOP 2022, PT INSTRUCTED TO BRING ALL MEDS WITH HER ON AM OF SURGERY    RIGHT HEART CATHETERIZATION Right 2024    Procedure: INSERTION, CATHETER, RIGHT HEART;  Surgeon: Puma Contreras MD;  Location: [facility] CATH LAB;  Service: Cardiology;  Laterality: Right;     Social History     Tobacco Use    Smoking status: Former     Current packs/day: 0.00     Average packs/day: 0.5 packs/day for 27.7 years (13.8 ttl pk-yrs)     Types: Cigarettes     Start date:      Quit date: 1995     Years since quittin.1    Smokeless tobacco: Never   Substance Use Topics    Alcohol use: Not Currently     Comment: occ    Drug use: No     Family History   Problem Relation Name Age of Onset    Kidney disease Mother      Cancer Father      Heart disease Brother      Mental illness Son      Glaucoma Neg Hx      Macular degeneration Neg Hx      Retinal detachment Neg Hx       Review of patient's allergies indicates:   Allergen Reactions    Plaquenil [hydroxychloroquine]      Having eye reaction, needs to stop plaquenil and stay off of it.        Performance Status:The patient's activity level is housebound activities.      Review of Systems:  a review of eleven systems covering constitutional, Eye, HEENT, Psych, Respiratory, Cardiac, GI, , Musculoskeletal, Endocrine, Dermatologic was negative except for pertinent findings as listed ABOVE and below:  pertinent positive as above, rest is good       Exam:Comprehensive exam done. /72 (BP Location:  "Right arm, Patient Position: Sitting)   Pulse 97   Ht 5' 6" (1.676 m)   Wt 90.4 kg (199 lb 3 oz)   SpO2 95% Comment: on room air at rest  BMI 32.15 kg/m²   Exam included Vitals as listed, and patient's appearance and affect and alertness and mood, oral exam for yeast and hygiene and pharynx lesions and Mallapatti (M) score, neck with inspection for jvd and masses and thyroid abnormalities and lymph nodes (supraclavicular and infraclavicular nodes and axillary also examined and noted if abn), chest exam included symmetry and effort and fremitus and percussion and auscultation, cardiac exam included rhythm and gallops and murmur and rubs and jvd and edema, abdominal exam for mass and hepatosplenomegaly and tenderness and hernias and bowel sounds, Musculoskeletal exam with muscle tone and posture and mobility/gait and  strength, and skin for rashes and cyanosis and pallor and turgor, extremity for clubbing.  Findings were normal except for pertinent findings listed below:M2, bilat rales. No  Edema nor clubbing.       Radiographs (ct chest and cxr) reviewed: view by direct vision  March 2018 ct not too bad with cysts- viewed 8/21/18  CT CHEST WITHOUT CONTRAST 03/27/2019   Severe interstitial fibrosis with peripheral honeycombing and multiple bilateral lower lobe bulla consistent with the history of scleroderma.  This is essentially unchanged from March 21, 2018.  Continued mild mediastinal adenopathy also unchanged.  No acute findings.  Small hiatal hernia.  Stable 2 cm complicated cyst of the left kidney.       Labs  noted  Lab Results   Component Value Date    WBC 4.06 09/02/2024    HGB 11.5 (L) 09/02/2024    HCT 35.6 (L) 09/02/2024    MCV 96 09/02/2024     (L) 09/02/2024        PFT results reviewed   Pulmonary Functions, including spirometry and bronchodilator response and lung volumes and diffusion, study was done May 8, 2018.  Spirometry shows mild obstruction, loss vital capacity and obstruction " and no bronchodilator response.   FEV1 is 65% or 1.68 liters.  Lung volumes show  loss of TLC with restriction 66%.  Diffusion shows reduced but falls within normal range when corrected for lung volumes.   Pulmonary functions show  Mild obstruction and also restriction. Clinical correlation recommended.   Mikal Serrano M.D.    Echo 10/12/2018 Normal left and right ventricular systolic functions with LVEF >55%.    Plan:  Clinical impression is apparently straight forward and impression with management as below.     Edith was seen today for follow-up and interstitial lung disease.    Diagnoses and all orders for this visit:    Seasonal allergies  -     azelastine (ASTELIN) 137 mcg (0.1 %) nasal spray; 2 sprays (274 mcg total) by Nasal route 2 (two) times daily.  -     levocetirizine (XYZAL) 5 MG tablet; Take 1 tablet (5 mg total) by mouth every evening.    Insomnia, unspecified type  -     traZODone (DESYREL) 100 MG tablet; Take 1.5 tablets (150 mg total) by mouth nightly as needed for Insomnia. As needed for sleep    Chronic obstructive pulmonary disease, unspecified COPD type    On home oxygen therapy                  Follow up in about 6 months (around 4/2/2025).    Discussed with patient above for education the following:      Patient Instructions   You are having symptoms consistent with seasonal allergies-- use zyrtec (or allergra/claritin).   If above not effective -- may use xyzal.  Could use nasal antihistamine with astelin --- paper scripts for above xyzal/astelin..    Would consider oxygen exercising...      Would follow up crp esr--- if not feeling better after antihistamine    Chest xray/ct viewed and stable...      Do pft and walk test in Jan      Use trazodone 100-150 bedtime as needed..                  Edith Tran  Office Note    Chief Complaint   Patient presents with    Follow-up    Interstitial Lung Disease       HPI:    9/16/2021 - six min walk distance same as October 2020 at 255 m, batteries  on poc run out doing errands over 2 h rs..    Not monitoring ox walking.  ues 3lpm pulse.    Uses filter on cpap to get new.   Had 3 vaccines .   On rx and bladder doing better.   Pt not needing rescue,use symbicort.        Ct in 2019, and pft 2020.     Patient Instructions   Would do ct chest and pft and six min walk prior to seeing next rheumatologist, or in 6 months.     Continue opsumit, tadalafil, symbicort, ofev,     Call if needed    Re check 6 months.    3/16/2021 concerned re covid vaccine and immujne suppression.  Breathing stable, has bladder urgency, has cpap mask problems - like f30 airfit.      Patient Instructions     Six min walk today walked 255 meters, and oxygen fell at 3 minutes to 88, and needed 3 lpm oxygen- 3/16/2021      6 min walk study was accomplished October 22, 2020. Baseline room air saturation was 98%. With ambulation O2 sat fell to 87% by 5 min. On 2 L of oxygen patient maintain sat in the low 90s subsequently. Patient walked about 133% of the reference distance   at 255 m.      6 min walk on August 21, 2018 was accomplished.  Patient's baseline O2 sat was 96% on room air.  After walking in her sats fell to 88% at 5 min.  On 2 L of oxygen sat was maintained in the mid 90s.  The patient was able to walk 390 m or 91% of the   reference value.     You may have spastic bladder.  Would recheck urine.  If sign symptoms may need to see urology?       You have and use portable oxygen concentrator. You should try to use more and be more active.      Get vaccine,      Walk studies suggest some loss in function from 2018 to 2020 but stable from October to March now.      Lung capacity was fairly good October 2020-  Spirometry, lung volume by gas dilution, and diffusion capacity measured October 22, 2020. The FEV1 to FVC ratio was 73% indicating no airflow obstruction measured by spirometry technique. The FEV1 was 81% of predicted at 2 L. Total lung capacity on lung    volume by gas dilution was  72% of predicted and a little low. Diffusion, uncorrected for anemia, was 41% of predicted and also low.   Patient has no obstruction. There is restriction measured. Diffusion is decreased. Clinical correlation recommended           Would do a six min walk prior to return in 6 months.    9/30/2020- uses 02 out of house, had scratchy throat with am cough lately. Uses cpap nightly all night usually 4-7 hrs/night.      Sees Dr Arboleda- Dr Barnes left.    Had been seeing Dr Goddard, had pft and 6 min walk, saw Dr Goddard on 8/3/2020 - told all stable,  Cannot locate pft /walk test in care everywhere.  Usually studied yearly.  Pt feels stable otherwise.  3/27/2019 ct chest viewed with ild/honeycombing.  [facility].    Pt continues on ofev and cellcept and cialis and opsumit     Patient Instructions   If antibiotic needed - azithromycin daily for 3 days, last 10, simple antibiotic.     Prednisone 5 mg - may use if cough bad.    Should have result of right heart cath, and august pulm function and six min walk- will ask to obtain.  Will need to make sure can continue opsumit/cialias    Can follow six min walk every 3 months - placed standing.    You need to stay on ofev for pulmonary fibrosis, uses tadalafil and optusmit for pulmonary hypertension.  October 8, 2019- Has PFT and 6 min walk scheduled Nov 2019 by Dr. Barnes Rheumatologist at [place]. Wearing supplemental oxygen at home day/night set at 2L [place], currently on Symbicort daily, states benefit in breathing. No recent prednisone use. SOB controlled. No cough, no chest tightness, no wheeze.   Wear cpap nightly, states benefiting greatly but want different mask, tries to read before bed, mask over nasal bridge does not allow glasses to rest correctly.  Patient Instructions   Order sent for different CPAP mask  Continue to use nightly for Obstructive sleep apnea    Continue supplemental oxygen    Will review the results of PFT and 6 min walk from  "[place] at next appointment.    Continue Symbicort daily.     Use Fela perles and prednisone as needed for cough.     April 3, 2019- Onset 1 week: Cough productive white in color, sore throat, post nasal drip, sinus drainage yellow, flushed cheeks, complaints improving tx with antibiotics no prednisone use. Cough worse when lying down.    Current therapy for scleroderma, pulmonary htn, and pulmonary fibrosis Opsumit/tadafanil and Ofev/cellcept. No diarrhea no complaints.  Currently using Symbicort 1 puff daily. Using supplemental oxygen at 2L NC for Shortness of breath, states greatly beneficial, pt states able to walk further and keep up with company while on oxygen.      Nov 27, trelegy not covered- not using, had flu shot 8 am with sorenes later day and huge swollen arm with  Pain thhat night.   No cough. On opsumit/tadafanil, and on ofev/cellcept.  Stable. No diarrhea.  Sees pulm htn and  [facility].  Last 6 min walk 02  Angelo 91 slow pace.      Aug 21, uses trelegy, no c/o.  No 0x arranges- sat 90 falls to 87 at 2 mins- walked 307 meters or 71%.  Dx Crohn's.  No abx nor prednisone.  cpap good and sinus good.  Instructions:Would monitor 6 min walk every 3 months.    307 meters was last distance.  Six min walk.  Monitor ct chest yearly in March - sooner if worsens.  Needs 02 for activity.  Use medications for bronchitis.   Stay active.    July11,2018has had raynauld's for yrs, pt had severe mobility problems issues leading to dx slceroderma 2007 - "rock bottom".  Has had swallow sticking with  2-3 dilations with last over 3 yrs ago.  Pt has had pulm htn on opsumit and talafadil,  Pt also on ofev in a study, and cellcept.  Also low dose prednisone.  Pt dx osas and uses cpap nightly 8 hrs - had used 02 but off 02 for 3 yrs.   Pt had had 6 min walks but none recently- none last yr at least.  Pt gets bronchitis with cough and yellow mucous.  Has occ wheezes.  Had exacerbations in feb and June - typically 2-3 yrly.  " Tessalon helps.  Uses combivent occ ppt headaches with some breathing benefit.     Uses flonase regular.  Was on asthma rx for yrs.        The chief compliant  problem is stable  PFSH:  Past Medical History:   Diagnosis Date    Allergy     Arthritis     Back pain     Colon polyps     COPD (chronic obstructive pulmonary disease)     Emphysema of lung     GERD (gastroesophageal reflux disease)     Hypertension     Kidney stones     Obesity     Pneumonia     Pneumonia due to other staphylococcus     Pulmonary fibrosis     Pulmonary hypertension     Scleroderma involving lung since she was 49 y/o    Sleep apnea     Trouble in sleeping          Past Surgical History:   Procedure Laterality Date    COLONOSCOPY N/A 8/7/2018    Procedure: COLONOSCOPY;  Surgeon: Ngozi Prince MD;  Location: [facility];  Service: Endoscopy;  Laterality: N/A;    COLONOSCOPY N/A 11/21/2019    Procedure: COLONOSCOPY;  Surgeon: Ngozi Prince MD;  Location: [facility];  Service: Endoscopy;  Laterality: N/A;    ESOPHAGEAL MANOMETRY WITH MEASUREMENT OF IMPEDANCE N/A 7/27/2020    Procedure: MANOMETRY, ESOPHAGUS, WITH IMPEDANCE MEASUREMENT;  Surgeon: Bhupendra Temple MD;  Location: [facility] ([address] [place] [address]);  Service: Endoscopy;  Laterality: N/A;  3/10 - pt confirmed apptCovid test ordered for 7/24 in [place]    ESOPHAGOGASTRODUODENOSCOPY N/A 8/7/2018    Procedure: EGD (ESOPHAGOGASTRODUODENOSCOPY);  Surgeon: Ngozi Prince MD;  Location: [facility];  Service: Endoscopy;  Laterality: N/A;    ESOPHAGOGASTRODUODENOSCOPY N/A 11/21/2019    Procedure: EGD (ESOPHAGOGASTRODUODENOSCOPY);  Surgeon: Ngozi Prince MD;  Location: [facility];  Service: Endoscopy;  Laterality: N/A;    ESOPHAGOGASTRODUODENOSCOPY N/A 1/29/2020    Procedure: EGD (ESOPHAGOGASTRODUODENOSCOPY);  Surgeon: Ngozi Prince MD;  Location: [facility];  Service: Endoscopy;  Laterality: N/A;    ESOPHAGOGASTRODUODENOSCOPY N/A 8/20/2020    Procedure: EGD (ESOPHAGOGASTRODUODENOSCOPY);   Surgeon: Ngozi Prince MD;  Location: [facility] ENDO;  Service: Endoscopy;  Laterality: N/A;    ESOPHAGOGASTRODUODENOSCOPY N/A 2020    Procedure: EGD (ESOPHAGOGASTRODUODENOSCOPY);  Surgeon: Ngozi Prince MD;  Location: [facility];  Service: Endoscopy;  Laterality: N/A;    ESOPHAGOGASTRODUODENOSCOPY N/A 2021    Procedure: EGD (ESOPHAGOGASTRODUODENOSCOPY);  Surgeon: Ngozi Prince MD;  Location: [facility] ENDO;  Service: Endoscopy;  Laterality: N/A;    ESOPHAGOGASTRODUODENOSCOPY N/A 2024    Procedure: EGD (ESOPHAGOGASTRODUODENOSCOPY);  Surgeon: Ngozi Prince MD;  Location: [facility];  Service: Endoscopy;  Laterality: N/A;    REPAIR, HERNIA, INCISIONAL OR VENTRAL, WITHOUT HISTORY OF PRIOR REPAIR N/A 2022    Procedure: REPAIR, HERNIA umbilical, INCISIONAL OR VENTRAL, WITHOUT HISTORY OF PRIOR REPAIR;  Surgeon: Trent Evans MD;  Location: [facility] OR;  Service: General;  Laterality: N/A;  RN PREOP 2022, PT INSTRUCTED TO BRING ALL MEDS WITH HER ON AM OF SURGERY    RIGHT HEART CATHETERIZATION Right 2024    Procedure: INSERTION, CATHETER, RIGHT HEART;  Surgeon: Puma Contreras MD;  Location: [facility] CATH LAB;  Service: Cardiology;  Laterality: Right;     Social History     Tobacco Use    Smoking status: Former     Current packs/day: 0.00     Average packs/day: 0.5 packs/day for 27.7 years (13.8 ttl pk-yrs)     Types: Cigarettes     Start date:      Quit date: 1995     Years since quittin.1    Smokeless tobacco: Never   Substance Use Topics    Alcohol use: Not Currently     Comment: occ    Drug use: No     Family History   Problem Relation Name Age of Onset    Kidney disease Mother      Cancer Father      Heart disease Brother      Mental illness Son      Glaucoma Neg Hx      Macular degeneration Neg Hx      Retinal detachment Neg Hx       Review of patient's allergies indicates:   Allergen Reactions    Plaquenil [hydroxychloroquine]      Having eye reaction, needs to stop  "plaquenil and stay off of it.        Performance Status:The patient's activity level is housebound activities.      Review of Systems:  a review of eleven systems covering constitutional, Eye, HEENT, Psych, Respiratory, Cardiac, GI, , Musculoskeletal, Endocrine, Dermatologic was negative except for pertinent findings as listed ABOVE and below:  pertinent positive as above, rest is good       Exam:Comprehensive exam done. /72 (BP Location: Right arm, Patient Position: Sitting)   Pulse 97   Ht 5' 6" (1.676 m)   Wt 90.4 kg (199 lb 3 oz)   SpO2 95% Comment: on room air at rest  BMI 32.15 kg/m²   Exam included Vitals as listed, and patient's appearance and affect and alertness and mood, oral exam for yeast and hygiene and pharynx lesions and Mallapatti (M) score, neck with inspection for jvd and masses and thyroid abnormalities and lymph nodes (supraclavicular and infraclavicular nodes and axillary also examined and noted if abn), chest exam included symmetry and effort and fremitus and percussion and auscultation, cardiac exam included rhythm and gallops and murmur and rubs and jvd and edema, abdominal exam for mass and hepatosplenomegaly and tenderness and hernias and bowel sounds, Musculoskeletal exam with muscle tone and posture and mobility/gait and  strength, and skin for rashes and cyanosis and pallor and turgor, extremity for clubbing.  Findings were normal except for pertinent findings listed below:M2, bilat rales. No  Edema mild clubbing.       Radiographs (ct chest and cxr) reviewed: view by direct vision  March 2018 ct not too bad with cysts- viewed 8/21/18  CT CHEST WITHOUT CONTRAST 03/27/2019   Severe interstitial fibrosis with peripheral honeycombing and multiple bilateral lower lobe bulla consistent with the history of scleroderma.  This is essentially unchanged from March 21, 2018.  Continued mild mediastinal adenopathy also unchanged.  No acute findings.  Small hiatal hernia.  Stable 2 " cm complicated cyst of the left kidney.       Labs  noted  Lab Results   Component Value Date    WBC 4.06 09/02/2024    HGB 11.5 (L) 09/02/2024    HCT 35.6 (L) 09/02/2024    MCV 96 09/02/2024     (L) 09/02/2024        PFT results reviewed     February 10, 2022-patient dropped out pulmonary functions from February 7, 2022. Forced vital capacity was 69 percent predicted.  There was no airflow obstruction.  Total lung capacity was 72 percent of predicted.  Diffusion was down to 31 percent predicted.  Patient had a 6 minute walk also.  Patient walked about 359 meters in 6 minutes.  She was 97 percent on room air.  The low O2 sat was 89 percent while walking.  \  \  Oct 27/2020 Spirometry, lung volume by gas dilution, and diffusion capacity measured October 22, 2020. The FEV1 to FVC ratio was 73% indicating no airflow obstruction measured by spirometry technique. The FEV1 was 81% of predicted at 2 L. fvc was 86% . Total lung capacity on lung    volume by gas dilution was 72% of predicted and a little low. Diffusion, uncorrected for anemia, was 41% of predicted and also low.   Patient has no obstruction. There is restriction measured. Diffusion is decreased. Clinical correlation recommended       5/8/2018 Pulmonary Functions, including spirometry and bronchodilator response and lung volumes and diffusion, study was done May 8, 2018.  Spirometry shows mild obstruction, loss vital capacity and obstruction and no bronchodilator response.   FEV1 is 65% or 1.68 liters.  Lung volumes show  loss of TLC with restriction 66%.  Diffusion shows reduced but falls within normal range when corrected for lung volumes.   Pulmonary functions show  Mild obstruction and also restriction. Clinical correlation recommended.     Mikal Serrano M.D.      Study was done at [facility]  Echo 10/12/2018 Normal left and right ventricular systolic functions with LVEF >55%.      Rhc 8/10/2022 [place]--Significant  Diagnostic Studies: right heart cath  Right Heart Pressures RAP: 3 mmHg  RVP: 50/3 mmHg  Pulmonary artery pressure: 50/12 mmHg  Mean pulmonary artery pressure: 31 mmHg  Pulmonary capillary wedge pressure: 7 mmHg  Cardiac Output: 5.9 L/min  Cardiac index: 3 L/min/m2  Pulmonary vascular resistance: 4 HRU      6 minute walk study was accomplished February 17, 2022.  Patient walked about 359 meters in 6 minutes.  She was 97 percent on room air.  The low O2 sat was 89 percent while walking.    6 minute walk study was accomplished September 15, 2021. Baseline room air saturation was 98%. With ambulation O2 sat fell to 87% by 2 minutes. Patient subsequently 2 L and then 3 L of oxygen to maintain sat in the low 90s. At the end of 6 minutes   walking on oxygen at 3 liters/minute the O2 saturation was 96%. Patient walked about 60% of the reference distance of 255 m.   6 min walk study was accomplished October 22, 2020. Baseline room air saturation was 98%. With ambulation O2 sat fell to 87% by 5 min. On 2 L of oxygen patient maintain sat in the low 90s subsequently. Patient walked about 133% of the reference distance   at 255 m.   6 min walk study was accomplished November 21, 2018.  Baseline room air saturation was 98%.  Walking on room air O2 sat did fall down to 91%.  Patient did walk 317 m or 73% of the reference distance    Plan:  Clinical impression is apparently straight forward and impression with management as below.     Edith was seen today for follow-up and interstitial lung disease.    Diagnoses and all orders for this visit:    Seasonal allergies  -     azelastine (ASTELIN) 137 mcg (0.1 %) nasal spray; 2 sprays (274 mcg total) by Nasal route 2 (two) times daily.  -     levocetirizine (XYZAL) 5 MG tablet; Take 1 tablet (5 mg total) by mouth every evening.    Insomnia, unspecified type  -     traZODone (DESYREL) 100 MG tablet; Take 1.5 tablets (150 mg total) by mouth nightly as needed for Insomnia. As needed for  sleep    Chronic obstructive pulmonary disease, unspecified COPD type    On home oxygen therapy                  Follow up in about 6 months (around 4/2/2025).    Discussed with patient above for education the following:      Patient Instructions   You are having symptoms consistent with seasonal allergies-- use zyrtec (or allergra/claritin).   If above not effective -- may use xyzal.  Could use nasal antihistamine with astelin --- paper scripts for above xyzal/astelin..    Would consider oxygen exercising...      Would follow up crp esr--- if not feeling better after antihistamine    Chest xray/ct viewed and stable...      Do pft and walk test in Jan      Use trazodone 100-150 bedtime as needed..

## 2024-10-03 ENCOUNTER — CLINICAL SUPPORT (OUTPATIENT)
Dept: CARDIAC REHAB | Facility: HOSPITAL | Age: 69
End: 2024-10-03
Payer: MEDICARE

## 2024-10-03 DIAGNOSIS — J84.9 ILD (INTERSTITIAL LUNG DISEASE): Primary | ICD-10-CM

## 2024-10-03 PROCEDURE — 94626 PHY/QHP OP PULM RHB W/MNTR: CPT

## 2024-10-05 DIAGNOSIS — M34.9 SCLERODERMA: ICD-10-CM

## 2024-10-06 RX ORDER — LOSARTAN POTASSIUM 25 MG/1
25 TABLET ORAL
Qty: 90 TABLET | Refills: 3 | Status: SHIPPED | OUTPATIENT
Start: 2024-10-06

## 2024-10-06 NOTE — TELEPHONE ENCOUNTER
No care due was identified.  [facility] Embedded Care Due Messages. Reference number: [id number].   10/05/2024 10:26:51 PM CDT

## 2024-10-06 NOTE — TELEPHONE ENCOUNTER
Refill Decision Note   Edith Tran  is requesting a refill authorization.  Brief Assessment and Rationale for Refill:  Approve     Medication Therapy Plan:        Comments:     Note composed:1:17 PM 10/06/2024

## 2024-10-08 ENCOUNTER — CLINICAL SUPPORT (OUTPATIENT)
Dept: CARDIAC REHAB | Facility: HOSPITAL | Age: 69
End: 2024-10-08
Payer: MEDICARE

## 2024-10-08 DIAGNOSIS — J84.9 ILD (INTERSTITIAL LUNG DISEASE): Primary | ICD-10-CM

## 2024-10-08 PROCEDURE — 94626 PHY/QHP OP PULM RHB W/MNTR: CPT

## 2024-10-10 ENCOUNTER — CLINICAL SUPPORT (OUTPATIENT)
Dept: CARDIAC REHAB | Facility: HOSPITAL | Age: 69
End: 2024-10-10
Payer: MEDICARE

## 2024-10-10 ENCOUNTER — OFFICE VISIT (OUTPATIENT)
Dept: OPHTHALMOLOGY | Facility: CLINIC | Age: 69
End: 2024-10-10
Payer: MEDICARE

## 2024-10-10 ENCOUNTER — LAB VISIT (OUTPATIENT)
Dept: LAB | Facility: HOSPITAL | Age: 69
End: 2024-10-10
Attending: STUDENT IN AN ORGANIZED HEALTH CARE EDUCATION/TRAINING PROGRAM
Payer: MEDICARE

## 2024-10-10 DIAGNOSIS — J84.9 ILD (INTERSTITIAL LUNG DISEASE): Primary | ICD-10-CM

## 2024-10-10 DIAGNOSIS — Z79.899 HIGH RISK MEDICATION USE: ICD-10-CM

## 2024-10-10 DIAGNOSIS — I27.20 PULMONARY HYPERTENSION: ICD-10-CM

## 2024-10-10 DIAGNOSIS — J84.9 INTERSTITIAL LUNG DISEASE: ICD-10-CM

## 2024-10-10 DIAGNOSIS — H25.813 COMBINED FORMS OF AGE-RELATED CATARACT, BILATERAL: Primary | ICD-10-CM

## 2024-10-10 DIAGNOSIS — D84.821 DRUG-INDUCED IMMUNODEFICIENCY: ICD-10-CM

## 2024-10-10 DIAGNOSIS — I73.00 RAYNAUD'S DISEASE WITHOUT GANGRENE: ICD-10-CM

## 2024-10-10 DIAGNOSIS — Z79.899 DRUG-INDUCED IMMUNODEFICIENCY: ICD-10-CM

## 2024-10-10 DIAGNOSIS — M34.9 LIMITED SYSTEMIC SCLEROSIS: ICD-10-CM

## 2024-10-10 LAB
ALT SERPL W/O P-5'-P-CCNC: 13 U/L (ref 10–44)
AST SERPL-CCNC: 18 U/L (ref 10–40)
BASOPHILS # BLD AUTO: 0.04 K/UL (ref 0–0.2)
BASOPHILS NFR BLD: 0.6 % (ref 0–1.9)
CREAT SERPL-MCNC: 0.8 MG/DL (ref 0.5–1.4)
CRP SERPL-MCNC: 0.8 MG/L (ref 0–8.2)
DIFFERENTIAL METHOD BLD: ABNORMAL
EOSINOPHIL # BLD AUTO: 0.2 K/UL (ref 0–0.5)
EOSINOPHIL NFR BLD: 2.7 % (ref 0–8)
ERYTHROCYTE [DISTWIDTH] IN BLOOD BY AUTOMATED COUNT: 13.5 % (ref 11.5–14.5)
ERYTHROCYTE [SEDIMENTATION RATE] IN BLOOD BY WESTERGREN METHOD: 9 MM/HR (ref 0–20)
EST. GFR  (NO RACE VARIABLE): >60 ML/MIN/1.73 M^2
HCT VFR BLD AUTO: 41.5 % (ref 37–48.5)
HGB BLD-MCNC: 13.2 G/DL (ref 12–16)
IMM GRANULOCYTES # BLD AUTO: 0.04 K/UL (ref 0–0.04)
IMM GRANULOCYTES NFR BLD AUTO: 0.6 % (ref 0–0.5)
LYMPHOCYTES # BLD AUTO: 1.4 K/UL (ref 1–4.8)
LYMPHOCYTES NFR BLD: 20.8 % (ref 18–48)
MCH RBC QN AUTO: 30.3 PG (ref 27–31)
MCHC RBC AUTO-ENTMCNC: 31.8 G/DL (ref 32–36)
MCV RBC AUTO: 95 FL (ref 82–98)
MONOCYTES # BLD AUTO: 0.5 K/UL (ref 0.3–1)
MONOCYTES NFR BLD: 7.7 % (ref 4–15)
NEUTROPHILS # BLD AUTO: 4.5 K/UL (ref 1.8–7.7)
NEUTROPHILS NFR BLD: 67.6 % (ref 38–73)
NRBC BLD-RTO: 0 /100 WBC
PLATELET # BLD AUTO: 191 K/UL (ref 150–450)
PMV BLD AUTO: 11.5 FL (ref 9.2–12.9)
RBC # BLD AUTO: 4.36 M/UL (ref 4–5.4)
WBC # BLD AUTO: 6.63 K/UL (ref 3.9–12.7)

## 2024-10-10 PROCEDURE — 84450 TRANSFERASE (AST) (SGOT): CPT | Performed by: STUDENT IN AN ORGANIZED HEALTH CARE EDUCATION/TRAINING PROGRAM

## 2024-10-10 PROCEDURE — 99999 PR PBB SHADOW E&M-EST. PATIENT-LVL III: CPT | Mod: PBBFAC,,, | Performed by: OPHTHALMOLOGY

## 2024-10-10 PROCEDURE — 36415 COLL VENOUS BLD VENIPUNCTURE: CPT | Performed by: STUDENT IN AN ORGANIZED HEALTH CARE EDUCATION/TRAINING PROGRAM

## 2024-10-10 PROCEDURE — 85651 RBC SED RATE NONAUTOMATED: CPT | Performed by: STUDENT IN AN ORGANIZED HEALTH CARE EDUCATION/TRAINING PROGRAM

## 2024-10-10 PROCEDURE — 84460 ALANINE AMINO (ALT) (SGPT): CPT | Performed by: STUDENT IN AN ORGANIZED HEALTH CARE EDUCATION/TRAINING PROGRAM

## 2024-10-10 PROCEDURE — 94626 PHY/QHP OP PULM RHB W/MNTR: CPT

## 2024-10-10 PROCEDURE — 85025 COMPLETE CBC W/AUTO DIFF WBC: CPT | Performed by: STUDENT IN AN ORGANIZED HEALTH CARE EDUCATION/TRAINING PROGRAM

## 2024-10-10 PROCEDURE — 86140 C-REACTIVE PROTEIN: CPT | Performed by: STUDENT IN AN ORGANIZED HEALTH CARE EDUCATION/TRAINING PROGRAM

## 2024-10-10 PROCEDURE — 82565 ASSAY OF CREATININE: CPT | Performed by: STUDENT IN AN ORGANIZED HEALTH CARE EDUCATION/TRAINING PROGRAM

## 2024-10-10 RX ORDER — MOXIFLOXACIN 5 MG/ML
1 SOLUTION/ DROPS OPHTHALMIC 4 TIMES DAILY
Qty: 3 ML | Refills: 0 | Status: SHIPPED | OUTPATIENT
Start: 2024-10-10 | End: 2024-10-18

## 2024-10-10 RX ORDER — CYCLOP/TROP/PROPA/PHEN/KET/WAT 1-1-0.1%
1 DROPS (EA) OPHTHALMIC (EYE)
OUTPATIENT
Start: 2024-10-10 | End: 2024-10-10

## 2024-10-10 RX ORDER — PREDNISOLONE ACETATE 10 MG/ML
1 SUSPENSION/ DROPS OPHTHALMIC 4 TIMES DAILY
Qty: 5 ML | Refills: 2 | Status: SHIPPED | OUTPATIENT
Start: 2024-10-10

## 2024-10-10 RX ORDER — SODIUM CHLORIDE 9 MG/ML
INJECTION, SOLUTION INTRAVENOUS CONTINUOUS
OUTPATIENT
Start: 2024-10-10

## 2024-10-10 RX ORDER — KETOROLAC TROMETHAMINE 5 MG/ML
1 SOLUTION OPHTHALMIC 4 TIMES DAILY
Qty: 5 ML | Refills: 2 | Status: SHIPPED | OUTPATIENT
Start: 2024-10-10

## 2024-10-10 NOTE — PROGRESS NOTES
HPI    Pre op cat sx OD scheduled for 10/30    Pt c/o blurry vision and glare.       Last edited by Alyssa May on 10/10/2024  2:12 PM.            Assessment /Plan     For exam results, see Encounter Report.    Combined forms of age-related cataract, bilateral      Patient with visually significant cataract impacting abiliity ADLs (reading, driving, PM driving, glare).  Discussed options, R & B, expectations, patient voices good understanding and wishes to proceed with procedure. Patient will likely benefit from sx and signed consent.    Proceed with CEIOL OD  Monofocal dist IOL

## 2024-10-24 NOTE — PROGRESS NOTES
Subjective:      Patient ID: Edith Tran is a 69 y.o. female.    Chief Complaint: Disease Management    HPI    Rheumatologic History:      - Diagnosis/es:              - limited systemic sclerosis diagnosed in  by Dr Barnes and characterized by SHILPI 1:1280 nucleolar, inflammatory arthritis, esophageal dysmotility, GERD, Raynaud's, interstitial lung disease, and pulmonary hypertension              - Crohn's disease diagnosed around 2019, not active  - Social History: Former smoker, denies alcohol intake  - Family History: No autoimmune conditions  - Gyn History: , 3 intentional abortions  - Positive serologies: SHILPI 1:1280 nucleolar  - Negative serologies: Scl 70, RNAP III, Th/To, myomarker panel,PM/Scl, Sm/RNP, dsDNA, RF  - Infectious screening labs:  Negative hepatitis-B, C, and QuantiFERON (2023)  - TPMT normal metabolizer  - Imaging:              - EGD (2020): Grade A esophagitis and mild Schatzki ring - dilation was performed.  Small hiatial hernia.              - TTE (2023) LVEF >55%, PASP and CVP WNL              - DEXA (2024) osteopenia 7.7% risk of a major osteoporotic fracture and a 0.5% risk of hip fracture in the next 10 years (FRAX).               - PFT (10/2023) FVC 2.9L <- 2.67L <- 2.76L, DLCO 33.4% <- 39.6% <- 40.7%              - CT chest (2023) ascending aorta measures 4.2 x 4.5 cm in maximum diameter. There are extensive subpleural reticulations and honeycombing throughout the upper and lower lobes compatible with idiopathic pulmonary fibrosis. There is bronchiectasis. There are no confluent infiltrates or pleural effusions. There are mildly prominent mediastinal lymph nodes most likely reactive. The esophagus is normal  The visualized portion of the upper abdomen demonstrates a 15 mm hyperdense mass arising from the upper pole the left kidney. Follow-up ultrasound is recommended to determine if this represents hemorrhagic cyst or solid mass adrenal glands are normal. There  "are degenerative changes of the spine. The musculature is normal.              - RHC (4/4/2024) normal pulmonary pressures   - EMG/NCV (9/30/24) left superficial peroneal sensory neuropathy  - Previous Treatments:              - HCQ 200mg BID: caused "eye problems"              - MTX              - Reglan  - Current Treatments:               - MMF 1500mg BID              - Tadalafil (Adcirca) 40mg daily  - Opsumit (macitentan)  - Tyvaso (treprostinil)              - Ofev              - Protonix 40mg BID   - Flexeril 5mg QHS PRN  Interval History:   She denies skin changes since her last visit. She reports difficulty with swallowing. She had esophageal dilation done within the last 6 months and it initially helped but is now worsening. Her GERD is controlled. She feels that her breathing is stable. Her biggest complaint at the moment is pain in both of her lower extremities that is worse at night.     Objective:   /72   Pulse 88   Wt 90.8 kg (200 lb 2.8 oz)   BMI 32.31 kg/m²   Physical Exam   Constitutional: normal appearance. No distress.   HENT:   Head: Normocephalic and atraumatic.   Cardiovascular: Normal rate, regular rhythm and normal heart sounds.   Pulmonary/Chest: Effort normal. She has rales (Bibasilar).   Musculoskeletal:      Comments: Synovial hypertrophy of bilateral 2nd and 3rd MCPs  No swelling in the feet   Neurological: She is alert.   Skin: Skin is warm and dry. No rash noted.   + Minimal telangiectasias  + Sclerodactyly  + Abnormal nailfold capillaries  + Cold fingers  No digital pitting       No data to display    LABS (10/10/2024)   CBC WBC, HGB, PLT WNL   CR, AST, ALT WNL   ESR AND CRP WNL      Assessment:     1. Limited systemic sclerosis    2. Interstitial lung disease    3. Raynaud's disease without gangrene    4. High risk medication use    5. Drug-induced immunodeficiency    6. Gastroesophageal reflux disease, unspecified whether esophagitis present    7. Pulmonary hypertension  "   8. Muscle spasm      This is a 69-year-old woman with history of AAA, insomnia, ELAINE on CPAP, esophageal stricture, diverticulosis, B12 deficiency, esophageal leukoplakia, chronic back pain, and limited systemic sclerosis diagnosed in 2009 by Dr Barnes and characterized by inflammatory arthritis, telangiectasias, esophageal dysmotility, GERD, Raynaud's, abnormal nailfold capillaries interstitial lung disease, and pulmonary hypertension on MMF 1500 mg b.i.d., Adcirca 40 mg daily, Opsumit, Tyvaso, Ofev, and Protonix 40 mg b.i.d. Patient states pulm told her it would be okay to hold off on rituximab for now. Patient to discuss dysphagia and GERD with her gastroenterologist.     Plan:     Problem List Items Addressed This Visit          Pulmonary    Interstitial lung disease    Relevant Orders    Anti-DNA Ab, Double-Stranded    C3 Complement    C4 Complement    CBC Auto Differential    Sedimentation rate    Protein/Creatinine Ratio, Urine    C-Reactive Protein    Comprehensive Metabolic Panel    Urinalysis       Cardiac/Vascular    Raynaud's disease    Relevant Orders    Anti-DNA Ab, Double-Stranded    C3 Complement    C4 Complement    CBC Auto Differential    Sedimentation rate    Protein/Creatinine Ratio, Urine    C-Reactive Protein    Comprehensive Metabolic Panel    Urinalysis    Pulmonary hypertension    Relevant Orders    Anti-DNA Ab, Double-Stranded    C3 Complement    C4 Complement    CBC Auto Differential    Sedimentation rate    Protein/Creatinine Ratio, Urine    C-Reactive Protein    Comprehensive Metabolic Panel    Urinalysis       GI    GERD (gastroesophageal reflux disease)    Relevant Orders    Anti-DNA Ab, Double-Stranded    C3 Complement    C4 Complement    CBC Auto Differential    Sedimentation rate    Protein/Creatinine Ratio, Urine    C-Reactive Protein    Comprehensive Metabolic Panel    Urinalysis       Palliative Care    High risk medication use    Relevant Orders    Anti-DNA Ab,  Double-Stranded    C3 Complement    C4 Complement    CBC Auto Differential    Sedimentation rate    Protein/Creatinine Ratio, Urine    C-Reactive Protein    Comprehensive Metabolic Panel    Urinalysis     Other Visit Diagnoses       Limited systemic sclerosis    -  Primary    Relevant Orders    Anti-DNA Ab, Double-Stranded    C3 Complement    C4 Complement    CBC Auto Differential    Sedimentation rate    Protein/Creatinine Ratio, Urine    C-Reactive Protein    Comprehensive Metabolic Panel    Urinalysis    Drug-induced immunodeficiency        Relevant Orders    Anti-DNA Ab, Double-Stranded    C3 Complement    C4 Complement    CBC Auto Differential    Sedimentation rate    Protein/Creatinine Ratio, Urine    C-Reactive Protein    Comprehensive Metabolic Panel    Urinalysis    Muscle spasm        Relevant Medications    cyclobenzaprine (FLEXERIL) 5 MG tablet          1.) Scleroderma   - MMF 1500mg BID. Hold for infection  - Pulm HTN: Tadalafil (Adcirca) 40mg daily, Opsumit (macitentan), Tyvaso (treprostinil)  - ILD: Ofev  - GERD: Protonix 40mg BID  - Dysphagia: persistent after dilation, patient should discuss with GI  - Raynaud's: stable, no ulcers     2.) Drug induce immunodeficiency  3.) High risk medication use  - CBC, CMP, ESR, CRP every 12 weeks  - Pre-DMARD labs due 9/2024  - Immunizations: COVID x 4 (most recent 1/2023), flu (9/2022), PCV13 (8/2019), PPV23 (11/2019), Shingrix x 2 (2020); patient needs PCV 20  - Obtain DEXA at follow up visit    4.) LLE pain at night: RLS?  - Trial of Flexeril 5mg QHS PRN       Follow up IN 4 MONTHS    30 minutes of total time spent on the encounter, which includes face to face time and non-face to face time preparing to see the patient (eg, review of tests), Obtaining and/or reviewing separately obtained history, Documenting clinical information in the electronic or other health record, Independently interpreting results (not separately reported) and communicating results to  the patient/family/caregiver, or Care coordination (not separately reported).     This note was prepared with [employer] Direct voice recognition transcription software. Garbled syntax, mangled pronouns, and other bizarre constructions may be attributed to that software system       Nieves Blanchard M.D.  Rheumatology Dept  [place], [place]

## 2024-10-25 ENCOUNTER — OFFICE VISIT (OUTPATIENT)
Dept: RHEUMATOLOGY | Facility: CLINIC | Age: 69
End: 2024-10-25
Payer: MEDICARE

## 2024-10-25 VITALS
DIASTOLIC BLOOD PRESSURE: 72 MMHG | SYSTOLIC BLOOD PRESSURE: 104 MMHG | BODY MASS INDEX: 32.31 KG/M2 | WEIGHT: 200.19 LBS | HEART RATE: 88 BPM

## 2024-10-25 DIAGNOSIS — M62.838 MUSCLE SPASM: ICD-10-CM

## 2024-10-25 DIAGNOSIS — D84.821 DRUG-INDUCED IMMUNODEFICIENCY: ICD-10-CM

## 2024-10-25 DIAGNOSIS — K21.9 GASTROESOPHAGEAL REFLUX DISEASE, UNSPECIFIED WHETHER ESOPHAGITIS PRESENT: ICD-10-CM

## 2024-10-25 DIAGNOSIS — M34.9 LIMITED SYSTEMIC SCLEROSIS: Primary | ICD-10-CM

## 2024-10-25 DIAGNOSIS — I73.00 RAYNAUD'S DISEASE WITHOUT GANGRENE: ICD-10-CM

## 2024-10-25 DIAGNOSIS — I27.20 PULMONARY HYPERTENSION: ICD-10-CM

## 2024-10-25 DIAGNOSIS — Z79.899 HIGH RISK MEDICATION USE: ICD-10-CM

## 2024-10-25 DIAGNOSIS — J84.9 INTERSTITIAL LUNG DISEASE: ICD-10-CM

## 2024-10-25 DIAGNOSIS — Z79.899 DRUG-INDUCED IMMUNODEFICIENCY: ICD-10-CM

## 2024-10-25 PROCEDURE — 99999 PR PBB SHADOW E&M-EST. PATIENT-LVL IV: CPT | Mod: PBBFAC,,, | Performed by: STUDENT IN AN ORGANIZED HEALTH CARE EDUCATION/TRAINING PROGRAM

## 2024-10-25 RX ORDER — CYCLOBENZAPRINE HCL 5 MG
5 TABLET ORAL NIGHTLY PRN
Qty: 30 TABLET | Refills: 1 | Status: SHIPPED | OUTPATIENT
Start: 2024-10-25

## 2024-10-25 ASSESSMENT — ROUTINE ASSESSMENT OF PATIENT INDEX DATA (RAPID3)
PATIENT GLOBAL ASSESSMENT SCORE: 6.5
FATIGUE SCORE: 3.3
TOTAL RAPID3 SCORE: 5.78
PSYCHOLOGICAL DISTRESS SCORE: 1.1
MDHAQ FUNCTION SCORE: 1
PAIN SCORE: 7.5

## 2024-10-28 NOTE — DISCHARGE INSTRUCTIONS
Discharge Instructions for  Surgery    USE DROPS AS INSTRUCTED:     PREDNISOLONE  ONE DROP 4 TIMES A DAY  Moxifloxacin ONE DROP 4 TIMES A DAY  KETOROLAC ONE DROP 4 TIMES A DAY   WAIT 2 MINUTES BETWEEN DROPS     BRING ALL EYE DROPS TO YOUR CLINIC VISITS    Wear sunglasses during the day  Do not sleep on the affected side and wear eye shield while sleeping for 2 weeks.  No exercise or lifting greater than 10 lbs for 2 weeks.  Try not to cough. If coughing a lot, take a cough suppressent  Do not bend over for 2 weeks.  Keep water it of your eye for 2 weeks.             Wear sunglasses for comfort as needed.  It is normal to feel a slight irritation, like there is an eyelash in the eye that had surgery.   You may take Tylenol for discomfort but if pain intensifies , call Dr     If you use eye drops for glaucoma you may continue to use them.      After Surgery:  Always be aware that any surgery can cause these symptoms:    Pain- Medication can be prescribed for pain to decrease your pain but may not completely take your pain away.  Over the Counter pain medicine my be enough and you can always use Ice and rest to help ease pain.    Bleeding- a little bleeding after a surgery is usually within normal.  If there is a lot of blood you need to notify your MD.  Emergency treatments of bleeding are cold application, elevation of the bleeding site and compression.    Infection- Infection after surgery is NOT a normal occurrence.  Signs of infection are fever, swelling, hot to touch the incision.  If this occurs notify your MD immediately.    Nausea- this can be common after a surgery especially if you have had anesthesia medicine or are taking pain medicine.  Staying on clear liquids, bland foods, gingerale, or over the counter anti nausea medicines can help.  If you vomit more than once, notify your MD.  Anti Nausea medicines can be prescribed.

## 2024-10-29 ENCOUNTER — ANESTHESIA EVENT (OUTPATIENT)
Dept: SURGERY | Facility: HOSPITAL | Age: 69
End: 2024-10-29
Payer: MEDICARE

## 2024-10-29 ENCOUNTER — HOSPITAL ENCOUNTER (EMERGENCY)
Facility: HOSPITAL | Age: 69
Discharge: HOME | End: 2024-10-29
Attending: EMERGENCY MEDICINE
Payer: MEDICARE

## 2024-10-29 VITALS
DIASTOLIC BLOOD PRESSURE: 72 MMHG | SYSTOLIC BLOOD PRESSURE: 148 MMHG | WEIGHT: 194 LBS | HEIGHT: 66 IN | BODY MASS INDEX: 31.18 KG/M2 | RESPIRATION RATE: 18 BRPM | TEMPERATURE: 99 F | HEART RATE: 67 BPM | OXYGEN SATURATION: 98 %

## 2024-10-29 DIAGNOSIS — M54.9 MUSCULOSKELETAL BACK PAIN: Primary | ICD-10-CM

## 2024-10-29 LAB
ALBUMIN SERPL BCP-MCNC: 4 G/DL (ref 3.5–5.2)
ALP SERPL-CCNC: 41 U/L (ref 55–135)
ALT SERPL W/O P-5'-P-CCNC: 8 U/L (ref 10–44)
ANION GAP SERPL CALC-SCNC: 3 MMOL/L (ref 8–16)
AST SERPL-CCNC: 13 U/L (ref 10–40)
BASOPHILS # BLD AUTO: 0.04 K/UL (ref 0–0.2)
BASOPHILS NFR BLD: 1 % (ref 0–1.9)
BILIRUB SERPL-MCNC: 0.4 MG/DL (ref 0.1–1)
BILIRUB UR QL STRIP: NEGATIVE
BUN SERPL-MCNC: 13 MG/DL (ref 8–23)
CALCIUM SERPL-MCNC: 9.3 MG/DL (ref 8.7–10.5)
CHLORIDE SERPL-SCNC: 109 MMOL/L (ref 95–110)
CLARITY UR: ABNORMAL
CO2 SERPL-SCNC: 28 MMOL/L (ref 23–29)
COLOR UR: YELLOW
CREAT SERPL-MCNC: 0.8 MG/DL (ref 0.5–1.4)
DIFFERENTIAL METHOD BLD: ABNORMAL
EOSINOPHIL # BLD AUTO: 0.2 K/UL (ref 0–0.5)
EOSINOPHIL NFR BLD: 5.8 % (ref 0–8)
ERYTHROCYTE [DISTWIDTH] IN BLOOD BY AUTOMATED COUNT: 13.3 % (ref 11.5–14.5)
EST. GFR  (NO RACE VARIABLE): >60 ML/MIN/1.73 M^2
GLUCOSE SERPL-MCNC: 90 MG/DL (ref 70–110)
GLUCOSE UR QL STRIP: NEGATIVE
HCT VFR BLD AUTO: 38.3 % (ref 37–48.5)
HGB BLD-MCNC: 12.3 G/DL (ref 12–16)
HGB UR QL STRIP: NEGATIVE
IMM GRANULOCYTES # BLD AUTO: 0.01 K/UL (ref 0–0.04)
IMM GRANULOCYTES NFR BLD AUTO: 0.2 % (ref 0–0.5)
KETONES UR QL STRIP: NEGATIVE
LEUKOCYTE ESTERASE UR QL STRIP: NEGATIVE
LYMPHOCYTES # BLD AUTO: 0.8 K/UL (ref 1–4.8)
LYMPHOCYTES NFR BLD: 20.2 % (ref 18–48)
MCH RBC QN AUTO: 31.2 PG (ref 27–31)
MCHC RBC AUTO-ENTMCNC: 32.1 G/DL (ref 32–36)
MCV RBC AUTO: 97 FL (ref 82–98)
MONOCYTES # BLD AUTO: 0.3 K/UL (ref 0.3–1)
MONOCYTES NFR BLD: 7.2 % (ref 4–15)
NEUTROPHILS # BLD AUTO: 2.7 K/UL (ref 1.8–7.7)
NEUTROPHILS NFR BLD: 65.6 % (ref 38–73)
NITRITE UR QL STRIP: NEGATIVE
NRBC BLD-RTO: 0 /100 WBC
PH UR STRIP: 8 [PH] (ref 5–8)
PLATELET # BLD AUTO: 148 K/UL (ref 150–450)
PMV BLD AUTO: 11.1 FL (ref 9.2–12.9)
POTASSIUM SERPL-SCNC: 4 MMOL/L (ref 3.5–5.1)
PROT SERPL-MCNC: 6.2 G/DL (ref 6–8.4)
PROT UR QL STRIP: NEGATIVE
RBC # BLD AUTO: 3.94 M/UL (ref 4–5.4)
SODIUM SERPL-SCNC: 140 MMOL/L (ref 136–145)
SP GR UR STRIP: 1.01 (ref 1–1.03)
URN SPEC COLLECT METH UR: ABNORMAL
UROBILINOGEN UR STRIP-ACNC: NEGATIVE EU/DL
WBC # BLD AUTO: 4.15 K/UL (ref 3.9–12.7)

## 2024-10-29 PROCEDURE — 85025 COMPLETE CBC W/AUTO DIFF WBC: CPT | Performed by: EMERGENCY MEDICINE

## 2024-10-29 PROCEDURE — 99284 EMERGENCY DEPT VISIT MOD MDM: CPT | Mod: 25

## 2024-10-29 PROCEDURE — 80053 COMPREHEN METABOLIC PANEL: CPT | Performed by: EMERGENCY MEDICINE

## 2024-10-29 PROCEDURE — 81003 URINALYSIS AUTO W/O SCOPE: CPT | Performed by: EMERGENCY MEDICINE

## 2024-10-29 RX ORDER — GLUCAGON 1 MG
1 KIT INJECTION
OUTPATIENT
Start: 2024-10-29

## 2024-10-30 ENCOUNTER — ANESTHESIA (OUTPATIENT)
Dept: SURGERY | Facility: HOSPITAL | Age: 69
End: 2024-10-30
Payer: MEDICARE

## 2024-10-30 ENCOUNTER — HOSPITAL ENCOUNTER (OUTPATIENT)
Facility: HOSPITAL | Age: 69
Discharge: HOME | End: 2024-10-30
Attending: OPHTHALMOLOGY | Admitting: OPHTHALMOLOGY
Payer: MEDICARE

## 2024-10-30 DIAGNOSIS — H25.813 COMBINED FORMS OF AGE-RELATED CATARACT, BILATERAL: ICD-10-CM

## 2024-10-30 PROCEDURE — 36000706: Performed by: OPHTHALMOLOGY

## 2024-10-30 PROCEDURE — 63600175 PHARM REV CODE 636 W HCPCS: Performed by: OPHTHALMOLOGY

## 2024-10-30 PROCEDURE — 37000008 HC ANESTHESIA 1ST 15 MINUTES: Performed by: OPHTHALMOLOGY

## 2024-10-30 PROCEDURE — 66984 XCAPSL CTRC RMVL W/O ECP: CPT | Mod: RT,,, | Performed by: OPHTHALMOLOGY

## 2024-10-30 PROCEDURE — 71000033 HC RECOVERY, INTIAL HOUR: Performed by: OPHTHALMOLOGY

## 2024-10-30 PROCEDURE — 37000009 HC ANESTHESIA EA ADD 15 MINS: Performed by: OPHTHALMOLOGY

## 2024-10-30 PROCEDURE — V2632 POST CHMBR INTRAOCULAR LENS: HCPCS | Performed by: OPHTHALMOLOGY

## 2024-10-30 PROCEDURE — 25000003 PHARM REV CODE 250: Performed by: OPHTHALMOLOGY

## 2024-10-30 PROCEDURE — 36000707: Performed by: OPHTHALMOLOGY

## 2024-10-30 PROCEDURE — 63600175 PHARM REV CODE 636 W HCPCS: Performed by: NURSE ANESTHETIST, CERTIFIED REGISTERED

## 2024-10-30 DEVICE — TECNIS SMPLCTY TECNIS 1PC CLR MONO 19.5D
Type: IMPLANTABLE DEVICE | Site: EYE | Status: FUNCTIONAL
Brand: TECNIS SIMPLICITY

## 2024-10-30 RX ORDER — ONDANSETRON HYDROCHLORIDE 2 MG/ML
INJECTION, SOLUTION INTRAVENOUS
Status: DISCONTINUED | OUTPATIENT
Start: 2024-10-30 | End: 2024-10-30

## 2024-10-30 RX ORDER — LIDOCAINE HYDROCHLORIDE 40 MG/ML
INJECTION, SOLUTION RETROBULBAR
Status: DISCONTINUED | OUTPATIENT
Start: 2024-10-30 | End: 2024-10-30 | Stop reason: HOSPADM

## 2024-10-30 RX ORDER — EPINEPHRINE 1 MG/ML
INJECTION, SOLUTION, CONCENTRATE INTRAVENOUS
Status: DISCONTINUED | OUTPATIENT
Start: 2024-10-30 | End: 2024-10-30 | Stop reason: HOSPADM

## 2024-10-30 RX ORDER — TETRACAINE HYDROCHLORIDE 5 MG/ML
SOLUTION OPHTHALMIC
Status: DISCONTINUED | OUTPATIENT
Start: 2024-10-30 | End: 2024-10-30 | Stop reason: HOSPADM

## 2024-10-30 RX ORDER — CYCLOP/TROP/PROPA/PHEN/KET/WAT 1-1-0.1%
1 DROPS (EA) OPHTHALMIC (EYE)
Status: COMPLETED | OUTPATIENT
Start: 2024-10-30 | End: 2024-10-30

## 2024-10-30 RX ORDER — SODIUM CHLORIDE, SODIUM LACTATE, POTASSIUM CHLORIDE, CALCIUM CHLORIDE 600; 310; 30; 20 MG/100ML; MG/100ML; MG/100ML; MG/100ML
INJECTION, SOLUTION INTRAVENOUS CONTINUOUS
Status: DISCONTINUED | OUTPATIENT
Start: 2024-10-30 | End: 2024-10-30 | Stop reason: HOSPADM

## 2024-10-30 RX ORDER — MIDAZOLAM HYDROCHLORIDE 1 MG/ML
INJECTION INTRAMUSCULAR; INTRAVENOUS
Status: DISCONTINUED | OUTPATIENT
Start: 2024-10-30 | End: 2024-10-30

## 2024-10-30 RX ORDER — LIDOCAINE HYDROCHLORIDE 10 MG/ML
0.5 INJECTION, SOLUTION EPIDURAL; INFILTRATION; INTRACAUDAL; PERINEURAL ONCE
Status: DISCONTINUED | OUTPATIENT
Start: 2024-10-30 | End: 2024-10-30 | Stop reason: HOSPADM

## 2024-10-30 RX ORDER — SODIUM CHLORIDE 9 MG/ML
INJECTION, SOLUTION INTRAVENOUS CONTINUOUS
Status: DISCONTINUED | OUTPATIENT
Start: 2024-10-30 | End: 2024-10-30 | Stop reason: HOSPADM

## 2024-10-30 RX ORDER — MOXIFLOXACIN 5 MG/ML
SOLUTION/ DROPS OPHTHALMIC
Status: DISCONTINUED | OUTPATIENT
Start: 2024-10-30 | End: 2024-10-30 | Stop reason: HOSPADM

## 2024-10-30 RX ADMIN — MIDAZOLAM HYDROCHLORIDE 1 MG: 1 INJECTION, SOLUTION INTRAMUSCULAR; INTRAVENOUS at 11:10

## 2024-10-30 RX ADMIN — Medication 1 DROP: at 10:10

## 2024-10-30 RX ADMIN — ONDANSETRON 4 MG: 2 INJECTION, SOLUTION INTRAMUSCULAR; INTRAVENOUS at 11:10

## 2024-10-30 NOTE — OP NOTE
Operative Date:  10/30/2024    Discharge Date:  10/30/2024    Report Title: Operative Note    SURGEON: Stephen Desir MD    ASSISTANT: None    PREOPERATIVE DIAGNOSIS: Combined form of senile cataract, Right Eye    POSTOPERATIVE DIAGNOSIS: same    PROCEDURE PERFORMED: Phacoemulsification of the cataract with posterior chamber intraocular lens Right Eye    IMPLANTS: DCBOO 19.5    ANESTHESIA:  Topical with MAC    COMPLICATIONS: None    ESTIMATED BLOOD LOSS: Minimal    PROCEDURE: The patient was brought to the operating room, time out was performed and implant checked.  The patient was given light sedation, and topical anesthesia was instilled in the right eye.  The right eye was prepped and draped in the usual fashion for eye surgery and lid speculum used to retract the eyelid. The eyelashes were secluded within the drape.  A paracentesis was made superiorly with a sideport blade. Epishugarcaine was injected in the anterior chamber and dispersive viscoelastic was injected into the anterior chamber. A temporal corneal incision was made with a steel keratome. A cystitome was used to initiate a continuous curvilinear capsulorrhexis and completed using the capsulorrhexis forceps. Hydrodissection of the lens nucleus was performed using balanced salt solution (BSS) on hydrodissection cannula. The lens nucleus was removed using phacoemulsification in the modified stop and chop technique. The lens cortex was removed using the irrigation/aspiration handpiece. The capsular bag was filled with viscoelastic, and the intraocular lens was injected into the capsular bag under direct visualization. Viscoelastic was removed using the irrigation/aspiration handpiece. The wounds were hydrated until watertight.    The wounds were rechecked and no leakage was noted.  The speculum was removed. Topical antibiotic was applied to the eye and shield was placed over the eye. The patient tolerated the procedure well and left the operating room  in good condition.

## 2024-10-30 NOTE — PLAN OF CARE
Patient is being driven home by Kyung. Dr Desir is at bedside speaking with patient. Patient is wearing sunglasses and is being sent home with extra tape and an eyeshield.

## 2024-10-30 NOTE — H&P
History    Chief complaint:  Painless progressive vision loss right Eye    Present Ilness/Diagnosis: Visually significant cataract, right Eye    ROS: +Eyes, otherwise no significant changes    Past Medical History: refer to chart    Family History/Social History: refer to chart    Allergies:   Review of patient's allergies indicates:   Allergen Reactions    Hydroxychloroquine Other (See Comments)     Having eye reaction, needs to stop plaquenil and stay off of it.        Current Medications: see medcard      Physical Exam    BP: Vital signs stable  General: No apparent distress  HEENT: cataract  Lungs: adequate respirations  Heart: + pulses  Abdomen: soft  Rectal/pelvic: deferred    Labs: Labs Reviewed    Lab Results   Component Value Date    WBC 4.15 10/29/2024    HGB 12.3 10/29/2024    HCT 38.3 10/29/2024    MCV 97 10/29/2024     (L) 10/29/2024           CMP  Sodium   Date Value Ref Range Status   10/29/2024 140 136 - 145 mmol/L Final     Potassium   Date Value Ref Range Status   10/29/2024 4.0 3.5 - 5.1 mmol/L Final     Chloride   Date Value Ref Range Status   10/29/2024 109 95 - 110 mmol/L Final     CO2   Date Value Ref Range Status   10/29/2024 28 23 - 29 mmol/L Final     Glucose   Date Value Ref Range Status   10/29/2024 90 70 - 110 mg/dL Final     BUN   Date Value Ref Range Status   10/29/2024 13 8 - 23 mg/dL Final     Creatinine   Date Value Ref Range Status   10/29/2024 0.8 0.5 - 1.4 mg/dL Final     Calcium   Date Value Ref Range Status   10/29/2024 9.3 8.7 - 10.5 mg/dL Final     Total Protein   Date Value Ref Range Status   10/29/2024 6.2 6.0 - 8.4 g/dL Final     Albumin   Date Value Ref Range Status   10/29/2024 4.0 3.5 - 5.2 g/dL Final     Total Bilirubin   Date Value Ref Range Status   10/29/2024 0.4 0.1 - 1.0 mg/dL Final     Comment:     For infants and newborns, interpretation of results should be based  on gestational age, weight and in agreement with clinical  observations.    Premature  Infant recommended reference ranges:  Up to 24 hours.............<8.0 mg/dL  Up to 48 hours............<12.0 mg/dL  3-5 days..................<15.0 mg/dL  6-29 days.................<15.0 mg/dL       Alkaline Phosphatase   Date Value Ref Range Status   10/29/2024 41 (L) 55 - 135 U/L Final     AST   Date Value Ref Range Status   10/29/2024 13 10 - 40 U/L Final     ALT   Date Value Ref Range Status   10/29/2024 8 (L) 10 - 44 U/L Final     Anion Gap   Date Value Ref Range Status   10/29/2024 3 (L) 8 - 16 mmol/L Final     eGFR   Date Value Ref Range Status   10/29/2024 >60.0 >60 mL/min/1.73 m^2 Final       The patient has been cleared for surgery in an ambulatory surgery facility.     Impression: Visually significant Cataract right Eye    Plan: Phacoemulsification with implantation of Intraocular lens right Eye

## 2024-10-30 NOTE — DISCHARGE SUMMARY
[facility]  Discharge Note  Short Stay    Procedure(s) (LRB):  CEIOL OD (Right)      OUTCOME: Patient tolerated treatment/procedure well without complication and is now ready for discharge.    DISPOSITION: Home or Self Care    FINAL DIAGNOSIS:  cataract    FOLLOWUP: In clinic    DISCHARGE INSTRUCTIONS:  No discharge procedures on file.     TIME SPENT ON DISCHARGE: 5 minutes

## 2024-10-31 ENCOUNTER — PATIENT OUTREACH (OUTPATIENT)
Dept: EMERGENCY MEDICINE | Facility: HOSPITAL | Age: 69
End: 2024-10-31

## 2024-10-31 ENCOUNTER — OFFICE VISIT (OUTPATIENT)
Dept: OPHTHALMOLOGY | Facility: CLINIC | Age: 69
End: 2024-10-31
Payer: MEDICARE

## 2024-10-31 ENCOUNTER — TELEPHONE (OUTPATIENT)
Dept: GASTROENTEROLOGY | Facility: CLINIC | Age: 69
End: 2024-10-31
Payer: MEDICARE

## 2024-10-31 DIAGNOSIS — Z98.41 STATUS POST CATARACT SURGERY, RIGHT: Primary | ICD-10-CM

## 2024-10-31 PROCEDURE — 1126F AMNT PAIN NOTED NONE PRSNT: CPT | Mod: CPTII,S$GLB,, | Performed by: OPHTHALMOLOGY

## 2024-10-31 PROCEDURE — 99024 POSTOP FOLLOW-UP VISIT: CPT | Mod: S$GLB,,, | Performed by: OPHTHALMOLOGY

## 2024-10-31 PROCEDURE — 3288F FALL RISK ASSESSMENT DOCD: CPT | Mod: CPTII,S$GLB,, | Performed by: OPHTHALMOLOGY

## 2024-10-31 PROCEDURE — 1101F PT FALLS ASSESS-DOCD LE1/YR: CPT | Mod: CPTII,S$GLB,, | Performed by: OPHTHALMOLOGY

## 2024-10-31 PROCEDURE — 99999 PR PBB SHADOW E&M-EST. PATIENT-LVL III: CPT | Mod: PBBFAC,,, | Performed by: OPHTHALMOLOGY

## 2024-10-31 PROCEDURE — 1159F MED LIST DOCD IN RCRD: CPT | Mod: CPTII,S$GLB,, | Performed by: OPHTHALMOLOGY

## 2024-10-31 PROCEDURE — 4010F ACE/ARB THERAPY RXD/TAKEN: CPT | Mod: CPTII,S$GLB,, | Performed by: OPHTHALMOLOGY

## 2024-11-01 ENCOUNTER — TELEPHONE (OUTPATIENT)
Dept: FAMILY MEDICINE | Facility: CLINIC | Age: 69
End: 2024-11-01
Payer: MEDICARE

## 2024-11-01 ENCOUNTER — TELEPHONE (OUTPATIENT)
Dept: UROLOGY | Facility: CLINIC | Age: 69
End: 2024-11-01
Payer: MEDICARE

## 2024-11-01 VITALS
RESPIRATION RATE: 18 BRPM | BODY MASS INDEX: 32.17 KG/M2 | HEIGHT: 66 IN | HEART RATE: 68 BPM | OXYGEN SATURATION: 97 % | SYSTOLIC BLOOD PRESSURE: 128 MMHG | TEMPERATURE: 98 F | WEIGHT: 200.19 LBS | DIASTOLIC BLOOD PRESSURE: 60 MMHG

## 2024-11-01 NOTE — PROGRESS NOTES
"Subjective:       Patient ID: Edith Tran is a 69 y.o. female Body mass index is 32.2 kg/m².    Chief Complaint: Dysphagia    This patient is new to me.  Referring Provider: No ref. provider found for dysphgia .  Established patient of Dr. Prince.     Pain is different than before hurts more in chest than feeling like things are getting stuck. "Feels like took too big of a bite but I chew my food well and take small bites"    5/2024 EGD with Dr Prince  Findings:       A non-obstructing Schatzki ring was found in the lower third of the        esophagus. A TTS dilator was passed through the scope. Dilation with        a 15-16.5-18 mm balloon dilator was performed to 17 mm. The dilation        site was examined and showed mild mucosal disruption.        Localized moderate mucosal variance characterized by discoloration        and altered texture was found in the lower third of the esophagus,        consistent with previously diagnosed esophageal leukoplakia.        Biopsies were taken with a cold forceps for histology.        A small hiatal hernia was present.        The examined duodenum was normal.         Review of Systems   Constitutional:  Positive for appetite change. Negative for activity change, fatigue, fever and unexpected weight change.   HENT:  Positive for trouble swallowing. Negative for sore throat.    Respiratory:  Negative for cough and shortness of breath.    Cardiovascular:  Positive for chest pain.   Gastrointestinal:  Negative for abdominal distention, abdominal pain, anal bleeding, blood in stool, constipation, diarrhea, nausea, rectal pain and vomiting.       No LMP recorded. Patient is postmenopausal.  Past Medical History:   Diagnosis Date    Allergy     Arthritis     Back pain     Colon polyps     COPD (chronic obstructive pulmonary disease)     Emphysema of lung     GERD (gastroesophageal reflux disease)     Hypertension     Kidney stones     Obesity     Pneumonia     Pneumonia due to other " staphylococcus     Pulmonary fibrosis     Pulmonary hypertension     Scleroderma involving lung since she was 47 y/o    Sleep apnea     Trouble in sleeping      Past Surgical History:   Procedure Laterality Date    CATARACT EXTRACTION W/  INTRAOCULAR LENS IMPLANT Right 10/30/2024    Procedure: CEIOL OD;  Surgeon: Stephen Desir MD;  Location: [facility] OR;  Service: Ophthalmology;  Laterality: Right;    COLONOSCOPY N/A 8/7/2018    Procedure: COLONOSCOPY;  Surgeon: Ngozi Prince MD;  Location: [facility];  Service: Endoscopy;  Laterality: N/A;    COLONOSCOPY N/A 11/21/2019    Procedure: COLONOSCOPY;  Surgeon: Ngozi Prince MD;  Location: [facility];  Service: Endoscopy;  Laterality: N/A;    ESOPHAGEAL MANOMETRY WITH MEASUREMENT OF IMPEDANCE N/A 7/27/2020    Procedure: MANOMETRY, ESOPHAGUS, WITH IMPEDANCE MEASUREMENT;  Surgeon: Bhupendra Temple MD;  [facility]  Service: Endoscopy;  Laterality: N/A;  3/10 - pt confirmed apptCovid test ordered for 7/24 in [place]    ESOPHAGOGASTRODUODENOSCOPY N/A 8/7/2018    Procedure: EGD (ESOPHAGOGASTRODUODENOSCOPY);  Surgeon: Ngozi Prince MD;  Location: [facility];  Service: Endoscopy;  Laterality: N/A;    ESOPHAGOGASTRODUODENOSCOPY N/A 11/21/2019    Procedure: EGD (ESOPHAGOGASTRODUODENOSCOPY);  Surgeon: Ngozi Prince MD;  Location: [facility];  Service: Endoscopy;  Laterality: N/A;    ESOPHAGOGASTRODUODENOSCOPY N/A 1/29/2020    Procedure: EGD (ESOPHAGOGASTRODUODENOSCOPY);  Surgeon: Ngozi Prince MD;  Location: [facility];  Service: Endoscopy;  Laterality: N/A;    ESOPHAGOGASTRODUODENOSCOPY N/A 8/20/2020    Procedure: EGD (ESOPHAGOGASTRODUODENOSCOPY);  Surgeon: Ngozi Prince MD;  Location: [facility];  Service: Endoscopy;  Laterality: N/A;    ESOPHAGOGASTRODUODENOSCOPY N/A 12/4/2020    Procedure: EGD (ESOPHAGOGASTRODUODENOSCOPY);  Surgeon: Ngozi Prince MD;  Location: [facility];  Service: Endoscopy;  Laterality: N/A;     ESOPHAGOGASTRODUODENOSCOPY N/A 2021    Procedure: EGD (ESOPHAGOGASTRODUODENOSCOPY);  Surgeon: Ngozi Prince MD;  Location: [facility];  Service: Endoscopy;  Laterality: N/A;    ESOPHAGOGASTRODUODENOSCOPY N/A 2024    Procedure: EGD (ESOPHAGOGASTRODUODENOSCOPY);  Surgeon: Ngozi Prince MD;  Location: [facility];  Service: Endoscopy;  Laterality: N/A;    REPAIR, HERNIA, INCISIONAL OR VENTRAL, WITHOUT HISTORY OF PRIOR REPAIR N/A 2022    Procedure: REPAIR, HERNIA umbilical, INCISIONAL OR VENTRAL, WITHOUT HISTORY OF PRIOR REPAIR;  Surgeon: Trent Evans MD;  Location: [facility] OR;  Service: General;  Laterality: N/A;  RN PREOP 2022, PT INSTRUCTED TO BRING ALL MEDS WITH HER ON AM OF SURGERY    RIGHT HEART CATHETERIZATION Right 2024    Procedure: INSERTION, CATHETER, RIGHT HEART;  Surgeon: Puma Contreras MD;  Location: [facility] CATH LAB;  Service: Cardiology;  Laterality: Right;     Family History   Problem Relation Name Age of Onset    Kidney disease Mother      Cancer Father      Heart disease Brother      Mental illness Son      Glaucoma Neg Hx      Macular degeneration Neg Hx      Retinal detachment Neg Hx       Social History     Tobacco Use    Smoking status: Former     Current packs/day: 0.00     Average packs/day: 0.5 packs/day for 27.7 years (13.8 ttl pk-yrs)     Types: Cigarettes     Start date:      Quit date: 1995     Years since quittin.1    Smokeless tobacco: Never   Substance Use Topics    Alcohol use: Not Currently     Comment: occ    Drug use: No     Wt Readings from Last 10 Encounters:   24 90.5 kg (199 lb 8.3 oz)   24 88 kg (194 lb 0.1 oz)   10/25/24 90.8 kg (200 lb 2.8 oz)   10/29/24 88 kg (194 lb)   10/25/24 90.8 kg (200 lb 2.8 oz)   10/02/24 90.4 kg (199 lb 3 oz)   24 89.4 kg (197 lb)   24 89.8 kg (197 lb 15.6 oz)   24 88 kg (194 lb)   24 88.7 kg (195 lb 8.8 oz)     Lab Results   Component Value Date    WBC 4.15  10/29/2024    HGB 12.3 10/29/2024    HCT 38.3 10/29/2024    MCV 97 10/29/2024     (L) 10/29/2024     CMP  Sodium   Date Value Ref Range Status   10/29/2024 140 136 - 145 mmol/L Final     Potassium   Date Value Ref Range Status   10/29/2024 4.0 3.5 - 5.1 mmol/L Final     Chloride   Date Value Ref Range Status   10/29/2024 109 95 - 110 mmol/L Final     CO2   Date Value Ref Range Status   10/29/2024 28 23 - 29 mmol/L Final     Glucose   Date Value Ref Range Status   10/29/2024 90 70 - 110 mg/dL Final     BUN   Date Value Ref Range Status   10/29/2024 13 8 - 23 mg/dL Final     Creatinine   Date Value Ref Range Status   10/29/2024 0.8 0.5 - 1.4 mg/dL Final     Calcium   Date Value Ref Range Status   10/29/2024 9.3 8.7 - 10.5 mg/dL Final     Total Protein   Date Value Ref Range Status   10/29/2024 6.2 6.0 - 8.4 g/dL Final     Albumin   Date Value Ref Range Status   10/29/2024 4.0 3.5 - 5.2 g/dL Final     Total Bilirubin   Date Value Ref Range Status   10/29/2024 0.4 0.1 - 1.0 mg/dL Final     Comment:     For infants and newborns, interpretation of results should be based  on gestational age, weight and in agreement with clinical  observations.    Premature Infant recommended reference ranges:  Up to 24 hours.............<8.0 mg/dL  Up to 48 hours............<12.0 mg/dL  3-5 days..................<15.0 mg/dL  6-29 days.................<15.0 mg/dL       Alkaline Phosphatase   Date Value Ref Range Status   10/29/2024 41 (L) 55 - 135 U/L Final     AST   Date Value Ref Range Status   10/29/2024 13 10 - 40 U/L Final     ALT   Date Value Ref Range Status   10/29/2024 8 (L) 10 - 44 U/L Final     Anion Gap   Date Value Ref Range Status   10/29/2024 3 (L) 8 - 16 mmol/L Final     eGFR if    Date Value Ref Range Status   03/16/2022 >60 >60 mL/min/1.73 m^2 Final     eGFR    Date Value Ref Range Status   08/19/2022 102 >89 mL/min Final     eGFR if non    Date Value Ref Range Status  "  03/16/2022 >60 >60 mL/min/1.73 m^2 Final     Comment:     Calculation used to obtain the estimated glomerular filtration  rate (eGFR) is the CKD-EPI equation.        No results found for: "AMYLASE"  No results found for: "LIPASE"  No results found for: "LIPASERES"  Lab Results   Component Value Date    TSH 1.650 07/07/2023       Reviewed prior medical records including radiology report of upper gi and kub 2019, x-ray chest 8/2024 & endoscopy history (see surgical history).    Objective:      Physical Exam  Vitals and nursing note reviewed.   Constitutional:       General: She is not in acute distress.     Appearance: She is not ill-appearing.   HENT:      Head: Normocephalic and atraumatic.      Mouth/Throat:      Mouth: Mucous membranes are moist.      Pharynx: Oropharynx is clear.   Eyes:      Conjunctiva/sclera: Conjunctivae normal.   Cardiovascular:      Rate and Rhythm: Normal rate and regular rhythm.      Pulses: Normal pulses.   Pulmonary:      Effort: Pulmonary effort is normal. No respiratory distress.   Abdominal:      General: Abdomen is flat. Bowel sounds are normal. There is no distension.      Palpations: Abdomen is soft.      Tenderness: There is no abdominal tenderness.   Skin:     General: Skin is warm and dry.      Capillary Refill: Capillary refill takes 2 to 3 seconds.   Neurological:      Mental Status: She is alert and oriented to person, place, and time.         Assessment:       1. Dysphagia, unspecified type    2. Screening for colon cancer    3. Gastroesophageal reflux disease, unspecified whether esophagitis present        Plan:       Dysphagia, unspecified type  - schedule EGD, discussed procedure with patient and possible esophageal dilation may be performed during procedure if indicated, patient verbalized understanding  - educated patient to eat smaller more frequent meals and to eat slowly and advised to eat a soft diet.  - possible UGI/esophagram/esophageal manometry if symptoms " persist     Repeat EGD with first available   Carafate 1 g 4x a day for 10 days  Repeat Upper GI and esophagram to reassess since symptoms have recurred less than 12 months after last dilation    Consider manometry   -     FL Upper GI to include esophagram; Future; Expected date: 11/04/2024  -     sucralfate (CARAFATE) 1 gram tablet; Take 1 tablet (1 g total) by mouth 4 (four) times daily before meals and nightly. for 10 days  Dispense: 40 tablet; Refill: 0    Screening for colon cancer  - schedule Colonoscopy, discussed procedure with the patient, including risks and benefits, patient verbalized understanding    Gastroesophageal reflux disease, unspecified whether esophagitis present  -discussed about the different types of medications used to treat reflux and how to use them, antacids can be used PRN for breakthrough heartburn symptoms by reducing stomach acid that is already produced, H2 blockers work by limiting the amount acid production, & PPI's work to block acid production and are taken daily, patient verbalized understanding.  -Educated patient on lifestyle modifications to help control/reduce reflux/abdominal pain including: avoid large meals, avoid eating within 2-3 hours of bedtime (avoid late night eating & lying down soon after eating), elevate head of bed if nocturnal symptoms are present, smoking cessation (if current smoker), & weight loss (if overweight).   -Educated to avoid known foods which trigger reflux symptoms & to minimize/avoid high-fat foods, chocolate, caffeine, citrus, alcohol, & tomato products.  -Advised to avoid/limit use of NSAID's, since they can cause GI upset, bleeding, and/or ulcers. If needed, take with food.       Follow up if symptoms worsen or fail to improve.      If no improvement in symptoms or symptoms worsen, call/follow-up at clinic or go to ER.       JAKOB Maria, YUSEF    Encounter includes face to face time and non-face to face time preparing to see the  patient (eg, review of tests), obtaining and/or reviewing separately obtained history, documenting clinical information in the electronic or other health record, independently interpreting results (not separately reported) and communicating results to the patient/family/caregiver, or care coordination (not separately reported).     A dictation software program was used for this note. Please expect some simple typographical errors in this note.

## 2024-11-04 ENCOUNTER — OFFICE VISIT (OUTPATIENT)
Dept: UROLOGY | Facility: CLINIC | Age: 69
End: 2024-11-04
Payer: MEDICARE

## 2024-11-04 ENCOUNTER — OFFICE VISIT (OUTPATIENT)
Dept: GASTROENTEROLOGY | Facility: CLINIC | Age: 69
End: 2024-11-04
Payer: MEDICARE

## 2024-11-04 VITALS
WEIGHT: 199.5 LBS | DIASTOLIC BLOOD PRESSURE: 82 MMHG | SYSTOLIC BLOOD PRESSURE: 120 MMHG | HEIGHT: 66 IN | BODY MASS INDEX: 32.06 KG/M2 | HEART RATE: 104 BPM

## 2024-11-04 VITALS — HEIGHT: 66 IN | BODY MASS INDEX: 31.18 KG/M2 | WEIGHT: 194 LBS

## 2024-11-04 DIAGNOSIS — N20.0 NEPHROLITHIASIS: Primary | ICD-10-CM

## 2024-11-04 DIAGNOSIS — M54.50 ACUTE RIGHT-SIDED LOW BACK PAIN WITHOUT SCIATICA: ICD-10-CM

## 2024-11-04 DIAGNOSIS — R13.10 DYSPHAGIA, UNSPECIFIED TYPE: Primary | ICD-10-CM

## 2024-11-04 DIAGNOSIS — Z12.11 SCREENING FOR COLON CANCER: ICD-10-CM

## 2024-11-04 DIAGNOSIS — N22 CALCULUS OF URINARY TRACT IN DISEASES CLASSIFIED ELSEWHERE: ICD-10-CM

## 2024-11-04 DIAGNOSIS — K21.9 GASTROESOPHAGEAL REFLUX DISEASE, UNSPECIFIED WHETHER ESOPHAGITIS PRESENT: ICD-10-CM

## 2024-11-04 LAB
BILIRUBIN, UA POC OHS: NEGATIVE
BLOOD, UA POC OHS: ABNORMAL
CLARITY, UA POC OHS: CLEAR
COLOR, UA POC OHS: YELLOW
GLUCOSE, UA POC OHS: NEGATIVE
KETONES, UA POC OHS: NEGATIVE
LEUKOCYTES, UA POC OHS: NEGATIVE
NITRITE, UA POC OHS: NEGATIVE
PH, UA POC OHS: 5.5
PROTEIN, UA POC OHS: NEGATIVE
SPECIFIC GRAVITY, UA POC OHS: 1.02
UROBILINOGEN, UA POC OHS: 0.2

## 2024-11-04 PROCEDURE — 4010F ACE/ARB THERAPY RXD/TAKEN: CPT | Mod: CPTII,S$GLB,, | Performed by: NURSE PRACTITIONER

## 2024-11-04 PROCEDURE — 3074F SYST BP LT 130 MM HG: CPT | Mod: CPTII,S$GLB,,

## 2024-11-04 PROCEDURE — 3008F BODY MASS INDEX DOCD: CPT | Mod: CPTII,S$GLB,, | Performed by: NURSE PRACTITIONER

## 2024-11-04 PROCEDURE — 99214 OFFICE O/P EST MOD 30 MIN: CPT | Mod: S$GLB,,,

## 2024-11-04 PROCEDURE — 1101F PT FALLS ASSESS-DOCD LE1/YR: CPT | Mod: CPTII,S$GLB,,

## 2024-11-04 PROCEDURE — 99999 PR PBB SHADOW E&M-EST. PATIENT-LVL V: CPT | Mod: PBBFAC,,, | Performed by: NURSE PRACTITIONER

## 2024-11-04 PROCEDURE — 1126F AMNT PAIN NOTED NONE PRSNT: CPT | Mod: CPTII,S$GLB,, | Performed by: NURSE PRACTITIONER

## 2024-11-04 PROCEDURE — 99999 PR PBB SHADOW E&M-EST. PATIENT-LVL IV: CPT | Mod: PBBFAC,,,

## 2024-11-04 PROCEDURE — 1101F PT FALLS ASSESS-DOCD LE1/YR: CPT | Mod: CPTII,S$GLB,, | Performed by: NURSE PRACTITIONER

## 2024-11-04 PROCEDURE — 4010F ACE/ARB THERAPY RXD/TAKEN: CPT | Mod: CPTII,S$GLB,,

## 2024-11-04 PROCEDURE — G2211 COMPLEX E/M VISIT ADD ON: HCPCS | Mod: S$GLB,,, | Performed by: NURSE PRACTITIONER

## 2024-11-04 PROCEDURE — 1159F MED LIST DOCD IN RCRD: CPT | Mod: CPTII,S$GLB,,

## 2024-11-04 PROCEDURE — 81003 URINALYSIS AUTO W/O SCOPE: CPT | Mod: QW,S$GLB,, | Performed by: NURSE PRACTITIONER

## 2024-11-04 PROCEDURE — 1126F AMNT PAIN NOTED NONE PRSNT: CPT | Mod: CPTII,S$GLB,,

## 2024-11-04 PROCEDURE — 3288F FALL RISK ASSESSMENT DOCD: CPT | Mod: CPTII,S$GLB,, | Performed by: NURSE PRACTITIONER

## 2024-11-04 PROCEDURE — 99214 OFFICE O/P EST MOD 30 MIN: CPT | Mod: S$GLB,,, | Performed by: NURSE PRACTITIONER

## 2024-11-04 PROCEDURE — 3008F BODY MASS INDEX DOCD: CPT | Mod: CPTII,S$GLB,,

## 2024-11-04 PROCEDURE — 3079F DIAST BP 80-89 MM HG: CPT | Mod: CPTII,S$GLB,,

## 2024-11-04 PROCEDURE — 3288F FALL RISK ASSESSMENT DOCD: CPT | Mod: CPTII,S$GLB,,

## 2024-11-04 RX ORDER — SUCRALFATE 1 G/1
1 TABLET ORAL
Qty: 40 TABLET | Refills: 0 | Status: SHIPPED | OUTPATIENT
Start: 2024-11-04 | End: 2024-11-05

## 2024-11-04 NOTE — PROGRESS NOTES
[facility] Note  Staff: YUSEF Hodges    PCP: JALEEL Julio    Chief Complaint: ER F/UP-Kidney stone    Subjective:        HPI: Edith Tran is a 69 y.o. female presents today for ER F/UP at this time related to a kidney stone possibly.    Pt was evaluated in ER on 10/29 for right sided lower back pains, with slight radiation to the front.    UA in ER was negative.  No urine culture done.    CT RSS done on 10/29/24:  IMPRESSION:  1. No evidence of obstructive uropathy.  2. 5 mm nonobstructing left renal calculus.  3. 12 mm irregular nodular opacity at the level of the minor fissure on the right, unchanged compared to August 2024, but new compared to 2022.  PET CT should be considered.  Alternatively, short-term CT follow-up in 3 months could be utilized.  4. Additional chronic findings as above.    REVIEW OF SYSTEMS:  A comprehensive 10 system review was performed and is negative except as noted above in HPI    PMHx:  Past Medical History:   Diagnosis Date    Allergy     Arthritis     Back pain     Colon polyps     COPD (chronic obstructive pulmonary disease)     Emphysema of lung     GERD (gastroesophageal reflux disease)     Hypertension     Kidney stones     Obesity     Pneumonia     Pneumonia due to other staphylococcus     Pulmonary fibrosis     Pulmonary hypertension     Scleroderma involving lung since she was 49 y/o    Sleep apnea     Trouble in sleeping        PSHx:  Past Surgical History:   Procedure Laterality Date    CATARACT EXTRACTION W/  INTRAOCULAR LENS IMPLANT Right 10/30/2024    Procedure: CEIOL OD;  Surgeon: Stephen Desir MD;  Location: [facility] AS OR;  Service: Ophthalmology;  Laterality: Right;    COLONOSCOPY N/A 8/7/2018    Procedure: COLONOSCOPY;  Surgeon: Ngozi Prince MD;  Location: [facility] ENDO;  Service: Endoscopy;  Laterality: N/A;    COLONOSCOPY N/A 11/21/2019    Procedure: COLONOSCOPY;  Surgeon: Ngozi Prince MD;  Location: [facility] ENDO;  Service:  Endoscopy;  Laterality: N/A;    ESOPHAGEAL MANOMETRY WITH MEASUREMENT OF IMPEDANCE N/A 7/27/2020    Procedure: MANOMETRY, ESOPHAGUS, WITH IMPEDANCE MEASUREMENT;  Surgeon: Bhupendra Temple MD;  Location: [facility] ([address] [place] [address]);  Service: Endoscopy;  Laterality: N/A;  3/10 - pt confirmed apptCovid test ordered for 7/24 in [facility]    ESOPHAGOGASTRODUODENOSCOPY N/A 8/7/2018    Procedure: EGD (ESOPHAGOGASTRODUODENOSCOPY);  Surgeon: Ngozi Prince MD;  Location: [facility];  Service: Endoscopy;  Laterality: N/A;    ESOPHAGOGASTRODUODENOSCOPY N/A 11/21/2019    Procedure: EGD (ESOPHAGOGASTRODUODENOSCOPY);  Surgeon: Ngozi Prince MD;  Location: [facility];  Service: Endoscopy;  Laterality: N/A;    ESOPHAGOGASTRODUODENOSCOPY N/A 1/29/2020    Procedure: EGD (ESOPHAGOGASTRODUODENOSCOPY);  Surgeon: Ngozi Prince MD;  Location: [facility];  Service: Endoscopy;  Laterality: N/A;    ESOPHAGOGASTRODUODENOSCOPY N/A 8/20/2020    Procedure: EGD (ESOPHAGOGASTRODUODENOSCOPY);  Surgeon: Ngozi Prince MD;  Location: [facility];  Service: Endoscopy;  Laterality: N/A;    ESOPHAGOGASTRODUODENOSCOPY N/A 12/4/2020    Procedure: EGD (ESOPHAGOGASTRODUODENOSCOPY);  Surgeon: Ngozi Prince MD;  Location: [facility];  Service: Endoscopy;  Laterality: N/A;    ESOPHAGOGASTRODUODENOSCOPY N/A 11/19/2021    Procedure: EGD (ESOPHAGOGASTRODUODENOSCOPY);  Surgeon: Ngozi Prince MD;  Location: [facility];  Service: Endoscopy;  Laterality: N/A;    ESOPHAGOGASTRODUODENOSCOPY N/A 5/17/2024    Procedure: EGD (ESOPHAGOGASTRODUODENOSCOPY);  Surgeon: Ngozi Prince MD;  Location: [facility];  Service: Endoscopy;  Laterality: N/A;    REPAIR, HERNIA, INCISIONAL OR VENTRAL, WITHOUT HISTORY OF PRIOR REPAIR N/A 12/27/2022    Procedure: REPAIR, HERNIA umbilical, INCISIONAL OR VENTRAL, WITHOUT HISTORY OF PRIOR REPAIR;  Surgeon: Trent Evans MD;  Location: [facility] OR;  Service: General;  Laterality: N/A;  RN PREOP 12/20/2022, PT INSTRUCTED TO BRING  ALL MEDS WITH HER ON AM OF SURGERY    RIGHT HEART CATHETERIZATION Right 4/4/2024    Procedure: INSERTION, CATHETER, RIGHT HEART;  Surgeon: Puma Contreras MD;  Location: [facility] CATH LAB;  Service: Cardiology;  Laterality: Right;       Allergies:  Hydroxychloroquine    Medications: reviewed   Objective:   There were no vitals filed for this visit.    Physical Exam  Constitutional:       Appearance: She is well-developed.   HENT:      Head: Normocephalic and atraumatic.   Eyes:      Conjunctiva/sclera: Conjunctivae normal.      Pupils: Pupils are equal, round, and reactive to light.   Cardiovascular:      Rate and Rhythm: Normal rate and regular rhythm.      Heart sounds: Normal heart sounds.   Pulmonary:      Effort: Pulmonary effort is normal.      Breath sounds: Normal breath sounds.   Abdominal:      General: Bowel sounds are normal.      Palpations: Abdomen is soft.   Musculoskeletal:         General: Normal range of motion.      Cervical back: Normal range of motion and neck supple.   Skin:     General: Skin is warm and dry.   Neurological:      Mental Status: She is alert and oriented to person, place, and time.      Deep Tendon Reflexes: Reflexes are normal and symmetric.   Psychiatric:         Behavior: Behavior normal.         Thought Content: Thought content normal.         Judgment: Judgment normal.           Assessment:       1. Nephrolithiasis    2. Calculus of urinary tract in diseases classified elsewhere    3. Acute right-sided low back pain without sciatica          Plan:     It was explained to this patient that her right lower back pain is NOT due to the Left sided kidney stone sitting within her LEFT kidney at this time.  There is no hydro, no obstructions noted.     Things you can do to decrease your risk of recurring stones:  1. Increase fluid intake - You should be producing at least 2.5 L of urine per 24 hour period. If your urine is dark you need to be drinking more water.  2. Lower sodium  intake - this helps lower the amount of calcium being lost in your urine. An ideal intake is between 2300 and 3300mg of sodium daily.  3. Normal calcium diet - ideal intake is between 800 and 1200mg of calcium daily. You do not want to decrease the amount of calcium you take in because this can actually increase your risk of making stone.     Limit iced tea and richard,  avoid Tums and Rolaids, to avoid Vitamin C supplementation and to limit salt and red meat intake.      F/u in one year with repeat imaging or sooner should new  symptoms develop.  Pt verbalized understanding.    [facility]: Active    Marleny López FNP-C  Visit today is associated with current or anticipated ongoing medical care related to this patient's single serious condition/complex condition.

## 2024-11-04 NOTE — PATIENT INSTRUCTIONS
Things you can do to decrease your risk of recurring stones:  1. Increase fluid intake - You should be producing at least 2.5 L of urine per 24 hour period. If your urine is dark you need to be drinking more water.  2. Lower sodium intake - this helps lower the amount of calcium being lost in your urine. An ideal intake is between 2300 and 3300mg of sodium daily.  3. Normal calcium diet - ideal intake is between 800 and 1200mg of calcium daily. You do not want to decrease the amount of calcium you take in because this can actually increase your risk of making stone.     Limit iced tea and richard,  avoid Tums and Rolaids, to avoid Vitamin C supplementation and to limit salt and red meat intake.

## 2024-11-05 ENCOUNTER — PATIENT OUTREACH (OUTPATIENT)
Dept: EMERGENCY MEDICINE | Facility: HOSPITAL | Age: 69
End: 2024-11-05

## 2024-11-05 ENCOUNTER — PATIENT MESSAGE (OUTPATIENT)
Dept: GASTROENTEROLOGY | Facility: CLINIC | Age: 69
End: 2024-11-05
Payer: MEDICARE

## 2024-11-05 DIAGNOSIS — R13.10 DYSPHAGIA, UNSPECIFIED TYPE: ICD-10-CM

## 2024-11-05 DIAGNOSIS — K20.90 ESOPHAGITIS: ICD-10-CM

## 2024-11-05 DIAGNOSIS — K21.9 GASTROESOPHAGEAL REFLUX DISEASE, UNSPECIFIED WHETHER ESOPHAGITIS PRESENT: Primary | ICD-10-CM

## 2024-11-05 RX ORDER — SUCRALFATE 1 G/10ML
1 SUSPENSION ORAL
Qty: 400 ML | Refills: 0 | Status: SHIPPED | OUTPATIENT
Start: 2024-11-05 | End: 2024-11-15

## 2024-11-05 NOTE — PROGRESS NOTES
Pt is unreachable for 1st F/U. ED navigator will follow-up with patient on/around 12/2/2024 for attempt at 2nd.    Gladis Almazan  ED Navigator  [phone]

## 2024-11-05 NOTE — PROGRESS NOTES
ED navigator reminded patient about her appointment for Thursday @ 3:20 p.m. with Duane Julio III, MD through voicemail, as she did not answer the call. ED navigator asked patient to return the call, so she can know she did receive the message.    Gladis Almazan  ED Navigator  [phone]

## 2024-11-07 ENCOUNTER — OFFICE VISIT (OUTPATIENT)
Dept: FAMILY MEDICINE | Facility: CLINIC | Age: 69
End: 2024-11-07
Payer: MEDICARE

## 2024-11-07 VITALS
OXYGEN SATURATION: 98 % | SYSTOLIC BLOOD PRESSURE: 114 MMHG | WEIGHT: 200.81 LBS | BODY MASS INDEX: 32.27 KG/M2 | HEART RATE: 82 BPM | TEMPERATURE: 98 F | DIASTOLIC BLOOD PRESSURE: 70 MMHG | HEIGHT: 66 IN

## 2024-11-07 DIAGNOSIS — Z23 NEED FOR INFLUENZA VACCINATION: Primary | ICD-10-CM

## 2024-11-07 DIAGNOSIS — Z23 NEED FOR VACCINATION WITH 20-POLYVALENT PNEUMOCOCCAL CONJUGATE VACCINE: ICD-10-CM

## 2024-11-07 DIAGNOSIS — R91.1 SOLITARY PULMONARY NODULE: ICD-10-CM

## 2024-11-07 DIAGNOSIS — M54.9 BACK PAIN, UNSPECIFIED BACK LOCATION, UNSPECIFIED BACK PAIN LATERALITY, UNSPECIFIED CHRONICITY: ICD-10-CM

## 2024-11-07 DIAGNOSIS — M34.9 SYSTEMIC SCLEROSIS WITH LIMITED CUTANEOUS INVOLVEMENT: ICD-10-CM

## 2024-11-07 DIAGNOSIS — N20.0 LEFT NEPHROLITHIASIS: ICD-10-CM

## 2024-11-07 DIAGNOSIS — J84.10 PULMONARY FIBROSIS: ICD-10-CM

## 2024-11-07 DIAGNOSIS — Z12.31 BREAST CANCER SCREENING BY MAMMOGRAM: ICD-10-CM

## 2024-11-07 DIAGNOSIS — J84.9 INTERSTITIAL LUNG DISEASE: ICD-10-CM

## 2024-11-07 DIAGNOSIS — M34.9 SCLERODERMA: ICD-10-CM

## 2024-11-07 DIAGNOSIS — I10 HYPERTENSION, ESSENTIAL: ICD-10-CM

## 2024-11-07 DIAGNOSIS — Z99.81 ON HOME OXYGEN THERAPY: ICD-10-CM

## 2024-11-07 DIAGNOSIS — M54.9 UPPER BACK PAIN: ICD-10-CM

## 2024-11-07 DIAGNOSIS — R91.1 PULMONARY NODULE: ICD-10-CM

## 2024-11-07 PROCEDURE — G0008 ADMIN INFLUENZA VIRUS VAC: HCPCS | Mod: S$GLB,,, | Performed by: FAMILY MEDICINE

## 2024-11-07 PROCEDURE — G2211 COMPLEX E/M VISIT ADD ON: HCPCS | Mod: S$GLB,,, | Performed by: FAMILY MEDICINE

## 2024-11-07 PROCEDURE — 3078F DIAST BP <80 MM HG: CPT | Mod: CPTII,S$GLB,, | Performed by: FAMILY MEDICINE

## 2024-11-07 PROCEDURE — 1160F RVW MEDS BY RX/DR IN RCRD: CPT | Mod: CPTII,S$GLB,, | Performed by: FAMILY MEDICINE

## 2024-11-07 PROCEDURE — 3074F SYST BP LT 130 MM HG: CPT | Mod: CPTII,S$GLB,, | Performed by: FAMILY MEDICINE

## 2024-11-07 PROCEDURE — 99999 PR PBB SHADOW E&M-EST. PATIENT-LVL V: CPT | Mod: PBBFAC,,, | Performed by: FAMILY MEDICINE

## 2024-11-07 PROCEDURE — 90653 IIV ADJUVANT VACCINE IM: CPT | Mod: S$GLB,,, | Performed by: FAMILY MEDICINE

## 2024-11-07 PROCEDURE — 1125F AMNT PAIN NOTED PAIN PRSNT: CPT | Mod: CPTII,S$GLB,, | Performed by: FAMILY MEDICINE

## 2024-11-07 PROCEDURE — G0009 ADMIN PNEUMOCOCCAL VACCINE: HCPCS | Mod: S$GLB,,, | Performed by: FAMILY MEDICINE

## 2024-11-07 PROCEDURE — 99214 OFFICE O/P EST MOD 30 MIN: CPT | Mod: S$GLB,,, | Performed by: FAMILY MEDICINE

## 2024-11-07 PROCEDURE — 1159F MED LIST DOCD IN RCRD: CPT | Mod: CPTII,S$GLB,, | Performed by: FAMILY MEDICINE

## 2024-11-07 PROCEDURE — 4010F ACE/ARB THERAPY RXD/TAKEN: CPT | Mod: CPTII,S$GLB,, | Performed by: FAMILY MEDICINE

## 2024-11-07 PROCEDURE — 90677 PCV20 VACCINE IM: CPT | Mod: S$GLB,,, | Performed by: FAMILY MEDICINE

## 2024-11-07 PROCEDURE — 3008F BODY MASS INDEX DOCD: CPT | Mod: CPTII,S$GLB,, | Performed by: FAMILY MEDICINE

## 2024-11-07 PROCEDURE — 87086 URINE CULTURE/COLONY COUNT: CPT | Performed by: FAMILY MEDICINE

## 2024-11-07 PROCEDURE — 3288F FALL RISK ASSESSMENT DOCD: CPT | Mod: CPTII,S$GLB,, | Performed by: FAMILY MEDICINE

## 2024-11-07 PROCEDURE — 1101F PT FALLS ASSESS-DOCD LE1/YR: CPT | Mod: CPTII,S$GLB,, | Performed by: FAMILY MEDICINE

## 2024-11-07 RX ORDER — CEFUROXIME AXETIL 250 MG/1
250 TABLET ORAL 2 TIMES DAILY
Qty: 20 TABLET | Refills: 0 | Status: SHIPPED | OUTPATIENT
Start: 2024-11-07

## 2024-11-07 RX ORDER — CEFUROXIME AXETIL 250 MG/1
250 TABLET ORAL 2 TIMES DAILY
COMMUNITY
End: 2024-11-07 | Stop reason: SDUPTHER

## 2024-11-07 NOTE — PROGRESS NOTES
SCRIBE #1 NOTE: I, Deonte Lara, am scribing for, and in the presence of,  Duane Julio III, MD. I have scribed the entire note.     Subjective:       Patient ID: Edith Tran is a 69 y.o. female.    Chief Complaint: Follow-up (Hsp Follow Up)    Ms. Tran is here for a hospital follow up after her last appointment on September 6th. Left nephrolithiasis. She went to the hospital on October 29th. She was having severe lower back pain for 3 days and went to the ER. She was having pain and urinary frequency. It does still hurt some but not as bad. No burning. There was pain with movement. She was not given any antibiotics. CBC is okay. Urinanalysis is okay. CMP is okay. Upper back pain. States her back pain is daily and leaning against her hand helps with the pain. She would like to try physical therapy. History of kidney stones 15-20 years ago. BMI of 32.4. Cardiovascular good. No chest pain. No palpitations. Blood pressure is 114/70. Hypertension controlled. Pulmonary nodule. Interstitial lung disease. Repeat CT scan in 3 months. Pulmonary fibrosis. Breathing is okay. Using oxygen at night and with exertion. Scleroderma.  Flu vaccine was already administered. RSV was discussed. Pneumonia vaccine was discussed and will get it today. Colonoscopy is scheduled with Dr. Norris. Mammogram is due.     Review of Systems   Constitutional:  Negative for chills and fever.   HENT:  Negative for congestion and sore throat.    Eyes:  Negative for visual disturbance.   Respiratory:  Negative for chest tightness and shortness of breath.    Cardiovascular:  Negative for chest pain.   Gastrointestinal:  Negative for nausea.   Endocrine: Negative for polydipsia and polyuria.   Genitourinary:  Negative for dysuria and flank pain.   Musculoskeletal:  Positive for back pain. Negative for neck pain and neck stiffness.   Skin:  Negative for rash.   Neurological:  Negative for weakness.   Hematological:  Does not bruise/bleed easily.    Psychiatric/Behavioral:  Negative for behavioral problems.    All other systems reviewed and are negative.      Objective:      Physical examination: Vital signs noted. No acute distress. No carotid bruit. Regular heart rate and rhythm. Lungs clear to auscultation bilaterally. Abdomen bowel sounds positive soft and nontender. Extremities without edema. 2+ pedal pulses. CVA nontender.       Assessment:       1. Need for influenza vaccination    2. Breast cancer screening by mammogram    3. Interstitial lung disease    4. On home oxygen therapy    5. Hypertension, essential    6. Systemic sclerosis with limited cutaneous involvement    7. Scleroderma    8. Back pain, unspecified back location, unspecified back pain laterality, unspecified chronicity    9. Pulmonary nodule    10. Pulmonary fibrosis    11. Left nephrolithiasis    12. Upper back pain    13. Need for vaccination with 20-polyvalent pneumococcal conjugate vaccine    14. Solitary pulmonary nodule        Plan:       Need for influenza vaccination  -     influenza (adjuvanted) (Fluad) 45 mcg/0.5 mL IM vaccine (> or = 64 yo) 0.5 mL    Breast cancer screening by mammogram  -     Mammo Digital Screening Bilat w/ Aubrey; Future; Expected date: 11/07/2024    Interstitial lung disease    On home oxygen therapy    Hypertension, essential    Systemic sclerosis with limited cutaneous involvement    Scleroderma    Back pain, unspecified back location, unspecified back pain laterality, unspecified chronicity  -     Urine culture  -     Ambulatory referral/consult to Physical/Occupational Therapy; Future; Expected date: 11/14/2024    Pulmonary nodule  -     CT Chest Without Contrast; Future; Expected date: 02/07/2025    Pulmonary fibrosis    Left nephrolithiasis    Upper back pain  -     Urine culture  -     Ambulatory referral/consult to Physical/Occupational Therapy; Future; Expected date: 11/14/2024    Need for vaccination with 20-polyvalent pneumococcal conjugate  vaccine  -     pneumoc 20-sergio conj-dip cr(PF) (PREVNAR-20 (PF)) injection Syrg 0.5 mL    Solitary pulmonary nodule  -     CT Chest Without Contrast; Future; Expected date: 02/07/2025    Other orders  -     cefUROXime (CEFTIN) 250 MG tablet; Take 1 tablet (250 mg total) by mouth 2 (two) times daily.  Dispense: 20 tablet; Refill: 0    Check mammogram.  Urine culture and sensitivity.  CT chest in 3 months.  To recheck the nodule.  Flu shot high-dose today.  Prevnar 20 today.  RSV vaccine recommended.  Ceftin 250 b.i.d. for 10 days.  Cover the possibility of ongoing kidney infection.  Physical therapy to thoracic spine.  Colonoscopy soon.  All care gaps addressed or discussed.     I, Duane Julio III, MD, personally performed the services described in this documentation. All medical record entries made by the scribe were at my direction and in my presence. I have reviewed the chart and agree that the record reflects my personal performance and is accurate and complete.

## 2024-11-10 PROBLEM — R91.1 PULMONARY NODULE: Status: ACTIVE | Noted: 2024-11-10

## 2024-11-10 PROBLEM — J84.10 PULMONARY FIBROSIS: Status: ACTIVE | Noted: 2024-11-10

## 2024-11-10 PROBLEM — N20.0 LEFT NEPHROLITHIASIS: Status: ACTIVE | Noted: 2024-11-10

## 2024-11-10 PROBLEM — R91.1 SOLITARY PULMONARY NODULE: Status: ACTIVE | Noted: 2024-11-10

## 2024-11-10 LAB — BACTERIA UR CULT: NORMAL

## 2024-11-15 DIAGNOSIS — K30 DELAYED GASTRIC EMPTYING: ICD-10-CM

## 2024-11-15 RX ORDER — ONDANSETRON HYDROCHLORIDE 8 MG/1
TABLET, FILM COATED ORAL
Qty: 30 TABLET | Refills: 3 | Status: SHIPPED | OUTPATIENT
Start: 2024-11-15

## 2024-11-18 ENCOUNTER — HOSPITAL ENCOUNTER (OUTPATIENT)
Dept: RADIOLOGY | Facility: HOSPITAL | Age: 69
Discharge: HOME | End: 2024-11-18
Attending: FAMILY MEDICINE
Payer: MEDICARE

## 2024-11-18 DIAGNOSIS — Z12.31 BREAST CANCER SCREENING BY MAMMOGRAM: ICD-10-CM

## 2024-11-18 PROCEDURE — 77063 BREAST TOMOSYNTHESIS BI: CPT | Mod: 26,,, | Performed by: RADIOLOGY

## 2024-11-18 PROCEDURE — 77063 BREAST TOMOSYNTHESIS BI: CPT | Mod: TC

## 2024-11-18 PROCEDURE — 77067 SCR MAMMO BI INCL CAD: CPT | Mod: 26,,, | Performed by: RADIOLOGY

## 2024-11-21 ENCOUNTER — OFFICE VISIT (OUTPATIENT)
Dept: OPHTHALMOLOGY | Facility: CLINIC | Age: 69
End: 2024-11-21
Payer: MEDICARE

## 2024-11-21 DIAGNOSIS — H25.812 COMBINED FORM OF AGE-RELATED CATARACT, LEFT EYE: ICD-10-CM

## 2024-11-21 DIAGNOSIS — Z98.41 STATUS POST CATARACT SURGERY, RIGHT: Primary | ICD-10-CM

## 2024-11-21 PROCEDURE — 99999 PR PBB SHADOW E&M-EST. PATIENT-LVL IV: CPT | Mod: PBBFAC,,, | Performed by: OPHTHALMOLOGY

## 2024-11-21 RX ORDER — CYCLOP/TROP/PROPA/PHEN/KET/WAT 1-1-0.1%
1 DROPS (EA) OPHTHALMIC (EYE)
OUTPATIENT
Start: 2024-11-21 | End: 2024-11-21

## 2024-11-21 RX ORDER — SODIUM CHLORIDE 9 MG/ML
INJECTION, SOLUTION INTRAVENOUS CONTINUOUS
OUTPATIENT
Start: 2024-11-21

## 2024-11-21 RX ORDER — MOXIFLOXACIN 5 MG/ML
1 SOLUTION/ DROPS OPHTHALMIC 4 TIMES DAILY
Qty: 3 ML | Refills: 1 | Status: SHIPPED | OUTPATIENT
Start: 2024-11-21

## 2024-11-21 RX ORDER — KETOROLAC TROMETHAMINE 5 MG/ML
1 SOLUTION OPHTHALMIC 4 TIMES DAILY
Qty: 5 ML | Refills: 2 | Status: SHIPPED | OUTPATIENT
Start: 2024-11-21

## 2024-11-21 RX ORDER — PREDNISOLONE ACETATE 10 MG/ML
1 SUSPENSION/ DROPS OPHTHALMIC 4 TIMES DAILY
Qty: 5 ML | Refills: 2 | Status: SHIPPED | OUTPATIENT
Start: 2024-11-21

## 2024-11-21 NOTE — PROGRESS NOTES
HPI    2week  s/p phaco IOL OD done on 10/30/2024    Pt states OD is doing well. Denies pain/ FOL/ Floaters.     States compliant with Post op gtts. Using Pred, Moxi, Keto OD QID as   directed.     Last edited by Aranza George on 11/21/2024 10:44 AM.            Assessment /Plan     For exam results, see Encounter Report.    Status post cataract surgery, right    Combined form of age-related cataract, left eye      1. Status post cataract surgery, right (Primary)  POW3 doing great  Continue taper per instr sheet    2. Combined form of age-related cataract, left eye  Patient with visually significant cataract impacting abiliity ADLs (reading, driving, PM driving, glare).  Discussed options, R & B, expectations, patient voices good understanding and wishes to proceed with procedure. Patient will likely benefit from sx and signed consent.    Proceed with CEIOL OS  Monofocal dist IOL

## 2024-12-03 ENCOUNTER — ANESTHESIA EVENT (OUTPATIENT)
Dept: SURGERY | Facility: HOSPITAL | Age: 69
End: 2024-12-03
Payer: MEDICARE

## 2024-12-04 ENCOUNTER — HOSPITAL ENCOUNTER (OUTPATIENT)
Facility: HOSPITAL | Age: 69
Discharge: HOME | End: 2024-12-04
Attending: OPHTHALMOLOGY | Admitting: OPHTHALMOLOGY
Payer: MEDICARE

## 2024-12-04 ENCOUNTER — ANESTHESIA (OUTPATIENT)
Dept: SURGERY | Facility: HOSPITAL | Age: 69
End: 2024-12-04
Payer: MEDICARE

## 2024-12-04 DIAGNOSIS — H25.812 COMBINED FORM OF AGE-RELATED CATARACT, LEFT EYE: ICD-10-CM

## 2024-12-04 DIAGNOSIS — Z98.41 STATUS POST CATARACT SURGERY, RIGHT: ICD-10-CM

## 2024-12-04 PROCEDURE — 25000003 PHARM REV CODE 250: Performed by: OPHTHALMOLOGY

## 2024-12-04 PROCEDURE — 36000707: Performed by: OPHTHALMOLOGY

## 2024-12-04 PROCEDURE — V2632 POST CHMBR INTRAOCULAR LENS: HCPCS | Performed by: OPHTHALMOLOGY

## 2024-12-04 PROCEDURE — 37000008 HC ANESTHESIA 1ST 15 MINUTES: Performed by: OPHTHALMOLOGY

## 2024-12-04 PROCEDURE — 63600175 PHARM REV CODE 636 W HCPCS: Performed by: ANESTHESIOLOGY

## 2024-12-04 PROCEDURE — 63600175 PHARM REV CODE 636 W HCPCS: Performed by: NURSE ANESTHETIST, CERTIFIED REGISTERED

## 2024-12-04 PROCEDURE — 37000009 HC ANESTHESIA EA ADD 15 MINS: Performed by: OPHTHALMOLOGY

## 2024-12-04 PROCEDURE — 36000706: Performed by: OPHTHALMOLOGY

## 2024-12-04 PROCEDURE — 71000033 HC RECOVERY, INTIAL HOUR: Performed by: OPHTHALMOLOGY

## 2024-12-04 PROCEDURE — 63600175 PHARM REV CODE 636 W HCPCS: Performed by: OPHTHALMOLOGY

## 2024-12-04 DEVICE — TECNIS SMPLCTY TECNIS 1PC CLR MONO 20.0D
Type: IMPLANTABLE DEVICE | Site: EYE | Status: FUNCTIONAL
Brand: TECNIS SIMPLICITY

## 2024-12-04 RX ORDER — ONDANSETRON HYDROCHLORIDE 2 MG/ML
4 INJECTION, SOLUTION INTRAVENOUS ONCE AS NEEDED
Status: DISCONTINUED | OUTPATIENT
Start: 2024-12-04 | End: 2024-12-04 | Stop reason: HOSPADM

## 2024-12-04 RX ORDER — OXYCODONE HYDROCHLORIDE 5 MG/1
5 TABLET ORAL
Status: DISCONTINUED | OUTPATIENT
Start: 2024-12-04 | End: 2024-12-04 | Stop reason: HOSPADM

## 2024-12-04 RX ORDER — LIDOCAINE HYDROCHLORIDE 10 MG/ML
1 INJECTION, SOLUTION EPIDURAL; INFILTRATION; INTRACAUDAL; PERINEURAL ONCE
Status: COMPLETED | OUTPATIENT
Start: 2024-12-04 | End: 2024-12-04

## 2024-12-04 RX ORDER — EPINEPHRINE 1 MG/ML
INJECTION, SOLUTION, CONCENTRATE INTRAVENOUS
Status: DISCONTINUED | OUTPATIENT
Start: 2024-12-04 | End: 2024-12-04 | Stop reason: HOSPADM

## 2024-12-04 RX ORDER — HYDROMORPHONE HYDROCHLORIDE 1 MG/ML
0.2 INJECTION, SOLUTION INTRAMUSCULAR; INTRAVENOUS; SUBCUTANEOUS EVERY 5 MIN PRN
Status: DISCONTINUED | OUTPATIENT
Start: 2024-12-04 | End: 2024-12-04 | Stop reason: HOSPADM

## 2024-12-04 RX ORDER — FENTANYL CITRATE 50 UG/ML
25 INJECTION, SOLUTION INTRAMUSCULAR; INTRAVENOUS EVERY 5 MIN PRN
Status: DISCONTINUED | OUTPATIENT
Start: 2024-12-04 | End: 2024-12-04 | Stop reason: HOSPADM

## 2024-12-04 RX ORDER — SODIUM CHLORIDE 9 MG/ML
INJECTION, SOLUTION INTRAVENOUS CONTINUOUS
Status: DISCONTINUED | OUTPATIENT
Start: 2024-12-04 | End: 2024-12-04 | Stop reason: HOSPADM

## 2024-12-04 RX ORDER — TETRACAINE HYDROCHLORIDE 5 MG/ML
SOLUTION OPHTHALMIC
Status: DISCONTINUED | OUTPATIENT
Start: 2024-12-04 | End: 2024-12-04 | Stop reason: HOSPADM

## 2024-12-04 RX ORDER — MEPERIDINE HYDROCHLORIDE 50 MG/ML
12.5 INJECTION INTRAMUSCULAR; INTRAVENOUS; SUBCUTANEOUS ONCE AS NEEDED
Status: DISCONTINUED | OUTPATIENT
Start: 2024-12-04 | End: 2024-12-04 | Stop reason: HOSPADM

## 2024-12-04 RX ORDER — CYCLOP/TROP/PROPA/PHEN/KET/WAT 1-1-0.1%
1 DROPS (EA) OPHTHALMIC (EYE)
Status: COMPLETED | OUTPATIENT
Start: 2024-12-04 | End: 2024-12-04

## 2024-12-04 RX ORDER — LIDOCAINE HYDROCHLORIDE 40 MG/ML
INJECTION, SOLUTION RETROBULBAR
Status: DISCONTINUED | OUTPATIENT
Start: 2024-12-04 | End: 2024-12-04 | Stop reason: HOSPADM

## 2024-12-04 RX ORDER — MIDAZOLAM HYDROCHLORIDE 1 MG/ML
INJECTION INTRAMUSCULAR; INTRAVENOUS
Status: DISCONTINUED | OUTPATIENT
Start: 2024-12-04 | End: 2024-12-04

## 2024-12-04 RX ORDER — GLUCAGON 1 MG
1 KIT INJECTION
Status: DISCONTINUED | OUTPATIENT
Start: 2024-12-04 | End: 2024-12-04 | Stop reason: HOSPADM

## 2024-12-04 RX ORDER — ONDANSETRON HYDROCHLORIDE 2 MG/ML
INJECTION, SOLUTION INTRAVENOUS
Status: DISCONTINUED | OUTPATIENT
Start: 2024-12-04 | End: 2024-12-04

## 2024-12-04 RX ORDER — SODIUM CHLORIDE, SODIUM LACTATE, POTASSIUM CHLORIDE, CALCIUM CHLORIDE 600; 310; 30; 20 MG/100ML; MG/100ML; MG/100ML; MG/100ML
INJECTION, SOLUTION INTRAVENOUS CONTINUOUS
Status: DISCONTINUED | OUTPATIENT
Start: 2024-12-04 | End: 2024-12-04 | Stop reason: HOSPADM

## 2024-12-04 RX ORDER — DIPHENHYDRAMINE HYDROCHLORIDE 50 MG/ML
12.5 INJECTION INTRAMUSCULAR; INTRAVENOUS EVERY 6 HOURS PRN
Status: DISCONTINUED | OUTPATIENT
Start: 2024-12-04 | End: 2024-12-04 | Stop reason: HOSPADM

## 2024-12-04 RX ORDER — MOXIFLOXACIN 5 MG/ML
SOLUTION/ DROPS OPHTHALMIC
Status: DISCONTINUED | OUTPATIENT
Start: 2024-12-04 | End: 2024-12-04 | Stop reason: HOSPADM

## 2024-12-04 RX ADMIN — LIDOCAINE HYDROCHLORIDE 5 MG: 10 INJECTION, SOLUTION EPIDURAL; INFILTRATION; INTRACAUDAL; PERINEURAL at 09:12

## 2024-12-04 RX ADMIN — Medication 1 DROP: at 08:12

## 2024-12-04 RX ADMIN — Medication 1 DROP: at 09:12

## 2024-12-04 RX ADMIN — MIDAZOLAM HYDROCHLORIDE 2 MG: 1 INJECTION, SOLUTION INTRAMUSCULAR; INTRAVENOUS at 09:12

## 2024-12-04 RX ADMIN — ONDANSETRON 4 MG: 2 INJECTION, SOLUTION INTRAMUSCULAR; INTRAVENOUS at 09:12

## 2024-12-04 NOTE — ANESTHESIA PREPROCEDURE EVALUATION
12/04/2024  Edith Tran is a 69 y.o., female.      Pre-op Assessment    I have reviewed the Patient Summary Reports.     I have reviewed the Nursing Notes. I have reviewed the NPO Status.   I have reviewed the Medications.     Review of Systems  Anesthesia Hx:  No problems with previous Anesthesia             Denies Family Hx of Anesthesia complications.    Denies Personal Hx of Anesthesia complications.                    Social:  Former Smoker       EENT/Dental:   Cataract           Cardiovascular:     Hypertension            CASH  ECG has been reviewed.                      Hypertension         Pulmonary:  Pneumonia COPD   Shortness of breath  Sleep Apnea, CPAP    Chronic Obstructive Pulmonary Disease (COPD):           Obstructive Sleep Apnea (ELAINE).       Pulmonary Infection:  Pneumonia.     Renal/:  Chronic Renal Disease        Kidney Function/Disease             Hepatic/GI:   PUD, Hiatal Hernia, GERD          Hernia, Hiatal Hernia      Neurological:    Neuromuscular Disease,                                 Neuromuscular Disease   Endocrine:        Obesity / BMI > 30      Physical Exam  General: Well nourished    Airway:  Mallampati: II   Mouth Opening: Normal  TM Distance: Normal  Neck ROM: Normal ROM    Dental:  Intact    Chest/Lungs:  Normal Respiratory Rate    Heart:  Rate: Normal        Anesthesia Plan  Type of Anesthesia, risks & benefits discussed:    Anesthesia Type: MAC  Intra-op Monitoring Plan: Standard ASA Monitors  Post Op Pain Control Plan: multimodal analgesia  Informed Consent: Informed consent signed with the Patient and all parties understand the risks and agree with anesthesia plan.  All questions answered.   ASA Score: 3    Ready For Surgery From Anesthesia Perspective.     .

## 2024-12-04 NOTE — PLAN OF CARE
Discharge instructions given to pt/sister, verbalized understanding.  Tolerating PO fluids.  IV removed.  No c/o pain.  Sunglasses on.  Ambulating out to sister  per RN in no distress.

## 2024-12-04 NOTE — OP NOTE
Operative Date:  12/04/2024    Discharge Date:  12/04/2024    Report Title: Operative Note    SURGEON: Stephen Desir MD    ASSISTANT: None    PREOPERATIVE DIAGNOSIS: Combined form of senile cataract,  Left Eye    POSTOPERATIVE DIAGNOSIS: same    PROCEDURE PERFORMED: Phacoemulsification of the cataract with posterior chamber intraocular lens Left Eye    IMPLANTS: DCBOO 20.0    ANESTHESIA:  Topical with MAC    COMPLICATIONS: None    ESTIMATED BLOOD LOSS: Minimal    PROCEDURE: The patient was brought to the operating room, time out was performed and implant checked.  The patient was given light sedation, and topical anesthesia was instilled in the left eye.  The left eye was prepped and draped in the usual fashion for eye surgery and lid speculum used to retract the eyelid. The eyelashes were secluded within the drape.  A paracentesis was made inferiorly with a sideport blade. Epishugarcaine was injected in the anterior chamber and dispersive viscoelastic was injected into the anterior chamber. A temporal corneal incision was made with a steel keratome. A cystitome was used to initiate a continuous curvilinear capsulorrhexis and completed using the capsulorrhexis forceps. Hydrodissection of the lens nucleus was performed using balanced salt solution (BSS) on hydrodissection cannula. The lens nucleus was removed using phacoemulsification in the modified stop and chop technique. The lens cortex was removed using the irrigation/aspiration handpiece. The capsular bag was filled with viscoelastic, and the intraocular lens was injected into the capsular bag under direct visualization. Viscoelastic was removed using the irrigation/aspiration handpiece. The wounds were hydrated until watertight.    The wounds were rechecked and no leakage was noted.  The speculum was removed. Topical antibiotic was applied to the eye and shield was placed over the eye. The patient tolerated the procedure well and left the operating room in  good condition.

## 2024-12-04 NOTE — TRANSFER OF CARE
"Anesthesia Transfer of Care Note    Patient: Edith Tran    Procedure(s) Performed: Procedure(s) (LRB):  CEIOL OS (Left)    Patient location: PACU    Anesthesia Type: MAC    Transport from OR: Transported from OR on room air with adequate spontaneous ventilation    Post pain: adequate analgesia    Post assessment: no apparent anesthetic complications and tolerated procedure well    Post vital signs: stable    Level of consciousness: awake, alert and oriented    Nausea/Vomiting: no nausea/vomiting    Complications: none    Transfer of care protocol was followed      Last vitals: Visit Vitals  /65 (BP Location: Left arm, Patient Position: Lying)   Pulse 72   Temp 36.6 °C (97.9 °F) (Skin)   Resp 19   Ht 5' 6" (1.676 m)   Wt 91.1 kg (200 lb 13.4 oz)   SpO2 99%   Breastfeeding No   BMI 32.42 kg/m²     "

## 2024-12-04 NOTE — DISCHARGE SUMMARY
[facility] - Periop Services  Discharge Note  Short Stay    Procedure(s) (LRB):  CEIOL OS (Left)      OUTCOME: Patient tolerated treatment/procedure well without complication and is now ready for discharge.    DISPOSITION: Home or Self Care    FINAL DIAGNOSIS:  cataract    FOLLOWUP: In clinic    DISCHARGE INSTRUCTIONS:  No discharge procedures on file.     TIME SPENT ON DISCHARGE: 5 minutes

## 2024-12-04 NOTE — H&P
History    Chief complaint:  Painless progressive vision loss left Eye    Present Ilness/Diagnosis: Visually significant cataract, left Eye    ROS: +Eyes, otherwise no significant changes    Past Medical History: refer to chart    Family History/Social History: refer to chart    Allergies:   Review of patient's allergies indicates:   Allergen Reactions    Hydroxychloroquine Other (See Comments)     Having eye reaction, needs to stop plaquenil and stay off of it.        Current Medications: see medcard      Physical Exam    BP: Vital signs stable  General: No apparent distress  HEENT: cataract  Lungs: adequate respirations  Heart: + pulses  Abdomen: soft  Rectal/pelvic: deferred    Labs: Labs Reviewed    Lab Results   Component Value Date    WBC 4.15 10/29/2024    HGB 12.3 10/29/2024    HCT 38.3 10/29/2024    MCV 97 10/29/2024     (L) 10/29/2024           CMP  Sodium   Date Value Ref Range Status   10/29/2024 140 136 - 145 mmol/L Final     Potassium   Date Value Ref Range Status   10/29/2024 4.0 3.5 - 5.1 mmol/L Final     Chloride   Date Value Ref Range Status   10/29/2024 109 95 - 110 mmol/L Final     CO2   Date Value Ref Range Status   10/29/2024 28 23 - 29 mmol/L Final     Glucose   Date Value Ref Range Status   10/29/2024 90 70 - 110 mg/dL Final     BUN   Date Value Ref Range Status   10/29/2024 13 8 - 23 mg/dL Final     Creatinine   Date Value Ref Range Status   10/29/2024 0.8 0.5 - 1.4 mg/dL Final     Calcium   Date Value Ref Range Status   10/29/2024 9.3 8.7 - 10.5 mg/dL Final     Total Protein   Date Value Ref Range Status   10/29/2024 6.2 6.0 - 8.4 g/dL Final     Albumin   Date Value Ref Range Status   10/29/2024 4.0 3.5 - 5.2 g/dL Final     Total Bilirubin   Date Value Ref Range Status   10/29/2024 0.4 0.1 - 1.0 mg/dL Final     Comment:     For infants and newborns, interpretation of results should be based  on gestational age, weight and in agreement with clinical  observations.    Premature Infant  recommended reference ranges:  Up to 24 hours.............<8.0 mg/dL  Up to 48 hours............<12.0 mg/dL  3-5 days..................<15.0 mg/dL  6-29 days.................<15.0 mg/dL       Alkaline Phosphatase   Date Value Ref Range Status   10/29/2024 41 (L) 55 - 135 U/L Final     AST   Date Value Ref Range Status   10/29/2024 13 10 - 40 U/L Final     ALT   Date Value Ref Range Status   10/29/2024 8 (L) 10 - 44 U/L Final     Anion Gap   Date Value Ref Range Status   10/29/2024 3 (L) 8 - 16 mmol/L Final     eGFR   Date Value Ref Range Status   10/29/2024 >60.0 >60 mL/min/1.73 m^2 Final       The patient has been cleared for surgery in an ambulatory surgery facility.     Impression: Visually significant Cataract left Eye    Plan: Phacoemulsification with implantation of Intraocular lens left Eye

## 2024-12-05 ENCOUNTER — OFFICE VISIT (OUTPATIENT)
Dept: OPHTHALMOLOGY | Facility: CLINIC | Age: 69
End: 2024-12-05
Payer: MEDICARE

## 2024-12-05 VITALS
SYSTOLIC BLOOD PRESSURE: 117 MMHG | HEIGHT: 66 IN | DIASTOLIC BLOOD PRESSURE: 60 MMHG | HEART RATE: 68 BPM | WEIGHT: 200.81 LBS | TEMPERATURE: 98 F | BODY MASS INDEX: 32.27 KG/M2 | RESPIRATION RATE: 17 BRPM | OXYGEN SATURATION: 98 %

## 2024-12-05 DIAGNOSIS — Z98.42 STATUS POST CATARACT SURGERY, LEFT: Primary | ICD-10-CM

## 2024-12-05 PROCEDURE — 1159F MED LIST DOCD IN RCRD: CPT | Mod: CPTII,S$GLB,, | Performed by: OPHTHALMOLOGY

## 2024-12-05 PROCEDURE — 99024 POSTOP FOLLOW-UP VISIT: CPT | Mod: S$GLB,,, | Performed by: OPHTHALMOLOGY

## 2024-12-05 PROCEDURE — 4010F ACE/ARB THERAPY RXD/TAKEN: CPT | Mod: CPTII,S$GLB,, | Performed by: OPHTHALMOLOGY

## 2024-12-05 PROCEDURE — 1101F PT FALLS ASSESS-DOCD LE1/YR: CPT | Mod: CPTII,S$GLB,, | Performed by: OPHTHALMOLOGY

## 2024-12-05 PROCEDURE — 1126F AMNT PAIN NOTED NONE PRSNT: CPT | Mod: CPTII,S$GLB,, | Performed by: OPHTHALMOLOGY

## 2024-12-05 PROCEDURE — 1160F RVW MEDS BY RX/DR IN RCRD: CPT | Mod: CPTII,S$GLB,, | Performed by: OPHTHALMOLOGY

## 2024-12-05 PROCEDURE — 3288F FALL RISK ASSESSMENT DOCD: CPT | Mod: CPTII,S$GLB,, | Performed by: OPHTHALMOLOGY

## 2024-12-05 PROCEDURE — 99999 PR PBB SHADOW E&M-EST. PATIENT-LVL IV: CPT | Mod: PBBFAC,,, | Performed by: OPHTHALMOLOGY

## 2024-12-05 NOTE — ANESTHESIA POSTPROCEDURE EVALUATION
Anesthesia Post Evaluation    Patient: Edith Tran    Procedure(s) Performed: Procedure(s) (LRB):  CEIOL OS (Left)    Final Anesthesia Type: general      Patient location during evaluation: PACU  Patient participation: Yes- Able to Participate  Level of consciousness: awake and alert  Post-procedure vital signs: reviewed and stable  Pain management: adequate  Airway patency: patent    PONV status at discharge: No PONV  Anesthetic complications: no      Cardiovascular status: blood pressure returned to baseline  Respiratory status: unassisted  Hydration status: euvolemic  Follow-up not needed.              Vitals Value Taken Time   /60 12/04/24 1045   Temp   12/05/24 1046   Pulse 68 12/04/24 1045   Resp 17 12/04/24 1045   SpO2 98 % 12/04/24 1045         Event Time   Out of Recovery 10:50:00         Pain/Kranthi Score: Kranthi Score: 10 (12/4/2024 10:50 AM)

## 2024-12-05 NOTE — PROGRESS NOTES
HPI    1 day  s/p phaco IOL OS done on 12/4/2024    Pt states OS is doing well. Denies pain/ FOL/ Floaters.     States compliant with Post op gtts. Using Pred, Moxi, Keto OS QID as   directed.     Last edited by Aranza George on 12/5/2024  8:04 AM.            Assessment /Plan     For exam results, see Encounter Report.    Status post cataract surgery, left      Doing well  Post op precautions and instructions reviewed, sheet given  moxi QID  PF QID  Keto qdaily    F/u 1 month, refract PRN

## 2024-12-09 ENCOUNTER — PATIENT MESSAGE (OUTPATIENT)
Dept: GASTROENTEROLOGY | Facility: CLINIC | Age: 69
End: 2024-12-09
Payer: MEDICARE

## 2024-12-10 ENCOUNTER — HOSPITAL ENCOUNTER (OUTPATIENT)
Facility: HOSPITAL | Age: 69
Discharge: HOME | End: 2024-12-10
Attending: INTERNAL MEDICINE | Admitting: INTERNAL MEDICINE
Payer: MEDICARE

## 2024-12-10 ENCOUNTER — ANESTHESIA (OUTPATIENT)
Dept: ENDOSCOPY | Facility: HOSPITAL | Age: 69
End: 2024-12-10
Payer: MEDICARE

## 2024-12-10 ENCOUNTER — ANESTHESIA EVENT (OUTPATIENT)
Dept: ENDOSCOPY | Facility: HOSPITAL | Age: 69
End: 2024-12-10
Payer: MEDICARE

## 2024-12-10 DIAGNOSIS — R13.10 DYSPHAGIA: ICD-10-CM

## 2024-12-10 PROCEDURE — 43239 EGD BIOPSY SINGLE/MULTIPLE: CPT | Mod: 59,,, | Performed by: INTERNAL MEDICINE

## 2024-12-10 PROCEDURE — 63600175 PHARM REV CODE 636 W HCPCS: Performed by: NURSE ANESTHETIST, CERTIFIED REGISTERED

## 2024-12-10 PROCEDURE — 45380 COLONOSCOPY AND BIOPSY: CPT | Mod: ,,, | Performed by: INTERNAL MEDICINE

## 2024-12-10 PROCEDURE — C1726 CATH, BAL DIL, NON-VASCULAR: HCPCS | Performed by: INTERNAL MEDICINE

## 2024-12-10 PROCEDURE — 88312 SPECIAL STAINS GROUP 1: CPT | Mod: TC,59 | Performed by: PATHOLOGY

## 2024-12-10 PROCEDURE — 45380 COLONOSCOPY AND BIOPSY: CPT | Performed by: INTERNAL MEDICINE

## 2024-12-10 PROCEDURE — 43249 ESOPH EGD DILATION <30 MM: CPT | Mod: 51,,, | Performed by: INTERNAL MEDICINE

## 2024-12-10 PROCEDURE — 27201012 HC FORCEPS, HOT/COLD, DISP: Performed by: INTERNAL MEDICINE

## 2024-12-10 PROCEDURE — 37000008 HC ANESTHESIA 1ST 15 MINUTES: Performed by: INTERNAL MEDICINE

## 2024-12-10 PROCEDURE — 43239 EGD BIOPSY SINGLE/MULTIPLE: CPT | Mod: 59 | Performed by: INTERNAL MEDICINE

## 2024-12-10 PROCEDURE — 37000009 HC ANESTHESIA EA ADD 15 MINS: Performed by: INTERNAL MEDICINE

## 2024-12-10 PROCEDURE — 25000003 PHARM REV CODE 250: Performed by: INTERNAL MEDICINE

## 2024-12-10 PROCEDURE — 43249 ESOPH EGD DILATION <30 MM: CPT | Performed by: INTERNAL MEDICINE

## 2024-12-10 RX ORDER — FAMOTIDINE 40 MG/1
40 TABLET, FILM COATED ORAL 2 TIMES DAILY
Qty: 60 TABLET | Refills: 11 | Status: SHIPPED | OUTPATIENT
Start: 2024-12-10 | End: 2025-12-10

## 2024-12-10 RX ORDER — SODIUM CHLORIDE 9 MG/ML
INJECTION, SOLUTION INTRAVENOUS CONTINUOUS
Status: DISCONTINUED | OUTPATIENT
Start: 2024-12-10 | End: 2024-12-13 | Stop reason: HOSPADM

## 2024-12-10 RX ORDER — LIDOCAINE HYDROCHLORIDE 20 MG/ML
INJECTION INTRAVENOUS
Status: DISCONTINUED | OUTPATIENT
Start: 2024-12-10 | End: 2024-12-10

## 2024-12-10 RX ORDER — PROPOFOL 10 MG/ML
VIAL (ML) INTRAVENOUS
Status: DISCONTINUED | OUTPATIENT
Start: 2024-12-10 | End: 2024-12-10

## 2024-12-10 RX ORDER — OMEPRAZOLE 40 MG/1
40 CAPSULE, DELAYED RELEASE ORAL
Qty: 180 CAPSULE | Refills: 1 | Status: SHIPPED | OUTPATIENT
Start: 2024-12-10 | End: 2025-06-08

## 2024-12-10 RX ADMIN — PROPOFOL 100 MG: 10 INJECTION, EMULSION INTRAVENOUS at 12:12

## 2024-12-10 RX ADMIN — PROPOFOL 50 MG: 10 INJECTION, EMULSION INTRAVENOUS at 12:12

## 2024-12-10 RX ADMIN — LIDOCAINE HYDROCHLORIDE 75 MG: 20 INJECTION, SOLUTION INTRAVENOUS at 12:12

## 2024-12-10 RX ADMIN — SODIUM CHLORIDE: 9 INJECTION, SOLUTION INTRAVENOUS at 11:12

## 2024-12-10 NOTE — H&P
[facility]    CC: Dysphagia, history of Crohn's    HPI 69 y.o. female presents for EGD and colonsocopy    Past Medical History:   Diagnosis Date    Allergy     Arthritis     Back pain     Colon polyps     COPD (chronic obstructive pulmonary disease)     Emphysema of lung     GERD (gastroesophageal reflux disease)     Hypertension     Kidney stones     Obesity     Pneumonia     Pneumonia due to other staphylococcus     Pulmonary fibrosis     Pulmonary hypertension     Scleroderma involving lung since she was 47 y/o    Sleep apnea     Trouble in sleeping        Allergies and Medications reviewed     Review of Systems  General ROS: negative for - chills, fever or weight loss  Cardiovascular ROS: no chest pain or dyspnea on exertion  Gastrointestinal ROS: + dysphagia    Physical Examination  There were no vitals taken for this visit.  General appearance: alert, cooperative, no distress  HENT: Normocephalic, atraumatic, neck symmetrical, no nasal discharge, sclera anicteric   Lungs: clear to auscultation bilaterally, symmetric chest wall expansion bilaterally  Heart: regular rate and rhythm without rub; no displacement of the PMI   Abdomen: soft  Extremities: extremities symmetric; no clubbing, cyanosis, or edema        Labs:  Lab Results   Component Value Date    WBC 4.15 10/29/2024    HGB 12.3 10/29/2024    HCT 38.3 10/29/2024    MCV 97 10/29/2024     (L) 10/29/2024           Assessment:   69 y.o. female presents for EGD and colonoscopy    Plan:  Proceed to colonoscopy     Ngozi Prince MD  [facility]  [address], [address]  [place] [address]  Office: [phone]  Fax: [phone]

## 2024-12-10 NOTE — PROVATION PATIENT INSTRUCTIONS
Discharge Summary/Instructions after an Endoscopic Procedure  Patient Name: Edith Tran  Patient MRN: [id number]  Patient YOB: 1955  Tuesday, December 10, 2024  Ngozi Prince MD  Dear patient,  As a result of recent federal legislation (The Federal Cures Act), you may   receive lab or pathology results from your procedure in your [facility]   account before your physician is able to contact you. Your physician or   their representative will relay the results to you with their   recommendations at their soonest availability.  Thank you,  RESTRICTIONS:  During your procedure today, you received medications for sedation.  These   medications may affect your judgment, balance and coordination.  Therefore,   for 24 hours, you have the following restrictions:   - DO NOT drive a car, operate machinery, make legal/financial decisions,   sign important papers or drink alcohol.    ACTIVITY:  Today: no heavy lifting, straining or running due to procedural   sedation/anesthesia.  The following day: return to full activity including work.  DIET:  Eat and drink normally unless instructed otherwise.     TREATMENT FOR COMMON SIDE EFFECTS:  - Mild abdominal pain, nausea, belching, bloating or excessive gas:  rest,   eat lightly and use a heating pad.  - Sore Throat: treat with throat lozenges and/or gargle with warm salt   water.  - Because air was used during the procedure, expelling large amounts of air   from your rectum or belching is normal.  - If a bowel prep was taken, you may not have a bowel movement for 1-3 days.    This is normal.  SYMPTOMS TO WATCH FOR AND REPORT TO YOUR PHYSICIAN:  1. Abdominal pain or bloating, other than gas cramps.  2. Chest pain.  3. Back pain.  4. Signs of infection such as: chills or fever occurring within 24 hours   after the procedure.  5. Rectal bleeding, which would show as bright red, maroon, or black stools.   (A tablespoon of blood from the rectum is not serious,  especially if   hemorrhoids are present.)  6. Vomiting.  7. Weakness or dizziness.  GO DIRECTLY TO THE NEAREST EMERGENCY ROOM IF YOU HAVE ANY OF THE FOLLOWING:      Difficulty breathing              Chills and/or fever over 101 F   Persistent vomiting and/or vomiting blood   Severe abdominal pain   Severe chest pain   Black, tarry stools   Bleeding- more than one tablespoon   Any other symptom or condition that you feel may need urgent attention  Your doctor recommends these additional instructions:  If any biopsies were taken, your doctors clinic will contact you in 1 to 2   weeks with any results.  - Discharge patient to home (with escort).   - Patient has a contact number available for emergencies.  The signs and   symptoms of potential delayed complications were discussed with the   patient.  Return to normal activities tomorrow.  Written discharge   instructions were provided to the patient.   - Resume previous diet.   - Continue present medications.   - Await pathology results.   - Repeat upper endoscopy in 10 weeks to evaluate the response to therapy.   -PPI BID until repeat EGD with Pepcid BID  -Consider repeat esophagram if symptoms not improved after dilation (patient   with known dysmotility/scleroderma esophagus)  - Return to my office PRN.  For questions, problems or results please call your physician - Ngozi Prince MD at Work:  [phone].  [facility], EMERGENCY ROOM PHONE NUMBER: [phone]  IF A COMPLICATION OR EMERGENCY SITUATION ARISES AND YOU ARE UNABLE TO REACH   YOUR PHYSICIAN - GO DIRECTLY TO THE EMERGENCY ROOM.  Ngozi Prince MD  12/10/2024 12:17:12 PM  This report has been verified and signed electronically.  Dear patient,  As a result of recent federal legislation (The Federal Cures Act), you may   receive lab or pathology results from your procedure in your [facility]   account before your physician is able to contact you. Your physician or    their representative will relay the results to you with their   recommendations at their soonest availability.  Thank you,  PROVATION

## 2024-12-10 NOTE — TRANSFER OF CARE
"Anesthesia Transfer of Care Note    Patient: Edith Tran    Procedure(s) Performed: Procedure(s) (LRB):  EGD (ESOPHAGOGASTRODUODENOSCOPY) (N/A)  COLONOSCOPY, SCREENING, LOW RISK PATIENT (N/A)    Patient location: GI    Anesthesia Type: general    Transport from OR: Transported from OR on room air with adequate spontaneous ventilation    Post pain: adequate analgesia    Post assessment: no apparent anesthetic complications    Post vital signs: stable    Level of consciousness: sedated    Nausea/Vomiting: no nausea/vomiting    Complications: none    Transfer of care protocol was followed      Last vitals: Visit Vitals  /76   Pulse 81   Temp 36.6 °C (97.9 °F) (Skin)   Resp 14   Ht 5' 6" (1.676 m)   Wt 91.1 kg (200 lb 13.4 oz)   SpO2 97%   Breastfeeding No   BMI 32.42 kg/m²     "

## 2024-12-10 NOTE — ANESTHESIA PREPROCEDURE EVALUATION
12/10/2024  Edith Tran is a 69 y.o., female.      Pre-op Assessment          Review of Systems  Cardiovascular:     Hypertension            CASH           Shortness of Breath Dyspnea On Extertion                   Hypertension         Pulmonary:  Pneumonia COPD   Shortness of breath  Sleep Apnea    Chronic Obstructive Pulmonary Disease (COPD):           Obstructive Sleep Apnea (ELAINE).       Pulmonary Infection:  Pneumonia.     Renal/:  Chronic Renal Disease        Kidney Function/Disease             Hepatic/GI:   PUD, Hiatal Hernia, GERD         Gerd, Peptic Ulcer Disease    Hernia, Hiatal Hernia      Neurological:    Neuromuscular Disease,                                 Neuromuscular Disease       Physical Exam  General: Well nourished        Anesthesia Plan  Type of Anesthesia, risks & benefits discussed:    Anesthesia Type: Gen Natural Airway  Intra-op Monitoring Plan: Standard ASA Monitors  Induction:  IV  Informed Consent: Informed consent signed with the Patient and all parties understand the risks and agree with anesthesia plan.  All questions answered.   ASA Score: 4  Day of Surgery Review of History & Physical: H&P Update referred to the surgeon/provider.    Ready For Surgery From Anesthesia Perspective.     .

## 2024-12-10 NOTE — PROVATION PATIENT INSTRUCTIONS
Discharge Summary/Instructions after an Endoscopic Procedure  Patient Name: Edith Tran  Patient MRN: [id number]  Patient YOB: 1955  Tuesday, December 10, 2024  Ngozi Prince MD  Dear patient,  As a result of recent federal legislation (The Federal Cures Act), you may   receive lab or pathology results from your procedure in your [facility]   account before your physician is able to contact you. Your physician or   their representative will relay the results to you with their   recommendations at their soonest availability.  Thank you,  RESTRICTIONS:  During your procedure today, you received medications for sedation.  These   medications may affect your judgment, balance and coordination.  Therefore,   for 24 hours, you have the following restrictions:   - DO NOT drive a car, operate machinery, make legal/financial decisions,   sign important papers or drink alcohol.    ACTIVITY:  Today: no heavy lifting, straining or running due to procedural   sedation/anesthesia.  The following day: return to full activity including work.  DIET:  Eat and drink normally unless instructed otherwise.     TREATMENT FOR COMMON SIDE EFFECTS:  - Mild abdominal pain, nausea, belching, bloating or excessive gas:  rest,   eat lightly and use a heating pad.  - Sore Throat: treat with throat lozenges and/or gargle with warm salt   water.  - Because air was used during the procedure, expelling large amounts of air   from your rectum or belching is normal.  - If a bowel prep was taken, you may not have a bowel movement for 1-3 days.    This is normal.  SYMPTOMS TO WATCH FOR AND REPORT TO YOUR PHYSICIAN:  1. Abdominal pain or bloating, other than gas cramps.  2. Chest pain.  3. Back pain.  4. Signs of infection such as: chills or fever occurring within 24 hours   after the procedure.  5. Rectal bleeding, which would show as bright red, maroon, or black stools.   (A tablespoon of blood from the rectum is not serious,  especially if   hemorrhoids are present.)  6. Vomiting.  7. Weakness or dizziness.  GO DIRECTLY TO THE NEAREST EMERGENCY ROOM IF YOU HAVE ANY OF THE FOLLOWING:      Difficulty breathing              Chills and/or fever over 101 F   Persistent vomiting and/or vomiting blood   Severe abdominal pain   Severe chest pain   Black, tarry stools   Bleeding- more than one tablespoon   Any other symptom or condition that you feel may need urgent attention  Your doctor recommends these additional instructions:  If any biopsies were taken, your doctors clinic will contact you in 1 to 2   weeks with any results.  - Discharge patient to home (with escort).   - Patient has a contact number available for emergencies.  The signs and   symptoms of potential delayed complications were discussed with the   patient.  Return to normal activities tomorrow.  Written discharge   instructions were provided to the patient.   - Resume previous diet.   - Continue present medications.   - Await pathology results.   - Repeat colonoscopy date to be determined after pending pathology results   are reviewed for surveillance based on pathology results.   - Return to my office PRN.  For questions, problems or results please call your physician - Ngozi Prince MD at Work:  [phone].  [facility], EMERGENCY ROOM PHONE NUMBER: [phone]  IF A COMPLICATION OR EMERGENCY SITUATION ARISES AND YOU ARE UNABLE TO REACH   YOUR PHYSICIAN - GO DIRECTLY TO THE EMERGENCY ROOM.  Ngozi Prince MD  12/10/2024 12:33:57 PM  This report has been verified and signed electronically.  Dear patient,  As a result of recent federal legislation (The Federal Cures Act), you may   receive lab or pathology results from your procedure in your [facility]   account before your physician is able to contact you. Your physician or   their representative will relay the results to you with their   recommendations at their soonest  availability.  Thank you,  PROVATION

## 2024-12-11 VITALS
BODY MASS INDEX: 32.27 KG/M2 | HEIGHT: 66 IN | RESPIRATION RATE: 17 BRPM | WEIGHT: 200.81 LBS | HEART RATE: 65 BPM | OXYGEN SATURATION: 100 % | TEMPERATURE: 97 F | SYSTOLIC BLOOD PRESSURE: 126 MMHG | DIASTOLIC BLOOD PRESSURE: 76 MMHG

## 2024-12-12 ENCOUNTER — PATIENT MESSAGE (OUTPATIENT)
Dept: GASTROENTEROLOGY | Facility: CLINIC | Age: 69
End: 2024-12-12
Payer: MEDICARE

## 2025-01-06 DIAGNOSIS — K20.90 ESOPHAGITIS: Primary | ICD-10-CM

## 2025-01-09 ENCOUNTER — OFFICE VISIT (OUTPATIENT)
Dept: OPHTHALMOLOGY | Facility: CLINIC | Age: 70
End: 2025-01-09
Payer: MEDICARE

## 2025-01-09 DIAGNOSIS — Z98.42 STATUS POST CATARACT SURGERY, LEFT: Primary | ICD-10-CM

## 2025-01-09 PROCEDURE — 99999 PR PBB SHADOW E&M-EST. PATIENT-LVL III: CPT | Mod: PBBFAC,,, | Performed by: OPHTHALMOLOGY

## 2025-01-09 PROCEDURE — 1160F RVW MEDS BY RX/DR IN RCRD: CPT | Mod: CPTII,S$GLB,, | Performed by: OPHTHALMOLOGY

## 2025-01-09 PROCEDURE — 1126F AMNT PAIN NOTED NONE PRSNT: CPT | Mod: CPTII,S$GLB,, | Performed by: OPHTHALMOLOGY

## 2025-01-09 PROCEDURE — 1101F PT FALLS ASSESS-DOCD LE1/YR: CPT | Mod: CPTII,S$GLB,, | Performed by: OPHTHALMOLOGY

## 2025-01-09 PROCEDURE — 99024 POSTOP FOLLOW-UP VISIT: CPT | Mod: S$GLB,,, | Performed by: OPHTHALMOLOGY

## 2025-01-09 PROCEDURE — 3288F FALL RISK ASSESSMENT DOCD: CPT | Mod: CPTII,S$GLB,, | Performed by: OPHTHALMOLOGY

## 2025-01-09 PROCEDURE — 1159F MED LIST DOCD IN RCRD: CPT | Mod: CPTII,S$GLB,, | Performed by: OPHTHALMOLOGY

## 2025-01-09 NOTE — PROGRESS NOTES
HPI     Post-op Evaluation     Additional comments: 1 month post Phaco IOL OS. Denies eye pain today   doing well. Finished all eye gtts as directed. Using OTC states working   well.   POH:   CE IOL OD 10/30  CE IOL OS 12/4          Last edited by Danielle Padilla on 1/9/2025 12:58 PM.            Assessment /Plan     For exam results, see Encounter Report.    Status post cataract surgery, left      Pom1 OS, POM2 OD  Doing great  Declines Mrx  OTC readers    F/u 1 year, routine exam, optometry

## 2025-01-26 DIAGNOSIS — K30 DELAYED GASTRIC EMPTYING: ICD-10-CM

## 2025-01-26 RX ORDER — ONDANSETRON HYDROCHLORIDE 8 MG/1
TABLET, FILM COATED ORAL
Qty: 30 TABLET | Refills: 3 | Status: SHIPPED | OUTPATIENT
Start: 2025-01-26

## 2025-02-06 ENCOUNTER — TELEPHONE (OUTPATIENT)
Dept: RADIOLOGY | Facility: HOSPITAL | Age: 70
End: 2025-02-06

## 2025-02-07 ENCOUNTER — HOSPITAL ENCOUNTER (OUTPATIENT)
Dept: RADIOLOGY | Facility: HOSPITAL | Age: 70
Discharge: HOME | End: 2025-02-07
Attending: FAMILY MEDICINE
Payer: MEDICARE

## 2025-02-07 DIAGNOSIS — R91.1 SOLITARY PULMONARY NODULE: ICD-10-CM

## 2025-02-07 DIAGNOSIS — R91.1 PULMONARY NODULE: ICD-10-CM

## 2025-02-07 PROCEDURE — 71250 CT THORAX DX C-: CPT | Mod: 26,,, | Performed by: RADIOLOGY

## 2025-02-07 PROCEDURE — 71250 CT THORAX DX C-: CPT | Mod: TC

## 2025-02-18 ENCOUNTER — RESULTS FOLLOW-UP (OUTPATIENT)
Dept: RHEUMATOLOGY | Facility: CLINIC | Age: 70
End: 2025-02-18

## 2025-02-18 ENCOUNTER — LAB VISIT (OUTPATIENT)
Dept: LAB | Facility: HOSPITAL | Age: 70
End: 2025-02-18
Attending: STUDENT IN AN ORGANIZED HEALTH CARE EDUCATION/TRAINING PROGRAM
Payer: MEDICARE

## 2025-02-18 ENCOUNTER — RESULTS FOLLOW-UP (OUTPATIENT)
Dept: FAMILY MEDICINE | Facility: CLINIC | Age: 70
End: 2025-02-18
Payer: MEDICARE

## 2025-02-18 DIAGNOSIS — J84.9 INTERSTITIAL LUNG DISEASE: ICD-10-CM

## 2025-02-18 DIAGNOSIS — Z79.899 DRUG-INDUCED IMMUNODEFICIENCY: ICD-10-CM

## 2025-02-18 DIAGNOSIS — I73.00 RAYNAUD'S DISEASE WITHOUT GANGRENE: ICD-10-CM

## 2025-02-18 DIAGNOSIS — K21.9 GASTROESOPHAGEAL REFLUX DISEASE, UNSPECIFIED WHETHER ESOPHAGITIS PRESENT: ICD-10-CM

## 2025-02-18 DIAGNOSIS — M34.9 LIMITED SYSTEMIC SCLEROSIS: ICD-10-CM

## 2025-02-18 DIAGNOSIS — I27.20 PULMONARY HYPERTENSION: ICD-10-CM

## 2025-02-18 DIAGNOSIS — D84.821 DRUG-INDUCED IMMUNODEFICIENCY: ICD-10-CM

## 2025-02-18 DIAGNOSIS — Z79.899 HIGH RISK MEDICATION USE: ICD-10-CM

## 2025-02-18 LAB
ALBUMIN SERPL BCP-MCNC: 4.2 G/DL (ref 3.5–5.2)
ALP SERPL-CCNC: 51 U/L (ref 55–135)
ALT SERPL W/O P-5'-P-CCNC: 6 U/L (ref 10–44)
ANION GAP SERPL CALC-SCNC: 7 MMOL/L (ref 8–16)
AST SERPL-CCNC: 11 U/L (ref 10–40)
BASOPHILS # BLD AUTO: 0.04 K/UL (ref 0–0.2)
BASOPHILS NFR BLD: 0.7 % (ref 0–1.9)
BILIRUB SERPL-MCNC: 0.6 MG/DL (ref 0.1–1)
BILIRUB UR QL STRIP: NEGATIVE
BUN SERPL-MCNC: 14 MG/DL (ref 8–23)
CALCIUM SERPL-MCNC: 9.8 MG/DL (ref 8.7–10.5)
CHLORIDE SERPL-SCNC: 106 MMOL/L (ref 95–110)
CLARITY UR: CLEAR
CO2 SERPL-SCNC: 27 MMOL/L (ref 23–29)
COLOR UR: YELLOW
CREAT SERPL-MCNC: 0.9 MG/DL (ref 0.5–1.4)
CREAT UR-MCNC: 54.8 MG/DL (ref 15–325)
CRP SERPL-MCNC: 0.2 MG/DL
DIFFERENTIAL METHOD BLD: ABNORMAL
EOSINOPHIL # BLD AUTO: 0.2 K/UL (ref 0–0.5)
EOSINOPHIL NFR BLD: 3.4 % (ref 0–8)
ERYTHROCYTE [DISTWIDTH] IN BLOOD BY AUTOMATED COUNT: 12.8 % (ref 11.5–14.5)
ERYTHROCYTE [SEDIMENTATION RATE] IN BLOOD BY WESTERGREN METHOD: 2 MM/HR (ref 0–20)
EST. GFR  (NO RACE VARIABLE): >60 ML/MIN/1.73 M^2
GLUCOSE SERPL-MCNC: 86 MG/DL (ref 70–110)
GLUCOSE UR QL STRIP: NEGATIVE
HCT VFR BLD AUTO: 40.4 % (ref 37–48.5)
HGB BLD-MCNC: 13 G/DL (ref 12–16)
HGB UR QL STRIP: NEGATIVE
IMM GRANULOCYTES # BLD AUTO: 0.02 K/UL (ref 0–0.04)
IMM GRANULOCYTES NFR BLD AUTO: 0.4 % (ref 0–0.5)
KETONES UR QL STRIP: NEGATIVE
LEUKOCYTE ESTERASE UR QL STRIP: NEGATIVE
LYMPHOCYTES # BLD AUTO: 0.9 K/UL (ref 1–4.8)
LYMPHOCYTES NFR BLD: 16.1 % (ref 18–48)
MCH RBC QN AUTO: 30.6 PG (ref 27–31)
MCHC RBC AUTO-ENTMCNC: 32.2 G/DL (ref 32–36)
MCV RBC AUTO: 95 FL (ref 82–98)
MONOCYTES # BLD AUTO: 0.5 K/UL (ref 0.3–1)
MONOCYTES NFR BLD: 8.1 % (ref 4–15)
NEUTROPHILS # BLD AUTO: 4 K/UL (ref 1.8–7.7)
NEUTROPHILS NFR BLD: 71.3 % (ref 38–73)
NITRITE UR QL STRIP: NEGATIVE
NRBC BLD-RTO: 0 /100 WBC
PH UR STRIP: 7 [PH] (ref 5–8)
PLATELET # BLD AUTO: 151 K/UL (ref 150–450)
PMV BLD AUTO: 11.7 FL (ref 9.2–12.9)
POTASSIUM SERPL-SCNC: 4.2 MMOL/L (ref 3.5–5.1)
PROT SERPL-MCNC: 7 G/DL (ref 6–8.4)
PROT UR QL STRIP: NEGATIVE
PROT UR-MCNC: 7 MG/DL (ref 6–15)
PROT/CREAT UR: 0.13 MG/G{CREAT} (ref 0–0.2)
RBC # BLD AUTO: 4.25 M/UL (ref 4–5.4)
SODIUM SERPL-SCNC: 140 MMOL/L (ref 136–145)
SP GR UR STRIP: 1.01 (ref 1–1.03)
URN SPEC COLLECT METH UR: NORMAL
UROBILINOGEN UR STRIP-ACNC: NEGATIVE EU/DL
WBC # BLD AUTO: 5.58 K/UL (ref 3.9–12.7)

## 2025-02-18 PROCEDURE — 80053 COMPREHEN METABOLIC PANEL: CPT | Performed by: STUDENT IN AN ORGANIZED HEALTH CARE EDUCATION/TRAINING PROGRAM

## 2025-02-18 PROCEDURE — 36415 COLL VENOUS BLD VENIPUNCTURE: CPT | Performed by: STUDENT IN AN ORGANIZED HEALTH CARE EDUCATION/TRAINING PROGRAM

## 2025-02-18 PROCEDURE — 81003 URINALYSIS AUTO W/O SCOPE: CPT | Performed by: STUDENT IN AN ORGANIZED HEALTH CARE EDUCATION/TRAINING PROGRAM

## 2025-02-18 PROCEDURE — 85651 RBC SED RATE NONAUTOMATED: CPT | Performed by: STUDENT IN AN ORGANIZED HEALTH CARE EDUCATION/TRAINING PROGRAM

## 2025-02-18 PROCEDURE — 86225 DNA ANTIBODY NATIVE: CPT | Performed by: STUDENT IN AN ORGANIZED HEALTH CARE EDUCATION/TRAINING PROGRAM

## 2025-02-18 PROCEDURE — 82570 ASSAY OF URINE CREATININE: CPT | Performed by: STUDENT IN AN ORGANIZED HEALTH CARE EDUCATION/TRAINING PROGRAM

## 2025-02-18 PROCEDURE — 86160 COMPLEMENT ANTIGEN: CPT | Mod: 59 | Performed by: STUDENT IN AN ORGANIZED HEALTH CARE EDUCATION/TRAINING PROGRAM

## 2025-02-18 PROCEDURE — 86140 C-REACTIVE PROTEIN: CPT | Performed by: STUDENT IN AN ORGANIZED HEALTH CARE EDUCATION/TRAINING PROGRAM

## 2025-02-18 PROCEDURE — 85025 COMPLETE CBC W/AUTO DIFF WBC: CPT | Performed by: STUDENT IN AN ORGANIZED HEALTH CARE EDUCATION/TRAINING PROGRAM

## 2025-02-18 PROCEDURE — 86160 COMPLEMENT ANTIGEN: CPT | Performed by: STUDENT IN AN ORGANIZED HEALTH CARE EDUCATION/TRAINING PROGRAM

## 2025-02-18 NOTE — TELEPHONE ENCOUNTER
----- Message from Duane Julio MD sent at 2/7/2025  8:54 PM CST -----  See pulmonology about the nodule.  ABNORMAL refer to specialist for further evaluation

## 2025-02-19 LAB — DSDNA AB SER-ACNC: <1 IU/ML (ref 0–9)

## 2025-02-20 LAB
C3 SERPL-MCNC: 125 MG/DL (ref 82–167)
C4 SERPL-MCNC: 19 MG/DL (ref 12–38)

## 2025-02-24 NOTE — PROGRESS NOTES
Subjective:      Patient ID: Edith Tran is a 70 y.o. female.    Chief Complaint: Disease Management    The patient location is: [place]  The chief complaint leading to consultation is: Systemic sclerosis    Visit type: audiovisual    Face to Face time with patient: 15 minutes  40 minutes of total time spent on the encounter, which includes face to face time and non-face to face time preparing to see the patient (eg, review of tests), Obtaining and/or reviewing separately obtained history, Documenting clinical information in the electronic or other health record, Independently interpreting results (not separately reported) and communicating results to the patient/family/caregiver, or Care coordination (not separately reported).     Each patient to whom he or she provides medical services by telemedicine is:  (1) informed of the relationship between the physician and patient and the respective role of any other health care provider with respect to management of the patient; and (2) notified that he or she may decline to receive medical services by telemedicine and may withdraw from such care at any time.    Notes:     HPI    Rheumatologic History:      - Diagnosis/es:              - limited systemic sclerosis diagnosed in  by Dr Barnes and characterized by SHILPI 1:1280 nucleolar, inflammatory arthritis, esophageal dysmotility, GERD, Raynaud's, interstitial lung disease, and pulmonary hypertension              - Crohn's disease diagnosed around - active on colonoscopy 12/10/2024  - Social History: Former smoker, denies alcohol intake  - Family History: No autoimmune conditions  - Gyn History: , 3 intentional abortions  - Positive serologies: SHILPI 1:1280 nucleolar  - Negative serologies: Scl 70, RNAP III, Th/To, myomarker panel,PM/Scl, Sm/RNP, dsDNA, RF  - Infectious screening labs:  Negative hepatitis-B, C, and QuantiFERON (2023)  - TPMT normal metabolizer  - Imaging:                  - CT chest (25)  "Enlarging 1.5 cm soft tissue density mass in the right middle lobe is noted. This could be an infiltrate related to the patient's severe interstitial lung disease or a neoplasm and PET CT and tissue sampling may be indicated. Patient has severe interstitial lung disease and multiple bulla especially in the lower lung fields.               - TTE (2/20/25) EF >55%, LA borderline dilated              - DEXA (4/2024) osteopenia 7.7% risk of a major osteoporotic fracture and a 0.5% risk of hip fracture in the next 10 years (FRAX).   - Procedures:  - Procedures:               - PFT (2/20/25) pending              - RHC (4/4/2024) normal pulmonary pressures              - EMG/NCV (9/30/24) left superficial peroneal sensory neuropathy   - EGD (12/10/24) few superficial esophageal ulcers with no bleeding and no stigmata of recent bleeding; moderate Schatzki's ring found in the lower third of the esophagus s/p dilation.    - Colonoscopy (12/10/2024) diverticulosis in the sigmoid colon and in the descending colon. Crohn's disease with ileitis. Moderate severity inflammation was found.   - Previous Treatments:              - HCQ 200mg BID: caused "eye problems"              - MTX              - Reglan  - Current Treatments:               - MMF 1500mg BID              - Tadalafil (Adcirca) 40mg daily  - Opsumit (macitentan) 10mg daily  - Tyvaso (treprostinil) QID              - Ofev 150mg BID              - Prilosec (omeprazole) 40mg BID and Pepcid (famotidine) 40mg              - Flexeril 10mg QHS PRN  Interval History:   She is not s/p EGD which showed  few superficial esophageal ulcers with no bleeding, moderate Schatzki's ring found in the lower third of the esophagus s/p dilation, diverticulosis in the sigmoid colon and in the descending colon, and Crohn's disease with ileitis- moderate severity inflammation was found. She states that she has not had abdominal pain or diarrhea. Currently, she reports improvement in her " dysphagia. Her antacids were changed and she now denies acid reflux. Her Raynaud's is active but she has not developed digital ulcers. She denies shortness of breath.    Objective:   There were no vitals taken for this visit.  Physical Exam   Constitutional: normal appearance.   HENT:   Head: Normocephalic and atraumatic.   Neurological: She is alert.     No data to display     Labs (2/18/2025)   CBC WBC, HGB, PLT WNL   CMP CR, AST, ALT WNL   ESR CRP WNL   C3 C4 WNL   UA UPC WNL   Negative dsDNA     Assessment:     1. Limited systemic sclerosis    2. Interstitial lung disease    3. Raynaud's disease without gangrene    4. Pulmonary hypertension    5. Gastroesophageal reflux disease, unspecified whether esophagitis present    6. Drug-induced immunodeficiency    7. High risk medication use        This is a 70-year-old woman with history of AAA, insomnia, ELAINE on CPAP, esophageal stricture, diverticulosis, B12 deficiency, esophageal leukoplakia, chronic back pain, Crohn's disease, and limited systemic sclerosis diagnosed in 2009 by Dr Barnes and characterized by inflammatory arthritis, telangiectasias, esophageal dysmotility, GERD, Raynaud's, abnormal nailfold capillaries interstitial lung disease, and pulmonary hypertension on MMF 1500 mg b.i.d., Adcirca 40 mg daily, Opsumit, Tyvaso, Ofev, Prilosec (omeprazole) 40mg BID and Pepcid (famotidine) 40mg.     Colonoscopy showed Crohn's disease with ileitis- moderate severity inflammation was found. CT chest (2/7/25) showed enlarging 1.5 cm soft tissue density mass in the right middle lobe is noted. She states that she has not had abdominal pain or diarrhea, she has had improvement in her dysphagia, and her antacids were changed and she now denies acid reflux. Her Raynaud's is active but she has not developed digital ulcers. She denies shortness of breath. I considered perhaps switching her MMF to Xeljanz to cover both Ssc ILD and Crohn's, however, I do feel that the soft  tissue mass noted on CT scan will need to be investigated first. If there is concern for malignancy, Nico would be contrainidicated. I will contact her pulmonologist.     Plan:     Problem List Items Addressed This Visit          Pulmonary    Interstitial lung disease       Cardiac/Vascular    Raynaud's disease    Pulmonary hypertension       GI    GERD (gastroesophageal reflux disease)       Palliative Care    High risk medication use     Other Visit Diagnoses         Limited systemic sclerosis    -  Primary      Drug-induced immunodeficiency              1.) Scleroderma   - MMF 1500mg BID. Hold for infection  - Pulm HTN: Tadalafil (Adcirca) 40mg daily, Opsumit (macitentan), Tyvaso (treprostinil)  - ILD: Ofev  - GERD: Prilosec (omeprazole) 40mg BID and Pepcid (famotidine) 40mg  - Dysphagia: improved after dilation  - Raynaud's: active, but no ulcers     2.) Drug induce immunodeficiency  3.) High risk medication use  - CBC, CMP, ESR, CRP every 12 weeks  - Pre-DMARD labs yearly  - Immunizations: COVID x 4 (most recent 1/2023), flu (9/2022), PCV13 (8/2019), PPV23 (11/2019), Shingrix x 2 (2020); patient needs PCV 20  - Obtain DEXA at follow up visit     4.) LLE pain at night: RLS?  - Try increasing Flexeril to 10mg QHS PRN    Follow up in 4-6 weeks    40 minutes of total time spent on the encounter, which includes face to face time and non-face to face time preparing to see the patient (eg, review of tests), Obtaining and/or reviewing separately obtained history, Documenting clinical information in the electronic or other health record, Independently interpreting results (not separately reported) and communicating results to the patient/family/caregiver, or Care coordination (not separately reported).     This note was prepared with [employer] voice recognition transcription software. Garbled syntax, mangled pronouns, and other bizarre constructions may be attributed to that software system       Nieves  ULIS Blanchard.  Rheumatology Dept  [place]

## 2025-02-25 ENCOUNTER — OFFICE VISIT (OUTPATIENT)
Dept: RHEUMATOLOGY | Facility: CLINIC | Age: 70
End: 2025-02-25
Payer: MEDICARE

## 2025-02-25 DIAGNOSIS — J84.112 IDIOPATHIC PULMONARY FIBROSIS: ICD-10-CM

## 2025-02-25 DIAGNOSIS — J84.9 INTERSTITIAL LUNG DISEASE: ICD-10-CM

## 2025-02-25 DIAGNOSIS — D84.821 DRUG-INDUCED IMMUNODEFICIENCY: ICD-10-CM

## 2025-02-25 DIAGNOSIS — M34.9 LIMITED SYSTEMIC SCLEROSIS: Primary | ICD-10-CM

## 2025-02-25 DIAGNOSIS — K21.9 GASTROESOPHAGEAL REFLUX DISEASE, UNSPECIFIED WHETHER ESOPHAGITIS PRESENT: ICD-10-CM

## 2025-02-25 DIAGNOSIS — R91.8 MASS OF RIGHT LUNG: Primary | ICD-10-CM

## 2025-02-25 DIAGNOSIS — Z79.899 HIGH RISK MEDICATION USE: ICD-10-CM

## 2025-02-25 DIAGNOSIS — I73.00 RAYNAUD'S DISEASE WITHOUT GANGRENE: ICD-10-CM

## 2025-02-25 DIAGNOSIS — Z79.899 DRUG-INDUCED IMMUNODEFICIENCY: ICD-10-CM

## 2025-02-25 DIAGNOSIS — I27.20 PULMONARY HYPERTENSION: ICD-10-CM

## 2025-02-25 PROCEDURE — 1160F RVW MEDS BY RX/DR IN RCRD: CPT | Mod: CPTII,95,, | Performed by: STUDENT IN AN ORGANIZED HEALTH CARE EDUCATION/TRAINING PROGRAM

## 2025-02-25 PROCEDURE — 1159F MED LIST DOCD IN RCRD: CPT | Mod: CPTII,95,, | Performed by: STUDENT IN AN ORGANIZED HEALTH CARE EDUCATION/TRAINING PROGRAM

## 2025-02-25 PROCEDURE — 98007 SYNCH AUDIO-VIDEO EST HI 40: CPT | Mod: 95,,, | Performed by: STUDENT IN AN ORGANIZED HEALTH CARE EDUCATION/TRAINING PROGRAM

## 2025-02-25 NOTE — PROGRESS NOTES
I was contacted by Rheumatology.  Primary care did CT of the chest that shows a 15 mm right middle lobe lesion, lesion seen October 29th last year on CT of the abdomen and also CT of the chest done in August by Cardiothoracic surgery.  The lesion has been slowly growing.    I called the patient and discussed.  The pattern looks most like cancer.  Will ask staff to arrange a PET scan in an office visit after the PET scan.  The patient is told she likely will need a biopsy.Dr Wayne is to adjust immunosuppressive therapy for Crohn's.

## 2025-03-12 ENCOUNTER — ANESTHESIA (OUTPATIENT)
Dept: ENDOSCOPY | Facility: HOSPITAL | Age: 70
End: 2025-03-12
Payer: MEDICARE

## 2025-03-12 ENCOUNTER — HOSPITAL ENCOUNTER (OUTPATIENT)
Facility: HOSPITAL | Age: 70
Discharge: HOME | End: 2025-03-12
Attending: INTERNAL MEDICINE | Admitting: INTERNAL MEDICINE
Payer: MEDICARE

## 2025-03-12 ENCOUNTER — ANESTHESIA EVENT (OUTPATIENT)
Dept: ENDOSCOPY | Facility: HOSPITAL | Age: 70
End: 2025-03-12
Payer: MEDICARE

## 2025-03-12 DIAGNOSIS — K20.90 ESOPHAGITIS: ICD-10-CM

## 2025-03-12 PROCEDURE — 25000003 PHARM REV CODE 250: Performed by: INTERNAL MEDICINE

## 2025-03-12 PROCEDURE — C1726 CATH, BAL DIL, NON-VASCULAR: HCPCS | Performed by: INTERNAL MEDICINE

## 2025-03-12 PROCEDURE — 88312 SPECIAL STAINS GROUP 1: CPT | Mod: TC,59 | Performed by: PATHOLOGY

## 2025-03-12 PROCEDURE — 43249 ESOPH EGD DILATION <30 MM: CPT | Mod: ,,, | Performed by: INTERNAL MEDICINE

## 2025-03-12 PROCEDURE — 43249 ESOPH EGD DILATION <30 MM: CPT | Performed by: INTERNAL MEDICINE

## 2025-03-12 PROCEDURE — 43239 EGD BIOPSY SINGLE/MULTIPLE: CPT | Mod: 59,,, | Performed by: INTERNAL MEDICINE

## 2025-03-12 PROCEDURE — 27201012 HC FORCEPS, HOT/COLD, DISP: Performed by: INTERNAL MEDICINE

## 2025-03-12 PROCEDURE — 63600175 PHARM REV CODE 636 W HCPCS: Performed by: NURSE ANESTHETIST, CERTIFIED REGISTERED

## 2025-03-12 PROCEDURE — 37000009 HC ANESTHESIA EA ADD 15 MINS: Performed by: INTERNAL MEDICINE

## 2025-03-12 PROCEDURE — 43239 EGD BIOPSY SINGLE/MULTIPLE: CPT | Mod: 59 | Performed by: INTERNAL MEDICINE

## 2025-03-12 PROCEDURE — 37000008 HC ANESTHESIA 1ST 15 MINUTES: Performed by: INTERNAL MEDICINE

## 2025-03-12 RX ORDER — SODIUM CHLORIDE 9 MG/ML
INJECTION, SOLUTION INTRAVENOUS CONTINUOUS
Status: DISCONTINUED | OUTPATIENT
Start: 2025-03-12 | End: 2025-03-12 | Stop reason: HOSPADM

## 2025-03-12 RX ORDER — PROPOFOL 10 MG/ML
VIAL (ML) INTRAVENOUS
Status: DISCONTINUED | OUTPATIENT
Start: 2025-03-12 | End: 2025-03-12

## 2025-03-12 RX ORDER — LIDOCAINE HYDROCHLORIDE 20 MG/ML
INJECTION, SOLUTION EPIDURAL; INFILTRATION; INTRACAUDAL; PERINEURAL
Status: DISCONTINUED | OUTPATIENT
Start: 2025-03-12 | End: 2025-03-12

## 2025-03-12 RX ORDER — VONOPRAZAN FUMARATE 26.72 MG/1
20 TABLET ORAL DAILY
Qty: 60 TABLET | Refills: 0 | Status: SHIPPED | OUTPATIENT
Start: 2025-03-12 | End: 2025-05-11

## 2025-03-12 RX ADMIN — PROPOFOL 100 MG: 10 INJECTION, EMULSION INTRAVENOUS at 08:03

## 2025-03-12 RX ADMIN — PROPOFOL 50 MG: 10 INJECTION, EMULSION INTRAVENOUS at 08:03

## 2025-03-12 RX ADMIN — SODIUM CHLORIDE: 9 INJECTION, SOLUTION INTRAVENOUS at 07:03

## 2025-03-12 RX ADMIN — GLYCOPYRROLATE 0.2 MG: 0.2 INJECTION, SOLUTION INTRAMUSCULAR; INTRAVITREAL at 08:03

## 2025-03-12 RX ADMIN — LIDOCAINE HYDROCHLORIDE 100 MG: 20 INJECTION, SOLUTION EPIDURAL; INFILTRATION; INTRACAUDAL; PERINEURAL at 08:03

## 2025-03-12 NOTE — PROVATION PATIENT INSTRUCTIONS
Discharge Summary/Instructions after an Endoscopic Procedure  Patient Name: Edith Tran  Patient MRN: [id number]  Patient YOB: 1955 Wednesday, March 12, 2025  Ngozi Prince MD  Dear patient,  As a result of recent federal legislation (The Federal Cures Act), you may   receive lab or pathology results from your procedure in your [facility]   account before your physician is able to contact you. Your physician or   their representative will relay the results to you with their   recommendations at their soonest availability.  Thank you,  RESTRICTIONS:  During your procedure today, you received medications for sedation.  These   medications may affect your judgment, balance and coordination.  Therefore,   for 24 hours, you have the following restrictions:   - DO NOT drive a car, operate machinery, make legal/financial decisions,   sign important papers or drink alcohol.    ACTIVITY:  Today: no heavy lifting, straining or running due to procedural   sedation/anesthesia.  The following day: return to full activity including work.  DIET:  Eat and drink normally unless instructed otherwise.     TREATMENT FOR COMMON SIDE EFFECTS:  - Mild abdominal pain, nausea, belching, bloating or excessive gas:  rest,   eat lightly and use a heating pad.  - Sore Throat: treat with throat lozenges and/or gargle with warm salt   water.  - Because air was used during the procedure, expelling large amounts of air   from your rectum or belching is normal.  - If a bowel prep was taken, you may not have a bowel movement for 1-3 days.    This is normal.  SYMPTOMS TO WATCH FOR AND REPORT TO YOUR PHYSICIAN:  1. Abdominal pain or bloating, other than gas cramps.  2. Chest pain.  3. Back pain.  4. Signs of infection such as: chills or fever occurring within 24 hours   after the procedure.  5. Rectal bleeding, which would show as bright red, maroon, or black stools.   (A tablespoon of blood from the rectum is not serious,  especially if   hemorrhoids are present.)  6. Vomiting.  7. Weakness or dizziness.  GO DIRECTLY TO THE NEAREST EMERGENCY ROOM IF YOU HAVE ANY OF THE FOLLOWING:      Difficulty breathing              Chills and/or fever over 101 F   Persistent vomiting and/or vomiting blood   Severe abdominal pain   Severe chest pain   Black, tarry stools   Bleeding- more than one tablespoon   Any other symptom or condition that you feel may need urgent attention  Your doctor recommends these additional instructions:  If any biopsies were taken, your doctors clinic will contact you in 1 to 2   weeks with any results.  - Discharge patient to home (with escort).   - Patient has a contact number available for emergencies.  The signs and   symptoms of potential delayed complications were discussed with the   patient.  Return to normal activities tomorrow.  Written discharge   instructions were provided to the patient.   - Resume previous diet.   - Stop PPI and Pepcid, 8 week course of Voquezna for non-healing esophageal   ulcers  - Await pathology results.   - Repeat upper endoscopy at appointment to be scheduled for surveillance   based on pathology results.   - Return to my office PRN.  For questions, problems or results please call your physician - Ngozi Prince MD at Work:  [phone].  [facility], EMERGENCY ROOM PHONE NUMBER: [phone]  IF A COMPLICATION OR EMERGENCY SITUATION ARISES AND YOU ARE UNABLE TO REACH   YOUR PHYSICIAN - GO DIRECTLY TO THE EMERGENCY ROOM.  Ngozi Prince MD  3/12/2025 8:57:32 AM  This report has been verified and signed electronically.  Dear patient,  As a result of recent federal legislation (The Federal Cures Act), you may   receive lab or pathology results from your procedure in your [facility]   account before your physician is able to contact you. Your physician or   their representative will relay the results to you with their   recommendations at their  soonest availability.  Thank you,  PROVATION

## 2025-03-12 NOTE — ANESTHESIA PREPROCEDURE EVALUATION
03/12/2025  Edith Tran is a 70 y.o., female.      Pre-op Assessment    I have reviewed the Patient Summary Reports.     I have reviewed the Nursing Notes. I have reviewed the NPO Status.   I have reviewed the Medications.     Review of Systems  Anesthesia Hx:  No problems with previous Anesthesia                Social:  Former Smoker       Cardiovascular:     Hypertension            CASH                        Hypertension         Pulmonary:  Pneumonia COPD   Shortness of breath  Sleep Apnea    Chronic Obstructive Pulmonary Disease (COPD):           Obstructive Sleep Apnea (ELAINE).       Pulmonary Infection:  Pneumonia.     Renal/:  Chronic Renal Disease        Kidney Function/Disease             Hepatic/GI:   PUD, Hiatal Hernia, GERD          Hernia, Hiatal Hernia      Neurological:    Neuromuscular Disease,                                 Neuromuscular Disease       Physical Exam  General: Well nourished, Cooperative, Alert and Oriented    Airway:  Mallampati: II   Mouth Opening: Normal  TM Distance: Normal  Tongue: Normal    Dental:  Intact    Chest/Lungs:  Normal Respiratory Rate    Heart:  Rate: Normal        Anesthesia Plan  Type of Anesthesia, risks & benefits discussed:    Anesthesia Type: Gen Natural Airway  Intra-op Monitoring Plan: Standard ASA Monitors  Induction:  IV  Informed Consent: Informed consent signed with the Patient and all parties understand the risks and agree with anesthesia plan.  All questions answered.   ASA Score: 3  Day of Surgery Review of History & Physical: H&P Update referred to the surgeon/provider.    Ready For Surgery From Anesthesia Perspective.     .

## 2025-03-12 NOTE — ANESTHESIA POSTPROCEDURE EVALUATION
Anesthesia Post Evaluation    Patient: Edith Tran    Procedure(s) Performed: Procedure(s) (LRB):  EGD (ESOPHAGOGASTRODUODENOSCOPY) (N/A)    Final Anesthesia Type: general      Patient location during evaluation: PACU  Patient participation: Yes- Able to Participate  Level of consciousness: awake and alert  Post-procedure vital signs: reviewed and stable  Pain management: adequate  Airway patency: patent    PONV status at discharge: No PONV  Anesthetic complications: no      Cardiovascular status: hemodynamically stable  Respiratory status: unassisted and room air  Hydration status: euvolemic  Follow-up not needed.              Vitals Value Taken Time   /78 03/12/25 09:20   Temp 36.5 °C (97.7 °F) 03/12/25 08:55   Pulse 84 03/12/25 09:21   Resp 23 03/12/25 09:21   SpO2 100 % 03/12/25 09:21   Vitals shown include unfiled device data.      Event Time   Out of Recovery 09:55:30         Pain/Kranthi Score: Kranthi Score: 9 (3/12/2025  9:15 AM)

## 2025-03-12 NOTE — H&P
"[facility] Note    CC: Esophageal ulcer    HPI 70 y.o. female presents to follow up esophageal ulcer    Past Medical History:   Diagnosis Date    Allergy     Arthritis     Back pain     Colon polyps     COPD (chronic obstructive pulmonary disease)     Emphysema of lung     GERD (gastroesophageal reflux disease)     Hypertension     Kidney stones     Obesity     Pneumonia     Pneumonia due to other staphylococcus     Pulmonary fibrosis     Pulmonary hypertension     Scleroderma involving lung since she was 47 y/o    Sleep apnea     Trouble in sleeping        Allergies and Medications reviewed     Review of Systems  General ROS: negative for - chills, fever or weight loss  Cardiovascular ROS: no chest pain or dyspnea on exertion  Gastrointestinal ROS: + dysphagia    Physical Examination  /62 (BP Location: Left arm, Patient Position: Lying)   Pulse 70   Temp 98.2 °F (36.8 °C) (Skin)   Resp 20   Ht 5' 6" (1.676 m)   Wt 91.2 kg (201 lb)   SpO2 100%   Breastfeeding No   BMI 32.44 kg/m²   General appearance: alert, cooperative, no distress  HENT: Normocephalic, atraumatic, neck symmetrical, no nasal discharge, sclera anicteric   Lungs: clear to auscultation bilaterally, symmetric chest wall expansion bilaterally  Heart: regular rate and rhythm without rub; no displacement of the PMI   Abdomen: soft  Extremities: extremities symmetric; no clubbing, cyanosis, or edema        Labs:  Lab Results   Component Value Date    WBC 5.58 02/18/2025    HGB 13.0 02/18/2025    HCT 40.4 02/18/2025    MCV 95 02/18/2025     02/18/2025             Assessment:   70 y.o. female presents for EGD    Plan:  Proceed to EGD    Ngozi Prince MD  [facility]  [address], [address]  [place] [address]  Office: [phone]  Fax: [phone]   "

## 2025-03-13 VITALS
DIASTOLIC BLOOD PRESSURE: 78 MMHG | HEIGHT: 66 IN | RESPIRATION RATE: 15 BRPM | WEIGHT: 201 LBS | BODY MASS INDEX: 32.3 KG/M2 | TEMPERATURE: 98 F | HEART RATE: 78 BPM | SYSTOLIC BLOOD PRESSURE: 117 MMHG | OXYGEN SATURATION: 100 %

## 2025-03-19 ENCOUNTER — RESULTS FOLLOW-UP (OUTPATIENT)
Dept: PULMONOLOGY | Facility: CLINIC | Age: 70
End: 2025-03-19

## 2025-03-19 ENCOUNTER — HOSPITAL ENCOUNTER (OUTPATIENT)
Dept: RADIOLOGY | Facility: HOSPITAL | Age: 70
Discharge: HOME | End: 2025-03-19
Attending: INTERNAL MEDICINE
Payer: MEDICARE

## 2025-03-19 DIAGNOSIS — R91.8 MASS OF RIGHT LUNG: ICD-10-CM

## 2025-03-19 DIAGNOSIS — R94.2 ABNORMAL PET SCAN OF LUNG: Primary | ICD-10-CM

## 2025-03-19 LAB — GLUCOSE SERPL-MCNC: 85 MG/DL (ref 70–110)

## 2025-03-19 PROCEDURE — A9552 F18 FDG: HCPCS | Mod: PN | Performed by: INTERNAL MEDICINE

## 2025-03-19 PROCEDURE — 78815 PET IMAGE W/CT SKULL-THIGH: CPT | Mod: 26,PI,, | Performed by: RADIOLOGY

## 2025-03-19 PROCEDURE — 78815 PET IMAGE W/CT SKULL-THIGH: CPT | Mod: TC,PN

## 2025-03-19 RX ORDER — FLUDEOXYGLUCOSE F18 500 MCI/ML
11.3 INJECTION INTRAVENOUS
Status: COMPLETED | OUTPATIENT
Start: 2025-03-19 | End: 2025-03-19

## 2025-03-19 RX ADMIN — FLUDEOXYGLUCOSE F-18 11.3 MILLICURIE: 500 INJECTION INTRAVENOUS at 09:03

## 2025-03-19 NOTE — PROGRESS NOTES
PET Imaging Questionnaire    Are you a Diabetic? Recent Blood Sugar level? No    Are you anemic? Bone Marrow Stimulation Meds? No    Have you had a CT Scan, if so when & where was your last one? Yes -     Have you had a PET Scan, if so when & where was your last one? No    Chemotherapy or currently on Chemotherapy? No    Radiation therapy? No    Surgical History:   Past Surgical History:   Procedure Laterality Date    CATARACT EXTRACTION W/  INTRAOCULAR LENS IMPLANT Right 10/30/2024    Procedure: CEIOL OD;  Surgeon: Stephen Desir MD;  Location: [facility] OR;  Service: Ophthalmology;  Laterality: Right;    CATARACT EXTRACTION W/  INTRAOCULAR LENS IMPLANT Left 12/4/2024    Procedure: CEIOL OS;  Surgeon: Stephen Desir MD;  Location: [facility] OR;  Service: Ophthalmology;  Laterality: Left;    COLONOSCOPY N/A 8/7/2018    Procedure: COLONOSCOPY;  Surgeon: Ngozi Prince MD;  Location: [facility];  Service: Endoscopy;  Laterality: N/A;    COLONOSCOPY N/A 11/21/2019    Procedure: COLONOSCOPY;  Surgeon: Ngozi rPince MD;  Location: [facility];  Service: Endoscopy;  Laterality: N/A;    COLONOSCOPY, SCREENING, LOW RISK PATIENT N/A 12/10/2024    Procedure: COLONOSCOPY, SCREENING, LOW RISK PATIENT;  Surgeon: Ngozi Prince MD;  Location: [facility];  Service: Endoscopy;  Laterality: N/A;    ESOPHAGEAL MANOMETRY WITH MEASUREMENT OF IMPEDANCE N/A 7/27/2020    Procedure: MANOMETRY, ESOPHAGUS, WITH IMPEDANCE MEASUREMENT;  Surgeon: Bhupendra Temple MD;  Location: [facility] ([address] [place] [address]);  Service: Endoscopy;  Laterality: N/A;  3/10 - pt confirmed apptCovid test ordered for 7/24 in [place].[facility]    ESOPHAGOGASTRODUODENOSCOPY N/A 8/7/2018    Procedure: EGD (ESOPHAGOGASTRODUODENOSCOPY);  Surgeon: Ngozi Prince MD;  Location: [facility];  Service: Endoscopy;  Laterality: N/A;    ESOPHAGOGASTRODUODENOSCOPY N/A 11/21/2019    Procedure: EGD (ESOPHAGOGASTRODUODENOSCOPY);  Surgeon: Ngozi Prince MD;  Location: [facility];  Service:  Endoscopy;  Laterality: N/A;    ESOPHAGOGASTRODUODENOSCOPY N/A 1/29/2020    Procedure: EGD (ESOPHAGOGASTRODUODENOSCOPY);  Surgeon: Ngozi Prince MD;  Location: [facility];  Service: Endoscopy;  Laterality: N/A;    ESOPHAGOGASTRODUODENOSCOPY N/A 8/20/2020    Procedure: EGD (ESOPHAGOGASTRODUODENOSCOPY);  Surgeon: Ngozi Prince MD;  Location: [facility];  Service: Endoscopy;  Laterality: N/A;    ESOPHAGOGASTRODUODENOSCOPY N/A 12/4/2020    Procedure: EGD (ESOPHAGOGASTRODUODENOSCOPY);  Surgeon: Ngozi Prince MD;  Location: [facility];  Service: Endoscopy;  Laterality: N/A;    ESOPHAGOGASTRODUODENOSCOPY N/A 11/19/2021    Procedure: EGD (ESOPHAGOGASTRODUODENOSCOPY);  Surgeon: Ngozi Prince MD;  Location: [facility];  Service: Endoscopy;  Laterality: N/A;    ESOPHAGOGASTRODUODENOSCOPY N/A 5/17/2024    Procedure: EGD (ESOPHAGOGASTRODUODENOSCOPY);  Surgeon: Ngozi Prince MD;  Location: [facility];  Service: Endoscopy;  Laterality: N/A;    ESOPHAGOGASTRODUODENOSCOPY N/A 12/10/2024    Procedure: EGD (ESOPHAGOGASTRODUODENOSCOPY);  Surgeon: Ngozi Prince MD;  Location: [facility];  Service: Endoscopy;  Laterality: N/A;    ESOPHAGOGASTRODUODENOSCOPY N/A 3/12/2025    Procedure: EGD (ESOPHAGOGASTRODUODENOSCOPY);  Surgeon: Ngozi Prince MD;  Location: [facility];  Service: Endoscopy;  Laterality: N/A;    REPAIR, HERNIA, INCISIONAL OR VENTRAL, WITHOUT HISTORY OF PRIOR REPAIR N/A 12/27/2022    Procedure: REPAIR, HERNIA umbilical, INCISIONAL OR VENTRAL, WITHOUT HISTORY OF PRIOR REPAIR;  Surgeon: Trent Evans MD;  Location: [facility];  Service: General;  Laterality: N/A;  RN PREOP 12/20/2022, PT INSTRUCTED TO BRING ALL MEDS WITH HER ON AM OF SURGERY    RIGHT HEART CATHETERIZATION Right 4/4/2024    Procedure: INSERTION, CATHETER, RIGHT HEART;  Surgeon: Puma Contreras MD;  Location: [facility] CATH LAB;  Service: Cardiology;  Laterality: Right;        Have you been fasting for at least 6 hours? Yes    Is there  any chance you may be pregnant or breastfeeding? No    Assay: 12.5 MCi@:906   Injection Site:rt arm    Residual: 1.02 mCi@: 908   Technologist: Becky Beltran Injected:11.3mCi

## 2025-03-20 ENCOUNTER — PATIENT MESSAGE (OUTPATIENT)
Dept: PULMONOLOGY | Facility: CLINIC | Age: 70
End: 2025-03-20
Payer: MEDICARE

## 2025-03-24 ENCOUNTER — OFFICE VISIT (OUTPATIENT)
Dept: PULMONOLOGY | Facility: CLINIC | Age: 70
End: 2025-03-24
Payer: MEDICARE

## 2025-03-24 ENCOUNTER — PATIENT MESSAGE (OUTPATIENT)
Dept: GASTROENTEROLOGY | Facility: CLINIC | Age: 70
End: 2025-03-24
Payer: MEDICARE

## 2025-03-24 ENCOUNTER — PATIENT MESSAGE (OUTPATIENT)
Dept: FAMILY MEDICINE | Facility: CLINIC | Age: 70
End: 2025-03-24
Payer: MEDICARE

## 2025-03-24 VITALS
SYSTOLIC BLOOD PRESSURE: 118 MMHG | OXYGEN SATURATION: 98 % | HEART RATE: 79 BPM | WEIGHT: 205 LBS | HEIGHT: 66 IN | BODY MASS INDEX: 32.95 KG/M2 | DIASTOLIC BLOOD PRESSURE: 72 MMHG

## 2025-03-24 DIAGNOSIS — F41.9 ANXIETY: Primary | ICD-10-CM

## 2025-03-24 DIAGNOSIS — G89.4 CHRONIC PAIN SYNDROME: ICD-10-CM

## 2025-03-24 DIAGNOSIS — R91.1 PULMONARY NODULE: ICD-10-CM

## 2025-03-24 PROCEDURE — 3288F FALL RISK ASSESSMENT DOCD: CPT | Mod: CPTII,S$GLB,, | Performed by: INTERNAL MEDICINE

## 2025-03-24 PROCEDURE — 99215 OFFICE O/P EST HI 40 MIN: CPT | Mod: S$GLB,,, | Performed by: INTERNAL MEDICINE

## 2025-03-24 PROCEDURE — 1159F MED LIST DOCD IN RCRD: CPT | Mod: CPTII,S$GLB,, | Performed by: INTERNAL MEDICINE

## 2025-03-24 PROCEDURE — 3078F DIAST BP <80 MM HG: CPT | Mod: CPTII,S$GLB,, | Performed by: INTERNAL MEDICINE

## 2025-03-24 PROCEDURE — 1101F PT FALLS ASSESS-DOCD LE1/YR: CPT | Mod: CPTII,S$GLB,, | Performed by: INTERNAL MEDICINE

## 2025-03-24 PROCEDURE — 3074F SYST BP LT 130 MM HG: CPT | Mod: CPTII,S$GLB,, | Performed by: INTERNAL MEDICINE

## 2025-03-24 PROCEDURE — 3008F BODY MASS INDEX DOCD: CPT | Mod: CPTII,S$GLB,, | Performed by: INTERNAL MEDICINE

## 2025-03-24 PROCEDURE — 99999 PR PBB SHADOW E&M-EST. PATIENT-LVL IV: CPT | Mod: PBBFAC,,, | Performed by: INTERNAL MEDICINE

## 2025-03-24 RX ORDER — ALPRAZOLAM 0.5 MG/1
0.5 TABLET ORAL 3 TIMES DAILY
Qty: 20 TABLET | Refills: 0 | Status: SHIPPED | OUTPATIENT
Start: 2025-03-24 | End: 2025-04-23

## 2025-03-24 RX ORDER — DULOXETIN HYDROCHLORIDE 30 MG/1
60 CAPSULE, DELAYED RELEASE ORAL DAILY
Qty: 60 CAPSULE | Refills: 11 | Status: SHIPPED | OUTPATIENT
Start: 2025-03-24 | End: 2026-03-24

## 2025-03-24 NOTE — PROGRESS NOTES
3/24/2025   3/24/2025    Edith Tran  Office Note    Chief Complaint   Patient presents with    6m f/u       HPI:    3/24/2025  F/u ct done pcp with lung lesion.  Looking back could be seen slowly enlarging since 2023 in area of fibrosis.     pt now off tyvaso as ppt cough emesis  Pt had six min walk last month after being off tyvaso-- pt was to follow up with pulm htn clinic at [place] but missed snow storm.    Pt still on adcirca,  considering change from cellcept.    Tolerates ofev well    Pt had been sign drinker in past. Having stomach issues from etoh??  She has chr severe pain and chronic anxiety.            10/2/2024 pt had covid admit 8/31-- hosp 2 days with fever, low bp. Pt still feeling poorly, uses ox for sleep and prn...  Had esophageal stretching 3 months ago-- no great improvement  Going through pulm rehab--     Patient Instructions   You are having symptoms consistent with seasonal allergies-- use zyrtec (or allergra/claritin).   If above not effective -- may use xyzal.  Could use nasal antihistamine with astelin --- paper scripts for above xyzal/astelin..    Would consider oxygen exercising...      Would follow up crp esr--- if not feeling better after antihistamine    Chest xray/ct viewed and stable...      Do pft and walk test in Jan      Use trazodone 100-150 bedtime as needed..    3/27/2024 no cough, no gi c/o on ofev, does streching and balance twice wk with walks. For right heart cath next wk.on cellcept and tyvaso, and adcirca....  Pft 10/2023 sl better thatn 10/2020 and 2/22...   dlco 7.6.    Cta lung viewed from 2/2024 compared to 2022 and 7/2023 -- no sign chagnes..  Cpap ongoing well..  Swallow difficulty somewhat worse and for endosocpy and ? Dilation?    Six min walk3/11/2024-   CLINICAL INTERPRETATION:  Six minute walk distance is 335.28 meters (1100 feet) with very heavy dyspnea.  During exercise, there was significant desaturation while breathing room air.         RHC:  8/2022  Right Heart Pressures   RAP: 3 mmHg   RVP: 50/3 mmHg   Pulmonary artery pressure: 50/12 mmHg   Mean pulmonary artery pressure: 31 mmHg   Pulmonary capillary wedge pressure: 7 mmHg   Cardiac Output: 5.9 L/min   Cardiac index: 3 L/min/m2   Pulmonary vascular resistance: 4 HRU   Oxygen saturation measurements were obtained at the following locations:   SVC: 67%   Main Pulmonary Artery: 71%   During Exercise:   PAP 65/20 mmHg   mPAP 35 mmHg   PCWP 15 mmHg   CO by TD: 7.3 L/min   PVR 4 Wood units   Patient Instructions   Ct and breathing test viewed-- stable -- re check next 2025-- could do sooner if worsens.....    Rsv vaccine recommended..  In six months  - recheck to assure no new symptoms...  need ct/breathing test next yr for evaluation.    9/27/2023 no lung infections, on ofev, no bowel issues, on weight watcher and lost 30 lbs....        Patient Instructions   Ct  chest viewed and ascending aneurysm seen looking same 2/2022 to 7/2023-- you saw Dr Romero and are to have follow up ct in 6 months (no growth to our view), 12/2022 echo did not suggest valve problems       lung tissue was slight worsening from 2/2022 to 7/2023 - ofev ongoing for years.  Would check pulmonary functions     Occasionally beta blocker used for aneurysms.   You have no palpitations but have some anxiety.  Could dose low dose toprol and continue if less anxiety and no light headed/dizziness.           spirometry bronchodilator, lung volume by gas dilution, diffusion capacity measured February 17, 2022. Spirometry falls within normal limits. There is no airflow obstruction measured. The FEV1 is 85% predicted. There is no significant bronchodilator   response.       Lung volumes showed total lung capacity to be 69% of predicted and low. diffusion is also decreased to 40% of predicted.       There is evidence for restriction and loss of diffusion. There is no airflow obstruction nor bronchodilator response. Clinical correlation  recommended  Spirometry, lung volume by gas dilution, and diffusion capacity measured October 22, 2020. The FEV1 to FVC ratio was 73% indicating no airflow obstruction measured by spirometry technique. The FEV1 was 81% of predicted at 2 L. Total lung capacity on lung    volume by gas dilution was 72% of predicted and a little low. Diffusion, uncorrected for anemia, was 41% of predicted and also low.   Patient has no obstruction. There is restriction measured. Diffusion is decreased. Clinical correlation recommended        Ofev dose is maximal.  May consider increased rx Dr Wayne if pulmonary function worsens..    3/27/2023 on tyvaso since November and doing better, has bronchial infections 1-2 with amoxil   Pt on ofev -- no diarrhea of  significance.    Pt feels less sob.  Patient Instructions   Last cpap from [facility] [place] -- should check compliance report.  Diffusion has fallen from 9.5 -October 2020 to 8.9 --2/2022 to 7.2-- 1/2023--- was 7.3 9/2022.  Diffusion may decrease from pulmonary hypertension or lung tissue disease.  Six min walk was good.   Ct chest looked better to our view 2/2022 c/w 2019 as far as lung tissue.    Use augmentin as needed, call if significant infection.  May be good to follow up ct chest later in year.  Low diffusion suggest low oxygen walking..      9/26/2022 no new issues, uses cpap with good results, uses ox for activity exercise. Misses ox concentrator--getting repairs.       Pt was having constant cough prior to august rhc.    Patient Instructions   Heart cath did not look too bad -- baseline NA.    Need to get portable oxygen concentrator repaired.    Would wish to see compliance report from cpap .    If new ct done -- bring on disc.       Evaluation took 44min to review ct and pft and rhc.    Had pft 9/21/2022 -- fvc 71%, fev1 74%, tlc 78%, dlco 27%.  Six min walk 349 meters in 6 min, and needed with 2 lpm needed.  Pft worsened from 2/2022.  Ox needs stable    Cough remitted -- was  constant.     Swallows ok.  Eats slowly.      [facility].    3/28/2022 not using symbicort, dose use occ albuterol which ppt headaches??    Feels like stb le-- uses ox more as feels more sob.  [facility]      Uses cpap -- had ahi 6.3 in past.  Patient Instructions   Need compliance report from cpap?  Ahi was 6.3 yrs ago.  You are compliant.    Ct chest and 6 min walk and pulm functions are stable to sl better.    9/16/2021 - six min walk distance same as October 2020 at 255 m, batteries on poc run out doing errands over 2 h rs..    Not monitoring ox walking.  ues 3lpm pulse.    Uses filter on cpap to get new.   Had 3 vaccines .   On rx and bladder doing better.   Pt not needing rescue,use symbicort.    Ct in 2019, and pft 2020.   Patient Instructions   Would do ct chest and pft and six min walk prior to seeing next rheumatologist, or in 6 months.     Continue opsumit, tadalafil, symbicort, ofev,     Call if needed    Re check 6 months.  3/16/2021 concerned re covid vaccine and immujne suppression.  Breathing stable, has bladder urgency, has cpap mask problems - like f30 airfit.      Patient Instructions     Six min walk today walked 255 meters, and oxygen fell at 3 minutes to 88, and needed 3 lpm oxygen- 3/16/2021      6 min walk study was accomplished October 22, 2020. Baseline room air saturation was 98%. With ambulation O2 sat fell to 87% by 5 min. On 2 L of oxygen patient maintain sat in the low 90s subsequently. Patient walked about 133% of the reference distance   at 255 m.      6 min walk on August 21, 2018 was accomplished.  Patient's baseline O2 sat was 96% on room air.  After walking in her sats fell to 88% at 5 min.  On 2 L of oxygen sat was maintained in the mid 90s.  The patient was able to walk 390 m or 91% of the   reference value.     You may have spastic bladder.  Would recheck urine.  If sign symptoms may need to see urology?       You have and use portable  oxygen concentrator. You should try to use more and be more active.      Get vaccine,      Walk studies suggest some loss in function from 2018 to 2020 but stable from October to March now.      Lung capacity was fairly good October 2020-  Spirometry, lung volume by gas dilution, and diffusion capacity measured October 22, 2020. The FEV1 to FVC ratio was 73% indicating no airflow obstruction measured by spirometry technique. The FEV1 was 81% of predicted at 2 L. Total lung capacity on lung    volume by gas dilution was 72% of predicted and a little low. Diffusion, uncorrected for anemia, was 41% of predicted and also low.   Patient has no obstruction. There is restriction measured. Diffusion is decreased. Clinical correlation recommended           Would do a six min walk prior to return in 6 months.    9/30/2020- uses 02 out of house, had scratchy throat with am cough lately. Uses cpap nightly all night usually 4-7 hrs/night.      Sees Dr Arboleda- Dr Barnes left.    Had been seeing Dr Goddard, had pft and 6 min walk, saw Dr Goddard on 8/3/2020 - told all stable,  Cannot locate pft /walk test in care everywhere.  Usually studied yearly.  Pt feels stable otherwise.  3/27/2019 ct chest viewed with ild/honeycombing.  Follows for ofev trial at [facility].    Pt continues on ofev and cellcept and cialis and opsumit     Patient Instructions   If antibiotic needed - azithromycin daily for 3 days, last 10, simple antibiotic.     Prednisone 5 mg - may use if cough bad.    Should have result of right heart cath, and august pulm function and six min walk- will ask to obtain.  Will need to make sure can continue opsumit/cialias    Can follow six min walk every 3 months - placed standing.    You need to stay on ofev for pulmonary fibrosis, uses tadalafil and optusmit for pulmonary hypertension.  October 8, 2019- Has PFT and 6 min walk scheduled Nov 2019 by Dr. Barnes Rheumatologist at [place]. Wearing supplemental oxygen at home  day/night set at 2L NC, currently on Symbicort daily, states benefit in breathing. No recent prednisone use. SOB controlled. No cough, no chest tightness, no wheeze.   Wear cpap nightly, states benefiting greatly but want different mask, tries to read before bed, mask over nasal bridge does not allow glasses to rest correctly.  Patient Instructions   Order sent for different CPAP mask  Continue to use nightly for Obstructive sleep apnea    Continue supplemental oxygen    Will review the results of PFT and 6 min walk from [place] at next appointment.    Continue Symbicort daily.     Use Fela perles and prednisone as needed for cough.     April 3, 2019- Onset 1 week: Cough productive white in color, sore throat, post nasal drip, sinus drainage yellow, flushed cheeks, complaints improving tx with antibiotics no prednisone use. Cough worse when lying down.    Current therapy for scleroderma, pulmonary htn, and pulmonary fibrosis Opsumit/tadafanil and Ofev/cellcept. No diarrhea no complaints.  Currently using Symbicort 1 puff daily. Using supplemental oxygen at 2L NC for Shortness of breath, states greatly beneficial, pt states able to walk further and keep up with company while on oxygen.      Nov 27, trelegy not covered- not using, had flu shot 8 am with sorenes later day and huge swollen arm with  Pain thhat night.   No cough. On opsumit/tadafanil, and on ofev/cellcept.  Stable. No diarrhea.  Sees pulm htn and  [facility].  Last 6 min walk 02  Angelo 91 slow pace.      Aug 21, uses trelegy, no c/o.  No 0x arranges- sat 90 falls to 87 at 2 mins- walked 307 meters or 71%.  Dx Crohn's.  No abx nor prednisone.  cpap good and sinus good.  Instructions:Would monitor 6 min walk every 3 months.    307 meters was last distance.  Six min walk.  Monitor ct chest yearly in March - sooner if worsens.  Needs 02 for activity.  Use medications for bronchitis.   Stay active.    July11,2018has had raynauld's for yrs, pt had severe  "mobility problems issues leading to dx slceroderma 2007 - "rock bottom".  Has had swallow sticking with  2-3 dilations with last over 3 yrs ago.  Pt has had pulm htn on opsumit and talafadil,  Pt also on ofev in a study, and cellcept.  Also low dose prednisone.  Pt dx osas and uses cpap nightly 8 hrs - had used 02 but off 02 for 3 yrs.   Pt had had 6 min walks but none recently- none last yr at least.  Pt gets bronchitis with cough and yellow mucous.  Has occ wheezes.  Had exacerbations in feb and June - typically 2-3 yrly.  Tessalon helps.  Uses combivent occ ppt headaches with some breathing benefit.     Uses flonase regular.  Was on asthma rx for yrs.        The chief compliant  problem is stable  PFSH:  Past Medical History:   Diagnosis Date    Allergy     Arthritis     Back pain     Colon polyps     COPD (chronic obstructive pulmonary disease)     Emphysema of lung     GERD (gastroesophageal reflux disease)     Hypertension     Kidney stones     Obesity     Pneumonia     Pneumonia due to other staphylococcus     Pulmonary fibrosis     Pulmonary hypertension     Scleroderma involving lung since she was 49 y/o    Sleep apnea     Trouble in sleeping          Past Surgical History:   Procedure Laterality Date    CATARACT EXTRACTION W/  INTRAOCULAR LENS IMPLANT Right 10/30/2024    Procedure: CEIOL OD;  Surgeon: Stephen Desir MD;  Location: [facility] OR;  Service: Ophthalmology;  Laterality: Right;    CATARACT EXTRACTION W/  INTRAOCULAR LENS IMPLANT Left 12/4/2024    Procedure: CEIOL OS;  Surgeon: Stephen Desir MD;  Location: [facility] OR;  Service: Ophthalmology;  Laterality: Left;    COLONOSCOPY N/A 8/7/2018    Procedure: COLONOSCOPY;  Surgeon: Ngozi Prince MD;  Location: [facility] ENDO;  Service: Endoscopy;  Laterality: N/A;    COLONOSCOPY N/A 11/21/2019    Procedure: COLONOSCOPY;  Surgeon: Ngozi Prince MD;  Location: [facility] ENDO;  Service: Endoscopy;  Laterality: N/A;    COLONOSCOPY, SCREENING, LOW RISK " PATIENT N/A 12/10/2024    Procedure: COLONOSCOPY, SCREENING, LOW RISK PATIENT;  Surgeon: Ngozi Prince MD;  Location: [facility];  Service: Endoscopy;  Laterality: N/A;    ESOPHAGEAL MANOMETRY WITH MEASUREMENT OF IMPEDANCE N/A 7/27/2020    Procedure: MANOMETRY, ESOPHAGUS, WITH IMPEDANCE MEASUREMENT;  Surgeon: Bhupendra Temple MD;  Location: [facility] ENDO (4TH FLR);  Service: Endoscopy;  Laterality: N/A;  3/10 - [facility]    ESOPHAGOGASTRODUODENOSCOPY N/A 8/7/2018    Procedure: EGD (ESOPHAGOGASTRODUODENOSCOPY);  Surgeon: Ngozi Prince MD;  Location: [facility];  Service: Endoscopy;  Laterality: N/A;    ESOPHAGOGASTRODUODENOSCOPY N/A 11/21/2019    Procedure: EGD (ESOPHAGOGASTRODUODENOSCOPY);  Surgeon: Ngozi Prince MD;  Location: [facility];  Service: Endoscopy;  Laterality: N/A;    ESOPHAGOGASTRODUODENOSCOPY N/A 1/29/2020    Procedure: EGD (ESOPHAGOGASTRODUODENOSCOPY);  Surgeon: Ngozi Prince MD;  Location: [facility];  Service: Endoscopy;  Laterality: N/A;    ESOPHAGOGASTRODUODENOSCOPY N/A 8/20/2020    Procedure: EGD (ESOPHAGOGASTRODUODENOSCOPY);  Surgeon: Ngozi Prince MD;  Location: [facility];  Service: Endoscopy;  Laterality: N/A;    ESOPHAGOGASTRODUODENOSCOPY N/A 12/4/2020    Procedure: EGD (ESOPHAGOGASTRODUODENOSCOPY);  Surgeon: Ngozi Prince MD;  Location: [facility];  Service: Endoscopy;  Laterality: N/A;    ESOPHAGOGASTRODUODENOSCOPY N/A 11/19/2021    Procedure: EGD (ESOPHAGOGASTRODUODENOSCOPY);  Surgeon: Ngozi Prince MD;  Location: [facility];  Service: Endoscopy;  Laterality: N/A;    ESOPHAGOGASTRODUODENOSCOPY N/A 5/17/2024    Procedure: EGD (ESOPHAGOGASTRODUODENOSCOPY);  Surgeon: Ngozi Prince MD;  Location: [facility];  Service: Endoscopy;  Laterality: N/A;    ESOPHAGOGASTRODUODENOSCOPY N/A 12/10/2024    Procedure: EGD (ESOPHAGOGASTRODUODENOSCOPY);  Surgeon: Ngozi Prince MD;  Location: [facility];  Service: Endoscopy;   Laterality: N/A;    ESOPHAGOGASTRODUODENOSCOPY N/A 3/12/2025    Procedure: EGD (ESOPHAGOGASTRODUODENOSCOPY);  Surgeon: Ngozi Prince MD;  Location: [facility];  Service: Endoscopy;  Laterality: N/A;    REPAIR, HERNIA, INCISIONAL OR VENTRAL, WITHOUT HISTORY OF PRIOR REPAIR N/A 2022    Procedure: REPAIR, HERNIA umbilical, INCISIONAL OR VENTRAL, WITHOUT HISTORY OF PRIOR REPAIR;  Surgeon: Trent Evans MD;  Location: [facility] OR;  Service: General;  Laterality: N/A;  RN PREOP 2022, PT INSTRUCTED TO BRING ALL MEDS WITH HER ON AM OF SURGERY    RIGHT HEART CATHETERIZATION Right 2024    Procedure: INSERTION, CATHETER, RIGHT HEART;  Surgeon: Puma Contreras MD;  Location: [facility] CATH LAB;  Service: Cardiology;  Laterality: Right;     Social History     Tobacco Use    Smoking status: Former     Current packs/day: 0.00     Average packs/day: 0.5 packs/day for 27.7 years (13.8 ttl pk-yrs)     Types: Cigarettes     Start date:      Quit date: 1995     Years since quittin.5    Smokeless tobacco: Never   Substance Use Topics    Alcohol use: Not Currently     Comment: occ    Drug use: No     Family History   Problem Relation Name Age of Onset    Kidney disease Mother      Cancer Father      Heart disease Brother      Mental illness Son      Glaucoma Neg Hx      Macular degeneration Neg Hx      Retinal detachment Neg Hx       Review of patient's allergies indicates:   Allergen Reactions    Plaquenil [hydroxychloroquine]      Having eye reaction, needs to stop plaquenil and stay off of it.        Performance Status:The patient's activity level is housebound activities.      Review of Systems:  a review of eleven systems covering constitutional, Eye, HEENT, Psych, Respiratory, Cardiac, GI, , Musculoskeletal, Endocrine, Dermatologic was negative except for pertinent findings as listed ABOVE and below:  pertinent positive as above, rest is good       Exam:Comprehensive exam done. /72 (BP Location:  "Right arm, Patient Position: Sitting)   Pulse 79   Ht 5' 6" (1.676 m)   Wt 93 kg (205 lb 0.4 oz)   SpO2 98% Comment: on room air at rest  BMI 33.09 kg/m²   Exam included Vitals as listed, and patient's appearance and affect and alertness and mood, oral exam for yeast and hygiene and pharynx lesions and Mallapatti (M) score, neck with inspection for jvd and masses and thyroid abnormalities and lymph nodes (supraclavicular and infraclavicular nodes and axillary also examined and noted if abn), chest exam included symmetry and effort and fremitus and percussion and auscultation, cardiac exam included rhythm and gallops and murmur and rubs and jvd and edema, abdominal exam for mass and hepatosplenomegaly and tenderness and hernias and bowel sounds, Musculoskeletal exam with muscle tone and posture and mobility/gait and  strength, and skin for rashes and cyanosis and pallor and turgor, extremity for clubbing.  Findings were normal except for pertinent findings listed below:M2, bilat rales. No  Edema nor clubbing.       Radiographs (ct chest and cxr) reviewed: view by direct vision  March 2018 ct not too bad with cysts- viewed 8/21/18  CT CHEST WITHOUT CONTRAST 03/27/2019   Severe interstitial fibrosis with peripheral honeycombing and multiple bilateral lower lobe bulla consistent with the history of scleroderma.  This is essentially unchanged from March 21, 2018.  Continued mild mediastinal adenopathy also unchanged.  No acute findings.  Small hiatal hernia.  Stable 2 cm complicated cyst of the left kidney.       Labs  noted  Lab Results   Component Value Date    WBC 5.58 02/18/2025    HGB 13.0 02/18/2025    HCT 40.4 02/18/2025    MCV 95 02/18/2025     02/18/2025        PFT results reviewed   Pulmonary Functions, including spirometry and bronchodilator response and lung volumes and diffusion, study was done May 8, 2018.  Spirometry shows mild obstruction, loss vital capacity and obstruction and no " bronchodilator response.   FEV1 is 65% or 1.68 liters.  Lung volumes show  loss of TLC with restriction 66%.  Diffusion shows reduced but falls within normal range when corrected for lung volumes.   Pulmonary functions show  Mild obstruction and also restriction. Clinical correlation recommended.   Mikal Serrano M.D.    Echo 10/12/2018 Normal left and right ventricular systolic functions with LVEF >55%.    Plan:  Clinical impression is apparently straight forward and impression with management as below.     Edith was seen today for 6m f/u.    Diagnoses and all orders for this visit:    Anxiety  -     DULoxetine (CYMBALTA) 30 MG capsule; Take 2 capsules (60 mg total) by mouth once daily.  -     ALPRAZolam (XANAX) 0.5 MG tablet; Take 1 tablet (0.5 mg total) by mouth 3 (three) times daily.    Chronic pain syndrome  -     DULoxetine (CYMBALTA) 30 MG capsule; Take 2 capsules (60 mg total) by mouth once daily.    Pulmonary nodule                    Follow up in about 4 weeks (around 4/21/2025), or if symptoms worsen or fail to improve.    Discussed with patient above for education the following:      Patient Instructions   Ct and pet scan viewed ---- right lung nodule has been slowly growing, in area of scarring, since 2/2023.  Pet scan suggest cancer with no spread-- brain not well evalulated      Will order onology eval after biopsy -- you would be high risk for surgery    You have had alcohol problems in  past and have high anxiety and diffuse chronic pains-- cymbalta should help (other alternatives so stop if problems) -- start 30 mg and increase to 60 mg daily as tolerated....   cymbalta may help chronic pains...    Will give limited xanax to use in near future for stressful prolbems...        Would follow up with pulmonary htn clinic as off tyvaso.    Continue ofev    Will direct management of spot -- will get back with biopsy results and refer.    Will make 4 wk recheck -- mainly for xanax/cymbalta          Edith ATLLEY  Tran  Office Note    Chief Complaint   Patient presents with    6m f/u       HPI:    9/16/2021 - six min walk distance same as October 2020 at 255 m, batteries on poc run out doing errands over 2 h rs..    Not monitoring ox walking.  ues 3lpm pulse.    Uses filter on cpap to get new.   Had 3 vaccines .   On rx and bladder doing better.   Pt not needing rescue,use symbicort.        Ct in 2019, and pft 2020.     Patient Instructions   Would do ct chest and pft and six min walk prior to seeing next rheumatologist, or in 6 months.     Continue opsumit, tadalafil, symbicort, ofev,     Call if needed    Re check 6 months.    3/16/2021 concerned re covid vaccine and immujne suppression.  Breathing stable, has bladder urgency, has cpap mask problems - like f30 airfit.      Patient Instructions     Six min walk today walked 255 meters, and oxygen fell at 3 minutes to 88, and needed 3 lpm oxygen- 3/16/2021      6 min walk study was accomplished October 22, 2020. Baseline room air saturation was 98%. With ambulation O2 sat fell to 87% by 5 min. On 2 L of oxygen patient maintain sat in the low 90s subsequently. Patient walked about 133% of the reference distance   at 255 m.      6 min walk on August 21, 2018 was accomplished.  Patient's baseline O2 sat was 96% on room air.  After walking in her sats fell to 88% at 5 min.  On 2 L of oxygen sat was maintained in the mid 90s.  The patient was able to walk 390 m or 91% of the   reference value.     You may have spastic bladder.  Would recheck urine.  If sign symptoms may need to see urology?       You have and use portable oxygen concentrator. You should try to use more and be more active.      Get vaccine,      Walk studies suggest some loss in function from 2018 to 2020 but stable from October to March now.      Lung capacity was fairly good October 2020-  Spirometry, lung volume by gas dilution, and diffusion capacity measured October 22, 2020. The FEV1 to FVC ratio was 73%  indicating no airflow obstruction measured by spirometry technique. The FEV1 was 81% of predicted at 2 L. Total lung capacity on lung    volume by gas dilution was 72% of predicted and a little low. Diffusion, uncorrected for anemia, was 41% of predicted and also low.   Patient has no obstruction. There is restriction measured. Diffusion is decreased. Clinical correlation recommended           Would do a six min walk prior to return in 6 months.    9/30/2020- uses 02 out of house, had scratchy throat with am cough lately. Uses cpap nightly all night usually 4-7 hrs/night.      Sees Dr Arboleda- Dr Barnes left.    Had been seeing Dr Goddard, had pft and 6 min walk, saw Dr Goddard on 8/3/2020 - told all stable,  Cannot locate pft /walk test in care everywhere.  Usually studied yearly.  Pt feels stable otherwise.  3/27/2019 ct chest viewed with ild/honeycombing.  Follows for ofev trial at [facility] [place], [place].    Pt continues on ofev and cellcept and cialis and opsumit     Patient Instructions   If antibiotic needed - azithromycin daily for 3 days, last 10, simple antibiotic.     Prednisone 5 mg - may use if cough bad.    Should have result of right heart cath, and august pulm function and six min walk- will ask to obtain.  Will need to make sure can continue opsumit/cialias    Can follow six min walk every 3 months - placed standing.    You need to stay on ofev for pulmonary fibrosis, uses tadalafil and optusmit for pulmonary hypertension.  October 8, 2019- Has PFT and 6 min walk scheduled Nov 2019 by Dr. Barnes Rheumatologist at [place]. Wearing supplemental oxygen at home day/night set at 2L NC, currently on Symbicort daily, states benefit in breathing. No recent prednisone use. SOB controlled. No cough, no chest tightness, no wheeze.   Wear cpap nightly, states benefiting greatly but want different mask, tries to read before bed, mask over nasal bridge does not allow glasses to rest correctly.  Patient Instructions   Order sent  "for different CPAP mask  Continue to use nightly for Obstructive sleep apnea    Continue supplemental oxygen    Will review the results of PFT and 6 min walk from [place] at next appointment.    Continue Symbicort daily.     Use Fela perles and prednisone as needed for cough.     April 3, 2019- Onset 1 week: Cough productive white in color, sore throat, post nasal drip, sinus drainage yellow, flushed cheeks, complaints improving tx with antibiotics no prednisone use. Cough worse when lying down.    Current therapy for scleroderma, pulmonary htn, and pulmonary fibrosis Opsumit/tadafanil and Ofev/cellcept. No diarrhea no complaints.  Currently using Symbicort 1 puff daily. Using supplemental oxygen at 2L NC for Shortness of breath, states greatly beneficial, pt states able to walk further and keep up with company while on oxygen.      Nov 27, trelegy not covered- not using, had flu shot 8 am with sorenes later day and huge swollen arm with  Pain thhat night.   No cough. On opsumit/tadafanil, and on ofev/cellcept.  Stable. No diarrhea.  Sees pulm htn and  [facility].  Last 6 min walk 02  Angelo 91 slow pace.      Aug 21, uses trelegy, no c/o.  No 0x arranges- sat 90 falls to 87 at 2 mins- walked 307 meters or 71%.  Dx Crohn's.  No abx nor prednisone.  cpap good and sinus good.  Instructions:Would monitor 6 min walk every 3 months.    307 meters was last distance.  Six min walk.  Monitor ct chest yearly in March - sooner if worsens.  Needs 02 for activity.  Use medications for bronchitis.   Stay active.    July11,2018has had raynauld's for yrs, pt had severe mobility problems issues leading to dx slceroderma 2007 - "rock bottom".  Has had swallow sticking with  2-3 dilations with last over 3 yrs ago.  Pt has had pulm htn on opsumit and talafadil,  Pt also on ofev in a study, and cellcept.  Also low dose prednisone.  Pt dx osas and uses cpap nightly 8 hrs - had used 02 but off 02 for 3 yrs.   Pt had had 6 min walks but " none recently- none last yr at least.  Pt gets bronchitis with cough and yellow mucous.  Has occ wheezes.  Had exacerbations in feb and June - typically 2-3 yrly.  Tessalon helps.  Uses combivent occ ppt headaches with some breathing benefit.     Uses flonase regular.  Was on asthma rx for yrs.        The chief compliant  problem is stable  PFSH:  Past Medical History:   Diagnosis Date    Allergy     Arthritis     Back pain     Colon polyps     COPD (chronic obstructive pulmonary disease)     Emphysema of lung     GERD (gastroesophageal reflux disease)     Hypertension     Kidney stones     Obesity     Pneumonia     Pneumonia due to other staphylococcus     Pulmonary fibrosis     Pulmonary hypertension     Scleroderma involving lung since she was 49 y/o    Sleep apnea     Trouble in sleeping          Past Surgical History:   Procedure Laterality Date    CATARACT EXTRACTION W/  INTRAOCULAR LENS IMPLANT Right 10/30/2024    Procedure: CEIOL OD;  Surgeon: Stephen Desir MD;  Location: [facility] OR;  Service: Ophthalmology;  Laterality: Right;    CATARACT EXTRACTION W/  INTRAOCULAR LENS IMPLANT Left 12/4/2024    Procedure: CEIOL OS;  Surgeon: Stephen Desir MD;  Location: [facility] OR;  Service: Ophthalmology;  Laterality: Left;    COLONOSCOPY N/A 8/7/2018    Procedure: COLONOSCOPY;  Surgeon: Ngozi Prince MD;  Location: [facility];  Service: Endoscopy;  Laterality: N/A;    COLONOSCOPY N/A 11/21/2019    Procedure: COLONOSCOPY;  Surgeon: Ngozi Prince MD;  Location: [facility];  Service: Endoscopy;  Laterality: N/A;    COLONOSCOPY, SCREENING, LOW RISK PATIENT N/A 12/10/2024    Procedure: COLONOSCOPY, SCREENING, LOW RISK PATIENT;  Surgeon: Ngozi Prince MD;  Location: [facility];  Service: Endoscopy;  Laterality: N/A;    ESOPHAGEAL MANOMETRY WITH MEASUREMENT OF IMPEDANCE N/A 7/27/2020    Procedure: MANOMETRY, ESOPHAGUS, WITH IMPEDANCE MEASUREMENT;  Surgeon: Bhupendra Temple MD;  Location: [facility] (4TH FLR);   Service: Endoscopy;  Laterality: N/A;  3/10 - pt confirmed apptCovid test ordered for 7/24 in [place]    ESOPHAGOGASTRODUODENOSCOPY N/A 8/7/2018    Procedure: EGD (ESOPHAGOGASTRODUODENOSCOPY);  Surgeon: Ngozi Prince MD;  Location: [facility] ENDO;  Service: Endoscopy;  Laterality: N/A;    ESOPHAGOGASTRODUODENOSCOPY N/A 11/21/2019    Procedure: EGD (ESOPHAGOGASTRODUODENOSCOPY);  Surgeon: Ngozi Prince MD;  Location: [facility] ENDO;  Service: Endoscopy;  Laterality: N/A;    ESOPHAGOGASTRODUODENOSCOPY N/A 1/29/2020    Procedure: EGD (ESOPHAGOGASTRODUODENOSCOPY);  Surgeon: Ngozi Prince MD;  Location: [facility];  Service: Endoscopy;  Laterality: N/A;    ESOPHAGOGASTRODUODENOSCOPY N/A 8/20/2020    Procedure: EGD (ESOPHAGOGASTRODUODENOSCOPY);  Surgeon: Ngozi Prince MD;  Location: [facility];  Service: Endoscopy;  Laterality: N/A;    ESOPHAGOGASTRODUODENOSCOPY N/A 12/4/2020    Procedure: EGD (ESOPHAGOGASTRODUODENOSCOPY);  Surgeon: Ngozi Prince MD;  Location: [facility] ENDO;  Service: Endoscopy;  Laterality: N/A;    ESOPHAGOGASTRODUODENOSCOPY N/A 11/19/2021    Procedure: EGD (ESOPHAGOGASTRODUODENOSCOPY);  Surgeon: Ngozi Prince MD;  Location: [facility];  Service: Endoscopy;  Laterality: N/A;    ESOPHAGOGASTRODUODENOSCOPY N/A 5/17/2024    Procedure: EGD (ESOPHAGOGASTRODUODENOSCOPY);  Surgeon: Ngozi Prince MD;  Location: [facility] ENDO;  Service: Endoscopy;  Laterality: N/A;    ESOPHAGOGASTRODUODENOSCOPY N/A 12/10/2024    Procedure: EGD (ESOPHAGOGASTRODUODENOSCOPY);  Surgeon: Ngozi Prince MD;  Location: [facility];  Service: Endoscopy;  Laterality: N/A;    ESOPHAGOGASTRODUODENOSCOPY N/A 3/12/2025    Procedure: EGD (ESOPHAGOGASTRODUODENOSCOPY);  Surgeon: Ngozi Prince MD;  Location: [facility];  Service: Endoscopy;  Laterality: N/A;    REPAIR, HERNIA, INCISIONAL OR VENTRAL, WITHOUT HISTORY OF PRIOR REPAIR N/A 12/27/2022    Procedure: REPAIR, HERNIA umbilical, INCISIONAL OR VENTRAL, WITHOUT  "HISTORY OF PRIOR REPAIR;  Surgeon: Trent Evans MD;  Location: [facility] OR;  Service: General;  Laterality: N/A;  RN PREOP 2022, PT INSTRUCTED TO BRING ALL MEDS WITH HER ON AM OF SURGERY    RIGHT HEART CATHETERIZATION Right 2024    Procedure: INSERTION, CATHETER, RIGHT HEART;  Surgeon: Puma Contreras MD;  Location: [facility] CATH LAB;  Service: Cardiology;  Laterality: Right;     Social History     Tobacco Use    Smoking status: Former     Current packs/day: 0.00     Average packs/day: 0.5 packs/day for 27.7 years (13.8 ttl pk-yrs)     Types: Cigarettes     Start date:      Quit date: 1995     Years since quittin.5    Smokeless tobacco: Never   Substance Use Topics    Alcohol use: Not Currently     Comment: occ    Drug use: No     Family History   Problem Relation Name Age of Onset    Kidney disease Mother      Cancer Father      Heart disease Brother      Mental illness Son      Glaucoma Neg Hx      Macular degeneration Neg Hx      Retinal detachment Neg Hx       Review of patient's allergies indicates:   Allergen Reactions    Plaquenil [hydroxychloroquine]      Having eye reaction, needs to stop plaquenil and stay off of it.        Performance Status:The patient's activity level is housebound activities.      Review of Systems:  a review of eleven systems covering constitutional, Eye, HEENT, Psych, Respiratory, Cardiac, GI, , Musculoskeletal, Endocrine, Dermatologic was negative except for pertinent findings as listed ABOVE and below:  pertinent positive as above, rest is good       Exam:Comprehensive exam done. /72 (BP Location: Right arm, Patient Position: Sitting)   Pulse 79   Ht 5' 6" (1.676 m)   Wt 93 kg (205 lb 0.4 oz)   SpO2 98% Comment: on room air at rest  BMI 33.09 kg/m²   Exam included Vitals as listed, and patient's appearance and affect and alertness and mood, oral exam for yeast and hygiene and pharynx lesions and Mallapatti (M) score, neck with inspection for jvd and " masses and thyroid abnormalities and lymph nodes (supraclavicular and infraclavicular nodes and axillary also examined and noted if abn), chest exam included symmetry and effort and fremitus and percussion and auscultation, cardiac exam included rhythm and gallops and murmur and rubs and jvd and edema, abdominal exam for mass and hepatosplenomegaly and tenderness and hernias and bowel sounds, Musculoskeletal exam with muscle tone and posture and mobility/gait and  strength, and skin for rashes and cyanosis and pallor and turgor, extremity for clubbing.  Findings were normal except for pertinent findings listed below:M2, bilat rales. No  Edema mild clubbing.       Radiographs (ct chest and cxr) reviewed: view by direct vision  March 2018 ct not too bad with cysts- viewed 8/21/18  CT CHEST WITHOUT CONTRAST 03/27/2019   Severe interstitial fibrosis with peripheral honeycombing and multiple bilateral lower lobe bulla consistent with the history of scleroderma.  This is essentially unchanged from March 21, 2018.  Continued mild mediastinal adenopathy also unchanged.  No acute findings.  Small hiatal hernia.  Stable 2 cm complicated cyst of the left kidney.       Labs  noted  Lab Results   Component Value Date    WBC 5.58 02/18/2025    HGB 13.0 02/18/2025    HCT 40.4 02/18/2025    MCV 95 02/18/2025     02/18/2025        PFT results reviewed     February 10, 2022-patient dropped out pulmonary functions from February 7, 2022. Forced vital capacity was 69 percent predicted.  There was no airflow obstruction.  Total lung capacity was 72 percent of predicted.  Diffusion was down to 31 percent predicted.  Patient had a 6 minute walk also.  Patient walked about 359 meters in 6 minutes.  She was 97 percent on room air.  The low O2 sat was 89 percent while walking.  \  \  Oct 27/2020 Spirometry, lung volume by gas dilution, and diffusion capacity measured October 22, 2020. The FEV1 to FVC ratio was 73% indicating no  airflow obstruction measured by spirometry technique. The FEV1 was 81% of predicted at 2 L. fvc was 86% . Total lung capacity on lung    volume by gas dilution was 72% of predicted and a little low. Diffusion, uncorrected for anemia, was 41% of predicted and also low.   Patient has no obstruction. There is restriction measured. Diffusion is decreased. Clinical correlation recommended       5/8/2018 Pulmonary Functions, including spirometry and bronchodilator response and lung volumes and diffusion, study was done May 8, 2018.  Spirometry shows mild obstruction, loss vital capacity and obstruction and no bronchodilator response.   FEV1 is 65% or 1.68 liters.  Lung volumes show  loss of TLC with restriction 66%.  Diffusion shows reduced but falls within normal range when corrected for lung volumes.   Pulmonary functions show  Mild obstruction and also restriction. Clinical correlation recommended.     Mikal Serrano M.D.      Study was done at [facility]  Echo 10/12/2018 Normal left and right ventricular systolic functions with LVEF >55%.      [facility] 8/10/2022 [place]--Significant Diagnostic Studies: right heart cath  Right Heart Pressures RAP: 3 mmHg  RVP: 50/3 mmHg  Pulmonary artery pressure: 50/12 mmHg  Mean pulmonary artery pressure: 31 mmHg  Pulmonary capillary wedge pressure: 7 mmHg  Cardiac Output: 5.9 L/min  Cardiac index: 3 L/min/m2  Pulmonary vascular resistance: 4 HRU      6 minute walk study was accomplished February 17, 2022.  Patient walked about 359 meters in 6 minutes.  She was 97 percent on room air.  The low O2 sat was 89 percent while walking.    6 minute walk study was accomplished September 15, 2021. Baseline room air saturation was 98%. With ambulation O2 sat fell to 87% by 2 minutes. Patient subsequently 2 L and then 3 L of oxygen to maintain sat in the low 90s. At the end of 6 minutes   walking on oxygen at 3 liters/minute the O2 saturation was 96%. Patient walked about 60%  of the reference distance of 255 m.   6 min walk study was accomplished October 22, 2020. Baseline room air saturation was 98%. With ambulation O2 sat fell to 87% by 5 min. On 2 L of oxygen patient maintain sat in the low 90s subsequently. Patient walked about 133% of the reference distance   at 255 m.   6 min walk study was accomplished November 21, 2018.  Baseline room air saturation was 98%.  Walking on room air O2 sat did fall down to 91%.  Patient did walk 317 m or 73% of the reference distance    Plan:  Clinical impression is apparently straight forward and impression with management as below.     Edith was seen today for 6m f/u.    Diagnoses and all orders for this visit:    Anxiety  -     DULoxetine (CYMBALTA) 30 MG capsule; Take 2 capsules (60 mg total) by mouth once daily.  -     ALPRAZolam (XANAX) 0.5 MG tablet; Take 1 tablet (0.5 mg total) by mouth 3 (three) times daily.    Chronic pain syndrome  -     DULoxetine (CYMBALTA) 30 MG capsule; Take 2 capsules (60 mg total) by mouth once daily.    Pulmonary nodule                    Follow up in about 4 weeks (around 4/21/2025), or if symptoms worsen or fail to improve.    Discussed with patient above for education the following:      Patient Instructions   Ct and pet scan viewed ---- right lung nodule has been slowly growing, in area of scarring, since 2/2023.  Pet scan suggest cancer with no spread-- brain not well evalulated      Will order onology eval after biopsy -- you would be high risk for surgery    You have had alcohol problems in  past and have high anxiety and diffuse chronic pains-- cymbalta should help (other alternatives so stop if problems) -- start 30 mg and increase to 60 mg daily as tolerated....   cymbalta may help chronic pains...    Will give limited xanax to use in near future for stressful prolbems...        [facility]    [facility]    Will direct management of spot -- will get back with  biopsy results and refer.    Will make 4 wk recheck -- mainly for xanax/cymbalta              Eval took 43 min

## 2025-03-24 NOTE — H&P (VIEW-ONLY)
3/24/2025   3/24/2025    Edith Tran  Office Note    Chief Complaint   Patient presents with    6m f/u       HPI:    3/24/2025  F/u ct done pcp with lung lesion.  Looking back could be seen slowly enlarging since 2023 in area of fibrosis.     pt now off tyvaso as ppt cough emesis  Pt had six min walk last month after being off tyvaso-- pt was to follow up with pulm htn clinic at [place] but missed snow storm.    Pt still on adcirca,  considering change from cellcept.    Tolerates ofev well    Pt had been sign drinker in past. Having stomach issues from etoh??  She has chr severe pain and chronic anxiety.            10/2/2024 pt had covid admit 8/31-- hosp 2 days with fever, low bp. Pt still feeling poorly, uses ox for sleep and prn...  Had esophageal stretching 3 months ago-- no great improvement  Going through pulm rehab--     Patient Instructions   You are having symptoms consistent with seasonal allergies-- use zyrtec (or allergra/claritin).   If above not effective -- may use xyzal.  Could use nasal antihistamine with astelin --- paper scripts for above xyzal/astelin..    Would consider oxygen exercising...      Would follow up crp esr--- if not feeling better after antihistamine    Chest xray/ct viewed and stable...      Do pft and walk test in Jan      Use trazodone 100-150 bedtime as needed..    3/27/2024 no cough, no gi c/o on ofev, does streching and balance twice wk with walks. For right heart cath next wk.on cellcept and tyvaso, and adcirca....  Pft 10/2023 sl better thatn 10/2020 and 2/22...   dlco 7.6.    Cta lung viewed from 2/2024 compared to 2022 and 7/2023 -- no sign chagnes..  Cpap ongoing well..  Swallow difficulty somewhat worse and for endosocpy and ? Dilation?    Six min walk3/11/2024-   CLINICAL INTERPRETATION:  Six minute walk distance is 335.28 meters (1100 feet) with very heavy dyspnea.  During exercise, there was significant desaturation while breathing room air.         RHC:  Med rec complete per pt  Allergies reviewed   8/2022  Right Heart Pressures   RAP: 3 mmHg   RVP: 50/3 mmHg   Pulmonary artery pressure: 50/12 mmHg   Mean pulmonary artery pressure: 31 mmHg   Pulmonary capillary wedge pressure: 7 mmHg   Cardiac Output: 5.9 L/min   Cardiac index: 3 L/min/m2   Pulmonary vascular resistance: 4 HRU   Oxygen saturation measurements were obtained at the following locations:   SVC: 67%   Main Pulmonary Artery: 71%   During Exercise:   PAP 65/20 mmHg   mPAP 35 mmHg   PCWP 15 mmHg   CO by TD: 7.3 L/min   PVR 4 Wood units   Patient Instructions   Ct and breathing test viewed-- stable -- re check next 2025-- could do sooner if worsens.....    Rsv vaccine recommended..  In six months  - recheck to assure no new symptoms...  need ct/breathing test next yr for evaluation.    9/27/2023 no lung infections, on ofev, no bowel issues, on weight watcher and lost 30 lbs....        Patient Instructions   Ct  chest viewed and ascending aneurysm seen looking same 2/2022 to 7/2023-- you saw Dr Romero and are to have follow up ct in 6 months (no growth to our view), 12/2022 echo did not suggest valve problems       lung tissue was slight worsening from 2/2022 to 7/2023 - ofev ongoing for years.  Would check pulmonary functions     Occasionally beta blocker used for aneurysms.   You have no palpitations but have some anxiety.  Could dose low dose toprol and continue if less anxiety and no light headed/dizziness.           spirometry bronchodilator, lung volume by gas dilution, diffusion capacity measured February 17, 2022. Spirometry falls within normal limits. There is no airflow obstruction measured. The FEV1 is 85% predicted. There is no significant bronchodilator   response.       Lung volumes showed total lung capacity to be 69% of predicted and low. diffusion is also decreased to 40% of predicted.       There is evidence for restriction and loss of diffusion. There is no airflow obstruction nor bronchodilator response. Clinical correlation  recommended  Spirometry, lung volume by gas dilution, and diffusion capacity measured October 22, 2020. The FEV1 to FVC ratio was 73% indicating no airflow obstruction measured by spirometry technique. The FEV1 was 81% of predicted at 2 L. Total lung capacity on lung    volume by gas dilution was 72% of predicted and a little low. Diffusion, uncorrected for anemia, was 41% of predicted and also low.   Patient has no obstruction. There is restriction measured. Diffusion is decreased. Clinical correlation recommended        Ofev dose is maximal.  May consider increased rx Dr Wayne if pulmonary function worsens..    3/27/2023 on tyvaso since November and doing better, has bronchial infections 1-2 with amoxil   Pt on ofev -- no diarrhea of  significance.    Pt feels less sob.  Patient Instructions   Last cpap from [facility] -- should check compliance report.  Diffusion has fallen from 9.5 -October 2020 to 8.9 --2/2022 to 7.2-- 1/2023--- was 7.3 9/2022.  Diffusion may decrease from pulmonary hypertension or lung tissue disease.  Six min walk was good.   Ct chest looked better to our view 2/2022 c/w 2019 as far as lung tissue.    Use augmentin as needed, call if significant infection.  May be good to follow up ct chest later in year.  Low diffusion suggest low oxygen walking..      9/26/2022 no new issues, uses cpap with good results, uses ox for activity exercise. Misses ox concentrator--getting repairs.       Pt was having constant cough prior to august rhc.    Patient Instructions   Heart cath did not look too bad -- baseline NA.    Need to get portable oxygen concentrator repaired.    Would wish to see compliance report from cpap .    If new ct done -- bring on disc.       Evaluation took 44min to review ct and pft and rhc.    Had pft 9/21/2022 -- fvc 71%, fev1 74%, tlc 78%, dlco 27%.  Six min walk 349 meters in 6 min, and needed with 2 lpm needed.  Pft worsened from 2/2022.  Ox needs stable    Cough remitted -- was  constant.     Swallows ok.  Eats slowly.      [facility] last month-- [facility].    3/28/2022 not using symbicort, dose use occ albuterol which ppt headaches??    Feels like stb le-- uses ox more as feels more sob.  [facility]      Uses cpap -- had ahi 6.3 in past.  Patient Instructions   Need compliance report from cpap?  Ahi was 6.3 yrs ago.  You are compliant.    Ct chest and 6 min walk and pulm functions are stable to sl better.    9/16/2021 - six min walk distance same as October 2020 at 255 m, batteries on poc run out doing errands over 2 h rs..    Not monitoring ox walking.  ues 3lpm pulse.    Uses filter on cpap to get new.   Had 3 vaccines .   On rx and bladder doing better.   Pt not needing rescue,use symbicort.    Ct in 2019, and pft 2020.   Patient Instructions   Would do ct chest and pft and six min walk prior to seeing next rheumatologist, or in 6 months.     Continue opsumit, tadalafil, symbicort, ofev,     Call if needed    Re check 6 months.  3/16/2021 concerned re covid vaccine and immujne suppression.  Breathing stable, has bladder urgency, has cpap mask problems - like f30 airfit.      Patient Instructions     Six min walk today walked 255 meters, and oxygen fell at 3 minutes to 88, and needed 3 lpm oxygen- 3/16/2021      6 min walk study was accomplished October 22, 2020. Baseline room air saturation was 98%. With ambulation O2 sat fell to 87% by 5 min. On 2 L of oxygen patient maintain sat in the low 90s subsequently. Patient walked about 133% of the reference distance   at 255 m.      6 min walk on August 21, 2018 was accomplished.  Patient's baseline O2 sat was 96% on room air.  After walking in her sats fell to 88% at 5 min.  On 2 L of oxygen sat was maintained in the mid 90s.  The patient was able to walk 390 m or 91% of the   reference value.     You may have spastic bladder.  Would recheck urine.  If sign symptoms may need to see urology?       You have and use portable  oxygen concentrator. You should try to use more and be more active.      Get vaccine,      Walk studies suggest some loss in function from 2018 to 2020 but stable from October to March now.      Lung capacity was fairly good October 2020-  Spirometry, lung volume by gas dilution, and diffusion capacity measured October 22, 2020. The FEV1 to FVC ratio was 73% indicating no airflow obstruction measured by spirometry technique. The FEV1 was 81% of predicted at 2 L. Total lung capacity on lung    volume by gas dilution was 72% of predicted and a little low. Diffusion, uncorrected for anemia, was 41% of predicted and also low.   Patient has no obstruction. There is restriction measured. Diffusion is decreased. Clinical correlation recommended           Would do a six min walk prior to return in 6 months.    9/30/2020- uses 02 out of house, had scratchy throat with am cough lately. Uses cpap nightly all night usually 4-7 hrs/night.      Sees Dr Arboleda- Dr Barnes left.    Had been seeing Dr Goddard, had pft and 6 min walk, saw Dr Goddard on 8/3/2020 - told all stable,  Cannot locate pft /walk test in care everywhere.  Usually studied yearly.  Pt feels stable otherwise.  3/27/2019 ct chest viewed with ild/honeycombing.  Follows for ofev trial at [facility].    Pt continues on ofev and cellcept and cialis and opsumit     Patient Instructions   If antibiotic needed - azithromycin daily for 3 days, last 10, simple antibiotic.     Prednisone 5 mg - may use if cough bad.    Should have result of right heart cath, and august pulm function and six min walk- will ask to obtain.  Will need to make sure can continue opsumit/cialias    Can follow six min walk every 3 months - placed standing.    You need to stay on ofev for pulmonary fibrosis, uses tadalafil and optusmit for pulmonary hypertension.  October 8, 2019- Has PFT and 6 min walk scheduled Nov 2019 by Dr. Barnes Rheumatologist at [place]. Wearing supplemental oxygen at home  day/night set at 2L NC, currently on Symbicort daily, states benefit in breathing. No recent prednisone use. SOB controlled. No cough, no chest tightness, no wheeze.   Wear cpap nightly, states benefiting greatly but want different mask, tries to read before bed, mask over nasal bridge does not allow glasses to rest correctly.  Patient Instructions   Order sent for different CPAP mask  Continue to use nightly for Obstructive sleep apnea    Continue supplemental oxygen    Will review the results of PFT and 6 min walk from [place] at next appointment.    Continue Symbicort daily.     Use Fela perles and prednisone as needed for cough.     April 3, 2019- Onset 1 week: Cough productive white in color, sore throat, post nasal drip, sinus drainage yellow, flushed cheeks, complaints improving tx with antibiotics no prednisone use. Cough worse when lying down.    Current therapy for scleroderma, pulmonary htn, and pulmonary fibrosis Opsumit/tadafanil and Ofev/cellcept. No diarrhea no complaints.  Currently using Symbicort 1 puff daily. Using supplemental oxygen at 2L NC for Shortness of breath, states greatly beneficial, pt states able to walk further and keep up with company while on oxygen.      Nov 27, trelegy not covered- not using, had flu shot 8 am with sorenes later day and huge swollen arm with  Pain thhat night.   No cough. On opsumit/tadafanil, and on ofev/cellcept.  Stable. No diarrhea.  Sees pulm htn and  [facility].  Last 6 min walk 02  Angelo 91 slow pace.      Aug 21, uses trelegy, no c/o.  No 0x arranges- sat 90 falls to 87 at 2 mins- walked 307 meters or 71%.  Dx Crohn's.  No abx nor prednisone.  cpap good and sinus good.  Instructions:Would monitor 6 min walk every 3 months.    307 meters was last distance.  Six min walk.  Monitor ct chest yearly in March - sooner if worsens.  Needs 02 for activity.  Use medications for bronchitis.   Stay active.    July11,2018has had raynauld's for yrs, pt had severe  "mobility problems issues leading to dx slceroderma 2007 - "rock bottom".  Has had swallow sticking with  2-3 dilations with last over 3 yrs ago.  Pt has had pulm htn on opsumit and talafadil,  Pt also on ofev in a study, and cellcept.  Also low dose prednisone.  Pt dx osas and uses cpap nightly 8 hrs - had used 02 but off 02 for 3 yrs.   Pt had had 6 min walks but none recently- none last yr at least.  Pt gets bronchitis with cough and yellow mucous.  Has occ wheezes.  Had exacerbations in feb and June - typically 2-3 yrly.  Tessalon helps.  Uses combivent occ ppt headaches with some breathing benefit.     Uses flonase regular.  Was on asthma rx for yrs.        The chief compliant  problem is stable  PFSH:  Past Medical History:   Diagnosis Date    Allergy     Arthritis     Back pain     Colon polyps     COPD (chronic obstructive pulmonary disease)     Emphysema of lung     GERD (gastroesophageal reflux disease)     Hypertension     Kidney stones     Obesity     Pneumonia     Pneumonia due to other staphylococcus     Pulmonary fibrosis     Pulmonary hypertension     Scleroderma involving lung since she was 47 y/o    Sleep apnea     Trouble in sleeping          Past Surgical History:   Procedure Laterality Date    CATARACT EXTRACTION W/  INTRAOCULAR LENS IMPLANT Right 10/30/2024    Procedure: CEIOL OD;  Surgeon: Stephen Desir MD;  Location: [facility] OR;  Service: Ophthalmology;  Laterality: Right;    CATARACT EXTRACTION W/  INTRAOCULAR LENS IMPLANT Left 12/4/2024    Procedure: CEIOL OS;  Surgeon: Stephen Desir MD;  Location: [facility] OR;  Service: Ophthalmology;  Laterality: Left;    COLONOSCOPY N/A 8/7/2018    Procedure: COLONOSCOPY;  Surgeon: Ngozi Prince MD;  Location: [facility] ENDO;  Service: Endoscopy;  Laterality: N/A;    COLONOSCOPY N/A 11/21/2019    Procedure: COLONOSCOPY;  Surgeon: Ngozi Prince MD;  Location: [facility] ENDO;  Service: Endoscopy;  Laterality: N/A;    COLONOSCOPY, SCREENING, LOW RISK " PATIENT N/A 12/10/2024    Procedure: COLONOSCOPY, SCREENING, LOW RISK PATIENT;  Surgeon: Ngozi Prince MD;  Location: [facility];  Service: Endoscopy;  Laterality: N/A;    ESOPHAGEAL MANOMETRY WITH MEASUREMENT OF IMPEDANCE N/A 7/27/2020    Procedure: MANOMETRY, ESOPHAGUS, WITH IMPEDANCE MEASUREMENT;  Surgeon: Bhupendra Temple MD;  Location: [facility] ENDO (4TH FLR);  Service: Endoscopy;  Laterality: N/A;  3/10 - [facility]    ESOPHAGOGASTRODUODENOSCOPY N/A 8/7/2018    Procedure: EGD (ESOPHAGOGASTRODUODENOSCOPY);  Surgeon: Ngozi Prince MD;  Location: [facility];  Service: Endoscopy;  Laterality: N/A;    ESOPHAGOGASTRODUODENOSCOPY N/A 11/21/2019    Procedure: EGD (ESOPHAGOGASTRODUODENOSCOPY);  Surgeon: Ngozi Prince MD;  Location: [facility];  Service: Endoscopy;  Laterality: N/A;    ESOPHAGOGASTRODUODENOSCOPY N/A 1/29/2020    Procedure: EGD (ESOPHAGOGASTRODUODENOSCOPY);  Surgeon: Ngozi Prince MD;  Location: [facility];  Service: Endoscopy;  Laterality: N/A;    ESOPHAGOGASTRODUODENOSCOPY N/A 8/20/2020    Procedure: EGD (ESOPHAGOGASTRODUODENOSCOPY);  Surgeon: Ngozi Prince MD;  Location: [facility];  Service: Endoscopy;  Laterality: N/A;    ESOPHAGOGASTRODUODENOSCOPY N/A 12/4/2020    Procedure: EGD (ESOPHAGOGASTRODUODENOSCOPY);  Surgeon: Ngozi Prince MD;  Location: [facility];  Service: Endoscopy;  Laterality: N/A;    ESOPHAGOGASTRODUODENOSCOPY N/A 11/19/2021    Procedure: EGD (ESOPHAGOGASTRODUODENOSCOPY);  Surgeon: Ngozi Prince MD;  Location: [facility];  Service: Endoscopy;  Laterality: N/A;    ESOPHAGOGASTRODUODENOSCOPY N/A 5/17/2024    Procedure: EGD (ESOPHAGOGASTRODUODENOSCOPY);  Surgeon: Ngozi Prince MD;  Location: [facility];  Service: Endoscopy;  Laterality: N/A;    ESOPHAGOGASTRODUODENOSCOPY N/A 12/10/2024    Procedure: EGD (ESOPHAGOGASTRODUODENOSCOPY);  Surgeon: Ngozi Prince MD;  Location: [facility];  Service: Endoscopy;   Laterality: N/A;    ESOPHAGOGASTRODUODENOSCOPY N/A 3/12/2025    Procedure: EGD (ESOPHAGOGASTRODUODENOSCOPY);  Surgeon: Ngozi Prince MD;  Location: [facility];  Service: Endoscopy;  Laterality: N/A;    REPAIR, HERNIA, INCISIONAL OR VENTRAL, WITHOUT HISTORY OF PRIOR REPAIR N/A 2022    Procedure: REPAIR, HERNIA umbilical, INCISIONAL OR VENTRAL, WITHOUT HISTORY OF PRIOR REPAIR;  Surgeon: Trent Evans MD;  Location: [facility] OR;  Service: General;  Laterality: N/A;  RN PREOP 2022, PT INSTRUCTED TO BRING ALL MEDS WITH HER ON AM OF SURGERY    RIGHT HEART CATHETERIZATION Right 2024    Procedure: INSERTION, CATHETER, RIGHT HEART;  Surgeon: Puma Contreras MD;  Location: [facility] CATH LAB;  Service: Cardiology;  Laterality: Right;     Social History     Tobacco Use    Smoking status: Former     Current packs/day: 0.00     Average packs/day: 0.5 packs/day for 27.7 years (13.8 ttl pk-yrs)     Types: Cigarettes     Start date:      Quit date: 1995     Years since quittin.5    Smokeless tobacco: Never   Substance Use Topics    Alcohol use: Not Currently     Comment: occ    Drug use: No     Family History   Problem Relation Name Age of Onset    Kidney disease Mother      Cancer Father      Heart disease Brother      Mental illness Son      Glaucoma Neg Hx      Macular degeneration Neg Hx      Retinal detachment Neg Hx       Review of patient's allergies indicates:   Allergen Reactions    Plaquenil [hydroxychloroquine]      Having eye reaction, needs to stop plaquenil and stay off of it.        Performance Status:The patient's activity level is housebound activities.      Review of Systems:  a review of eleven systems covering constitutional, Eye, HEENT, Psych, Respiratory, Cardiac, GI, , Musculoskeletal, Endocrine, Dermatologic was negative except for pertinent findings as listed ABOVE and below:  pertinent positive as above, rest is good       Exam:Comprehensive exam done. /72 (BP Location:  "Right arm, Patient Position: Sitting)   Pulse 79   Ht 5' 6" (1.676 m)   Wt 93 kg (205 lb 0.4 oz)   SpO2 98% Comment: on room air at rest  BMI 33.09 kg/m²   Exam included Vitals as listed, and patient's appearance and affect and alertness and mood, oral exam for yeast and hygiene and pharynx lesions and Mallapatti (M) score, neck with inspection for jvd and masses and thyroid abnormalities and lymph nodes (supraclavicular and infraclavicular nodes and axillary also examined and noted if abn), chest exam included symmetry and effort and fremitus and percussion and auscultation, cardiac exam included rhythm and gallops and murmur and rubs and jvd and edema, abdominal exam for mass and hepatosplenomegaly and tenderness and hernias and bowel sounds, Musculoskeletal exam with muscle tone and posture and mobility/gait and  strength, and skin for rashes and cyanosis and pallor and turgor, extremity for clubbing.  Findings were normal except for pertinent findings listed below:M2, bilat rales. No  Edema nor clubbing.       Radiographs (ct chest and cxr) reviewed: view by direct vision  March 2018 ct not too bad with cysts- viewed 8/21/18  CT CHEST WITHOUT CONTRAST 03/27/2019   Severe interstitial fibrosis with peripheral honeycombing and multiple bilateral lower lobe bulla consistent with the history of scleroderma.  This is essentially unchanged from March 21, 2018.  Continued mild mediastinal adenopathy also unchanged.  No acute findings.  Small hiatal hernia.  Stable 2 cm complicated cyst of the left kidney.       Labs  noted  Lab Results   Component Value Date    WBC 5.58 02/18/2025    HGB 13.0 02/18/2025    HCT 40.4 02/18/2025    MCV 95 02/18/2025     02/18/2025        PFT results reviewed   Pulmonary Functions, including spirometry and bronchodilator response and lung volumes and diffusion, study was done May 8, 2018.  Spirometry shows mild obstruction, loss vital capacity and obstruction and no " bronchodilator response.   FEV1 is 65% or 1.68 liters.  Lung volumes show  loss of TLC with restriction 66%.  Diffusion shows reduced but falls within normal range when corrected for lung volumes.   Pulmonary functions show  Mild obstruction and also restriction. Clinical correlation recommended.   Mikal Serrano M.D.    Echo 10/12/2018 Normal left and right ventricular systolic functions with LVEF >55%.    Plan:  Clinical impression is apparently straight forward and impression with management as below.     Edith was seen today for 6m f/u.    Diagnoses and all orders for this visit:    Anxiety  -     DULoxetine (CYMBALTA) 30 MG capsule; Take 2 capsules (60 mg total) by mouth once daily.  -     ALPRAZolam (XANAX) 0.5 MG tablet; Take 1 tablet (0.5 mg total) by mouth 3 (three) times daily.    Chronic pain syndrome  -     DULoxetine (CYMBALTA) 30 MG capsule; Take 2 capsules (60 mg total) by mouth once daily.    Pulmonary nodule                    Follow up in about 4 weeks (around 4/21/2025), or if symptoms worsen or fail to improve.    Discussed with patient above for education the following:      Patient Instructions   Ct and pet scan viewed ---- right lung nodule has been slowly growing, in area of scarring, since 2/2023.  Pet scan suggest cancer with no spread-- brain not well evalulated      Will order onology eval after biopsy -- you would be high risk for surgery    You have had alcohol problems in  past and have high anxiety and diffuse chronic pains-- cymbalta should help (other alternatives so stop if problems) -- start 30 mg and increase to 60 mg daily as tolerated....   cymbalta may help chronic pains...    Will give limited xanax to use in near future for stressful prolbems...        Would follow up with pulmonary htn clinic as off tyvaso.    Continue ofev    Will direct management of spot -- will get back with biopsy results and refer.    Will make 4 wk recheck -- mainly for xanax/cymbalta          Edith TALLEY  Tran  Office Note    Chief Complaint   Patient presents with    6m f/u       HPI:    9/16/2021 - six min walk distance same as October 2020 at 255 m, batteries on poc run out doing errands over 2 h rs..    Not monitoring ox walking.  ues 3lpm pulse.    Uses filter on cpap to get new.   Had 3 vaccines .   On rx and bladder doing better.   Pt not needing rescue,use symbicort.        Ct in 2019, and pft 2020.     Patient Instructions   Would do ct chest and pft and six min walk prior to seeing next rheumatologist, or in 6 months.     Continue opsumit, tadalafil, symbicort, ofev,     Call if needed    Re check 6 months.    3/16/2021 concerned re covid vaccine and immujne suppression.  Breathing stable, has bladder urgency, has cpap mask problems - like f30 airfit.      Patient Instructions     Six min walk today walked 255 meters, and oxygen fell at 3 minutes to 88, and needed 3 lpm oxygen- 3/16/2021      6 min walk study was accomplished October 22, 2020. Baseline room air saturation was 98%. With ambulation O2 sat fell to 87% by 5 min. On 2 L of oxygen patient maintain sat in the low 90s subsequently. Patient walked about 133% of the reference distance   at 255 m.      6 min walk on August 21, 2018 was accomplished.  Patient's baseline O2 sat was 96% on room air.  After walking in her sats fell to 88% at 5 min.  On 2 L of oxygen sat was maintained in the mid 90s.  The patient was able to walk 390 m or 91% of the   reference value.     You may have spastic bladder.  Would recheck urine.  If sign symptoms may need to see urology?       You have and use portable oxygen concentrator. You should try to use more and be more active.      Get vaccine,      Walk studies suggest some loss in function from 2018 to 2020 but stable from October to March now.      Lung capacity was fairly good October 2020-  Spirometry, lung volume by gas dilution, and diffusion capacity measured October 22, 2020. The FEV1 to FVC ratio was 73%  indicating no airflow obstruction measured by spirometry technique. The FEV1 was 81% of predicted at 2 L. Total lung capacity on lung    volume by gas dilution was 72% of predicted and a little low. Diffusion, uncorrected for anemia, was 41% of predicted and also low.   Patient has no obstruction. There is restriction measured. Diffusion is decreased. Clinical correlation recommended           Would do a six min walk prior to return in 6 months.    9/30/2020- uses 02 out of house, had scratchy throat with am cough lately. Uses cpap nightly all night usually 4-7 hrs/night.      Sees Dr Arboleda- Dr Barnes left.    Had been seeing Dr Goddard, had pft and 6 min walk, saw Dr Goddard on 8/3/2020 - told all stable,  Cannot locate pft /walk test in care everywhere.  Usually studied yearly.  Pt feels stable otherwise.  3/27/2019 ct chest viewed with ild/honeycombing.  Follows for ofev trial [place] [facility].    Pt continues on ofev and cellcept and cialis and opsumit     Patient Instructions   If antibiotic needed - azithromycin daily for 3 days, last 10, simple antibiotic.     Prednisone 5 mg - may use if cough bad.    Should have result of right heart cath, and august pulm function and six min walk- will ask to obtain.  Will need to make sure can continue opsumit/cialias    Can follow six min walk every 3 months - placed standing.    You need to stay on ofev for pulmonary fibrosis, uses tadalafil and optusmit for pulmonary hypertension.  October 8, 2019- Has PFT and 6 min walk scheduled Nov 2019 by Dr. Barnes Rheumatologist at [place]. Wearing supplemental oxygen at home day/night set at 2L NC, currently on Symbicort daily, states benefit in breathing. No recent prednisone use. SOB controlled. No cough, no chest tightness, no wheeze.   Wear cpap nightly, states benefiting greatly but want different mask, tries to read before bed, mask over nasal bridge does not allow glasses to rest correctly.  Patient Instructions   Order sent  "for different CPAP mask  Continue to use nightly for Obstructive sleep apnea    Continue supplemental oxygen    Will review the results of PFT and 6 min walk from [place] at next appointment.    Continue Symbicort daily.     Use Fela perles and prednisone as needed for cough.     April 3, 2019- Onset 1 week: Cough productive white in color, sore throat, post nasal drip, sinus drainage yellow, flushed cheeks, complaints improving tx with antibiotics no prednisone use. Cough worse when lying down.    Current therapy for scleroderma, pulmonary htn, and pulmonary fibrosis Opsumit/tadafanil and Ofev/cellcept. No diarrhea no complaints.  Currently using Symbicort 1 puff daily. Using supplemental oxygen at 2L NC for Shortness of breath, states greatly beneficial, pt states able to walk further and keep up with company while on oxygen.      Nov 27, trelegy not covered- not using, had flu shot 8 am with sorenes later day and huge swollen arm with  Pain thhat night.   No cough. On opsumit/tadafanil, and on ofev/cellcept.  Stable. No diarrhea.  Sees pulm htn and  [facility].  Last 6 min walk 02  Angelo 91 slow pace.      Aug 21, uses trelegy, no c/o.  No 0x arranges- sat 90 falls to 87 at 2 mins- walked 307 meters or 71%.  Dx Crohn's.  No abx nor prednisone.  cpap good and sinus good.  Instructions:Would monitor 6 min walk every 3 months.    307 meters was last distance.  Six min walk.  Monitor ct chest yearly in March - sooner if worsens.  Needs 02 for activity.  Use medications for bronchitis.   Stay active.    July11,2018has had raynauld's for yrs, pt had severe mobility problems issues leading to dx slceroderma 2007 - "rock bottom".  Has had swallow sticking with  2-3 dilations with last over 3 yrs ago.  Pt has had pulm htn on opsumit and talafadil,  Pt also on ofev in a study, and cellcept.  Also low dose prednisone.  Pt dx osas and uses cpap nightly 8 hrs - had used 02 but off 02 for 3 yrs.   Pt had had 6 min walks but " none recently- none last yr at least.  Pt gets bronchitis with cough and yellow mucous.  Has occ wheezes.  Had exacerbations in feb and June - typically 2-3 yrly.  Tessalon helps.  Uses combivent occ ppt headaches with some breathing benefit.     Uses flonase regular.  Was on asthma rx for yrs.        The chief compliant  problem is stable  PFSH:  Past Medical History:   Diagnosis Date    Allergy     Arthritis     Back pain     Colon polyps     COPD (chronic obstructive pulmonary disease)     Emphysema of lung     GERD (gastroesophageal reflux disease)     Hypertension     Kidney stones     Obesity     Pneumonia     Pneumonia due to other staphylococcus     Pulmonary fibrosis     Pulmonary hypertension     Scleroderma involving lung since she was 47 y/o    Sleep apnea     Trouble in sleeping          Past Surgical History:   Procedure Laterality Date    CATARACT EXTRACTION W/  INTRAOCULAR LENS IMPLANT Right 10/30/2024    Procedure: CEIOL OD;  Surgeon: Stephen Desir MD;  Location: [facility] OR;  Service: Ophthalmology;  Laterality: Right;    CATARACT EXTRACTION W/  INTRAOCULAR LENS IMPLANT Left 12/4/2024    Procedure: CEIOL OS;  Surgeon: Stephen Desir MD;  Location: [facility] OR;  Service: Ophthalmology;  Laterality: Left;    COLONOSCOPY N/A 8/7/2018    Procedure: COLONOSCOPY;  Surgeon: Ngozi Prince MD;  Location: [facility];  Service: Endoscopy;  Laterality: N/A;    COLONOSCOPY N/A 11/21/2019    Procedure: COLONOSCOPY;  Surgeon: Ngozi Prince MD;  Location: [facility];  Service: Endoscopy;  Laterality: N/A;    COLONOSCOPY, SCREENING, LOW RISK PATIENT N/A 12/10/2024    Procedure: COLONOSCOPY, SCREENING, LOW RISK PATIENT;  Surgeon: Ngozi Prince MD;  Location: [facility];  Service: Endoscopy;  Laterality: N/A;    ESOPHAGEAL MANOMETRY WITH MEASUREMENT OF IMPEDANCE N/A 7/27/2020    Procedure: MANOMETRY, ESOPHAGUS, WITH IMPEDANCE MEASUREMENT;  Surgeon: Bhupendra Temple MD;  [facility] ([facility]);   Service: Endoscopy;  Laterality: N/A;  3/10 - pt confirmed apptCovid test ordered for 7/24 in [place]    ESOPHAGOGASTRODUODENOSCOPY N/A 8/7/2018    Procedure: EGD (ESOPHAGOGASTRODUODENOSCOPY);  Surgeon: Ngozi Prince MD;  Location: [facility] ENDO;  Service: Endoscopy;  Laterality: N/A;    ESOPHAGOGASTRODUODENOSCOPY N/A 11/21/2019    Procedure: EGD (ESOPHAGOGASTRODUODENOSCOPY);  Surgeon: Ngozi Prince MD;  Location: [facility] ENDO;  Service: Endoscopy;  Laterality: N/A;    ESOPHAGOGASTRODUODENOSCOPY N/A 1/29/2020    Procedure: EGD (ESOPHAGOGASTRODUODENOSCOPY);  Surgeon: Ngozi Prince MD;  Location: [facility];  Service: Endoscopy;  Laterality: N/A;    ESOPHAGOGASTRODUODENOSCOPY N/A 8/20/2020    Procedure: EGD (ESOPHAGOGASTRODUODENOSCOPY);  Surgeon: Ngozi Prince MD;  Location: [facility];  Service: Endoscopy;  Laterality: N/A;    ESOPHAGOGASTRODUODENOSCOPY N/A 12/4/2020    Procedure: EGD (ESOPHAGOGASTRODUODENOSCOPY);  Surgeon: Ngozi Prince MD;  Location: [facility] ENDO;  Service: Endoscopy;  Laterality: N/A;    ESOPHAGOGASTRODUODENOSCOPY N/A 11/19/2021    Procedure: EGD (ESOPHAGOGASTRODUODENOSCOPY);  Surgeon: Ngozi Prince MD;  Location: [facility];  Service: Endoscopy;  Laterality: N/A;    ESOPHAGOGASTRODUODENOSCOPY N/A 5/17/2024    Procedure: EGD (ESOPHAGOGASTRODUODENOSCOPY);  Surgeon: Ngozi Prince MD;  Location: [facility] ENDO;  Service: Endoscopy;  Laterality: N/A;    ESOPHAGOGASTRODUODENOSCOPY N/A 12/10/2024    Procedure: EGD (ESOPHAGOGASTRODUODENOSCOPY);  Surgeon: Ngozi Prince MD;  Location: [facility];  Service: Endoscopy;  Laterality: N/A;    ESOPHAGOGASTRODUODENOSCOPY N/A 3/12/2025    Procedure: EGD (ESOPHAGOGASTRODUODENOSCOPY);  Surgeon: Ngozi Prince MD;  Location: [facility];  Service: Endoscopy;  Laterality: N/A;    REPAIR, HERNIA, INCISIONAL OR VENTRAL, WITHOUT HISTORY OF PRIOR REPAIR N/A 12/27/2022    Procedure: REPAIR, HERNIA umbilical, INCISIONAL OR VENTRAL, WITHOUT  "HISTORY OF PRIOR REPAIR;  Surgeon: Trent Evans MD;  Location: [facility] OR;  Service: General;  Laterality: N/A;  RN PREOP 2022, PT INSTRUCTED TO BRING ALL MEDS WITH HER ON AM OF SURGERY    RIGHT HEART CATHETERIZATION Right 2024    Procedure: INSERTION, CATHETER, RIGHT HEART;  Surgeon: Puma Contreras MD;  Location: [facility] CATH LAB;  Service: Cardiology;  Laterality: Right;     Social History     Tobacco Use    Smoking status: Former     Current packs/day: 0.00     Average packs/day: 0.5 packs/day for 27.7 years (13.8 ttl pk-yrs)     Types: Cigarettes     Start date:      Quit date: 1995     Years since quittin.5    Smokeless tobacco: Never   Substance Use Topics    Alcohol use: Not Currently     Comment: occ    Drug use: No     Family History   Problem Relation Name Age of Onset    Kidney disease Mother      Cancer Father      Heart disease Brother      Mental illness Son      Glaucoma Neg Hx      Macular degeneration Neg Hx      Retinal detachment Neg Hx       Review of patient's allergies indicates:   Allergen Reactions    Plaquenil [hydroxychloroquine]      Having eye reaction, needs to stop plaquenil and stay off of it.        Performance Status:The patient's activity level is housebound activities.      Review of Systems:  a review of eleven systems covering constitutional, Eye, HEENT, Psych, Respiratory, Cardiac, GI, , Musculoskeletal, Endocrine, Dermatologic was negative except for pertinent findings as listed ABOVE and below:  pertinent positive as above, rest is good       Exam:Comprehensive exam done. /72 (BP Location: Right arm, Patient Position: Sitting)   Pulse 79   Ht 5' 6" (1.676 m)   Wt 93 kg (205 lb 0.4 oz)   SpO2 98% Comment: on room air at rest  BMI 33.09 kg/m²   Exam included Vitals as listed, and patient's appearance and affect and alertness and mood, oral exam for yeast and hygiene and pharynx lesions and Mallapatti (M) score, neck with inspection for jvd and " masses and thyroid abnormalities and lymph nodes (supraclavicular and infraclavicular nodes and axillary also examined and noted if abn), chest exam included symmetry and effort and fremitus and percussion and auscultation, cardiac exam included rhythm and gallops and murmur and rubs and jvd and edema, abdominal exam for mass and hepatosplenomegaly and tenderness and hernias and bowel sounds, Musculoskeletal exam with muscle tone and posture and mobility/gait and  strength, and skin for rashes and cyanosis and pallor and turgor, extremity for clubbing.  Findings were normal except for pertinent findings listed below:M2, bilat rales. No  Edema mild clubbing.       Radiographs (ct chest and cxr) reviewed: view by direct vision  March 2018 ct not too bad with cysts- viewed 8/21/18  CT CHEST WITHOUT CONTRAST 03/27/2019   Severe interstitial fibrosis with peripheral honeycombing and multiple bilateral lower lobe bulla consistent with the history of scleroderma.  This is essentially unchanged from March 21, 2018.  Continued mild mediastinal adenopathy also unchanged.  No acute findings.  Small hiatal hernia.  Stable 2 cm complicated cyst of the left kidney.       Labs  noted  Lab Results   Component Value Date    WBC 5.58 02/18/2025    HGB 13.0 02/18/2025    HCT 40.4 02/18/2025    MCV 95 02/18/2025     02/18/2025        PFT results reviewed     February 10, 2022-patient dropped out pulmonary functions from February 7, 2022. Forced vital capacity was 69 percent predicted.  There was no airflow obstruction.  Total lung capacity was 72 percent of predicted.  Diffusion was down to 31 percent predicted.  Patient had a 6 minute walk also.  Patient walked about 359 meters in 6 minutes.  She was 97 percent on room air.  The low O2 sat was 89 percent while walking.  \  \  Oct 27/2020 Spirometry, lung volume by gas dilution, and diffusion capacity measured October 22, 2020. The FEV1 to FVC ratio was 73% indicating no  airflow obstruction measured by spirometry technique. The FEV1 was 81% of predicted at 2 L. fvc was 86% . Total lung capacity on lung    volume by gas dilution was 72% of predicted and a little low. Diffusion, uncorrected for anemia, was 41% of predicted and also low.   Patient has no obstruction. There is restriction measured. Diffusion is decreased. Clinical correlation recommended       5/8/2018 Pulmonary Functions, including spirometry and bronchodilator response and lung volumes and diffusion, study was done May 8, 2018.  Spirometry shows mild obstruction, loss vital capacity and obstruction and no bronchodilator response.   FEV1 is 65% or 1.68 liters.  Lung volumes show  loss of TLC with restriction 66%.  Diffusion shows reduced but falls within normal range when corrected for lung volumes.   Pulmonary functions show  Mild obstruction and also restriction. Clinical correlation recommended.     Mikal Serrano M.D.      Study was done at [facility]  Echo 10/12/2018 Normal left and right ventricular systolic functions with LVEF >55%.      Rhc 8/10/2022 [place]--Significant Diagnostic Studies: right heart cath  Right Heart Pressures RAP: 3 mmHg  RVP: 50/3 mmHg  Pulmonary artery pressure: 50/12 mmHg  Mean pulmonary artery pressure: 31 mmHg  Pulmonary capillary wedge pressure: 7 mmHg  Cardiac Output: 5.9 L/min  Cardiac index: 3 L/min/m2  Pulmonary vascular resistance: 4 HRU      6 minute walk study was accomplished February 17, 2022.  Patient walked about 359 meters in 6 minutes.  She was 97 percent on room air.  The low O2 sat was 89 percent while walking.    6 minute walk study was accomplished September 15, 2021. Baseline room air saturation was 98%. With ambulation O2 sat fell to 87% by 2 minutes. Patient subsequently 2 L and then 3 L of oxygen to maintain sat in the low 90s. At the end of 6 minutes   walking on oxygen at 3 liters/minute the O2 saturation was 96%. Patient walked about 60%  of the reference distance of 255 m.   6 min walk study was accomplished October 22, 2020. Baseline room air saturation was 98%. With ambulation O2 sat fell to 87% by 5 min. On 2 L of oxygen patient maintain sat in the low 90s subsequently. Patient walked about 133% of the reference distance   at 255 m.   6 min walk study was accomplished November 21, 2018.  Baseline room air saturation was 98%.  Walking on room air O2 sat did fall down to 91%.  Patient did walk 317 m or 73% of the reference distance    Plan:  Clinical impression is apparently straight forward and impression with management as below.     Edith was seen today for 6m f/u.    Diagnoses and all orders for this visit:    Anxiety  -     DULoxetine (CYMBALTA) 30 MG capsule; Take 2 capsules (60 mg total) by mouth once daily.  -     ALPRAZolam (XANAX) 0.5 MG tablet; Take 1 tablet (0.5 mg total) by mouth 3 (three) times daily.    Chronic pain syndrome  -     DULoxetine (CYMBALTA) 30 MG capsule; Take 2 capsules (60 mg total) by mouth once daily.    Pulmonary nodule                    Follow up in about 4 weeks (around 4/21/2025), or if symptoms worsen or fail to improve.    Discussed with patient above for education the following:      Patient Instructions   Ct and pet scan viewed ---- right lung nodule has been slowly growing, in area of scarring, since 2/2023.  Pet scan suggest cancer with no spread-- brain not well evalulated      Will order onology eval after biopsy -- you would be high risk for surgery    You have had alcohol problems in  past and have high anxiety and diffuse chronic pains-- cymbalta should help (other alternatives so stop if problems) -- start 30 mg and increase to 60 mg daily as tolerated....   cymbalta may help chronic pains...    Will give limited xanax to use in near future for stressful prolbems...        [facility]    [facility]    Will direct management of spot -- will get back with  biopsy results and refer.    Will make 4 wk recheck -- mainly for xanax/cymbalta              Eval took 43 min

## 2025-03-24 NOTE — PATIENT INSTRUCTIONS
Ct and pet scan viewed ---- right lung nodule has been slowly growing, in area of scarring, since 2/2023.  Pet scan suggest cancer with no spread-- brain not well evalulated      Will order onology eval after biopsy -- you would be high risk for surgery    You have had alcohol problems in  past and have high anxiety and diffuse chronic pains-- cymbalta should help (other alternatives so stop if problems) -- start 30 mg and increase to 60 mg daily as tolerated....   cymbalta may help chronic pains...    Will give limited xanax to use in near future for stressful prolbems...        Would follow up with pulmonary htn clinic as off [facility].    Continue ofev    Will direct management of spot -- will get back with biopsy results and refer.    Will make 4 wk recheck -- mainly for xanax/cymbalta

## 2025-03-25 ENCOUNTER — TELEPHONE (OUTPATIENT)
Dept: INTERVENTIONAL RADIOLOGY/VASCULAR | Facility: HOSPITAL | Age: 70
End: 2025-03-25

## 2025-03-25 ENCOUNTER — PATIENT MESSAGE (OUTPATIENT)
Dept: INTERVENTIONAL RADIOLOGY/VASCULAR | Facility: HOSPITAL | Age: 70
End: 2025-03-25

## 2025-03-25 NOTE — NURSING
Radiology Pre-Procedural Instructions- [facility]    Radiology Nurse contacted patient to provide instructions for scheduled CT Lung Biopsy at 0900 on 4/3/25.   Sent [employer] message.   Patient instructed to arrive no later than 0730 am to outpatient registration.   Registration will direct patient to outpatient lab for pre-op lab work if required.  Following labs, patient needs to check in at the surgery waiting room desk located on the second floor.     Patient instructed to remain NPO after midnight with the exception of approved essential meds taken with a small sip of water.  Instructed patient to bathe the night before and the morning of their procedure with an anti bacterial soap or Hibiclens.   Patient is aware of their responsibility to make post transportation arrangements home by a responsible adult.

## 2025-03-25 NOTE — PROGRESS NOTES
Subjective:      Patient ID: Edith Tran is a 70 y.o. female.    Chief Complaint: Disease Management    HPI    Rheumatologic History:      - Diagnosis/es:              - limited systemic sclerosis diagnosed in  by Dr Barnes and characterized by SHILPI 1:1280 nucleolar, inflammatory arthritis, esophageal dysmotility, GERD, Raynaud's, interstitial lung disease, and pulmonary hypertension              - Crohn's disease diagnosed around 2019- active on colonoscopy 12/10/2024  - Social History: Former smoker, denies alcohol intake  - Family History: No autoimmune conditions  - Gyn History: , 3 intentional abortions  - Positive serologies: SHILPI 1:1280 nucleolar  - Negative serologies: Scl 70, RNAP III, Th/To, myomarker panel,PM/Scl, Sm/RNP, dsDNA, RF  - Infectious screening labs:  Negative hepatitis-B, C, and QuantiFERON (2023)  - TPMT normal metabolizer  - Imaging:                  - CT chest (25) Enlarging 1.5 cm soft tissue density mass in the right middle lobe is noted. This could be an infiltrate related to the patient's severe interstitial lung disease or a neoplasm and PET CT and tissue sampling may be indicated. Patient has severe interstitial lung disease and multiple bulla especially in the lower lung fields.               - TTE (25) EF >55%, LA borderline dilated              - DEXA (2024) osteopenia 7.7% risk of a major osteoporotic fracture and a 0.5% risk of hip fracture in the next 10 years (FRAX).   - Procedures:  - Procedures:               - PFT (25) pending              - RHC (2024) normal pulmonary pressures              - EMG/NCV (24) left superficial peroneal sensory neuropathy              - EGD (12/10/24) few superficial esophageal ulcers with no bleeding and no stigmata of recent bleeding; moderate Schatzki's ring found in the lower third of the esophagus s/p dilation.               - Colonoscopy (12/10/2024) diverticulosis in the sigmoid colon and in the  "descending colon. Crohn's disease with ileitis. Moderate severity inflammation was found.   - Previous Treatments:              - HCQ 200mg BID: caused "eye problems"              - MTX              - Reglan  - Tyvaso (treprostinil) QID  - Current Treatments:               - MMF 1500mg BID              - Tadalafil (Adcirca) 40mg daily  - Opsumit (macitentan) 10mg daily              - Ofev 150mg BID              - Voquezna per GI: still requesting PA               - Flexeril 10mg QHS PRN  Interval History:   She has seen pulmonology and there is concern for cancer. She is scheduled for biopsy. She has had dilation for a Schatzki's ring recently which has helped with her dysphagia, but was found to have esophageal ulcers on EGD and was advised to start Voquezna. Shortness of breath is stable. She denies abdominal pain, diarrhea, and bloody stool. She is still having joint pain and swelling in the hands, and active Raynaud's.     Objective:   /76 (BP Location: Left arm, Patient Position: Sitting)   Pulse 88   Ht 5' 6" (1.676 m)   Wt 93.8 kg (206 lb 12.7 oz)   BMI 33.38 kg/m²   Physical Exam   Constitutional: normal appearance. No distress.   HENT:   Head: Normocephalic and atraumatic.   Cardiovascular: Normal rate, regular rhythm and normal heart sounds.   Pulmonary/Chest: Effort normal. She has rales (Bibasilar).   Musculoskeletal:      Comments: Synovial hypertrophy of bilateral 2nd and 3rd MCPs  Puffy fingers   Neurological: She is alert.   Skin: Skin is warm and dry. No rash noted.   + Minimal telangiectasias  + Sclerodactyly  + Abnormal nailfold capillaries  + Cold fingers  No digital pitting       No data to display    Labs (2/18/2025)  CBC WBC, HGB, PLT WNL   CMP CR, AST, ALT WNL   ESR CRP WNL   C3 C4 WNL  UA UPC WNL   Negative dsDNA     Assessment:     1. Limited systemic sclerosis    2. Interstitial lung disease    3. Raynaud's disease without gangrene    4. Pulmonary hypertension    5. " Gastroesophageal reflux disease, unspecified whether esophagitis present    6. Drug-induced immunodeficiency    7. High risk medication use      This is a 70-year-old woman with history of AAA, insomnia, ELAINE on CPAP, esophageal stricture, diverticulosis, B12 deficiency, esophageal leukoplakia, chronic back pain, Crohn's disease, and limited systemic sclerosis diagnosed in 2009 by Dr Barnes and characterized by inflammatory arthritis, telangiectasias, esophageal dysmotility, GERD, Raynaud's, abnormal nailfold capillaries interstitial lung disease, and pulmonary hypertension on MMF 1500 mg b.i.d., Adcirca 40 mg daily, Opsumit, Ofev, and Voquezna.      Colonoscopy showed Crohn's disease with ileitis- moderate severity inflammation was found. CT chest (2/7/25) showed enlarging 1.5 cm soft tissue density mass in the right middle lobe is noted. She states that she has not had abdominal pain or diarrhea, she has had improvement in her dysphagia, and her antacids were changed and she now denies acid reflux. Her Raynaud's is active but she has not developed digital ulcers. She denies shortness of breath. I considered perhaps switching her MMF to Xeljanz to cover both Ssc ILD and Crohn's, however, I do feel that the soft tissue mass noted on CT scan will need to be investigated first. If there is concern for malignancy, Xeljanz would be contrainidicated. She is scheduled for biopsy next week.     She is still having joint pain and swelling in the hands, and active Raynaud's. Patient to ask Dr Neal if okay to increase Adcirca to 40mg BID to better control Raynaud's.    Plan:     Problem List Items Addressed This Visit          Pulmonary    Interstitial lung disease    Relevant Orders    Anti-DNA Ab, Double-Stranded    C3 Complement    C4 Complement    CBC Auto Differential    Sedimentation rate    Protein/Creatinine Ratio, Urine    C-Reactive Protein    Comprehensive Metabolic Panel    Urinalysis       Cardiac/Vascular     Raynaud's disease    Relevant Orders    Anti-DNA Ab, Double-Stranded    C3 Complement    C4 Complement    CBC Auto Differential    Sedimentation rate    Protein/Creatinine Ratio, Urine    C-Reactive Protein    Comprehensive Metabolic Panel    Urinalysis    Pulmonary hypertension    Relevant Orders    Anti-DNA Ab, Double-Stranded    C3 Complement    C4 Complement    CBC Auto Differential    Sedimentation rate    Protein/Creatinine Ratio, Urine    C-Reactive Protein    Comprehensive Metabolic Panel    Urinalysis       GI    GERD (gastroesophageal reflux disease)    Relevant Orders    Anti-DNA Ab, Double-Stranded    C3 Complement    C4 Complement    CBC Auto Differential    Sedimentation rate    Protein/Creatinine Ratio, Urine    C-Reactive Protein    Comprehensive Metabolic Panel    Urinalysis       Palliative Care    High risk medication use    Relevant Orders    Anti-DNA Ab, Double-Stranded    C3 Complement    C4 Complement    CBC Auto Differential    Sedimentation rate    Protein/Creatinine Ratio, Urine    C-Reactive Protein    Comprehensive Metabolic Panel    Urinalysis     Other Visit Diagnoses         Limited systemic sclerosis    -  Primary    Relevant Orders    Anti-DNA Ab, Double-Stranded    C3 Complement    C4 Complement    CBC Auto Differential    Sedimentation rate    Protein/Creatinine Ratio, Urine    C-Reactive Protein    Comprehensive Metabolic Panel    Urinalysis      Drug-induced immunodeficiency        Relevant Orders    Anti-DNA Ab, Double-Stranded    C3 Complement    C4 Complement    CBC Auto Differential    Sedimentation rate    Protein/Creatinine Ratio, Urine    C-Reactive Protein    Comprehensive Metabolic Panel    Urinalysis          1.) Scleroderma   - MMF 1500mg BID. Hold for infection  - Pulm HTN: Tadalafil (Adcirca) 40mg daily, Opsumit (macitentan)  - ILD: Ofev  - GERD: Voquezna  - Dysphagia: improved after dilation  - Raynaud's: active, but no ulcers; Patient to ask Dr Neal if okay  to increase Adcirca to 40mg BID to better control Raynaud's.     2.) Drug induce immunodeficiency  3.) High risk medication use  - CBC, CMP, ESR, CRP every 12 weeks  - Pre-DMARD labs yearly  - Immunizations: COVID x 4 (most recent 1/2023), flu (9/2022), PCV13 (8/2019), PPV23 (11/2019), Shingrix x 2 (2020); patient needs PCV 20  - Obtain DEXA at follow up visit     Follow up in 6 weeks    40 minutes of total time spent on the encounter, which includes face to face time and non-face to face time preparing to see the patient (eg, review of tests), Obtaining and/or reviewing separately obtained history, Documenting clinical information in the electronic or other health record, Independently interpreting results (not separately reported) and communicating results to the patient/family/caregiver, or Care coordination (not separately reported).     This note was prepared with [employer] Direct voice recognition transcription software. Garbled syntax, mangled pronouns, and other bizarre constructions may be attributed to that software system       Nieves Blanchard M.D.  Rheumatology Dept  [place], [place]

## 2025-03-26 ENCOUNTER — OFFICE VISIT (OUTPATIENT)
Dept: RHEUMATOLOGY | Facility: CLINIC | Age: 70
End: 2025-03-26
Payer: MEDICARE

## 2025-03-26 VITALS
BODY MASS INDEX: 33.24 KG/M2 | WEIGHT: 206.81 LBS | HEIGHT: 66 IN | HEART RATE: 88 BPM | DIASTOLIC BLOOD PRESSURE: 76 MMHG | SYSTOLIC BLOOD PRESSURE: 111 MMHG

## 2025-03-26 DIAGNOSIS — D84.821 DRUG-INDUCED IMMUNODEFICIENCY: ICD-10-CM

## 2025-03-26 DIAGNOSIS — I73.00 RAYNAUD'S DISEASE WITHOUT GANGRENE: ICD-10-CM

## 2025-03-26 DIAGNOSIS — I27.20 PULMONARY HYPERTENSION: ICD-10-CM

## 2025-03-26 DIAGNOSIS — Z79.899 HIGH RISK MEDICATION USE: ICD-10-CM

## 2025-03-26 DIAGNOSIS — Z79.899 DRUG-INDUCED IMMUNODEFICIENCY: ICD-10-CM

## 2025-03-26 DIAGNOSIS — J84.9 INTERSTITIAL LUNG DISEASE: ICD-10-CM

## 2025-03-26 DIAGNOSIS — K21.9 GASTROESOPHAGEAL REFLUX DISEASE, UNSPECIFIED WHETHER ESOPHAGITIS PRESENT: ICD-10-CM

## 2025-03-26 DIAGNOSIS — M34.9 LIMITED SYSTEMIC SCLEROSIS: Primary | ICD-10-CM

## 2025-03-26 PROCEDURE — 1126F AMNT PAIN NOTED NONE PRSNT: CPT | Mod: CPTII,S$GLB,, | Performed by: STUDENT IN AN ORGANIZED HEALTH CARE EDUCATION/TRAINING PROGRAM

## 2025-03-26 PROCEDURE — 1101F PT FALLS ASSESS-DOCD LE1/YR: CPT | Mod: CPTII,S$GLB,, | Performed by: STUDENT IN AN ORGANIZED HEALTH CARE EDUCATION/TRAINING PROGRAM

## 2025-03-26 PROCEDURE — 3074F SYST BP LT 130 MM HG: CPT | Mod: CPTII,S$GLB,, | Performed by: STUDENT IN AN ORGANIZED HEALTH CARE EDUCATION/TRAINING PROGRAM

## 2025-03-26 PROCEDURE — 1159F MED LIST DOCD IN RCRD: CPT | Mod: CPTII,S$GLB,, | Performed by: STUDENT IN AN ORGANIZED HEALTH CARE EDUCATION/TRAINING PROGRAM

## 2025-03-26 PROCEDURE — 99999 PR PBB SHADOW E&M-EST. PATIENT-LVL V: CPT | Mod: PBBFAC,,, | Performed by: STUDENT IN AN ORGANIZED HEALTH CARE EDUCATION/TRAINING PROGRAM

## 2025-03-26 PROCEDURE — 1160F RVW MEDS BY RX/DR IN RCRD: CPT | Mod: CPTII,S$GLB,, | Performed by: STUDENT IN AN ORGANIZED HEALTH CARE EDUCATION/TRAINING PROGRAM

## 2025-03-26 PROCEDURE — 99215 OFFICE O/P EST HI 40 MIN: CPT | Mod: S$GLB,,, | Performed by: STUDENT IN AN ORGANIZED HEALTH CARE EDUCATION/TRAINING PROGRAM

## 2025-03-26 PROCEDURE — 3078F DIAST BP <80 MM HG: CPT | Mod: CPTII,S$GLB,, | Performed by: STUDENT IN AN ORGANIZED HEALTH CARE EDUCATION/TRAINING PROGRAM

## 2025-03-26 PROCEDURE — 3008F BODY MASS INDEX DOCD: CPT | Mod: CPTII,S$GLB,, | Performed by: STUDENT IN AN ORGANIZED HEALTH CARE EDUCATION/TRAINING PROGRAM

## 2025-03-26 PROCEDURE — 3288F FALL RISK ASSESSMENT DOCD: CPT | Mod: CPTII,S$GLB,, | Performed by: STUDENT IN AN ORGANIZED HEALTH CARE EDUCATION/TRAINING PROGRAM

## 2025-03-26 ASSESSMENT — ROUTINE ASSESSMENT OF PATIENT INDEX DATA (RAPID3)
PAIN SCORE: 7
PATIENT GLOBAL ASSESSMENT SCORE: 6.5
MDHAQ FUNCTION SCORE: 0.8
PSYCHOLOGICAL DISTRESS SCORE: 4.4
FATIGUE SCORE: 1.1
TOTAL RAPID3 SCORE: 5.39

## 2025-04-03 ENCOUNTER — HOSPITAL ENCOUNTER (OUTPATIENT)
Dept: RADIOLOGY | Facility: HOSPITAL | Age: 70
Discharge: HOME | End: 2025-04-03
Attending: INTERNAL MEDICINE
Payer: MEDICARE

## 2025-04-03 ENCOUNTER — HOSPITAL ENCOUNTER (OUTPATIENT)
Dept: RADIOLOGY | Facility: HOSPITAL | Age: 70
Discharge: HOME | End: 2025-04-03
Attending: RADIOLOGY
Payer: MEDICARE

## 2025-04-03 VITALS
HEIGHT: 66 IN | BODY MASS INDEX: 33.24 KG/M2 | SYSTOLIC BLOOD PRESSURE: 108 MMHG | WEIGHT: 206.81 LBS | OXYGEN SATURATION: 98 % | HEART RATE: 68 BPM | TEMPERATURE: 98 F | RESPIRATION RATE: 16 BRPM | DIASTOLIC BLOOD PRESSURE: 66 MMHG

## 2025-04-03 DIAGNOSIS — R91.1 SOLITARY PULMONARY NODULE: Primary | ICD-10-CM

## 2025-04-03 DIAGNOSIS — R94.2 ABNORMAL PET SCAN OF LUNG: ICD-10-CM

## 2025-04-03 PROCEDURE — 63600175 PHARM REV CODE 636 W HCPCS: Performed by: RADIOLOGY

## 2025-04-03 PROCEDURE — 32408 CORE NDL BX LNG/MED PERQ: CPT | Mod: ,,, | Performed by: RADIOLOGY

## 2025-04-03 PROCEDURE — 71045 X-RAY EXAM CHEST 1 VIEW: CPT | Mod: 26,76,, | Performed by: RADIOLOGY

## 2025-04-03 PROCEDURE — 71045 X-RAY EXAM CHEST 1 VIEW: CPT | Mod: TC

## 2025-04-03 PROCEDURE — 63600175 PHARM REV CODE 636 W HCPCS

## 2025-04-03 PROCEDURE — 71045 X-RAY EXAM CHEST 1 VIEW: CPT | Mod: 26,,, | Performed by: RADIOLOGY

## 2025-04-03 PROCEDURE — 27200940 CT BIOPSY LUNG W/ GUIDANCE

## 2025-04-03 RX ORDER — LIDOCAINE HYDROCHLORIDE 10 MG/ML
1 INJECTION, SOLUTION EPIDURAL; INFILTRATION; INTRACAUDAL; PERINEURAL ONCE AS NEEDED
Status: DISCONTINUED | OUTPATIENT
Start: 2025-04-03 | End: 2025-04-04 | Stop reason: HOSPADM

## 2025-04-03 RX ORDER — FENTANYL CITRATE 50 UG/ML
INJECTION, SOLUTION INTRAMUSCULAR; INTRAVENOUS
Status: COMPLETED | OUTPATIENT
Start: 2025-04-03 | End: 2025-04-03

## 2025-04-03 RX ORDER — FENTANYL CITRATE 50 UG/ML
INJECTION, SOLUTION INTRAMUSCULAR; INTRAVENOUS
Status: DISCONTINUED
Start: 2025-04-03 | End: 2025-04-03 | Stop reason: HOSPADM

## 2025-04-03 RX ORDER — MIDAZOLAM HYDROCHLORIDE 1 MG/ML
INJECTION, SOLUTION INTRAMUSCULAR; INTRAVENOUS
Status: COMPLETED | OUTPATIENT
Start: 2025-04-03 | End: 2025-04-03

## 2025-04-03 RX ORDER — MIDAZOLAM HYDROCHLORIDE 2 MG/2ML
INJECTION, SOLUTION INTRAMUSCULAR; INTRAVENOUS
Status: DISCONTINUED
Start: 2025-04-03 | End: 2025-04-03 | Stop reason: HOSPADM

## 2025-04-03 RX ORDER — LIDOCAINE HYDROCHLORIDE 10 MG/ML
INJECTION, SOLUTION INFILTRATION; PERINEURAL
Status: COMPLETED
Start: 2025-04-03 | End: 2025-04-03

## 2025-04-03 RX ADMIN — FENTANYL CITRATE 25 MCG: 0.05 INJECTION, SOLUTION INTRAMUSCULAR; INTRAVENOUS at 09:04

## 2025-04-03 RX ADMIN — MIDAZOLAM 1 MG: 1 INJECTION INTRAMUSCULAR; INTRAVENOUS at 09:04

## 2025-04-03 RX ADMIN — LIDOCAINE HYDROCHLORIDE 100 MG: 10 INJECTION, SOLUTION INFILTRATION; PERINEURAL at 09:04

## 2025-04-03 NOTE — NURSING
Procedure explained and pt consented in DSU by Dr. Ricardo.   Pt arrived via stretcher to CT for CT Guided Lung Biopsy.   AAOx4,- Time out performed.     Pt received Fentanyl 25 mcg and Versed 1 mg IV. Vital signs monitored and remained stable for duration of procedure. Biopsies collected By Radiologist.   Specimens submitted to lab for Pathology.   Bandage applied to pts right chest . CDI- Report given to MIKAYLA Shah  Pt transported to DSU  STAT Post- Chest CT - WNL   STAT Post- Chest  Xray - completed and awaiting dictation    PATIENT TO REMAIN NPO UNTIL AFTER 3 HOUR TIMED XRAY TO ENSURE CHEST TUBE IS NOT NEEDED.     Timed chest Xray ordered and due at 1235 and must be cleared by Dr. Ricardo prior to pts discharge.

## 2025-04-04 ENCOUNTER — RESULTS FOLLOW-UP (OUTPATIENT)
Dept: GASTROENTEROLOGY | Facility: HOSPITAL | Age: 70
End: 2025-04-04

## 2025-04-21 ENCOUNTER — OFFICE VISIT (OUTPATIENT)
Dept: PULMONOLOGY | Facility: CLINIC | Age: 70
End: 2025-04-21
Payer: MEDICARE

## 2025-04-21 VITALS
HEIGHT: 66 IN | BODY MASS INDEX: 32.81 KG/M2 | DIASTOLIC BLOOD PRESSURE: 80 MMHG | WEIGHT: 204.13 LBS | SYSTOLIC BLOOD PRESSURE: 117 MMHG | HEART RATE: 90 BPM | OXYGEN SATURATION: 96 %

## 2025-04-21 DIAGNOSIS — C34.91 ADENOCARCINOMA OF RIGHT LUNG: Primary | ICD-10-CM

## 2025-04-21 PROCEDURE — 1101F PT FALLS ASSESS-DOCD LE1/YR: CPT | Mod: CPTII,S$GLB,, | Performed by: INTERNAL MEDICINE

## 2025-04-21 PROCEDURE — 99213 OFFICE O/P EST LOW 20 MIN: CPT | Mod: S$GLB,,, | Performed by: INTERNAL MEDICINE

## 2025-04-21 PROCEDURE — 3074F SYST BP LT 130 MM HG: CPT | Mod: CPTII,S$GLB,, | Performed by: INTERNAL MEDICINE

## 2025-04-21 PROCEDURE — 3008F BODY MASS INDEX DOCD: CPT | Mod: CPTII,S$GLB,, | Performed by: INTERNAL MEDICINE

## 2025-04-21 PROCEDURE — 3079F DIAST BP 80-89 MM HG: CPT | Mod: CPTII,S$GLB,, | Performed by: INTERNAL MEDICINE

## 2025-04-21 PROCEDURE — 4010F ACE/ARB THERAPY RXD/TAKEN: CPT | Mod: CPTII,S$GLB,, | Performed by: INTERNAL MEDICINE

## 2025-04-21 PROCEDURE — 3288F FALL RISK ASSESSMENT DOCD: CPT | Mod: CPTII,S$GLB,, | Performed by: INTERNAL MEDICINE

## 2025-04-21 PROCEDURE — 99999 PR PBB SHADOW E&M-EST. PATIENT-LVL III: CPT | Mod: PBBFAC,,, | Performed by: INTERNAL MEDICINE

## 2025-04-21 RX ORDER — NIFEDIPINE 30 MG/1
30 TABLET, EXTENDED RELEASE ORAL NIGHTLY
COMMUNITY
Start: 2025-04-15 | End: 2025-10-12

## 2025-04-21 NOTE — PATIENT INSTRUCTIONS
Could taper off cymbalta-- one tablet daily to one every other day for 1-2 wks , then stop?    We discuss radiation  and lung cancer treatment and follow up.  Expect radiation or cancer doc to follow up ct and pet later..    We could view ct later but would need scans on a disc..

## 2025-04-21 NOTE — PROGRESS NOTES
4/21/2025 4/21/2025    Edith Tran  Office Note    Chief Complaint   Patient presents with    Follow-up    Pulmonary Nodules    Interstitial Lung Disease       HPI:    April 21, 2025-the patient was seen by Oncology at [facility].  She has been diagnosis adenocarcinoma.  Oncology felt patient was not surgical because of pulmonary hypertension in the underlying lung disease.  The patient is have follow up with Radiation Oncology.  Molecular testing is in progress.  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  No xanax use -- started on cymbalta with no clear help.      Tyvaso resumed at lower dose.      3/24/2025  F/u ct done pcp with lung lesion.  Looking back could be seen slowly enlarging since 2023 in area of fibrosis.     pt now off tyvaso as ppt cough emesis  Pt had six min walk last month after being off tyvaso-- pt was to follow up with pulm htn clinic at [place] but missed snow storm.    Pt still on adcirca,  considering change from cellcept.    Tolerates ofev well    Pt had been sign drinker in past. Having stomach issues from etoh??  She has chr severe pain and chronic anxiety.  Patient Instructions   Ct and pet scan viewed ---- right lung nodule has been slowly growing, in area of scarring, since 2/2023.  Pet scan suggest cancer with no spread-- brain not well evalulated      Will order onology eval after biopsy -- you would be high risk for surgery    You have had alcohol problems in  past and have high anxiety and diffuse chronic pains-- cymbalta should help (other alternatives so stop if problems) -- start 30 mg and increase to 60 mg daily as tolerated....   cymbalta may help chronic pains...    Will give limited xanax to use in near future for stressful prolbems...        Would follow up with pulmonary htn clinic as off tyvaso.    Continue ofev    Will direct management of spot -- will get back with biopsy results and refer.    Will make 4 wk recheck -- mainly for xanax/cymbalta              10/2/2024 pt had covid admit 8/31-- hosp 2 days with fever, low bp. Pt still feeling poorly, uses ox for sleep and prn...  Had esophageal stretching 3 months ago-- no great improvement  Going through pulm rehab--     Patient Instructions   You are having symptoms consistent with seasonal allergies-- use zyrtec (or allergra/claritin).   If above not effective -- may use xyzal.  Could use nasal antihistamine with astelin --- paper scripts for above xyzal/astelin..    Would consider oxygen exercising...      Would follow up crp esr--- if not feeling better after antihistamine    Chest xray/ct viewed and stable...      Do pft and walk test in Jan      Use trazodone 100-150 bedtime as needed..    3/27/2024 no cough, no gi c/o on ofev, does streching and balance twice wk with walks. For right heart cath next wk.on cellcept and tyvaso, and adcirca....  Pft 10/2023 sl better thatn 10/2020 and 2/22...   dlco 7.6.    Cta lung viewed from 2/2024 compared to 2022 and 7/2023 -- no sign chagnes..  Cpap ongoing well..  Swallow difficulty somewhat worse and for endosocpy and ? Dilation?    Six min walk3/11/2024-   CLINICAL INTERPRETATION:  Six minute walk distance is 335.28 meters (1100 feet) with very heavy dyspnea.  During exercise, there was significant desaturation while breathing room air.         RHC: 8/2022  Right Heart Pressures   RAP: 3 mmHg   RVP: 50/3 mmHg   Pulmonary artery pressure: 50/12 mmHg   Mean pulmonary artery pressure: 31 mmHg   Pulmonary capillary wedge pressure: 7 mmHg   Cardiac Output: 5.9 L/min   Cardiac index: 3 L/min/m2   Pulmonary vascular resistance: 4 HRU   Oxygen saturation measurements were obtained at the following locations:   SVC: 67%   Main Pulmonary Artery: 71%   During Exercise:   PAP 65/20 mmHg   mPAP 35 mmHg   PCWP 15 mmHg   CO by TD: 7.3 L/min   PVR 4 Wood units   Patient Instructions   Ct and breathing test viewed-- stable -- re check next 2025-- could do sooner if worsens.....     Rsv vaccine recommended..  In six months  - recheck to assure no new symptoms...  need ct/breathing test next yr for evaluation.    9/27/2023 no lung infections, on ofev, no bowel issues, on weight watcher and lost 30 lbs....        Patient Instructions   Ct  chest viewed and ascending aneurysm seen looking same 2/2022 to 7/2023-- you saw Dr Romero and are to have follow up ct in 6 months (no growth to our view), 12/2022 echo did not suggest valve problems       lung tissue was slight worsening from 2/2022 to 7/2023 - ofev ongoing for years.  Would check pulmonary functions     Occasionally beta blocker used for aneurysms.   You have no palpitations but have some anxiety.  Could dose low dose toprol and continue if less anxiety and no light headed/dizziness.           spirometry bronchodilator, lung volume by gas dilution, diffusion capacity measured February 17, 2022. Spirometry falls within normal limits. There is no airflow obstruction measured. The FEV1 is 85% predicted. There is no significant bronchodilator   response.       Lung volumes showed total lung capacity to be 69% of predicted and low. diffusion is also decreased to 40% of predicted.       There is evidence for restriction and loss of diffusion. There is no airflow obstruction nor bronchodilator response. Clinical correlation recommended  Spirometry, lung volume by gas dilution, and diffusion capacity measured October 22, 2020. The FEV1 to FVC ratio was 73% indicating no airflow obstruction measured by spirometry technique. The FEV1 was 81% of predicted at 2 L. Total lung capacity on lung    volume by gas dilution was 72% of predicted and a little low. Diffusion, uncorrected for anemia, was 41% of predicted and also low.   Patient has no obstruction. There is restriction measured. Diffusion is decreased. Clinical correlation recommended        Ofev dose is maximal.  May consider increased rx Dr Wayne if pulmonary function worsens..    3/27/2023  on tyvaso since November and doing better, has bronchial infections 1-2 with amoxil   Pt on ofev -- no diarrhea of  significance.    Pt feels less sob.  Patient Instructions   Last cpap from [facility] -- should check compliance report.  Diffusion has fallen from 9.5 -October 2020 to 8.9 --2/2022 to 7.2-- 1/2023--- was 7.3 9/2022.  Diffusion may decrease from pulmonary hypertension or lung tissue disease.  Six min walk was good.   Ct chest looked better to our view 2/2022 c/w 2019 as far as lung tissue.    Use augmentin as needed, call if significant infection.  May be good to follow up ct chest later in year.  Low diffusion suggest low oxygen walking..      9/26/2022 no new issues, uses cpap with good results, uses ox for activity exercise. Misses ox concentrator--getting repairs.       Pt was having constant cough prior to august rhc.    Patient Instructions   Heart cath did not look too bad -- baseline NA.    Need to get portable oxygen concentrator repaired.    Would wish to see compliance report from cpap .    If new ct done -- bring on disc.       Evaluation took 44min to review ct and pft and rhc.    Had pft 9/21/2022 -- fvc 71%, fev1 74%, tlc 78%, dlco 27%.  Six min walk 349 meters in 6 min, and needed with 2 lpm needed.  Pft worsened from 2/2022.  Ox needs stable    Cough remitted -- was constant.     Swallows ok.  Eats slowly.      Rhc last month-- [place].    3/28/2022 not using symbicort, dose use occ albuterol which ppt headaches??    Feels like stb le-- uses ox more as feels more sob.  [facility]      Uses cpap -- had ahi 6.3 in past.  Patient Instructions   Need compliance report from cpap?  Ahi was 6.3 yrs ago.  You are compliant.    Ct chest and 6 min walk and pulm functions are stable to sl better.    9/16/2021 - six min walk distance same as October 2020 at 255 m, batteries on poc run out doing errands over 2 h rs..    Not monitoring ox walking.  ues 3lpm pulse.    Uses filter  on cpap to get new.   Had 3 vaccines .   On rx and bladder doing better.   Pt not needing rescue,use symbicort.    Ct in 2019, and pft 2020.   Patient Instructions   Would do ct chest and pft and six min walk prior to seeing next rheumatologist, or in 6 months.     Continue opsumit, tadalafil, symbicort, ofev,     Call if needed    Re check 6 months.  3/16/2021 concerned re covid vaccine and immujne suppression.  Breathing stable, has bladder urgency, has cpap mask problems - like f30 airfit.      Patient Instructions     Six min walk today walked 255 meters, and oxygen fell at 3 minutes to 88, and needed 3 lpm oxygen- 3/16/2021      6 min walk study was accomplished October 22, 2020. Baseline room air saturation was 98%. With ambulation O2 sat fell to 87% by 5 min. On 2 L of oxygen patient maintain sat in the low 90s subsequently. Patient walked about 133% of the reference distance   at 255 m.      6 min walk on August 21, 2018 was accomplished.  Patient's baseline O2 sat was 96% on room air.  After walking in her sats fell to 88% at 5 min.  On 2 L of oxygen sat was maintained in the mid 90s.  The patient was able to walk 390 m or 91% of the   reference value.     You may have spastic bladder.  Would recheck urine.  If sign symptoms may need to see urology?       You have and use portable oxygen concentrator. You should try to use more and be more active.      Get vaccine,      Walk studies suggest some loss in function from 2018 to 2020 but stable from October to March now.      Lung capacity was fairly good October 2020-  Spirometry, lung volume by gas dilution, and diffusion capacity measured October 22, 2020. The FEV1 to FVC ratio was 73% indicating no airflow obstruction measured by spirometry technique. The FEV1 was 81% of predicted at 2 L. Total lung capacity on lung    volume by gas dilution was 72% of predicted and a little low. Diffusion, uncorrected for anemia, was 41% of predicted and also low.    Patient has no obstruction. There is restriction measured. Diffusion is decreased. Clinical correlation recommended           Would do a six min walk prior to return in 6 months.    9/30/2020- uses 02 out of house, had scratchy throat with am cough lately. Uses cpap nightly all night usually 4-7 hrs/night.      Sees Dr Arboleda- Dr Barnes left.    Had been seeing Dr Goddard, had pft and 6 min walk, saw Dr Goddard on 8/3/2020 - told all stable,  Cannot locate pft /walk test in care everywhere.  Usually studied yearly.  Pt feels stable otherwise.  3/27/2019 ct chest viewed with ild/honeycombing.  Follows for ofev trial at [facility].    Pt continues on ofev and cellcept and cialis and opsumit     Patient Instructions   If antibiotic needed - azithromycin daily for 3 days, last 10, simple antibiotic.     Prednisone 5 mg - may use if cough bad.    Should have result of right heart cath, and august pulm function and six min walk- will ask to obtain.  Will need to make sure can continue opsumit/cialias    Can follow six min walk every 3 months - placed standing.    You need to stay on ofev for pulmonary fibrosis, uses tadalafil and optusmit for pulmonary hypertension.  October 8, 2019- Has PFT and 6 min walk scheduled Nov 2019 by Dr. Barnes Rheumatologist at [place]. Wearing supplemental oxygen at home day/night set at 2L NC, currently on Symbicort daily, states benefit in breathing. No recent prednisone use. SOB controlled. No cough, no chest tightness, no wheeze.   Wear cpap nightly, states benefiting greatly but want different mask, tries to read before bed, mask over nasal bridge does not allow glasses to rest correctly.  Patient Instructions   Order sent for different CPAP mask  Continue to use nightly for Obstructive sleep apnea    Continue supplemental oxygen    Will review the results of PFT and 6 min walk from [place] at next appointment.    Continue Symbicort daily.     Use Fela perles and prednisone as needed  "for cough.     April 3, 2019- Onset 1 week: Cough productive white in color, sore throat, post nasal drip, sinus drainage yellow, flushed cheeks, complaints improving tx with antibiotics no prednisone use. Cough worse when lying down.    Current therapy for scleroderma, pulmonary htn, and pulmonary fibrosis Opsumit/tadafanil and Ofev/cellcept. No diarrhea no complaints.  Currently using Symbicort 1 puff daily. Using supplemental oxygen at 2L NC for Shortness of breath, states greatly beneficial, pt states able to walk further and keep up with company while on oxygen.      Nov 27, trelegy not covered- not using, had flu shot 8 am with sorenes later day and huge swollen arm with  Pain thhat night.   No cough. On opsumit/tadafanil, and on ofev/cellcept.  Stable. No diarrhea.  Sees pulm htn and  [facility].  Last 6 min walk 02  Angelo 91 slow pace.      Aug 21, uses trelegy, no c/o.  No 0x arranges- sat 90 falls to 87 at 2 mins- walked 307 meters or 71%.  Dx Crohn's.  No abx nor prednisone.  cpap good and sinus good.  Instructions:Would monitor 6 min walk every 3 months.    307 meters was last distance.  Six min walk.  Monitor ct chest yearly in March - sooner if worsens.  Needs 02 for activity.  Use medications for bronchitis.   Stay active.    July11,2018has had raynauld's for yrs, pt had severe mobility problems issues leading to dx slceroderma 2007 - "rock bottom".  Has had swallow sticking with  2-3 dilations with last over 3 yrs ago.  Pt has had pulm htn on opsumit and talafadil,  Pt also on ofev in a study, and cellcept.  Also low dose prednisone.  Pt dx osas and uses cpap nightly 8 hrs - had used 02 but off 02 for 3 yrs.   Pt had had 6 min walks but none recently- none last yr at least.  Pt gets bronchitis with cough and yellow mucous.  Has occ wheezes.  Had exacerbations in feb and June - typically 2-3 yrly.  Tessalon helps.  Uses combivent occ ppt headaches with some breathing benefit.     Uses flonase regular.  " Was on asthma rx for yrs.        The chief compliant  problem is stable  PFSH:  Past Medical History:   Diagnosis Date    Allergy     Arthritis     Back pain     Colon polyps     COPD (chronic obstructive pulmonary disease)     Emphysema of lung     GERD (gastroesophageal reflux disease)     Hypertension     Kidney stones     Obesity     Pneumonia     Pneumonia due to other staphylococcus     Pulmonary fibrosis     Pulmonary hypertension     Scleroderma involving lung since she was 47 y/o    Sleep apnea     Trouble in sleeping          Past Surgical History:   Procedure Laterality Date    CATARACT EXTRACTION W/  INTRAOCULAR LENS IMPLANT Right 10/30/2024    Procedure: CEIOL OD;  Surgeon: Stephen Desir MD;  Location: [facility] OR;  Service: Ophthalmology;  Laterality: Right;    CATARACT EXTRACTION W/  INTRAOCULAR LENS IMPLANT Left 12/4/2024    Procedure: CEIOL OS;  Surgeon: Stephen Desir MD;  Location: [facility] OR;  Service: Ophthalmology;  Laterality: Left;    COLONOSCOPY N/A 8/7/2018    Procedure: COLONOSCOPY;  Surgeon: Ngozi Prince MD;  Location: [facility];  Service: Endoscopy;  Laterality: N/A;    COLONOSCOPY N/A 11/21/2019    Procedure: COLONOSCOPY;  Surgeon: Ngozi Prince MD;  Location: [facility];  Service: Endoscopy;  Laterality: N/A;    COLONOSCOPY, SCREENING, LOW RISK PATIENT N/A 12/10/2024    Procedure: COLONOSCOPY, SCREENING, LOW RISK PATIENT;  Surgeon: Ngozi Prince MD;  Location: [facility];  Service: Endoscopy;  Laterality: N/A;    ESOPHAGEAL MANOMETRY WITH MEASUREMENT OF IMPEDANCE N/A 7/27/2020    Procedure: MANOMETRY, ESOPHAGUS, WITH IMPEDANCE MEASUREMENT;  Surgeon: Bhupendra Temple MD;  Location: [facility] ([address] [place] [address]);  Service: Endoscopy;  Laterality: N/A;  3/10 - pt confirmed apptCovid test ordered for 7/24 in [place].[facility]    ESOPHAGOGASTRODUODENOSCOPY N/A 8/7/2018    Procedure: EGD (ESOPHAGOGASTRODUODENOSCOPY);  Surgeon: Ngozi Prince MD;  Location: [facility];  Service: Endoscopy;   Laterality: N/A;    ESOPHAGOGASTRODUODENOSCOPY N/A 11/21/2019    Procedure: EGD (ESOPHAGOGASTRODUODENOSCOPY);  Surgeon: Ngozi Prince MD;  Location: [facility];  Service: Endoscopy;  Laterality: N/A;    ESOPHAGOGASTRODUODENOSCOPY N/A 1/29/2020    Procedure: EGD (ESOPHAGOGASTRODUODENOSCOPY);  Surgeon: Ngozi Prince MD;  Location: [facility];  Service: Endoscopy;  Laterality: N/A;    ESOPHAGOGASTRODUODENOSCOPY N/A 8/20/2020    Procedure: EGD (ESOPHAGOGASTRODUODENOSCOPY);  Surgeon: Ngozi Pirnce MD;  Location: [facility];  Service: Endoscopy;  Laterality: N/A;    ESOPHAGOGASTRODUODENOSCOPY N/A 12/4/2020    Procedure: EGD (ESOPHAGOGASTRODUODENOSCOPY);  Surgeon: Ngozi Prince MD;  Location: [facility];  Service: Endoscopy;  Laterality: N/A;    ESOPHAGOGASTRODUODENOSCOPY N/A 11/19/2021    Procedure: EGD (ESOPHAGOGASTRODUODENOSCOPY);  Surgeon: Ngozi Prince MD;  Location: [facility];  Service: Endoscopy;  Laterality: N/A;    ESOPHAGOGASTRODUODENOSCOPY N/A 5/17/2024    Procedure: EGD (ESOPHAGOGASTRODUODENOSCOPY);  Surgeon: Ngozi Prince MD;  [facility];  Service: Endoscopy;  Laterality: N/A;    ESOPHAGOGASTRODUODENOSCOPY N/A 12/10/2024    Procedure: EGD (ESOPHAGOGASTRODUODENOSCOPY);  Surgeon: Ngozi Prince MD;  [facility];  Service: Endoscopy;  Laterality: N/A;    ESOPHAGOGASTRODUODENOSCOPY N/A 3/12/2025    Procedure: EGD (ESOPHAGOGASTRODUODENOSCOPY);  Surgeon: Ngozi Prince MD;  [facility];  Service: Endoscopy;  Laterality: N/A;    REPAIR, HERNIA, INCISIONAL OR VENTRAL, WITHOUT HISTORY OF PRIOR REPAIR N/A 12/27/2022    Procedure: REPAIR, HERNIA umbilical, INCISIONAL OR VENTRAL, WITHOUT HISTORY OF PRIOR REPAIR;  Surgeon: Trent Evans MD;  Location: [facility];  Service: General;  Laterality: N/A;  RN PREOP 12/20/2022, PT INSTRUCTED TO BRING ALL MEDS WITH HER ON AM OF SURGERY    RIGHT HEART CATHETERIZATION Right 4/4/2024    Procedure: INSERTION, CATHETER,  "RIGHT HEART;  Surgeon: Puma Contreras MD;  Location: [facility] CATH LAB;  Service: Cardiology;  Laterality: Right;     Social History     Tobacco Use    Smoking status: Former     Current packs/day: 0.00     Average packs/day: 0.5 packs/day for 27.7 years (13.8 ttl pk-yrs)     Types: Cigarettes     Start date:      Quit date: 1995     Years since quittin.6    Smokeless tobacco: Never   Substance Use Topics    Alcohol use: Not Currently     Comment: occ    Drug use: No     Family History   Problem Relation Name Age of Onset    Kidney disease Mother      Cancer Father      Heart disease Brother      Mental illness Son      Glaucoma Neg Hx      Macular degeneration Neg Hx      Retinal detachment Neg Hx       Review of patient's allergies indicates:   Allergen Reactions    Plaquenil [hydroxychloroquine]      Having eye reaction, needs to stop plaquenil and stay off of it.        Performance Status:The patient's activity level is housebound activities.      Review of Systems:  a review of eleven systems covering constitutional, Eye, HEENT, Psych, Respiratory, Cardiac, GI, , Musculoskeletal, Endocrine, Dermatologic was negative except for pertinent findings as listed ABOVE and below:  pertinent positive as above, rest is good       Exam:Comprehensive exam done. /80 (BP Location: Right arm, Patient Position: Sitting)   Pulse 90   Ht 5' 6" (1.676 m)   Wt 92.6 kg (204 lb 2.3 oz)   SpO2 96% Comment: on room air at rest  BMI 32.95 kg/m²   Exam included Vitals as listed, and patient's appearance and affect and alertness and mood, oral exam for yeast and hygiene and pharynx lesions and Mallapatti (M) score, neck with inspection for jvd and masses and thyroid abnormalities and lymph nodes (supraclavicular and infraclavicular nodes and axillary also examined and noted if abn), chest exam included symmetry and effort and fremitus and percussion and auscultation, cardiac exam included rhythm and gallops and " murmur and rubs and jvd and edema, abdominal exam for mass and hepatosplenomegaly and tenderness and hernias and bowel sounds, Musculoskeletal exam with muscle tone and posture and mobility/gait and  strength, and skin for rashes and cyanosis and pallor and turgor, extremity for clubbing.  Findings were normal except for pertinent findings listed below:M2, bilat rales. No  Edema nor clubbing.       Radiographs (ct chest and cxr) reviewed: view by direct vision  March 2018 ct not too bad with cysts- viewed 8/21/18  CT CHEST WITHOUT CONTRAST 03/27/2019   Severe interstitial fibrosis with peripheral honeycombing and multiple bilateral lower lobe bulla consistent with the history of scleroderma.  This is essentially unchanged from March 21, 2018.  Continued mild mediastinal adenopathy also unchanged.  No acute findings.  Small hiatal hernia.  Stable 2 cm complicated cyst of the left kidney.       Labs  noted  Lab Results   Component Value Date    WBC 4.96 04/03/2025    HGB 12.9 04/03/2025    HCT 40.1 04/03/2025    MCV 93 04/03/2025     04/03/2025        PFT results reviewed   Pulmonary Functions, including spirometry and bronchodilator response and lung volumes and diffusion, study was done May 8, 2018.  Spirometry shows mild obstruction, loss vital capacity and obstruction and no bronchodilator response.   FEV1 is 65% or 1.68 liters.  Lung volumes show  loss of TLC with restriction 66%.  Diffusion shows reduced but falls within normal range when corrected for lung volumes.   Pulmonary functions show  Mild obstruction and also restriction. Clinical correlation recommended.   Mikal Serrano M.D.    Echo 10/12/2018 Normal left and right ventricular systolic functions with LVEF >55%.    Plan:  Clinical impression is apparently straight forward and impression with management as below.     Edith was seen today for follow-up, pulmonary nodules and interstitial lung disease.    Diagnoses and all orders for this  visit:    Adenocarcinoma of right lung                      Follow up in about 6 months (around 10/21/2025), or if symptoms worsen or fail to improve.    Discussed with patient above for education the following:      Patient Instructions   Could taper off cymbalta-- one tablet daily to one every other day for 1-2 wks , then stop?    We discuss radiation  and lung cancer treatment and follow up.  Expect radiation or cancer doc to follow up ct and pet later..    We could view ct later but would need scans on a disc..                Edith Tran  Office Note    Chief Complaint   Patient presents with    Follow-up    Pulmonary Nodules    Interstitial Lung Disease       HPI:    9/16/2021 - six min walk distance same as October 2020 at 255 m, batteries on poc run out doing errands over 2 h rs..    Not monitoring ox walking.  ues 3lpm pulse.    Uses filter on cpap to get new.   Had 3 vaccines .   On rx and bladder doing better.   Pt not needing rescue,use symbicort.        Ct in 2019, and pft 2020.     Patient Instructions   Would do ct chest and pft and six min walk prior to seeing next rheumatologist, or in 6 months.     Continue opsumit, tadalafil, symbicort, ofev,     Call if needed    Re check 6 months.    3/16/2021 concerned re covid vaccine and immujne suppression.  Breathing stable, has bladder urgency, has cpap mask problems - like f30 airfit.      Patient Instructions     Six min walk today walked 255 meters, and oxygen fell at 3 minutes to 88, and needed 3 lpm oxygen- 3/16/2021      6 min walk study was accomplished October 22, 2020. Baseline room air saturation was 98%. With ambulation O2 sat fell to 87% by 5 min. On 2 L of oxygen patient maintain sat in the low 90s subsequently. Patient walked about 133% of the reference distance   at 255 m.      6 min walk on August 21, 2018 was accomplished.  Patient's baseline O2 sat was 96% on room air.  After walking in her sats fell to 88% at 5 min.  On 2 L of oxygen  sat was maintained in the mid 90s.  The patient was able to walk 390 m or 91% of the   reference value.     You may have spastic bladder.  Would recheck urine.  If sign symptoms may need to see urology?       You have and use portable oxygen concentrator. You should try to use more and be more active.      Get vaccine,      Walk studies suggest some loss in function from 2018 to 2020 but stable from October to March now.      Lung capacity was fairly good October 2020-  Spirometry, lung volume by gas dilution, and diffusion capacity measured October 22, 2020. The FEV1 to FVC ratio was 73% indicating no airflow obstruction measured by spirometry technique. The FEV1 was 81% of predicted at 2 L. Total lung capacity on lung    volume by gas dilution was 72% of predicted and a little low. Diffusion, uncorrected for anemia, was 41% of predicted and also low.   Patient has no obstruction. There is restriction measured. Diffusion is decreased. Clinical correlation recommended           Would do a six min walk prior to return in 6 months.    9/30/2020- uses 02 out of house, had scratchy throat with am cough lately. Uses cpap nightly all night usually 4-7 hrs/night.      Sees Dr Arboleda- Dr Barnes left.    Had been seeing Dr Goddard, had pft and 6 min walk, saw Dr Goddard on 8/3/2020 - told all stable,  Cannot locate pft /walk test in care everywhere.  Usually studied yearly.  Pt feels stable otherwise.  3/27/2019 ct chest viewed with ild/honeycombing.  Follows for ofev trial at [facility].    Pt continues on ofev and cellcept and cialis and opsumit     Patient Instructions   If antibiotic needed - azithromycin daily for 3 days, last 10, simple antibiotic.     Prednisone 5 mg - may use if cough bad.    Should have result of right heart cath, and august pulm function and six min walk- will ask to obtain.  Will need to make sure can continue opsumit/cialias    Can follow six min walk every 3 months - placed standing.    You need to stay  on ofev for pulmonary fibrosis, uses tadalafil and optusmit for pulmonary hypertension.  October 8, 2019- Has PFT and 6 min walk scheduled Nov 2019 by Dr. Barnes Rheumatologist at [place]. Wearing supplemental oxygen at home day/night set at 2L NC, currently on Symbicort daily, states benefit in breathing. No recent prednisone use. SOB controlled. No cough, no chest tightness, no wheeze.   Wear cpap nightly, states benefiting greatly but want different mask, tries to read before bed, mask over nasal bridge does not allow glasses to rest correctly.  Patient Instructions   Order sent for different CPAP mask  Continue to use nightly for Obstructive sleep apnea    Continue supplemental oxygen    Will review the results of PFT and 6 min walk from [place] at next appointment.    Continue Symbicort daily.     Use Fela perles and prednisone as needed for cough.     April 3, 2019- Onset 1 week: Cough productive white in color, sore throat, post nasal drip, sinus drainage yellow, flushed cheeks, complaints improving tx with antibiotics no prednisone use. Cough worse when lying down.    Current therapy for scleroderma, pulmonary htn, and pulmonary fibrosis Opsumit/tadafanil and Ofev/cellcept. No diarrhea no complaints.  Currently using Symbicort 1 puff daily. Using supplemental oxygen at 2L NC for Shortness of breath, states greatly beneficial, pt states able to walk further and keep up with company while on oxygen.      Nov 27, trelegy not covered- not using, had flu shot 8 am with sorenes later day and huge swollen arm with  Pain thhat night.   No cough. On opsumit/tadafanil, and on ofev/cellcept.  Stable. No diarrhea.  Sees pulm htn and  [facility].  Last 6 min walk 02  Angelo 91 slow pace.      Aug 21, uses trelegy, no c/o.  No 0x arranges- sat 90 falls to 87 at 2 mins- walked 307 meters or 71%.  Dx Crohn's.  No abx nor prednisone.  cpap good and sinus good.  Instructions:Would monitor 6 min walk every 3 months.     "307 meters was last distance.  Six min walk.  Monitor ct chest yearly in March - sooner if worsens.  Needs 02 for activity.  Use medications for bronchitis.   Stay active.    July11,2018has had raynauld's for yrs, pt had severe mobility problems issues leading to dx slceroderma 2007 - "rock bottom".  Has had swallow sticking with  2-3 dilations with last over 3 yrs ago.  Pt has had pulm htn on opsumit and talafadil,  Pt also on ofev in a study, and cellcept.  Also low dose prednisone.  Pt dx osas and uses cpap nightly 8 hrs - had used 02 but off 02 for 3 yrs.   Pt had had 6 min walks but none recently- none last yr at least.  Pt gets bronchitis with cough and yellow mucous.  Has occ wheezes.  Had exacerbations in feb and June - typically 2-3 yrly.  Tessalon helps.  Uses combivent occ ppt headaches with some breathing benefit.     Uses flonase regular.  Was on asthma rx for yrs.        The chief compliant  problem is stable  PFSH:  Past Medical History:   Diagnosis Date    Allergy     Arthritis     Back pain     Colon polyps     COPD (chronic obstructive pulmonary disease)     Emphysema of lung     GERD (gastroesophageal reflux disease)     Hypertension     Kidney stones     Obesity     Pneumonia     Pneumonia due to other staphylococcus     Pulmonary fibrosis     Pulmonary hypertension     Scleroderma involving lung since she was 47 y/o    Sleep apnea     Trouble in sleeping          Past Surgical History:   Procedure Laterality Date    CATARACT EXTRACTION W/  INTRAOCULAR LENS IMPLANT Right 10/30/2024    Procedure: CEIOL OD;  Surgeon: Stephen Desir MD;  Location: [facility] OR;  Service: Ophthalmology;  Laterality: Right;    CATARACT EXTRACTION W/  INTRAOCULAR LENS IMPLANT Left 12/4/2024    Procedure: CEIOL OS;  Surgeon: Stephen Desir MD;  Location: [facility] OR;  Service: Ophthalmology;  Laterality: Left;    COLONOSCOPY N/A 8/7/2018    Procedure: COLONOSCOPY;  Surgeon: Ngozi Prince MD;  Location: [facility]" ENDO;  Service: Endoscopy;  Laterality: N/A;    COLONOSCOPY N/A 11/21/2019    Procedure: COLONOSCOPY;  Surgeon: Ngozi Prince MD;  Location: [facility];  Service: Endoscopy;  Laterality: N/A;    COLONOSCOPY, SCREENING, LOW RISK PATIENT N/A 12/10/2024    Procedure: COLONOSCOPY, SCREENING, LOW RISK PATIENT;  Surgeon: Ngozi Prince MD;  Location: [facility] ENDO;  Service: Endoscopy;  Laterality: N/A;    ESOPHAGEAL MANOMETRY WITH MEASUREMENT OF IMPEDANCE N/A 7/27/2020    Procedure: MANOMETRY, ESOPHAGUS, WITH IMPEDANCE MEASUREMENT;  Surgeon: Bhupendra Temple MD;  [facility]  Service: Endoscopy;  Laterality: N/A;  3/10 - [facility]    ESOPHAGOGASTRODUODENOSCOPY N/A 8/7/2018    Procedure: EGD (ESOPHAGOGASTRODUODENOSCOPY);  Surgeon: Ngozi Prince MD;  Location: [facility];  Service: Endoscopy;  Laterality: N/A;    ESOPHAGOGASTRODUODENOSCOPY N/A 11/21/2019    Procedure: EGD (ESOPHAGOGASTRODUODENOSCOPY);  Surgeon: Ngozi Prince MD;  Location: [facility];  Service: Endoscopy;  Laterality: N/A;    ESOPHAGOGASTRODUODENOSCOPY N/A 1/29/2020    Procedure: EGD (ESOPHAGOGASTRODUODENOSCOPY);  Surgeon: Ngozi Prince MD;  Location: [facility];  Service: Endoscopy;  Laterality: N/A;    ESOPHAGOGASTRODUODENOSCOPY N/A 8/20/2020    Procedure: EGD (ESOPHAGOGASTRODUODENOSCOPY);  Surgeon: Ngozi Prince MD;  Location: [facility];  Service: Endoscopy;  Laterality: N/A;    ESOPHAGOGASTRODUODENOSCOPY N/A 12/4/2020    Procedure: EGD (ESOPHAGOGASTRODUODENOSCOPY);  Surgeon: Ngozi Prince MD;  Location: [facility] ENDO;  Service: Endoscopy;  Laterality: N/A;    ESOPHAGOGASTRODUODENOSCOPY N/A 11/19/2021    Procedure: EGD (ESOPHAGOGASTRODUODENOSCOPY);  Surgeon: Ngozi Prince MD;  Location: [facility] ENDO;  Service: Endoscopy;  Laterality: N/A;    ESOPHAGOGASTRODUODENOSCOPY N/A 5/17/2024    Procedure: EGD (ESOPHAGOGASTRODUODENOSCOPY);  Surgeon: Ngozi Prince MD;   Location: [facility];  Service: Endoscopy;  Laterality: N/A;    ESOPHAGOGASTRODUODENOSCOPY N/A 12/10/2024    Procedure: EGD (ESOPHAGOGASTRODUODENOSCOPY);  Surgeon: Ngozi Prince MD;  Location: [facility];  Service: Endoscopy;  Laterality: N/A;    ESOPHAGOGASTRODUODENOSCOPY N/A 3/12/2025    Procedure: EGD (ESOPHAGOGASTRODUODENOSCOPY);  Surgeon: Ngozi Prince MD;  Location: [facility];  Service: Endoscopy;  Laterality: N/A;    REPAIR, HERNIA, INCISIONAL OR VENTRAL, WITHOUT HISTORY OF PRIOR REPAIR N/A 2022    Procedure: REPAIR, HERNIA umbilical, INCISIONAL OR VENTRAL, WITHOUT HISTORY OF PRIOR REPAIR;  Surgeon: Trent Evans MD;  Location: [facility] OR;  Service: General;  Laterality: N/A;  RN PREOP 2022, PT INSTRUCTED TO BRING ALL MEDS WITH HER ON AM OF SURGERY    RIGHT HEART CATHETERIZATION Right 2024    Procedure: INSERTION, CATHETER, RIGHT HEART;  Surgeon: Puma Contreras MD;  Location: [facility] CATH LAB;  Service: Cardiology;  Laterality: Right;     Social History     Tobacco Use    Smoking status: Former     Current packs/day: 0.00     Average packs/day: 0.5 packs/day for 27.7 years (13.8 ttl pk-yrs)     Types: Cigarettes     Start date:      Quit date: 1995     Years since quittin.6    Smokeless tobacco: Never   Substance Use Topics    Alcohol use: Not Currently     Comment: occ    Drug use: No     Family History   Problem Relation Name Age of Onset    Kidney disease Mother      Cancer Father      Heart disease Brother      Mental illness Son      Glaucoma Neg Hx      Macular degeneration Neg Hx      Retinal detachment Neg Hx       Review of patient's allergies indicates:   Allergen Reactions    Plaquenil [hydroxychloroquine]      Having eye reaction, needs to stop plaquenil and stay off of it.        Performance Status:The patient's activity level is housebound activities.      Review of Systems:  a review of eleven systems covering constitutional, Eye, HEENT, Psych, Respiratory,  "Cardiac, GI, , Musculoskeletal, Endocrine, Dermatologic was negative except for pertinent findings as listed ABOVE and below:  pertinent positive as above, rest is good       Exam:Comprehensive exam done. /80 (BP Location: Right arm, Patient Position: Sitting)   Pulse 90   Ht 5' 6" (1.676 m)   Wt 92.6 kg (204 lb 2.3 oz)   SpO2 96% Comment: on room air at rest  BMI 32.95 kg/m²   Exam included Vitals as listed, and patient's appearance and affect and alertness and mood, oral exam for yeast and hygiene and pharynx lesions and Mallapatti (M) score, neck with inspection for jvd and masses and thyroid abnormalities and lymph nodes (supraclavicular and infraclavicular nodes and axillary also examined and noted if abn), chest exam included symmetry and effort and fremitus and percussion and auscultation, cardiac exam included rhythm and gallops and murmur and rubs and jvd and edema, abdominal exam for mass and hepatosplenomegaly and tenderness and hernias and bowel sounds, Musculoskeletal exam with muscle tone and posture and mobility/gait and  strength, and skin for rashes and cyanosis and pallor and turgor, extremity for clubbing.  Findings were normal except for pertinent findings listed below:M2, bilat rales. No  Edema mild clubbing.       Radiographs (ct chest and cxr) reviewed: view by direct vision  March 2018 ct not too bad with cysts- viewed 8/21/18  CT CHEST WITHOUT CONTRAST 03/27/2019   Severe interstitial fibrosis with peripheral honeycombing and multiple bilateral lower lobe bulla consistent with the history of scleroderma.  This is essentially unchanged from March 21, 2018.  Continued mild mediastinal adenopathy also unchanged.  No acute findings.  Small hiatal hernia.  Stable 2 cm complicated cyst of the left kidney.       Labs  noted  Lab Results   Component Value Date    WBC 4.96 04/03/2025    HGB 12.9 04/03/2025    HCT 40.1 04/03/2025    MCV 93 04/03/2025     04/03/2025        PFT " results reviewed     February 10, 2022-patient dropped out pulmonary functions from February 7, 2022. Forced vital capacity was 69 percent predicted.  There was no airflow obstruction.  Total lung capacity was 72 percent of predicted.  Diffusion was down to 31 percent predicted.  Patient had a 6 minute walk also.  Patient walked about 359 meters in 6 minutes.  She was 97 percent on room air.  The low O2 sat was 89 percent while walking.  \  \  Oct 27/2020 Spirometry, lung volume by gas dilution, and diffusion capacity measured October 22, 2020. The FEV1 to FVC ratio was 73% indicating no airflow obstruction measured by spirometry technique. The FEV1 was 81% of predicted at 2 L. fvc was 86% . Total lung capacity on lung    volume by gas dilution was 72% of predicted and a little low. Diffusion, uncorrected for anemia, was 41% of predicted and also low.   Patient has no obstruction. There is restriction measured. Diffusion is decreased. Clinical correlation recommended       5/8/2018 Pulmonary Functions, including spirometry and bronchodilator response and lung volumes and diffusion, study was done May 8, 2018.  Spirometry shows mild obstruction, loss vital capacity and obstruction and no bronchodilator response.   FEV1 is 65% or 1.68 liters.  Lung volumes show  loss of TLC with restriction 66%.  Diffusion shows reduced but falls within normal range when corrected for lung volumes.   Pulmonary functions show  Mild obstruction and also restriction. Clinical correlation recommended.     Mikal Serrano M.D.      Study was done at [facility]  Echo 10/12/2018 Normal left and right ventricular systolic functions with LVEF >55%.      Rhc 8/10/2022 Felicity--Significant Diagnostic Studies: right heart cath  Right Heart Pressures RAP: 3 mmHg  RVP: 50/3 mmHg  Pulmonary artery pressure: 50/12 mmHg  Mean pulmonary artery pressure: 31 mmHg  Pulmonary capillary wedge pressure: 7 mmHg  Cardiac Output: 5.9  L/min  Cardiac index: 3 L/min/m2  Pulmonary vascular resistance: 4 HRU      6 minute walk study was accomplished February 17, 2022.  Patient walked about 359 meters in 6 minutes.  She was 97 percent on room air.  The low O2 sat was 89 percent while walking.    6 minute walk study was accomplished September 15, 2021. Baseline room air saturation was 98%. With ambulation O2 sat fell to 87% by 2 minutes. Patient subsequently 2 L and then 3 L of oxygen to maintain sat in the low 90s. At the end of 6 minutes   walking on oxygen at 3 liters/minute the O2 saturation was 96%. Patient walked about 60% of the reference distance of 255 m.   6 min walk study was accomplished October 22, 2020. Baseline room air saturation was 98%. With ambulation O2 sat fell to 87% by 5 min. On 2 L of oxygen patient maintain sat in the low 90s subsequently. Patient walked about 133% of the reference distance   at 255 m.   6 min walk study was accomplished November 21, 2018.  Baseline room air saturation was 98%.  Walking on room air O2 sat did fall down to 91%.  Patient did walk 317 m or 73% of the reference distance    Plan:  Clinical impression is apparently straight forward and impression with management as below.     Edith was seen today for follow-up, pulmonary nodules and interstitial lung disease.    Diagnoses and all orders for this visit:    Adenocarcinoma of right lung                      Follow up in about 6 months (around 10/21/2025), or if symptoms worsen or fail to improve.    Discussed with patient above for education the following:      Patient Instructions   Could taper off cymbalta-- one tablet daily to one every other day for 1-2 wks , then stop?    We discuss radiation  and lung cancer treatment and follow up.  Expect radiation or cancer doc to follow up ct and pet later..    We could view ct later but would need scans on a disc..                Mariselal took 20min

## 2025-04-22 ENCOUNTER — TELEPHONE (OUTPATIENT)
Dept: PULMONOLOGY | Facility: CLINIC | Age: 70
End: 2025-04-22
Payer: MEDICARE

## 2025-04-27 DIAGNOSIS — M62.838 MUSCLE SPASM: ICD-10-CM

## 2025-04-27 DIAGNOSIS — I27.20 PULMONARY HYPERTENSION: ICD-10-CM

## 2025-04-27 DIAGNOSIS — G47.00 INSOMNIA, UNSPECIFIED TYPE: ICD-10-CM

## 2025-04-28 RX ORDER — TRAZODONE HYDROCHLORIDE 100 MG/1
150 TABLET ORAL NIGHTLY PRN
Qty: 135 TABLET | Refills: 1 | Status: SHIPPED | OUTPATIENT
Start: 2025-04-28

## 2025-04-28 RX ORDER — CYCLOBENZAPRINE HCL 5 MG
5 TABLET ORAL NIGHTLY PRN
Qty: 30 TABLET | Refills: 1 | Status: SHIPPED | OUTPATIENT
Start: 2025-04-28

## 2025-04-28 RX ORDER — MYCOPHENOLATE MOFETIL 500 MG/1
1500 TABLET ORAL 2 TIMES DAILY
Qty: 520 TABLET | Refills: 1 | Status: SHIPPED | OUTPATIENT
Start: 2025-04-28

## 2025-05-16 ENCOUNTER — LAB VISIT (OUTPATIENT)
Dept: LAB | Facility: HOSPITAL | Age: 70
End: 2025-05-16
Attending: STUDENT IN AN ORGANIZED HEALTH CARE EDUCATION/TRAINING PROGRAM
Payer: MEDICARE

## 2025-05-16 ENCOUNTER — RESULTS FOLLOW-UP (OUTPATIENT)
Dept: RHEUMATOLOGY | Facility: CLINIC | Age: 70
End: 2025-05-16

## 2025-05-16 DIAGNOSIS — K21.9 GASTROESOPHAGEAL REFLUX DISEASE, UNSPECIFIED WHETHER ESOPHAGITIS PRESENT: ICD-10-CM

## 2025-05-16 DIAGNOSIS — M34.9 LIMITED SYSTEMIC SCLEROSIS: ICD-10-CM

## 2025-05-16 DIAGNOSIS — I27.20 PULMONARY HYPERTENSION: ICD-10-CM

## 2025-05-16 DIAGNOSIS — Z79.899 HIGH RISK MEDICATION USE: ICD-10-CM

## 2025-05-16 DIAGNOSIS — D84.821 DRUG-INDUCED IMMUNODEFICIENCY: ICD-10-CM

## 2025-05-16 DIAGNOSIS — J84.9 INTERSTITIAL LUNG DISEASE: ICD-10-CM

## 2025-05-16 DIAGNOSIS — I73.00 RAYNAUD'S DISEASE WITHOUT GANGRENE: ICD-10-CM

## 2025-05-16 DIAGNOSIS — Z79.899 DRUG-INDUCED IMMUNODEFICIENCY: ICD-10-CM

## 2025-05-16 LAB
ABSOLUTE EOSINOPHIL (SMH): 0.19 K/UL
ABSOLUTE MONOCYTE (SMH): 0.35 K/UL (ref 0.3–1)
ABSOLUTE NEUTROPHIL COUNT (SMH): 4.2 K/UL (ref 1.8–7.7)
ALBUMIN SERPL-MCNC: 4.3 G/DL (ref 3.5–5.2)
ALP SERPL-CCNC: 64 UNIT/L (ref 55–135)
ALT SERPL-CCNC: 7 UNIT/L (ref 10–44)
ANION GAP (SMH): 6 MMOL/L (ref 8–16)
AST SERPL-CCNC: 11 UNIT/L (ref 10–40)
BASOPHILS # BLD AUTO: 0.03 K/UL
BASOPHILS NFR BLD AUTO: 0.5 %
BILIRUB SERPL-MCNC: 0.5 MG/DL (ref 0.1–1)
BILIRUB UR QL STRIP.AUTO: NEGATIVE
BUN SERPL-MCNC: 12 MG/DL (ref 8–23)
C-REACTIVE PROTEIN (SMH): 0.3 MG/DL
CALCIUM SERPL-MCNC: 9.6 MG/DL (ref 8.7–10.5)
CHLORIDE SERPL-SCNC: 109 MMOL/L (ref 95–110)
CLARITY UR: CLEAR
CO2 SERPL-SCNC: 26 MMOL/L (ref 23–29)
COLOR UR AUTO: YELLOW
CREAT SERPL-MCNC: 0.8 MG/DL (ref 0.5–1.4)
CREAT UR-MCNC: 41.7 MG/DL (ref 15–325)
ERYTHROCYTE [DISTWIDTH] IN BLOOD BY AUTOMATED COUNT: 13.2 % (ref 11.5–14.5)
ERYTHROCYTE [SEDIMENTATION RATE] IN BLOOD: 5 MM/HR (ref 0–20)
GFR SERPLBLD CREATININE-BSD FMLA CKD-EPI: >60 ML/MIN/1.73/M2
GLUCOSE SERPL-MCNC: 98 MG/DL (ref 70–110)
GLUCOSE UR QL STRIP: NEGATIVE
HCT VFR BLD AUTO: 41.2 % (ref 37–48.5)
HGB BLD-MCNC: 13.2 GM/DL (ref 12–16)
HGB UR QL STRIP: NEGATIVE
IMM GRANULOCYTES # BLD AUTO: 0.02 K/UL (ref 0–0.04)
IMM GRANULOCYTES NFR BLD AUTO: 0.3 % (ref 0–0.5)
KETONES UR QL STRIP: NEGATIVE
LEUKOCYTE ESTERASE UR QL STRIP: NEGATIVE
LYMPHOCYTES # BLD AUTO: 0.97 K/UL (ref 1–4.8)
MCH RBC QN AUTO: 29.6 PG (ref 27–31)
MCHC RBC AUTO-ENTMCNC: 32 G/DL (ref 32–36)
MCV RBC AUTO: 92 FL (ref 82–98)
NITRITE UR QL STRIP: NEGATIVE
NUCLEATED RBC (/100WBC) (SMH): 0 /100 WBC
PH UR STRIP: 7 [PH]
PLATELET # BLD AUTO: 186 K/UL (ref 150–450)
PMV BLD AUTO: 11.2 FL (ref 9.2–12.9)
POTASSIUM SERPL-SCNC: 3.7 MMOL/L (ref 3.5–5.1)
PROT SERPL-MCNC: 7 GM/DL (ref 6–8.4)
PROT UR QL STRIP: NEGATIVE
PROT UR-MCNC: 4 MG/DL
PROT/CREAT UR: 0.1 MG/G{CREAT}
RBC # BLD AUTO: 4.46 M/UL (ref 4–5.4)
RELATIVE EOSINOPHIL (SMH): 3.3 % (ref 0–8)
RELATIVE LYMPHOCYTE (SMH): 16.9 % (ref 18–48)
RELATIVE MONOCYTE (SMH): 6.1 % (ref 4–15)
RELATIVE NEUTROPHIL (SMH): 72.9 % (ref 38–73)
SODIUM SERPL-SCNC: 141 MMOL/L (ref 136–145)
SP GR UR STRIP: 1.01
UROBILINOGEN UR STRIP-ACNC: NEGATIVE EU/DL
WBC # BLD AUTO: 5.74 K/UL (ref 3.9–12.7)

## 2025-05-16 PROCEDURE — 86225 DNA ANTIBODY NATIVE: CPT

## 2025-05-16 PROCEDURE — 82947 ASSAY GLUCOSE BLOOD QUANT: CPT

## 2025-05-16 PROCEDURE — 84156 ASSAY OF PROTEIN URINE: CPT

## 2025-05-16 PROCEDURE — 86160 COMPLEMENT ANTIGEN: CPT

## 2025-05-16 PROCEDURE — 36415 COLL VENOUS BLD VENIPUNCTURE: CPT

## 2025-05-16 PROCEDURE — 85025 COMPLETE CBC W/AUTO DIFF WBC: CPT

## 2025-05-16 PROCEDURE — 85651 RBC SED RATE NONAUTOMATED: CPT

## 2025-05-16 PROCEDURE — 86140 C-REACTIVE PROTEIN: CPT

## 2025-05-16 PROCEDURE — 81003 URINALYSIS AUTO W/O SCOPE: CPT

## 2025-05-16 PROCEDURE — 86160 COMPLEMENT ANTIGEN: CPT | Mod: 59

## 2025-05-17 LAB — DSDNA AB SER-ACNC: <1 IU/ML (ref 0–9)

## 2025-05-18 DIAGNOSIS — K30 DELAYED GASTRIC EMPTYING: ICD-10-CM

## 2025-05-18 LAB
C3 SERPL-MCNC: 133 MG/DL (ref 82–167)
C4 SERPL-MCNC: 20 MG/DL (ref 12–38)

## 2025-05-18 RX ORDER — ONDANSETRON 8 MG/1
TABLET, FILM COATED ORAL
Qty: 30 TABLET | Refills: 3 | Status: SHIPPED | OUTPATIENT
Start: 2025-05-18

## 2025-05-19 ENCOUNTER — PATIENT MESSAGE (OUTPATIENT)
Dept: GASTROENTEROLOGY | Facility: CLINIC | Age: 70
End: 2025-05-19
Payer: MEDICARE

## 2025-05-22 NOTE — PROGRESS NOTES
Subjective:      Patient ID: Edith Tran is a 70 y.o. female.    Chief Complaint: Disease Management    HPI    Rheumatologic History:      - Diagnosis/es:              - limited systemic sclerosis diagnosed in  by Dr Barnes and characterized by SHILPI 1:1280 nucleolar, inflammatory arthritis, esophageal dysmotility, GERD, Raynaud's, interstitial lung disease, and pulmonary hypertension              - Crohn's disease diagnosed around 2019- active on colonoscopy 12/10/2024  - Social History: Former smoker, denies alcohol intake  - Family History: No autoimmune conditions  - Gyn History: , 3 intentional abortions  - Positive serologies: SHILPI 1:1280 nucleolar  - Negative serologies: Scl 70, RNAP III, Th/To, myomarker panel,PM/Scl, Sm/RNP, dsDNA, RF  - Infectious screening labs:  Negative hepatitis-B, C, and QuantiFERON (2023)  - TPMT normal metabolizer  - Imaging:                  - CT chest (25) Enlarging 1.5 cm soft tissue density mass in the right middle lobe is noted. This could be an infiltrate related to the patient's severe interstitial lung disease or a neoplasm and PET CT and tissue sampling may be indicated. Patient has severe interstitial lung disease and multiple bulla especially in the lower lung fields.               - TTE (25) EF >55%, LA borderline dilated              - DEXA (2024) osteopenia 7.7% risk of a major osteoporotic fracture and a 0.5% risk of hip fracture in the next 10 years (FRAX).   - Procedures:  - Procedures:               - PFT (25) pending              - RHC (2024) normal pulmonary pressures              - EMG/NCV (24) left superficial peroneal sensory neuropathy              - EGD (12/10/24) few superficial esophageal ulcers with no bleeding and no stigmata of recent bleeding; moderate Schatzki's ring found in the lower third of the esophagus s/p dilation.               - Colonoscopy (12/10/2024) diverticulosis in the sigmoid colon and in the  "descending colon. Crohn's disease with ileitis. Moderate severity inflammation was found.   - Previous Treatments:              - HCQ 200mg BID: caused "eye problems"              - MTX              - Reglan  - Tyvaso (treprostinil) QID  - Current Treatments:               - MMF 1500mg BID              - Tadalafil (Adcirca) 40mg daily  - Opsumit (macitentan) 10mg daily              - Ofev 150mg BID   - nifedipine 30mg daily              - Voquezna per GI: still requesting PA               - Flexeril 10mg QHS PRN  Interval History:  Since her last visit, she has been diagnosed with adenocarcinoma of the right lung. She is not a surgical candidate and has been referred to radiation oncology. She plans to start radiation the first week of June. She has seen Dr Neal who added nifedipine for Raynaud's. Her Raynaud's is better controlled. GERD is controlled on the Voquezna but she is running out of samples. She still has some dysphagia, and she has persistent pain and swelling in the hands, wrists and elbows. She has intermittent diarrhea.     Objective:   /84 (BP Location: Left arm, Patient Position: Sitting)   Pulse 84   Ht 5' 6" (1.676 m)   Wt 92.4 kg (203 lb 11.3 oz)   BMI 32.88 kg/m²   Physical Exam   Constitutional: normal appearance. No distress.   HENT:   Head: Normocephalic and atraumatic.   Cardiovascular: Normal rate, regular rhythm and normal heart sounds.   Pulmonary/Chest: Effort normal. She has rales (Bibasilar).   Musculoskeletal:      Comments: Synovial hypertrophy of bilateral 2nd and 3rd MCPs  Puffy fingers   Neurological: She is alert.   Skin: Skin is warm and dry. No rash noted.   + Minimal telangiectasias  + Sclerodactyly  + Abnormal nailfold capillaries  + Cold fingers  No digital pitting       No data to display    Labs (5/16/25)   CBC WBC, HGB, PLT WNL   CMP CR, AST, ALT WNL   ESR CRP WNL  UA UPC WNL  C3 C4 WNL  Negative dsDNA     Assessment:     1. Limited systemic sclerosis    2. " Pulmonary hypertension    3. Interstitial lung disease    4. Raynaud's disease without gangrene    5. Gastroesophageal reflux disease, unspecified whether esophagitis present    6. Drug-induced immunodeficiency    7. High risk medication use        This is a 70-year-old woman with history of AAA, insomnia, ELAINE on CPAP, esophageal stricture, diverticulosis, B12 deficiency, esophageal leukoplakia, chronic back pain, Crohn's disease, and limited systemic sclerosis diagnosed in 2009 by Dr Barnes and characterized by inflammatory arthritis, telangiectasias, esophageal dysmotility, GERD, Raynaud's, abnormal nailfold capillaries interstitial lung disease, and pulmonary hypertension on MMF 1500 mg b.i.d., nifedipine 30mg daily, Adcirca 40 mg daily, Opsumit, Ofev, and Voquezna. Since her last visit, she has been diagnosed with adenocarcinoma of the right lung. She is not a surgical candidate and has been referred to radiation oncology. She plans to start radiation the first week of June. She has seen Dr Neal who added nifedipine for Raynaud's. Her Raynaud's is better controlled. GERD is controlled on the Voquezna but she is running out of samples. She still has some dysphagia, and she has persistent pain and swelling in the hands, wrists and elbows. She has intermittent diarrhea. I discussed potentially adding LEF to help with the inflammatory arthritis but would like her to clear this with her radiation oncologist first.     Plan:     Problem List Items Addressed This Visit          Pulmonary    Interstitial lung disease       Cardiac/Vascular    Raynaud's disease    Pulmonary hypertension       GI    GERD (gastroesophageal reflux disease)       Palliative Care    High risk medication use     Other Visit Diagnoses         Limited systemic sclerosis    -  Primary      Drug-induced immunodeficiency              1.) Scleroderma   - MMF 1500mg BID. Hold for infection  - I discussed potential side effecst of leflunomide  including infection, blood count abnormalities, liver/kidney abnormalities, and GI upset. ACR patient information sheet on leflunomide was provided for additional information.  - Pulm HTN: Tadalafil (Adcirca) 40mg daily (per Dr Neal, cannot increase dose), Opsumit (macitentan)  - ILD: Ofev  - GERD: Voquezna  - Dysphagia: improved after dilation  - Raynaud's: nifedipine 30mg, Adcirca 40mg daily (per Dr Neal, cannot increase dose)     2.) Drug induce immunodeficiency  3.) High risk medication use  - CBC, CMP, ESR, CRP every 12 weeks  - Pre-DMARD labs yearly  - Immunizations: COVID x 4 (most recent 1/2023), flu (9/2022), PCV13 (8/2019), PPV23 (11/2019), Shingrix x 2 (2020); patient needs PCV 20      Follow up in 3 months    40 minutes of total time spent on the encounter, which includes face to face time and non-face to face time preparing to see the patient (eg, review of tests), Obtaining and/or reviewing separately obtained history, Documenting clinical information in the electronic or other health record, Independently interpreting results (not separately reported) and communicating results to the patient/family/caregiver, or Care coordination (not separately reported).     This note was prepared with [employer] Direct voice recognition transcription software. Garbled syntax, mangled pronouns, and other bizarre constructions may be attributed to that software system       Nieves Blanchard M.D.  Rheumatology Dept  [place], [place]

## 2025-05-23 ENCOUNTER — OFFICE VISIT (OUTPATIENT)
Dept: RHEUMATOLOGY | Facility: CLINIC | Age: 70
End: 2025-05-23
Payer: MEDICARE

## 2025-05-23 VITALS
BODY MASS INDEX: 32.73 KG/M2 | DIASTOLIC BLOOD PRESSURE: 84 MMHG | HEART RATE: 84 BPM | WEIGHT: 203.69 LBS | SYSTOLIC BLOOD PRESSURE: 130 MMHG | HEIGHT: 66 IN

## 2025-05-23 DIAGNOSIS — I73.00 RAYNAUD'S DISEASE WITHOUT GANGRENE: ICD-10-CM

## 2025-05-23 DIAGNOSIS — K21.9 GASTROESOPHAGEAL REFLUX DISEASE, UNSPECIFIED WHETHER ESOPHAGITIS PRESENT: ICD-10-CM

## 2025-05-23 DIAGNOSIS — M34.9 LIMITED SYSTEMIC SCLEROSIS: Primary | ICD-10-CM

## 2025-05-23 DIAGNOSIS — D84.821 DRUG-INDUCED IMMUNODEFICIENCY: ICD-10-CM

## 2025-05-23 DIAGNOSIS — Z79.899 HIGH RISK MEDICATION USE: ICD-10-CM

## 2025-05-23 DIAGNOSIS — Z79.899 DRUG-INDUCED IMMUNODEFICIENCY: ICD-10-CM

## 2025-05-23 DIAGNOSIS — J84.9 INTERSTITIAL LUNG DISEASE: ICD-10-CM

## 2025-05-23 DIAGNOSIS — I27.20 PULMONARY HYPERTENSION: ICD-10-CM

## 2025-05-23 PROCEDURE — 99999 PR PBB SHADOW E&M-EST. PATIENT-LVL IV: CPT | Mod: PBBFAC,,, | Performed by: STUDENT IN AN ORGANIZED HEALTH CARE EDUCATION/TRAINING PROGRAM

## 2025-05-23 RX ORDER — CALC/MAG/B COMPLEX/D3/HERB 61
15 TABLET ORAL 2 TIMES DAILY
Qty: 60 CAPSULE | Refills: 11 | Status: SHIPPED | OUTPATIENT
Start: 2025-05-23 | End: 2026-05-23

## 2025-05-23 ASSESSMENT — ROUTINE ASSESSMENT OF PATIENT INDEX DATA (RAPID3)
PAIN SCORE: 6
TOTAL RAPID3 SCORE: 5.55
FATIGUE SCORE: 1.1
MDHAQ FUNCTION SCORE: 1.1
PATIENT GLOBAL ASSESSMENT SCORE: 7
PSYCHOLOGICAL DISTRESS SCORE: 2.2

## 2025-05-28 DIAGNOSIS — R25.2 CRAMPING OF HANDS: ICD-10-CM

## 2025-05-28 RX ORDER — LANOLIN ALCOHOL/MO/W.PET/CERES
CREAM (GRAM) TOPICAL
Qty: 90 TABLET | Refills: 0 | Status: SHIPPED | OUTPATIENT
Start: 2025-05-28

## 2025-05-28 NOTE — TELEPHONE ENCOUNTER
No care due was identified.  [facility] Embedded Care Due Messages. Reference number: [id number].   5/28/2025 5:57:51 AM CDT

## 2025-06-16 DIAGNOSIS — J47.9 BRONCHIECTASIS WITHOUT COMPLICATION: ICD-10-CM

## 2025-06-16 DIAGNOSIS — J44.9 CHRONIC OBSTRUCTIVE PULMONARY DISEASE, UNSPECIFIED COPD TYPE: ICD-10-CM

## 2025-06-16 RX ORDER — ALBUTEROL SULFATE 90 UG/1
INHALANT RESPIRATORY (INHALATION)
Qty: 8 G | Refills: 11 | Status: SHIPPED | OUTPATIENT
Start: 2025-06-16

## 2025-07-01 DIAGNOSIS — M62.838 MUSCLE SPASM: ICD-10-CM

## 2025-07-01 RX ORDER — CYCLOBENZAPRINE HCL 5 MG
5 TABLET ORAL NIGHTLY PRN
Qty: 30 TABLET | Refills: 3 | Status: SHIPPED | OUTPATIENT
Start: 2025-07-01

## 2025-07-01 NOTE — TELEPHONE ENCOUNTER
Pharmacy requesting refill on CYCLOBENZAPRINE   Pt's LOV 5/23/2025  Pt's NOV 9/5/2025  Medication pending

## 2025-07-22 DIAGNOSIS — E55.9 VITAMIN D DEFICIENCY: ICD-10-CM

## 2025-07-22 RX ORDER — ERGOCALCIFEROL 1.25 MG/1
50000 CAPSULE ORAL WEEKLY
Qty: 12 CAPSULE | Refills: 0 | Status: SHIPPED | OUTPATIENT
Start: 2025-07-22

## 2025-07-22 NOTE — TELEPHONE ENCOUNTER
Care Due:                  Date            Visit Type   Department     Provider  --------------------------------------------------------------------------------                                EP -                              [facility]  Last Visit: 11-      [facility]  Next Visit: None Scheduled  None         None Found                                                            Last  Test          Frequency    Reason                     Performed    Due Date  --------------------------------------------------------------------------------    Mg Level....  12 months..  magnesium................  09- 08-    Vitamin D...  12 months..  ergocalciferol...........  09- 08-    [employer] Embedded Care Due Messages. Reference number: [id number].   7/22/2025 5:38:35 AM CDT

## 2025-07-28 ENCOUNTER — TELEPHONE (OUTPATIENT)
Dept: VASCULAR SURGERY | Facility: CLINIC | Age: 70
End: 2025-07-28
Payer: MEDICARE

## 2025-08-01 DIAGNOSIS — K30 DELAYED GASTRIC EMPTYING: ICD-10-CM

## 2025-08-01 RX ORDER — ONDANSETRON 8 MG/1
TABLET, FILM COATED ORAL
Qty: 30 TABLET | Refills: 3 | Status: SHIPPED | OUTPATIENT
Start: 2025-08-01

## 2025-08-14 ENCOUNTER — HOSPITAL ENCOUNTER (OUTPATIENT)
Dept: RADIOLOGY | Facility: HOSPITAL | Age: 70
Discharge: HOME | End: 2025-08-14
Attending: THORACIC SURGERY (CARDIOTHORACIC VASCULAR SURGERY)
Payer: MEDICARE

## 2025-08-26 DIAGNOSIS — R25.2 CRAMPING OF HANDS: ICD-10-CM

## 2025-08-26 RX ORDER — LANOLIN ALCOHOL/MO/W.PET/CERES
CREAM (GRAM) TOPICAL
Qty: 90 TABLET | Refills: 0 | Status: SHIPPED | OUTPATIENT
Start: 2025-08-26

## 2025-09-05 ENCOUNTER — OFFICE VISIT (OUTPATIENT)
Dept: RHEUMATOLOGY | Facility: CLINIC | Age: 70
End: 2025-09-05
Payer: MEDICARE

## 2025-09-05 VITALS
HEIGHT: 66 IN | WEIGHT: 188.94 LBS | DIASTOLIC BLOOD PRESSURE: 77 MMHG | BODY MASS INDEX: 30.36 KG/M2 | HEART RATE: 94 BPM | SYSTOLIC BLOOD PRESSURE: 111 MMHG

## 2025-09-05 DIAGNOSIS — Z79.899 DRUG-INDUCED IMMUNODEFICIENCY: ICD-10-CM

## 2025-09-05 DIAGNOSIS — D84.821 DRUG-INDUCED IMMUNODEFICIENCY: ICD-10-CM

## 2025-09-05 DIAGNOSIS — I73.00 RAYNAUD'S DISEASE WITHOUT GANGRENE: ICD-10-CM

## 2025-09-05 DIAGNOSIS — I27.20 PULMONARY HYPERTENSION: ICD-10-CM

## 2025-09-05 DIAGNOSIS — J84.9 INTERSTITIAL LUNG DISEASE: ICD-10-CM

## 2025-09-05 DIAGNOSIS — K21.9 GASTROESOPHAGEAL REFLUX DISEASE, UNSPECIFIED WHETHER ESOPHAGITIS PRESENT: ICD-10-CM

## 2025-09-05 DIAGNOSIS — M34.9 LIMITED SYSTEMIC SCLEROSIS: Primary | ICD-10-CM

## 2025-09-05 DIAGNOSIS — Z79.899 HIGH RISK MEDICATION USE: ICD-10-CM

## 2025-09-05 PROCEDURE — 99999 PR PBB SHADOW E&M-EST. PATIENT-LVL IV: CPT | Mod: PBBFAC,,, | Performed by: STUDENT IN AN ORGANIZED HEALTH CARE EDUCATION/TRAINING PROGRAM

## 2025-09-05 ASSESSMENT — ROUTINE ASSESSMENT OF PATIENT INDEX DATA (RAPID3)
PAIN SCORE: 7.5
PATIENT GLOBAL ASSESSMENT SCORE: 7.5
PSYCHOLOGICAL DISTRESS SCORE: 4.4
FATIGUE SCORE: 1.1
TOTAL RAPID3 SCORE: 6.11
MDHAQ FUNCTION SCORE: 1

## (undated) DEVICE — GLOVE BIOGEL 7.5

## (undated) DEVICE — CANISTER SUCTION 2 LTR

## (undated) DEVICE — KIT POST CATARACT SURGERY

## (undated) DEVICE — GLOVE SENSICARE PI ALOE 7.5

## (undated) DEVICE — PACK CUSTOM EYE KIT

## (undated) DEVICE — APPLICATOR CHLORAPREP ORN 26ML

## (undated) DEVICE — SHEATH INTRODUCER 7FR 11CM

## (undated) DEVICE — TIP YANKAUERS BULB NO VENT

## (undated) DEVICE — TAPE SILICONE 1 X1 1/2

## (undated) DEVICE — COVER OVERHEAD SURG LT BLUE

## (undated) DEVICE — COVER PROBE US 5.5X58L NON LTX

## (undated) DEVICE — ELECTRODE REM PLYHSV RETURN 9

## (undated) DEVICE — SYR 10CC LUER LOCK

## (undated) DEVICE — CYSTOTOME IRRIG 24G 13MM

## (undated) DEVICE — KIT MICROINTRODUCE MINI 5X10CM

## (undated) DEVICE — PACK ENDOSCOPY GENERAL

## (undated) DEVICE — SUT 0 36IN PDS II VIO MONO

## (undated) DEVICE — TOWEL OR DISP STRL BLUE 4/PK

## (undated) DEVICE — SEE MEDLINE ITEM 157110

## (undated) DEVICE — Device

## (undated) DEVICE — SUT 1 36IN PDS II

## (undated) DEVICE — CATH SWAN GANZ STND 7FR

## (undated) DEVICE — TRAY CATH LAB OMC

## (undated) DEVICE — DRESSING MEPORE ISLAND 31/2X4

## (undated) DEVICE — SEE L#120831

## (undated) DEVICE — BLANKET UPPER BODY 78.7X29.9IN

## (undated) DEVICE — SOL BETADINE 5%

## (undated) DEVICE — BINDER ABDOMINAL 9 46-62

## (undated) DEVICE — SUT VICRYL 3-0 27 SH

## (undated) DEVICE — SUT VICRYL PLUS 4-0 P3 18IN

## (undated) DEVICE — NDL HYPO REG 25G X 1 1/2

## (undated) DEVICE — CLOSURE SKIN STERI STRIP 1/2X4